# Patient Record
Sex: MALE | Race: BLACK OR AFRICAN AMERICAN | NOT HISPANIC OR LATINO | Employment: OTHER | URBAN - METROPOLITAN AREA
[De-identification: names, ages, dates, MRNs, and addresses within clinical notes are randomized per-mention and may not be internally consistent; named-entity substitution may affect disease eponyms.]

---

## 2017-01-06 ENCOUNTER — COMMUNICATION - HEALTHEAST (OUTPATIENT)
Dept: FAMILY MEDICINE | Facility: CLINIC | Age: 52
End: 2017-01-06

## 2017-01-06 DIAGNOSIS — G47.00 INSOMNIA: ICD-10-CM

## 2017-01-09 ENCOUNTER — OFFICE VISIT - HEALTHEAST (OUTPATIENT)
Dept: FAMILY MEDICINE | Facility: CLINIC | Age: 52
End: 2017-01-09

## 2017-01-09 DIAGNOSIS — G82.20 PARAPLEGIA (H): ICD-10-CM

## 2017-01-09 DIAGNOSIS — L89.90 PRESSURE ULCER: ICD-10-CM

## 2017-01-09 DIAGNOSIS — Z00.00 HEALTH CARE MAINTENANCE: ICD-10-CM

## 2017-01-09 DIAGNOSIS — I10 ESSENTIAL HYPERTENSION, BENIGN: ICD-10-CM

## 2017-01-09 DIAGNOSIS — G89.29 OTHER CHRONIC PAIN: ICD-10-CM

## 2017-01-09 DIAGNOSIS — N31.9 NEUROGENIC BLADDER: ICD-10-CM

## 2017-01-15 ENCOUNTER — RECORDS - HEALTHEAST (OUTPATIENT)
Dept: ADMINISTRATIVE | Facility: OTHER | Age: 52
End: 2017-01-15

## 2017-01-24 ENCOUNTER — TRANSFERRED RECORDS (OUTPATIENT)
Dept: HEALTH INFORMATION MANAGEMENT | Facility: CLINIC | Age: 52
End: 2017-01-24

## 2017-01-24 ENCOUNTER — COMMUNICATION - HEALTHEAST (OUTPATIENT)
Dept: FAMILY MEDICINE | Facility: CLINIC | Age: 52
End: 2017-01-24

## 2017-01-31 ENCOUNTER — OFFICE VISIT - HEALTHEAST (OUTPATIENT)
Dept: VASCULAR SURGERY | Facility: CLINIC | Age: 52
End: 2017-01-31

## 2017-01-31 DIAGNOSIS — S71.101A OPEN THIGH WOUND, RIGHT, INITIAL ENCOUNTER: ICD-10-CM

## 2017-02-01 ENCOUNTER — COMMUNICATION - HEALTHEAST (OUTPATIENT)
Dept: FAMILY MEDICINE | Facility: CLINIC | Age: 52
End: 2017-02-01

## 2017-02-01 DIAGNOSIS — G89.29 OTHER CHRONIC PAIN: ICD-10-CM

## 2017-02-02 ENCOUNTER — COMMUNICATION - HEALTHEAST (OUTPATIENT)
Dept: FAMILY MEDICINE | Facility: CLINIC | Age: 52
End: 2017-02-02

## 2017-02-15 ENCOUNTER — COMMUNICATION - HEALTHEAST (OUTPATIENT)
Dept: FAMILY MEDICINE | Facility: CLINIC | Age: 52
End: 2017-02-15

## 2017-02-15 DIAGNOSIS — G47.00 INSOMNIA: ICD-10-CM

## 2017-02-19 ENCOUNTER — COMMUNICATION - HEALTHEAST (OUTPATIENT)
Dept: FAMILY MEDICINE | Facility: CLINIC | Age: 52
End: 2017-02-19

## 2017-02-19 DIAGNOSIS — G82.20 PARAPLEGIA (H): ICD-10-CM

## 2017-03-06 ENCOUNTER — OFFICE VISIT - HEALTHEAST (OUTPATIENT)
Dept: FAMILY MEDICINE | Facility: CLINIC | Age: 52
End: 2017-03-06

## 2017-03-06 DIAGNOSIS — G82.20 PARAPLEGIA (H): ICD-10-CM

## 2017-03-06 DIAGNOSIS — I10 ESSENTIAL HYPERTENSION, BENIGN: ICD-10-CM

## 2017-03-06 DIAGNOSIS — Z89.511 HX OF BKA, RIGHT (H): ICD-10-CM

## 2017-03-06 DIAGNOSIS — L97.119: ICD-10-CM

## 2017-03-06 DIAGNOSIS — D64.9 ANEMIA: ICD-10-CM

## 2017-03-06 DIAGNOSIS — Z00.00 ROUTINE GENERAL MEDICAL EXAMINATION AT A HEALTH CARE FACILITY: ICD-10-CM

## 2017-03-06 DIAGNOSIS — G89.29 OTHER CHRONIC PAIN: ICD-10-CM

## 2017-03-06 DIAGNOSIS — N31.9 NEUROGENIC BLADDER: ICD-10-CM

## 2017-03-06 LAB
CHOLEST SERPL-MCNC: 103 MG/DL
FASTING STATUS PATIENT QL REPORTED: YES
HDLC SERPL-MCNC: 43 MG/DL
LDLC SERPL CALC-MCNC: 50 MG/DL
PSA SERPL-MCNC: 1 NG/ML (ref 0–3.5)
TRIGL SERPL-MCNC: 51 MG/DL

## 2017-03-10 ENCOUNTER — COMMUNICATION - HEALTHEAST (OUTPATIENT)
Dept: FAMILY MEDICINE | Facility: CLINIC | Age: 52
End: 2017-03-10

## 2017-03-10 DIAGNOSIS — N31.9 NEUROGENIC BLADDER: ICD-10-CM

## 2017-03-13 ENCOUNTER — COMMUNICATION - HEALTHEAST (OUTPATIENT)
Dept: FAMILY MEDICINE | Facility: CLINIC | Age: 52
End: 2017-03-13

## 2017-03-16 ENCOUNTER — RECORDS - HEALTHEAST (OUTPATIENT)
Dept: ADMINISTRATIVE | Facility: OTHER | Age: 52
End: 2017-03-16

## 2017-03-21 ENCOUNTER — COMMUNICATION - HEALTHEAST (OUTPATIENT)
Dept: TELEHEALTH | Facility: CLINIC | Age: 52
End: 2017-03-21

## 2017-03-21 ENCOUNTER — OFFICE VISIT - HEALTHEAST (OUTPATIENT)
Dept: VASCULAR SURGERY | Facility: CLINIC | Age: 52
End: 2017-03-21

## 2017-03-21 DIAGNOSIS — S71.101A OPEN THIGH WOUND, RIGHT, INITIAL ENCOUNTER: ICD-10-CM

## 2017-03-26 ENCOUNTER — COMMUNICATION - HEALTHEAST (OUTPATIENT)
Dept: FAMILY MEDICINE | Facility: CLINIC | Age: 52
End: 2017-03-26

## 2017-03-26 DIAGNOSIS — G82.20 PARAPLEGIA (H): ICD-10-CM

## 2017-03-28 ENCOUNTER — COMMUNICATION - HEALTHEAST (OUTPATIENT)
Dept: FAMILY MEDICINE | Facility: CLINIC | Age: 52
End: 2017-03-28

## 2017-03-28 DIAGNOSIS — G89.29 OTHER CHRONIC PAIN: ICD-10-CM

## 2017-04-25 ENCOUNTER — COMMUNICATION - HEALTHEAST (OUTPATIENT)
Dept: FAMILY MEDICINE | Facility: CLINIC | Age: 52
End: 2017-04-25

## 2017-04-25 DIAGNOSIS — G82.20 PARAPLEGIA (H): ICD-10-CM

## 2017-05-01 ENCOUNTER — OFFICE VISIT - HEALTHEAST (OUTPATIENT)
Dept: FAMILY MEDICINE | Facility: CLINIC | Age: 52
End: 2017-05-01

## 2017-05-01 DIAGNOSIS — G89.29 OTHER CHRONIC PAIN: ICD-10-CM

## 2017-05-01 DIAGNOSIS — G82.20 PARAPLEGIA (H): ICD-10-CM

## 2017-05-01 DIAGNOSIS — Z89.511 HX OF BKA, RIGHT (H): ICD-10-CM

## 2017-05-01 DIAGNOSIS — D64.9 ANEMIA: ICD-10-CM

## 2017-05-25 ENCOUNTER — RECORDS - HEALTHEAST (OUTPATIENT)
Dept: GENERAL RADIOLOGY | Facility: CLINIC | Age: 52
End: 2017-05-25

## 2017-05-25 ENCOUNTER — OFFICE VISIT - HEALTHEAST (OUTPATIENT)
Dept: FAMILY MEDICINE | Facility: CLINIC | Age: 52
End: 2017-05-25

## 2017-05-25 DIAGNOSIS — G89.29 OTHER CHRONIC PAIN: ICD-10-CM

## 2017-05-25 DIAGNOSIS — G82.20 PARAPLEGIA (H): ICD-10-CM

## 2017-05-25 DIAGNOSIS — M25.511 SHOULDER PAIN, RIGHT: ICD-10-CM

## 2017-05-25 DIAGNOSIS — G47.00 INSOMNIA: ICD-10-CM

## 2017-05-25 DIAGNOSIS — M25.511 PAIN IN RIGHT SHOULDER: ICD-10-CM

## 2017-06-12 ENCOUNTER — COMMUNICATION - HEALTHEAST (OUTPATIENT)
Dept: FAMILY MEDICINE | Facility: CLINIC | Age: 52
End: 2017-06-12

## 2017-06-12 DIAGNOSIS — G82.20 PARAPLEGIA (H): ICD-10-CM

## 2017-06-22 ENCOUNTER — RECORDS - HEALTHEAST (OUTPATIENT)
Dept: ADMINISTRATIVE | Facility: OTHER | Age: 52
End: 2017-06-22

## 2017-06-26 ENCOUNTER — COMMUNICATION - HEALTHEAST (OUTPATIENT)
Dept: FAMILY MEDICINE | Facility: CLINIC | Age: 52
End: 2017-06-26

## 2017-06-26 DIAGNOSIS — G47.00 INSOMNIA: ICD-10-CM

## 2017-06-28 ENCOUNTER — TRANSFERRED RECORDS (OUTPATIENT)
Dept: HEALTH INFORMATION MANAGEMENT | Facility: CLINIC | Age: 52
End: 2017-06-28

## 2017-06-29 ENCOUNTER — OFFICE VISIT - HEALTHEAST (OUTPATIENT)
Dept: FAMILY MEDICINE | Facility: CLINIC | Age: 52
End: 2017-06-29

## 2017-06-29 DIAGNOSIS — G89.29 OTHER CHRONIC PAIN: ICD-10-CM

## 2017-06-29 DIAGNOSIS — M25.511 SHOULDER PAIN, RIGHT: ICD-10-CM

## 2017-07-11 ENCOUNTER — COMMUNICATION - HEALTHEAST (OUTPATIENT)
Dept: FAMILY MEDICINE | Facility: CLINIC | Age: 52
End: 2017-07-11

## 2017-07-11 DIAGNOSIS — G82.20 PARAPLEGIA (H): ICD-10-CM

## 2017-07-13 ENCOUNTER — RECORDS - HEALTHEAST (OUTPATIENT)
Dept: ADMINISTRATIVE | Facility: OTHER | Age: 52
End: 2017-07-13

## 2017-07-14 ENCOUNTER — COMMUNICATION - HEALTHEAST (OUTPATIENT)
Dept: FAMILY MEDICINE | Facility: CLINIC | Age: 52
End: 2017-07-14

## 2017-07-14 ENCOUNTER — AMBULATORY - HEALTHEAST (OUTPATIENT)
Dept: LAB | Facility: CLINIC | Age: 52
End: 2017-07-14

## 2017-07-14 DIAGNOSIS — G89.29 OTHER CHRONIC PAIN: ICD-10-CM

## 2017-07-20 ENCOUNTER — OFFICE VISIT - HEALTHEAST (OUTPATIENT)
Dept: FAMILY MEDICINE | Facility: CLINIC | Age: 52
End: 2017-07-20

## 2017-07-20 DIAGNOSIS — G82.20 PARAPLEGIA (H): ICD-10-CM

## 2017-07-20 DIAGNOSIS — L97.119: ICD-10-CM

## 2017-07-20 DIAGNOSIS — G89.29 OTHER CHRONIC PAIN: ICD-10-CM

## 2017-07-20 DIAGNOSIS — I10 ESSENTIAL HYPERTENSION, BENIGN: ICD-10-CM

## 2017-07-26 ENCOUNTER — COMMUNICATION - HEALTHEAST (OUTPATIENT)
Dept: FAMILY MEDICINE | Facility: CLINIC | Age: 52
End: 2017-07-26

## 2017-07-26 ENCOUNTER — TRANSFERRED RECORDS (OUTPATIENT)
Dept: HEALTH INFORMATION MANAGEMENT | Facility: CLINIC | Age: 52
End: 2017-07-26

## 2017-07-26 ENCOUNTER — OFFICE VISIT - HEALTHEAST (OUTPATIENT)
Dept: FAMILY MEDICINE | Facility: CLINIC | Age: 52
End: 2017-07-26

## 2017-07-26 DIAGNOSIS — G82.20 PARAPLEGIA (H): ICD-10-CM

## 2017-07-26 DIAGNOSIS — L97.119: ICD-10-CM

## 2017-07-26 DIAGNOSIS — F14.90 COCAINE USE: ICD-10-CM

## 2017-07-26 DIAGNOSIS — G89.29 OTHER CHRONIC PAIN: ICD-10-CM

## 2017-07-26 DIAGNOSIS — R25.2 SPASTICITY: ICD-10-CM

## 2017-08-07 ENCOUNTER — COMMUNICATION - HEALTHEAST (OUTPATIENT)
Dept: FAMILY MEDICINE | Facility: CLINIC | Age: 52
End: 2017-08-07

## 2017-08-15 ENCOUNTER — COMMUNICATION - HEALTHEAST (OUTPATIENT)
Dept: FAMILY MEDICINE | Facility: CLINIC | Age: 52
End: 2017-08-15

## 2017-08-16 ENCOUNTER — COMMUNICATION - HEALTHEAST (OUTPATIENT)
Dept: FAMILY MEDICINE | Facility: CLINIC | Age: 52
End: 2017-08-16

## 2017-08-16 DIAGNOSIS — G89.29 OTHER CHRONIC PAIN: ICD-10-CM

## 2017-08-30 ENCOUNTER — COMMUNICATION - HEALTHEAST (OUTPATIENT)
Dept: FAMILY MEDICINE | Facility: CLINIC | Age: 52
End: 2017-08-30

## 2017-08-30 DIAGNOSIS — N31.9 NEUROGENIC BLADDER: ICD-10-CM

## 2017-09-12 ENCOUNTER — COMMUNICATION - HEALTHEAST (OUTPATIENT)
Dept: FAMILY MEDICINE | Facility: CLINIC | Age: 52
End: 2017-09-12

## 2017-09-12 DIAGNOSIS — G89.29 OTHER CHRONIC PAIN: ICD-10-CM

## 2017-09-26 ENCOUNTER — OFFICE VISIT - HEALTHEAST (OUTPATIENT)
Dept: VASCULAR SURGERY | Facility: CLINIC | Age: 52
End: 2017-09-26

## 2017-09-26 DIAGNOSIS — S71.101A OPEN THIGH WOUND, RIGHT, INITIAL ENCOUNTER: ICD-10-CM

## 2017-09-27 ENCOUNTER — COMMUNICATION - HEALTHEAST (OUTPATIENT)
Dept: FAMILY MEDICINE | Facility: CLINIC | Age: 52
End: 2017-09-27

## 2017-10-02 ENCOUNTER — COMMUNICATION - HEALTHEAST (OUTPATIENT)
Dept: FAMILY MEDICINE | Facility: CLINIC | Age: 52
End: 2017-10-02

## 2017-10-02 DIAGNOSIS — N39.0 UTI (URINARY TRACT INFECTION): ICD-10-CM

## 2017-10-05 ENCOUNTER — COMMUNICATION - HEALTHEAST (OUTPATIENT)
Dept: PALLIATIVE MEDICINE | Facility: OTHER | Age: 52
End: 2017-10-05

## 2017-10-09 ENCOUNTER — COMMUNICATION - HEALTHEAST (OUTPATIENT)
Dept: FAMILY MEDICINE | Facility: CLINIC | Age: 52
End: 2017-10-09

## 2017-10-09 DIAGNOSIS — G89.29 OTHER CHRONIC PAIN: ICD-10-CM

## 2017-10-18 ENCOUNTER — OFFICE VISIT - HEALTHEAST (OUTPATIENT)
Dept: FAMILY MEDICINE | Facility: CLINIC | Age: 52
End: 2017-10-18

## 2017-10-18 DIAGNOSIS — G82.20 PARAPLEGIA (H): ICD-10-CM

## 2017-10-18 DIAGNOSIS — Z89.511 HX OF BKA, RIGHT (H): ICD-10-CM

## 2017-10-18 DIAGNOSIS — L02.92 BOIL: ICD-10-CM

## 2017-10-18 DIAGNOSIS — G89.29 OTHER CHRONIC PAIN: ICD-10-CM

## 2017-10-18 DIAGNOSIS — L97.119: ICD-10-CM

## 2017-10-18 DIAGNOSIS — N62 GYNECOMASTIA: ICD-10-CM

## 2017-10-24 ENCOUNTER — PRE VISIT (OUTPATIENT)
Dept: ORTHOPEDICS | Facility: CLINIC | Age: 52
End: 2017-10-24

## 2017-10-30 ENCOUNTER — COMMUNICATION - HEALTHEAST (OUTPATIENT)
Dept: FAMILY MEDICINE | Facility: CLINIC | Age: 52
End: 2017-10-30

## 2017-10-30 DIAGNOSIS — I10 HTN (HYPERTENSION): ICD-10-CM

## 2017-10-30 DIAGNOSIS — G47.00 INSOMNIA: ICD-10-CM

## 2017-10-31 ENCOUNTER — OFFICE VISIT (OUTPATIENT)
Dept: ORTHOPEDICS | Facility: CLINIC | Age: 52
End: 2017-10-31

## 2017-10-31 ENCOUNTER — RECORDS - HEALTHEAST (OUTPATIENT)
Dept: ADMINISTRATIVE | Facility: OTHER | Age: 52
End: 2017-10-31

## 2017-10-31 VITALS
DIASTOLIC BLOOD PRESSURE: 74 MMHG | SYSTOLIC BLOOD PRESSURE: 111 MMHG | TEMPERATURE: 98 F | RESPIRATION RATE: 16 BRPM | OXYGEN SATURATION: 100 % | HEART RATE: 78 BPM

## 2017-10-31 DIAGNOSIS — T14.8XXA OPEN WOUND: Primary | ICD-10-CM

## 2017-10-31 RX ORDER — OXYCODONE HYDROCHLORIDE 5 MG/1
5 TABLET ORAL 3 TIMES DAILY
COMMUNITY
Start: 2017-10-18 | End: 2019-04-02

## 2017-10-31 RX ORDER — BACLOFEN 20 MG/1
20 TABLET ORAL 4 TIMES DAILY
Status: ON HOLD | COMMUNITY
Start: 2017-07-26 | End: 2019-05-28

## 2017-10-31 RX ORDER — METOPROLOL SUCCINATE 50 MG/1
50 TABLET, EXTENDED RELEASE ORAL DAILY
Status: ON HOLD | COMMUNITY
Start: 2016-11-25 | End: 2019-05-28

## 2017-10-31 RX ORDER — TOLTERODINE 4 MG/1
8 CAPSULE, EXTENDED RELEASE ORAL DAILY
Status: ON HOLD | COMMUNITY
Start: 2017-09-01 | End: 2019-05-28

## 2017-10-31 RX ORDER — FERROUS SULFATE 325(65) MG
325 TABLET ORAL DAILY
COMMUNITY
Start: 2017-03-10 | End: 2018-03-10

## 2017-10-31 RX ORDER — ZOLPIDEM TARTRATE 10 MG/1
10 TABLET ORAL AT BEDTIME
COMMUNITY
Start: 2017-06-26 | End: 2019-04-02

## 2017-10-31 ASSESSMENT — ENCOUNTER SYMPTOMS
NECK PAIN: 1
MUSCLE WEAKNESS: 1
ARTHRALGIAS: 1
SKIN CHANGES: 0
BACK PAIN: 1
NAIL CHANGES: 0
POOR WOUND HEALING: 1
MYALGIAS: 1

## 2017-10-31 NOTE — NURSING NOTE
Reason For Visit:   Chief Complaint   Patient presents with     Consult     Right thigh ulcer       ?  No  Occupation: Customer Service  Currently working? Yes.  Work status?  On medical leave.  Date of injury: N/A  Type of injury: Ulcer.  Date of surgery: 1995  Type of surgery: Right leg amputation  Smoker: No      Pain Assessment  Patient Currently in Pain: No

## 2017-10-31 NOTE — PROGRESS NOTES
Dear Dr. Petit,    Thank you for asking me to see Mr. Crump for evaluation of a chronic right posterior thigh and buttock secondary to complications of paraplegia. As you know [facility] team has spent significant time and energy working with Mr. Crump to manage his wound. His primary problem at this point appears to be the need to rest the wound on his chair when sitting as well as the significant edema and swelling that has resulted in a very large below-knee amputation stump. He expresses difficulty in his mobility because of the size and weight of the right lower extremity.    Details of our assessment and plan her attention Dr. Rizzo's note. I saw Mr. Crump with Dr. Rizzo today. In summary I believe we will be able to provide him with surgical management of his problems. This would likely require a modified hip disarticulation and significant involvement with our plastic surgery team. I recommended and we will schedule an appointment with Dr. Felicia Blackmon. We will also obtain a CT scan to help predict the orientation of the osteotomy that will be required to remove remove his femoral acetabular interface.    He is asked that if surgery is performed to be performed early in 2018. I will therefore see him back after consultation with Dr. Blackmon and review of his CT scan to discuss the risk and potential benefits of proceeding with what will be a significant surgery.    I hope this is helpful please contact me with any questions are team at [facility] or appreciate being involved in his care.     Sincerely,
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
## 2017-10-31 NOTE — MR AVS SNAPSHOT
After Visit Summary   10/31/2017    Josiah Crump    MRN: [id number]           Patient Information     Date Of Birth          1965        Visit Information        Provider Department      10/31/2017 3:00 PM Mayur Murphy MD [facility]        Today's Diagnoses     Open wound    -  1       Follow-ups after your visit        Who to contact     Please call your clinic at [phone] to:    Ask questions about your health    Make or cancel appointments    Discuss your medicines    Learn about your test results    Speak to your doctor   If you have compliments or concerns about an experience at your clinic, or if you wish to file a complaint, please contact [facility] Patient Relations at [phone] or email us at [email or web address]         Additional Information About Your Visit        MyChart Information     [employer] is an electronic gateway that provides easy, online access to your medical records. With [employer] you can request a clinic appointment, read your test results, renew a prescription or communicate with your care team.     To sign up for [employer] visit the website at [email or web address]   You will be asked to enter the access code listed below, as well as some personal information. Please follow the directions to create your username and password.     Your access code is: [email or web address]  Expires: 2018  6:30 AM     Your access code will  in 90 days. If you need help or a new code, please contact your [facility] or call [phone] for assistance.        Care EveryWhere ID     This is your Care EveryWhere ID. This could be used by other organizations to access your [facility] medical records  [id number]        Your Vitals Were     Pulse Temperature Respirations Pulse Oximetry          78 98  F (36.7  C) (Oral) 16 100%         Blood Pressure from Last 3 Encounters:   10/31/17  111/74    Weight from Last 3 Encounters:   No data found for Wt               Primary Care Provider Office Phone # Fax #    Marvel Petit [phone]       [address] [address]        Equal Access to Services     JOSTIN PEREA : [place] darden germaino Soomaali, waaxda luqadaha, qaybta kaalmada adekodakyada, adelfo aaliyahin hayaagrant kimblekodak ronquillo darrell sales. So [facility] [phone].    ATENCIÓN: Si habla español, tiene a aldridge disposición servicios gratuitos de asistencia lingüística. Llame al [phone].    We comply with applicable federal civil rights laws and [place] laws. We do not discriminate on the basis of race, color, national origin, age, disability, sex, sexual orientation, or gender identity.            Thank you!     Thank you for choosing [facility]  for your care. Our goal is always to provide you with excellent care. Hearing back from our patients is one way we can continue to improve our services. Please take a few minutes to complete the written survey that you may receive in the mail after your visit with us. Thank you!             Your Updated Medication List - Protect others around you: Learn how to safely use, store and throw away your medicines at www.disposemymeds.org.          This list is accurate as of: 10/31/17  6:53 PM.  Always use your most recent med list.                   Brand Name Dispense Instructions for use Diagnosis    baclofen 20 MG tablet    LIORESAL     Take 20 mg by mouth 4 times daily        ferrous sulfate 325 (65 FE) MG tablet    IRON     Take 325 mg by mouth daily        metoprolol 50 MG 24 hr tablet    TOPROL-XL     Take 50 mg by mouth daily        * OXYCONTIN PO      Take 60 mg by mouth 3 times daily        * oxyCODONE 5 MG IR tablet    ROXICODONE     Take 5 mg by mouth 3 times daily        tolterodine 4 MG 24 hr capsule    DETROL LA     Take 4 mg by mouth daily        zolpidem 10 MG tablet    AMBIEN     Take 10 mg by  mouth At Bedtime        * Notice:  This list has 2 medication(s) that are the same as other medications prescribed for you. Read the directions carefully, and ask your doctor or other care provider to review them with you.

## 2017-11-02 ENCOUNTER — OFFICE VISIT - HEALTHEAST (OUTPATIENT)
Dept: FAMILY MEDICINE | Facility: CLINIC | Age: 52
End: 2017-11-02

## 2017-11-02 DIAGNOSIS — N62 GYNECOMASTIA: ICD-10-CM

## 2017-11-02 DIAGNOSIS — L89.90 PRESSURE ULCER: ICD-10-CM

## 2017-11-02 DIAGNOSIS — Z89.511 HX OF BKA, RIGHT (H): ICD-10-CM

## 2017-11-02 DIAGNOSIS — G89.29 OTHER CHRONIC PAIN: ICD-10-CM

## 2017-11-07 ENCOUNTER — TELEPHONE (OUTPATIENT)
Dept: ORTHOPEDICS | Facility: CLINIC | Age: 52
End: 2017-11-07

## 2017-11-07 NOTE — TELEPHONE ENCOUNTER
RN called and spoke with Mr. Crump.  He is scheduled at [facility] with Dr. Norman Mora. An associate of Dr. Coates.  On 11-15-17 at 1000.  We will try and arrange for CT pelvis to be done same day prior to this visit.  Pt is in agreement with this plan.

## 2017-11-14 ENCOUNTER — COMMUNICATION - HEALTHEAST (OUTPATIENT)
Dept: FAMILY MEDICINE | Facility: CLINIC | Age: 52
End: 2017-11-14

## 2017-11-14 DIAGNOSIS — G89.29 OTHER CHRONIC PAIN: ICD-10-CM

## 2017-11-15 ENCOUNTER — RECORDS - HEALTHEAST (OUTPATIENT)
Dept: ADMINISTRATIVE | Facility: OTHER | Age: 52
End: 2017-11-15

## 2017-11-15 ENCOUNTER — HOSPITAL ENCOUNTER (OUTPATIENT)
Dept: WOUND CARE | Facility: CLINIC | Age: 52
Discharge: HOME | End: 2017-11-15
Attending: SURGERY | Admitting: SURGERY
Payer: MEDICARE

## 2017-11-15 ENCOUNTER — TELEPHONE (OUTPATIENT)
Dept: WOUND CARE | Facility: CLINIC | Age: 52
End: 2017-11-15

## 2017-11-15 VITALS — DIASTOLIC BLOOD PRESSURE: 63 MMHG | TEMPERATURE: 98.6 F | HEART RATE: 84 BPM | SYSTOLIC BLOOD PRESSURE: 117 MMHG

## 2017-11-15 LAB
ALBUMIN SERPL-MCNC: 2.3 G/DL (ref 3.4–5)
CRP SERPL-MCNC: 96.2 MG/L (ref 0–8)
ERYTHROCYTE [SEDIMENTATION RATE] IN BLOOD BY WESTERGREN METHOD: 91 MM/H (ref 0–20)
PREALB SERPL IA-MCNC: 12 MG/DL (ref 15–45)

## 2017-11-15 PROCEDURE — 85652 RBC SED RATE AUTOMATED: CPT | Performed by: SURGERY

## 2017-11-15 PROCEDURE — 84134 ASSAY OF PREALBUMIN: CPT | Performed by: SURGERY

## 2017-11-15 PROCEDURE — 99203 OFFICE O/P NEW LOW 30 MIN: CPT | Performed by: SURGERY

## 2017-11-15 PROCEDURE — 86140 C-REACTIVE PROTEIN: CPT | Performed by: SURGERY

## 2017-11-15 PROCEDURE — 99214 OFFICE O/P EST MOD 30 MIN: CPT | Mod: 25

## 2017-11-15 PROCEDURE — 97602 WOUND(S) CARE NON-SELECTIVE: CPT

## 2017-11-15 PROCEDURE — 82040 ASSAY OF SERUM ALBUMIN: CPT | Performed by: SURGERY

## 2017-11-15 NOTE — TELEPHONE ENCOUNTER
Called pt to let him know that Dr. Romero recommends 100 gms of protein daily to promote wound healing according to today's prealbumin and albumin levels. Pt education completed regarding high protein foods in addition to drinking two Ensure supplements daily.
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
## 2017-11-16 ENCOUNTER — OFFICE VISIT - HEALTHEAST (OUTPATIENT)
Dept: FAMILY MEDICINE | Facility: CLINIC | Age: 52
End: 2017-11-16

## 2017-11-16 DIAGNOSIS — E88.09 HYPOALBUMINEMIA: ICD-10-CM

## 2017-11-16 DIAGNOSIS — Z89.511 HX OF BKA, RIGHT (H): ICD-10-CM

## 2017-11-16 DIAGNOSIS — D64.9 ANEMIA: ICD-10-CM

## 2017-11-16 DIAGNOSIS — N62 GYNECOMASTIA: ICD-10-CM

## 2017-11-16 DIAGNOSIS — L89.90 PRESSURE ULCER: ICD-10-CM

## 2017-11-27 ENCOUNTER — AMBULATORY - HEALTHEAST (OUTPATIENT)
Dept: FAMILY MEDICINE | Facility: CLINIC | Age: 52
End: 2017-11-27

## 2017-11-27 ENCOUNTER — COMMUNICATION - HEALTHEAST (OUTPATIENT)
Dept: FAMILY MEDICINE | Facility: CLINIC | Age: 52
End: 2017-11-27

## 2017-11-27 DIAGNOSIS — N31.9 NEUROGENIC BLADDER: ICD-10-CM

## 2017-11-27 DIAGNOSIS — G89.29 OTHER CHRONIC PAIN: ICD-10-CM

## 2017-11-27 DIAGNOSIS — G47.00 INSOMNIA: ICD-10-CM

## 2017-12-04 ENCOUNTER — COMMUNICATION - HEALTHEAST (OUTPATIENT)
Dept: FAMILY MEDICINE | Facility: CLINIC | Age: 52
End: 2017-12-04

## 2017-12-04 DIAGNOSIS — G89.29 OTHER CHRONIC PAIN: ICD-10-CM

## 2017-12-05 ENCOUNTER — COMMUNICATION - HEALTHEAST (OUTPATIENT)
Dept: FAMILY MEDICINE | Facility: CLINIC | Age: 52
End: 2017-12-05

## 2017-12-05 ENCOUNTER — RECORDS - HEALTHEAST (OUTPATIENT)
Dept: ADMINISTRATIVE | Facility: OTHER | Age: 52
End: 2017-12-05

## 2017-12-11 ENCOUNTER — HOSPITAL ENCOUNTER (OUTPATIENT)
Dept: MAMMOGRAPHY | Facility: CLINIC | Age: 52
Discharge: HOME | End: 2017-12-11
Attending: FAMILY MEDICINE

## 2017-12-11 ENCOUNTER — HOSPITAL ENCOUNTER (OUTPATIENT)
Dept: ULTRASOUND IMAGING | Facility: CLINIC | Age: 52
Discharge: HOME | End: 2017-12-11
Attending: FAMILY MEDICINE

## 2017-12-11 ENCOUNTER — COMMUNICATION - HEALTHEAST (OUTPATIENT)
Dept: FAMILY MEDICINE | Facility: CLINIC | Age: 52
End: 2017-12-11

## 2017-12-11 DIAGNOSIS — N62 GYNECOMASTIA: ICD-10-CM

## 2018-01-03 ENCOUNTER — COMMUNICATION - HEALTHEAST (OUTPATIENT)
Dept: FAMILY MEDICINE | Facility: CLINIC | Age: 53
End: 2018-01-03

## 2018-01-03 ENCOUNTER — OFFICE VISIT - HEALTHEAST (OUTPATIENT)
Dept: FAMILY MEDICINE | Facility: CLINIC | Age: 53
End: 2018-01-03

## 2018-01-03 DIAGNOSIS — N62 GYNECOMASTIA: ICD-10-CM

## 2018-01-03 DIAGNOSIS — R77.9 ELEVATED BLOOD PROTEIN: ICD-10-CM

## 2018-01-03 DIAGNOSIS — G89.29 OTHER CHRONIC PAIN: ICD-10-CM

## 2018-01-03 DIAGNOSIS — R79.89 ELEVATED PROLACTIN LEVEL: ICD-10-CM

## 2018-01-03 DIAGNOSIS — R61 NIGHT SWEATS: ICD-10-CM

## 2018-01-03 DIAGNOSIS — R97.8 OTHER ABNORMAL TUMOR MARKERS: ICD-10-CM

## 2018-01-03 DIAGNOSIS — L89.90 PRESSURE ULCER: ICD-10-CM

## 2018-01-03 LAB
ALBUMIN SERPL-MCNC: 2.6 G/DL (ref 3.5–5)
ALP SERPL-CCNC: 84 U/L (ref 45–120)
ALT SERPL W P-5'-P-CCNC: <6 U/L (ref 0–45)
ANION GAP SERPL CALCULATED.3IONS-SCNC: 12 MMOL/L (ref 5–18)
AST SERPL W P-5'-P-CCNC: 9 U/L (ref 0–40)
BILIRUB SERPL-MCNC: 0.3 MG/DL (ref 0–1)
BUN SERPL-MCNC: 10 MG/DL (ref 8–22)
CALCIUM SERPL-MCNC: 9.3 MG/DL (ref 8.5–10.5)
CHLORIDE BLD-SCNC: 102 MMOL/L (ref 98–107)
CO2 SERPL-SCNC: 24 MMOL/L (ref 22–31)
CREAT SERPL-MCNC: 0.75 MG/DL (ref 0.7–1.3)
GFR SERPL CREATININE-BSD FRML MDRD: >60 ML/MIN/1.73M2
GLUCOSE BLD-MCNC: 94 MG/DL (ref 70–125)
POTASSIUM BLD-SCNC: 4.3 MMOL/L (ref 3.5–5)
PROLACTIN SERPL-MCNC: 20.5 NG/ML (ref 0–15)
PROT SERPL-MCNC: 9.3 G/DL (ref 6–8)
SODIUM SERPL-SCNC: 138 MMOL/L (ref 136–145)
T4 FREE SERPL-MCNC: 1.1 NG/DL (ref 0.7–1.8)
TSH SERPL DL<=0.005 MIU/L-ACNC: 2.19 UIU/ML (ref 0.3–5)

## 2018-01-06 LAB
TESTOST SERPL-MCNC: 325 NG/DL (ref 240–950)
TESTOSTERONE, FREE, S - HISTORICAL: 8.13 NG/DL (ref 4.06–15.6)

## 2018-01-12 ENCOUNTER — AMBULATORY - HEALTHEAST (OUTPATIENT)
Dept: LAB | Facility: CLINIC | Age: 53
End: 2018-01-12

## 2018-01-12 DIAGNOSIS — R97.8 OTHER ABNORMAL TUMOR MARKERS: ICD-10-CM

## 2018-01-12 DIAGNOSIS — N62 GYNECOMASTIA: ICD-10-CM

## 2018-01-12 DIAGNOSIS — R77.9 ELEVATED BLOOD PROTEIN: ICD-10-CM

## 2018-01-12 DIAGNOSIS — R79.89 ELEVATED PROLACTIN LEVEL: ICD-10-CM

## 2018-01-12 LAB
ESTRADIOL SERPL-MCNC: 64 PG/ML
LH SERPL-ACNC: 4.8 MIU/ML

## 2018-01-15 LAB — HCG TUMOR MARKER - HISTORICAL: <0.6 IU/L

## 2018-01-19 LAB
ALBUMIN PERCENT: 33.9 % (ref 51–67)
ALBUMIN SERPL ELPH-MCNC: 2.9 G/DL (ref 3.2–4.7)
ALPHA 1 PERCENT: 3.6 % (ref 2–4)
ALPHA 2 PERCENT: 11.1 % (ref 5–13)
ALPHA1 GLOB SERPL ELPH-MCNC: 0.3 G/DL (ref 0.1–0.3)
ALPHA2 GLOB SERPL ELPH-MCNC: 1 G/DL (ref 0.4–0.9)
B-GLOBULIN SERPL ELPH-MCNC: 1.3 G/DL (ref 0.7–1.2)
BETA PERCENT: 15 % (ref 10–17)
GAMMA GLOB SERPL ELPH-MCNC: 3.2 G/DL (ref 0.6–1.4)
GAMMA GLOBULIN PERCENT: 36.4 % (ref 9–20)
PATH ICD:: ABNORMAL
PROT PATTERN SERPL ELPH-IMP: ABNORMAL
PROT SERPL-MCNC: 8.7 G/DL (ref 6–8)
REVIEWING PATHOLOGIST: ABNORMAL

## 2018-01-30 ENCOUNTER — COMMUNICATION - HEALTHEAST (OUTPATIENT)
Dept: FAMILY MEDICINE | Facility: CLINIC | Age: 53
End: 2018-01-30

## 2018-01-31 ENCOUNTER — OFFICE VISIT - HEALTHEAST (OUTPATIENT)
Dept: FAMILY MEDICINE | Facility: CLINIC | Age: 53
End: 2018-01-31

## 2018-01-31 DIAGNOSIS — R77.9 ELEVATED BLOOD PROTEIN: ICD-10-CM

## 2018-01-31 DIAGNOSIS — J10.1 INFLUENZA A: ICD-10-CM

## 2018-01-31 DIAGNOSIS — J40 BRONCHITIS: ICD-10-CM

## 2018-01-31 DIAGNOSIS — N62 GYNECOMASTIA: ICD-10-CM

## 2018-01-31 DIAGNOSIS — G89.29 OTHER CHRONIC PAIN: ICD-10-CM

## 2018-01-31 LAB
FLUAV AG SPEC QL IA: ABNORMAL
FLUBV AG SPEC QL IA: ABNORMAL

## 2018-02-02 LAB
PATH ICD:: NORMAL
PROT PATTERN SERPL IFE-IMP: NORMAL
REVIEWING PATHOLOGIST: NORMAL

## 2018-02-19 ENCOUNTER — OFFICE VISIT - HEALTHEAST (OUTPATIENT)
Dept: FAMILY MEDICINE | Facility: CLINIC | Age: 53
End: 2018-02-19

## 2018-02-19 DIAGNOSIS — N62 GYNECOMASTIA: ICD-10-CM

## 2018-02-19 DIAGNOSIS — L97.119: ICD-10-CM

## 2018-02-19 DIAGNOSIS — G89.29 OTHER CHRONIC PAIN: ICD-10-CM

## 2018-02-19 DIAGNOSIS — G82.20 PARAPLEGIA (H): ICD-10-CM

## 2018-02-20 ENCOUNTER — COMMUNICATION - HEALTHEAST (OUTPATIENT)
Dept: FAMILY MEDICINE | Facility: CLINIC | Age: 53
End: 2018-02-20

## 2018-02-20 DIAGNOSIS — G47.00 INSOMNIA: ICD-10-CM

## 2018-02-22 ENCOUNTER — HOSPITAL ENCOUNTER (OUTPATIENT)
Dept: WOUND CARE | Facility: CLINIC | Age: 53
Discharge: HOME | End: 2018-02-22
Attending: SURGERY | Admitting: SURGERY
Payer: MEDICARE

## 2018-02-22 VITALS — SYSTOLIC BLOOD PRESSURE: 113 MMHG | DIASTOLIC BLOOD PRESSURE: 94 MMHG | TEMPERATURE: 97.4 F | HEART RATE: 94 BPM

## 2018-02-22 DIAGNOSIS — L89.213 PRESSURE ULCER OF RIGHT HIP, STAGE 3 (H): Primary | ICD-10-CM

## 2018-02-22 PROCEDURE — 97602 WOUND(S) CARE NON-SELECTIVE: CPT

## 2018-02-22 NOTE — MR AVS SNAPSHOT
"                  MRN:[id number]                      After Visit Summary   2/22/2018    Josiah Crump    MRN: [id number]           Visit Information        Provider Department      2/22/2018  2:30 PM Charlotte Blackmon MD [facility]          Further instructions from your care team             [facility] [place]  [address] [facility] [address], [place] [address]  Appointment Phone [phone] Nurse Advisors [phone]    Josiah Crump      1965    Wound Dressing Change to right hip and right ischial tuberosity  Cleanse wound with Dial soap and water  Cover wound with kerlix  Cover wound with ABD's and tape, secure with ace wrap   Change dressing daily to twice a day if draining through     Elzbieta Blackmon M.D.. February 22, 2018    Call the [facility] at [phone] to schedule both your CT scan of your pelvis and the CT Angiogram of your right thigh     Call us at [phone] if you have any questions about your wounds, have redness or swelling around your wound, have a fever of 101 or greater or if you have any other problems or concerns. We answer the phone Monday through Friday 8 am to 4 pm, please leave a message as we check the voicemail frequently throughout the day.     Follow up with Dr. Charlotte Blackmon on Thursday March 29th at 2:30pm              [employer] Information     [employer] lets you send messages to your doctor, view your test results, renew your prescriptions, schedule appointments and more. To sign up, go to [email or web address] . Click on \"Log in\" on the left side of the screen, which will take you to the Welcome page. Then click on \"Sign up Now\" on the right side of the page.     You will be asked to enter the access code listed below, as well as some personal information. Please follow the directions to create your username and password.     Your access code is: " [email or web address]  Expires: 2018  3:26 PM     Your access code will  in 90 days. If you need help or a new code, please call your [place] clinic or [phone].        Care EveryWhere ID     This is your Care EveryWhere ID. This could be used by other organizations to access your [place] medical records  [id number]        Equal Access to Services     [facility] Center[facility] : Hadii rajesh Carter, waaxda luninaadaha, qaybta kaalmada adesabas, adelfo ramírez . So [facility] [phone].    ATENCIÓN: Si habla español, tiene a aldridge disposición servicios gratuitos de asistencia lingüística. Llame al [phone].    We comply with applicable federal civil rights laws and [place] laws. We do not discriminate on the basis of race, color, national origin, age, disability, sex, sexual orientation, or gender identity.

## 2018-02-22 NOTE — DISCHARGE INSTRUCTIONS
[facility]  [address] [facility] [address], [place] [address]  Appointment Phone [phone] Nurse Advisors [phone]    Josiah Crump      1965    Wound Dressing Change to right hip and right ischial tuberosity  Cleanse wound with Dial soap and water  Cover wound with kerlix  Cover wound with ABD's and tape, secure with ace wrap   Change dressing daily to twice a day if draining through     Elzbieta Blackmon M.D.. February 22, 2018    Call the [facility] at [phone] to schedule both your CT scan of your pelvis and the CT Angiogram of your right thigh     Call us at [phone] if you have any questions about your wounds, have redness or swelling around your wound, have a fever of 101 or greater or if you have any other problems or concerns. We answer the phone Monday through Friday 8 am to 4 pm, please leave a message as we check the voicemail frequently throughout the day.     Follow up with Dr. Charlotte Blackmon on Thursday March 29th at 2:30pm
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
## 2018-02-27 ENCOUNTER — COMMUNICATION - HEALTHEAST (OUTPATIENT)
Dept: FAMILY MEDICINE | Facility: CLINIC | Age: 53
End: 2018-02-27

## 2018-02-27 DIAGNOSIS — R25.2 SPASTICITY: ICD-10-CM

## 2018-03-06 ENCOUNTER — RADIANT APPOINTMENT (OUTPATIENT)
Dept: CT IMAGING | Facility: CLINIC | Age: 53
End: 2018-03-06
Attending: SURGERY
Payer: MEDICARE

## 2018-03-06 ENCOUNTER — RADIANT APPOINTMENT (OUTPATIENT)
Dept: CT IMAGING | Facility: CLINIC | Age: 53
End: 2018-03-06
Attending: ORTHOPAEDIC SURGERY
Payer: MEDICARE

## 2018-03-06 DIAGNOSIS — T14.8XXA OPEN WOUND: ICD-10-CM

## 2018-03-06 DIAGNOSIS — L89.213 PRESSURE ULCER OF RIGHT HIP, STAGE 3 (H): ICD-10-CM

## 2018-03-06 RX ORDER — IOPAMIDOL 755 MG/ML
150 INJECTION, SOLUTION INTRAVASCULAR ONCE
Status: COMPLETED | OUTPATIENT
Start: 2018-03-06 | End: 2018-03-06

## 2018-03-06 RX ADMIN — IOPAMIDOL 150 ML: 755 INJECTION, SOLUTION INTRAVASCULAR at 13:20

## 2018-03-06 NOTE — DISCHARGE INSTRUCTIONS

## 2018-03-13 ENCOUNTER — COMMUNICATION - HEALTHEAST (OUTPATIENT)
Dept: FAMILY MEDICINE | Facility: CLINIC | Age: 53
End: 2018-03-13

## 2018-03-13 DIAGNOSIS — G89.29 OTHER CHRONIC PAIN: ICD-10-CM

## 2018-03-29 ENCOUNTER — COMMUNICATION - HEALTHEAST (OUTPATIENT)
Dept: FAMILY MEDICINE | Facility: CLINIC | Age: 53
End: 2018-03-29

## 2018-03-29 ENCOUNTER — HOSPITAL ENCOUNTER (OUTPATIENT)
Dept: WOUND CARE | Facility: CLINIC | Age: 53
Discharge: HOME | End: 2018-03-29
Attending: SURGERY | Admitting: SURGERY
Payer: MEDICARE

## 2018-03-29 VITALS — TEMPERATURE: 97.8 F | SYSTOLIC BLOOD PRESSURE: 128 MMHG | HEART RATE: 75 BPM | DIASTOLIC BLOOD PRESSURE: 67 MMHG

## 2018-03-29 DIAGNOSIS — R25.2 SPASTICITY: ICD-10-CM

## 2018-03-29 PROCEDURE — 97602 WOUND(S) CARE NON-SELECTIVE: CPT

## 2018-03-29 NOTE — MR AVS SNAPSHOT
"                  MRN:[id number]                      After Visit Summary   3/29/2018    Josiah Crump    MRN: [id number]           Visit Information        Provider Department      3/29/2018  2:30 PM Charlotte Blackmon MD [facility] [place] [facility]          Further instructions from your care team             [facility]AL[facility] [place]  [address] [facility] [address], [place] [address]  Appointment Phone [phone] Nurse Advisors [phone]     Josiah Crump      1965     Wound Dressing Change to right hip and right ischial tuberosity  Cleanse wound with Dial soap and water  Cover wound with kerlix  Cover wound with ABD's and tape, secure with neoprene velcro closure device  Change dressing daily to twice a day if draining through     Recommend a two part surgical process: have Dr. Murphy remove your bone then wait to see if your soft tissue survives, then have Dr. Blackmon try to close everything. Will consider this after you get your new chair     Elzbieta Blackmon M.D. March 29, 2018      Call us at [phone] if you have any questions about your wounds, have redness or swelling around your wound, have a fever of 101 or greater or if you have any other problems or concerns. We answer the phone Monday through Friday 8 am to 4 pm, please leave a message as we check the voicemail frequently throughout the day.        [employer] Information     [employer] lets you send messages to your doctor, view your test results, renew your prescriptions, schedule appointments and more. To sign up, go to [email or web address] . Click on \"Log in\" on the left side of the screen, which will take you to the Welcome page. Then click on \"Sign up Now\" on the right side of the page.     You will be asked to enter the access code listed below, as well as some personal information. Please follow the directions to create your username and password.     Your access code is: " [email or web address]  Expires: 2018  4:26 PM     Your access code will  in 90 days. If you need help or a new code, please call your [place] clinic or [phone].        Care EveryWhere ID     This is your Care EveryWhere ID. This could be used by other organizations to access your [facility] medical records  [id number]        Equal Access to Services     [facility] : Hadii rajesh Carter, waaxda luninaadaha, qaybta kaalmada aderoberatda, adelfo ramírez . So [facility] [phone].    ATENCIÓN: Si habla español, tiene a aldridge disposición servicios gratuitos de asistencia lingüística. Llame al [phone].    We comply with applicable federal civil rights laws and [place] laws. We do not discriminate on the basis of race, color, national origin, age, disability, sex, sexual orientation, or gender identity.

## 2018-03-29 NOTE — PROGRESS NOTES
Visit Date:   03/29/2018      [facility]AL[facility]STITUTE VISIT       DATE OF VISIT:  03/29/2018      This is a 52-year-old paraplegic gentleman who is here for followup of his right lower extremity decubitus and hip autofusion from . He was referred by Dr. Murphy who had proposed possible hip disarticulation.  We have been trying to assess him for possible soft tissue coverage.  A CT angio showed that the vessels for the upper thigh were all widely patent.  My greatest concern is not so much that as the fact that he has had multiple surgical incisions and previous procedures that have essentially set up additional scar tissues through the soft tissues, which may limit the possibilities for flap creation and coverage. My thoughts are that perhaps Dr. Murphy should first do the disarticulation and we can assess the tissue perfusion at that time with SPY-PHI followed probably within the week with any attempt at flap coverage.  This would certainly help us to ensure that the soft tissues will be viable for coverage. All of these things, however, should wait until this patient gets a new wheelchair and at least a Roho cushion, if not a custom molded cushion at [employer]. The current seating system that he is using is abysmal.  He bought a stuffed cushion for a kitchen chair from [employer] and he is using that on a sling bottom wheelchair. Unfortunately, [place] is quite backed up and that may mean delaying his surgery by at least a couple months.  I, however, will not proceed with any kind of flap closure until he has a proper seating system in place before we commit to prolonged recovery period and need for optimized offloading postoperatively.  He will talk with his  about possibly getting a Roho.  He will talk to his other providers about trying to expedite his visits with [facility].  I have written an email to Dr. Murphy to let him know about where we are at with possible planning and  "optimization for surgery.  We will contact him when we are ready to schedule. The patient, not surprisingly, is eager to have his surgery yesterday.  He is a very active gentleman with a vibrant family life and has difficulty just even sitting in his \"recliner\" at home or holding himself up due to this very elephantitic, edematous lower leg below the large ulcer of the posterior thigh.  Also the heterotopic ossification of the right hip area is also problematic for him and he is wondering if that will be resolved when he has his hip disarticulation. That is probably a better question for Dr. Murphy.  It has been my experience that not all HO is faithfully removable and this might add to the risk of tissue perfusion as well.  I do not know how that would leave him with regards to function in this area.  For now, we will continue with Kerlix, ABD and Ace wraps.  The patient will let us know when there are any further developments so we can go ahead and schedule.       Today, the wound currently measures 17.8 x 15 x 1 cm with some undermining from 7-8 o'clock position of 1.3 cm.  His vitals were within normal limits today.  We are happy to see him back if there are any other issues, otherwise, will just wait to schedule surgery in a staged fashion.         SUNG NAM MD             D: 2018   T: 2018   MT: CAMELIA      Name:     VALDEMAR FLANNERY   MRN:      [id number]        Account:      [id number]   :      1965           Visit Date:   2018      Document: [id number]    "

## 2018-03-29 NOTE — DISCHARGE INSTRUCTIONS
[facility]  [address] [facility] [address], [place] [address]  Appointment Phone [phone] Nurse Advisors [phone]     Josiah Crump      1965     Wound Dressing Change to right hip and right ischial tuberosity  Cleanse wound with Dial soap and water  Cover wound with kerlix  Cover wound with ABD's and tape, secure with neoprene velcro closure device  Change dressing daily to twice a day if draining through     Recommend a two part surgical process: have Dr. Murphy remove your bone then wait to see if your soft tissue survives, then have Dr. Blackmon try to close everything. Will consider this after you get your new chair     Elzbieta Blackmon M.D. March 29, 2018      Call us at [phone] if you have any questions about your wounds, have redness or swelling around your wound, have a fever of 101 or greater or if you have any other problems or concerns. We answer the phone Monday through Friday 8 am to 4 pm, please leave a message as we check the voicemail frequently throughout the day.

## 2018-04-09 ENCOUNTER — OFFICE VISIT - HEALTHEAST (OUTPATIENT)
Dept: FAMILY MEDICINE | Facility: CLINIC | Age: 53
End: 2018-04-09

## 2018-04-09 DIAGNOSIS — G89.29 OTHER CHRONIC PAIN: ICD-10-CM

## 2018-04-09 DIAGNOSIS — L97.119: ICD-10-CM

## 2018-04-09 DIAGNOSIS — G82.20 PARAPLEGIA (H): ICD-10-CM

## 2018-04-09 DIAGNOSIS — Z89.511 HX OF BKA, RIGHT (H): ICD-10-CM

## 2018-04-13 ENCOUNTER — COMMUNICATION - HEALTHEAST (OUTPATIENT)
Dept: FAMILY MEDICINE | Facility: CLINIC | Age: 53
End: 2018-04-13

## 2018-04-13 DIAGNOSIS — I10 HTN (HYPERTENSION): ICD-10-CM

## 2018-04-25 ENCOUNTER — RECORDS - HEALTHEAST (OUTPATIENT)
Dept: ADMINISTRATIVE | Facility: OTHER | Age: 53
End: 2018-04-25

## 2018-04-28 ENCOUNTER — COMMUNICATION - HEALTHEAST (OUTPATIENT)
Dept: FAMILY MEDICINE | Facility: CLINIC | Age: 53
End: 2018-04-28

## 2018-04-28 DIAGNOSIS — R25.2 SPASTICITY: ICD-10-CM

## 2018-04-30 ENCOUNTER — RECORDS - HEALTHEAST (OUTPATIENT)
Dept: ADMINISTRATIVE | Facility: OTHER | Age: 53
End: 2018-04-30

## 2018-05-06 ENCOUNTER — COMMUNICATION - HEALTHEAST (OUTPATIENT)
Dept: FAMILY MEDICINE | Facility: CLINIC | Age: 53
End: 2018-05-06

## 2018-05-06 DIAGNOSIS — G89.29 OTHER CHRONIC PAIN: ICD-10-CM

## 2018-05-07 ENCOUNTER — COMMUNICATION - HEALTHEAST (OUTPATIENT)
Dept: FAMILY MEDICINE | Facility: CLINIC | Age: 53
End: 2018-05-07

## 2018-05-19 ENCOUNTER — COMMUNICATION - HEALTHEAST (OUTPATIENT)
Dept: FAMILY MEDICINE | Facility: CLINIC | Age: 53
End: 2018-05-19

## 2018-05-19 DIAGNOSIS — G47.00 INSOMNIA: ICD-10-CM

## 2018-05-21 ENCOUNTER — COMMUNICATION - HEALTHEAST (OUTPATIENT)
Dept: FAMILY MEDICINE | Facility: CLINIC | Age: 53
End: 2018-05-21

## 2018-05-21 DIAGNOSIS — G89.29 OTHER CHRONIC PAIN: ICD-10-CM

## 2018-05-31 ENCOUNTER — COMMUNICATION - HEALTHEAST (OUTPATIENT)
Dept: FAMILY MEDICINE | Facility: CLINIC | Age: 53
End: 2018-05-31

## 2018-06-04 ENCOUNTER — COMMUNICATION - HEALTHEAST (OUTPATIENT)
Dept: FAMILY MEDICINE | Facility: CLINIC | Age: 53
End: 2018-06-04

## 2018-06-04 DIAGNOSIS — G89.29 OTHER CHRONIC PAIN: ICD-10-CM

## 2018-06-18 ENCOUNTER — COMMUNICATION - HEALTHEAST (OUTPATIENT)
Dept: FAMILY MEDICINE | Facility: CLINIC | Age: 53
End: 2018-06-18

## 2018-06-18 DIAGNOSIS — G89.29 OTHER CHRONIC PAIN: ICD-10-CM

## 2018-06-21 ENCOUNTER — COMMUNICATION - HEALTHEAST (OUTPATIENT)
Dept: FAMILY MEDICINE | Facility: CLINIC | Age: 53
End: 2018-06-21

## 2018-06-25 ENCOUNTER — COMMUNICATION - HEALTHEAST (OUTPATIENT)
Dept: PALLIATIVE MEDICINE | Facility: OTHER | Age: 53
End: 2018-06-25

## 2018-06-25 DIAGNOSIS — G89.29 OTHER CHRONIC PAIN: ICD-10-CM

## 2018-07-09 ENCOUNTER — RECORDS - HEALTHEAST (OUTPATIENT)
Dept: ADMINISTRATIVE | Facility: OTHER | Age: 53
End: 2018-07-09

## 2018-07-11 ENCOUNTER — OFFICE VISIT - HEALTHEAST (OUTPATIENT)
Dept: FAMILY MEDICINE | Facility: CLINIC | Age: 53
End: 2018-07-11

## 2018-07-11 ENCOUNTER — RECORDS - HEALTHEAST (OUTPATIENT)
Dept: ADMINISTRATIVE | Facility: OTHER | Age: 53
End: 2018-07-11

## 2018-07-11 DIAGNOSIS — G89.29 OTHER CHRONIC PAIN: ICD-10-CM

## 2018-07-11 DIAGNOSIS — I10 ESSENTIAL HYPERTENSION, BENIGN: ICD-10-CM

## 2018-07-11 DIAGNOSIS — D64.9 ANEMIA: ICD-10-CM

## 2018-07-11 DIAGNOSIS — L97.119: ICD-10-CM

## 2018-07-11 DIAGNOSIS — G82.20 PARAPLEGIA (H): ICD-10-CM

## 2018-07-11 DIAGNOSIS — Z12.11 SCREENING FOR COLON CANCER: ICD-10-CM

## 2018-07-11 LAB
ANION GAP SERPL CALCULATED.3IONS-SCNC: 12 MMOL/L (ref 5–18)
BUN SERPL-MCNC: 11 MG/DL (ref 8–22)
CALCIUM SERPL-MCNC: 8.9 MG/DL (ref 8.5–10.5)
CHLORIDE BLD-SCNC: 102 MMOL/L (ref 98–107)
CO2 SERPL-SCNC: 21 MMOL/L (ref 22–31)
CREAT SERPL-MCNC: 0.79 MG/DL (ref 0.7–1.3)
ERYTHROCYTE [DISTWIDTH] IN BLOOD BY AUTOMATED COUNT: 26.9 % (ref 11–14.5)
GFR SERPL CREATININE-BSD FRML MDRD: >60 ML/MIN/1.73M2
GLUCOSE BLD-MCNC: 69 MG/DL (ref 70–125)
HCT VFR BLD AUTO: 35.8 % (ref 40–54)
HGB BLD-MCNC: 9.9 G/DL (ref 14–18)
MCH RBC QN AUTO: 18.7 PG (ref 27–34)
MCHC RBC AUTO-ENTMCNC: 27.7 G/DL (ref 32–36)
MCV RBC AUTO: 68 FL (ref 80–100)
PLATELET # BLD AUTO: 535 THOU/UL (ref 140–440)
PMV BLD AUTO: 10 FL (ref 8.5–12.5)
POTASSIUM BLD-SCNC: 4.2 MMOL/L (ref 3.5–5)
RBC # BLD AUTO: 5.29 MILL/UL (ref 4.4–6.2)
SODIUM SERPL-SCNC: 135 MMOL/L (ref 136–145)
WBC: 14.6 THOU/UL (ref 4–11)

## 2018-07-12 ENCOUNTER — COMMUNICATION - HEALTHEAST (OUTPATIENT)
Dept: FAMILY MEDICINE | Facility: CLINIC | Age: 53
End: 2018-07-12

## 2018-07-17 ENCOUNTER — COMMUNICATION - HEALTHEAST (OUTPATIENT)
Dept: FAMILY MEDICINE | Facility: CLINIC | Age: 53
End: 2018-07-17

## 2018-07-17 DIAGNOSIS — N31.9 NEUROGENIC BLADDER: ICD-10-CM

## 2018-07-22 ENCOUNTER — RECORDS - HEALTHEAST (OUTPATIENT)
Dept: ADMINISTRATIVE | Facility: OTHER | Age: 53
End: 2018-07-22

## 2018-07-30 ENCOUNTER — COMMUNICATION - HEALTHEAST (OUTPATIENT)
Dept: FAMILY MEDICINE | Facility: CLINIC | Age: 53
End: 2018-07-30

## 2018-08-09 ENCOUNTER — OFFICE VISIT - HEALTHEAST (OUTPATIENT)
Dept: FAMILY MEDICINE | Facility: CLINIC | Age: 53
End: 2018-08-09

## 2018-08-09 DIAGNOSIS — Z71.85 IMMUNIZATION COUNSELING: ICD-10-CM

## 2018-08-09 DIAGNOSIS — Z23 NEED FOR TD VACCINE: ICD-10-CM

## 2018-08-09 DIAGNOSIS — G89.29 OTHER CHRONIC PAIN: ICD-10-CM

## 2018-08-09 DIAGNOSIS — D64.9 ANEMIA: ICD-10-CM

## 2018-08-09 DIAGNOSIS — G82.20 PARAPLEGIA (H): ICD-10-CM

## 2018-08-09 LAB
ERYTHROCYTE [DISTWIDTH] IN BLOOD BY AUTOMATED COUNT: 25.2 % (ref 11–14.5)
FERRITIN SERPL-MCNC: 77 NG/ML (ref 27–300)
HCT VFR BLD AUTO: 33.8 % (ref 40–54)
HGB BLD-MCNC: 9 G/DL (ref 14–18)
IRON SERPL-MCNC: 20 UG/DL (ref 42–175)
MCH RBC QN AUTO: 18.1 PG (ref 27–34)
MCHC RBC AUTO-ENTMCNC: 26.6 G/DL (ref 32–36)
MCV RBC AUTO: 68 FL (ref 80–100)
PLATELET # BLD AUTO: 553 THOU/UL (ref 140–440)
PMV BLD AUTO: 10 FL (ref 8.5–12.5)
RBC # BLD AUTO: 4.98 MILL/UL (ref 4.4–6.2)
VIT B12 SERPL-MCNC: 821 PG/ML (ref 213–816)
WBC: 10.1 THOU/UL (ref 4–11)

## 2018-08-13 LAB
HEMOGLOBIN A2 QUANTITATION: 3 % (ref 2.2–3.5)
HEMOGLOBIN ELECTROPHRESIS: NORMAL
HEMOGLOBIN F QUANTITATION: <0.8 % (ref 0–2)
PATH ICD:: NORMAL
REVIEWING PATHOLOGIST: NORMAL

## 2018-08-17 ENCOUNTER — OFFICE VISIT - HEALTHEAST (OUTPATIENT)
Dept: ENDOCRINOLOGY | Facility: CLINIC | Age: 53
End: 2018-08-17

## 2018-08-17 DIAGNOSIS — D49.59 TESTICULAR TUMOR: ICD-10-CM

## 2018-08-17 DIAGNOSIS — E55.9 VITAMIN D DEFICIENCY: ICD-10-CM

## 2018-08-17 DIAGNOSIS — N62 GYNECOMASTIA: ICD-10-CM

## 2018-08-17 LAB
CALCIUM SERPL-MCNC: 9.2 MG/DL (ref 8.5–10.5)
CREAT SERPL-MCNC: 0.87 MG/DL (ref 0.7–1.3)
FSH SERPL-ACNC: 4.7 MIU/ML (ref 0–12)
GFR SERPL CREATININE-BSD FRML MDRD: >60 ML/MIN/1.73M2
LH SERPL-ACNC: 6.2 MIU/ML
POTASSIUM BLD-SCNC: 4.8 MMOL/L (ref 3.5–5)
PROLACTIN SERPL-MCNC: 22 NG/ML (ref 0–15)
PTH-INTACT SERPL-MCNC: 58 PG/ML (ref 10–86)

## 2018-08-20 LAB
25(OH)D3 SERPL-MCNC: 15.6 NG/ML (ref 30–80)
25(OH)D3 SERPL-MCNC: 15.6 NG/ML (ref 30–80)

## 2018-08-23 ENCOUNTER — COMMUNICATION - HEALTHEAST (OUTPATIENT)
Dept: ENDOCRINOLOGY | Facility: CLINIC | Age: 53
End: 2018-08-23

## 2018-08-23 DIAGNOSIS — E56.9 VITAMIN DEFICIENCY: ICD-10-CM

## 2018-09-10 ENCOUNTER — OFFICE VISIT - HEALTHEAST (OUTPATIENT)
Dept: FAMILY MEDICINE | Facility: CLINIC | Age: 53
End: 2018-09-10

## 2018-09-10 ENCOUNTER — COMMUNICATION - HEALTHEAST (OUTPATIENT)
Dept: FAMILY MEDICINE | Facility: CLINIC | Age: 53
End: 2018-09-10

## 2018-09-10 DIAGNOSIS — G82.20 PARAPLEGIA (H): ICD-10-CM

## 2018-09-10 DIAGNOSIS — R33.9 URINE RETENTION: ICD-10-CM

## 2018-09-10 DIAGNOSIS — N31.9 NEUROGENIC BLADDER: ICD-10-CM

## 2018-09-10 DIAGNOSIS — G89.29 OTHER CHRONIC PAIN: ICD-10-CM

## 2018-09-25 ENCOUNTER — COMMUNICATION - HEALTHEAST (OUTPATIENT)
Dept: FAMILY MEDICINE | Facility: CLINIC | Age: 53
End: 2018-09-25

## 2018-09-25 DIAGNOSIS — R25.2 SPASTICITY: ICD-10-CM

## 2018-10-01 ENCOUNTER — COMMUNICATION - HEALTHEAST (OUTPATIENT)
Dept: FAMILY MEDICINE | Facility: CLINIC | Age: 53
End: 2018-10-01

## 2018-10-01 DIAGNOSIS — G47.00 INSOMNIA: ICD-10-CM

## 2018-10-26 ENCOUNTER — TELEPHONE (OUTPATIENT)
Dept: WOUND CARE | Facility: CLINIC | Age: 53
End: 2018-10-26

## 2018-10-26 NOTE — TELEPHONE ENCOUNTER
Patient contacted office would like to know if he can possilbly schedule his surgery for the beginning of the new year. State Dr Blackmon was waiting on him to get his new chair and he has already received it    Yousuf Majano LPN

## 2018-10-26 NOTE — TELEPHONE ENCOUNTER
Spoke with patient informed to followup with Dr Blackmon as he has not been in to see her since March. Scheduled December 6th.    Yousuf Majano LPN

## 2018-10-29 ENCOUNTER — TELEPHONE (OUTPATIENT)
Dept: WOUND CARE | Facility: CLINIC | Age: 53
End: 2018-10-29

## 2018-10-29 NOTE — TELEPHONE ENCOUNTER
"Received call from Radha a Physical Therapist from [place] ([phone]) reporting Betty  (Niraj through [facility])  has received Patients new wheelchair at [employer] office. They intend to keep the chair there until surgery. As he will need custom seating. They can offer him a trial cushion (likely a roho) for the in between time but Patient has history of \"popping\" roho cushions so they want to wait until surgery date is set up before dispensing to Patient. Advised that Patient is scheduled to be seen in Dec. They will discuss surgical dates than and Patient can update them after that.   "

## 2018-11-29 ENCOUNTER — COMMUNICATION - HEALTHEAST (OUTPATIENT)
Dept: FAMILY MEDICINE | Facility: CLINIC | Age: 53
End: 2018-11-29

## 2018-11-29 DIAGNOSIS — G47.00 INSOMNIA: ICD-10-CM

## 2018-12-20 ENCOUNTER — HOSPITAL ENCOUNTER (OUTPATIENT)
Dept: WOUND CARE | Facility: CLINIC | Age: 53
Discharge: HOME | End: 2018-12-20
Attending: SURGERY | Admitting: SURGERY
Payer: MEDICARE

## 2018-12-20 VITALS
TEMPERATURE: 98.3 F | HEART RATE: 74 BPM | SYSTOLIC BLOOD PRESSURE: 118 MMHG | RESPIRATION RATE: 18 BRPM | DIASTOLIC BLOOD PRESSURE: 63 MMHG

## 2018-12-20 DIAGNOSIS — S71.101D OPEN WOUND OF RIGHT HIP AND THIGH WITH COMPLICATION, SUBSEQUENT ENCOUNTER: Primary | ICD-10-CM

## 2018-12-20 DIAGNOSIS — L89.213 PRESSURE INJURY OF TROCHANTERIC REGION OF RIGHT HIP, STAGE 3 (H): ICD-10-CM

## 2018-12-20 DIAGNOSIS — S71.001D OPEN WOUND OF RIGHT HIP AND THIGH WITH COMPLICATION, SUBSEQUENT ENCOUNTER: Primary | ICD-10-CM

## 2018-12-20 PROBLEM — L89.219: Status: ACTIVE | Noted: 2017-10-31

## 2018-12-20 LAB
ALBUMIN SERPL-MCNC: 2.8 G/DL (ref 3.4–5)
CRP SERPL-MCNC: 103 MG/L (ref 0–8)
ERYTHROCYTE [SEDIMENTATION RATE] IN BLOOD BY WESTERGREN METHOD: 102 MM/H (ref 0–20)
PREALB SERPL IA-MCNC: 14 MG/DL (ref 15–45)

## 2018-12-20 PROCEDURE — 97602 WOUND(S) CARE NON-SELECTIVE: CPT

## 2018-12-20 PROCEDURE — 86140 C-REACTIVE PROTEIN: CPT | Performed by: SURGERY

## 2018-12-20 PROCEDURE — 84134 ASSAY OF PREALBUMIN: CPT | Performed by: SURGERY

## 2018-12-20 PROCEDURE — 82040 ASSAY OF SERUM ALBUMIN: CPT | Performed by: SURGERY

## 2018-12-20 PROCEDURE — 85652 RBC SED RATE AUTOMATED: CPT | Performed by: SURGERY

## 2018-12-20 RX ORDER — CEFAZOLIN SODIUM 2 G/50ML
2 SOLUTION INTRAVENOUS
Status: CANCELLED | OUTPATIENT
Start: 2018-12-20

## 2018-12-20 RX ORDER — CEFAZOLIN SODIUM 1 G/50ML
1 INJECTION, SOLUTION INTRAVENOUS SEE ADMIN INSTRUCTIONS
Status: CANCELLED | OUTPATIENT
Start: 2018-12-20

## 2018-12-20 NOTE — DISCHARGE INSTRUCTIONS
[facility]  [address] [facility] [address], [place] [address]  Appointment Phone [phone] Nurse Advisors [phone]     Josiah Crump      1965     Wound Dressing Change to right hip and right ischial tuberosity  Cleanse wound with Dial soap and water  Cover wound with kerlix  Cover wound with ABD's and tape  Change dressing daily to twice a day if draining through      Recommend a two part surgical process: have Dr. Murphy remove your bone then wait to see if your soft tissue survives, then have Dr. Blackmon try to close everything.      Elzbieta Blackmon M.D. December 20, 2018      Call us at [phone] if you have any questions about your wounds, have redness or swelling around your wound, have a fever of 101 or greater or if you have any other problems or concerns. We answer the phone Monday through Friday 8 am to 4 pm, please leave a message as we check the voicemail frequently throughout the day.
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
## 2018-12-31 ENCOUNTER — RECORDS - HEALTHEAST (OUTPATIENT)
Dept: ADMINISTRATIVE | Facility: OTHER | Age: 53
End: 2018-12-31

## 2019-01-18 ENCOUNTER — TELEPHONE (OUTPATIENT)
Dept: SURGERY | Facility: CLINIC | Age: 54
End: 2019-01-18

## 2019-01-18 NOTE — TELEPHONE ENCOUNTER
Left message to schedule procedure with Dr Blackmon    Details left with my direct contact number for scheduling.     Shreya Pool  [facility] Surgical Coordinator  [phone]

## 2019-01-21 ENCOUNTER — HOSPITAL ENCOUNTER (INPATIENT)
Facility: CLINIC | Age: 54
Setting detail: SURGERY ADMIT
End: 2019-01-21
Attending: ORTHOPAEDIC SURGERY | Admitting: ORTHOPAEDIC SURGERY
Payer: MEDICARE

## 2019-01-21 NOTE — TELEPHONE ENCOUNTER
Patient called back, he would like to schedule the surgery soon.  Dr West's  is checking to see what date is possible for this surgery.

## 2019-01-22 ENCOUNTER — TELEPHONE (OUTPATIENT)
Dept: ORTHOPEDICS | Facility: CLINIC | Age: 54
End: 2019-01-22

## 2019-01-22 NOTE — TELEPHONE ENCOUNTER
Attempted to reach out to patient to assist with scheduling a follow up appointment with Dr. Murphy to discuss surgery with Dr. Murphy and Dr. Blackmon. Left detailed message with best call back number for scheduling of [phone]

## 2019-01-23 NOTE — TELEPHONE ENCOUNTER
Attempted to reach out to patient to confirm that 1/29/19 would work for an appointment with Dr. Murphy starting at 5:00 pm. Left best call back number of [phone]

## 2019-01-24 NOTE — TELEPHONE ENCOUNTER
Spoke with patient, advised Dr West would like to see him in clinic. He said he will arrange a medical transport ride for 1/29

## 2019-01-25 ENCOUNTER — DOCUMENTATION ONLY (OUTPATIENT)
Dept: CARE COORDINATION | Facility: CLINIC | Age: 54
End: 2019-01-25

## 2019-01-29 ENCOUNTER — COMMUNICATION - HEALTHEAST (OUTPATIENT)
Dept: FAMILY MEDICINE | Facility: CLINIC | Age: 54
End: 2019-01-29

## 2019-01-29 DIAGNOSIS — G47.00 INSOMNIA: ICD-10-CM

## 2019-02-15 ENCOUNTER — OFFICE VISIT (OUTPATIENT)
Dept: ORTHOPEDICS | Facility: CLINIC | Age: 54
End: 2019-02-15
Payer: MEDICARE

## 2019-02-15 ENCOUNTER — RECORDS - HEALTHEAST (OUTPATIENT)
Dept: ADMINISTRATIVE | Facility: OTHER | Age: 54
End: 2019-02-15

## 2019-02-15 DIAGNOSIS — L97.119: Primary | ICD-10-CM

## 2019-02-15 PROBLEM — G89.29 CHRONIC PAIN: Status: ACTIVE | Noted: 2019-02-15

## 2019-02-15 PROBLEM — N62 GYNECOMASTIA: Status: ACTIVE | Noted: 2018-08-17

## 2019-02-15 PROBLEM — E55.9 VITAMIN D DEFICIENCY: Status: ACTIVE | Noted: 2019-02-15

## 2019-02-15 PROBLEM — M77.10 LATERAL EPICONDYLITIS: Status: ACTIVE | Noted: 2019-02-15

## 2019-02-15 PROBLEM — G47.00 INSOMNIA: Status: ACTIVE | Noted: 2019-02-15

## 2019-02-15 PROBLEM — Z89.511 HX OF BKA, RIGHT (H): Status: ACTIVE | Noted: 2018-04-09

## 2019-02-15 PROBLEM — M65.849 OTHER TENOSYNOVITIS OF HAND AND WRIST: Status: ACTIVE | Noted: 2019-02-15

## 2019-02-15 PROBLEM — I10 BENIGN ESSENTIAL HYPERTENSION: Status: ACTIVE | Noted: 2019-02-15

## 2019-02-15 PROBLEM — M65.839 OTHER TENOSYNOVITIS OF HAND AND WRIST: Status: ACTIVE | Noted: 2019-02-15

## 2019-02-15 PROBLEM — N31.9 NEUROGENIC BLADDER: Status: ACTIVE | Noted: 2019-02-15

## 2019-02-15 NOTE — PROGRESS NOTES
I met with Mr. Crump today for 30 minutes.  I saw him several years ago.  He sought Dr. Blackmon about 2 months ago.  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  Because of the fixed flexion contracture and because he is very active the the above-knee amputation stump is constantly dependent in the posterior aspect of the stump and buttocks are constantly rubbing against the edge of his wheelchair.  He is very clear objectives with any surgical treatment and that is to be able to sit upright without the right lower extremity stump inhibiting his ability to sit forward.  He is also aware of significant wound problems.  He attributes these and I think correctly to the pressure of the posterior thigh on his chair.    Examination reveals a stump which is cool and very edematous from the mid thigh distally on the right.  Otherwise anterior medial aspects of the stump are well perfused.  That is proximal to the mid portion of the stump.  The direct lateral aspect of the stump is cool but of life.  The posterior aspect has a very large but clean wound.  The femoral artery is palpable approximately 3 cm medial to the existing anterior hip incision.  The limb is fixed and functionally fused to the pelvis.  Based on my assessment of the wound the best incision would be the straight anterior wound which is already been performed in the past.  We would then amputate the limb at the mid thigh level and all soft tissues distal this would be surgical waist.  The amputation specimen was then can obtain the entire remaining portions of the femur from the hip joint distally.    CT scan was reviewed.  This shows extensive heterotopic ossification.  There is a large bony block causing a functional arthrodesis of the right hip.  There is significant overgrowth of heterotopic bone anteriorly.  The bony block is adjacent to but free of the femoral  vessels.    Impression: 1.  Mr. Crump understands the magnitude of surgery.  He also understands the clear indication of improving his sitting posture and hopefully ultimately improving the soft tissues of the buttock.  2.  Risk and benefits of were reviewed including the risk of blood loss.  He understands this and would like to proceed.    Plan: 1.  We will schedule: Surgery date with or without Dr. Blackmon pending her preference.  3.  We will need a VAC dressing large wound VAC dressing for the time of surgery.  3.  He will need radiation treatment postoperatively.  4.  Would be potentially plan for prolonged hospitalization.

## 2019-02-15 NOTE — NURSING NOTE
Chief Complaint   Patient presents with     Treatment Plan     Pt. states that he is here today to Discuss moving forward with surgery. He was initially scheduled to have surgery on 03/11/2019, but that procedure has been cancelled. It was for Right Hip Disarticulation/Possible Right Thigh Flap, Possible SPY-PHI, Possible Vac, Possible Delayed Closure.        53 year old  1965        [id number] - [place], [place] - [address] AT [facility] [address]    No Known Allergies    Current Outpatient Medications   Medication     baclofen (LIORESAL) 20 MG tablet     metoprolol (TOPROL-XL) 50 MG 24 hr tablet     oxyCODONE (ROXICODONE) 5 MG IR tablet     OxyCODONE HCl (OXYCONTIN PO)     tolterodine (DETROL LA) 4 MG 24 hr capsule     zolpidem (AMBIEN) 10 MG tablet     No current facility-administered medications for this visit.

## 2019-02-15 NOTE — LETTER
2/15/2019       RE: Josiah Crump  [address]  [place] [address]     Dear Colleague,    Thank you for referring your patient, Josiah Crump, to the [facility]. Please see a copy of my visit note below.    I met with Mr. Crump today for 30 minutes.  I saw him several years ago.  He sought Dr. Blacmkon about 2 months ago.  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  Because of the fixed flexion contracture and because he is very active the the above-knee amputation stump is constantly dependent in the posterior aspect of the stump and buttocks are constantly rubbing against the edge of his wheelchair.  He is very clear objectives with any surgical treatment and that is to be able to sit upright without the right lower extremity stump inhibiting his ability to sit forward.  He is also aware of significant wound problems.  He attributes these and I think correctly to the pressure of the posterior thigh on his chair.    Examination reveals a stump which is cool and very edematous from the mid thigh distally on the right.  Otherwise anterior medial aspects of the stump are well perfused.  That is proximal to the mid portion of the stump.  The direct lateral aspect of the stump is cool but of life.  The posterior aspect has a very large but clean wound.  The femoral artery is palpable approximately 3 cm medial to the existing anterior hip incision.  The limb is fixed and functionally fused to the pelvis.  Based on my assessment of the wound the best incision would be the straight anterior wound which is already been performed in the past.  We would then amputate the limb at the mid thigh level and all soft tissues distal this would be surgical waist.  The amputation specimen was then can obtain the entire remaining portions of the femur from the  hip joint distally.    CT scan was reviewed.  This shows extensive heterotopic ossification.  There is a large bony block causing a functional arthrodesis of the right hip.  There is significant overgrowth of heterotopic bone anteriorly.  The bony block is adjacent to but free of the femoral vessels.    Impression: 1.  Mr. Crump understands the magnitude of surgery.  He also understands the clear indication of improving his sitting posture and hopefully ultimately improving the soft tissues of the buttock.  2.  Risk and benefits of were reviewed including the risk of blood loss.  He understands this and would like to proceed.    Plan: 1.  We will schedule: Surgery date with or without Dr. Blackmon pending her preference.  3.  We will need a VAC dressing large wound VAC dressing for the time of surgery.  3.  He will need radiation treatment postoperatively.  4.  Would be potentially plan for prolonged hospitalization.    Again, thank you for allowing me to participate in the care of your patient.      Sincerely,    Mayur Murphy MD

## 2019-02-21 ENCOUNTER — HOSPITAL ENCOUNTER (INPATIENT)
Facility: CLINIC | Age: 54
Setting detail: SURGERY ADMIT
End: 2019-02-21
Attending: ORTHOPAEDIC SURGERY | Admitting: ORTHOPAEDIC SURGERY
Payer: MEDICARE

## 2019-02-21 ENCOUNTER — TELEPHONE (OUTPATIENT)
Dept: ORTHOPEDICS | Facility: CLINIC | Age: 54
End: 2019-02-21

## 2019-02-21 NOTE — TELEPHONE ENCOUNTER
Attempted to reach out to patient to assist with scheduling surgery with Dr. Murphy. Left detailed message informing patient that first available would be 4/29/19. Also informed patient we need to schedule PAC. Left best call back number of [phone]

## 2019-03-21 ENCOUNTER — OFFICE VISIT - HEALTHEAST (OUTPATIENT)
Dept: FAMILY MEDICINE | Facility: CLINIC | Age: 54
End: 2019-03-21

## 2019-03-21 DIAGNOSIS — G82.20 PARAPLEGIA (H): ICD-10-CM

## 2019-03-21 DIAGNOSIS — N62 GYNECOMASTIA: ICD-10-CM

## 2019-03-21 DIAGNOSIS — R79.89 ELEVATED PROLACTIN LEVEL: ICD-10-CM

## 2019-03-21 DIAGNOSIS — L97.119: ICD-10-CM

## 2019-03-21 LAB — PROLACTIN SERPL-MCNC: 25.5 NG/ML (ref 0–15)

## 2019-03-22 ENCOUNTER — COMMUNICATION - HEALTHEAST (OUTPATIENT)
Dept: FAMILY MEDICINE | Facility: CLINIC | Age: 54
End: 2019-03-22

## 2019-03-22 DIAGNOSIS — N31.9 NEUROGENIC BLADDER: ICD-10-CM

## 2019-03-22 LAB — AFP SERPL-MCNC: <2 UG/ML

## 2019-03-25 LAB — HCG-TM SERPL-ACNC: <3 IU/L

## 2019-03-26 ENCOUNTER — COMMUNICATION - HEALTHEAST (OUTPATIENT)
Dept: ADMINISTRATIVE | Facility: CLINIC | Age: 54
End: 2019-03-26

## 2019-03-27 ENCOUNTER — COMMUNICATION - HEALTHEAST (OUTPATIENT)
Dept: FAMILY MEDICINE | Facility: CLINIC | Age: 54
End: 2019-03-27

## 2019-03-27 DIAGNOSIS — G47.00 INSOMNIA, UNSPECIFIED TYPE: ICD-10-CM

## 2019-03-27 DIAGNOSIS — G47.00 INSOMNIA: ICD-10-CM

## 2019-04-01 ENCOUNTER — COMMUNICATION - HEALTHEAST (OUTPATIENT)
Dept: FAMILY MEDICINE | Facility: CLINIC | Age: 54
End: 2019-04-01

## 2019-04-02 ENCOUNTER — RECORDS - HEALTHEAST (OUTPATIENT)
Dept: ADMINISTRATIVE | Facility: OTHER | Age: 54
End: 2019-04-02

## 2019-04-02 ENCOUNTER — ANESTHESIA EVENT (OUTPATIENT)
Dept: SURGERY | Facility: CLINIC | Age: 54
End: 2019-04-02

## 2019-04-02 ENCOUNTER — OFFICE VISIT (OUTPATIENT)
Dept: SURGERY | Facility: CLINIC | Age: 54
End: 2019-04-02
Payer: MEDICARE

## 2019-04-02 VITALS
BODY MASS INDEX: 26.14 KG/M2 | WEIGHT: 193 LBS | TEMPERATURE: 98.1 F | HEIGHT: 72 IN | DIASTOLIC BLOOD PRESSURE: 73 MMHG | SYSTOLIC BLOOD PRESSURE: 126 MMHG | OXYGEN SATURATION: 97 % | HEART RATE: 93 BPM | RESPIRATION RATE: 18 BRPM

## 2019-04-02 DIAGNOSIS — Z01.818 PREOP EXAMINATION: Primary | ICD-10-CM

## 2019-04-02 DIAGNOSIS — L98.499 CHRONIC SKIN ULCER, UNSPECIFIED ULCER STAGE (H): ICD-10-CM

## 2019-04-02 DIAGNOSIS — Z01.818 PREOP EXAMINATION: ICD-10-CM

## 2019-04-02 LAB
ABO + RH BLD: NORMAL
ABO + RH BLD: NORMAL
ANION GAP SERPL CALCULATED.3IONS-SCNC: 6 MMOL/L (ref 3–14)
BLD GP AB SCN SERPL QL: NORMAL
BLOOD BANK CMNT PATIENT-IMP: NORMAL
BLOOD BANK CMNT PATIENT-IMP: NORMAL
BUN SERPL-MCNC: 13 MG/DL (ref 7–30)
CALCIUM SERPL-MCNC: 9.1 MG/DL (ref 8.5–10.1)
CHLORIDE SERPL-SCNC: 103 MMOL/L (ref 94–109)
CO2 SERPL-SCNC: 24 MMOL/L (ref 20–32)
CREAT SERPL-MCNC: 0.73 MG/DL (ref 0.66–1.25)
ERYTHROCYTE [DISTWIDTH] IN BLOOD BY AUTOMATED COUNT: 23.6 % (ref 10–15)
GFR SERPL CREATININE-BSD FRML MDRD: >90 ML/MIN/{1.73_M2}
GLUCOSE SERPL-MCNC: 98 MG/DL (ref 70–99)
HCT VFR BLD AUTO: 42.8 % (ref 40–53)
HGB BLD-MCNC: 11.5 G/DL (ref 13.3–17.7)
MCH RBC QN AUTO: 18.3 PG (ref 26.5–33)
MCHC RBC AUTO-ENTMCNC: 26.9 G/DL (ref 31.5–36.5)
MCV RBC AUTO: 68 FL (ref 78–100)
MRSA DNA SPEC QL NAA+PROBE: NEGATIVE
NT-PROBNP SERPL-MCNC: 54 PG/ML (ref 0–125)
PLATELET # BLD AUTO: 356 10E9/L (ref 150–450)
POTASSIUM SERPL-SCNC: 3.8 MMOL/L (ref 3.4–5.3)
RBC # BLD AUTO: 6.27 10E12/L (ref 4.4–5.9)
SODIUM SERPL-SCNC: 133 MMOL/L (ref 133–144)
SPECIMEN EXP DATE BLD: NORMAL
SPECIMEN SOURCE: NORMAL
WBC # BLD AUTO: 8.2 10E9/L (ref 4–11)

## 2019-04-02 PROCEDURE — 87641 MR-STAPH DNA AMP PROBE: CPT | Performed by: NURSE PRACTITIONER

## 2019-04-02 PROCEDURE — 87640 STAPH A DNA AMP PROBE: CPT | Performed by: NURSE PRACTITIONER

## 2019-04-02 RX ORDER — FERROUS SULFATE 325(65) MG
325 TABLET ORAL
Status: ON HOLD | COMMUNITY
End: 2019-05-28

## 2019-04-02 RX ORDER — TRAZODONE HYDROCHLORIDE 50 MG/1
50 TABLET, FILM COATED ORAL AT BEDTIME
Status: ON HOLD | COMMUNITY
End: 2019-05-28

## 2019-04-02 SDOH — HEALTH STABILITY: MENTAL HEALTH: HOW OFTEN DO YOU HAVE A DRINK CONTAINING ALCOHOL?: NEVER

## 2019-04-02 ASSESSMENT — MIFFLIN-ST. JEOR: SCORE: 1763.44

## 2019-04-02 ASSESSMENT — LIFESTYLE VARIABLES: TOBACCO_USE: 0
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
## 2019-04-02 NOTE — RESULT ENCOUNTER NOTE
Labs reviewed.  Hgb improved from previous.  All labs okay for surgery.       Hanane Roberts DNP, RN, ANP-C

## 2019-04-02 NOTE — PROGRESS NOTES
Preoperative Assessment Center medication history for April 2, 2019 is complete.    See Epic admission navigator for prior to admission medications.   Operating room staff will still need to confirm medications and last dose information on day of surgery.     Medication history interview sources:  Patient INterview    Changes made to PTA medication list (reason)  Added: None    Deleted:   -- oxyCODONE - patient reports still being prescribed by Dr. Petit but care everywhere notes document that PCP stopped prescribing in September 2018 and  shows last fill back in 9/2018  -- oxyCONTIN - patient reports still being prescribed by Dr. Petit but care everywhere notes document that PCP stopped prescribing in September 2018 and  shows last fill back in 9/2018    Changed:   -- ambien changed to trazodone per care everywhere notes    Additional medication history information (including reliability of information, actions taken by pharmacist):    -- No recent (within 30 days) course of antibiotics  -- No recent (within 30 days) course of steroids  -- No recent (within 30 days) chronic daily medications stopped   -- Patient declines being on any other prescription or over-the-counter medications    Prior to Admission medications    Medication Sig Last Dose Taking? Auth Provider   baclofen (LIORESAL) 20 MG tablet Take 20 mg by mouth 4 times daily Taking Yes Reported, Patient   metoprolol (TOPROL-XL) 50 MG 24 hr tablet Take 50 mg by mouth daily Taking Yes Reported, Patient   tolterodine (DETROL LA) 4 MG 24 hr capsule Take 8 mg by mouth daily  Taking Yes Reported, Patient   traZODone (DESYREL) 50 MG tablet Take 50 mg by mouth At Bedtime Taking Yes Unknown, Entered By History         Medication history completed by: Von Jaime RPH

## 2019-04-02 NOTE — PATIENT INSTRUCTIONS
Preparing for Your Surgery      Name:  Josiah Crump   MRN:  [id number]   :  1965   Today's Date:  2019     Arriving for surgery:  Surgery date:  2019  Arrival time:  5:30 am   Please come to:     [facility] [address]  [address] [place]  [address]    - parking is available in front of [facility] from 5:15AM to 8:00PM. If you prefer, park your car in the Green Lot.  -Proceed to the 3rd floor, check in at the Adult Surgery Waiting Lounge. [phone]  If an escort is needed stop at the Information Desk in the lobby. Inform the information person that you are here for surgery. An escort to the Adult Surgery Waiting Lounge will be provided.        What can I eat or drink?  -  You may have solid food or milk products until 8 hours prior to your surgery. (19 at 11 pm)  -  You may have water, apple juice or 7up/Sprite until 2 hours prior to your surgery.(until 5:30 am arrival time)    Which medicines can I take?        Stop Aspirin, vitamins and supplements one week prior to surgery.      Hold Ibuprofen for 24 hours and/or Naproxen for 48 hours prior to surgery.     -  Please take these medications the day of surgery:     All usual am prescription medications       How do I prepare myself?  -  Take two showers: one the night before surgery; and one the morning of surgery.         Use Scrubcare or Hibiclens to wash from neck down.  You may use your own shampoo and conditioner. No other hair products.   -  Do NOT use lotion, powder, deodorant, or antiperspirant the day of your surgery.  -  Do NOT wear any makeup, fingernail polish or jewelry.  - Do not bring your own medications to the hospital, except for inhalers and eye drops.  -  Bring your ID and insurance card.    Questions or Concerns:  -If you have questions or concerns regarding the day of surgery, please call   The Pre Admission Nursing office at [phone].       -For  questions after surgery please call your surgeons office.           AFTER YOUR SURGERY  Breathing exercises   Breathing exercises help you recover faster. Take deep breaths and let the air out slowly. This will:     Help you wake up after surgery.    Help prevent complications like pneumonia.  Preventing complications will help you go home sooner.   We may give you a breathing device (incentive spirometer) to encourage you to breathe deeply.   Nausea and vomiting   You may feel sick to your stomach after surgery; if so, let your nurse know.    Pain control:  After surgery, you may have pain. Our goal is to help you manage your pain. Pain medicine will help you feel comfortable enough to do activities that will help you heal.  These activities may include breathing exercises, walking and physical therapy.   To help your health care team treat your pain we will ask: 1) If you have pain  2) where it is located 3) describe your pain in your words  Methods of pain control include medications given by mouth, vein or by nerve block for some surgeries.  We may give you a pain control pump that will:  1) Deliver the medicine through a tube placed in your vein  2) Control the amount of medicine you receive  3) Allow you to push a button to deliver a dose of pain medicine  Sequential Compression Device (SCD) or Pneumo Boots:  You may need to wear SCD S on your legs or feet. These are wraps connected to a machine that pumps in air and releases it. The repeated pumping helps prevent blood clots from forming.

## 2019-04-04 ENCOUNTER — TELEPHONE (OUTPATIENT)
Dept: URGENT CARE | Facility: URGENT CARE | Age: 54
End: 2019-04-04

## 2019-04-04 DIAGNOSIS — J15.211 MSSA (METHICILLIN SUSCEPTIBLE STAPHYLOCOCCUS AUREUS) PNEUMONIA (H): Primary | ICD-10-CM
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
## 2019-04-04 NOTE — TELEPHONE ENCOUNTER
VM left for pt asking for them to call the Pre-op clinic back when available.   Call back number provided.     Note: LPN was instructed by Provider to inform them of the MSSA positive result of their Nasal swab.     The Provider would like to treat the MSSA with Bactroban- Apply small amount (1 g) to both nostrils, 2 times a day, for 5 days.     Pt will also be directed to obtain a Pre-surgical scrub from the pharmacy. Pt will be instructed to wash with scrub once daily for 5 days.     LPN will continue to try to reach pt and update the provider if they are unable to reach them.     Darya Stovall LPN

## 2019-04-05 RX ORDER — MUPIROCIN 20 MG/G
OINTMENT TOPICAL
Qty: 10 G | Refills: 0 | Status: ON HOLD | OUTPATIENT
Start: 2019-04-05 | End: 2019-05-28

## 2019-04-05 NOTE — RESULT ENCOUNTER NOTE
Patient called by PROMISE Orozco with +MSSA results.  Bactroban and presurgical scrub instructions given.      Hanane Roberts DNP, RN, ANP-C

## 2019-04-05 NOTE — TELEPHONE ENCOUNTER
LPN called pt and explained the MSSA positive results and how to use the Bactroban as instructed by the PAC Provider.   Pt was informed of using the surgical scrub as described, as well.     Pt verbalized understanding.     Order placed.     Darya Stovall LPN

## 2019-04-15 ENCOUNTER — TELEPHONE (OUTPATIENT)
Dept: PLASTIC SURGERY | Facility: CLINIC | Age: 54
End: 2019-04-15

## 2019-04-15 NOTE — TELEPHONE ENCOUNTER
Left message regarding surgery with Dr Blackmon/thom, requested a return call to complete surgery scheduling process.    Surgery was scheduled on 5/23 at [facility]    Direct phone number given for patient call back.    Shreya Pool  Surgical Alycia-Op Coordinator  [phone]

## 2019-04-15 NOTE — TELEPHONE ENCOUNTER
Received call from Josiah, I told him of the surgery date and location change and he seemed upset.   He had already arranged for people to come into town to help him, etc.   He did have some questions regarding medication, etc that a nurse should answer.   Please call him to advise him if/when meds need to be changed

## 2019-04-29 ENCOUNTER — COMMUNICATION - HEALTHEAST (OUTPATIENT)
Dept: FAMILY MEDICINE | Facility: CLINIC | Age: 54
End: 2019-04-29

## 2019-04-29 DIAGNOSIS — I10 HTN (HYPERTENSION): ICD-10-CM

## 2019-05-01 ENCOUNTER — RECORDS - HEALTHEAST (OUTPATIENT)
Dept: ADMINISTRATIVE | Facility: OTHER | Age: 54
End: 2019-05-01

## 2019-05-06 ENCOUNTER — RECORDS - HEALTHEAST (OUTPATIENT)
Dept: ADMINISTRATIVE | Facility: OTHER | Age: 54
End: 2019-05-06

## 2019-05-14 ENCOUNTER — TELEPHONE (OUTPATIENT)
Dept: ORTHOPEDICS | Facility: CLINIC | Age: 54
End: 2019-05-14

## 2019-05-14 NOTE — TELEPHONE ENCOUNTER
RN called and left a voice message for Josiah.  Stating that he would need to obtain another H&P, he could call me and I can assist him in scheduling this.  Otherwise, I gave the number to call to schedule.  I also wanted to make sure that he was going to stop ASA and NSAIDS.  I asked that he call me back to confirm that he had received this message.  Also to help make his H&P appt.        Leticia Malik RN [place], [place], RN             Hi,     Tressa, just one more thing.  I tried reaching this jack, but I'm not leaving any messages that they'd have to call long distance to [place] to return to me.     This is patient of Dr. Blackmon's that Dr. Murphy is doing a hip disarticulation for on May 23rd.  His date has been moved around several time to accommodate her schedule.  He had a PAC eval on 4/2/19, which is now outdated.   He could have an update with his PCP if that is more convenient. Could you just check in with him abou that and make sure he's stopped ASA/NAIDS, etc. He is getting post-op radiation for heterotopic bone the day after surgery and already has an appt. With Dr. Mesa on 5/21/19.     I think that's all.   Thanks for all your help.     Leticia

## 2019-05-15 ENCOUNTER — TELEPHONE (OUTPATIENT)
Dept: FAMILY MEDICINE | Facility: CLINIC | Age: 54
End: 2019-05-15

## 2019-05-15 NOTE — TELEPHONE ENCOUNTER
Health Call Center    Phone Message    May a detailed message be left on voicemail: yes    Reason for Call: Medication Question or concern regarding medication   Prescription Clarification  Name of Medication: mupirocin (BACTROBAN) 2 % external ointment  Prescribing Provider: Hanane Roberts NP   Pharmacy: [id number] - [place], [place] - [address] [place] [address] AT [facility] [address]   What on the order needs clarification? Pt wanted a call to clarify how he is supposed to be using this medication, and also wanted to know when he should begin to take this. He has surgery scheduled on 5/23. Please advise.    Action Taken: Message routed to:  [facility] ([facility]): NP

## 2019-05-17 NOTE — TELEPHONE ENCOUNTER
Attempted to call patient, but there was no answer and no voicemail message indicating it was his voicemail.    If he calls back, please instruct him to start the bactroban BID today (as already written in the prescription instructions) by applying a small amount in each nostril with a qtip.    He should also use the presurgical scrub daily x 5 days.      Hanane Roberts DNP, RN, APRN

## 2019-05-20 ENCOUNTER — OFFICE VISIT (OUTPATIENT)
Dept: FAMILY MEDICINE | Facility: CLINIC | Age: 54
End: 2019-05-20
Payer: MEDICARE

## 2019-05-20 ENCOUNTER — RECORDS - HEALTHEAST (OUTPATIENT)
Dept: ADMINISTRATIVE | Facility: OTHER | Age: 54
End: 2019-05-20

## 2019-05-20 VITALS — SYSTOLIC BLOOD PRESSURE: 131 MMHG | OXYGEN SATURATION: 99 % | DIASTOLIC BLOOD PRESSURE: 78 MMHG | HEART RATE: 87 BPM

## 2019-05-20 DIAGNOSIS — Z01.818 PRE-OPERATIVE EXAMINATION: Primary | ICD-10-CM

## 2019-05-20 DIAGNOSIS — Z01.818 PRE-OPERATIVE EXAMINATION: ICD-10-CM

## 2019-05-20 LAB
ERYTHROCYTE [DISTWIDTH] IN BLOOD BY AUTOMATED COUNT: 22.2 % (ref 10–15)
HCT VFR BLD AUTO: 40.8 % (ref 40–53)
HGB BLD-MCNC: 11.4 G/DL (ref 13.3–17.7)
MCH RBC QN AUTO: 19.6 PG (ref 26.5–33)
MCHC RBC AUTO-ENTMCNC: 27.9 G/DL (ref 31.5–36.5)
MCV RBC AUTO: 70 FL (ref 78–100)
PLATELET # BLD AUTO: 327 10E9/L (ref 150–450)
RBC # BLD AUTO: 5.81 10E12/L (ref 4.4–5.9)
WBC # BLD AUTO: 8.8 10E9/L (ref 4–11)

## 2019-05-20 ASSESSMENT — PAIN SCALES - GENERAL: PAINLEVEL: NO PAIN (0)
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
## 2019-05-20 NOTE — NURSING NOTE
53 year old  Chief Complaint   Patient presents with     Pre-Op Exam     Pt is here for a pre op examination.       Blood pressure 131/78, pulse 87, SpO2 99 %. There is no height or weight on file to calculate BMI.  BP completed using cuff size:    Patient Active Problem List   Diagnosis     Ulcer of right thigh, unspecified ulcer stage (H)     Benign essential hypertension     Chronic pain     General symptom     Gynecomastia     Hx of BKA, right (H)     Hydrocele     Infected penile implant (H)     Insomnia     Lateral epicondylitis     Neurogenic bladder     Other tenosynovitis of hand and wrist     Pain in limb     Paraplegia (H)     Right shoulder strain     Unspecified site of spinal cord injury without evidence of spinal bone injury     Vitamin D deficiency       Wt Readings from Last 2 Encounters:   04/02/19 87.5 kg (193 lb)     BP Readings from Last 3 Encounters:   05/20/19 131/78   04/02/19 126/73   12/20/18 118/63       No Known Allergies    Current Outpatient Medications   Medication     baclofen (LIORESAL) 20 MG tablet     ferrous sulfate (FEROSUL) 325 (65 Fe) MG tablet     metoprolol (TOPROL-XL) 50 MG 24 hr tablet     mupirocin (BACTROBAN) 2 % external ointment     tolterodine (DETROL LA) 4 MG 24 hr capsule     traZODone (DESYREL) 50 MG tablet     No current facility-administered medications for this visit.        Social History     Tobacco Use     Smoking status: Never Smoker     Smokeless tobacco: Never Used   Substance Use Topics     Alcohol use: No     Frequency: Never     Drug use: No       Honoring Choices - Health Care Directive Guide offered to patient at time of visit.    Health Maintenance Due   Topic Date Due     LUIS ASSESSMENT  1965     PHQ-9  1965     COLONOSCOPY  12/01/1976     HIV SCREENING  12/01/1981     LIPID  12/01/2001     ZOSTER IMMUNIZATION (1 of 2) 12/01/2016       Immunization History   Administered Date(s) Administered     FLU 6-35 months 09/14/2010     Flu,  Unspecified 10/22/2007, 10/30/2008     Influenza (H1N1) 12/03/2009     Influenza (IIV3) PF 10/23/2003, 11/02/2005, 11/02/2006, 10/22/2007, 10/30/2008, 10/08/2009, 09/29/2011, 09/12/2012, 09/25/2013     Influenza Quad, Recombinant, p-free (RIV4) 09/23/2014     Influenza Vaccine IM 3yrs+ 4 Valent IIV4 09/23/2014     Influenza Vaccine, 3 YRS +, IM (QUADRIVALENT W/PRESERVATIVES) 10/08/2009, 09/14/2010, 09/29/2011, 09/12/2012, 09/25/2013, 11/14/2016, 10/18/2017     Pneumo Conj 13-V (2010&after) 08/09/2018     Pneumococcal 23 valent 08/15/2012     TD (ADULT, 7+) 08/09/2018     TDAP Vaccine (Adacel) 04/19/2007     Td (Adult), Adsorbed 09/02/2001     Tdap (Adult) Unspecified Formulation 04/19/2007       No results found for: PAP      Recent Labs   Lab Test 04/02/19  1407 12/20/18  0900 11/15/17  1025   CR 0.73  --   --    GFRESTIMATED >90  --   --    GFRESTBLACK >90  --   --    ALBUMIN  --  2.8* 2.3*   POTASSIUM 3.8  --   --        No flowsheet data found.    No flowsheet data found.    No flowsheet data found.    No flowsheet data found.      Gianna Mijares, [facility]  May 20, 2019 12:34 PM

## 2019-05-20 NOTE — PATIENT INSTRUCTIONS
Surgery is at [facility]  Please arrive by 5:30AM    [address] [facility] night before and AM of surgery    Swing by lab on your way out.

## 2019-05-20 NOTE — PROGRESS NOTES
Surgeon (please enter first and last name):  Mayur Murphy MD Buckley, Charlotte Duarte MD  Location of Surgery: [facility] OR   Date of Surgery:  5/23/2019  Procedure:  Right Hip Disarticulation, Possible Thigh Flap Possible VAC  History of reaction to anesthesia?  No
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
## 2019-05-21 ENCOUNTER — TELEPHONE (OUTPATIENT)
Dept: ORTHOPEDICS | Facility: CLINIC | Age: 54
End: 2019-05-21

## 2019-05-21 NOTE — TELEPHONE ENCOUNTER
[facility] Call Center    Phone Message    May a detailed message be left on voicemail: yes    Reason for Call: Other: Pt needing call back to find out the time he needs to check in for surgery on 5/23 with Dr. Murphy. Pt needs to set up a ride     Action Taken: Message routed to:  [facility] ([facility] ortho

## 2019-05-22 ENCOUNTER — ANESTHESIA EVENT (OUTPATIENT)
Dept: SURGERY | Facility: CLINIC | Age: 54
DRG: 580 | End: 2019-05-22
Payer: MEDICARE

## 2019-05-22 ASSESSMENT — LIFESTYLE VARIABLES: TOBACCO_USE: 0

## 2019-05-22 NOTE — TELEPHONE ENCOUNTER
Attempted to return phone call to inform patient that he will be getting a call today to go over arrival time. Left detailed message with best call back number of [phone]
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
## 2019-05-23 ENCOUNTER — HOSPITAL ENCOUNTER (INPATIENT)
Facility: CLINIC | Age: 54
LOS: 5 days | Discharge: NURSING, REHAB OR LONG-TERM CARE | DRG: 580 | End: 2019-05-28
Attending: ORTHOPAEDIC SURGERY | Admitting: ORTHOPAEDIC SURGERY
Payer: MEDICARE

## 2019-05-23 ENCOUNTER — APPOINTMENT (OUTPATIENT)
Dept: GENERAL RADIOLOGY | Facility: CLINIC | Age: 54
DRG: 580 | End: 2019-05-23
Attending: ORTHOPAEDIC SURGERY
Payer: MEDICARE

## 2019-05-23 ENCOUNTER — ANESTHESIA (OUTPATIENT)
Dept: SURGERY | Facility: CLINIC | Age: 54
DRG: 580 | End: 2019-05-23
Payer: MEDICARE

## 2019-05-23 DIAGNOSIS — Z98.890 STATUS POST HIP SURGERY: Primary | ICD-10-CM

## 2019-05-23 LAB
ANGLE RATE OF CLOT GROWTH: 76.7 DEG (ref 59–74)
ANGLE RATE OF CLOT GROWTH: 77.3 DEG (ref 59–74)
APTT PPP: 31 SEC (ref 22–37)
BASE DEFICIT BLDA-SCNC: 2.4 MMOL/L
BASE DEFICIT BLDA-SCNC: 2.4 MMOL/L
BASE DEFICIT BLDA-SCNC: 2.5 MMOL/L
BASE DEFICIT BLDA-SCNC: 2.8 MMOL/L
BASE DEFICIT BLDA-SCNC: 2.9 MMOL/L
BLD PROD TYP BPU: NORMAL
BLD PROD TYP BPU: NORMAL
BLD UNIT ID BPU: 0
BLD UNIT ID BPU: 0
BLOOD PRODUCT CODE: NORMAL
BLOOD PRODUCT CODE: NORMAL
BPU ID: NORMAL
BPU ID: NORMAL
CA-I BLD-MCNC: 4.6 MG/DL (ref 4.4–5.2)
CA-I BLD-MCNC: 4.7 MG/DL (ref 4.4–5.2)
CA-I BLD-MCNC: 4.8 MG/DL (ref 4.4–5.2)
CA-I BLD-MCNC: 4.8 MG/DL (ref 4.4–5.2)
CA-I BLD-MCNC: 5.1 MG/DL (ref 4.4–5.2)
CI HYPERCOAGULATION INDEX: 4.3 RATIO (ref 0–3)
CI HYPERCOAGULATION INDEX: 4.4 RATIO (ref 0–3)
CLOT LYSIS 30M P MA LENFR BLD TEG: 0 % (ref 0–8)
CLOT LYSIS 30M P MA LENFR BLD TEG: 1 % (ref 0–8)
CLOT STRENGTH BLD TEG: 20.9 KD/SC (ref 5.3–13.2)
CLOT STRENGTH BLD TEG: 22.9 KD/SC (ref 5.3–13.2)
CREAT SERPL-MCNC: 0.75 MG/DL (ref 0.66–1.25)
FIBRINOGEN PPP-MCNC: 392 MG/DL (ref 200–420)
GFR SERPL CREATININE-BSD FRML MDRD: >90 ML/MIN/{1.73_M2}
GLUCOSE BLD-MCNC: 111 MG/DL (ref 70–99)
GLUCOSE BLD-MCNC: 128 MG/DL (ref 70–99)
GLUCOSE BLD-MCNC: 130 MG/DL (ref 70–99)
GLUCOSE BLD-MCNC: 134 MG/DL (ref 70–99)
GLUCOSE BLD-MCNC: 145 MG/DL (ref 70–99)
GLUCOSE BLDC GLUCOMTR-MCNC: 108 MG/DL (ref 70–99)
HCO3 BLD-SCNC: 22 MMOL/L (ref 21–28)
HGB BLD-MCNC: 10.1 G/DL (ref 13.3–17.7)
HGB BLD-MCNC: 11 G/DL (ref 13.3–17.7)
HGB BLD-MCNC: 8.3 G/DL (ref 13.3–17.7)
HGB BLD-MCNC: 8.4 G/DL (ref 13.3–17.7)
HGB BLD-MCNC: 9.1 G/DL (ref 13.3–17.7)
HGB BLD-MCNC: 9.6 G/DL (ref 13.3–17.7)
INR PPP: 1.27 (ref 0.86–1.14)
K TIME TO SPEC CLOT STRENGTH: 0.9 MIN (ref 1–3)
K TIME TO SPEC CLOT STRENGTH: 0.9 MIN (ref 1–3)
LACTATE BLD-SCNC: 1.2 MMOL/L (ref 0.7–2)
LY60 LYSIS AT 60 MINUTES: 1.4 % (ref 0–15)
LY60 LYSIS AT 60 MINUTES: 3 % (ref 0–15)
MA MAXIMUM CLOT STRENGTH: 80.7 MM (ref 55–74)
MA MAXIMUM CLOT STRENGTH: 82.1 MM (ref 55–74)
O2/TOTAL GAS SETTING VFR VENT: 43 %
O2/TOTAL GAS SETTING VFR VENT: 44 %
O2/TOTAL GAS SETTING VFR VENT: 45 %
O2/TOTAL GAS SETTING VFR VENT: 45 %
O2/TOTAL GAS SETTING VFR VENT: ABNORMAL %
PCO2 BLD: 35 MM HG (ref 35–45)
PCO2 BLD: 36 MM HG (ref 35–45)
PCO2 BLD: 37 MM HG (ref 35–45)
PCO2 BLD: 37 MM HG (ref 35–45)
PCO2 BLD: 38 MM HG (ref 35–45)
PH BLD: 7.38 PH (ref 7.35–7.45)
PH BLD: 7.38 PH (ref 7.35–7.45)
PH BLD: 7.39 PH (ref 7.35–7.45)
PH BLD: 7.4 PH (ref 7.35–7.45)
PH BLD: 7.41 PH (ref 7.35–7.45)
PLATELET # BLD AUTO: 356 10E9/L (ref 150–450)
PO2 BLD: 113 MM HG (ref 80–105)
PO2 BLD: 126 MM HG (ref 80–105)
PO2 BLD: 149 MM HG (ref 80–105)
PO2 BLD: 179 MM HG (ref 80–105)
PO2 BLD: 196 MM HG (ref 80–105)
POTASSIUM BLD-SCNC: 3.8 MMOL/L (ref 3.4–5.3)
POTASSIUM BLD-SCNC: 3.9 MMOL/L (ref 3.4–5.3)
POTASSIUM BLD-SCNC: 4.1 MMOL/L (ref 3.4–5.3)
POTASSIUM BLD-SCNC: 4.3 MMOL/L (ref 3.4–5.3)
POTASSIUM BLD-SCNC: 4.3 MMOL/L (ref 3.4–5.3)
POTASSIUM SERPL-SCNC: 4.2 MMOL/L (ref 3.4–5.3)
R TIME UNTIL CLOT FORMS: 5 MIN (ref 4–9)
R TIME UNTIL CLOT FORMS: 5.2 MIN (ref 4–9)
SODIUM BLD-SCNC: 139 MMOL/L (ref 133–144)
SODIUM BLD-SCNC: 140 MMOL/L (ref 133–144)
TRANSFUSION STATUS PATIENT QL: NORMAL

## 2019-05-23 PROCEDURE — 25800030 ZZH RX IP 258 OP 636: Performed by: ANESTHESIOLOGY

## 2019-05-23 PROCEDURE — 00000146 ZZHCL STATISTIC GLUCOSE BY METER IP

## 2019-05-23 PROCEDURE — 82803 BLOOD GASES ANY COMBINATION: CPT | Performed by: ANESTHESIOLOGY

## 2019-05-23 PROCEDURE — 25000565 ZZH ISOFLURANE, EA 15 MIN: Performed by: ORTHOPAEDIC SURGERY

## 2019-05-23 PROCEDURE — 37000009 ZZH ANESTHESIA TECHNICAL FEE, EACH ADDTL 15 MIN: Performed by: ORTHOPAEDIC SURGERY

## 2019-05-23 PROCEDURE — 82947 ASSAY GLUCOSE BLOOD QUANT: CPT | Performed by: ANESTHESIOLOGY

## 2019-05-23 PROCEDURE — 25000128 H RX IP 250 OP 636: Performed by: NURSE ANESTHETIST, CERTIFIED REGISTERED

## 2019-05-23 PROCEDURE — 36415 COLL VENOUS BLD VENIPUNCTURE: CPT | Performed by: ANESTHESIOLOGY

## 2019-05-23 PROCEDURE — 25000128 H RX IP 250 OP 636: Performed by: STUDENT IN AN ORGANIZED HEALTH CARE EDUCATION/TRAINING PROGRAM

## 2019-05-23 PROCEDURE — 88311 DECALCIFY TISSUE: CPT | Performed by: ORTHOPAEDIC SURGERY

## 2019-05-23 PROCEDURE — 25000128 H RX IP 250 OP 636: Performed by: ANESTHESIOLOGY

## 2019-05-23 PROCEDURE — 25000125 ZZHC RX 250: Performed by: ANESTHESIOLOGY

## 2019-05-23 PROCEDURE — 25800030 ZZH RX IP 258 OP 636: Performed by: STUDENT IN AN ORGANIZED HEALTH CARE EDUCATION/TRAINING PROGRAM

## 2019-05-23 PROCEDURE — 84132 ASSAY OF SERUM POTASSIUM: CPT | Performed by: ANESTHESIOLOGY

## 2019-05-23 PROCEDURE — 86850 RBC ANTIBODY SCREEN: CPT | Performed by: ANESTHESIOLOGY

## 2019-05-23 PROCEDURE — 93010 ELECTROCARDIOGRAM REPORT: CPT | Performed by: INTERNAL MEDICINE

## 2019-05-23 PROCEDURE — 88311 DECALCIFY TISSUE: CPT | Mod: 26 | Performed by: ORTHOPAEDIC SURGERY

## 2019-05-23 PROCEDURE — 40000170 ZZH STATISTIC PRE-PROCEDURE ASSESSMENT II: Performed by: ORTHOPAEDIC SURGERY

## 2019-05-23 PROCEDURE — 40000275 ZZH STATISTIC RCP TIME EA 10 MIN

## 2019-05-23 PROCEDURE — 85610 PROTHROMBIN TIME: CPT | Performed by: ORTHOPAEDIC SURGERY

## 2019-05-23 PROCEDURE — 0Y6C0Z1 DETACHMENT AT RIGHT UPPER LEG, HIGH, OPEN APPROACH: ICD-10-PCS | Performed by: ORTHOPAEDIC SURGERY

## 2019-05-23 PROCEDURE — 71000015 ZZH RECOVERY PHASE 1 LEVEL 2 EA ADDTL HR: Performed by: ORTHOPAEDIC SURGERY

## 2019-05-23 PROCEDURE — 82330 ASSAY OF CALCIUM: CPT | Performed by: ANESTHESIOLOGY

## 2019-05-23 PROCEDURE — 40000065 ZZH STATISTIC EKG NON-CHARGEABLE

## 2019-05-23 PROCEDURE — 25000132 ZZH RX MED GY IP 250 OP 250 PS 637: Performed by: STUDENT IN AN ORGANIZED HEALTH CARE EDUCATION/TRAINING PROGRAM

## 2019-05-23 PROCEDURE — 37000008 ZZH ANESTHESIA TECHNICAL FEE, 1ST 30 MIN: Performed by: ORTHOPAEDIC SURGERY

## 2019-05-23 PROCEDURE — 86923 COMPATIBILITY TEST ELECTRIC: CPT | Performed by: ANESTHESIOLOGY

## 2019-05-23 PROCEDURE — 85018 HEMOGLOBIN: CPT | Performed by: ANESTHESIOLOGY

## 2019-05-23 PROCEDURE — 40000277 XR SURGERY CARM FLUORO LESS THAN 5 MIN W STILLS: Mod: TC

## 2019-05-23 PROCEDURE — 84295 ASSAY OF SERUM SODIUM: CPT | Performed by: ANESTHESIOLOGY

## 2019-05-23 PROCEDURE — 36000072 ZZH SURGERY LEVEL 5 W FLUORO 1ST 30 MIN - UMMC: Performed by: ORTHOPAEDIC SURGERY

## 2019-05-23 PROCEDURE — 88307 TISSUE EXAM BY PATHOLOGIST: CPT | Performed by: ORTHOPAEDIC SURGERY

## 2019-05-23 PROCEDURE — 85384 FIBRINOGEN ACTIVITY: CPT | Performed by: ORTHOPAEDIC SURGERY

## 2019-05-23 PROCEDURE — 25000125 ZZHC RX 250: Performed by: NURSE ANESTHETIST, CERTIFIED REGISTERED

## 2019-05-23 PROCEDURE — 36000070 ZZH SURGERY LEVEL 5 EA 15 ADDTL MIN - UMMC: Performed by: ORTHOPAEDIC SURGERY

## 2019-05-23 PROCEDURE — P9041 ALBUMIN (HUMAN),5%, 50ML: HCPCS | Performed by: ANESTHESIOLOGY

## 2019-05-23 PROCEDURE — 85049 AUTOMATED PLATELET COUNT: CPT | Performed by: ORTHOPAEDIC SURGERY

## 2019-05-23 PROCEDURE — P9016 RBC LEUKOCYTES REDUCED: HCPCS | Performed by: ANESTHESIOLOGY

## 2019-05-23 PROCEDURE — 27210794 ZZH OR GENERAL SUPPLY STERILE: Performed by: ORTHOPAEDIC SURGERY

## 2019-05-23 PROCEDURE — 12000001 ZZH R&B MED SURG/OB UMMC

## 2019-05-23 PROCEDURE — 82565 ASSAY OF CREATININE: CPT | Performed by: ANESTHESIOLOGY

## 2019-05-23 PROCEDURE — 83605 ASSAY OF LACTIC ACID: CPT | Performed by: ANESTHESIOLOGY

## 2019-05-23 PROCEDURE — 85396 CLOTTING ASSAY WHOLE BLOOD: CPT | Performed by: ORTHOPAEDIC SURGERY

## 2019-05-23 PROCEDURE — 0KXR0ZZ TRANSFER LEFT UPPER LEG MUSCLE, OPEN APPROACH: ICD-10-PCS | Performed by: SURGERY

## 2019-05-23 PROCEDURE — 88307 TISSUE EXAM BY PATHOLOGIST: CPT | Mod: 26 | Performed by: ORTHOPAEDIC SURGERY

## 2019-05-23 PROCEDURE — A9270 NON-COVERED ITEM OR SERVICE: HCPCS | Performed by: STUDENT IN AN ORGANIZED HEALTH CARE EDUCATION/TRAINING PROGRAM

## 2019-05-23 PROCEDURE — 40000014 ZZH STATISTIC ARTERIAL MONITORING DAILY

## 2019-05-23 PROCEDURE — 85730 THROMBOPLASTIN TIME PARTIAL: CPT | Performed by: ORTHOPAEDIC SURGERY

## 2019-05-23 PROCEDURE — 86901 BLOOD TYPING SEROLOGIC RH(D): CPT | Performed by: ANESTHESIOLOGY

## 2019-05-23 PROCEDURE — 71000014 ZZH RECOVERY PHASE 1 LEVEL 2 FIRST HR: Performed by: ORTHOPAEDIC SURGERY

## 2019-05-23 PROCEDURE — 25800030 ZZH RX IP 258 OP 636: Performed by: NURSE ANESTHETIST, CERTIFIED REGISTERED

## 2019-05-23 PROCEDURE — 4A1GXSH MONITORING OF SKIN AND BREAST VASCULAR PERFUSION USING INDOCYANINE GREEN DYE, EXTERNAL APPROACH: ICD-10-PCS | Performed by: SURGERY

## 2019-05-23 PROCEDURE — 86900 BLOOD TYPING SEROLOGIC ABO: CPT | Performed by: ANESTHESIOLOGY

## 2019-05-23 PROCEDURE — 25000128 H RX IP 250 OP 636: Performed by: ORTHOPAEDIC SURGERY

## 2019-05-23 RX ORDER — FENTANYL CITRATE 50 UG/ML
25-50 INJECTION, SOLUTION INTRAMUSCULAR; INTRAVENOUS
Status: DISCONTINUED | OUTPATIENT
Start: 2019-05-23 | End: 2019-05-23 | Stop reason: HOSPADM

## 2019-05-23 RX ORDER — FENTANYL CITRATE 50 UG/ML
INJECTION, SOLUTION INTRAMUSCULAR; INTRAVENOUS PRN
Status: DISCONTINUED | OUTPATIENT
Start: 2019-05-23 | End: 2019-05-23

## 2019-05-23 RX ORDER — FERROUS SULFATE 325(65) MG
325 TABLET ORAL
Status: DISCONTINUED | OUTPATIENT
Start: 2019-05-24 | End: 2019-05-28 | Stop reason: HOSPADM

## 2019-05-23 RX ORDER — CALCIUM CARBONATE 500 MG/1
500 TABLET, CHEWABLE ORAL DAILY PRN
Status: DISCONTINUED | OUTPATIENT
Start: 2019-05-23 | End: 2019-05-28 | Stop reason: HOSPADM

## 2019-05-23 RX ORDER — AMOXICILLIN 250 MG
1 CAPSULE ORAL 2 TIMES DAILY
Status: DISCONTINUED | OUTPATIENT
Start: 2019-05-23 | End: 2019-05-28 | Stop reason: HOSPADM

## 2019-05-23 RX ORDER — DEXMEDETOMIDINE HYDROCHLORIDE 4 UG/ML
0.2-1.2 INJECTION, SOLUTION INTRAVENOUS CONTINUOUS
Status: DISCONTINUED | OUTPATIENT
Start: 2019-05-23 | End: 2019-05-23 | Stop reason: HOSPADM

## 2019-05-23 RX ORDER — ONDANSETRON 2 MG/ML
4 INJECTION INTRAMUSCULAR; INTRAVENOUS EVERY 6 HOURS PRN
Status: DISCONTINUED | OUTPATIENT
Start: 2019-05-23 | End: 2019-05-28 | Stop reason: HOSPADM

## 2019-05-23 RX ORDER — NALOXONE HYDROCHLORIDE 0.4 MG/ML
.1-.4 INJECTION, SOLUTION INTRAMUSCULAR; INTRAVENOUS; SUBCUTANEOUS
Status: ACTIVE | OUTPATIENT
Start: 2019-05-23 | End: 2019-05-24

## 2019-05-23 RX ORDER — HYDROXYZINE HYDROCHLORIDE 25 MG/1
50 TABLET, FILM COATED ORAL EVERY 6 HOURS PRN
Status: DISCONTINUED | OUTPATIENT
Start: 2019-05-23 | End: 2019-05-28 | Stop reason: HOSPADM

## 2019-05-23 RX ORDER — INDOCYANINE GREEN AND WATER 25 MG
KIT INJECTION PRN
Status: DISCONTINUED | OUTPATIENT
Start: 2019-05-23 | End: 2019-05-23

## 2019-05-23 RX ORDER — ONDANSETRON 2 MG/ML
4 INJECTION INTRAMUSCULAR; INTRAVENOUS EVERY 30 MIN PRN
Status: DISCONTINUED | OUTPATIENT
Start: 2019-05-23 | End: 2019-05-23 | Stop reason: HOSPADM

## 2019-05-23 RX ORDER — PROPOFOL 10 MG/ML
INJECTION, EMULSION INTRAVENOUS PRN
Status: DISCONTINUED | OUTPATIENT
Start: 2019-05-23 | End: 2019-05-23

## 2019-05-23 RX ORDER — SODIUM CHLORIDE, SODIUM LACTATE, POTASSIUM CHLORIDE, CALCIUM CHLORIDE 600; 310; 30; 20 MG/100ML; MG/100ML; MG/100ML; MG/100ML
INJECTION, SOLUTION INTRAVENOUS CONTINUOUS
Status: DISCONTINUED | OUTPATIENT
Start: 2019-05-23 | End: 2019-05-25

## 2019-05-23 RX ORDER — TOLTERODINE 4 MG/1
8 CAPSULE, EXTENDED RELEASE ORAL DAILY
Status: DISCONTINUED | OUTPATIENT
Start: 2019-05-24 | End: 2019-05-28 | Stop reason: HOSPADM

## 2019-05-23 RX ORDER — ONDANSETRON 4 MG/1
4 TABLET, ORALLY DISINTEGRATING ORAL EVERY 30 MIN PRN
Status: DISCONTINUED | OUTPATIENT
Start: 2019-05-23 | End: 2019-05-23 | Stop reason: HOSPADM

## 2019-05-23 RX ORDER — ACETAMINOPHEN 325 MG/1
650 TABLET ORAL EVERY 4 HOURS PRN
Status: DISCONTINUED | OUTPATIENT
Start: 2019-05-26 | End: 2019-05-28 | Stop reason: HOSPADM

## 2019-05-23 RX ORDER — HYDROMORPHONE HYDROCHLORIDE 1 MG/ML
.3-.5 INJECTION, SOLUTION INTRAMUSCULAR; INTRAVENOUS; SUBCUTANEOUS
Status: DISCONTINUED | OUTPATIENT
Start: 2019-05-23 | End: 2019-05-28

## 2019-05-23 RX ORDER — SODIUM CHLORIDE, SODIUM LACTATE, POTASSIUM CHLORIDE, CALCIUM CHLORIDE 600; 310; 30; 20 MG/100ML; MG/100ML; MG/100ML; MG/100ML
INJECTION, SOLUTION INTRAVENOUS CONTINUOUS
Status: DISCONTINUED | OUTPATIENT
Start: 2019-05-23 | End: 2019-05-23 | Stop reason: HOSPADM

## 2019-05-23 RX ORDER — HYDROMORPHONE HYDROCHLORIDE 1 MG/ML
.3-.5 INJECTION, SOLUTION INTRAMUSCULAR; INTRAVENOUS; SUBCUTANEOUS EVERY 5 MIN PRN
Status: DISCONTINUED | OUTPATIENT
Start: 2019-05-23 | End: 2019-05-23 | Stop reason: HOSPADM

## 2019-05-23 RX ORDER — CEFAZOLIN SODIUM 1 G/3ML
1 INJECTION, POWDER, FOR SOLUTION INTRAMUSCULAR; INTRAVENOUS SEE ADMIN INSTRUCTIONS
Status: DISCONTINUED | OUTPATIENT
Start: 2019-05-23 | End: 2019-05-23 | Stop reason: HOSPADM

## 2019-05-23 RX ORDER — LIDOCAINE HYDROCHLORIDE 20 MG/ML
INJECTION, SOLUTION INFILTRATION; PERINEURAL PRN
Status: DISCONTINUED | OUTPATIENT
Start: 2019-05-23 | End: 2019-05-23

## 2019-05-23 RX ORDER — ONDANSETRON 2 MG/ML
INJECTION INTRAMUSCULAR; INTRAVENOUS PRN
Status: DISCONTINUED | OUTPATIENT
Start: 2019-05-23 | End: 2019-05-23

## 2019-05-23 RX ORDER — TRAZODONE HYDROCHLORIDE 50 MG/1
50 TABLET, FILM COATED ORAL AT BEDTIME
Status: DISCONTINUED | OUTPATIENT
Start: 2019-05-23 | End: 2019-05-25

## 2019-05-23 RX ORDER — CEFAZOLIN SODIUM 2 G/100ML
2 INJECTION, SOLUTION INTRAVENOUS
Status: COMPLETED | OUTPATIENT
Start: 2019-05-23 | End: 2019-05-23

## 2019-05-23 RX ORDER — PROCHLORPERAZINE MALEATE 5 MG
10 TABLET ORAL EVERY 6 HOURS PRN
Status: DISCONTINUED | OUTPATIENT
Start: 2019-05-23 | End: 2019-05-28 | Stop reason: HOSPADM

## 2019-05-23 RX ORDER — CALCIUM CHLORIDE 100 MG/ML
INJECTION INTRAVENOUS; INTRAVENTRICULAR PRN
Status: DISCONTINUED | OUTPATIENT
Start: 2019-05-23 | End: 2019-05-23

## 2019-05-23 RX ORDER — LIDOCAINE 40 MG/G
CREAM TOPICAL
Status: DISCONTINUED | OUTPATIENT
Start: 2019-05-23 | End: 2019-05-23 | Stop reason: HOSPADM

## 2019-05-23 RX ORDER — METOPROLOL SUCCINATE 50 MG/1
50 TABLET, EXTENDED RELEASE ORAL DAILY
Status: DISCONTINUED | OUTPATIENT
Start: 2019-05-24 | End: 2019-05-28 | Stop reason: HOSPADM

## 2019-05-23 RX ORDER — ACETAMINOPHEN 325 MG/1
975 TABLET ORAL EVERY 8 HOURS
Status: DISPENSED | OUTPATIENT
Start: 2019-05-23 | End: 2019-05-26

## 2019-05-23 RX ORDER — AMOXICILLIN 250 MG
2 CAPSULE ORAL 2 TIMES DAILY
Status: DISCONTINUED | OUTPATIENT
Start: 2019-05-23 | End: 2019-05-28 | Stop reason: HOSPADM

## 2019-05-23 RX ORDER — LANOLIN ALCOHOL/MO/W.PET/CERES
3 CREAM (GRAM) TOPICAL
Status: DISCONTINUED | OUTPATIENT
Start: 2019-05-23 | End: 2019-05-24

## 2019-05-23 RX ORDER — NALOXONE HYDROCHLORIDE 0.4 MG/ML
.1-.4 INJECTION, SOLUTION INTRAMUSCULAR; INTRAVENOUS; SUBCUTANEOUS
Status: DISCONTINUED | OUTPATIENT
Start: 2019-05-23 | End: 2019-05-28 | Stop reason: HOSPADM

## 2019-05-23 RX ORDER — KETOROLAC TROMETHAMINE 15 MG/ML
15 INJECTION, SOLUTION INTRAMUSCULAR; INTRAVENOUS EVERY 6 HOURS
Status: COMPLETED | OUTPATIENT
Start: 2019-05-24 | End: 2019-05-25

## 2019-05-23 RX ORDER — SODIUM CHLORIDE, SODIUM LACTATE, POTASSIUM CHLORIDE, CALCIUM CHLORIDE 600; 310; 30; 20 MG/100ML; MG/100ML; MG/100ML; MG/100ML
INJECTION, SOLUTION INTRAVENOUS CONTINUOUS PRN
Status: DISCONTINUED | OUTPATIENT
Start: 2019-05-23 | End: 2019-05-23

## 2019-05-23 RX ORDER — BACLOFEN 20 MG/1
20 TABLET ORAL 4 TIMES DAILY
Status: DISCONTINUED | OUTPATIENT
Start: 2019-05-23 | End: 2019-05-28 | Stop reason: HOSPADM

## 2019-05-23 RX ORDER — LIDOCAINE 40 MG/G
CREAM TOPICAL
Status: DISCONTINUED | OUTPATIENT
Start: 2019-05-23 | End: 2019-05-28 | Stop reason: HOSPADM

## 2019-05-23 RX ORDER — ALBUMIN, HUMAN INJ 5% 5 %
SOLUTION INTRAVENOUS CONTINUOUS PRN
Status: DISCONTINUED | OUTPATIENT
Start: 2019-05-23 | End: 2019-05-23

## 2019-05-23 RX ORDER — ONDANSETRON 4 MG/1
4 TABLET, ORALLY DISINTEGRATING ORAL EVERY 6 HOURS PRN
Status: DISCONTINUED | OUTPATIENT
Start: 2019-05-23 | End: 2019-05-28 | Stop reason: HOSPADM

## 2019-05-23 RX ORDER — CEFAZOLIN SODIUM 2 G/100ML
2 INJECTION, SOLUTION INTRAVENOUS EVERY 8 HOURS
Status: COMPLETED | OUTPATIENT
Start: 2019-05-23 | End: 2019-05-24

## 2019-05-23 RX ORDER — LABETALOL HYDROCHLORIDE 5 MG/ML
10 INJECTION, SOLUTION INTRAVENOUS
Status: DISCONTINUED | OUTPATIENT
Start: 2019-05-23 | End: 2019-05-23 | Stop reason: HOSPADM

## 2019-05-23 RX ORDER — HYDROMORPHONE HYDROCHLORIDE 1 MG/ML
.3-.5 INJECTION, SOLUTION INTRAMUSCULAR; INTRAVENOUS; SUBCUTANEOUS
Status: DISCONTINUED | OUTPATIENT
Start: 2019-05-23 | End: 2019-05-23

## 2019-05-23 RX ORDER — OXYCODONE HYDROCHLORIDE 5 MG/1
5-10 TABLET ORAL
Status: DISCONTINUED | OUTPATIENT
Start: 2019-05-23 | End: 2019-05-28 | Stop reason: HOSPADM

## 2019-05-23 RX ADMIN — MIDAZOLAM 2 MG: 1 INJECTION INTRAMUSCULAR; INTRAVENOUS at 08:38

## 2019-05-23 RX ADMIN — CEFAZOLIN 1 G: 1 INJECTION, POWDER, FOR SOLUTION INTRAMUSCULAR; INTRAVENOUS at 13:15

## 2019-05-23 RX ADMIN — PHENYLEPHRINE HYDROCHLORIDE 100 MCG: 10 INJECTION INTRAVENOUS at 10:42

## 2019-05-23 RX ADMIN — PHENYLEPHRINE HYDROCHLORIDE 200 MCG: 10 INJECTION INTRAVENOUS at 12:57

## 2019-05-23 RX ADMIN — PROPOFOL 50 MG: 10 INJECTION, EMULSION INTRAVENOUS at 08:56

## 2019-05-23 RX ADMIN — CEFAZOLIN 1 G: 1 INJECTION, POWDER, FOR SOLUTION INTRAMUSCULAR; INTRAVENOUS at 11:14

## 2019-05-23 RX ADMIN — PHENYLEPHRINE HYDROCHLORIDE 100 MCG: 10 INJECTION INTRAVENOUS at 12:38

## 2019-05-23 RX ADMIN — PHENYLEPHRINE HYDROCHLORIDE 200 MCG: 10 INJECTION INTRAVENOUS at 12:43

## 2019-05-23 RX ADMIN — SODIUM CHLORIDE, POTASSIUM CHLORIDE, SODIUM LACTATE AND CALCIUM CHLORIDE: 600; 310; 30; 20 INJECTION, SOLUTION INTRAVENOUS at 08:38

## 2019-05-23 RX ADMIN — BACLOFEN 20 MG: 20 TABLET ORAL at 21:52

## 2019-05-23 RX ADMIN — PHENYLEPHRINE HYDROCHLORIDE 100 MCG: 10 INJECTION INTRAVENOUS at 12:14

## 2019-05-23 RX ADMIN — PHENYLEPHRINE HYDROCHLORIDE 100 MCG: 10 INJECTION INTRAVENOUS at 14:40

## 2019-05-23 RX ADMIN — PHENYLEPHRINE HYDROCHLORIDE 100 MCG: 10 INJECTION INTRAVENOUS at 10:15

## 2019-05-23 RX ADMIN — CALCIUM CHLORIDE 200 MG: 100 INJECTION, SOLUTION INTRAVENOUS at 13:04

## 2019-05-23 RX ADMIN — ROCURONIUM BROMIDE 20 MG: 10 INJECTION INTRAVENOUS at 12:59

## 2019-05-23 RX ADMIN — CALCIUM CHLORIDE 300 MG: 100 INJECTION, SOLUTION INTRAVENOUS at 12:57

## 2019-05-23 RX ADMIN — SUGAMMADEX 200 MG: 100 INJECTION, SOLUTION INTRAVENOUS at 16:46

## 2019-05-23 RX ADMIN — ROCURONIUM BROMIDE 30 MG: 10 INJECTION INTRAVENOUS at 14:21

## 2019-05-23 RX ADMIN — INDOCYANINE GREEN 1.5 MG: KIT INTRAVENOUS at 14:00

## 2019-05-23 RX ADMIN — FENTANYL CITRATE 50 MCG: 50 INJECTION, SOLUTION INTRAMUSCULAR; INTRAVENOUS at 16:59

## 2019-05-23 RX ADMIN — PHENYLEPHRINE HYDROCHLORIDE 100 MCG: 10 INJECTION INTRAVENOUS at 12:54

## 2019-05-23 RX ADMIN — PHENYLEPHRINE HYDROCHLORIDE 100 MCG: 10 INJECTION INTRAVENOUS at 12:30

## 2019-05-23 RX ADMIN — AMINOCAPROIC ACID 1.25 G/HR: 250 INJECTION, SOLUTION INTRAVENOUS at 10:28

## 2019-05-23 RX ADMIN — PHENYLEPHRINE HYDROCHLORIDE 100 MCG: 10 INJECTION INTRAVENOUS at 15:35

## 2019-05-23 RX ADMIN — CEFAZOLIN SODIUM 2 G: 2 INJECTION, SOLUTION INTRAVENOUS at 22:31

## 2019-05-23 RX ADMIN — PHENYLEPHRINE HYDROCHLORIDE 100 MCG: 10 INJECTION INTRAVENOUS at 09:05

## 2019-05-23 RX ADMIN — SENNOSIDES AND DOCUSATE SODIUM 2 TABLET: 8.6; 5 TABLET ORAL at 22:22

## 2019-05-23 RX ADMIN — ALBUMIN HUMAN: 0.05 INJECTION, SOLUTION INTRAVENOUS at 13:04

## 2019-05-23 RX ADMIN — OXYCODONE HYDROCHLORIDE 10 MG: 5 TABLET ORAL at 21:52

## 2019-05-23 RX ADMIN — Medication 20 MG: at 09:15

## 2019-05-23 RX ADMIN — PHENYLEPHRINE HYDROCHLORIDE 100 MCG: 10 INJECTION INTRAVENOUS at 09:40

## 2019-05-23 RX ADMIN — PHENYLEPHRINE HYDROCHLORIDE 0.5 MCG/KG/MIN: 10 INJECTION INTRAVENOUS at 12:51

## 2019-05-23 RX ADMIN — PHENYLEPHRINE HYDROCHLORIDE 50 MCG: 10 INJECTION INTRAVENOUS at 16:40

## 2019-05-23 RX ADMIN — PHENYLEPHRINE HYDROCHLORIDE 100 MCG: 10 INJECTION INTRAVENOUS at 09:19

## 2019-05-23 RX ADMIN — TRAZODONE HYDROCHLORIDE 50 MG: 50 TABLET ORAL at 22:30

## 2019-05-23 RX ADMIN — ROCURONIUM BROMIDE 50 MG: 10 INJECTION INTRAVENOUS at 10:21

## 2019-05-23 RX ADMIN — CEFAZOLIN 1 G: 1 INJECTION, POWDER, FOR SOLUTION INTRAMUSCULAR; INTRAVENOUS at 15:11

## 2019-05-23 RX ADMIN — PHENYLEPHRINE HYDROCHLORIDE 200 MCG: 10 INJECTION INTRAVENOUS at 12:49

## 2019-05-23 RX ADMIN — ROCURONIUM BROMIDE 100 MG: 10 INJECTION INTRAVENOUS at 08:50

## 2019-05-23 RX ADMIN — PHENYLEPHRINE HYDROCHLORIDE 100 MCG: 10 INJECTION INTRAVENOUS at 12:02

## 2019-05-23 RX ADMIN — ROCURONIUM BROMIDE 50 MG: 10 INJECTION INTRAVENOUS at 12:00

## 2019-05-23 RX ADMIN — FENTANYL CITRATE 100 MCG: 50 INJECTION, SOLUTION INTRAMUSCULAR; INTRAVENOUS at 08:47

## 2019-05-23 RX ADMIN — ACETAMINOPHEN 975 MG: 325 TABLET, FILM COATED ORAL at 20:32

## 2019-05-23 RX ADMIN — AMINOCAPROIC ACID 7.5 G: 250 INJECTION, SOLUTION INTRAVENOUS at 09:20

## 2019-05-23 RX ADMIN — PROPOFOL 150 MG: 10 INJECTION, EMULSION INTRAVENOUS at 08:50

## 2019-05-23 RX ADMIN — Medication 0.5 MG: at 20:31

## 2019-05-23 RX ADMIN — PHENYLEPHRINE HYDROCHLORIDE 100 MCG: 10 INJECTION INTRAVENOUS at 10:08

## 2019-05-23 RX ADMIN — ALBUMIN HUMAN: 0.05 INJECTION, SOLUTION INTRAVENOUS at 12:49

## 2019-05-23 RX ADMIN — FENTANYL CITRATE 100 MCG: 50 INJECTION, SOLUTION INTRAMUSCULAR; INTRAVENOUS at 09:35

## 2019-05-23 RX ADMIN — ONDANSETRON 4 MG: 2 INJECTION INTRAMUSCULAR; INTRAVENOUS at 16:34

## 2019-05-23 RX ADMIN — PHENYLEPHRINE HYDROCHLORIDE 50 MCG: 10 INJECTION INTRAVENOUS at 16:39

## 2019-05-23 RX ADMIN — SODIUM CHLORIDE, POTASSIUM CHLORIDE, SODIUM LACTATE AND CALCIUM CHLORIDE: 600; 310; 30; 20 INJECTION, SOLUTION INTRAVENOUS at 12:43

## 2019-05-23 RX ADMIN — SODIUM CHLORIDE, POTASSIUM CHLORIDE, SODIUM LACTATE AND CALCIUM CHLORIDE: 600; 310; 30; 20 INJECTION, SOLUTION INTRAVENOUS at 19:03

## 2019-05-23 RX ADMIN — CALCIUM CHLORIDE 250 MG: 100 INJECTION, SOLUTION INTRAVENOUS at 13:09

## 2019-05-23 RX ADMIN — PHENYLEPHRINE HYDROCHLORIDE 100 MCG: 10 INJECTION INTRAVENOUS at 16:22

## 2019-05-23 RX ADMIN — PHENYLEPHRINE HYDROCHLORIDE 100 MCG: 10 INJECTION INTRAVENOUS at 08:51

## 2019-05-23 RX ADMIN — PHENYLEPHRINE HYDROCHLORIDE 100 MCG: 10 INJECTION INTRAVENOUS at 11:07

## 2019-05-23 RX ADMIN — PHENYLEPHRINE HYDROCHLORIDE 100 MCG: 10 INJECTION INTRAVENOUS at 13:09

## 2019-05-23 RX ADMIN — PHENYLEPHRINE HYDROCHLORIDE 100 MCG: 10 INJECTION INTRAVENOUS at 10:58

## 2019-05-23 RX ADMIN — DEXMEDETOMIDINE 0.5 MCG/KG/HR: 100 INJECTION, SOLUTION, CONCENTRATE INTRAVENOUS at 10:02

## 2019-05-23 RX ADMIN — CEFAZOLIN SODIUM 2 G: 2 INJECTION, SOLUTION INTRAVENOUS at 09:15

## 2019-05-23 RX ADMIN — LIDOCAINE HYDROCHLORIDE 100 MG: 20 INJECTION, SOLUTION INFILTRATION; PERINEURAL at 08:47

## 2019-05-23 RX ADMIN — KETAMINE HYDROCHLORIDE 10 MG/HR: 100 INJECTION, SOLUTION, CONCENTRATE INTRAMUSCULAR; INTRAVENOUS at 09:19

## 2019-05-23 ASSESSMENT — MIFFLIN-ST. JEOR: SCORE: 1822.41

## 2019-05-23 NOTE — BRIEF OP NOTE
[facility], [place]    Brief Operative Note    Pre-operative diagnosis: Open Wound And Flexion Contracture  Post-operative diagnosis * No post-op diagnosis entered *  Procedure: Procedure(s):  Right Hip Disarticulation  Possible Thigh Flap Possible VAC  Surgeon: Surgeon(s) and Role:  Panel 1:     * Mayur Murphy MD - Primary     * Jacinto Mejia MD - Resident - Assisting  Panel 2:     * Charlotte Blackmon MD - Primary  Anesthesia: General   Estimated blood loss: >1500 mL   Drains:    Mendez-Domínguez    Specimens:   ID Type Source Tests Collected by Time Destination   A : RIght Upper Leg Tissue Leg, Right SURGICAL PATHOLOGY EXAM Mayur Murphy MD 5/23/2019  1:01 PM      Findings:   see op note.  Complications: None.      Plan  Activity: HOB no greater than 30 degrees, Ok to roll on left but not right side   Antibiotics: ancef x 24 hr   Consults: hospitalist   Diet: ADAT  DVT prophylaxis: NONE   Wound Care: monitor drain output, incisional wound vac   Drain: PARESH in place   Pain management: oral with iv for breakthrough  Dispo: to floor vs. ICU   Follow up: 2 weeks with Dr. Murphy's team     Jacinto Mejia MD   Orthopaedic Surgery Resident, PGY-4      For any questions regarding this patient please page the ortho resident on call tonight

## 2019-05-23 NOTE — ANESTHESIA PROCEDURE NOTES
Arterial Line Procedure Note  Staff:     Anesthesiologist:  Nita Ceron MD    Resident/CRNA:  Osmani Grace MD    Arterial line performed by resident/CRNA in presence of a teaching physician    Location: In OR After Induction  Procedure Start/Stop Times:     patient identified, IV checked, site marked, risks and benefits discussed, informed consent, monitors and equipment checked, pre-op evaluation and at physician/surgeon's request      Correct Patient: Yes      Correct Position: Yes      Correct Site: Yes      Correct Procedure: Yes      Correct Laterality:  Yes    Site Marked:  N/A  Line Placement:     Procedure:  Arterial Line    Insertion Site:  Radial    Insertion laterality:  Left    Skin Prep: Chloraprep      Patient Prep: patient draped, mask, sterile gloves and hat      Local skin infiltration:  None    Ultrasound Guided?: No      Catheter size:  20 gauge, 12 cm    Cath secured with: suture      Dressing:  Tegaderm    Complications:  None obvious    Arterial waveform: Yes      IBP within 10% of NIBP: Yes

## 2019-05-23 NOTE — ANESTHESIA CARE TRANSFER NOTE
Patient: Josiah Crump    Procedure(s):  Right Hip Disarticulation with Spy  Right Quadracep Thigh Flap With Wound VAC Placement    Diagnosis: Open Wound And Flexion Contracture  Diagnosis Additional Information: No value filed.    Anesthesia Type:   No value filed.     Note:  Airway :Face Mask  Patient transferred to:PACU  Comments: On 4l facemask. Exchanging well. VSS. Report given to RN. Handoff Report: Identifed the Patient, Identified the Reponsible Provider, Reviewed the pertinent medical history, Discussed the surgical course, Reviewed Intra-OP anesthesia mangement and issues during anesthesia, Set expectations for post-procedure period and Allowed opportunity for questions and acknowledgement of understanding      Vitals: (Last set prior to Anesthesia Care Transfer)    CRNA VITALS  5/23/2019 1626 - 5/23/2019 1708      5/23/2019             NIBP:  118/83    Pulse:  90    NIBP Mean:  90    Ht Rate:  90    SpO2:  100 %    Resp Rate (observed):  17    EKG:  Sinus rhythm                Electronically Signed By: RUSLAN Schmid CRNA  May 23, 2019  5:08 PM

## 2019-05-23 NOTE — OP NOTE
Preop diagnosis: 1. 30 cm right posterior thigh decubitus.  2.  Massive heterotopic ossification right hip    Postop diagnosis: The same    Procedures performed: 1.  Excision of massive heterotopic ossification right hip, 15 x 10 x 10 cm.  2.  Right hip disarticulation through arthrodesed hip.  3.  Harvesting of right anterior thigh flap based on the femoral artery.    Surgeons: Justin Murphy, Pam SABA, Jacinto Root and Felicia Blackmon.      Ms. Sanchez's assistance was required throughout the first three quarters of the case.  She assisted with exposure hemostasis and intraoperative surgical decision-making.      Estimated blood loss,.  1.5 L    Patient was interviewed in the preoperative area with his son present risk and benefits have been reviewed previously the consent was signed the surgical site was marked with my initials and line of intended incision.  Preoperative brief had been performed.  Patient was taken the operating room received a general anesthetic in the lateral position the right buttock hip and lower extremity were prepped and draped sterilely.  Surgical timeout was performed.    In the lateral position a incision was made extending along the medial and lateral aspects of the posterior wound.  The incisions were then connected with an incision over the anterior portion of the right thigh.  In combination these allowed the formation of a thigh flap.  Dissection was taken down across the anterior thigh to develop a myocutaneous anterior flap based on the femoral artery.  The femoral artery was ligated with the proximal third of the thigh and the flap was developed along the medial anterior and lateral borders of the thigh.  While developing the flap massive area of heterotopic bone was exposed.  This was excised with removal of approximately 15 x 10 x 10 cm.  We then passed a blunt instrument around the posterior aspect of the arm femur.  Combination of osteotomes in summary used to cut  through the proximal femur at the level of the acetabulum.  Hemostasis was obtained.  There was significant blood loss during the osteotomy and exposure.  The remaining bony surface of the wound was then inspected and modified to accommodate the soft tissue flap which was the result of the preservation of the anterior thigh.  The wound was irrigated.    Dr. Blackmon then became the lead surgeon and I left the operating wound. Her procedure involved optimization of the wound and wound management as well as placement of VAC dressing.  She will dictate that portion of the procedure.    Postoperative plan.  1.  Patient will receive radiation treatment tomorrow.  2.  He will be on the orthopedic service with medical hospitalist consultation.

## 2019-05-23 NOTE — PROGRESS NOTES
SPIRITUAL HEALTH SERVICES  [facility] ([place] 3C   PRE-SURGERY VISIT    Pt's nurse asked me to see pt. Had pre-surgery visit with pt. And his son. Provided spiritual support, prayer. Introduced them to Spiritual Health Services.    Chu Gonzalez M.Div.     Pager [phone]

## 2019-05-23 NOTE — OR NURSING
Dr. Diehl at bedside. SBP 70's - 80's. No IV access at this time. New orders are received for ABG's and he will place new IV at this time.

## 2019-05-23 NOTE — LETTER
Transition Communication Hand-off for Care Transitions to Next Level of Care Provider    Name: Josiah Crump  : 1965  MRN #: [id number]  Primary Care Provider: GABBY TAVAERZ     Primary Clinic: [address] [address]     Reason for Hospitalization:  Open Wound And Flexion Contracture  Status post hip surgery  Admit Date/Time: 2019  5:10 AM  Discharge Date: 19  Payor Source: Payor: MEDICARE / Plan: MEDICARE / Product Type: Medicare /     Readmission Assessment Measure (PENNY) Risk Score/category: Low         Reason for Communication Hand-off Referral: Other D/c to TCU    Discharge Plan:  Patient Name:  Josiah Crump     Anticipated Discharge Date:  19    Discharge Disposition:   TCU:   TCU   [address] [place] [address] [address] [place], [place]  [phone]     Concern for non-adherence with plan of care:   Y/N Unknown  Discharge Needs Assessment:  Needs      Most Recent Value   Equipment Currently Used at Home  grab bar, toilet, grab bar, tub/shower, wheelchair, manual, tub bench          Already enrolled in Tele-monitoring program and name of program:  Unknown  Follow-up specialty is recommended: Yes    Follow-up plan:    Future Appointments   Date Time Provider Department Center   2019  7:00 PM Kylah Brandon OT [facility]       Any outstanding tests or procedures:        Referrals     Future Labs/Procedures    Occupational Therapy Adult Consult     Comments:    Evaluate and treat as clinically indicated.    Physical Therapy Adult Consult     Comments:    Evaluate and treat as clinically indicated.            STEVEN Kessler, LICSW  7B   [phone] (pager) [phone]  2019

## 2019-05-23 NOTE — BRIEF OP NOTE
[facility] Brief Operative Note    Pre-operative diagnosis: Open Wound And Flexion Contracture   Post-operative diagnosis Chronic posterior thigh ulcer right leg, distal elephantiasis, stage 4 R IT decubitus.  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    Procedure: Procedure(s):  Right Hip Disarticulation with Spy  Right Quadracep Thigh Flap With Wound VAC Placement   Surgeon(s): Surgeon(s) and Role:  Panel 1:     * Mayur Murphy MD - Primary     * Jacinto Mejia MD - Resident - Assisting  Panel 2:     * Charlotte Blackmon MD - Primary   Estimated blood loss: 1500 mL    Specimens: ID Type Source Tests Collected by Time Destination   A : RIght Upper Leg Tissue Leg, Right SURGICAL PATHOLOGY EXAM Mayur Murphy MD 5/23/2019  1:01 PM       Findings: Hip disartic with removal of fused H.O.  Quad fillet flap OK on SPY.  Inset and incisional wound VAC.  dopplerable pulses marked with silk sutures.  Off pressors by end of case.  PRBC x 1 unit, 3 liters crystalloid, 5% albumin.

## 2019-05-24 ENCOUNTER — OFFICE VISIT (OUTPATIENT)
Dept: RADIATION ONCOLOGY | Facility: CLINIC | Age: 54
End: 2019-05-24
Attending: RADIOLOGY
Payer: MEDICARE

## 2019-05-24 ENCOUNTER — RECORDS - HEALTHEAST (OUTPATIENT)
Dept: ADMINISTRATIVE | Facility: OTHER | Age: 54
End: 2019-05-24

## 2019-05-24 ENCOUNTER — COMMUNICATION - HEALTHEAST (OUTPATIENT)
Dept: FAMILY MEDICINE | Facility: CLINIC | Age: 54
End: 2019-05-24

## 2019-05-24 DIAGNOSIS — R25.2 SPASTICITY: ICD-10-CM

## 2019-05-24 DIAGNOSIS — N31.9 NEUROGENIC BLADDER: ICD-10-CM

## 2019-05-24 DIAGNOSIS — M89.8X9 HETEROTOPIC OSSIFICATION OF BONE: ICD-10-CM

## 2019-05-24 DIAGNOSIS — G47.00 INSOMNIA, UNSPECIFIED TYPE: ICD-10-CM

## 2019-05-24 DIAGNOSIS — L97.119: Primary | ICD-10-CM

## 2019-05-24 DIAGNOSIS — M89.8X9 HETEROTOPIC OSSIFICATION OF BONE: Primary | ICD-10-CM

## 2019-05-24 DIAGNOSIS — D64.9 ANEMIA: ICD-10-CM

## 2019-05-24 LAB
ANION GAP SERPL CALCULATED.3IONS-SCNC: 2 MMOL/L (ref 3–14)
BLD PROD TYP BPU: NORMAL
BLD UNIT ID BPU: 0
BLOOD PRODUCT CODE: NORMAL
BPU ID: NORMAL
BUN SERPL-MCNC: 12 MG/DL (ref 7–30)
CALCIUM SERPL-MCNC: 7.8 MG/DL (ref 8.5–10.1)
CHLORIDE SERPL-SCNC: 111 MMOL/L (ref 94–109)
CO2 SERPL-SCNC: 28 MMOL/L (ref 20–32)
CREAT SERPL-MCNC: 0.87 MG/DL (ref 0.66–1.25)
FERRITIN SERPL-MCNC: 27 NG/ML (ref 26–388)
GFR SERPL CREATININE-BSD FRML MDRD: >90 ML/MIN/{1.73_M2}
GLUCOSE SERPL-MCNC: 104 MG/DL (ref 70–99)
HGB BLD-MCNC: 6.6 G/DL (ref 13.3–17.7)
HGB BLD-MCNC: 7.2 G/DL (ref 13.3–17.7)
INTERPRETATION ECG - MUSE: NORMAL
IRON SATN MFR SERPL: 9 % (ref 15–46)
IRON SERPL-MCNC: 12 UG/DL (ref 35–180)
POTASSIUM SERPL-SCNC: 3.8 MMOL/L (ref 3.4–5.3)
SODIUM SERPL-SCNC: 140 MMOL/L (ref 133–144)
TIBC SERPL-MCNC: 143 UG/DL (ref 240–430)
TRANSFUSION STATUS PATIENT QL: NORMAL
TRANSFUSION STATUS PATIENT QL: NORMAL

## 2019-05-24 PROCEDURE — 85018 HEMOGLOBIN: CPT | Performed by: ORTHOPAEDIC SURGERY

## 2019-05-24 PROCEDURE — 25000128 H RX IP 250 OP 636: Performed by: STUDENT IN AN ORGANIZED HEALTH CARE EDUCATION/TRAINING PROGRAM

## 2019-05-24 PROCEDURE — 77412 RADIATION TX DELIVERY LVL 3: CPT | Performed by: RADIOLOGY

## 2019-05-24 PROCEDURE — 25000132 ZZH RX MED GY IP 250 OP 250 PS 637: Performed by: STUDENT IN AN ORGANIZED HEALTH CARE EDUCATION/TRAINING PROGRAM

## 2019-05-24 PROCEDURE — 12000001 ZZH R&B MED SURG/OB UMMC

## 2019-05-24 PROCEDURE — 85018 HEMOGLOBIN: CPT | Performed by: STUDENT IN AN ORGANIZED HEALTH CARE EDUCATION/TRAINING PROGRAM

## 2019-05-24 PROCEDURE — 25000132 ZZH RX MED GY IP 250 OP 250 PS 637: Performed by: PEDIATRICS

## 2019-05-24 PROCEDURE — 77334 RADIATION TREATMENT AID(S): CPT | Performed by: RADIOLOGY

## 2019-05-24 PROCEDURE — 80048 BASIC METABOLIC PNL TOTAL CA: CPT | Performed by: STUDENT IN AN ORGANIZED HEALTH CARE EDUCATION/TRAINING PROGRAM

## 2019-05-24 PROCEDURE — 77417 THER RADIOLOGY PORT IMAGE(S): CPT | Performed by: RADIOLOGY

## 2019-05-24 PROCEDURE — A9270 NON-COVERED ITEM OR SERVICE: HCPCS | Performed by: PEDIATRICS

## 2019-05-24 PROCEDURE — 77336 RADIATION PHYSICS CONSULT: CPT | Performed by: RADIOLOGY

## 2019-05-24 PROCEDURE — 77290 THER RAD SIMULAJ FIELD CPLX: CPT | Performed by: RADIOLOGY

## 2019-05-24 PROCEDURE — A9270 NON-COVERED ITEM OR SERVICE: HCPCS | Performed by: STUDENT IN AN ORGANIZED HEALTH CARE EDUCATION/TRAINING PROGRAM

## 2019-05-24 PROCEDURE — 36415 COLL VENOUS BLD VENIPUNCTURE: CPT | Performed by: ORTHOPAEDIC SURGERY

## 2019-05-24 PROCEDURE — 99222 1ST HOSP IP/OBS MODERATE 55: CPT | Performed by: PEDIATRICS

## 2019-05-24 PROCEDURE — P9016 RBC LEUKOCYTES REDUCED: HCPCS | Performed by: ANESTHESIOLOGY

## 2019-05-24 PROCEDURE — 25800030 ZZH RX IP 258 OP 636: Performed by: STUDENT IN AN ORGANIZED HEALTH CARE EDUCATION/TRAINING PROGRAM

## 2019-05-24 PROCEDURE — 83540 ASSAY OF IRON: CPT | Performed by: PEDIATRICS

## 2019-05-24 PROCEDURE — 36415 COLL VENOUS BLD VENIPUNCTURE: CPT | Performed by: STUDENT IN AN ORGANIZED HEALTH CARE EDUCATION/TRAINING PROGRAM

## 2019-05-24 PROCEDURE — 82728 ASSAY OF FERRITIN: CPT | Performed by: PEDIATRICS

## 2019-05-24 PROCEDURE — 77307 TELETHX ISODOSE PLAN CPLX: CPT | Performed by: RADIOLOGY

## 2019-05-24 PROCEDURE — 83550 IRON BINDING TEST: CPT | Performed by: PEDIATRICS

## 2019-05-24 RX ORDER — OXYCODONE HYDROCHLORIDE 5 MG/1
5-10 TABLET ORAL EVERY 4 HOURS PRN
Qty: 40 TABLET | Refills: 0 | Status: ON HOLD | OUTPATIENT
Start: 2019-05-24 | End: 2019-07-19

## 2019-05-24 RX ORDER — LIDOCAINE 4 G/G
1 PATCH TOPICAL
Status: DISCONTINUED | OUTPATIENT
Start: 2019-05-24 | End: 2019-05-28 | Stop reason: HOSPADM

## 2019-05-24 RX ORDER — LANOLIN ALCOHOL/MO/W.PET/CERES
6 CREAM (GRAM) TOPICAL AT BEDTIME
Status: DISCONTINUED | OUTPATIENT
Start: 2019-05-24 | End: 2019-05-28 | Stop reason: HOSPADM

## 2019-05-24 RX ADMIN — ACETAMINOPHEN 975 MG: 325 TABLET, FILM COATED ORAL at 20:08

## 2019-05-24 RX ADMIN — OXYCODONE HYDROCHLORIDE 10 MG: 5 TABLET ORAL at 03:05

## 2019-05-24 RX ADMIN — MELATONIN TAB 3 MG 6 MG: 3 TAB at 22:58

## 2019-05-24 RX ADMIN — BACLOFEN 20 MG: 20 TABLET ORAL at 20:08

## 2019-05-24 RX ADMIN — KETOROLAC TROMETHAMINE 15 MG: 15 INJECTION, SOLUTION INTRAMUSCULAR; INTRAVENOUS at 00:02

## 2019-05-24 RX ADMIN — CEFAZOLIN SODIUM 2 G: 2 INJECTION, SOLUTION INTRAVENOUS at 07:02

## 2019-05-24 RX ADMIN — TRAZODONE HYDROCHLORIDE 50 MG: 50 TABLET ORAL at 22:58

## 2019-05-24 RX ADMIN — ACETAMINOPHEN 975 MG: 325 TABLET, FILM COATED ORAL at 13:05

## 2019-05-24 RX ADMIN — OXYCODONE HYDROCHLORIDE 10 MG: 5 TABLET ORAL at 15:47

## 2019-05-24 RX ADMIN — OXYCODONE HYDROCHLORIDE 10 MG: 5 TABLET ORAL at 05:59

## 2019-05-24 RX ADMIN — SODIUM CHLORIDE, POTASSIUM CHLORIDE, SODIUM LACTATE AND CALCIUM CHLORIDE: 600; 310; 30; 20 INJECTION, SOLUTION INTRAVENOUS at 06:17

## 2019-05-24 RX ADMIN — OXYCODONE HYDROCHLORIDE 10 MG: 5 TABLET ORAL at 09:37

## 2019-05-24 RX ADMIN — MELATONIN TAB 3 MG 3 MG: 3 TAB at 00:02

## 2019-05-24 RX ADMIN — TOLTERODINE 8 MG: 4 CAPSULE, EXTENDED RELEASE ORAL at 07:42

## 2019-05-24 RX ADMIN — HYDROMORPHONE HYDROCHLORIDE 0.5 MG: 1 INJECTION, SOLUTION INTRAMUSCULAR; INTRAVENOUS; SUBCUTANEOUS at 00:02

## 2019-05-24 RX ADMIN — KETOROLAC TROMETHAMINE 15 MG: 15 INJECTION, SOLUTION INTRAMUSCULAR; INTRAVENOUS at 05:59

## 2019-05-24 RX ADMIN — KETOROLAC TROMETHAMINE 15 MG: 15 INJECTION, SOLUTION INTRAMUSCULAR; INTRAVENOUS at 18:40

## 2019-05-24 RX ADMIN — HYDROXYZINE HYDROCHLORIDE 50 MG: 25 TABLET ORAL at 03:05

## 2019-05-24 RX ADMIN — SENNOSIDES AND DOCUSATE SODIUM 1 TABLET: 8.6; 5 TABLET ORAL at 20:08

## 2019-05-24 RX ADMIN — SODIUM CHLORIDE 1000 ML: 9 INJECTION, SOLUTION INTRAVENOUS at 04:10

## 2019-05-24 RX ADMIN — BACLOFEN 20 MG: 20 TABLET ORAL at 07:41

## 2019-05-24 RX ADMIN — OXYCODONE HYDROCHLORIDE 10 MG: 5 TABLET ORAL at 20:08

## 2019-05-24 RX ADMIN — SENNOSIDES AND DOCUSATE SODIUM 2 TABLET: 8.6; 5 TABLET ORAL at 07:43

## 2019-05-24 RX ADMIN — BACLOFEN 20 MG: 20 TABLET ORAL at 15:47

## 2019-05-24 RX ADMIN — LIDOCAINE 1 PATCH: 560 PATCH PERCUTANEOUS; TOPICAL; TRANSDERMAL at 09:37

## 2019-05-24 RX ADMIN — METOPROLOL SUCCINATE 50 MG: 50 TABLET, EXTENDED RELEASE ORAL at 07:47

## 2019-05-24 RX ADMIN — HYDROXYZINE HYDROCHLORIDE 50 MG: 25 TABLET ORAL at 22:58

## 2019-05-24 RX ADMIN — KETOROLAC TROMETHAMINE 15 MG: 15 INJECTION, SOLUTION INTRAMUSCULAR; INTRAVENOUS at 13:05

## 2019-05-24 RX ADMIN — FERROUS SULFATE TAB 325 MG (65 MG ELEMENTAL FE) 325 MG: 325 (65 FE) TAB at 07:41

## 2019-05-24 RX ADMIN — ACETAMINOPHEN 975 MG: 325 TABLET, FILM COATED ORAL at 04:10

## 2019-05-24 RX ADMIN — BACLOFEN 20 MG: 20 TABLET ORAL at 13:05

## 2019-05-24 RX ADMIN — HYDROMORPHONE HYDROCHLORIDE 0.5 MG: 1 INJECTION, SOLUTION INTRAMUSCULAR; INTRAVENOUS; SUBCUTANEOUS at 13:06

## 2019-05-24 ASSESSMENT — ACTIVITIES OF DAILY LIVING (ADL)
ADLS_ACUITY_SCORE: 27
ADLS_ACUITY_SCORE: 27
ADLS_ACUITY_SCORE: 25
ADLS_ACUITY_SCORE: 27
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
## 2019-05-24 NOTE — ANESTHESIA POSTPROCEDURE EVALUATION
Anesthesia POST Procedure Evaluation    Patient: Josiah Crump   MRN:     [id number] Gender:   male   Age:    53 year old :      1965        Preoperative Diagnosis: Open Wound And Flexion Contracture   Procedure(s):  Right Hip Disarticulation with Spy  Right Quadracep Thigh Flap With Wound VAC Placement   Postop Comments: No value filed.       Anesthesia Type:  General  No value filed.    Reportable Event: NO     PAIN: Uncomplicated   Sign Out status: Comfortable, Well controlled pain     PONV: No PONV   Sign Out status:  No Nausea or Vomiting     Neuro/Psych: Uneventful perioperative course   Sign Out Status: Preoperative baseline; Age appropriate mentation     Airway/Resp.: Uneventful perioperative course   Sign Out Status: Non labored breathing, age appropriate RR; Resp. Status within EXPECTED Parameters     CV: Uneventful perioperative course   Sign Out status: Appropriate BP and perfusion indices; Appropriate HR/Rhythm     Disposition:   Sign Out in:  PACU  Disposition:  Floor  Recovery Course: Uneventful  Follow-Up: Not required           Last Anesthesia Record Vitals:  CRNA VITALS  2019 1626 - 2019 1726      2019             NIBP:  118/83    Pulse:  90    NIBP Mean:  90    Ht Rate:  90    SpO2:  100 %    Resp Rate (observed):  17    EKG:  Sinus rhythm          Last PACU Vitals:  Vitals Value Taken Time   /73 2019  7:40 PM   Temp 36.6  C (97.8  F) 2019  6:45 PM   Pulse 96 2019  7:40 PM   Resp 16 2019  7:00 PM   SpO2 90 % 2019  7:39 PM   Temp src     NIBP 118/83 2019  5:00 PM   Pulse 90 2019  5:00 PM   SpO2 100 % 2019  5:00 PM   Resp     Temp     Ht Rate 90 2019  5:00 PM   Temp 2     Vitals shown include unvalidated device data.      Electronically Signed By: Bill Diehl MD, May 23, 2019, 8:01 PM

## 2019-05-24 NOTE — PROGRESS NOTES
"Social Work: Assessment with Discharge Plan    Patient Name:  Josiah Crump  :  1965  Age:  53 year old  MRN:  [id number]  Risk/Complexity Score:     Completed assessment with:  Patient    Presenting Information   Reason for Referral:  Discharge plan  Date of Intake:  May 24, 2019  Referral Source:  Physician  Decision Maker:  Patient  Alternate Decision Maker:  Patient would want his brother making decisions for him  Health Care Directive:  Declined completing  Living Situation:  Apartment  Previous Functional Status:  Patient has a PCA for 6 hours every day. They help with ADLs/IADLs. Patient shared he was recently reassessed for PCA and feels his hours will increase. Patient also was assessed for a CADI waiver. Patient hopes that with the CADI waiver he will be able to get a power wheelchair  Patient and family understanding of hospitalization:  Appropriate  Cultural/Language/Spiritual Considerations:  Patient is Christian and would like a visit from the   Adjustment to Illness:  Patient feels he has been doing \"alright\" and has been dealing with his leg for the past 18 years. Patient knew he needed to come in for the surgery and he is already feeling better    Physical Health  Reason for Admission:    1. Status post hip surgery      Services Needed/Recommended:  ARU    Mental Health/Chemical Dependency  Diagnosis:  None indicated  Support/Services in Place:  None  Services Needed/Recommended:  None indicated at this time    Support System  Significant relationship at present time:  None  Family of origin is available for support: Patient gets support from his brother and mother but they lives in [place]. Patient has 2 sons and a daughter. Patient also has 4 grandchildren, 2 of which live in [place]  Other support available:  Patient gets support from his PCA  Gaps in support system:  None  Patient is caregiver to:  None     Provider Information   Primary Care Physician:  Marvel Petit   " [phone]   Clinic:  [facility] [address] [facility] [address]      :  Patient will have a CM when he is set up with a CADI waiver    Financial   Income Source:  SSDI  Financial Concerns:  None indicated  Insurance:    Payor/Plan Subscriber Name Rel Member # Group #   MEDICARE - MEDICARE VALDEMAR FLANNERY*  [id number]       ATTN CLAIMS, [address]   MEDICAID [place] - [place]* VALDEMAR FLANNERY*  [id number]       [address] [address], [address]       Discharge Plan   Patient and family discharge goal:  ARU  Provided education on discharge plan:  YES  Patient agreeable to discharge plan:  YES  A list of Medicare Certified Facilities was provided to the patient and/or family to encourage patient choice. Patient's choices for facility are:      1. [facility]  2. [facility] is also following    Addendum: After further discussion with patient, he would like [facility] to be his number 1 choice. He does not want  to make any other referrals at this time.    Will NH provide Skilled rehabilitation or complex medical:  YES  General information regarding anticipated insurance coverage and possible out of pocket cost was discussed. Patient and patient's family are aware patient may incur the cost of transportation to the facility, pending insurance payment: YES  Barriers to discharge:  Medical stability    Discharge Recommendations   Anticipated Disposition:  TBD  Transportation Needs:  TBD  Name of Transportation Company and Phone:  TBD    Additional comments   Patient is a 53-year-old male who was admitted to [facility] on 5/23/19 for right hip disarticulation, removal of heterotopic ossification with VAC placement. Patient had an EBL of ~1500mL, receiving 1 unit of PRBCs intraoperatively and has had a postoperative acute anemia with hgb from 11 to 7. SW was asked to see patient as he will most likely need an ARU stay upon discharge. Patient was pleasant and engaged while meeting  with SW. Patient would prefer to go to [facility] for ARU but if he declined then his 2nd choice would be [facility]. Unknown at the time of assessment that [facility] is also following [facility]. SW will need to discuss with patient if he is also okay with this facility. Patient has 6 hours of PCA a day and was recently assessed for a CADI waiver. Patient reports good support from his PCA, his mother, and his brother. Patient was unsure if he will need continuing radiation treatments after today. SW to check in with tx team. Patient is expected to discharge within 3-4 days.     STEVEN Thomas, LGSW  7A   Ph: [phone]  Pager: [phone]

## 2019-05-24 NOTE — PROGRESS NOTES
WEEKLY MANAGEMENT NOTE  Radiation Oncology          Patient Name: Josiah Crump  MRN: [id number]     Right Pelvis  700 cGy / 700 cGy   1/1      DAILY DOSE:     700 cGy       DISEASE UNDER TREATMENT: Right hip hetero-ossification    The patient underwent a simulation today followed by planning and delivery of single treatment of 700 cGy to the right pelvis.    Patient previously had radiotherapy to the same region at [facility] in 9/2005.  I discussed with the patient various aspects of radiotherapy including potential early and late toxicities prior to simulation.    Patient tolerated the treatment without complications.  It is unlikely that the patient can receive additional radiotherapy in the future for heterotopic ossification involving the same region.      IMPRESSION: The patient is tolerating the treatment.  The patient set up, dose, and portal imagings were reviewed.    PLAN: Complete radiotherapy with PRN follow up    PAIN MANAGEMENT PLAN: IP management    JANIA Mesa M.D.  Department of Radiation Oncology  [facility]

## 2019-05-24 NOTE — PROGRESS NOTES
Simulation Note    Date: May 24, 2019    Patient: Josiah Crump    Diagnosis:    Ulcer of right thigh, unspecified ulcer stage (H)  Heterotopic ossification of bone    Type of Simulation:  Adjuvant     Patient Position: Supine    Patient Immobilization: Other:  None    Simulation Aid(s): None    Image Acquisition: Conventional CT simulation without 4D    Total Dose Planned: 700 cGy      Dose/fraction:700 cGy    Energy of machine:  18 MV           Type of Radiotherapy Technique: Oblique pair with customized blocking      Continuing Physcis/Dosimetry  and Dose calculations are ordered.  Simple simulations will be done prior to new start and changes in fields.  Weekly on treat visit.      JANIA Mesa M.D.  Department of Radiation Oncology  [facility]

## 2019-05-24 NOTE — PLAN OF CARE
"Blood pressure 103/61, pulse 97, temperature 99  F (37.2  C), temperature source Oral, resp. rate 14, height 1.93 m (6' 4\"), weight 87.1 kg (192 lb), SpO2 96 %.   Patient with complaints of right hip pain and rt shoulder pain.  Given oxycodone x2, dilaudid x1, toradol x2 and atarax with some relief.  Hot pack to rt shoulder.  Wound vac to right hip disarticulation site-intact, not pulling any drainage.  Omi with with 130cc bloody drainage.  Patient with soft BP and  low urine output.  MD notified, 1liter bolus given.  BP unchanged, pulse decreased (109-97), 100cc urine response (urine cloudy).  Patient on pulsate mattress, turned slightly to lft only.  HOB should be max at 30 degrees, rolling only to lft side or supine.  Lungs clear, sats high 90's on 2 liters.   Alert and oriented x4.  Plastics here and found pulses on rt hip wound over silk stitches.  Continue with POC.  "

## 2019-05-24 NOTE — LETTER
5/24/2019       RE: Josiah Crump  [address]  [address]  [place] [address]     Dear Colleague,    Thank you for referring your patient, Josiah Crump, to the RADIATION ONCOLOGY CLINIC. Please see a copy of my visit note below.    WEEKLY MANAGEMENT NOTE  Radiation Oncology          Patient Name: Josiah Crump  MRN: [id number]     Right Pelvis  700 cGy / 700 cGy   1/1      DAILY DOSE:     700 cGy       DISEASE UNDER TREATMENT: Right hip hetero-ossification    The patient underwent a simulation today followed by planning and delivery of single treatment of 700 cGy to the right pelvis.    Patient previously had radiotherapy to the same region at [facility] in 9/2005.  I discussed with the patient various aspects of radiotherapy including potential early and late toxicities prior to simulation.    Patient tolerated the treatment without complications.  It is unlikely that the patient can receive additional radiotherapy in the future for heterotopic ossification involving the same region.      IMPRESSION: The patient is tolerating the treatment.  The patient set up, dose, and portal imagings were reviewed.    PLAN: Complete radiotherapy with PRN follow up    PAIN MANAGEMENT PLAN: IP teresa Mesa M.D.  Department of Radiation Oncology  [facility]

## 2019-05-24 NOTE — PLAN OF CARE
Vitals:    05/24/19 0306 05/24/19 0554 05/24/19 0753 05/24/19 1207   BP: 105/70 103/61 127/61 133/65   BP Location: Right arm Left arm  Left arm   Pulse: 109 97 102 100   Resp: 15 14 16 16   Temp:  99  F (37.2  C) 98.6  F (37  C) 99.1  F (37.3  C)   TempSrc:  Oral  Oral   SpO2:  96% 99% 99%   Weight:       Height:        T- max 99.1 tachycardic BP stable, sating 99% on room air, complain of right hip and right shoulder pain, Oxy, Toradol, dilaudid and tylenol with mani relief,  Right hip wound vac and J.P intact, left groin Mepilex intact,  muhammad with adequate out put, tolerating intake.  Pt went down for radiation this AM and at 1400,   Continue with plan of care.

## 2019-05-24 NOTE — PROGRESS NOTES
"Doing well this am, some pain control issues overnight, most pain in right shoulder, likely from positioning    /61 (BP Location: Left arm)   Pulse 97   Temp 99  F (37.2  C) (Oral)   Resp 14   Ht 1.93 m (6' 4\")   Wt 87.1 kg (192 lb)   SpO2 96%   BMI 23.37 kg/m    NAD, AAOx3  RRR  NLB room air  Incisional vac in place, good seal, no leak, no drainage, PARESH right LE serosanguinous; dopplerable signals slightly away from the two silk sutures on posterior aspect of fillet flap    PARESH 130 // 140 serosanguinous  Hg post op 8.3 (pre op 10); am hg pending    I/O last 3 completed shifts:  In: 6016.67 [P.O.:1170; I.V.:3046.67; IV Piggyback:1000]  Out: 3555 [Urine:1785; Drains:270; Blood:1500]      A/P: 54 yo underwent 5/23 Clohisy/Blackmon hip disartic with removal of fused H. O., quad fillet flap (ok on SPY), incisional vac  --No need for doppler checks  --Continue incisional vac  --F/u am Hg  --Strip/record drain output qshift  --Pain control  --Regular diet  --Appreciate cares per primary ortho team    Patient seen and discussed with Dr. Neymar Sam PGY-4  Plastic Surgery  Please page on call  "
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
## 2019-05-24 NOTE — PROGRESS NOTES
"Orthopaedic Surgery Progress Note   May 24, 2019    Subjective: No acute events overnight. Pain well controlled. Feeling ready for breakfast this morning.    Objective: /61 (BP Location: Left arm)   Pulse 97   Temp 99  F (37.2  C) (Oral)   Resp 14   Ht 1.93 m (6' 4\")   Wt 87.1 kg (192 lb)   SpO2 96%   BMI 23.37 kg/m      Drain: PARESH    General: NAD, alert and oriented, cooperative with exam.   Cardio: RRR, extremities wwp.   Respiratory: Non-labored breathing on room air.  MSK: Focused examination of RLE with incisional vac over stump. No erythema. PARESH drain with serosanguinous output (140 ml, 130cc last two shifts)    Labs:   Hemoglobin   Date Value Ref Range Status   05/24/2019 7.2 (L) 13.3 - 17.7 g/dL Final   05/23/2019 8.3 (L) 13.3 - 17.7 g/dL Final     Imaging: none needed    Assessment and Plan: Josiah Crump is a 53 year old male with complex past medical history regarding right lower extremity who underwent right hip disarticulation (Jeffrey) and right quadracep thigh flap (Dr. Blackmon) on 5/23/19. Intraoperative blood loss was estimated to be 1500 mL but patient has been stable since surgery    Ortho Primary  Scheduled for radiation therapy today due to history of HO  Activity: HOB no greater than 30 degrees, ok to roll on left but no on right side  Pain management: Transition from IV to PO as tolerated. Lidocaine patch for right should pain   Antibiotics: Ancef x 24 hours.  Diet: Begin with clear fluids and progress diet as tolerated.   DVT prophylaxis: no chemical ppx indicated, and mechanical on contralateral side while in the hospital  Imaging: none needed.  Labs: Hgb trend POD#1, #2  Bracing/Splinting: none  Dressings: incisional vac to be managed by plastics  Drains: Document output per shift, managed by plastics  Physical Therapy/Occupational Therapy: Eval and treat.  Cultures: none   Consults: Plastics, medicine, rad onc  Follow-up: Clinic with Dr. Murphy in 2 weeks  Disposition: " Pending progress with therapies, pain control on orals, and medical stability, anticipate discharge to home on POD #3-4.    Ary Atkinson MD  Orthopaedic Surgery Resident, PGY-4  Pager: [phone]

## 2019-05-25 LAB
ABO + RH BLD: NORMAL
ABO + RH BLD: NORMAL
BLD GP AB SCN SERPL QL: NORMAL
BLD PROD TYP BPU: NORMAL
BLD UNIT ID BPU: 0
BLD UNIT ID BPU: 0
BLOOD BANK CMNT PATIENT-IMP: NORMAL
BLOOD PRODUCT CODE: NORMAL
BLOOD PRODUCT CODE: NORMAL
BPU ID: NORMAL
BPU ID: NORMAL
HGB BLD-MCNC: 6.9 G/DL (ref 13.3–17.7)
NUM BPU REQUESTED: 4
SPECIMEN EXP DATE BLD: NORMAL
TRANSFUSION STATUS PATIENT QL: NORMAL

## 2019-05-25 PROCEDURE — 25800030 ZZH RX IP 258 OP 636: Performed by: STUDENT IN AN ORGANIZED HEALTH CARE EDUCATION/TRAINING PROGRAM

## 2019-05-25 PROCEDURE — 25000132 ZZH RX MED GY IP 250 OP 250 PS 637: Performed by: STUDENT IN AN ORGANIZED HEALTH CARE EDUCATION/TRAINING PROGRAM

## 2019-05-25 PROCEDURE — 36415 COLL VENOUS BLD VENIPUNCTURE: CPT | Performed by: ORTHOPAEDIC SURGERY

## 2019-05-25 PROCEDURE — A9270 NON-COVERED ITEM OR SERVICE: HCPCS | Performed by: PEDIATRICS

## 2019-05-25 PROCEDURE — P9016 RBC LEUKOCYTES REDUCED: HCPCS | Performed by: ANESTHESIOLOGY

## 2019-05-25 PROCEDURE — 99233 SBSQ HOSP IP/OBS HIGH 50: CPT | Performed by: PEDIATRICS

## 2019-05-25 PROCEDURE — 25000132 ZZH RX MED GY IP 250 OP 250 PS 637: Performed by: PEDIATRICS

## 2019-05-25 PROCEDURE — 85018 HEMOGLOBIN: CPT | Performed by: ORTHOPAEDIC SURGERY

## 2019-05-25 PROCEDURE — A9270 NON-COVERED ITEM OR SERVICE: HCPCS | Performed by: STUDENT IN AN ORGANIZED HEALTH CARE EDUCATION/TRAINING PROGRAM

## 2019-05-25 PROCEDURE — 25000128 H RX IP 250 OP 636: Performed by: STUDENT IN AN ORGANIZED HEALTH CARE EDUCATION/TRAINING PROGRAM

## 2019-05-25 PROCEDURE — 12000001 ZZH R&B MED SURG/OB UMMC

## 2019-05-25 RX ORDER — GABAPENTIN 300 MG/1
300 CAPSULE ORAL 3 TIMES DAILY
Status: DISCONTINUED | OUTPATIENT
Start: 2019-05-25 | End: 2019-05-28 | Stop reason: HOSPADM

## 2019-05-25 RX ORDER — TRAZODONE HYDROCHLORIDE 100 MG/1
100 TABLET ORAL AT BEDTIME
Status: DISCONTINUED | OUTPATIENT
Start: 2019-05-25 | End: 2019-05-28 | Stop reason: HOSPADM

## 2019-05-25 RX ADMIN — OXYCODONE HYDROCHLORIDE 10 MG: 5 TABLET ORAL at 17:07

## 2019-05-25 RX ADMIN — BACLOFEN 20 MG: 20 TABLET ORAL at 16:55

## 2019-05-25 RX ADMIN — MELATONIN TAB 3 MG 6 MG: 3 TAB at 22:32

## 2019-05-25 RX ADMIN — BACLOFEN 20 MG: 20 TABLET ORAL at 07:40

## 2019-05-25 RX ADMIN — TRAZODONE HYDROCHLORIDE 100 MG: 100 TABLET ORAL at 22:32

## 2019-05-25 RX ADMIN — OXYCODONE HYDROCHLORIDE 10 MG: 5 TABLET ORAL at 11:46

## 2019-05-25 RX ADMIN — KETOROLAC TROMETHAMINE 15 MG: 15 INJECTION, SOLUTION INTRAMUSCULAR; INTRAVENOUS at 00:33

## 2019-05-25 RX ADMIN — ACETAMINOPHEN 975 MG: 325 TABLET, FILM COATED ORAL at 11:30

## 2019-05-25 RX ADMIN — METOPROLOL SUCCINATE 50 MG: 50 TABLET, EXTENDED RELEASE ORAL at 07:47

## 2019-05-25 RX ADMIN — FERROUS SULFATE TAB 325 MG (65 MG ELEMENTAL FE) 325 MG: 325 (65 FE) TAB at 07:41

## 2019-05-25 RX ADMIN — ACETAMINOPHEN 975 MG: 325 TABLET, FILM COATED ORAL at 20:00

## 2019-05-25 RX ADMIN — LIDOCAINE 1 PATCH: 560 PATCH PERCUTANEOUS; TOPICAL; TRANSDERMAL at 07:39

## 2019-05-25 RX ADMIN — HYDROMORPHONE HYDROCHLORIDE 0.5 MG: 1 INJECTION, SOLUTION INTRAMUSCULAR; INTRAVENOUS; SUBCUTANEOUS at 01:05

## 2019-05-25 RX ADMIN — HYDROMORPHONE HYDROCHLORIDE 0.5 MG: 1 INJECTION, SOLUTION INTRAMUSCULAR; INTRAVENOUS; SUBCUTANEOUS at 06:08

## 2019-05-25 RX ADMIN — ACETAMINOPHEN 975 MG: 325 TABLET, FILM COATED ORAL at 04:46

## 2019-05-25 RX ADMIN — GABAPENTIN 300 MG: 300 CAPSULE ORAL at 20:01

## 2019-05-25 RX ADMIN — GABAPENTIN 300 MG: 300 CAPSULE ORAL at 14:17

## 2019-05-25 RX ADMIN — SENNOSIDES AND DOCUSATE SODIUM 2 TABLET: 8.6; 5 TABLET ORAL at 07:41

## 2019-05-25 RX ADMIN — TOLTERODINE 8 MG: 4 CAPSULE, EXTENDED RELEASE ORAL at 07:40

## 2019-05-25 RX ADMIN — SODIUM CHLORIDE, POTASSIUM CHLORIDE, SODIUM LACTATE AND CALCIUM CHLORIDE: 600; 310; 30; 20 INJECTION, SOLUTION INTRAVENOUS at 00:33

## 2019-05-25 RX ADMIN — BACLOFEN 20 MG: 20 TABLET ORAL at 11:31

## 2019-05-25 RX ADMIN — SENNOSIDES AND DOCUSATE SODIUM 1 TABLET: 8.6; 5 TABLET ORAL at 20:01

## 2019-05-25 RX ADMIN — BACLOFEN 20 MG: 20 TABLET ORAL at 20:01

## 2019-05-25 RX ADMIN — OXYCODONE HYDROCHLORIDE 10 MG: 5 TABLET ORAL at 04:46

## 2019-05-25 ASSESSMENT — ACTIVITIES OF DAILY LIVING (ADL)
ADLS_ACUITY_SCORE: 24
ADLS_ACUITY_SCORE: 27
ADLS_ACUITY_SCORE: 24

## 2019-05-25 NOTE — PLAN OF CARE
Vitals:    05/25/19 1003 05/25/19 1200 05/25/19 1215 05/25/19 1300   BP: 106/56 129/76 128/73 121/72   Pulse: 89 90 94 92   Resp: 18 18 18    Temp: 98  F (36.7  C) 97.5  F (36.4  C) 97.6  F (36.4  C)    TempSrc:       SpO2: 97% 99% 99% 99%   Weight:       Height:       Afebrile vital stable, sating 99% on room air, belly soft, passing gas,   Lungs clear, good urine out put from muhammad, pt stated that he wants to keep the muhammad, OK to keep muhammad per Dr Murphy, tolerating intake, Hgb 6.9, 2 unit RBC infused, Oxy tylenol and dilaudid for pain, left groin site primapore.   Ritgh hip with incisional vac over stump. J.P Tegaderm intact, no erythema.  Gabapentin for tingling to bilateral upper extremities fingers,  IV SL continue with plan of care.
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
## 2019-05-25 NOTE — PROGRESS NOTES
"Plastic Surgery Progress Note    No complaints this am, pain control has improved. In good spirits.     /60   Pulse 94   Temp 98.6  F (37  C)   Resp 18   Ht 1.93 m (6' 4\")   Wt 87.1 kg (192 lb)   SpO2 94%   BMI 23.37 kg/m    NAD, AAOx3  RRR  NLB room air  Incisional vac in place, good seal, no leak, no drainage, PARESH right LE serosanguinous. Flap is well perfused and appears viable.     PARESH 410 // 45 serosanguinous  Hg post op 6.9 from 9 post-op    I/O last 3 completed shifts:  In: 2961.67 [P.O.:900; I.V.:1696.67]  Out: 2925 [Urine:2600; Drains:325]      A/P: 52 yo underwent 5/23 Clohisy/Blackmon hip disartic with removal of fused H. O., quad fillet flap (ok on SPY), incisional vac    --No need for doppler checks  --Continue incisional vac, will take down likely mid-week next week  --Will defer to Orthopedic for management of Hgb drift, likely from intra-op blood loss. Ok for transfusion from our stand-point  --HOB no greater than 30 degrees. Reposition Q2H.   --Strip/record drain output qshift  --Pain control  --Regular diet  --Appreciate cares per primary ortho team      Leatha Pacheco MD  "

## 2019-05-25 NOTE — PROGRESS NOTES
"Orthopaedic Surgery Progress Note   May 25, 2019    Subjective: No acute events overnight. 1 unit PRBC yesterday for hgb 6.6  Objective: /56   Pulse 89   Temp 98  F (36.7  C)   Resp 18   Ht 1.93 m (6' 4\")   Wt 87.1 kg (192 lb)   SpO2 97%   BMI 23.37 kg/m      Drain: PARESH with 140, 140, 45 ml for last three shifts  Incisional vac with no output    General: NAD, alert and oriented, cooperative with exam.   Cardio: RRR, extremities wwp.   Respiratory: Non-labored breathing on room air.  MSK: Focused examination of RLE with incisional vac over stump. No erythema.    Labs:   Hemoglobin   Date Value Ref Range Status   05/25/2019 6.9 (LL) 13.3 - 17.7 g/dL Final     Comment:     This result has been called to KELLY SALAZAR RN 7B by Anay Beaver on 05 25 2019   at 0731, and has been read back.      05/24/2019 6.6 (LL) 13.3 - 17.7 g/dL Final     Comment:     This result has been called to NUBIA ESPINOZA RN 7B by Kamron Singh   on 05 24 2019 at 1932, and has been read back.        Imaging: none needed    Assessment and Plan: Josiah Crump is a 53 year old male with complex past medical history regarding right lower extremity who underwent right hip disarticulation (Clohisy) and right quadracep thigh flap (Dr. Blackmon) on 5/23/19. Intraoperative blood loss was estimated to be 1500 mL and he has gotten two units back due to persistent hgb <7    Ortho Primary  Radiation therapy completed POD#1  Activity: HOB no greater than 30 degrees, ok to roll on left but no on right side  Pain management: Transition from IV to PO as tolerated. Lidocaine patch for right should pain   Antibiotics: Ancef x 24 hours.  Diet: Begin with clear fluids and progress diet as tolerated.   DVT prophylaxis: no chemical ppx indicated, and mechanical on contralateral side while in the hospital  Imaging: none needed.  Labs: Hgb trend POD#1, #2, goal Hgb >7, medicine team ordered 2 units this morning, ortho will plan to recheck hgb in the " morning but would not check this evening unless symptomatic  Bracing/Splinting: none  Dressings: incisional vac to be managed by plastics  Drains: Document output per shift, managed by plastics  Appreciate medicine team placing order to discontinue muhammad today  Physical Therapy/Occupational Therapy: Eval and treat.  Cultures: none   Consults: Plastics, medicine, rad onc  Follow-up: Clinic with Dr. Murphy in 2 weeks  Disposition: Pending progress with therapies, pain control on orals, and medical stability, anticipate discharge to home on POD #3-4. Has bed secured at  per patient. Also per patient, plastics wants to keep incisional vac on until Wednesda; unclear if he needs to remain in the hospital for this so we will clarify with plastics team regarding discharge plan.    No further surgery planned during this admission    Ary Atkinson MD  Orthopaedic Surgery Resident, PGY-4  Pager: [phone]    Patient seen and examined today. I spoke with Dr Atkinson. I agree with her assessment and plan. Slow bleeding continues . Agree with intent to transfuse 2 units. Will remain hospital until Wednesday per plastics recommendation.

## 2019-05-25 NOTE — PLAN OF CARE
Vital signs:  Temp: 98.6  F (37  C) Temp src: Oral BP: 118/60 Heart Rate: 82 Resp: 18 SpO2: 94 % O2 Device: None (Room air)     Neuro: A&Ox4.   Cardiac: WNL, denies chest pain.   Respiratory: LS clear, denies SOB.   Pain: Pt requested prn IV dilaudid x2 & prn oxycodone x1 with adequate relief. Scheduled toradol & tylenol given per order.   Diet: Tolerating regular diet without nausea or emesis.   GI/: Shah with adequate UOP. +BS, +flatus, no BM.   Incision/Drain: R hip wound vac intact, 0 output. R PARESH with small serosang drainage. Mepilex intact on coccyx.   Labs: Hgb 6.6, 1 unit of RBC given at HS. Hgb recheck in am per MD.   LDA: R PIV infusing 125 ml/hr.   New changes this shift: None, no acute changes overnight.   Plan: Continue POC.

## 2019-05-26 ENCOUNTER — APPOINTMENT (OUTPATIENT)
Dept: PHYSICAL THERAPY | Facility: CLINIC | Age: 54
DRG: 580 | End: 2019-05-26
Attending: ORTHOPAEDIC SURGERY
Payer: MEDICARE

## 2019-05-26 LAB — HGB BLD-MCNC: 9.1 G/DL (ref 13.3–17.7)

## 2019-05-26 PROCEDURE — 25000128 H RX IP 250 OP 636: Performed by: STUDENT IN AN ORGANIZED HEALTH CARE EDUCATION/TRAINING PROGRAM

## 2019-05-26 PROCEDURE — 25000132 ZZH RX MED GY IP 250 OP 250 PS 637: Performed by: STUDENT IN AN ORGANIZED HEALTH CARE EDUCATION/TRAINING PROGRAM

## 2019-05-26 PROCEDURE — A9270 NON-COVERED ITEM OR SERVICE: HCPCS | Performed by: PEDIATRICS

## 2019-05-26 PROCEDURE — 85018 HEMOGLOBIN: CPT | Performed by: STUDENT IN AN ORGANIZED HEALTH CARE EDUCATION/TRAINING PROGRAM

## 2019-05-26 PROCEDURE — 99233 SBSQ HOSP IP/OBS HIGH 50: CPT | Performed by: PEDIATRICS

## 2019-05-26 PROCEDURE — 25000132 ZZH RX MED GY IP 250 OP 250 PS 637: Performed by: PEDIATRICS

## 2019-05-26 PROCEDURE — A9270 NON-COVERED ITEM OR SERVICE: HCPCS | Performed by: STUDENT IN AN ORGANIZED HEALTH CARE EDUCATION/TRAINING PROGRAM

## 2019-05-26 PROCEDURE — 97162 PT EVAL MOD COMPLEX 30 MIN: CPT | Mod: GP

## 2019-05-26 PROCEDURE — 40000556 ZZH STATISTIC PERIPHERAL IV START W US GUIDANCE

## 2019-05-26 PROCEDURE — 12000001 ZZH R&B MED SURG/OB UMMC

## 2019-05-26 PROCEDURE — 36415 COLL VENOUS BLD VENIPUNCTURE: CPT | Performed by: STUDENT IN AN ORGANIZED HEALTH CARE EDUCATION/TRAINING PROGRAM

## 2019-05-26 PROCEDURE — 97110 THERAPEUTIC EXERCISES: CPT | Mod: GP

## 2019-05-26 PROCEDURE — 97530 THERAPEUTIC ACTIVITIES: CPT | Mod: GP

## 2019-05-26 RX ORDER — POLYETHYLENE GLYCOL 3350 17 G/17G
17 POWDER, FOR SOLUTION ORAL 2 TIMES DAILY PRN
Status: DISCONTINUED | OUTPATIENT
Start: 2019-05-26 | End: 2019-05-28 | Stop reason: HOSPADM

## 2019-05-26 RX ADMIN — MELATONIN TAB 3 MG 6 MG: 3 TAB at 22:18

## 2019-05-26 RX ADMIN — METOPROLOL SUCCINATE 50 MG: 50 TABLET, EXTENDED RELEASE ORAL at 07:52

## 2019-05-26 RX ADMIN — GABAPENTIN 300 MG: 300 CAPSULE ORAL at 14:49

## 2019-05-26 RX ADMIN — HYDROXYZINE HYDROCHLORIDE 50 MG: 25 TABLET ORAL at 22:23

## 2019-05-26 RX ADMIN — OXYCODONE HYDROCHLORIDE 10 MG: 5 TABLET ORAL at 20:14

## 2019-05-26 RX ADMIN — TRAZODONE HYDROCHLORIDE 100 MG: 100 TABLET ORAL at 22:18

## 2019-05-26 RX ADMIN — ACETAMINOPHEN 975 MG: 325 TABLET, FILM COATED ORAL at 12:21

## 2019-05-26 RX ADMIN — BACLOFEN 20 MG: 20 TABLET ORAL at 12:21

## 2019-05-26 RX ADMIN — BACLOFEN 20 MG: 20 TABLET ORAL at 07:52

## 2019-05-26 RX ADMIN — OXYCODONE HYDROCHLORIDE 10 MG: 5 TABLET ORAL at 17:21

## 2019-05-26 RX ADMIN — BACLOFEN 20 MG: 20 TABLET ORAL at 17:11

## 2019-05-26 RX ADMIN — GABAPENTIN 300 MG: 300 CAPSULE ORAL at 07:51

## 2019-05-26 RX ADMIN — OXYCODONE HYDROCHLORIDE 10 MG: 5 TABLET ORAL at 10:05

## 2019-05-26 RX ADMIN — FERROUS SULFATE TAB 325 MG (65 MG ELEMENTAL FE) 325 MG: 325 (65 FE) TAB at 07:52

## 2019-05-26 RX ADMIN — SENNOSIDES AND DOCUSATE SODIUM 2 TABLET: 8.6; 5 TABLET ORAL at 20:14

## 2019-05-26 RX ADMIN — BACLOFEN 20 MG: 20 TABLET ORAL at 20:14

## 2019-05-26 RX ADMIN — HYDROMORPHONE HYDROCHLORIDE 0.5 MG: 1 INJECTION, SOLUTION INTRAMUSCULAR; INTRAVENOUS; SUBCUTANEOUS at 00:00

## 2019-05-26 RX ADMIN — HYDROMORPHONE HYDROCHLORIDE 0.5 MG: 1 INJECTION, SOLUTION INTRAMUSCULAR; INTRAVENOUS; SUBCUTANEOUS at 12:29

## 2019-05-26 RX ADMIN — GABAPENTIN 300 MG: 300 CAPSULE ORAL at 20:14

## 2019-05-26 RX ADMIN — LIDOCAINE 1 PATCH: 560 PATCH PERCUTANEOUS; TOPICAL; TRANSDERMAL at 07:52

## 2019-05-26 RX ADMIN — SENNOSIDES AND DOCUSATE SODIUM 2 TABLET: 8.6; 5 TABLET ORAL at 07:52

## 2019-05-26 RX ADMIN — TOLTERODINE 8 MG: 4 CAPSULE, EXTENDED RELEASE ORAL at 07:51

## 2019-05-26 ASSESSMENT — ACTIVITIES OF DAILY LIVING (ADL)
ADLS_ACUITY_SCORE: 24
ADLS_ACUITY_SCORE: 24
ADLS_ACUITY_SCORE: 23
ADLS_ACUITY_SCORE: 24
ADLS_ACUITY_SCORE: 23
ADLS_ACUITY_SCORE: 24

## 2019-05-26 NOTE — PLAN OF CARE
Discharge Planner PT   Patient plan for discharge: Rehab  Current status: Pt sat in bed HOB elevated to 60 deg ~3 minutes. Roll to left side, transfer to 45 deg left lateral lean, avoiding pressure on R surgical site. Pt tolerated position for ~10 minutes. No increase in pain or discomfort. Issues blue theraband and instructed in exercises for shoulder girdle strengthening.   Barriers to return to prior living situation: Medical status  Recommendations for discharge: ARU  Rationale for recommendations: Pt has high level of pre surgical mobility, can tolerate 3 hrs therapy       Entered by: Marvel Plata 05/26/2019 3:37 PM       Avoid any lift use 2/2 wound breakdown sling will cause.

## 2019-05-26 NOTE — PLAN OF CARE
"/63 (BP Location: Left arm)   Pulse 93   Temp 99.3  F (37.4  C) (Oral)   Resp 16   Ht 1.93 m (6' 4\")   Wt 87.1 kg (192 lb)   SpO2 99%   BMI 23.37 kg/m      Patient afebrile, VSS. Patients pain controlled with oxy x1. Patients muhammad with large urine output. Patient eva with 90cc output. Patient did bowel regeiment of digital stimulation with no results. Patient passing gas. Patient appears to rest between cares. Cont with POC  "

## 2019-05-26 NOTE — PLAN OF CARE
OT: Hold, per discussion with interdisciplinary team, PT will initiate with Pt with bed mobility and transfers within post surgical precautions.  Pt with likely OT needs and will hold therapy until 5/28

## 2019-05-26 NOTE — PROGRESS NOTES
"Orthopaedic Surgery Progress Note   May 26, 2019    Subjective: No acute events overnight. Pain adequately controlled. Has not noticed much change from gabapentin. Shah out and voiding well. Has not had bowel movement    Objective: /69 (BP Location: Right arm)   Pulse 86   Temp 99.8  F (37.7  C) (Oral)   Resp 16   Ht 1.93 m (6' 4\")   Wt 87.1 kg (192 lb)   SpO2 98%   BMI 23.37 kg/m      Drain: PARESH with 140, 30, 110 ml for last three shifts  Incisional vac with no output    General: NAD, alert and oriented, cooperative with exam.   Cardio: RRR, extremities wwp.   Respiratory: Non-labored breathing on room air.  MSK: Focused examination of RLE with incisional vac over stump. No erythema.    Labs:   Hemoglobin   Date Value Ref Range Status   05/26/2019 9.1 (L) 13.3 - 17.7 g/dL Final   05/25/2019 6.9 (LL) 13.3 - 17.7 g/dL Final     Comment:     This result has been called to KELLY SALAZAR RN 7B by Anay Beaver on 05 25 2019   at 0731, and has been read back.        Imaging: none needed    Assessment and Plan: Josiah Crump is a 53 year old male with complex past medical history regarding right lower extremity who underwent right hip disarticulation (Clohisy) and right quadracep thigh flap (Dr. Blackmon) on 5/23/19. Intraoperative blood loss was estimated to be 1500 mL and he has gotten two units back due to persistent hgb <7    Ortho Primary  Radiation therapy completed POD#1  Activity: HOB no greater than 30 degrees, ok to roll on left but no on right side. Lift to transfer to chair. NO slideboard.  Pain management: Transition from IV to PO as tolerated. Lidocaine patch for right should pain   Antibiotics: Ancef x 24 hours.  Diet: Begin with clear fluids and progress diet as tolerated.   DVT prophylaxis: no chemical ppx indicated, and mechanical on contralateral side while in the hospital  Imaging: none needed.  Labs: repeat Hgb tomorrow. If stable, no further rechecks needed unless " symptomatic  Bracing/Splinting: none  Dressings: incisional vac to be managed by plastics  Drains: Document output per shift, managed by plastics  Appreciate medicine team placing order to discontinue muhammad today  Physical Therapy/Occupational Therapy: Eval and treat.  Cultures: none   Consults: Plastics, medicine, rad onc  Follow-up: Clinic with Dr. Murphy in 2 weeks  Disposition: Pending progress with therapies, pain control on orals, and medical stability, anticipate discharge to [facility]. Plastics does not need him in-house for incisional vac or drain management- they can do that when he is in TCU    No further surgery planned during this admission    Ary Atkinson MD  Orthopaedic Surgery Resident, PGY-4  Pager: [phone]

## 2019-05-26 NOTE — PLAN OF CARE
Vital signs:  Temp: 99.3  F (37.4  C) Temp src: Oral BP: 118/63 Pulse: 93 Resp: 16 SpO2: 99 % O2 Device: None (Room air)     Neuro: A&Ox4.   Cardiac: HR 93, denies chest pain.   Respiratory: LS clear, denies SOB.   Pain: Pt requested IV dilaudid prn x1 at HS with relief.   Diet: Tolerating diet, no nausea.   GI/: Shah with adequate UOP. +BS, +flatus, no BM.   Incision/Drain: Wound vac intact on R hip, 0 output. R PARESH with small serosang drainage.   R PARESH with small serosang drainage. Mepilex intact on coccyx.   Labs: Hgb draw in am.   LDA: New PIV placed, saline locked.   New changes this shift: None, acute changes overnight.   Plan: Continue POC.

## 2019-05-26 NOTE — PLAN OF CARE
Vitals:    05/25/19 1518 05/25/19 2213 05/26/19 0854 05/26/19 1200   BP: 111/58 118/63 127/69 120/70   BP Location: Left arm Left arm Right arm Right arm   Pulse: 92 93 86 83   Resp: 16 16 16 18   Temp: 97.9  F (36.6  C) 99.3  F (37.4  C) 99.8  F (37.7  C) 98.6  F (37  C)   TempSrc: Oral Oral Oral    SpO2: 96% 99% 98% 97%   Weight:       Height:        Afebrile vital stable sating 97% on room air, lungs clear, non labor breathing,   Right side wound vac intact, left groin and hip wound primapore changed,   Pt cleaned up bed changed, Oxy and dilaudid for pain with relief, tolerating intake,  Good urine out put from muhammad, plan for PT and OT,  left foot middle toe scabbed,  Elevated on pillow. Bowl regimen provided, continue with plan of care.
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
## 2019-05-27 LAB
ERYTHROCYTE [DISTWIDTH] IN BLOOD BY AUTOMATED COUNT: 22.9 % (ref 10–15)
HCT VFR BLD AUTO: 32.5 % (ref 40–53)
HGB BLD-MCNC: 9.2 G/DL (ref 13.3–17.7)
MCH RBC QN AUTO: 22.2 PG (ref 26.5–33)
MCHC RBC AUTO-ENTMCNC: 28.3 G/DL (ref 31.5–36.5)
MCV RBC AUTO: 79 FL (ref 78–100)
PLATELET # BLD AUTO: 237 10E9/L (ref 150–450)
RBC # BLD AUTO: 4.14 10E12/L (ref 4.4–5.9)
WBC # BLD AUTO: 10.3 10E9/L (ref 4–11)

## 2019-05-27 PROCEDURE — 85027 COMPLETE CBC AUTOMATED: CPT | Performed by: STUDENT IN AN ORGANIZED HEALTH CARE EDUCATION/TRAINING PROGRAM

## 2019-05-27 PROCEDURE — 25000132 ZZH RX MED GY IP 250 OP 250 PS 637: Performed by: PEDIATRICS

## 2019-05-27 PROCEDURE — 25000132 ZZH RX MED GY IP 250 OP 250 PS 637: Performed by: STUDENT IN AN ORGANIZED HEALTH CARE EDUCATION/TRAINING PROGRAM

## 2019-05-27 PROCEDURE — 12000001 ZZH R&B MED SURG/OB UMMC

## 2019-05-27 PROCEDURE — 99232 SBSQ HOSP IP/OBS MODERATE 35: CPT | Performed by: PEDIATRICS

## 2019-05-27 PROCEDURE — 36415 COLL VENOUS BLD VENIPUNCTURE: CPT | Performed by: STUDENT IN AN ORGANIZED HEALTH CARE EDUCATION/TRAINING PROGRAM

## 2019-05-27 PROCEDURE — A9270 NON-COVERED ITEM OR SERVICE: HCPCS | Performed by: STUDENT IN AN ORGANIZED HEALTH CARE EDUCATION/TRAINING PROGRAM

## 2019-05-27 PROCEDURE — A9270 NON-COVERED ITEM OR SERVICE: HCPCS | Performed by: PEDIATRICS

## 2019-05-27 RX ORDER — DIAPER,BRIEF,INFANT-TODD,DISP
EACH MISCELLANEOUS 4 TIMES DAILY PRN
Status: DISCONTINUED | OUTPATIENT
Start: 2019-05-27 | End: 2019-05-28 | Stop reason: HOSPADM

## 2019-05-27 RX ADMIN — SENNOSIDES AND DOCUSATE SODIUM 2 TABLET: 8.6; 5 TABLET ORAL at 20:33

## 2019-05-27 RX ADMIN — OXYCODONE HYDROCHLORIDE 10 MG: 5 TABLET ORAL at 14:31

## 2019-05-27 RX ADMIN — TRAZODONE HYDROCHLORIDE 100 MG: 100 TABLET ORAL at 21:35

## 2019-05-27 RX ADMIN — LIDOCAINE 1 PATCH: 560 PATCH PERCUTANEOUS; TOPICAL; TRANSDERMAL at 07:21

## 2019-05-27 RX ADMIN — METOPROLOL SUCCINATE 50 MG: 50 TABLET, EXTENDED RELEASE ORAL at 07:21

## 2019-05-27 RX ADMIN — OXYCODONE HYDROCHLORIDE 10 MG: 5 TABLET ORAL at 10:58

## 2019-05-27 RX ADMIN — BACLOFEN 20 MG: 20 TABLET ORAL at 16:48

## 2019-05-27 RX ADMIN — BACLOFEN 20 MG: 20 TABLET ORAL at 07:21

## 2019-05-27 RX ADMIN — GABAPENTIN 300 MG: 300 CAPSULE ORAL at 14:32

## 2019-05-27 RX ADMIN — GABAPENTIN 300 MG: 300 CAPSULE ORAL at 20:33

## 2019-05-27 RX ADMIN — BACLOFEN 20 MG: 20 TABLET ORAL at 13:03

## 2019-05-27 RX ADMIN — GABAPENTIN 300 MG: 300 CAPSULE ORAL at 07:21

## 2019-05-27 RX ADMIN — TOLTERODINE 8 MG: 4 CAPSULE, EXTENDED RELEASE ORAL at 07:21

## 2019-05-27 RX ADMIN — OXYCODONE HYDROCHLORIDE 10 MG: 5 TABLET ORAL at 00:01

## 2019-05-27 RX ADMIN — FERROUS SULFATE TAB 325 MG (65 MG ELEMENTAL FE) 325 MG: 325 (65 FE) TAB at 07:21

## 2019-05-27 RX ADMIN — OXYCODONE HYDROCHLORIDE 10 MG: 5 TABLET ORAL at 20:40

## 2019-05-27 RX ADMIN — SENNOSIDES AND DOCUSATE SODIUM 2 TABLET: 8.6; 5 TABLET ORAL at 07:21

## 2019-05-27 RX ADMIN — OXYCODONE HYDROCHLORIDE 10 MG: 5 TABLET ORAL at 04:58

## 2019-05-27 RX ADMIN — MELATONIN TAB 3 MG 6 MG: 3 TAB at 21:35

## 2019-05-27 RX ADMIN — BACLOFEN 20 MG: 20 TABLET ORAL at 20:33

## 2019-05-27 ASSESSMENT — ACTIVITIES OF DAILY LIVING (ADL)
ADLS_ACUITY_SCORE: 23

## 2019-05-27 NOTE — PLAN OF CARE
Patient chart reviewed. Patient clinically stable and medically appropriate for discharge to ARU when bed becomes available. We will plan to take wound VAC down on 5/29 if patient remains in house. Should a bed become available tomorrow and the patient is discharging please contact the Plastic Surgery team and we will remove the wound VAC prior to discharge. Following the VAC removal the patient will have daily dressing changes with a non-adherent dressing over incisions, covered with ABD.     Continue with current activity restrictions. These will be in place for at least 2 weeks.    Follow-up with Dr. Blackmon in two weeks.    Call with questions, concerns, or change in clinical exam.    Leatha Pacheco MD

## 2019-05-27 NOTE — PLAN OF CARE
Vitals:    05/27/19 0642 05/27/19 0748 05/27/19 1522 05/27/19 1700   BP: 107/62 108/68 124/72    BP Location: Right arm Right arm Right arm    Pulse:       Resp: 14 16 16    Temp: 98.1  F (36.7  C) 98.9  F (37.2  C) 99.9  F (37.7  C) 98.9  F (37.2  C)   TempSrc: Oral Oral Oral    SpO2: 97% 96% 98%    Weight:       Height:        Afebrile vital stable, sating 98% on room air, non labor breathing, +BS passing gas,   Had multiple BM, muhammad discontinued, pt self cath twice with adequate out put,  Complain of right shoulder pain, Oxycodone and lidocaine patch provide,  Right hip wound vac air leak detected, patched  sealed, wound ostomy consult placed, tolerating intake. Pt clinically stable, possible discharge to acute rehab unit tomorrow per MD,  continue with plan of care.
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
## 2019-05-27 NOTE — PLAN OF CARE
right hip disarticulation, removal of heterotopic ossification with wound VAC placement. A&Ox4, neuro intact. Vs stable on room air. C/o low back pain, Oxy 10 mg x 1 with effective result. Wound vac for flap healing intact and no output. PARESH 60 ml output. Shah in place with enough output and patent. Denies SOB or chest pain, deep breathing and coughing encouraged. Bilateral lung sound clear. Hgb 9.1 per yesterday morning. Will continue the current care plan.

## 2019-05-27 NOTE — PROGRESS NOTES
Social Work Services Progress Note    Hospital Day: 5  Date of Initial Social Work Evaluation:  5/24/19  Collaborated with:  Ortho Residdent    Data: Pt admitted to [facility] on 5/23/19    Intervention:  WESTON paged by Ortho resident explaining that pt is medically ready for ARU and was inquiring about updates on placement. SW explained due to the holidays not many facilities have staff in admissions. SW will attempt to contact facilities to get update.    Assessment:  Pt medically ready for discharge to ARU.    Plan:    Anticipated Disposition:  Facility:  [facility]    Barriers to d/c plan:  Bed availability, acceptance    Follow Up:  SW will continue to follow

## 2019-05-27 NOTE — PROGRESS NOTES
"Orthopaedic Surgery Progress Note   May 27, 2019    Subjective: No acute events overnight. Pain adequately controlled. Wants muhammad out today to resume home regimen of straight cath. Incisional vac has been alarming for leak intermittently; nursing will reinforce.     Objective: /68 (BP Location: Right arm)   Pulse 93   Temp 98.9  F (37.2  C) (Oral)   Resp 16   Ht 1.93 m (6' 4\")   Wt 87.1 kg (192 lb)   SpO2 96%   BMI 23.37 kg/m      Drain: PARESH with 100, 60, 40 ml for last three shifts  Incisional vac with no output    General: NAD, alert and oriented, cooperative with exam.   Cardio: RRR, extremities wwp.   Respiratory: Non-labored breathing on room air.  MSK: Focused examination of RLE with incisional vac over stump. No erythema.    Labs:   Hemoglobin   Date Value Ref Range Status   05/27/2019 9.2 (L) 13.3 - 17.7 g/dL Final   05/26/2019 9.1 (L) 13.3 - 17.7 g/dL Final     Imaging: none needed    Assessment and Plan: Josiah Crump is a 53 year old male with complex past medical history regarding right lower extremity who underwent right hip disarticulation (Clohisy) and right quadracep thigh flap (Dr. Blackmon) on 5/23/19. Intraoperative blood loss was estimated to be 1500 mL and he has gotten two units back due to persistent hgb <7    Ortho Primary  Radiation therapy completed POD#1  Activity: HOB no greater than 30 degrees, ok to roll on left but no on right side. Lift to transfer to chair. NO slideboard.  Pain management: Transition from IV to PO as tolerated. Lidocaine patch for right should pain   Antibiotics: Ancef x 24 hours.  Diet: Begin with clear fluids and progress diet as tolerated.   DVT prophylaxis: no chemical ppx indicated, and mechanical on contralateral side while in the hospital  Imaging: none needed.  Labs:no labs needed at this time  Bracing/Splinting: none  Dressings: incisional vac to be managed by plastics  Drains: Document output per shift, managed by plastics  Discontinue " jb today  Physical Therapy/Occupational Therapy: Eval and treat.  Cultures: none   Consults: Plastics, medicine, rad onc, social work.  Follow-up: Clinic with Dr. Murphy in 2 weeks  Disposition: Pending progress with therapies, pain control on orals, and medical stability, anticipate discharge to [facility]. Plastics does not need him in-house for incisional vac or drain management- they can do that when he is in [facility]. Medically ready for discharge tomorrow if placement finalized.    No further surgery planned during this admission    Ary tAkinson MD  Orthopaedic Surgery Resident, PGY-4  Pager: [phone]

## 2019-05-28 ENCOUNTER — APPOINTMENT (OUTPATIENT)
Dept: PHYSICAL THERAPY | Facility: CLINIC | Age: 54
DRG: 580 | End: 2019-05-28
Attending: ORTHOPAEDIC SURGERY
Payer: MEDICARE

## 2019-05-28 ENCOUNTER — HOSPITAL ENCOUNTER (INPATIENT)
Facility: SKILLED NURSING FACILITY | Age: 54
LOS: 52 days | Discharge: HOME HEALTH | DRG: 560 | End: 2019-07-19
Attending: INTERNAL MEDICINE | Admitting: INTERNAL MEDICINE
Payer: MEDICARE

## 2019-05-28 VITALS
WEIGHT: 192 LBS | DIASTOLIC BLOOD PRESSURE: 58 MMHG | BODY MASS INDEX: 23.38 KG/M2 | HEART RATE: 101 BPM | HEIGHT: 76 IN | RESPIRATION RATE: 16 BRPM | OXYGEN SATURATION: 94 % | SYSTOLIC BLOOD PRESSURE: 105 MMHG | TEMPERATURE: 99.7 F

## 2019-05-28 DIAGNOSIS — I10 BENIGN ESSENTIAL HYPERTENSION: ICD-10-CM

## 2019-05-28 DIAGNOSIS — M77.10 LATERAL EPICONDYLITIS, UNSPECIFIED LATERALITY: ICD-10-CM

## 2019-05-28 DIAGNOSIS — Z98.890 STATUS POST HIP SURGERY: Primary | ICD-10-CM

## 2019-05-28 DIAGNOSIS — R14.0 ABDOMINAL BLOATING WITH CRAMPS: ICD-10-CM

## 2019-05-28 DIAGNOSIS — M79.609 PAIN IN EXTREMITY, UNSPECIFIED EXTREMITY: ICD-10-CM

## 2019-05-28 DIAGNOSIS — R10.13 DYSPEPSIA: ICD-10-CM

## 2019-05-28 DIAGNOSIS — R10.9 ABDOMINAL BLOATING WITH CRAMPS: ICD-10-CM

## 2019-05-28 DIAGNOSIS — G82.20 PARAPLEGIA (H): ICD-10-CM

## 2019-05-28 DIAGNOSIS — D50.9 IRON DEFICIENCY ANEMIA, UNSPECIFIED IRON DEFICIENCY ANEMIA TYPE: ICD-10-CM

## 2019-05-28 DIAGNOSIS — S46.911D STRAIN OF RIGHT SHOULDER, SUBSEQUENT ENCOUNTER: ICD-10-CM

## 2019-05-28 DIAGNOSIS — G47.00 INSOMNIA, UNSPECIFIED TYPE: ICD-10-CM

## 2019-05-28 PROCEDURE — 25000132 ZZH RX MED GY IP 250 OP 250 PS 637: Performed by: INTERNAL MEDICINE

## 2019-05-28 PROCEDURE — A9270 NON-COVERED ITEM OR SERVICE: HCPCS | Performed by: STUDENT IN AN ORGANIZED HEALTH CARE EDUCATION/TRAINING PROGRAM

## 2019-05-28 PROCEDURE — 12000022 ZZH R&B SNF

## 2019-05-28 PROCEDURE — 25000128 H RX IP 250 OP 636: Performed by: STUDENT IN AN ORGANIZED HEALTH CARE EDUCATION/TRAINING PROGRAM

## 2019-05-28 PROCEDURE — 25000132 ZZH RX MED GY IP 250 OP 250 PS 637: Performed by: STUDENT IN AN ORGANIZED HEALTH CARE EDUCATION/TRAINING PROGRAM

## 2019-05-28 PROCEDURE — 97530 THERAPEUTIC ACTIVITIES: CPT | Mod: GP

## 2019-05-28 PROCEDURE — 40000901 ZZH STATISTIC WOC PT EDUCATION, 0-15 MIN

## 2019-05-28 PROCEDURE — L3908 WHO COCK-UP NONMOLDE PRE OTS: HCPCS

## 2019-05-28 PROCEDURE — A9270 NON-COVERED ITEM OR SERVICE: HCPCS | Performed by: INTERNAL MEDICINE

## 2019-05-28 RX ORDER — HYDROXYZINE HYDROCHLORIDE 50 MG/1
50 TABLET, FILM COATED ORAL EVERY 6 HOURS PRN
Status: ON HOLD | DISCHARGE
Start: 2019-05-28 | End: 2019-07-19

## 2019-05-28 RX ORDER — LIDOCAINE 4 G/G
1 PATCH TOPICAL EVERY 24 HOURS
Status: ON HOLD | DISCHARGE
Start: 2019-05-28 | End: 2019-07-19

## 2019-05-28 RX ORDER — HYDROXYZINE HYDROCHLORIDE 25 MG/1
50 TABLET, FILM COATED ORAL EVERY 6 HOURS PRN
Status: DISCONTINUED | OUTPATIENT
Start: 2019-05-28 | End: 2019-06-26

## 2019-05-28 RX ORDER — TOLTERODINE 4 MG/1
8 CAPSULE, EXTENDED RELEASE ORAL DAILY
Status: ON HOLD | DISCHARGE
Start: 2019-05-28 | End: 2019-07-19

## 2019-05-28 RX ORDER — METOPROLOL SUCCINATE 25 MG/1
50 TABLET, EXTENDED RELEASE ORAL DAILY
Status: DISCONTINUED | OUTPATIENT
Start: 2019-05-29 | End: 2019-07-19 | Stop reason: HOSPADM

## 2019-05-28 RX ORDER — ACETAMINOPHEN 325 MG/1
650 TABLET ORAL EVERY 4 HOURS PRN
Status: ON HOLD | DISCHARGE
Start: 2019-05-28 | End: 2019-07-19

## 2019-05-28 RX ORDER — BACLOFEN 20 MG/1
20 TABLET ORAL 4 TIMES DAILY
Status: ON HOLD | DISCHARGE
Start: 2019-05-28 | End: 2019-07-19

## 2019-05-28 RX ORDER — GABAPENTIN 300 MG/1
300 CAPSULE ORAL 3 TIMES DAILY
Status: DISCONTINUED | OUTPATIENT
Start: 2019-05-28 | End: 2019-05-30

## 2019-05-28 RX ORDER — ACETAMINOPHEN 325 MG/1
650 TABLET ORAL EVERY 4 HOURS PRN
Status: DISCONTINUED | OUTPATIENT
Start: 2019-05-28 | End: 2019-07-19 | Stop reason: HOSPADM

## 2019-05-28 RX ORDER — FERROUS SULFATE 325(65) MG
325 TABLET ORAL
Status: ON HOLD | DISCHARGE
Start: 2019-05-28 | End: 2019-07-19

## 2019-05-28 RX ORDER — TRAZODONE HYDROCHLORIDE 50 MG/1
50 TABLET, FILM COATED ORAL ONCE
Status: COMPLETED | OUTPATIENT
Start: 2019-05-29 | End: 2019-05-29

## 2019-05-28 RX ORDER — CALCIUM CARBONATE 500 MG/1
1000 TABLET, CHEWABLE ORAL 4 TIMES DAILY PRN
Status: DISCONTINUED | OUTPATIENT
Start: 2019-05-28 | End: 2019-07-19 | Stop reason: HOSPADM

## 2019-05-28 RX ORDER — NALOXONE HYDROCHLORIDE 0.4 MG/ML
.1-.4 INJECTION, SOLUTION INTRAMUSCULAR; INTRAVENOUS; SUBCUTANEOUS
Status: DISCONTINUED | OUTPATIENT
Start: 2019-05-28 | End: 2019-07-19 | Stop reason: HOSPADM

## 2019-05-28 RX ORDER — LANOLIN ALCOHOL/MO/W.PET/CERES
6 CREAM (GRAM) TOPICAL AT BEDTIME
Status: ON HOLD | DISCHARGE
Start: 2019-05-28 | End: 2019-07-19

## 2019-05-28 RX ORDER — BACLOFEN 10 MG/1
20 TABLET ORAL 4 TIMES DAILY
Status: DISCONTINUED | OUTPATIENT
Start: 2019-05-28 | End: 2019-07-15

## 2019-05-28 RX ORDER — TOLTERODINE 4 MG/1
8 CAPSULE, EXTENDED RELEASE ORAL DAILY
Status: DISCONTINUED | OUTPATIENT
Start: 2019-05-29 | End: 2019-07-19 | Stop reason: HOSPADM

## 2019-05-28 RX ORDER — GABAPENTIN 300 MG/1
300 CAPSULE ORAL 3 TIMES DAILY
Status: ON HOLD | DISCHARGE
Start: 2019-05-28 | End: 2019-07-19

## 2019-05-28 RX ORDER — TRAZODONE HYDROCHLORIDE 50 MG/1
50 TABLET, FILM COATED ORAL AT BEDTIME
Status: ON HOLD | DISCHARGE
Start: 2019-05-28 | End: 2019-07-19

## 2019-05-28 RX ORDER — POLYETHYLENE GLYCOL 3350 17 G/17G
17 POWDER, FOR SOLUTION ORAL 2 TIMES DAILY PRN
Status: DISCONTINUED | OUTPATIENT
Start: 2019-05-28 | End: 2019-07-19 | Stop reason: HOSPADM

## 2019-05-28 RX ORDER — FERROUS SULFATE 325(65) MG
325 TABLET ORAL
Status: DISCONTINUED | OUTPATIENT
Start: 2019-05-29 | End: 2019-07-19 | Stop reason: HOSPADM

## 2019-05-28 RX ORDER — AMOXICILLIN 250 MG
2 CAPSULE ORAL 2 TIMES DAILY
Status: ON HOLD | DISCHARGE
Start: 2019-05-28 | End: 2019-07-19

## 2019-05-28 RX ORDER — METOPROLOL SUCCINATE 50 MG/1
50 TABLET, EXTENDED RELEASE ORAL DAILY
Status: ON HOLD | DISCHARGE
Start: 2019-05-28 | End: 2019-07-19

## 2019-05-28 RX ORDER — POLYETHYLENE GLYCOL 3350 17 G/17G
17 POWDER, FOR SOLUTION ORAL 2 TIMES DAILY PRN
Status: ON HOLD | DISCHARGE
Start: 2019-05-28 | End: 2019-07-19

## 2019-05-28 RX ORDER — OXYCODONE HYDROCHLORIDE 5 MG/1
5-10 TABLET ORAL EVERY 4 HOURS PRN
Status: DISCONTINUED | OUTPATIENT
Start: 2019-05-28 | End: 2019-05-30

## 2019-05-28 RX ORDER — AMOXICILLIN 250 MG
2 CAPSULE ORAL 2 TIMES DAILY
Status: DISCONTINUED | OUTPATIENT
Start: 2019-05-28 | End: 2019-05-29

## 2019-05-28 RX ORDER — TRAZODONE HYDROCHLORIDE 50 MG/1
50 TABLET, FILM COATED ORAL AT BEDTIME
Status: DISCONTINUED | OUTPATIENT
Start: 2019-05-28 | End: 2019-05-30

## 2019-05-28 RX ORDER — LIDOCAINE 4 G/G
1 PATCH TOPICAL EVERY 24 HOURS
Status: DISCONTINUED | OUTPATIENT
Start: 2019-05-29 | End: 2019-06-16

## 2019-05-28 RX ORDER — LANOLIN ALCOHOL/MO/W.PET/CERES
6 CREAM (GRAM) TOPICAL AT BEDTIME
Status: DISCONTINUED | OUTPATIENT
Start: 2019-05-28 | End: 2019-07-19 | Stop reason: HOSPADM

## 2019-05-28 RX ADMIN — OXYCODONE HYDROCHLORIDE 10 MG: 5 TABLET ORAL at 00:27

## 2019-05-28 RX ADMIN — OXYCODONE HYDROCHLORIDE 10 MG: 5 TABLET ORAL at 23:42

## 2019-05-28 RX ADMIN — GABAPENTIN 300 MG: 300 CAPSULE ORAL at 13:14

## 2019-05-28 RX ADMIN — OXYCODONE HYDROCHLORIDE 10 MG: 5 TABLET ORAL at 09:52

## 2019-05-28 RX ADMIN — OXYCODONE HYDROCHLORIDE 10 MG: 5 TABLET ORAL at 13:13

## 2019-05-28 RX ADMIN — HYDROXYZINE HYDROCHLORIDE 50 MG: 25 TABLET ORAL at 21:26

## 2019-05-28 RX ADMIN — TOLTERODINE 8 MG: 4 CAPSULE, EXTENDED RELEASE ORAL at 08:08

## 2019-05-28 RX ADMIN — GABAPENTIN 300 MG: 300 CAPSULE ORAL at 08:08

## 2019-05-28 RX ADMIN — OXYCODONE HYDROCHLORIDE 10 MG: 5 TABLET ORAL at 05:59

## 2019-05-28 RX ADMIN — BACLOFEN 20 MG: 10 TABLET ORAL at 21:26

## 2019-05-28 RX ADMIN — BACLOFEN 20 MG: 20 TABLET ORAL at 08:08

## 2019-05-28 RX ADMIN — ACETAMINOPHEN 650 MG: 325 TABLET, FILM COATED ORAL at 21:26

## 2019-05-28 RX ADMIN — OXYCODONE HYDROCHLORIDE 10 MG: 5 TABLET ORAL at 19:37

## 2019-05-28 RX ADMIN — FERROUS SULFATE TAB 325 MG (65 MG ELEMENTAL FE) 325 MG: 325 (65 FE) TAB at 08:08

## 2019-05-28 RX ADMIN — TRAZODONE HYDROCHLORIDE 50 MG: 50 TABLET ORAL at 21:27

## 2019-05-28 RX ADMIN — LIDOCAINE 1 PATCH: 560 PATCH PERCUTANEOUS; TOPICAL; TRANSDERMAL at 08:08

## 2019-05-28 RX ADMIN — METOPROLOL SUCCINATE 50 MG: 50 TABLET, EXTENDED RELEASE ORAL at 08:08

## 2019-05-28 RX ADMIN — OXYCODONE HYDROCHLORIDE 10 MG: 5 TABLET ORAL at 16:09

## 2019-05-28 RX ADMIN — MELATONIN TAB 3 MG 6 MG: 3 TAB at 21:26

## 2019-05-28 RX ADMIN — GABAPENTIN 300 MG: 300 CAPSULE ORAL at 19:37

## 2019-05-28 RX ADMIN — BACLOFEN 20 MG: 20 TABLET ORAL at 12:06

## 2019-05-28 RX ADMIN — BACLOFEN 20 MG: 20 TABLET ORAL at 16:09

## 2019-05-28 RX ADMIN — HYDROMORPHONE HYDROCHLORIDE 0.5 MG: 1 INJECTION, SOLUTION INTRAMUSCULAR; INTRAVENOUS; SUBCUTANEOUS at 08:02

## 2019-05-28 ASSESSMENT — ACTIVITIES OF DAILY LIVING (ADL)
ADLS_ACUITY_SCORE: 23

## 2019-05-28 ASSESSMENT — MIFFLIN-ST. JEOR: SCORE: 1761.62

## 2019-05-28 NOTE — PLAN OF CARE
Vital signs:  Temp: 98.9  F (37.2  C) Temp src: Oral BP: 114/67 Heart Rate: 98 Resp: 16 SpO2: 97 % O2 Device: None (Room air)     Neuro: A&Ox4.   Cardiac: HR 98, denies chest pain.   Respiratory: LS clear, denies SOB.   Pain: Pt c/o R hip pain, back, R shoulder discomfort. Pain well managed with current regimen & prn oxycodone.   Diet: Tolerating regular diet, no nausea.    GI/: Self caths with adequate UOP. +BS, +flatus, +BM.   Incision/Drain: R hip wound vac dressing reinforced d/t air leak. R PARESH with small serosang drainage.  LDA: L PIV SL.   New changes this shift: None, no acute changes overnight.   Plan: plan for possible discharge to ARU today.

## 2019-05-28 NOTE — PROGRESS NOTES
"Vac leaking overnight, patient comfortable    /63 (BP Location: Right arm)   Pulse 101   Temp 98.1  F (36.7  C) (Oral)   Resp 16   Ht 1.93 m (6' 4\")   Wt 87.1 kg (192 lb)   SpO2 94%   BMI 23.37 kg/m    NAD  RRR  NLB room air  RLE surgical site with incisional vac taken down, incisions CDI, small superficial blister lateral aspect, deep layer closed but skin open on 5 cm aspect of anterior standing cone (dog ear)--this was intentionally left open to reduce tension  PARESH serosanguinous    A/P: 52 yo underwent 5/23 Clohisy/Blackmon hip disartic with removal of fused H. O., quad fillet flap (ok on SPY), incisional vac, now s/p vac take down  --No need for doppler checks  --Strip and record PARESH drain output qshift--patient will discharge with drain  --Adaptic strip to open skin at anterior dog ear/standing cone, adaptic else where to collect drainage  --Appreciate cares per primary, patient may discharge to TCU once bed available, plastics discharge orders place (wound care, drain)    Seen and discussed with Dr. Neymar Sam PGY-4  Plastic Surgery  Please page on call  "

## 2019-05-28 NOTE — PROGRESS NOTES
"05/28/19, 12:15PM, [facility], [place] [address] [facility] 7228/7228-01, male, Height: 6' 4\", Weight: 192 lbs 0 oz, Ordered by: Dr. Yayo Elizabeth MD, Dx: B/L Carpal tunnel, MRN #: [id number].    S:   Patient was seen today at 7228/7228-01 present for the evaluation/fitting/delivery of an OTS [employer] bilateral large suede lacing wrist (Lot #: [id number]; Part #: [id number] - LT- L; 434B - RT - L) on the bilateral wrists. Patient appears pleasant.   Health History & Progression: Patient currently experiences b/l finger numbness 1-4 and was written a script to wear b/l splints while sleeping. .  Patient's history of utilizing a wrist cockup splint: no history of utilizing orthoses for ailment being addressed today.  Patient would like to utilize wrist hand orthosis to achieve the following functional goals: Maintain a functional wrist position near neutral  Patient is currently limited by: Weakness in affected side wrist    O:   Sensory: Patient vocalized sensation as Diminished on right and left upper limb: b/l 1-4 finger numbness.  Appearance of limb & vasculature: WNL  Patient has full ROM of b/l elbows.    A:     Patient can benefit from the OTS cock-up wrist splint as it features an easy wraparound design with perforated neoprene construction. Enclosure of the brace is done with a single strap closure that is suitable for a variety of wrist ailments.  Upon fitting the brace the patient vocalized satisfaction with the fit and feel of the orthosis. The circumferences of the large brace presented satisfactory upon donning the brace.   Patient signed the delivery ticket and was given the [facility] receipt information sheet.     Goal:   A wrist hand orthosis is functionally necessary to provide support and immobilization for injuries to the wrist including, but not limited to: carpal tunnel syndrome and general support and positioning of the affected wrist.    P:  Patient was " given the verbal and written instructions on how to don/doff/care for the brace. Patient will utilize the orthosis for the duration of rehabilitation/PT in the hospital and at home activities upon discharge for the duration of treatment of patient ailment or until use of orthosis is no longer necessary. Will follow-up PRN.    Electronically signed by STEVEN Joseph, MSPO, Board Eligible Prosthetist.

## 2019-05-28 NOTE — PROGRESS NOTES
Received consult for wound vac management. Chart reviewed and noted plastic team to manage the vac. Plan to remove the vac today. Paged out to TIFF PEDERSON will complete the consult.

## 2019-05-28 NOTE — PROGRESS NOTES
Social Work Services Discharge Note      Patient Name:  Josiah Crump     Anticipated Discharge Date:  5/28/19    Discharge Disposition:   TCU:   TCU   [address] 4th floor  [place], [place]  [phone]    Following MD:  To be determined     Pre-Admission Screening (PAS) online form has been completed.  The Level of Care (LOC) is:  Determined  Confirmation Code is:  [id number]  Patient/caregiver informed of referral to [facility] for Pre-Admission Screening for skilled nursing facility (SNF) placement and to expect a phone call post discharge from SNF.     Additional Services/Equipment Arranged:  Stretcher transport (due to restrictions and pt being unable to sit upright) arranged via [facility] ([phone]) for today at 5:45pm.      Patient / Family response to discharge plan:  Pt is agreeable and declined to have SW update anyone and reported he has updated his PCA/step daughter     Persons notified of above discharge plan:  Pt, bedside nurse, charge nurse, NST, receiving facility, medical teams    Staff Discharge Instructions:  Please fax discharge orders and signed hard scripts for any controlled substances.  Please print a packet and send with patient.     CTS Handoff completed:  YES    Medicare Notice of Rights provided to the patient/family:  YES    STEVEN Kessler, Dorothea Dix Psychiatric CenterSW     [phone] (pager) [phone]  5/28/2019
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
## 2019-05-28 NOTE — PLAN OF CARE
[id number]  VSS. A&Ox4. HOB less than 30 degrees. On pulsate mattress, turning between L side and back. R leg amputated up to hip. R hip flap incision CDI covered with ABD. Wound vac removed this morning by provider. PARESH with serosanguinous output. Pt having incontinence- condom cath placed. Hip and shoulder pain. Oxycodone 10mg q3 hours PRN. Lidocaine patch to R shoulder. Tolerating reg diet. Pt going to [facility]. Transport set up for 5:45pm.

## 2019-05-28 NOTE — PLAN OF CARE
OT 7B: Cancel and HOLD.  Acknowledge orders were placed for OT eval and treat.  Upon chart review and discussion with PT, The patients current activity restrictions limit his skilled therapy needs at this time.  Pt upper body function is intact allowing pt to complete basic self cares while supine without assist.  Activity orders limit pt time in sitting, no slideboards or shearing force to R hip/flap making lift transfer contraindicated.  Anticipate ~2 weeks with this level of restricted activity.  PT will follow to advance bed mobility, work toward transfers as appropriate and engage pt in upper body strengthening exercise to allow pt to maintain independence for mobility during time of prolonged inactivity. Will monitor pt status and initiate OT services once appropriate.  Anticipate pt will initially be most appropriate to discharge to TCU with possibility of transitioning to ARU once activity is advanced.

## 2019-05-28 NOTE — PROGRESS NOTES
"Orthopaedic Surgery Progress Note     Subjective: Wound vac alarming all night. Continues to have R shoulder pain. Working with therapy. Pain per baseline (takes oxycodone at baseline).     Objective: /67 (BP Location: Right arm)   Pulse 93   Temp 98.9  F (37.2  C) (Oral)   Resp 16   Ht 1.93 m (6' 4\")   Wt 87.1 kg (192 lb)   SpO2 97%   BMI 23.37 kg/m      Drain: PARESH with 270 mL yesterday, 160mL    General: NAD, alert and oriented, cooperative with exam.   Cardio: RRR, extremities wwp.   Respiratory: Non-labored breathing on room air.  MSK: Focused examination of RLE with incisional vac over stump, suction questionable over alteral aspect of wound. No erythema.    Labs:   Hemoglobin   Date Value Ref Range Status   05/27/2019 9.2 (L) 13.3 - 17.7 g/dL Final   05/26/2019 9.1 (L) 13.3 - 17.7 g/dL Final     Imaging: none needed    Assessment and Plan: Josiah Crump is a 53 year old male with complex past medical history regarding right lower extremity who underwent right hip disarticulation (Clojany) and right quadracep thigh flap (Dr. Blackmon) on 5/23/19. Intraoperative blood loss was estimated to be 1500 mL, after 2 U pRBC Hb has been stable.     Ortho Primary  Radiation therapy completed POD#1  Activity: HOB no greater than 30 degrees, ok to roll on left but no on right side. Lift to transfer to chair. NO slideboard.  Pain management: Transition from IV to PO as tolerated. Lidocaine patch for right should pain   Antibiotics: Ancef x 24 hours.  Diet: Begin with clear fluids and progress diet as tolerated.   DVT prophylaxis: no chemical ppx indicated, and mechanical on contralateral side while in the hospital  Imaging: none needed.  Labs:no labs needed at this time  Bracing/Splinting: none  Dressings: incisional vac to be managed by plastics  Drains: Document output per shift, managed by plastics  Physical Therapy/Occupational Therapy: Eval and treat.  Cultures: none   Consults: Plastics, medicine, rad " onc, social work.  Follow-up: Clinic with Dr. Murphy in 2 weeks  Disposition: Pending progress with therapies, pain control on orals, and medical stability, anticipate discharge to [facility]..  Medically ready for discharge today if placement finalized.    No further surgery planned during this admission    Jacinto Mejia MD   PGY-4 Orthopaedic Surgery   [phone]

## 2019-05-28 NOTE — PROGRESS NOTES
"Social Work Services Progress Note    Hospital Day: 6  Date of Initial Social Work Evaluation:  5/24/19- please see for details  Collaborated with:  Chart review, team rounds, pt, [facility] admissions (*[id number]), Ortho team, Plastic Surgery team    Data:  Patient is a 53-year-old male who was admitted to [facility] on 5/23/19 for right hip disarticulation, removal of heterotopic ossification with VAC placement.    ARU had been recommended for pt but upon further discussion with OT staff pt does not have any OT needs currently, which means he does not meet criteria for ARU (as ARU requires that pt have need for two therapy disciplines).     Per chart review pt is medically ready for discharge. Both the Plastic Surgery and Ortho team confirmed pt's readiness for discharge.     Intervention:  SW met with pt at the bedside to introduce self and role, and discuss discharge planning. Pt reported he is doing ok today, other than having some pain. Pt discussed being thankful that he has his surgery and noted he was supposed to have the surgery some time ago. Pt discussed how, with his frozen hip, his leg was really swollen and heavy and he was not able to sit upright. Pt is hopeful that after the surgery he will be able to move better and get back to playing w/c basketball and tennis like he was doing before.   Pt discussed how his wife passed away a year ago and noted he is seeing grief counselors with [facility] and attending support groups.     SW explained how ARU had been being pursued but after pt saw PT and after discussion with OT staff it was determined that pt doesn't currently have OT needs, which pt agrees with. SW explained criteria for an ARU stay and pt got really frustrated when finding out he doesn't qualify and said he needs rehab but can't get rehab. Pt discussed feeling it is a \"money thing\" and he is limited because he doesn't have means, better insurance, leading to more options. WESTON explained to pt that it " is not a money thing and that an ARU would not accept pt if he doesn't meet criteria for admission. SW explained alternate option of TCU and pt did not like that this is often in a nursing home setting. Pt asked about going directly home from the hospital and discussed how his step daughter is paid to provide 6 hours a day of PCA care but can stay longer if needed, is able to perform wound care, and pt noted his home is all handicap accessible. Pt noted that his step daughter/PCA is his only local family/support. Pt also discussed how he will be getting opened to a CADI waiver and has been keeping the county updated and understands that it can take some time to get things opened up through CADI. SW reminded pt that these things often don't happen until the person is back in the community. Pt acknowledges that he has not attempted a transfer yet and would like more physical rehab than he would be able to get if he were to go directly home. Pt also asked if he can stay in the hospital to get more rehab then go home instead of to TCU. SW explained that pt is medically ready for discharge and pt discussed feeling like he is being pushed out and doesn't have options.   Pt asked if he can still go to a rehab connected with the hospital and SW explained [facility] option and also provided pt with a Medicare list of TCU options and explained general insurance coverage for TCU. Pt noted that he has a spend down for MA but did not note the amount. Pt discussed being very motivated to get home as soon as possible and wants to be at a TCU for as little amount of time as possible. Pt requested a referral to  TCU.     Pt noted wanting a roho cushion for his w/c and noted his current cushion is really hard. Pt also noted having a seating appointment for his chair at [facility] on 7/2/19.    WESTON made referral to  TCU via phone call to [facility]. WESTON later spoke with Natasha with admissions whom requested transport be arranged,  questioned the plan for pt's wound vac, and asked that IV dilaudid be discontinued.     WESTON paged Ortho team inquiring if pt is ready for discharge and asking that IV dilaudid be stopped, waiting to hear back.     WESTON paged Plastic Surgery team inquiring about plan for wound vac and was told this was removed today and pt will not discharge with a wound vac.  TCU admissions is aware of this.     Tentative stretcher transport arranged via [facility] ([phone]) for today 5:45pm.     PAS completed in anticipation of discharge- confirmation code: [id number].    Assessment:  See bedside RN, PT/OT, medical team notes    Plan:    Anticipated Disposition:  Facility:   TCU    Barriers to d/c plan:  Awaiting official acceptance at  TCU and update from Ortho team    Follow Up:  WESTON MOCTEZUMA will continue to follow    STEVEN Kessler, [facility] HospitalSW     [phone] (pager) [phone]  5/28/2019

## 2019-05-28 NOTE — PLAN OF CARE
Discharge Planner PT   Patient plan for discharge: rehab  Current status: Log roll IND to EOB and sits x~10 min at 45 deg lean L in order to off-weight R side.  Able to sit in neutral 3x 10 sec IND with no pain.  During bouts of neutral sitting, pt able to perform sitting push up.  2/2 compliance of bed, pt not able to lift pelvis off entirely from bed but does decrease WB on pelvis: 3x4 reps.    Barriers to return to prior living situation: mobility limited by WB restrictions on R pelvis,   Recommendations for discharge: TCU   Rationale for recommendations: Pt is very motivated and has high PLOF but does not demonstrate need for OT at this time.  Would benefit from intensive PT once restrictions to surgical site are lifted.        Entered by: Glen Portillo 05/28/2019 4:52 PM     Lift use not advised 2/2 potential for shearing forces on surgical site causing skin breakdown.  Pt IND able to off load and roll onto L side to prevent skin breakdown.

## 2019-05-29 ENCOUNTER — RECORDS - HEALTHEAST (OUTPATIENT)
Dept: ADMINISTRATIVE | Facility: OTHER | Age: 54
End: 2019-05-29

## 2019-05-29 PROCEDURE — 12000022 ZZH R&B SNF

## 2019-05-29 PROCEDURE — 25000125 ZZHC RX 250: Performed by: INTERNAL MEDICINE

## 2019-05-29 PROCEDURE — 97162 PT EVAL MOD COMPLEX 30 MIN: CPT | Mod: GP | Performed by: PHYSICAL THERAPIST

## 2019-05-29 PROCEDURE — A9270 NON-COVERED ITEM OR SERVICE: HCPCS | Performed by: INTERNAL MEDICINE

## 2019-05-29 PROCEDURE — 25000132 ZZH RX MED GY IP 250 OP 250 PS 637: Performed by: INTERNAL MEDICINE

## 2019-05-29 PROCEDURE — 99305 1ST NF CARE MODERATE MDM 35: CPT | Performed by: INTERNAL MEDICINE

## 2019-05-29 PROCEDURE — 97530 THERAPEUTIC ACTIVITIES: CPT | Mod: GP | Performed by: PHYSICAL THERAPIST

## 2019-05-29 RX ORDER — AMOXICILLIN 250 MG
2 CAPSULE ORAL 2 TIMES DAILY
Status: DISCONTINUED | OUTPATIENT
Start: 2019-05-29 | End: 2019-07-19 | Stop reason: HOSPADM

## 2019-05-29 RX ADMIN — LIDOCAINE 1 PATCH: 560 PATCH PERCUTANEOUS; TOPICAL; TRANSDERMAL at 08:30

## 2019-05-29 RX ADMIN — MELATONIN TAB 3 MG 6 MG: 3 TAB at 21:35

## 2019-05-29 RX ADMIN — GABAPENTIN 300 MG: 300 CAPSULE ORAL at 08:29

## 2019-05-29 RX ADMIN — METOPROLOL SUCCINATE 50 MG: 25 TABLET, EXTENDED RELEASE ORAL at 08:29

## 2019-05-29 RX ADMIN — SENNOSIDES AND DOCUSATE SODIUM 2 TABLET: 8.6; 5 TABLET ORAL at 08:29

## 2019-05-29 RX ADMIN — OXYCODONE HYDROCHLORIDE 10 MG: 5 TABLET ORAL at 20:43

## 2019-05-29 RX ADMIN — BACLOFEN 20 MG: 10 TABLET ORAL at 16:42

## 2019-05-29 RX ADMIN — GABAPENTIN 300 MG: 300 CAPSULE ORAL at 13:10

## 2019-05-29 RX ADMIN — BACLOFEN 20 MG: 10 TABLET ORAL at 08:29

## 2019-05-29 RX ADMIN — FERROUS SULFATE TAB 325 MG (65 MG ELEMENTAL FE) 325 MG: 325 (65 FE) TAB at 08:29

## 2019-05-29 RX ADMIN — BACLOFEN 20 MG: 10 TABLET ORAL at 13:10

## 2019-05-29 RX ADMIN — ACETAMINOPHEN 650 MG: 325 TABLET, FILM COATED ORAL at 16:42

## 2019-05-29 RX ADMIN — TOLTERODINE 8 MG: 4 CAPSULE, EXTENDED RELEASE ORAL at 08:29

## 2019-05-29 RX ADMIN — ACETAMINOPHEN 650 MG: 325 TABLET, FILM COATED ORAL at 20:43

## 2019-05-29 RX ADMIN — OXYCODONE HYDROCHLORIDE 10 MG: 5 TABLET ORAL at 16:42

## 2019-05-29 RX ADMIN — Medication 5 UNITS: at 09:51

## 2019-05-29 RX ADMIN — OXYCODONE HYDROCHLORIDE 10 MG: 5 TABLET ORAL at 03:46

## 2019-05-29 RX ADMIN — ACETAMINOPHEN 650 MG: 325 TABLET, FILM COATED ORAL at 23:49

## 2019-05-29 RX ADMIN — BACLOFEN 20 MG: 10 TABLET ORAL at 20:43

## 2019-05-29 RX ADMIN — OXYCODONE HYDROCHLORIDE 10 MG: 5 TABLET ORAL at 08:29

## 2019-05-29 RX ADMIN — TRAZODONE HYDROCHLORIDE 50 MG: 50 TABLET ORAL at 00:10

## 2019-05-29 RX ADMIN — TRAZODONE HYDROCHLORIDE 50 MG: 50 TABLET ORAL at 21:35

## 2019-05-29 RX ADMIN — HYDROXYZINE HYDROCHLORIDE 50 MG: 25 TABLET ORAL at 23:49

## 2019-05-29 RX ADMIN — OXYCODONE HYDROCHLORIDE 10 MG: 5 TABLET ORAL at 12:41

## 2019-05-29 RX ADMIN — GABAPENTIN 300 MG: 300 CAPSULE ORAL at 20:43

## 2019-05-29 NOTE — PROGRESS NOTES
Social Work: Initial Assessment with Discharge Plan    Patient Name: Josiah Crump  : 1965  Age: 53 year old  MRN: [id number]  Completed assessment with: Pt  Admitted to TCU: 2019    Presenting Information   Date of SW assessment: May 29, 2019  Health Care Directive: Provided education  Primary Health Care Agent: Self  Secondary Health Care Agent: Pt's brother would be NOK per policy   Living Situation: Pt lives alone in a handicap accessible apartment in [place]   Previous Functional Status: Pt was wheelchair based, and now has the help of his PCA   DME available: Wheelchair   Patient and family understanding of hospitalization: Pt stated that he is here for his amputation/wound care and bed rest per Dr. Blackmon   Cultural/Language/Spiritual Considerations: English speaking.   Abuse concerns: No concerns presented   BIMS: Pt scored 15 on BIMS indicating cognitively intact.   PHQ-9: Pt scored 00 on PHQ-9 indicating no depressive symptoms.   PAS: confirmation number- [id number]  Has there been a level II screen?  No  Were there any recommendations in the screen? N/A  If yes, will the recommendations we incorporated into the Plan of Care?  N/A  Physical Health  Reason for admission: Status post hip surgery, Heterotopic ossification of bone, Paraplegia (H)    Provider Information   Primary Care Physician:Marvel Petit   : Pt will have  when he is approved for a CADI Waiver. The pt does have a financial worker and pt is up to date on his MA renewals.     Mental Health:   Diagnosis: No concerns presented   Current Support/Services: NA  Previous Services: NA  Services Needed/Recommended: Pt is currently going to grief counseling due to the passing of his wife Krystyna in 2018. Pt finds it helpful but knows the journey of grief has no time line. Pt also aware that Health Psychology is available if he needs that support.     Substance Use:  Diagnosis: No concerns   Current  Support/Services: NA  Previous Services: NA  Services Needed/Recommended: NA    Support System  Marital Status:    Family support: Pt has the support of his son's and brother.   Other support available: PCA provides 6 hours/day 7 days a week. Looking at potential re-assessment for PCA hours since the pt has had a change in condition.   Gaps in support system: No apparent gaps.     Community Resources  Current in home services: PCA 6 hours/day   Previous services: NA    Financial/Employment/Education  Employment Status: Not currently working- Pt states that he has work waiting for him, but he knows that this healing process can't be rushed. The pt's goal is to return to work as able.   Income Source: [employer]  Education: College   Financial Concerns:  No concerns, pt has financial worker through [facility].   Insurance: Medicare/ Medicaid.       Discharge Plan   Patient and family discharge goal: Pt's goal is to return to his apartment   Provided Education on discharge plan: YES  Patient agreeable to discharge plan:  YES  A list of Medicare Certified Facilities was provided to the patient and/or family to encourage patient choice. Based on location and rating, patient would like referrals made to: [facility]  General information regarding anticipated insurance coverage and possible out of pocket cost was discussed. Patient and patient's family are aware patient may incur the cost of transportation to the facility, pending insurance payment: YES  Barriers to discharge: Medical clearance, safe discharge plan, wound care/bed rest restrictions.     Discharge Recommendations   Disposition: Home with continued PCA support.   Transportation Needs: Family to provide.   Name of Transportation Company and Phone: [facility]    Additional comments   Writer introduced self and role of SW. Pt was pleasant and open to SW services while here on TCU. Pt very thankful that he is here to recover and has a good outlook on life and his care plan.  Pt did give the pt his care plan goals and orders and there were no questions at this time. SW will continue to follow and assist as needed.    AIDA Epstein  [facility]   P: [phone]  Pgr: [phone]           05/30/19 1100   Living Arrangements   Lives With alone   Living Arrangements apartment   Primary Caregiver(s) Other(s)  (PCA)   Able to Return to Prior Arrangements yes   Home Safety   Patient Feels Safe Living in Home? yes   Discharge Planning   Expected Discharge Date   (TBD pt states that he is currently on bed rest )   Patient/Family Anticipates Transition to home with help/services   Discharge Needs Assessment   Transportation Anticipated family or friend will provide

## 2019-05-29 NOTE — PLAN OF CARE
RN: New admit yesterday. Pt is alert and oriented. C/o inability to sleep. Received Trazodone 50 mg tab at 2100. Was receiving 100 mg tab prior to transfer here. paged and ordered another 50 mg tab of Trazodone- given. R hip and back pain comfortably managed with Oxycodone 10 mg tab x 2 this shift. R hip incision with sutures in place. PARESH bulb x 1 with serosanguinous drainage- stripped x 1. Incontinent of urine. Incontinence care done and Gauze on R hip changed. Pt supposed to be straight cathing independently but has been incontinent. Has supplies at bedside. Although not his regular supplies, pt claimed they're ok. When asked how often he cath at night but pt questioned why his indwelling muhammad was removed early.  Concern on the swelling part on his R hip. It is soft to touch. Will leave a note for the provider. Independent with bed mobility. Aware to turn only between L and back. Continue with plan of care.    0725: Pt called asking for some wash cloth and new gown. Resident reported waking up incontinent then he cath himself with 200 ml in the urinal. Gauze dressing off and wet. Reiterated his desire to have the indwelling muhammad back d/t still deconditioned and drinks a lot of water.Currently still being cleaned up by NA. Will inform AM nurse.
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
## 2019-05-29 NOTE — PLAN OF CARE
PT: Pt seen today for PT eval.  Pt with precautions limiting his ability to sit and transfer.  A recliner chair and /or wheelchair was not available at this time. Pt would benefit from a pressure relief wheelchair cushion if getting up to recliner, and pt cannot be seated greater than 60 degrees at hip if in  recliner, and is supposed to be 30 degrees or lower in bed. Pt is aware of his precautions and is cautious, with goal of healing so he can get back to previous activities like working, recreational activities. Pt was able to sit EOB leaning onto L side , L forearm to keep precautions, and denied dizziness, vitals stable.   PT will follow to ensure safe transfers and seating within precautions and restrictions.  Estimated 3-5 days and then hand off to nursing, as pt will need to be here for longer time period for wound healing.    Pt cannot use his wheelchair, is not a recliner.

## 2019-05-29 NOTE — PLAN OF CARE
Pt is alert and oriented, able to make needs known. Pt was incontinent of bladder x 2 per report from the outgoing RN. When asked about the incontinence episodes, pt expressed concerns about getting his incision wet with urine. Indicated the desire to have an indwelling catheter inserted until he is strong enough to self cath per home routine. Dr Gilmore became aware of pt's intent. New 16 Fr indwelling catheter was inserted at 1100 per new order. Shah is currently patent with yellow urine. Pt also mentioned that he performs digital stimulation every other day at home and would like to continue with the same routine. Pt reported that he takes oral medications for his bowel program. Had a bowel movement on 5/28/19 per digital stimulation. Next digital stimulation is due on 5/30/19 in the late afternoon per pt's request. PARESH bulb is intact and patent with sanguinous drainage, tubing was stripped. No leak noted from the insertion site.   The top of the right hip incision is dehisced, Adaptic applied to the site and ABD dressing to the rest of the incision.
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
## 2019-05-29 NOTE — PLAN OF CARE
Patient is a 53 year old male  admitted to room 405 via [facility] transport  Patient is alert and oriented X 3. See Epic for VS and assessment.  Patient is able to transfer A2 using likolift. Patient was settled into their room, shown call light, tv, mealtimes etc. Oriented to unit. Will continue monitoring pain level and VS. Notifying MD with any concerns.  Follow MD orders for cares and medications.    Level of Schooling:college  Ethnicity: -American  Marital Status:  Dentures: No  Hearing Aid: No  Smoker:  No  Glasses: No  Occupation: none  Falls 0-1 mo: 0 2-6 mo: 0  Stairs prior function: Not applicable  Prior device use: Manual Wheelchair   Advanced Care Directive Referral to Social Work?No
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
## 2019-05-29 NOTE — PROGRESS NOTES
Patient had Wrist splints at bedside upon arrival. Patient did not take delivery on the wrist splints from the orthotics department.  STEVEN Diaz.

## 2019-05-30 ENCOUNTER — COMMUNICATION - HEALTHEAST (OUTPATIENT)
Dept: FAMILY MEDICINE | Facility: CLINIC | Age: 54
End: 2019-05-30

## 2019-05-30 ENCOUNTER — APPOINTMENT (OUTPATIENT)
Dept: GENERAL RADIOLOGY | Facility: CLINIC | Age: 54
End: 2019-05-30
Attending: NURSE PRACTITIONER
Payer: MEDICARE

## 2019-05-30 ENCOUNTER — APPOINTMENT (OUTPATIENT)
Dept: LAB | Facility: CLINIC | Age: 54
End: 2019-05-30
Attending: INTERNAL MEDICINE
Payer: MEDICARE

## 2019-05-30 PROCEDURE — 25000132 ZZH RX MED GY IP 250 OP 250 PS 637: Performed by: INTERNAL MEDICINE

## 2019-05-30 PROCEDURE — G0463 HOSPITAL OUTPT CLINIC VISIT: HCPCS

## 2019-05-30 PROCEDURE — A9270 NON-COVERED ITEM OR SERVICE: HCPCS | Performed by: INTERNAL MEDICINE

## 2019-05-30 PROCEDURE — 12000022 ZZH R&B SNF

## 2019-05-30 PROCEDURE — 73030 X-RAY EXAM OF SHOULDER: CPT | Mod: 50

## 2019-05-30 PROCEDURE — 97110 THERAPEUTIC EXERCISES: CPT | Mod: GP | Performed by: PHYSICAL THERAPIST

## 2019-05-30 RX ORDER — OXYCODONE HYDROCHLORIDE 5 MG/1
5-10 TABLET ORAL
Status: DISCONTINUED | OUTPATIENT
Start: 2019-05-30 | End: 2019-06-02

## 2019-05-30 RX ORDER — TRAZODONE HYDROCHLORIDE 50 MG/1
50 TABLET, FILM COATED ORAL ONCE
Status: COMPLETED | OUTPATIENT
Start: 2019-05-30 | End: 2019-05-30

## 2019-05-30 RX ORDER — TRAZODONE HYDROCHLORIDE 100 MG/1
100 TABLET ORAL AT BEDTIME
Status: DISCONTINUED | OUTPATIENT
Start: 2019-05-31 | End: 2019-07-19 | Stop reason: HOSPADM

## 2019-05-30 RX ORDER — GABAPENTIN 300 MG/1
600 CAPSULE ORAL 3 TIMES DAILY
Status: DISCONTINUED | OUTPATIENT
Start: 2019-05-30 | End: 2019-06-19

## 2019-05-30 RX ORDER — TRAZODONE HYDROCHLORIDE 50 MG/1
100 TABLET, FILM COATED ORAL AT BEDTIME
Status: DISCONTINUED | OUTPATIENT
Start: 2019-05-30 | End: 2019-05-30

## 2019-05-30 RX ADMIN — OXYCODONE HYDROCHLORIDE 10 MG: 5 TABLET ORAL at 19:34

## 2019-05-30 RX ADMIN — BACLOFEN 20 MG: 10 TABLET ORAL at 13:47

## 2019-05-30 RX ADMIN — CALCIUM CARBONATE (ANTACID) CHEW TAB 500 MG 1000 MG: 500 CHEW TAB at 22:38

## 2019-05-30 RX ADMIN — MELATONIN TAB 3 MG 6 MG: 3 TAB at 21:14

## 2019-05-30 RX ADMIN — ACETAMINOPHEN 650 MG: 325 TABLET, FILM COATED ORAL at 22:34

## 2019-05-30 RX ADMIN — ACETAMINOPHEN 650 MG: 325 TABLET, FILM COATED ORAL at 08:34

## 2019-05-30 RX ADMIN — BACLOFEN 20 MG: 10 TABLET ORAL at 08:28

## 2019-05-30 RX ADMIN — LIDOCAINE 1 PATCH: 560 PATCH PERCUTANEOUS; TOPICAL; TRANSDERMAL at 08:29

## 2019-05-30 RX ADMIN — GABAPENTIN 300 MG: 300 CAPSULE ORAL at 08:28

## 2019-05-30 RX ADMIN — OXYCODONE HYDROCHLORIDE 10 MG: 5 TABLET ORAL at 00:38

## 2019-05-30 RX ADMIN — OXYCODONE HYDROCHLORIDE 10 MG: 5 TABLET ORAL at 22:34

## 2019-05-30 RX ADMIN — ACETAMINOPHEN 650 MG: 325 TABLET, FILM COATED ORAL at 04:38

## 2019-05-30 RX ADMIN — OXYCODONE HYDROCHLORIDE 10 MG: 5 TABLET ORAL at 08:34

## 2019-05-30 RX ADMIN — FERROUS SULFATE TAB 325 MG (65 MG ELEMENTAL FE) 325 MG: 325 (65 FE) TAB at 08:28

## 2019-05-30 RX ADMIN — GABAPENTIN 600 MG: 300 CAPSULE ORAL at 13:48

## 2019-05-30 RX ADMIN — GABAPENTIN 600 MG: 300 CAPSULE ORAL at 19:34

## 2019-05-30 RX ADMIN — BACLOFEN 20 MG: 10 TABLET ORAL at 21:14

## 2019-05-30 RX ADMIN — OXYCODONE HYDROCHLORIDE 10 MG: 5 TABLET ORAL at 12:26

## 2019-05-30 RX ADMIN — OXYCODONE HYDROCHLORIDE 10 MG: 5 TABLET ORAL at 16:02

## 2019-05-30 RX ADMIN — TRAZODONE HYDROCHLORIDE 50 MG: 50 TABLET ORAL at 00:38

## 2019-05-30 RX ADMIN — SENNOSIDES AND DOCUSATE SODIUM 2 TABLET: 8.6; 5 TABLET ORAL at 08:28

## 2019-05-30 RX ADMIN — METOPROLOL SUCCINATE 50 MG: 25 TABLET, EXTENDED RELEASE ORAL at 08:28

## 2019-05-30 RX ADMIN — OXYCODONE HYDROCHLORIDE 10 MG: 5 TABLET ORAL at 04:38

## 2019-05-30 RX ADMIN — BACLOFEN 20 MG: 10 TABLET ORAL at 16:02

## 2019-05-30 RX ADMIN — TOLTERODINE 8 MG: 4 CAPSULE, EXTENDED RELEASE ORAL at 08:28

## 2019-05-30 NOTE — PLAN OF CARE
New order in place to keep pt on strict bedrest, no transfers allowed at this time. Pt complained that his pain medication regimen is not effective. Pt expressed that he was taking 60 mg of Oxycodone three times daily at home. Dr Gilmore increased the frequency of Oxycodone 5-10 mg to 3 hours from 4 hours.  Also, Gabapentin was increased from 300 mg to 600 mg tid. Pt verbalized contentment with the changes. Shah is patent with yellow urine output, good fluid intake noted. PARESH site is intact, no leak noted from the insertions site. Total output of 75 ml was collected this shift. Dressing was changed to the right hip incision, scant amount of drainage noted. Had a bowel movement this afternoon. Pleasant and cooperative with cares.

## 2019-05-30 NOTE — PLAN OF CARE
Pt is alert and oriented x4. Denies SOB and chest pain during cares. Strict bedrest. Temp of 100.8 and 100.3 this shift; encouraged fluids and informed Charge; moonlighter paged. Per magilighter, administer PRN Tylenol and inform care team; added on sticky. Temp: 100.3  F (37.9  C) Temp src: Oral BP: 103/64 Pulse: 112   Resp: 18 SpO2: 95 % O2 Device: None (Room air)    Administered PRN oxycodone 10 mg x3 q 3hrs with last administration at 2234. Administered PRN TUMs for heartburn, per pt request. Shah cares completed; patent with yellow colored urine, 1200 mL output. Cleanse PARESH site, intact with 50 mL output. Pt performed digital stimulation independently with small BM. Dressing to right hip incision was soiled; changed with scant amount of drainage noted. PT had X-Ray completed to bilateral shoulder, awaiting results. Continue w/ POC.

## 2019-05-30 NOTE — PLAN OF CARE
RN: Pt was c/o unrelieved shoulder pain at the start of the shift. He claimed that he had try warm and cold packs but not helping. Was taking Oxycodone 60 mg/day at home per pt. Informed the pt that its pretty much the same amount he is taking here 10 mg tab q 4H = 60 mg/24H  but pt insisted that 10 mg tab won't do nothing. Declined offer to call the house officer regarding pain. Was getting Tylenol every 4H to aid in Oxycodone effects so possibly masking the actual temperature. Note in place by PM RN re: Pt's unrelieved pain.Got another another dose of Trazodone 50 mg tab for sleep. Dr. Marin increased the order now to 100 mg tab. Shah intact. R hip stump incision with abd - WNL. PARESH drain with serosanguinous drainage- stripped X 1.  Appear to be sleeping/resting. Continue with plan of care.

## 2019-05-30 NOTE — PLAN OF CARE
OT:  OT therapist introduction to patient and OT interview with patient regarding his current medical restriction and current involvement with physical therapy. OT discussion with physical  therapy earlier this am concluded 1 service is adequate to follow patient for HEP WC status for mapping and mobilization when appropriate. OT discussed with patient when his healing process supports ADL retraining OT will assess and TX as needed. Patient receptive and in agreement with OT discussion and plan and is aware OT will CX eval and tx orders  due to patient current restriction level and his involvement with physical therapy at this time.

## 2019-05-30 NOTE — PLAN OF CARE
Physical Therapy Discharge Summary    Reason for therapy discharge:    Discharged to transitional care facility.    Progress towards therapy goal(s). See goals on Care Plan in Psychiatric electronic health record for goal details.  Goals partially met.  Barriers to achieving goals:   discharge from facility.    Therapy recommendation(s):    Continued therapy is recommended.  Rationale/Recommendations:  Pt is very motivated and has high PLOF but does not demonstrate need for OT at this time.  Would benefit from intensive PT once restrictions to surgical site are lifted. .

## 2019-05-30 NOTE — PROGRESS NOTES
Discussed with Dr. Blackmon on 05/30/2019  Patient should be on bed rest for 3 weeks from surgery (05/23/2019)  Order placed.       Harrison Gilmore  Internal Medicine  Hospitalist  Pager no: [phone]

## 2019-05-30 NOTE — PLAN OF CARE
Pt is requesting for pharmacological pain management to be re-assessed/increased. Pt indicated that oxycodone 10 mg q 4 hrs is not relieving pain as desired. Added to sticky for Provider to assess.

## 2019-05-30 NOTE — PLAN OF CARE
PT: Pt with new orders for strict bedrest x 3 weeks after surgery (5/23/19) per DR. Blackmon. Pt with B shoulder pain and cannot tolerate shoulder exercises due to pain.  Pt had been given a blue tband for shoulder exercises in inpatient but he cannot tolerate those exercises now.  HEP modified to elbow strengthening and scap retraction, AAROM  within painfree range.  Pt with likely supraspinatus involvement B, and had been going to OP PT in the recent past.  Pt will try to get his orthopedic notes from [facility].  Pt will benefit from PT 2-3 x/week , 30 min to work on shoulder rehab while on bedrest. Pt will benefit form ice/heat modalities to help with pain management, inflammation.

## 2019-05-30 NOTE — PROGRESS NOTES
WOC Consult    S: WOC Consult to evaluate and treat right hip wound.    B: Per Dr Reagan Sam with Plastic Surgery on 5/28/19: 54 yo underwent 5/23 Clohisy/Blackmon hip disartic with removal of fused H. O., quad fillet flap (ok on SPY), incisional vac, now s/p vac take down  --No need for doppler checks  --Strip and record PARESH drain output qshift--patient will discharge with drain  --Adaptic strip to open skin at anterior dog ear/standing cone, adaptic else where to collect drainage  --Appreciate cares per primary, patient may discharge to TCU once bed available, plastics discharge orders place (wound care, drain)    A: Spoke with patient this morning. Dressing in place per Plastic Surgery orders. No drainage noted on dressing. Wound last assessed by Plastics on 5/28/19.    P: Continue with plan of care per Plastic Surgery. WOC will continue to follow patient weekly for wound checks, however, Plastic Surgery should be called with acute concerns or for any changes in the plan of care.    Anastasia Cruz RN, CWOCN

## 2019-05-30 NOTE — PLAN OF CARE
Patient is alert and oriented x4 and able to make needs known. No complaints of chest pain, nausea, or SOB. Pt had indwelling urinary catheter placed during am shift due to incontinent episodes. 750 mL catheter output noted for evening shift. 60 mL PARESH drain output noted for evening shift. Pt received PRN oxycodone and acetaminophen @ 1642 and 2043 due to pain and fever. Continued to monitor fever throughout shift with fluctuating results. Continue to monitor on overnight shift.   Pt performed digital stimulation independently this evening and had a moderately sized BM.   New dressing was placed on backside, CDI. Hygiene care completed to PARESH drain and catheter.   Pt resting in bed throughout shift, will continue to monitor.   Pt requesting pharmacological pain management to be increased tomorrow 5/30.

## 2019-05-31 LAB
ALBUMIN UR-MCNC: NEGATIVE MG/DL
ANION GAP SERPL CALCULATED.3IONS-SCNC: 6 MMOL/L (ref 3–14)
APPEARANCE UR: CLEAR
BACTERIA #/AREA URNS HPF: ABNORMAL /HPF
BASOPHILS # BLD AUTO: 0 10E9/L (ref 0–0.2)
BASOPHILS NFR BLD AUTO: 0.2 %
BILIRUB UR QL STRIP: NEGATIVE
BUN SERPL-MCNC: 22 MG/DL (ref 7–30)
CALCIUM SERPL-MCNC: 8.8 MG/DL (ref 8.5–10.1)
CHLORIDE SERPL-SCNC: 101 MMOL/L (ref 94–109)
CO2 SERPL-SCNC: 28 MMOL/L (ref 20–32)
COLOR UR AUTO: ABNORMAL
COPATH REPORT: NORMAL
CREAT SERPL-MCNC: 0.7 MG/DL (ref 0.66–1.25)
DIFFERENTIAL METHOD BLD: ABNORMAL
EOSINOPHIL # BLD AUTO: 0.4 10E9/L (ref 0–0.7)
EOSINOPHIL NFR BLD AUTO: 3 %
ERYTHROCYTE [DISTWIDTH] IN BLOOD BY AUTOMATED COUNT: 23.5 % (ref 10–15)
GFR SERPL CREATININE-BSD FRML MDRD: >90 ML/MIN/{1.73_M2}
GLUCOSE SERPL-MCNC: 125 MG/DL (ref 70–99)
GLUCOSE UR STRIP-MCNC: NEGATIVE MG/DL
HCT VFR BLD AUTO: 31.1 % (ref 40–53)
HGB BLD-MCNC: 9.4 G/DL (ref 13.3–17.7)
HGB UR QL STRIP: ABNORMAL
IMM GRANULOCYTES # BLD: 0.1 10E9/L (ref 0–0.4)
IMM GRANULOCYTES NFR BLD: 0.5 %
KETONES UR STRIP-MCNC: NEGATIVE MG/DL
LACTATE BLD-SCNC: 0.7 MMOL/L (ref 0.7–2)
LEUKOCYTE ESTERASE UR QL STRIP: ABNORMAL
LYMPHOCYTES # BLD AUTO: 1.4 10E9/L (ref 0.8–5.3)
LYMPHOCYTES NFR BLD AUTO: 10.3 %
MCH RBC QN AUTO: 22.3 PG (ref 26.5–33)
MCHC RBC AUTO-ENTMCNC: 30.2 G/DL (ref 31.5–36.5)
MCV RBC AUTO: 74 FL (ref 78–100)
MONOCYTES # BLD AUTO: 1.7 10E9/L (ref 0–1.3)
MONOCYTES NFR BLD AUTO: 13 %
MUCOUS THREADS #/AREA URNS LPF: PRESENT /LPF
NEUTROPHILS # BLD AUTO: 9.6 10E9/L (ref 1.6–8.3)
NEUTROPHILS NFR BLD AUTO: 73 %
NITRATE UR QL: NEGATIVE
NRBC # BLD AUTO: 0.1 10*3/UL
NRBC BLD AUTO-RTO: 0 /100
PH UR STRIP: 6.5 PH (ref 5–7)
PLATELET # BLD AUTO: 309 10E9/L (ref 150–450)
POTASSIUM SERPL-SCNC: 4 MMOL/L (ref 3.4–5.3)
RBC # BLD AUTO: 4.21 10E12/L (ref 4.4–5.9)
RBC #/AREA URNS AUTO: 4 /HPF (ref 0–2)
SODIUM SERPL-SCNC: 135 MMOL/L (ref 133–144)
SOURCE: ABNORMAL
SP GR UR STRIP: 1.01 (ref 1–1.03)
UROBILINOGEN UR STRIP-MCNC: NORMAL MG/DL (ref 0–2)
WBC # BLD AUTO: 13.2 10E9/L (ref 4–11)
WBC #/AREA URNS AUTO: 18 /HPF (ref 0–5)

## 2019-05-31 PROCEDURE — 81001 URINALYSIS AUTO W/SCOPE: CPT | Performed by: INTERNAL MEDICINE

## 2019-05-31 PROCEDURE — 87186 SC STD MICRODIL/AGAR DIL: CPT | Performed by: INTERNAL MEDICINE

## 2019-05-31 PROCEDURE — A9270 NON-COVERED ITEM OR SERVICE: HCPCS | Performed by: INTERNAL MEDICINE

## 2019-05-31 PROCEDURE — 87040 BLOOD CULTURE FOR BACTERIA: CPT | Performed by: INTERNAL MEDICINE

## 2019-05-31 PROCEDURE — 25000132 ZZH RX MED GY IP 250 OP 250 PS 637: Performed by: INTERNAL MEDICINE

## 2019-05-31 PROCEDURE — 87088 URINE BACTERIA CULTURE: CPT | Performed by: INTERNAL MEDICINE

## 2019-05-31 PROCEDURE — 12000022 ZZH R&B SNF

## 2019-05-31 PROCEDURE — 85025 COMPLETE CBC W/AUTO DIFF WBC: CPT | Performed by: INTERNAL MEDICINE

## 2019-05-31 PROCEDURE — 99207 ZZC CDG-MDM COMPONENT: MEETS MODERATE - UP CODED: CPT | Performed by: INTERNAL MEDICINE

## 2019-05-31 PROCEDURE — 80048 BASIC METABOLIC PNL TOTAL CA: CPT | Performed by: INTERNAL MEDICINE

## 2019-05-31 PROCEDURE — 83605 ASSAY OF LACTIC ACID: CPT | Performed by: INTERNAL MEDICINE

## 2019-05-31 PROCEDURE — 36415 COLL VENOUS BLD VENIPUNCTURE: CPT | Performed by: INTERNAL MEDICINE

## 2019-05-31 PROCEDURE — 25000128 H RX IP 250 OP 636: Performed by: INTERNAL MEDICINE

## 2019-05-31 PROCEDURE — 87086 URINE CULTURE/COLONY COUNT: CPT | Performed by: INTERNAL MEDICINE

## 2019-05-31 PROCEDURE — 99309 SBSQ NF CARE MODERATE MDM 30: CPT | Performed by: INTERNAL MEDICINE

## 2019-05-31 RX ORDER — LEVOFLOXACIN 250 MG/1
500 TABLET, FILM COATED ORAL DAILY
Status: DISCONTINUED | OUTPATIENT
Start: 2019-05-31 | End: 2019-06-03

## 2019-05-31 RX ADMIN — BACLOFEN 20 MG: 10 TABLET ORAL at 08:53

## 2019-05-31 RX ADMIN — OXYCODONE HYDROCHLORIDE 10 MG: 5 TABLET ORAL at 16:07

## 2019-05-31 RX ADMIN — FERROUS SULFATE TAB 325 MG (65 MG ELEMENTAL FE) 325 MG: 325 (65 FE) TAB at 08:53

## 2019-05-31 RX ADMIN — BACLOFEN 20 MG: 10 TABLET ORAL at 16:08

## 2019-05-31 RX ADMIN — CALCIUM CARBONATE (ANTACID) CHEW TAB 500 MG 1000 MG: 500 CHEW TAB at 04:37

## 2019-05-31 RX ADMIN — CALCIUM CARBONATE (ANTACID) CHEW TAB 500 MG 1000 MG: 500 CHEW TAB at 23:32

## 2019-05-31 RX ADMIN — OXYCODONE HYDROCHLORIDE 10 MG: 5 TABLET ORAL at 08:51

## 2019-05-31 RX ADMIN — OXYCODONE HYDROCHLORIDE 10 MG: 5 TABLET ORAL at 01:29

## 2019-05-31 RX ADMIN — TRAZODONE HYDROCHLORIDE 100 MG: 100 TABLET ORAL at 21:18

## 2019-05-31 RX ADMIN — GABAPENTIN 600 MG: 300 CAPSULE ORAL at 08:53

## 2019-05-31 RX ADMIN — GABAPENTIN 600 MG: 300 CAPSULE ORAL at 13:49

## 2019-05-31 RX ADMIN — ACETAMINOPHEN 650 MG: 325 TABLET, FILM COATED ORAL at 16:07

## 2019-05-31 RX ADMIN — ENOXAPARIN SODIUM 40 MG: 40 INJECTION SUBCUTANEOUS at 11:21

## 2019-05-31 RX ADMIN — SENNOSIDES AND DOCUSATE SODIUM 2 TABLET: 8.6; 5 TABLET ORAL at 08:53

## 2019-05-31 RX ADMIN — BACLOFEN 20 MG: 10 TABLET ORAL at 12:52

## 2019-05-31 RX ADMIN — MELATONIN TAB 3 MG 6 MG: 3 TAB at 21:18

## 2019-05-31 RX ADMIN — OXYCODONE HYDROCHLORIDE 10 MG: 5 TABLET ORAL at 23:30

## 2019-05-31 RX ADMIN — HYDROXYZINE HYDROCHLORIDE 50 MG: 25 TABLET ORAL at 01:29

## 2019-05-31 RX ADMIN — METOPROLOL SUCCINATE 50 MG: 25 TABLET, EXTENDED RELEASE ORAL at 08:53

## 2019-05-31 RX ADMIN — GABAPENTIN 600 MG: 300 CAPSULE ORAL at 21:17

## 2019-05-31 RX ADMIN — OXYCODONE HYDROCHLORIDE 10 MG: 5 TABLET ORAL at 04:35

## 2019-05-31 RX ADMIN — TOLTERODINE 8 MG: 4 CAPSULE, EXTENDED RELEASE ORAL at 08:52

## 2019-05-31 RX ADMIN — OXYCODONE HYDROCHLORIDE 10 MG: 5 TABLET ORAL at 12:52

## 2019-05-31 RX ADMIN — SENNOSIDES AND DOCUSATE SODIUM 2 TABLET: 8.6; 5 TABLET ORAL at 21:17

## 2019-05-31 RX ADMIN — OXYCODONE HYDROCHLORIDE 10 MG: 5 TABLET ORAL at 19:50

## 2019-05-31 RX ADMIN — ACETAMINOPHEN 650 MG: 325 TABLET, FILM COATED ORAL at 04:35

## 2019-05-31 RX ADMIN — LEVOFLOXACIN 500 MG: 250 TABLET, FILM COATED ORAL at 16:07

## 2019-05-31 RX ADMIN — BACLOFEN 20 MG: 10 TABLET ORAL at 21:17

## 2019-05-31 NOTE — PROGRESS NOTES
Per conversation with pt,  sent an e-mail to Nannette VIRGEN liaison with [facility] to seek an assessment for the pt' change in condition for PCA hours. Writer did alert the pt that it may or may not take place here or it may have to be completed once he returns home. Pt is agreeable and understanding. SW will continue to follow and assist as needed.    AIDA Epstein  [facility]   P: [phone]  Pgr: [phone]
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
## 2019-05-31 NOTE — PLAN OF CARE
Pt.A&Ox4. Strict bedrest. Requests PRN pain meds when due - last medicated with Tyl.650mg po and Oxycodone 10mg po @ 0435 for c/o ariana.shoulder pain. Had XR done yesterday ariana.shoulders. Elevated temps previous shift - checked x2 tonight = 98.2 and 98.5 (o). Pablo and PARESH intact and patent with lrg.outputs. Pt.incont.lrg.loose>soft stool. Dressings changed d/t being soiled.

## 2019-05-31 NOTE — PLAN OF CARE
Patient is alert and oriented x 4,able to verbalize needs,complains of  right shoulder pain.Lidoderm patch applied on shoulder,PRN oxycodone every 3 hours with relief.PARESH right groin/ hip draining okay,sutures intact & dressing changed.Shah intact,cares done.No stool incontinence this shift.Patient eating and drinking okay.Pleasant,continue with POC.

## 2019-05-31 NOTE — PLAN OF CARE
RN: Pt has low grade fever again this evening, fluid encouraged. ML informed, the order is to give Tylenol as needed tonight and ask care team to F/U tomorrow.

## 2019-06-01 LAB
ANION GAP SERPL CALCULATED.3IONS-SCNC: 7 MMOL/L (ref 3–14)
BASOPHILS # BLD AUTO: 0 10E9/L (ref 0–0.2)
BASOPHILS NFR BLD AUTO: 0.2 %
BUN SERPL-MCNC: 22 MG/DL (ref 7–30)
CALCIUM SERPL-MCNC: 8.7 MG/DL (ref 8.5–10.1)
CHLORIDE SERPL-SCNC: 101 MMOL/L (ref 94–109)
CO2 SERPL-SCNC: 27 MMOL/L (ref 20–32)
CREAT SERPL-MCNC: 0.75 MG/DL (ref 0.66–1.25)
DIFFERENTIAL METHOD BLD: ABNORMAL
EOSINOPHIL # BLD AUTO: 0.3 10E9/L (ref 0–0.7)
EOSINOPHIL NFR BLD AUTO: 2 %
ERYTHROCYTE [DISTWIDTH] IN BLOOD BY AUTOMATED COUNT: 23.5 % (ref 10–15)
GFR SERPL CREATININE-BSD FRML MDRD: >90 ML/MIN/{1.73_M2}
GLUCOSE SERPL-MCNC: 112 MG/DL (ref 70–99)
HCT VFR BLD AUTO: 30.3 % (ref 40–53)
HGB BLD-MCNC: 9.3 G/DL (ref 13.3–17.7)
IMM GRANULOCYTES # BLD: 0.1 10E9/L (ref 0–0.4)
IMM GRANULOCYTES NFR BLD: 0.4 %
LACTATE BLD-SCNC: 0.8 MMOL/L (ref 0.7–2)
LYMPHOCYTES # BLD AUTO: 2 10E9/L (ref 0.8–5.3)
LYMPHOCYTES NFR BLD AUTO: 14.8 %
MCH RBC QN AUTO: 22.5 PG (ref 26.5–33)
MCHC RBC AUTO-ENTMCNC: 30.7 G/DL (ref 31.5–36.5)
MCV RBC AUTO: 73 FL (ref 78–100)
MONOCYTES # BLD AUTO: 1.5 10E9/L (ref 0–1.3)
MONOCYTES NFR BLD AUTO: 10.8 %
NEUTROPHILS # BLD AUTO: 9.8 10E9/L (ref 1.6–8.3)
NEUTROPHILS NFR BLD AUTO: 71.8 %
NRBC # BLD AUTO: 0.1 10*3/UL
NRBC BLD AUTO-RTO: 0 /100
PLATELET # BLD AUTO: 321 10E9/L (ref 150–450)
POTASSIUM SERPL-SCNC: 4.2 MMOL/L (ref 3.4–5.3)
RBC # BLD AUTO: 4.14 10E12/L (ref 4.4–5.9)
SODIUM SERPL-SCNC: 135 MMOL/L (ref 133–144)
WBC # BLD AUTO: 13.7 10E9/L (ref 4–11)

## 2019-06-01 PROCEDURE — 25000132 ZZH RX MED GY IP 250 OP 250 PS 637: Performed by: INTERNAL MEDICINE

## 2019-06-01 PROCEDURE — 25000128 H RX IP 250 OP 636: Performed by: INTERNAL MEDICINE

## 2019-06-01 PROCEDURE — 83605 ASSAY OF LACTIC ACID: CPT | Performed by: INTERNAL MEDICINE

## 2019-06-01 PROCEDURE — 99309 SBSQ NF CARE MODERATE MDM 30: CPT | Performed by: INTERNAL MEDICINE

## 2019-06-01 PROCEDURE — A9270 NON-COVERED ITEM OR SERVICE: HCPCS | Performed by: INTERNAL MEDICINE

## 2019-06-01 PROCEDURE — 12000022 ZZH R&B SNF

## 2019-06-01 PROCEDURE — 85025 COMPLETE CBC W/AUTO DIFF WBC: CPT | Performed by: INTERNAL MEDICINE

## 2019-06-01 PROCEDURE — 99207 ZZC CDG-MDM COMPONENT: MEETS MODERATE - UP CODED: CPT | Performed by: INTERNAL MEDICINE

## 2019-06-01 PROCEDURE — 80048 BASIC METABOLIC PNL TOTAL CA: CPT | Performed by: INTERNAL MEDICINE

## 2019-06-01 PROCEDURE — 36415 COLL VENOUS BLD VENIPUNCTURE: CPT | Performed by: INTERNAL MEDICINE

## 2019-06-01 RX ADMIN — SENNOSIDES AND DOCUSATE SODIUM 2 TABLET: 8.6; 5 TABLET ORAL at 20:40

## 2019-06-01 RX ADMIN — OXYCODONE HYDROCHLORIDE 10 MG: 5 TABLET ORAL at 02:29

## 2019-06-01 RX ADMIN — ACETAMINOPHEN 650 MG: 325 TABLET, FILM COATED ORAL at 06:06

## 2019-06-01 RX ADMIN — BACLOFEN 20 MG: 10 TABLET ORAL at 20:40

## 2019-06-01 RX ADMIN — ENOXAPARIN SODIUM 40 MG: 40 INJECTION SUBCUTANEOUS at 08:02

## 2019-06-01 RX ADMIN — LEVOFLOXACIN 500 MG: 250 TABLET, FILM COATED ORAL at 15:15

## 2019-06-01 RX ADMIN — GABAPENTIN 600 MG: 300 CAPSULE ORAL at 13:03

## 2019-06-01 RX ADMIN — OXYCODONE HYDROCHLORIDE 10 MG: 5 TABLET ORAL at 06:06

## 2019-06-01 RX ADMIN — LIDOCAINE 1 PATCH: 560 PATCH PERCUTANEOUS; TOPICAL; TRANSDERMAL at 07:53

## 2019-06-01 RX ADMIN — BACLOFEN 20 MG: 10 TABLET ORAL at 08:00

## 2019-06-01 RX ADMIN — BACLOFEN 20 MG: 10 TABLET ORAL at 16:43

## 2019-06-01 RX ADMIN — BACLOFEN 20 MG: 10 TABLET ORAL at 13:03

## 2019-06-01 RX ADMIN — OXYCODONE HYDROCHLORIDE 10 MG: 5 TABLET ORAL at 09:51

## 2019-06-01 RX ADMIN — METOPROLOL SUCCINATE 50 MG: 25 TABLET, EXTENDED RELEASE ORAL at 07:53

## 2019-06-01 RX ADMIN — OXYCODONE HYDROCHLORIDE 10 MG: 5 TABLET ORAL at 21:37

## 2019-06-01 RX ADMIN — TOLTERODINE 8 MG: 4 CAPSULE, EXTENDED RELEASE ORAL at 07:53

## 2019-06-01 RX ADMIN — OXYCODONE HYDROCHLORIDE 10 MG: 5 TABLET ORAL at 18:28

## 2019-06-01 RX ADMIN — FERROUS SULFATE TAB 325 MG (65 MG ELEMENTAL FE) 325 MG: 325 (65 FE) TAB at 07:54

## 2019-06-01 RX ADMIN — TRAZODONE HYDROCHLORIDE 100 MG: 100 TABLET ORAL at 21:38

## 2019-06-01 RX ADMIN — GABAPENTIN 600 MG: 300 CAPSULE ORAL at 20:40

## 2019-06-01 RX ADMIN — OXYCODONE HYDROCHLORIDE 10 MG: 5 TABLET ORAL at 15:14

## 2019-06-01 RX ADMIN — ACETAMINOPHEN 650 MG: 325 TABLET, FILM COATED ORAL at 18:28

## 2019-06-01 RX ADMIN — MELATONIN TAB 3 MG 6 MG: 3 TAB at 21:38

## 2019-06-01 RX ADMIN — GABAPENTIN 600 MG: 300 CAPSULE ORAL at 07:54

## 2019-06-01 NOTE — PLAN OF CARE
Patient alert and oriented x4, able to make needs known. Pt received PRN oxycodone 10 mg at 0951 due to complaints of pain. Pt had has no further complaints of pain, shortness of breath or nausea throughout shift. Pt reported self-removal of catheter this am at 0600. Physician is aware of catheter removal. Pt continues with self cath and digital stimulation.   Pt self cathed this afternoon @ 1215 with 150 output.   Wound care complete due to soiled dressings, and PARESH drain cares complete.   Pt pleasant and calm. Will continue with plan of care.

## 2019-06-01 NOTE — PLAN OF CARE
Alert and oriented x 4. C/o pain gave oxycodone x 2. Gave bed bath and changed surgical incision dressing no drainage noted. The incision not intact around the hip area. Muhammad intacta and positional. Pt temp was 101.6 gave tylenol and pain medications and it went down to 99.6. Pt triggered sepsis and lactate level was 0.7. Pt is taking Levaquin for UTI. Pt wanted me to remove muhammad but there is no order.
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
## 2019-06-01 NOTE — PLAN OF CARE
No acute issues or concerns overnight. Strict bedrest. Indep.performed dig.stim.after set-up. Cleansed incision and reapplied dsg. Pablo and PARESH intact/patent. Requested and rec'd Oxycodone 10mg po @ 0230 for c/o shoulder pain. Temp.@ 0030 = 100.1(o). Blood cultures, UA/UC completed yesterday.
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
## 2019-06-01 NOTE — PROGRESS NOTES
RN: MD aware of labs, elevated WBC, continue plan of care, monitor, recheck labs on Monday, monitor vital signs.

## 2019-06-02 LAB
ANION GAP SERPL CALCULATED.3IONS-SCNC: 7 MMOL/L (ref 3–14)
BACTERIA SPEC CULT: ABNORMAL
BASOPHILS # BLD AUTO: 0 10E9/L (ref 0–0.2)
BASOPHILS NFR BLD AUTO: 0.2 %
BUN SERPL-MCNC: 23 MG/DL (ref 7–30)
CALCIUM SERPL-MCNC: 8.4 MG/DL (ref 8.5–10.1)
CHLORIDE SERPL-SCNC: 105 MMOL/L (ref 94–109)
CO2 SERPL-SCNC: 25 MMOL/L (ref 20–32)
CREAT SERPL-MCNC: 0.63 MG/DL (ref 0.66–1.25)
DIFFERENTIAL METHOD BLD: ABNORMAL
EOSINOPHIL # BLD AUTO: 0.3 10E9/L (ref 0–0.7)
EOSINOPHIL NFR BLD AUTO: 2.6 %
ERYTHROCYTE [DISTWIDTH] IN BLOOD BY AUTOMATED COUNT: 23.8 % (ref 10–15)
GFR SERPL CREATININE-BSD FRML MDRD: >90 ML/MIN/{1.73_M2}
GLUCOSE SERPL-MCNC: 116 MG/DL (ref 70–99)
HCT VFR BLD AUTO: 29.1 % (ref 40–53)
HGB BLD-MCNC: 8.7 G/DL (ref 13.3–17.7)
IMM GRANULOCYTES # BLD: 0 10E9/L (ref 0–0.4)
IMM GRANULOCYTES NFR BLD: 0.2 %
LYMPHOCYTES # BLD AUTO: 1.5 10E9/L (ref 0.8–5.3)
LYMPHOCYTES NFR BLD AUTO: 11.1 %
Lab: ABNORMAL
MCH RBC QN AUTO: 22.3 PG (ref 26.5–33)
MCHC RBC AUTO-ENTMCNC: 29.9 G/DL (ref 31.5–36.5)
MCV RBC AUTO: 74 FL (ref 78–100)
MONOCYTES # BLD AUTO: 1.4 10E9/L (ref 0–1.3)
MONOCYTES NFR BLD AUTO: 10.4 %
NEUTROPHILS # BLD AUTO: 9.8 10E9/L (ref 1.6–8.3)
NEUTROPHILS NFR BLD AUTO: 75.5 %
NRBC # BLD AUTO: 0 10*3/UL
NRBC BLD AUTO-RTO: 0 /100
PLATELET # BLD AUTO: 342 10E9/L (ref 150–450)
POTASSIUM SERPL-SCNC: 4.2 MMOL/L (ref 3.4–5.3)
RBC # BLD AUTO: 3.91 10E12/L (ref 4.4–5.9)
SODIUM SERPL-SCNC: 137 MMOL/L (ref 133–144)
SPECIMEN SOURCE: ABNORMAL
WBC # BLD AUTO: 13 10E9/L (ref 4–11)

## 2019-06-02 PROCEDURE — 12000022 ZZH R&B SNF

## 2019-06-02 PROCEDURE — 80048 BASIC METABOLIC PNL TOTAL CA: CPT | Performed by: INTERNAL MEDICINE

## 2019-06-02 PROCEDURE — 25000132 ZZH RX MED GY IP 250 OP 250 PS 637: Performed by: INTERNAL MEDICINE

## 2019-06-02 PROCEDURE — 25000128 H RX IP 250 OP 636: Performed by: INTERNAL MEDICINE

## 2019-06-02 PROCEDURE — 85025 COMPLETE CBC W/AUTO DIFF WBC: CPT | Performed by: INTERNAL MEDICINE

## 2019-06-02 PROCEDURE — A9270 NON-COVERED ITEM OR SERVICE: HCPCS | Performed by: INTERNAL MEDICINE

## 2019-06-02 PROCEDURE — 36415 COLL VENOUS BLD VENIPUNCTURE: CPT | Performed by: INTERNAL MEDICINE

## 2019-06-02 RX ORDER — OXYCODONE HYDROCHLORIDE 5 MG/1
10-15 TABLET ORAL EVERY 4 HOURS PRN
Status: DISCONTINUED | OUTPATIENT
Start: 2019-06-02 | End: 2019-07-19 | Stop reason: HOSPADM

## 2019-06-02 RX ADMIN — GABAPENTIN 600 MG: 300 CAPSULE ORAL at 15:20

## 2019-06-02 RX ADMIN — HYDROXYZINE HYDROCHLORIDE 50 MG: 25 TABLET ORAL at 11:52

## 2019-06-02 RX ADMIN — BACLOFEN 20 MG: 10 TABLET ORAL at 08:01

## 2019-06-02 RX ADMIN — TOLTERODINE 8 MG: 4 CAPSULE, EXTENDED RELEASE ORAL at 07:57

## 2019-06-02 RX ADMIN — BACLOFEN 20 MG: 10 TABLET ORAL at 17:15

## 2019-06-02 RX ADMIN — SENNOSIDES AND DOCUSATE SODIUM 2 TABLET: 8.6; 5 TABLET ORAL at 08:04

## 2019-06-02 RX ADMIN — BACLOFEN 20 MG: 10 TABLET ORAL at 21:12

## 2019-06-02 RX ADMIN — LEVOFLOXACIN 500 MG: 250 TABLET, FILM COATED ORAL at 15:20

## 2019-06-02 RX ADMIN — OXYCODONE HYDROCHLORIDE 15 MG: 5 TABLET ORAL at 21:12

## 2019-06-02 RX ADMIN — GABAPENTIN 600 MG: 300 CAPSULE ORAL at 20:28

## 2019-06-02 RX ADMIN — FERROUS SULFATE TAB 325 MG (65 MG ELEMENTAL FE) 325 MG: 325 (65 FE) TAB at 07:59

## 2019-06-02 RX ADMIN — OXYCODONE HYDROCHLORIDE 15 MG: 5 TABLET ORAL at 17:15

## 2019-06-02 RX ADMIN — TRAZODONE HYDROCHLORIDE 100 MG: 100 TABLET ORAL at 22:56

## 2019-06-02 RX ADMIN — MELATONIN TAB 3 MG 6 MG: 3 TAB at 22:55

## 2019-06-02 RX ADMIN — METOPROLOL SUCCINATE 50 MG: 25 TABLET, EXTENDED RELEASE ORAL at 07:59

## 2019-06-02 RX ADMIN — SENNOSIDES AND DOCUSATE SODIUM 2 TABLET: 8.6; 5 TABLET ORAL at 21:12

## 2019-06-02 RX ADMIN — CALCIUM CARBONATE (ANTACID) CHEW TAB 500 MG 1000 MG: 500 CHEW TAB at 05:56

## 2019-06-02 RX ADMIN — OXYCODONE HYDROCHLORIDE 10 MG: 5 TABLET ORAL at 09:09

## 2019-06-02 RX ADMIN — GABAPENTIN 600 MG: 300 CAPSULE ORAL at 07:57

## 2019-06-02 RX ADMIN — OXYCODONE HYDROCHLORIDE 15 MG: 5 TABLET ORAL at 12:39

## 2019-06-02 RX ADMIN — BACLOFEN 20 MG: 10 TABLET ORAL at 12:39

## 2019-06-02 RX ADMIN — OXYCODONE HYDROCHLORIDE 10 MG: 5 TABLET ORAL at 02:51

## 2019-06-02 RX ADMIN — ACETAMINOPHEN 650 MG: 325 TABLET, FILM COATED ORAL at 06:55

## 2019-06-02 RX ADMIN — ENOXAPARIN SODIUM 40 MG: 40 INJECTION SUBCUTANEOUS at 08:05

## 2019-06-02 RX ADMIN — LIDOCAINE 1 PATCH: 560 PATCH PERCUTANEOUS; TOPICAL; TRANSDERMAL at 07:58

## 2019-06-02 RX ADMIN — ACETAMINOPHEN 650 MG: 325 TABLET, FILM COATED ORAL at 02:51

## 2019-06-02 RX ADMIN — OXYCODONE HYDROCHLORIDE 10 MG: 5 TABLET ORAL at 05:56

## 2019-06-02 NOTE — PLAN OF CARE
Pt triggered sepsis around 1600 and lactate level was 0.8. Temp was 100.3 gave tylenol 650mg, encouraged fluids and at bedtime it was 99.6 F. Incontinent of urine and he did straight cath x 2 800cc out put no BM. Dressing on R hip changed by the am nurse CDI. Pt was c/o pain and gave oxycodone q3hrs.

## 2019-06-02 NOTE — PROGRESS NOTES
Briefly met with patient  Reports doing well  Still feels shoulder pain is not controlled.   Urine culture grew E coli. Will continue Levofloxacin  Vital signs stable  Will go up on the dose of Oxycodone to 10-15 mg every 4 hours PRN from 5-10 mg every 3 hours PRN    Harrison Gilmore  Internal Medicine  Hospitalist  Pager no: [phone]

## 2019-06-02 NOTE — PLAN OF CARE
Right hip drain output 40 ml,site RITIKA.Pain medication every 4 hours per patient request,last given @ 12.45 pm.Self cath per patient.Eating and drinking okay.Denies SOB.Independent  with repositioning.Pleasant.

## 2019-06-02 NOTE — PLAN OF CARE
No acute issues or concerns overnight. Self-caths and indep.performed dig.stim tonight. PRN Oxycodone 10mg po x2 for shoulder pain.

## 2019-06-03 ENCOUNTER — RECORDS - HEALTHEAST (OUTPATIENT)
Dept: ADMINISTRATIVE | Facility: OTHER | Age: 54
End: 2019-06-03

## 2019-06-03 LAB
ANION GAP SERPL CALCULATED.3IONS-SCNC: 6 MMOL/L (ref 3–14)
BASOPHILS # BLD AUTO: 0 10E9/L (ref 0–0.2)
BASOPHILS NFR BLD AUTO: 0.2 %
BUN SERPL-MCNC: 19 MG/DL (ref 7–30)
CALCIUM SERPL-MCNC: 8.3 MG/DL (ref 8.5–10.1)
CHLORIDE SERPL-SCNC: 106 MMOL/L (ref 94–109)
CO2 SERPL-SCNC: 26 MMOL/L (ref 20–32)
CREAT SERPL-MCNC: 0.67 MG/DL (ref 0.66–1.25)
DIFFERENTIAL METHOD BLD: ABNORMAL
EOSINOPHIL # BLD AUTO: 0.3 10E9/L (ref 0–0.7)
EOSINOPHIL NFR BLD AUTO: 2.9 %
ERYTHROCYTE [DISTWIDTH] IN BLOOD BY AUTOMATED COUNT: 23.7 % (ref 10–15)
GFR SERPL CREATININE-BSD FRML MDRD: >90 ML/MIN/{1.73_M2}
GLUCOSE SERPL-MCNC: 113 MG/DL (ref 70–99)
HCT VFR BLD AUTO: 27.3 % (ref 40–53)
HGB BLD-MCNC: 8.2 G/DL (ref 13.3–17.7)
IMM GRANULOCYTES # BLD: 0 10E9/L (ref 0–0.4)
IMM GRANULOCYTES NFR BLD: 0.4 %
LYMPHOCYTES # BLD AUTO: 1.5 10E9/L (ref 0.8–5.3)
LYMPHOCYTES NFR BLD AUTO: 13.2 %
MCH RBC QN AUTO: 22.2 PG (ref 26.5–33)
MCHC RBC AUTO-ENTMCNC: 30 G/DL (ref 31.5–36.5)
MCV RBC AUTO: 74 FL (ref 78–100)
MONOCYTES # BLD AUTO: 0.9 10E9/L (ref 0–1.3)
MONOCYTES NFR BLD AUTO: 8.4 %
NEUTROPHILS # BLD AUTO: 8.4 10E9/L (ref 1.6–8.3)
NEUTROPHILS NFR BLD AUTO: 74.9 %
NRBC # BLD AUTO: 0 10*3/UL
NRBC BLD AUTO-RTO: 0 /100
PLATELET # BLD AUTO: 373 10E9/L (ref 150–450)
POTASSIUM SERPL-SCNC: 4.4 MMOL/L (ref 3.4–5.3)
RBC # BLD AUTO: 3.7 10E12/L (ref 4.4–5.9)
SODIUM SERPL-SCNC: 138 MMOL/L (ref 133–144)
WBC # BLD AUTO: 11.2 10E9/L (ref 4–11)

## 2019-06-03 PROCEDURE — 85025 COMPLETE CBC W/AUTO DIFF WBC: CPT | Performed by: INTERNAL MEDICINE

## 2019-06-03 PROCEDURE — 25000132 ZZH RX MED GY IP 250 OP 250 PS 637: Performed by: INTERNAL MEDICINE

## 2019-06-03 PROCEDURE — A9270 NON-COVERED ITEM OR SERVICE: HCPCS | Performed by: INTERNAL MEDICINE

## 2019-06-03 PROCEDURE — 25000128 H RX IP 250 OP 636: Performed by: INTERNAL MEDICINE

## 2019-06-03 PROCEDURE — 36415 COLL VENOUS BLD VENIPUNCTURE: CPT | Performed by: INTERNAL MEDICINE

## 2019-06-03 PROCEDURE — 12000022 ZZH R&B SNF

## 2019-06-03 PROCEDURE — 80048 BASIC METABOLIC PNL TOTAL CA: CPT | Performed by: INTERNAL MEDICINE

## 2019-06-03 PROCEDURE — 97110 THERAPEUTIC EXERCISES: CPT | Mod: GP

## 2019-06-03 RX ORDER — SULFAMETHOXAZOLE/TRIMETHOPRIM 800-160 MG
1 TABLET ORAL 2 TIMES DAILY
Status: COMPLETED | OUTPATIENT
Start: 2019-06-03 | End: 2019-06-09

## 2019-06-03 RX ADMIN — GABAPENTIN 600 MG: 300 CAPSULE ORAL at 20:12

## 2019-06-03 RX ADMIN — GABAPENTIN 600 MG: 300 CAPSULE ORAL at 08:43

## 2019-06-03 RX ADMIN — GABAPENTIN 600 MG: 300 CAPSULE ORAL at 13:02

## 2019-06-03 RX ADMIN — MELATONIN TAB 3 MG 6 MG: 3 TAB at 21:24

## 2019-06-03 RX ADMIN — FERROUS SULFATE TAB 325 MG (65 MG ELEMENTAL FE) 325 MG: 325 (65 FE) TAB at 08:47

## 2019-06-03 RX ADMIN — TOLTERODINE 8 MG: 4 CAPSULE, EXTENDED RELEASE ORAL at 08:43

## 2019-06-03 RX ADMIN — BACLOFEN 20 MG: 10 TABLET ORAL at 16:38

## 2019-06-03 RX ADMIN — OXYCODONE HYDROCHLORIDE 15 MG: 5 TABLET ORAL at 04:00

## 2019-06-03 RX ADMIN — BACLOFEN 20 MG: 10 TABLET ORAL at 13:02

## 2019-06-03 RX ADMIN — SULFAMETHOXAZOLE AND TRIMETHOPRIM 1 TABLET: 800; 160 TABLET ORAL at 11:48

## 2019-06-03 RX ADMIN — OXYCODONE HYDROCHLORIDE 15 MG: 5 TABLET ORAL at 17:20

## 2019-06-03 RX ADMIN — OXYCODONE HYDROCHLORIDE 15 MG: 5 TABLET ORAL at 08:42

## 2019-06-03 RX ADMIN — ENOXAPARIN SODIUM 40 MG: 40 INJECTION SUBCUTANEOUS at 08:43

## 2019-06-03 RX ADMIN — BACLOFEN 20 MG: 10 TABLET ORAL at 20:12

## 2019-06-03 RX ADMIN — BACLOFEN 20 MG: 10 TABLET ORAL at 08:43

## 2019-06-03 RX ADMIN — OXYCODONE HYDROCHLORIDE 15 MG: 5 TABLET ORAL at 21:24

## 2019-06-03 RX ADMIN — METOPROLOL SUCCINATE 50 MG: 25 TABLET, EXTENDED RELEASE ORAL at 08:43

## 2019-06-03 RX ADMIN — OXYCODONE HYDROCHLORIDE 15 MG: 5 TABLET ORAL at 13:02

## 2019-06-03 RX ADMIN — TRAZODONE HYDROCHLORIDE 100 MG: 100 TABLET ORAL at 21:24

## 2019-06-03 RX ADMIN — LIDOCAINE 1 PATCH: 560 PATCH PERCUTANEOUS; TOPICAL; TRANSDERMAL at 08:50

## 2019-06-03 RX ADMIN — SULFAMETHOXAZOLE AND TRIMETHOPRIM 1 TABLET: 800; 160 TABLET ORAL at 20:12

## 2019-06-03 NOTE — PROGRESS NOTES
RN paged me about PARESH drain falling out.   Per chart review. Drain was managed by Plastics.   RN to try to page plastic.   Keep on pressure dressing (per RN output was 40 ml last 8 hrs.    Page prn.     Moonlighter.

## 2019-06-03 NOTE — PROGRESS NOTES
Chart reviewed  Briefly met with patient  PARESH drain fell out on 06/02 night.   Case was discussed with Plastic surgery overnight. Advise to dress site with ABD  Patient is Afebrile, feels fine. Pain is controlled  White count is improving  Page Plastic surgery. Awaiting to hear back  Urine culture reviewed. E coli sensitive to Bactrim and resistant to Flouroquinolones  - Start Bactrim for 7 days      Addendum: 1:15 PM. Discussed with plastic surgery. Continue dressing as before.  Notify plastic surgery if there is significant swelling. Patient informed.     Harrison [facility]  Internal Medicine  Hospitalist  Pager no: [phone]

## 2019-06-03 NOTE — PLAN OF CARE
Patient alert and oriented x4, and able to make needs known. Pt uses call light appropriately. Pt's PARESH drain completely dislodged last evening 6/2.Report given to MD and plastics and told to cover with abdominal dressing and monitor for swelling and drainage. Wound care complete to pt's bottom this afternoon including PARESH site. Small amount of drainage noted. Pt received PRN 15 mg of oxycodone twice this shift @ 0840 & 1305. Bactrim added to pt's medication with first dose given today @1305. Levaquin discontinued.   Pt self cathed once this am, and did not perform digital stimulation.   Weight needed on pt tomorrow 6/4.   Will continue POC.

## 2019-06-03 NOTE — PLAN OF CARE
Pt.sleeping well until ~ 0400 / called to request pain meds. Medicated with Oxycodone 15mg po @ 0400 for c/o shoulder pain. Pt.self-cathed @ that time but also urinary incontinence noted. Wounds cleansed and new dsgs applied d/t urine on old dsgs. (R) hip PARESH completely dislodged yesterday - changed dsg as well - moderate amt.purulent, yellow drainage with scant blood on old dsg - site without swelling/edema.

## 2019-06-03 NOTE — PROGRESS NOTES
06/03/19 1500   General Information   Patient Profile Review See Profile for full history and prior level of function   Daily Contact with Relatives or Friends Phone call;Visit   Observations Pt on bedrest.   Community Involvement   Community Involvement Currently employed;Disabled;Other (comments)  ([place])   Drives Yes   Spiritual Practice Other (comments)  (Zacarias)   Sees [facility] ? Yes   Outings Dinner;Movies   Hobbies/Interests   Word Puzzles Word Search   Music   Listens to Recorded Music Yes   Brought Own Equipment Yes   Media   Newspapers Daily   Computer Will use tablet/phone   TV / Movies TV;Movie list   Sports / Physical Activities   Outdoor Activities Hunting;Fishing   Sports/Exercise Baseball;Basketball;Health club   Sports Fan Football   Impression   Open to Socializing with Others Independent   Barriers to Leisure Bedrest   Patient, family and / or staff in agreement with Plan of Care Yes   Treatment Plan   Interested in Unit [place]? No   Type of Intervention 1:1 intervention   Equipment and Supplies While on Unit Newspaper;Other (comments)  (word search)   Assessment Assessment completed. Pt limited to leisure d/t bedrest and precautions. Pt previously very active and involved in w/c sports and leisure activities. Provided pt with word searches and daily newspaper. Pt interested in hand massage.
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
## 2019-06-03 NOTE — PLAN OF CARE
PT: Provided pt with yellow theraband and exercise hand out for shoulder exercises this date. Pt performed without concern, HOB remained at 30 degrees.

## 2019-06-03 NOTE — PLAN OF CARE
"RN: Pt AA&O x3, able to make needs known and uses call light appropriately. Pt reported PARESH drain and tube falling out around 1930. Abd pad and tape applied to area and will monitor output. Pt averaging about 40 mL q 8 hr. Writer paged hospitalist (#[phone]) and spoke to dr on call; dr requested paging plastics (* * *   [phone] Leatha Pacheco). Writer spoke to Tara at 2130 and ok to keep abd dsg on and monitor output and change dsg prn - also monitor for swelling if fluid is not being reabsorbed. Writer will leave Sticky Note for dr to follow up Monday. Pt's appetite good. Incontinent of bladder x2; pt reports self cathing every 4 hours and he \"sometimes leaks.\" Dsgs changed per orders. Pt reports having a penile implant and that is why a Shah cath does not work. Pain meds requested when due. Con't POC.   "

## 2019-06-04 ENCOUNTER — RECORDS - HEALTHEAST (OUTPATIENT)
Dept: ADMINISTRATIVE | Facility: OTHER | Age: 54
End: 2019-06-04

## 2019-06-04 PROCEDURE — 99207 ZZC CDG-MDM COMPONENT: MEETS MODERATE - UP CODED: CPT | Performed by: INTERNAL MEDICINE

## 2019-06-04 PROCEDURE — 25000128 H RX IP 250 OP 636: Performed by: INTERNAL MEDICINE

## 2019-06-04 PROCEDURE — A9270 NON-COVERED ITEM OR SERVICE: HCPCS | Performed by: INTERNAL MEDICINE

## 2019-06-04 PROCEDURE — 12000022 ZZH R&B SNF

## 2019-06-04 PROCEDURE — 00220000 ZZH SNF RUG CODE OPNP

## 2019-06-04 PROCEDURE — 99308 SBSQ NF CARE LOW MDM 20: CPT | Performed by: INTERNAL MEDICINE

## 2019-06-04 PROCEDURE — 25000132 ZZH RX MED GY IP 250 OP 250 PS 637: Performed by: INTERNAL MEDICINE

## 2019-06-04 RX ADMIN — FERROUS SULFATE TAB 325 MG (65 MG ELEMENTAL FE) 325 MG: 325 (65 FE) TAB at 08:36

## 2019-06-04 RX ADMIN — OXYCODONE HYDROCHLORIDE 15 MG: 5 TABLET ORAL at 01:33

## 2019-06-04 RX ADMIN — SULFAMETHOXAZOLE AND TRIMETHOPRIM 1 TABLET: 800; 160 TABLET ORAL at 20:10

## 2019-06-04 RX ADMIN — OXYCODONE HYDROCHLORIDE 15 MG: 5 TABLET ORAL at 21:27

## 2019-06-04 RX ADMIN — GABAPENTIN 600 MG: 300 CAPSULE ORAL at 20:10

## 2019-06-04 RX ADMIN — BACLOFEN 20 MG: 10 TABLET ORAL at 17:03

## 2019-06-04 RX ADMIN — OXYCODONE HYDROCHLORIDE 15 MG: 5 TABLET ORAL at 13:04

## 2019-06-04 RX ADMIN — BACLOFEN 20 MG: 10 TABLET ORAL at 20:10

## 2019-06-04 RX ADMIN — BACLOFEN 20 MG: 10 TABLET ORAL at 13:04

## 2019-06-04 RX ADMIN — ENOXAPARIN SODIUM 40 MG: 40 INJECTION SUBCUTANEOUS at 08:35

## 2019-06-04 RX ADMIN — BACLOFEN 20 MG: 10 TABLET ORAL at 08:35

## 2019-06-04 RX ADMIN — TOLTERODINE 8 MG: 4 CAPSULE, EXTENDED RELEASE ORAL at 08:38

## 2019-06-04 RX ADMIN — MELATONIN TAB 3 MG 6 MG: 3 TAB at 21:27

## 2019-06-04 RX ADMIN — HYDROXYZINE HYDROCHLORIDE 50 MG: 25 TABLET ORAL at 00:36

## 2019-06-04 RX ADMIN — OXYCODONE HYDROCHLORIDE 15 MG: 5 TABLET ORAL at 17:03

## 2019-06-04 RX ADMIN — OXYCODONE HYDROCHLORIDE 15 MG: 5 TABLET ORAL at 08:43

## 2019-06-04 RX ADMIN — TRAZODONE HYDROCHLORIDE 100 MG: 100 TABLET ORAL at 21:27

## 2019-06-04 RX ADMIN — METOPROLOL SUCCINATE 50 MG: 25 TABLET, EXTENDED RELEASE ORAL at 08:37

## 2019-06-04 RX ADMIN — SULFAMETHOXAZOLE AND TRIMETHOPRIM 1 TABLET: 800; 160 TABLET ORAL at 08:38

## 2019-06-04 NOTE — PLAN OF CARE
A&O x4, able to communicate needs. Denies SOB or chest pain. Good appetite, denies nausea. Complained of pain to incision site and requested 15mg Oxycodone Q4H with some relief. Lidocaine patch negative. LBM 6/3/19 - small BM after digital stimulation per patient, self-caths twice this shift. UTV under R hip, buttocks, or abdominal dressings - changed this Am. Continued on strict bedrest, A2 w/ lift for transfer, HOB <30. Will continue plan of care.
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
## 2019-06-04 NOTE — PLAN OF CARE
Alert and oriented x4. Medicated with prn Oxycodone 15 mg per request for right hip pain. Atarax also administered x1 for adjuvant pain. Patient sleeping well in between encounters. Incontinent of bladder x1, patient also self catheterized. Old PARESH site dressing changed as the old one become soiled from the incontinence. Calls appropriately for assistance. Continue POC.

## 2019-06-04 NOTE — PLAN OF CARE
"Alert and correctly oriented. Pleasant. Has refused gabapentin today, stating it makes him too sleepy. Taking oxycodone for pain with relief. See emar. Dressing changes done. Moderate amount of serosanguinous drainage from old PARESH site- area cleansed and dressing applied. Suture still in place-PARESH came out previously-will re-asses old PARESH site tomorrow and remove sutures if indicated. Incision on right buttock and \"hip\" area have one area each of dehiscence. Areas cleansed and dry dressings applied. Able to turn self from side to side in bed independently. Self caths. Had one urine incontinency episode this morning, otherwise has been self cathing.   "

## 2019-06-05 ENCOUNTER — TELEPHONE (OUTPATIENT)
Dept: REHABILITATION | Facility: SKILLED NURSING FACILITY | Age: 54
End: 2019-06-05

## 2019-06-05 PROCEDURE — 12000022 ZZH R&B SNF

## 2019-06-05 PROCEDURE — A9270 NON-COVERED ITEM OR SERVICE: HCPCS | Performed by: INTERNAL MEDICINE

## 2019-06-05 PROCEDURE — A9270 NON-COVERED ITEM OR SERVICE: HCPCS | Mod: GY | Performed by: INTERNAL MEDICINE

## 2019-06-05 PROCEDURE — 25000132 ZZH RX MED GY IP 250 OP 250 PS 637: Mod: GY | Performed by: INTERNAL MEDICINE

## 2019-06-05 PROCEDURE — 25000128 H RX IP 250 OP 636: Performed by: INTERNAL MEDICINE

## 2019-06-05 PROCEDURE — 25000132 ZZH RX MED GY IP 250 OP 250 PS 637: Performed by: INTERNAL MEDICINE

## 2019-06-05 RX ADMIN — GABAPENTIN 600 MG: 300 CAPSULE ORAL at 13:56

## 2019-06-05 RX ADMIN — BACLOFEN 20 MG: 10 TABLET ORAL at 13:56

## 2019-06-05 RX ADMIN — BACLOFEN 20 MG: 10 TABLET ORAL at 16:40

## 2019-06-05 RX ADMIN — SULFAMETHOXAZOLE AND TRIMETHOPRIM 1 TABLET: 800; 160 TABLET ORAL at 08:13

## 2019-06-05 RX ADMIN — SULFAMETHOXAZOLE AND TRIMETHOPRIM 1 TABLET: 800; 160 TABLET ORAL at 22:00

## 2019-06-05 RX ADMIN — OXYCODONE HYDROCHLORIDE 15 MG: 5 TABLET ORAL at 13:56

## 2019-06-05 RX ADMIN — MELATONIN TAB 3 MG 6 MG: 3 TAB at 22:00

## 2019-06-05 RX ADMIN — BACLOFEN 20 MG: 10 TABLET ORAL at 08:13

## 2019-06-05 RX ADMIN — ENOXAPARIN SODIUM 40 MG: 40 INJECTION SUBCUTANEOUS at 08:15

## 2019-06-05 RX ADMIN — METOPROLOL SUCCINATE 50 MG: 25 TABLET, EXTENDED RELEASE ORAL at 08:13

## 2019-06-05 RX ADMIN — OXYCODONE HYDROCHLORIDE 15 MG: 5 TABLET ORAL at 05:29

## 2019-06-05 RX ADMIN — TOLTERODINE 8 MG: 4 CAPSULE, EXTENDED RELEASE ORAL at 08:12

## 2019-06-05 RX ADMIN — GABAPENTIN 600 MG: 300 CAPSULE ORAL at 08:12

## 2019-06-05 RX ADMIN — BACLOFEN 20 MG: 10 TABLET ORAL at 22:00

## 2019-06-05 RX ADMIN — FERROUS SULFATE TAB 325 MG (65 MG ELEMENTAL FE) 325 MG: 325 (65 FE) TAB at 08:14

## 2019-06-05 RX ADMIN — OXYCODONE HYDROCHLORIDE 15 MG: 5 TABLET ORAL at 19:45

## 2019-06-05 RX ADMIN — OXYCODONE HYDROCHLORIDE 15 MG: 5 TABLET ORAL at 01:22

## 2019-06-05 RX ADMIN — OXYCODONE HYDROCHLORIDE 15 MG: 5 TABLET ORAL at 09:54

## 2019-06-05 RX ADMIN — LIDOCAINE 1 PATCH: 560 PATCH PERCUTANEOUS; TOPICAL; TRANSDERMAL at 08:11

## 2019-06-05 RX ADMIN — TRAZODONE HYDROCHLORIDE 100 MG: 100 TABLET ORAL at 22:00

## 2019-06-05 NOTE — PLAN OF CARE
Alert and oriented x4. Reported no new complaints overnight. Medicated with prn Oxycodone x1 per request for right hip pain. Slept well in between cares. Self caths, had adequate output. Independent with repositioning in bed. Calls appropriately for assistance. Continue POC.

## 2019-06-05 NOTE — PLAN OF CARE
Patient is bedrest,pain medication every 4 hours,last given @ 2 pm.Right hip dressing CDI.Pt self cath himself with no issues.Eating and drinking,appetite excellent.Pleasant.

## 2019-06-05 NOTE — PLAN OF CARE
VSS. Alert and orientedx4. Able to make needs known and used call light appropriately. On strict bed rest. All dressings to wound CDI. Had BM medium soft this shift after pts self digital stimulation. Self cath 2x. Verbalized pain to wound, oxycodone 15mg given 2x this shift with some relief. A-2 full lift for transfer. Pt declined Senna. Appetite good.   Will continue plan of care.  Vital signs:  Temp: 98.8  F (37.1  C) Temp src: Oral BP: 111/65 Pulse: 88 Heart Rate: 95 Resp: 18 SpO2: 96 % O2 Device: None (Room air)

## 2019-06-06 ENCOUNTER — APPOINTMENT (OUTPATIENT)
Dept: LAB | Facility: CLINIC | Age: 54
End: 2019-06-06
Attending: INTERNAL MEDICINE
Payer: MEDICARE

## 2019-06-06 LAB
ANION GAP SERPL CALCULATED.3IONS-SCNC: 6 MMOL/L (ref 3–14)
BACTERIA SPEC CULT: NO GROWTH
BACTERIA SPEC CULT: NO GROWTH
BASOPHILS # BLD AUTO: 0 10E9/L (ref 0–0.2)
BASOPHILS NFR BLD AUTO: 0.3 %
BUN SERPL-MCNC: 26 MG/DL (ref 7–30)
CALCIUM SERPL-MCNC: 8.6 MG/DL (ref 8.5–10.1)
CHLORIDE SERPL-SCNC: 104 MMOL/L (ref 94–109)
CO2 SERPL-SCNC: 26 MMOL/L (ref 20–32)
CREAT SERPL-MCNC: 0.74 MG/DL (ref 0.66–1.25)
DIFFERENTIAL METHOD BLD: ABNORMAL
EOSINOPHIL # BLD AUTO: 0.4 10E9/L (ref 0–0.7)
EOSINOPHIL NFR BLD AUTO: 4 %
ERYTHROCYTE [DISTWIDTH] IN BLOOD BY AUTOMATED COUNT: 24 % (ref 10–15)
GFR SERPL CREATININE-BSD FRML MDRD: >90 ML/MIN/{1.73_M2}
GLUCOSE SERPL-MCNC: 104 MG/DL (ref 70–99)
HCT VFR BLD AUTO: 29.1 % (ref 40–53)
HGB BLD-MCNC: 8.6 G/DL (ref 13.3–17.7)
IMM GRANULOCYTES # BLD: 0 10E9/L (ref 0–0.4)
IMM GRANULOCYTES NFR BLD: 0.3 %
LYMPHOCYTES # BLD AUTO: 1.4 10E9/L (ref 0.8–5.3)
LYMPHOCYTES NFR BLD AUTO: 15 %
Lab: NORMAL
Lab: NORMAL
MCH RBC QN AUTO: 21.7 PG (ref 26.5–33)
MCHC RBC AUTO-ENTMCNC: 29.6 G/DL (ref 31.5–36.5)
MCV RBC AUTO: 74 FL (ref 78–100)
MONOCYTES # BLD AUTO: 0.9 10E9/L (ref 0–1.3)
MONOCYTES NFR BLD AUTO: 9.5 %
NEUTROPHILS # BLD AUTO: 6.7 10E9/L (ref 1.6–8.3)
NEUTROPHILS NFR BLD AUTO: 70.9 %
NRBC # BLD AUTO: 0 10*3/UL
NRBC BLD AUTO-RTO: 0 /100
PLATELET # BLD AUTO: 487 10E9/L (ref 150–450)
POTASSIUM SERPL-SCNC: 4.6 MMOL/L (ref 3.4–5.3)
RBC # BLD AUTO: 3.96 10E12/L (ref 4.4–5.9)
SODIUM SERPL-SCNC: 136 MMOL/L (ref 133–144)
SPECIMEN SOURCE: NORMAL
SPECIMEN SOURCE: NORMAL
WBC # BLD AUTO: 9.5 10E9/L (ref 4–11)

## 2019-06-06 PROCEDURE — 25000128 H RX IP 250 OP 636: Performed by: INTERNAL MEDICINE

## 2019-06-06 PROCEDURE — 12000022 ZZH R&B SNF

## 2019-06-06 PROCEDURE — 85025 COMPLETE CBC W/AUTO DIFF WBC: CPT | Performed by: INTERNAL MEDICINE

## 2019-06-06 PROCEDURE — G0463 HOSPITAL OUTPT CLINIC VISIT: HCPCS

## 2019-06-06 PROCEDURE — 25000132 ZZH RX MED GY IP 250 OP 250 PS 637: Mod: GY | Performed by: INTERNAL MEDICINE

## 2019-06-06 PROCEDURE — 97110 THERAPEUTIC EXERCISES: CPT | Mod: GP | Performed by: PHYSICAL THERAPIST

## 2019-06-06 PROCEDURE — 80048 BASIC METABOLIC PNL TOTAL CA: CPT | Performed by: INTERNAL MEDICINE

## 2019-06-06 PROCEDURE — 36415 COLL VENOUS BLD VENIPUNCTURE: CPT | Performed by: INTERNAL MEDICINE

## 2019-06-06 PROCEDURE — A9270 NON-COVERED ITEM OR SERVICE: HCPCS | Mod: GY | Performed by: INTERNAL MEDICINE

## 2019-06-06 RX ADMIN — OXYCODONE HYDROCHLORIDE 15 MG: 5 TABLET ORAL at 07:32

## 2019-06-06 RX ADMIN — METOPROLOL SUCCINATE 50 MG: 25 TABLET, EXTENDED RELEASE ORAL at 09:04

## 2019-06-06 RX ADMIN — GABAPENTIN 600 MG: 300 CAPSULE ORAL at 20:06

## 2019-06-06 RX ADMIN — GABAPENTIN 600 MG: 300 CAPSULE ORAL at 13:18

## 2019-06-06 RX ADMIN — OXYCODONE HYDROCHLORIDE 15 MG: 5 TABLET ORAL at 20:07

## 2019-06-06 RX ADMIN — MELATONIN TAB 3 MG 6 MG: 3 TAB at 21:08

## 2019-06-06 RX ADMIN — SULFAMETHOXAZOLE AND TRIMETHOPRIM 1 TABLET: 800; 160 TABLET ORAL at 20:07

## 2019-06-06 RX ADMIN — ENOXAPARIN SODIUM 40 MG: 40 INJECTION SUBCUTANEOUS at 09:02

## 2019-06-06 RX ADMIN — OXYCODONE HYDROCHLORIDE 15 MG: 5 TABLET ORAL at 11:34

## 2019-06-06 RX ADMIN — OXYCODONE HYDROCHLORIDE 15 MG: 5 TABLET ORAL at 02:45

## 2019-06-06 RX ADMIN — OXYCODONE HYDROCHLORIDE 15 MG: 5 TABLET ORAL at 16:02

## 2019-06-06 RX ADMIN — LIDOCAINE 1 PATCH: 560 PATCH PERCUTANEOUS; TOPICAL; TRANSDERMAL at 09:02

## 2019-06-06 RX ADMIN — BACLOFEN 20 MG: 10 TABLET ORAL at 09:03

## 2019-06-06 RX ADMIN — FERROUS SULFATE TAB 325 MG (65 MG ELEMENTAL FE) 325 MG: 325 (65 FE) TAB at 09:04

## 2019-06-06 RX ADMIN — SULFAMETHOXAZOLE AND TRIMETHOPRIM 1 TABLET: 800; 160 TABLET ORAL at 09:04

## 2019-06-06 RX ADMIN — GABAPENTIN 600 MG: 300 CAPSULE ORAL at 09:03

## 2019-06-06 RX ADMIN — TRAZODONE HYDROCHLORIDE 100 MG: 100 TABLET ORAL at 21:08

## 2019-06-06 RX ADMIN — TOLTERODINE 8 MG: 4 CAPSULE, EXTENDED RELEASE ORAL at 09:03

## 2019-06-06 NOTE — PLAN OF CARE
Pain medication when due per patient preference,he states he is always in pain,right shoulder,right hip and back.Right hip dressing CDI,old drain site suture intact,dressing changed last night. Self cath and he dig himself early morning.Eats and drinks well.Pt is on bedrest,PT evaluation done today.Pleasant.

## 2019-06-06 NOTE — PLAN OF CARE
Physical Therapy Discharge Summary    Reason for therapy discharge:    All goals and outcomes met, no further needs identified. Discharge PT 6/6    Progress towards therapy goal(s). See goals on Care Plan in [facility] electronic health record for goal details.  Goals met    Therapy recommendation(s):    No further PT while here. Pt has ex program for shoulders from here. Pt's OP ortho PT needed his signed release before they could fax his OP program here--[facility] obtained form and pt's signature, faxed it to [facility]. Pt will cont with his ex program on own while here on bedrest until ~6/13. Plan pressure mapping at [place] in July.

## 2019-06-06 NOTE — PROGRESS NOTES
D/w Plastic resident on call.   Rec: at least 3 weeks bed rest from day of surgery. 05.23.19. Unless told otherwise by Dr Blackmon.   Dr Blackmon to see pt in TCU in next 1-2 days.     No other concern reported.     Mitchell Marin MD   Hospitalist (Internal Medicine)  Pager: [phone].

## 2019-06-06 NOTE — PROGRESS NOTES
WOC Consult    S: WOC Consult to evaluate and treat right hip wound.    B: Per Dr Reagan Sam with Plastic Surgery on 5/28/19: 54 yo underwent 5/23 Clohisy/Blackmon hip disartic with removal of fused H. O., quad fillet flap (ok on SPY), incisional vac, now s/p vac take down  --No need for doppler checks  --Strip and record PARESH drain output qshift--patient will discharge with drain  --Adaptic strip to open skin at anterior dog ear/standing cone, adaptic else where to collect drainage  --Appreciate cares per primary, patient may discharge to TCU once bed available, plastics discharge orders place (wound care, drain)    A: Spoke with patient this morning. Dressing in place per Plastic Surgery orders. No drainage noted on dressing. Wound last assessed by Plastics on 5/28/19.          Wound Location:  Right stump incision  Date of last photo 6/6/19 (lateral portion only, lost connection for posterior portion)  Wound History: as above  Measurements (length x width x depth, in cm) Distal and proximal portions dehiscence, not measured  Wound Base: clean yellow and red base.    Palpation of the wound bed: normal   Periwound skin: intact  Color: normal and consistent with surrounding tissue  Temperature: normal   Drainage:, small  Description of drainage: serosanguinous  Odor: none  Pain: absent    P: Continue with plan of care per Plastic Surgery. WOC will continue to follow patient weekly for wound checks, however, Plastic Surgery should be called with acute concerns or for any changes in the plan of care.    Ele Mccarthy BSN RN CWOCN

## 2019-06-06 NOTE — PLAN OF CARE
Alert and oriented x 4, able to make needs known. Remains on bedrest. Wound care/dressing completed by AM RN CDI. Had small BM this shift. Continues to self cath when needed. Medicated x  for incisional pian. Call in reach.

## 2019-06-06 NOTE — PLAN OF CARE
Pt.sleeping until ~ 0245 - requested and rec'd Oxycodone 15mg po for c/o shoulder pain. Self-cathed @ that time. Old PARESH site dsg soiled with lrg.amount milky yellow drng./scant blood - site cleansed and new dsg applied. Denies CP/SOB.

## 2019-06-07 PROCEDURE — A9270 NON-COVERED ITEM OR SERVICE: HCPCS | Mod: GY | Performed by: INTERNAL MEDICINE

## 2019-06-07 PROCEDURE — 12000022 ZZH R&B SNF

## 2019-06-07 PROCEDURE — 25000128 H RX IP 250 OP 636: Performed by: INTERNAL MEDICINE

## 2019-06-07 PROCEDURE — 25000132 ZZH RX MED GY IP 250 OP 250 PS 637: Mod: GY | Performed by: INTERNAL MEDICINE

## 2019-06-07 RX ADMIN — ENOXAPARIN SODIUM 40 MG: 40 INJECTION SUBCUTANEOUS at 08:58

## 2019-06-07 RX ADMIN — OXYCODONE HYDROCHLORIDE 15 MG: 5 TABLET ORAL at 17:27

## 2019-06-07 RX ADMIN — MELATONIN TAB 3 MG 6 MG: 3 TAB at 21:52

## 2019-06-07 RX ADMIN — TRAZODONE HYDROCHLORIDE 100 MG: 100 TABLET ORAL at 21:52

## 2019-06-07 RX ADMIN — OXYCODONE HYDROCHLORIDE 15 MG: 5 TABLET ORAL at 21:53

## 2019-06-07 RX ADMIN — LIDOCAINE 1 PATCH: 560 PATCH PERCUTANEOUS; TOPICAL; TRANSDERMAL at 08:58

## 2019-06-07 RX ADMIN — BACLOFEN 20 MG: 10 TABLET ORAL at 08:57

## 2019-06-07 RX ADMIN — METOPROLOL SUCCINATE 50 MG: 25 TABLET, EXTENDED RELEASE ORAL at 08:55

## 2019-06-07 RX ADMIN — OXYCODONE HYDROCHLORIDE 15 MG: 5 TABLET ORAL at 03:58

## 2019-06-07 RX ADMIN — FERROUS SULFATE TAB 325 MG (65 MG ELEMENTAL FE) 325 MG: 325 (65 FE) TAB at 08:56

## 2019-06-07 RX ADMIN — OXYCODONE HYDROCHLORIDE 15 MG: 5 TABLET ORAL at 00:01

## 2019-06-07 RX ADMIN — OXYCODONE HYDROCHLORIDE 15 MG: 5 TABLET ORAL at 13:21

## 2019-06-07 RX ADMIN — SULFAMETHOXAZOLE AND TRIMETHOPRIM 1 TABLET: 800; 160 TABLET ORAL at 21:52

## 2019-06-07 RX ADMIN — OXYCODONE HYDROCHLORIDE 15 MG: 5 TABLET ORAL at 08:57

## 2019-06-07 RX ADMIN — TOLTERODINE 8 MG: 4 CAPSULE, EXTENDED RELEASE ORAL at 08:54

## 2019-06-07 RX ADMIN — SENNOSIDES AND DOCUSATE SODIUM 2 TABLET: 8.6; 5 TABLET ORAL at 21:51

## 2019-06-07 RX ADMIN — SULFAMETHOXAZOLE AND TRIMETHOPRIM 1 TABLET: 800; 160 TABLET ORAL at 08:57

## 2019-06-07 NOTE — OP NOTE
Procedure Date: 05/23/2019      SURGEON:  Charlotte Blackmon MD.      COSURGEON:   Mayur Murphy MD.       PREOPERATIVE DIAGNOSIS:  Multiple decubitus ulcers with right hip fusion.      ANESTHESIA:  GET.      ESTIMATED BLOOD LOSS:  1500 mL total.      COUNTS:  Correct.      COMPLICATIONS:  None.      DRAINS:  PAREHS x 1.      INDICATIONS:  This is a 53-year-old  gentleman who is a paraplegic.  He was seen in our [facility] for possible surgical options for a complex combination of decubitus ulcers.  This includes the right ischial area and a very large posterior thigh wound at the right leg with distal severe lymphedema.  This patient has had numerous other procedures and flaps resulting in a complex anatomy and multiple scar lines.  His case is complicated by essentially a fused right hip following Girdlestone procedure and mass of heterotopic ossification resulting in bony fusion.  The patient was referred by Dr. Murphy for possible combined surgery during which Dr. Murphy would perform a completion hip disarticulation and we would use the remaining quadriceps thigh fillet flap to cover his complex wounds.  The patient had preoperative imaging studies including vascular studies which showed patent blood supply.      DESCRIPTION OF PROCEDURE:  The patient was seen in the preoperative waiting area.  The operative site was marked.  The patient was also seen with Dr. Murphy and consent forms were signed and witnessed after discussing our proposed procedures with the patient.  I also reviewed the possible risks and complications including, but not limited to the following:  Infection, bleeding, hematoma, seroma formation, poor healing, possible dehiscence, possible tissue loss, possible failed flap, possible residual deformities and need for further surgery, possible anesthetic risks such as DVT, PE and cardiopulmonary arrest.        The patient was then brought to the operating room and  placed on the OR table.  Please see Dr. Murphy's notes for details regarding positioning.  I was asked to join the procedure with Dr. Murphy to facilitate protection of the anterior thigh fillet flap while he completed the osteotomy of the autofusion.  Once the lower leg was detached and sent to pathology, some additional shaving of the remaining exposed bone near the acetabulum and ischial ramus was additionally shaved off by Dr. Murphy.  Please see his notes for further details.  At this point, the flap had a viable vascular pedicle.  We did, however, use the SPY to assess all areas of the flap.  Approximately 3 mL of indocyanine green were reconstituted and injected intravenously by the Anesthesia team.  The near infrared laser system was used and showed adequate perfusion of the entire flap despite having a fairly large scar  the lateral portion from the anterior portion.  This was somewhat more difficult given the patient had -American skin.  The underside of the flap was much easier to visualize and assess.  The flap was able to fold posteriorly in such a way that it nicely covered over the exposed bone.  The more lateral thicker portion of the flap  by the scar was somewhat extra, but not able to completely remove at the level of the scar.  Since we only had 1 shot at using SPY, we felt we should fold the flap over as is and come back another day to revise things if there were problems with healing.  Since we had good flow and we felt that the wound was very clean after irrigating with multiple liters of triple antibiotic solution, we decided to attempt primary closure.  After ensuring that adequate hemostasis had been achieved, we started to inset the flap with a deep level of the muscle being anchored essentially to itself as it was folded over.  0 Vicryls were used in a figure-of-eight fashion to really anchor and line up the flap for a multilayer closure.  Once we had inset  the flap along the deepest level from the point of flap fold along the scrotal base along the medial aspect of the perineum, we then inserted a #19 channel drain and brought it out through a separate stab wound incision laterally over the lateral hip.  This was secured with 3-0 nylon suture.  Additional layers of closure were then achieved with 2-0 Vicryl deep dermal buried sutures and 2-0 Prolene vertical mattress sutures.  The remaining dog ear, if you will, along the lateral aspect of the flap was reapproximated as much as possible, but due to the thickness of the flap, could not be completely closed at a superficial level at the very distal tip.  Therefore, silver VAC sponge was used essentially to dress the open area of incision, the dog ear and to line the incision of the rest of the flap to help with drainage, immobilization, and edema postoperatively.  A piece of Adaptic was laid along the incision closure and then a strip or silver VAC sponge was held in place with the VAC adhesive drape.  A tincture of benzoin was also used to help achieve the seal.  Additional reinforcement was used around the scrotal base and anywhere else the seal appeared tenuous.  Track pad was used and attached laterally to avoid any difficulties with the suction tubing.  The VAC was then set to 125 mmHg continuous therapy.  Once we were happy with our closure, the patient was returned to a supine position on the OR table, extubated and transferred to a specialty wave mattress.  He was then taken to the recovery room in satisfactory condition having tolerated the procedure without difficulty or complication.  The patient did receive a transfusion for 1500 mL blood loss during the orthopedic portion of the procedure and was quickly weaned off of pressors by the end of the case.         SUNG NAM MD             D: 06/06/2019   T: 06/06/2019   MT:       Name:     VALDEMAR FLANNERY   MRN:      [id number]        Account:         [id number]   :      1965           Procedure Date: 2019      Document: [id number]

## 2019-06-07 NOTE — PLAN OF CARE
Patient slept well between cares. He called for assistance as needed and communicated his needs. C/O pain right hip,medicated with Oxycodone twice, with relief, last time was 0400. He was incontinent of urine but also  performed intermittent bladder catheterization twice independently. He is on bedrest.

## 2019-06-07 NOTE — PLAN OF CARE
Pt is alert and oriented x4. Denies SOB and chest pain during cares. Incisions to right buttock and hip area continue to each have one area of dehiscence. Cleansed and applied dressings, per orders. Old PARESH site cleansed and applied dressings per order, noted moderate amount of serosanguinous drainage. Left groin pressure ulcer looks opened. Informed Charge who had Dr. Marin assess. Indicated that this is a keloid. Cleansed area and applied Mepilex for protection. Pt independently repositions himself on the bed with reminders. Pt independently self cathed x3 with x2 incontinent episode. Digital stimulation was completed by pt this am with small amt of small BM. Held baclofen x1 and gabapentin x2, per pt request as he believes this is the cause of the incontinence episode. Continue w/ POC.     Temp: 97.7  F (36.5  C) Temp src: Oral BP: 101/61 Pulse: 86 Heart Rate: 83 Resp: 18 SpO2: 94 % O2 Device: None (Room air)

## 2019-06-07 NOTE — TELEPHONE ENCOUNTER
PA Initiation    Medication: Oxycodone 10mg tablets - Quantity Limits  Insurance Company: [employer] Part D - Phone [phone] Fax [phone]  Pharmacy Filling the Rx: n/a - Patient has not yet been discharged, and there are no outpatient orders for this medication yet   Start Date: 6/5/2019    Ashley Zendejas  [place] Discharge Pharmacy Liaison     [facility]  [address] [facility]  [address] [place] [address] [address], [place] [address]   [email or web address]  [email or web address]   Phone: [phone]  Pager: [phone]  Fax: [phone]

## 2019-06-07 NOTE — TELEPHONE ENCOUNTER
Prior Authorization Approval    Authorization Effective Date: 3/9/2019  Authorization Expiration Date: 6/6/2020  Medication: Oxycodone 10mg tablets - Quantity Limits  Approved Dose/Quantity: 9 tablets daily  Reference #: CMM Key: [id number] - PA Case ID: [id number]   Insurance Company: [employer] Part D - Phone [phone] Fax [phone]  Expected CoPay: $3.40     CoPay Card Available: No    Foundation Assistance Needed: n/a  Which Pharmacy is filling the prescription (Not needed for infusion/clinic administered): n/a - Patient has not yet been discharged, and there are no outpatient orders for this medication yet    Ashley Zendejas  [place] Discharge Pharmacy Liaison     [facility]  [address] [place] [address]  [address] [place] [address] [address], [place] [address]   [email or web address]  [email or web address]   Phone: [phone]  Pager: [phone]  Fax: [phone]

## 2019-06-07 NOTE — PLAN OF CARE
RN: Pt alert and oriented, VSS. Pt remains on bed rest. Appetite good. Wound dressing changed to right hip at ordered. Pt dose self st.cath without issues, urine is yellow/clear. Pt refused to take scheduled Baclofen, State: This med makes me incontinent of urine. Pt takes PRN med for pain every 4hrs with good effects. Pt has no complaints. Continue with POC.

## 2019-06-08 PROCEDURE — 12000022 ZZH R&B SNF

## 2019-06-08 PROCEDURE — A9270 NON-COVERED ITEM OR SERVICE: HCPCS | Mod: GY | Performed by: INTERNAL MEDICINE

## 2019-06-08 PROCEDURE — 25000132 ZZH RX MED GY IP 250 OP 250 PS 637: Mod: GY | Performed by: INTERNAL MEDICINE

## 2019-06-08 PROCEDURE — 25000128 H RX IP 250 OP 636: Performed by: INTERNAL MEDICINE

## 2019-06-08 RX ADMIN — OXYCODONE HYDROCHLORIDE 15 MG: 5 TABLET ORAL at 18:21

## 2019-06-08 RX ADMIN — OXYCODONE HYDROCHLORIDE 15 MG: 5 TABLET ORAL at 01:53

## 2019-06-08 RX ADMIN — SULFAMETHOXAZOLE AND TRIMETHOPRIM 1 TABLET: 800; 160 TABLET ORAL at 21:28

## 2019-06-08 RX ADMIN — OXYCODONE HYDROCHLORIDE 15 MG: 5 TABLET ORAL at 06:11

## 2019-06-08 RX ADMIN — SENNOSIDES AND DOCUSATE SODIUM 2 TABLET: 8.6; 5 TABLET ORAL at 08:33

## 2019-06-08 RX ADMIN — OXYCODONE HYDROCHLORIDE 15 MG: 5 TABLET ORAL at 10:20

## 2019-06-08 RX ADMIN — MELATONIN TAB 3 MG 6 MG: 3 TAB at 21:28

## 2019-06-08 RX ADMIN — SENNOSIDES AND DOCUSATE SODIUM 2 TABLET: 8.6; 5 TABLET ORAL at 21:28

## 2019-06-08 RX ADMIN — BACLOFEN 20 MG: 10 TABLET ORAL at 08:33

## 2019-06-08 RX ADMIN — LIDOCAINE 1 PATCH: 560 PATCH PERCUTANEOUS; TOPICAL; TRANSDERMAL at 08:28

## 2019-06-08 RX ADMIN — TOLTERODINE 8 MG: 4 CAPSULE, EXTENDED RELEASE ORAL at 08:33

## 2019-06-08 RX ADMIN — BACLOFEN 20 MG: 10 TABLET ORAL at 17:15

## 2019-06-08 RX ADMIN — OXYCODONE HYDROCHLORIDE 15 MG: 5 TABLET ORAL at 22:26

## 2019-06-08 RX ADMIN — OXYCODONE HYDROCHLORIDE 15 MG: 5 TABLET ORAL at 14:21

## 2019-06-08 RX ADMIN — ENOXAPARIN SODIUM 40 MG: 40 INJECTION SUBCUTANEOUS at 08:29

## 2019-06-08 RX ADMIN — TRAZODONE HYDROCHLORIDE 100 MG: 100 TABLET ORAL at 21:28

## 2019-06-08 RX ADMIN — SULFAMETHOXAZOLE AND TRIMETHOPRIM 1 TABLET: 800; 160 TABLET ORAL at 08:34

## 2019-06-08 RX ADMIN — FERROUS SULFATE TAB 325 MG (65 MG ELEMENTAL FE) 325 MG: 325 (65 FE) TAB at 08:34

## 2019-06-08 RX ADMIN — ACETAMINOPHEN 650 MG: 325 TABLET, FILM COATED ORAL at 00:34

## 2019-06-08 NOTE — PLAN OF CARE
A &O x4, able to communicate needs. Continued on strict bedrest. Requested PRN oxycodone 15mg Q4H for pain to shoulders and incision with some relief. Declined baclofen and gabapentin 2x. No complaints of SOB or chest pain. Dressing changes done to R hip and L groin. Continues to have serous drainage to old PARESH drain site. Self cath 2x this shift. Will continue with plan of care.

## 2019-06-08 NOTE — PLAN OF CARE
Pt is alert and oriented. Denies SOB and chest pain. Strict bedrest. Administered PRN oxycodone 15 mg x2 for right hip and left shoulder pain. Declined gabapentin x2 and baclofen x1. Self cath x3, clear annia colored urine, see flow sheet for output. Urine incontinence x1. Digital stimulation by pt x1. Completed dressing to right hip, pressure injury and PARESH site. Incision open area noted on sacral area; informed Charge. Continue w/ POC.     Temp: 96.9  F (36.1  C) Temp src: Oral BP: 98/61 Pulse: 86 Heart Rate: 88 Resp: 18 SpO2: 98 % O2 Device: None (Room air)

## 2019-06-08 NOTE — PLAN OF CARE
Patient is alert and oriented x 4. Medicated with oxycodone and tylenol for back pain and R hip pain q 4 hours. Patient straight cath self x 3. Had a small formed BM this shift. Patient stated he didn't sleep last night. No respiratory distress noted. Will continue with current plan of care.

## 2019-06-09 PROCEDURE — A9270 NON-COVERED ITEM OR SERVICE: HCPCS | Mod: GY | Performed by: INTERNAL MEDICINE

## 2019-06-09 PROCEDURE — 25000128 H RX IP 250 OP 636: Performed by: INTERNAL MEDICINE

## 2019-06-09 PROCEDURE — 12000022 ZZH R&B SNF

## 2019-06-09 PROCEDURE — 25000132 ZZH RX MED GY IP 250 OP 250 PS 637: Mod: GY | Performed by: INTERNAL MEDICINE

## 2019-06-09 RX ADMIN — SULFAMETHOXAZOLE AND TRIMETHOPRIM 1 TABLET: 800; 160 TABLET ORAL at 08:36

## 2019-06-09 RX ADMIN — METOPROLOL SUCCINATE 50 MG: 25 TABLET, EXTENDED RELEASE ORAL at 08:36

## 2019-06-09 RX ADMIN — ENOXAPARIN SODIUM 40 MG: 40 INJECTION SUBCUTANEOUS at 08:39

## 2019-06-09 RX ADMIN — OXYCODONE HYDROCHLORIDE 15 MG: 5 TABLET ORAL at 13:00

## 2019-06-09 RX ADMIN — BACLOFEN 20 MG: 10 TABLET ORAL at 08:39

## 2019-06-09 RX ADMIN — BACLOFEN 20 MG: 10 TABLET ORAL at 13:00

## 2019-06-09 RX ADMIN — OXYCODONE HYDROCHLORIDE 15 MG: 5 TABLET ORAL at 21:09

## 2019-06-09 RX ADMIN — OXYCODONE HYDROCHLORIDE 15 MG: 5 TABLET ORAL at 16:56

## 2019-06-09 RX ADMIN — MELATONIN TAB 3 MG 6 MG: 3 TAB at 21:11

## 2019-06-09 RX ADMIN — FERROUS SULFATE TAB 325 MG (65 MG ELEMENTAL FE) 325 MG: 325 (65 FE) TAB at 08:36

## 2019-06-09 RX ADMIN — BACLOFEN 20 MG: 10 TABLET ORAL at 16:56

## 2019-06-09 RX ADMIN — LIDOCAINE 1 PATCH: 560 PATCH PERCUTANEOUS; TOPICAL; TRANSDERMAL at 08:36

## 2019-06-09 RX ADMIN — GABAPENTIN 600 MG: 300 CAPSULE ORAL at 21:11

## 2019-06-09 RX ADMIN — TRAZODONE HYDROCHLORIDE 100 MG: 100 TABLET ORAL at 21:11

## 2019-06-09 RX ADMIN — SULFAMETHOXAZOLE AND TRIMETHOPRIM 1 TABLET: 800; 160 TABLET ORAL at 21:10

## 2019-06-09 RX ADMIN — OXYCODONE HYDROCHLORIDE 15 MG: 5 TABLET ORAL at 08:39

## 2019-06-09 RX ADMIN — OXYCODONE HYDROCHLORIDE 15 MG: 5 TABLET ORAL at 02:32

## 2019-06-09 RX ADMIN — TOLTERODINE 8 MG: 4 CAPSULE, EXTENDED RELEASE ORAL at 08:36

## 2019-06-09 NOTE — PLAN OF CARE
Pt is alert and oriented x4. Denies SOB and chest pain. Continuous on strict bedrest. Applied scheduled lidocaine on left upper shoulder. Administered PRN oxycodone 15 mg x2 for shoulder and hip pain. Pt independently self cath x2; incontinent x1. No BM this shift. Dressings were completed by Provider this AM; currently CDI. Pt verbalized throat discomfort/irritation and requested for lozenge; paged provider. No temp, no throat redness/thrush noted. Pt continuous to refuse gabapentin x2. Continue w/ POC.     Temp: 97  F (36.1  C) Temp src: Oral BP: 107/66 Pulse: 73 Heart Rate: 86 Resp: 18 SpO2: 99 % O2 Device: None (Room air)

## 2019-06-09 NOTE — PLAN OF CARE
Patient alert and oriented x 4. Complained of R hip, back and shoulder pain-oxycodone given. Dressing to R buttock came off and R hip old PARESH site was saturated- both dressing were changed. Patient able to communicate needs. No respiratory distress noted. Will continue with current plan of care.

## 2019-06-09 NOTE — PLAN OF CARE
A &O x 4, able to communicate needs. Denies SOB or chest pain. Pain in shoulders, back, R hip, requested PRN oxycodone 15mg x2, lidocaine patches off. Self cath 3x this shift, supplies at bedside. Dressing changes done, serous drainage from old PARESH site. Strict bedrest. Regular diet. Refused gabapentin x2 and baclofen x1. Decided to take baclofen at 1715 due to discomfort from muscle spasms in LLE, was effective. Will continue with plan of care.

## 2019-06-09 NOTE — PROGRESS NOTES
"PLASTICS ATTENDING    Will be POD#21 this coming Friday.   Unfortunately, PARESH drain fell out several days ago and the drain site is still draining a fair amount.   The patient is doing very well from a psychological perspective and is committed to his healing.    PE:  Dressings saturated with serous drainage from PARESH site.  ~6 cm area along most distal edge of flap starting to dehisce superficially, but deep closure still holding. Looks a bit sloughy.  The area of the lateral dogear is softening and the open crevice is getting smaller.   The area of flap \"turnover\" near the scrotal base feels more indurated, but the incision is intact.    A/P: continue strict bedrest.  Will have Janell come evaluate for possible GlideWear bed pad.   Will have CWOCN eval and start collagen and VAC to dehisced areas.  Even though he will hit 3 weeks postop on Friday, we will need to get a CT to evaluate for any underlying fluid collections since PARESH fell out prematurely. If this is OK, then we will remove sutures as prudent and consider starting a very conservative sitting protocol.  "

## 2019-06-10 ENCOUNTER — APPOINTMENT (OUTPATIENT)
Dept: LAB | Facility: CLINIC | Age: 54
End: 2019-06-10
Attending: INTERNAL MEDICINE
Payer: MEDICARE

## 2019-06-10 LAB
ANION GAP SERPL CALCULATED.3IONS-SCNC: 9 MMOL/L (ref 3–14)
BASOPHILS # BLD AUTO: 0.1 10E9/L (ref 0–0.2)
BASOPHILS NFR BLD AUTO: 0.6 %
BUN SERPL-MCNC: 31 MG/DL (ref 7–30)
CALCIUM SERPL-MCNC: 9.1 MG/DL (ref 8.5–10.1)
CHLORIDE SERPL-SCNC: 103 MMOL/L (ref 94–109)
CO2 SERPL-SCNC: 24 MMOL/L (ref 20–32)
CREAT SERPL-MCNC: 0.78 MG/DL (ref 0.66–1.25)
DIFFERENTIAL METHOD BLD: ABNORMAL
EOSINOPHIL # BLD AUTO: 0.4 10E9/L (ref 0–0.7)
EOSINOPHIL NFR BLD AUTO: 3.3 %
ERYTHROCYTE [DISTWIDTH] IN BLOOD BY AUTOMATED COUNT: 23.6 % (ref 10–15)
GFR SERPL CREATININE-BSD FRML MDRD: >90 ML/MIN/{1.73_M2}
GLUCOSE SERPL-MCNC: 129 MG/DL (ref 70–99)
HCT VFR BLD AUTO: 31.6 % (ref 40–53)
HGB BLD-MCNC: 9.1 G/DL (ref 13.3–17.7)
IMM GRANULOCYTES # BLD: 0.1 10E9/L (ref 0–0.4)
IMM GRANULOCYTES NFR BLD: 0.6 %
LYMPHOCYTES # BLD AUTO: 1.6 10E9/L (ref 0.8–5.3)
LYMPHOCYTES NFR BLD AUTO: 15 %
MCH RBC QN AUTO: 22.1 PG (ref 26.5–33)
MCHC RBC AUTO-ENTMCNC: 28.8 G/DL (ref 31.5–36.5)
MCV RBC AUTO: 77 FL (ref 78–100)
MONOCYTES # BLD AUTO: 0.9 10E9/L (ref 0–1.3)
MONOCYTES NFR BLD AUTO: 8.3 %
NEUTROPHILS # BLD AUTO: 7.8 10E9/L (ref 1.6–8.3)
NEUTROPHILS NFR BLD AUTO: 72.2 %
NRBC # BLD AUTO: 0 10*3/UL
NRBC BLD AUTO-RTO: 0 /100
PLATELET # BLD AUTO: 706 10E9/L (ref 150–450)
POTASSIUM SERPL-SCNC: 4.2 MMOL/L (ref 3.4–5.3)
RBC # BLD AUTO: 4.12 10E12/L (ref 4.4–5.9)
SODIUM SERPL-SCNC: 136 MMOL/L (ref 133–144)
WBC # BLD AUTO: 10.7 10E9/L (ref 4–11)

## 2019-06-10 PROCEDURE — 97605 NEG PRS WND THER DME<=50SQCM: CPT

## 2019-06-10 PROCEDURE — 25000128 H RX IP 250 OP 636: Performed by: INTERNAL MEDICINE

## 2019-06-10 PROCEDURE — 36415 COLL VENOUS BLD VENIPUNCTURE: CPT | Performed by: INTERNAL MEDICINE

## 2019-06-10 PROCEDURE — 25000132 ZZH RX MED GY IP 250 OP 250 PS 637: Mod: GY | Performed by: INTERNAL MEDICINE

## 2019-06-10 PROCEDURE — 99308 SBSQ NF CARE LOW MDM 20: CPT | Performed by: INTERNAL MEDICINE

## 2019-06-10 PROCEDURE — A9270 NON-COVERED ITEM OR SERVICE: HCPCS | Mod: GY | Performed by: INTERNAL MEDICINE

## 2019-06-10 PROCEDURE — 80048 BASIC METABOLIC PNL TOTAL CA: CPT | Performed by: INTERNAL MEDICINE

## 2019-06-10 PROCEDURE — G0463 HOSPITAL OUTPT CLINIC VISIT: HCPCS

## 2019-06-10 PROCEDURE — 85025 COMPLETE CBC W/AUTO DIFF WBC: CPT | Performed by: INTERNAL MEDICINE

## 2019-06-10 PROCEDURE — 12000022 ZZH R&B SNF

## 2019-06-10 RX ADMIN — OXYCODONE HYDROCHLORIDE 15 MG: 5 TABLET ORAL at 13:33

## 2019-06-10 RX ADMIN — TRAZODONE HYDROCHLORIDE 100 MG: 100 TABLET ORAL at 21:29

## 2019-06-10 RX ADMIN — BACLOFEN 20 MG: 10 TABLET ORAL at 20:32

## 2019-06-10 RX ADMIN — BACLOFEN 20 MG: 10 TABLET ORAL at 08:41

## 2019-06-10 RX ADMIN — GABAPENTIN 600 MG: 300 CAPSULE ORAL at 13:33

## 2019-06-10 RX ADMIN — BENZOCAINE, MENTHOL 1 LOZENGE: 15; 3.6 LOZENGE ORAL at 13:34

## 2019-06-10 RX ADMIN — FERROUS SULFATE TAB 325 MG (65 MG ELEMENTAL FE) 325 MG: 325 (65 FE) TAB at 08:39

## 2019-06-10 RX ADMIN — BACLOFEN 20 MG: 10 TABLET ORAL at 13:33

## 2019-06-10 RX ADMIN — MELATONIN TAB 3 MG 6 MG: 3 TAB at 21:29

## 2019-06-10 RX ADMIN — OXYCODONE HYDROCHLORIDE 15 MG: 5 TABLET ORAL at 18:30

## 2019-06-10 RX ADMIN — OXYCODONE HYDROCHLORIDE 15 MG: 5 TABLET ORAL at 22:33

## 2019-06-10 RX ADMIN — BENZOCAINE, MENTHOL 1 LOZENGE: 15; 3.6 LOZENGE ORAL at 22:39

## 2019-06-10 RX ADMIN — ENOXAPARIN SODIUM 40 MG: 40 INJECTION SUBCUTANEOUS at 08:40

## 2019-06-10 RX ADMIN — LIDOCAINE 1 PATCH: 560 PATCH PERCUTANEOUS; TOPICAL; TRANSDERMAL at 08:40

## 2019-06-10 RX ADMIN — OXYCODONE HYDROCHLORIDE 15 MG: 5 TABLET ORAL at 08:39

## 2019-06-10 RX ADMIN — METOPROLOL SUCCINATE 50 MG: 25 TABLET, EXTENDED RELEASE ORAL at 08:39

## 2019-06-10 RX ADMIN — BENZOCAINE, MENTHOL 1 LOZENGE: 15; 3.6 LOZENGE ORAL at 11:31

## 2019-06-10 RX ADMIN — TOLTERODINE 8 MG: 4 CAPSULE, EXTENDED RELEASE ORAL at 08:39

## 2019-06-10 NOTE — PLAN OF CARE
A &O x4, able to communicate needs. Denies SOB or chest pain. Denied sore throat, did not request lozenge. Pain in shoulders, back, and hips Oxycodone 15mg given 2x. Self cath 2x, declined senna due to loose BM early this am per pt. Declined baclofen this evening. Strict bedrest. All dressings changed, please see Dr. Blackmon's note and plan. Will continue with plan of care.
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
## 2019-06-10 NOTE — PLAN OF CARE
Patient alert and oriented x 4,able to complete his dig stimulation and self cath independently.PRN pain medication x 2,complains of right hip and shoulder pain.Throat irritation controlled with Cepacol Lozenge.  Declined Gabapentin in am but took it this afternoon.Right hip old drain site wound vac will be placed this afternoon by WOC nurse.
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
## 2019-06-10 NOTE — PLAN OF CARE
Patient alert and oriented x 4. Patient no complaints of pain and discomfort. Patient appears sleeping during rounds. Uses call light approprietly. No respiratory distress noted. Will continue with current plan of care.

## 2019-06-10 NOTE — PROGRESS NOTES
Writer heard from Nannette with HC and that the pt has already been assessed prior to having surgery. Writer went to talk to the pt about this, and pt was addiment that they need to re-do it. Writer was able to get the assessors name and number (Darnell [phone]) and talked to him about the assessment. Darnell indicated that the pt was assessed based on him having the upcoming surgery. Darnell stated that he was approved for PCA and the CADI waiver. The waiver is good for 90 days, so we will have to update Darnell if the pt is getting close to that 60 day cap. Darnell then can call the pt to update his assessment. Pt will have full services when he returns home.     Writer will update the pt on this information . SW will continue to follow and assist as needed.    AIDA Epstein  [facility]   P: [phone]  Pgr: [phone]

## 2019-06-11 PROCEDURE — A9270 NON-COVERED ITEM OR SERVICE: HCPCS | Mod: GY | Performed by: INTERNAL MEDICINE

## 2019-06-11 PROCEDURE — 25000132 ZZH RX MED GY IP 250 OP 250 PS 637: Mod: GY | Performed by: INTERNAL MEDICINE

## 2019-06-11 PROCEDURE — 00220000 ZZH SNF RUG CODE OPNP

## 2019-06-11 PROCEDURE — 25000128 H RX IP 250 OP 636: Performed by: INTERNAL MEDICINE

## 2019-06-11 PROCEDURE — 12000022 ZZH R&B SNF

## 2019-06-11 RX ADMIN — GABAPENTIN 600 MG: 300 CAPSULE ORAL at 13:59

## 2019-06-11 RX ADMIN — METOPROLOL SUCCINATE 50 MG: 25 TABLET, EXTENDED RELEASE ORAL at 09:11

## 2019-06-11 RX ADMIN — TOLTERODINE 8 MG: 4 CAPSULE, EXTENDED RELEASE ORAL at 09:11

## 2019-06-11 RX ADMIN — BACLOFEN 20 MG: 10 TABLET ORAL at 16:56

## 2019-06-11 RX ADMIN — BENZOCAINE, MENTHOL 1 LOZENGE: 15; 3.6 LOZENGE ORAL at 13:59

## 2019-06-11 RX ADMIN — GABAPENTIN 600 MG: 300 CAPSULE ORAL at 20:05

## 2019-06-11 RX ADMIN — LIDOCAINE 1 PATCH: 560 PATCH PERCUTANEOUS; TOPICAL; TRANSDERMAL at 09:10

## 2019-06-11 RX ADMIN — BACLOFEN 20 MG: 10 TABLET ORAL at 09:11

## 2019-06-11 RX ADMIN — OXYCODONE HYDROCHLORIDE 15 MG: 5 TABLET ORAL at 10:38

## 2019-06-11 RX ADMIN — GABAPENTIN 600 MG: 300 CAPSULE ORAL at 09:11

## 2019-06-11 RX ADMIN — ENOXAPARIN SODIUM 40 MG: 40 INJECTION SUBCUTANEOUS at 09:10

## 2019-06-11 RX ADMIN — OXYCODONE HYDROCHLORIDE 15 MG: 5 TABLET ORAL at 02:43

## 2019-06-11 RX ADMIN — BACLOFEN 20 MG: 10 TABLET ORAL at 13:59

## 2019-06-11 RX ADMIN — OXYCODONE HYDROCHLORIDE 15 MG: 5 TABLET ORAL at 06:24

## 2019-06-11 RX ADMIN — FERROUS SULFATE TAB 325 MG (65 MG ELEMENTAL FE) 325 MG: 325 (65 FE) TAB at 09:13

## 2019-06-11 RX ADMIN — BENZOCAINE, MENTHOL 1 LOZENGE: 15; 3.6 LOZENGE ORAL at 09:34

## 2019-06-11 RX ADMIN — BACLOFEN 20 MG: 10 TABLET ORAL at 21:16

## 2019-06-11 RX ADMIN — TRAZODONE HYDROCHLORIDE 100 MG: 100 TABLET ORAL at 21:16

## 2019-06-11 RX ADMIN — OXYCODONE HYDROCHLORIDE 15 MG: 5 TABLET ORAL at 15:20

## 2019-06-11 RX ADMIN — MELATONIN TAB 3 MG 6 MG: 3 TAB at 21:16

## 2019-06-11 RX ADMIN — BENZOCAINE, MENTHOL 1 LOZENGE: 15; 3.6 LOZENGE ORAL at 06:26

## 2019-06-11 RX ADMIN — OXYCODONE HYDROCHLORIDE 15 MG: 5 TABLET ORAL at 20:05

## 2019-06-11 NOTE — PLAN OF CARE
Wound vac intact,no alarms.PRN pain medication when due.Condom catheter draining okay.Eating and drinking well.Pleasant.Continue w/ plan of care.

## 2019-06-11 NOTE — PLAN OF CARE
Pt is alert and oriented x4, able to make needs known. Denies chest pain and SOB. Pt complains of bilateral shoulder pain, given PRN oxycodone x2 this shift. WOC nurse placed wound VAC today to old drain site on R. hip, running at 125. Pt requested condom cath overnight, placed at 2030, patent and draining. Mepliex to L. groin changed this shift. Strict bedrest. Call light within reach, will continue to monitor.

## 2019-06-11 NOTE — PLAN OF CARE
Pt.sleeping off and on. Continues strict bedrest. Requested and rec'd Oxycodone 15mg po @ 0240 for c/o ariana.shoulder/back pain. Wearing condom catheter overnight. Stated will be doing dig.stim ~ 0600. Wound vac dsg reinforced d/t leak - noted dressing started to peel off and away from skin near the trac pad. Changed dsg to old PARESH site - moderate amt.creamy yellow-green drainage with scant blood.

## 2019-06-12 LAB
ANION GAP SERPL CALCULATED.3IONS-SCNC: 4 MMOL/L (ref 3–14)
BUN SERPL-MCNC: 37 MG/DL (ref 7–30)
CALCIUM SERPL-MCNC: 8.9 MG/DL (ref 8.5–10.1)
CHLORIDE SERPL-SCNC: 103 MMOL/L (ref 94–109)
CO2 SERPL-SCNC: 28 MMOL/L (ref 20–32)
CREAT SERPL-MCNC: 0.69 MG/DL (ref 0.66–1.25)
ERYTHROCYTE [DISTWIDTH] IN BLOOD BY AUTOMATED COUNT: 23.3 % (ref 10–15)
GFR SERPL CREATININE-BSD FRML MDRD: >90 ML/MIN/{1.73_M2}
GLUCOSE SERPL-MCNC: 98 MG/DL (ref 70–99)
HCT VFR BLD AUTO: 29.9 % (ref 40–53)
HGB BLD-MCNC: 9 G/DL (ref 13.3–17.7)
MCH RBC QN AUTO: 21.7 PG (ref 26.5–33)
MCHC RBC AUTO-ENTMCNC: 30.1 G/DL (ref 31.5–36.5)
MCV RBC AUTO: 72 FL (ref 78–100)
PLATELET # BLD AUTO: 656 10E9/L (ref 150–450)
POTASSIUM SERPL-SCNC: 4.5 MMOL/L (ref 3.4–5.3)
PROCALCITONIN SERPL-MCNC: 0.09 NG/ML
RBC # BLD AUTO: 4.14 10E12/L (ref 4.4–5.9)
SODIUM SERPL-SCNC: 135 MMOL/L (ref 133–144)
WBC # BLD AUTO: 12.4 10E9/L (ref 4–11)

## 2019-06-12 PROCEDURE — 85027 COMPLETE CBC AUTOMATED: CPT | Performed by: INTERNAL MEDICINE

## 2019-06-12 PROCEDURE — 36415 COLL VENOUS BLD VENIPUNCTURE: CPT | Performed by: INTERNAL MEDICINE

## 2019-06-12 PROCEDURE — 25000132 ZZH RX MED GY IP 250 OP 250 PS 637: Mod: GY | Performed by: INTERNAL MEDICINE

## 2019-06-12 PROCEDURE — 25000128 H RX IP 250 OP 636: Performed by: INTERNAL MEDICINE

## 2019-06-12 PROCEDURE — A9270 NON-COVERED ITEM OR SERVICE: HCPCS | Mod: GY | Performed by: INTERNAL MEDICINE

## 2019-06-12 PROCEDURE — 84145 PROCALCITONIN (PCT): CPT | Performed by: INTERNAL MEDICINE

## 2019-06-12 PROCEDURE — 80048 BASIC METABOLIC PNL TOTAL CA: CPT | Performed by: INTERNAL MEDICINE

## 2019-06-12 PROCEDURE — 12000022 ZZH R&B SNF

## 2019-06-12 PROCEDURE — 99309 SBSQ NF CARE MODERATE MDM 30: CPT | Performed by: INTERNAL MEDICINE

## 2019-06-12 PROCEDURE — 99207 ZZC CDG-MDM COMPONENT: MEETS MODERATE - UP CODED: CPT | Performed by: INTERNAL MEDICINE

## 2019-06-12 RX ORDER — GUAIFENESIN/DEXTROMETHORPHAN 100-10MG/5
5 SYRUP ORAL EVERY 4 HOURS PRN
Status: DISCONTINUED | OUTPATIENT
Start: 2019-06-12 | End: 2019-07-19 | Stop reason: HOSPADM

## 2019-06-12 RX ORDER — GUAIFENESIN AND DEXTROMETHORPHAN HYDROBROMIDE 600; 30 MG/1; MG/1
1 TABLET, EXTENDED RELEASE ORAL 2 TIMES DAILY PRN
Status: DISCONTINUED | OUTPATIENT
Start: 2019-06-12 | End: 2019-06-12

## 2019-06-12 RX ADMIN — ENOXAPARIN SODIUM 40 MG: 40 INJECTION SUBCUTANEOUS at 08:58

## 2019-06-12 RX ADMIN — FERROUS SULFATE TAB 325 MG (65 MG ELEMENTAL FE) 325 MG: 325 (65 FE) TAB at 08:58

## 2019-06-12 RX ADMIN — OXYCODONE HYDROCHLORIDE 15 MG: 5 TABLET ORAL at 10:06

## 2019-06-12 RX ADMIN — GABAPENTIN 600 MG: 300 CAPSULE ORAL at 21:52

## 2019-06-12 RX ADMIN — BENZOCAINE, MENTHOL 1 LOZENGE: 15; 3.6 LOZENGE ORAL at 05:49

## 2019-06-12 RX ADMIN — OXYCODONE HYDROCHLORIDE 15 MG: 5 TABLET ORAL at 01:07

## 2019-06-12 RX ADMIN — OXYCODONE HYDROCHLORIDE 15 MG: 5 TABLET ORAL at 14:08

## 2019-06-12 RX ADMIN — OXYCODONE HYDROCHLORIDE 15 MG: 5 TABLET ORAL at 21:52

## 2019-06-12 RX ADMIN — BENZOCAINE, MENTHOL 1 LOZENGE: 15; 3.6 LOZENGE ORAL at 01:11

## 2019-06-12 RX ADMIN — TRAZODONE HYDROCHLORIDE 100 MG: 100 TABLET ORAL at 21:52

## 2019-06-12 RX ADMIN — OXYCODONE HYDROCHLORIDE 15 MG: 5 TABLET ORAL at 05:48

## 2019-06-12 RX ADMIN — BACLOFEN 20 MG: 10 TABLET ORAL at 08:56

## 2019-06-12 RX ADMIN — METOPROLOL SUCCINATE 50 MG: 25 TABLET, EXTENDED RELEASE ORAL at 08:57

## 2019-06-12 RX ADMIN — GUAIFENESIN AND DEXTROMETHORPHAN 5 ML: 100; 10 SYRUP ORAL at 22:18

## 2019-06-12 RX ADMIN — TOLTERODINE 8 MG: 4 CAPSULE, EXTENDED RELEASE ORAL at 08:57

## 2019-06-12 RX ADMIN — GUAIFENESIN AND DEXTROMETHORPHAN HYDROBROMIDE 1 TABLET: 600; 30 TABLET, EXTENDED RELEASE ORAL at 10:06

## 2019-06-12 RX ADMIN — BACLOFEN 20 MG: 10 TABLET ORAL at 21:52

## 2019-06-12 RX ADMIN — GUAIFENESIN AND DEXTROMETHORPHAN 5 ML: 100; 10 SYRUP ORAL at 17:19

## 2019-06-12 RX ADMIN — GABAPENTIN 600 MG: 300 CAPSULE ORAL at 08:56

## 2019-06-12 RX ADMIN — BENZOCAINE, MENTHOL 1 LOZENGE: 15; 3.6 LOZENGE ORAL at 12:23

## 2019-06-12 RX ADMIN — MELATONIN TAB 3 MG 6 MG: 3 TAB at 21:52

## 2019-06-12 RX ADMIN — BACLOFEN 20 MG: 10 TABLET ORAL at 14:03

## 2019-06-12 RX ADMIN — LIDOCAINE 1 PATCH: 560 PATCH PERCUTANEOUS; TOPICAL; TRANSDERMAL at 08:58

## 2019-06-12 RX ADMIN — OXYCODONE HYDROCHLORIDE 15 MG: 5 TABLET ORAL at 18:08

## 2019-06-12 NOTE — PLAN OF CARE
Patient alert and oriented x 4. Complained of R hip, and lower back pain, medicated with oxycodone and effective. Wound vac intact and working well, no alarm noted. Patien thas condom catheter @ night and per patient he didn't straight cath this shift. No respiratory distress noted. Will continue with current plan of care.
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
## 2019-06-12 NOTE — PLAN OF CARE
Pt VSS. Afebrile. Complaining of new cough today, nonproductive. RN ordered IS for pt to use. MD notified, who ordered mucinex. CBC and BMP also drawn, WBC 12.4. RN administered mucinex, pt reports not effective. Cepacol given, RN notified MD who saw pt and ordered portable CXR this afternoon, and cough syrup. Pt c/o pain in BL shoulders, R hip and back. PRN oxycodone 15 mg given x2, last at 1405. Reduces pain for pt. Wound VAC patent, to be changed by WOCN tomorrow. R hip PARESH site dressing changed, large amount of drainage on dressing. Condom catheter patent, otherwise self straight caths. Pt did bowel regimen this AM. Pt shifts weight in bed frequently. Pt alert and oriented, pleasant and conversant. Continue to monitor pt status and keep updated.

## 2019-06-12 NOTE — UTILIZATION REVIEW
Pain Interview    1. Have you had pain or hurting at any time in the last 5 days?  Yes  2. How much of the time have you experienced pain or hurting over the last 5 days? Almost Constantly  3. Over the past 5 days, has pain made it hard for you to sleep at night?  No  4. Over the past 5 days, have you limited your day-to-day activities because of pain?Yes  5. Rate pain intensity: Numeric 8

## 2019-06-12 NOTE — PLAN OF CARE
"Patient A&Ox4. VSS. Remains on strict bedrest. Straight-cathed himself in the evening with 1650mL clear, yellow urine output and applied condom catheter at [facility]. Incontinent of urine x1; linen changed. Good appetite; finished 100% of supper. PRN oxycodone given x1 for bilateral shoulder and hip pain with moderate relief. Patient performed digital stim on himself after supper and had a large BM. Wound vac dressing to R stump came loose during pericares and was successfully reinforced. NPWT currently set at -125mmHg with no alarms reported. Dressing to R hip changed; moderate amount of serous drainage noted on old dressing. New Mepilex applied to L groin wound. Patient tolerated well. No PRN lozenge requested this shift. Pleasant and cooperative with cares. Able to make needs known. Continue with POC.    /68 (BP Location: Right arm)   Pulse 80   Temp 97.9  F (36.6  C) (Oral)   Resp 18   Ht 1.93 m (6' 4\")   Wt 81 kg (178 lb 9.6 oz)   SpO2 96%   BMI 21.74 kg/m      "

## 2019-06-13 ENCOUNTER — APPOINTMENT (OUTPATIENT)
Dept: LAB | Facility: CLINIC | Age: 54
End: 2019-06-13
Attending: INTERNAL MEDICINE
Payer: MEDICARE

## 2019-06-13 ENCOUNTER — ANESTHESIA (OUTPATIENT)
Dept: REHABILITATION | Facility: SKILLED NURSING FACILITY | Age: 54
End: 2019-06-13

## 2019-06-13 ENCOUNTER — ANESTHESIA EVENT (OUTPATIENT)
Dept: REHABILITATION | Facility: SKILLED NURSING FACILITY | Age: 54
End: 2019-06-13

## 2019-06-13 LAB
ANION GAP SERPL CALCULATED.3IONS-SCNC: 5 MMOL/L (ref 3–14)
BASOPHILS # BLD AUTO: 0 10E9/L (ref 0–0.2)
BASOPHILS NFR BLD AUTO: 0.3 %
BUN SERPL-MCNC: 36 MG/DL (ref 7–30)
CALCIUM SERPL-MCNC: 9 MG/DL (ref 8.5–10.1)
CHLORIDE SERPL-SCNC: 104 MMOL/L (ref 94–109)
CO2 SERPL-SCNC: 26 MMOL/L (ref 20–32)
CREAT SERPL-MCNC: 0.63 MG/DL (ref 0.66–1.25)
DIFFERENTIAL METHOD BLD: ABNORMAL
EOSINOPHIL # BLD AUTO: 0.4 10E9/L (ref 0–0.7)
EOSINOPHIL NFR BLD AUTO: 3.6 %
ERYTHROCYTE [DISTWIDTH] IN BLOOD BY AUTOMATED COUNT: 23.3 % (ref 10–15)
GFR SERPL CREATININE-BSD FRML MDRD: >90 ML/MIN/{1.73_M2}
GLUCOSE SERPL-MCNC: 124 MG/DL (ref 70–99)
HCT VFR BLD AUTO: 29.4 % (ref 40–53)
HGB BLD-MCNC: 8.8 G/DL (ref 13.3–17.7)
IMM GRANULOCYTES # BLD: 0.1 10E9/L (ref 0–0.4)
IMM GRANULOCYTES NFR BLD: 0.5 %
LYMPHOCYTES # BLD AUTO: 1.6 10E9/L (ref 0.8–5.3)
LYMPHOCYTES NFR BLD AUTO: 13.4 %
MCH RBC QN AUTO: 21.9 PG (ref 26.5–33)
MCHC RBC AUTO-ENTMCNC: 29.9 G/DL (ref 31.5–36.5)
MCV RBC AUTO: 73 FL (ref 78–100)
MONOCYTES # BLD AUTO: 1.3 10E9/L (ref 0–1.3)
MONOCYTES NFR BLD AUTO: 10.8 %
NEUTROPHILS # BLD AUTO: 8.3 10E9/L (ref 1.6–8.3)
NEUTROPHILS NFR BLD AUTO: 71.4 %
NRBC # BLD AUTO: 0.1 10*3/UL
NRBC BLD AUTO-RTO: 1 /100
PLATELET # BLD AUTO: 570 10E9/L (ref 150–450)
POTASSIUM SERPL-SCNC: 3.9 MMOL/L (ref 3.4–5.3)
RBC # BLD AUTO: 4.01 10E12/L (ref 4.4–5.9)
SODIUM SERPL-SCNC: 135 MMOL/L (ref 133–144)
WBC # BLD AUTO: 11.6 10E9/L (ref 4–11)

## 2019-06-13 PROCEDURE — G0463 HOSPITAL OUTPT CLINIC VISIT: HCPCS

## 2019-06-13 PROCEDURE — 80048 BASIC METABOLIC PNL TOTAL CA: CPT | Performed by: INTERNAL MEDICINE

## 2019-06-13 PROCEDURE — A9270 NON-COVERED ITEM OR SERVICE: HCPCS | Mod: GY | Performed by: INTERNAL MEDICINE

## 2019-06-13 PROCEDURE — 25000132 ZZH RX MED GY IP 250 OP 250 PS 637: Mod: GY | Performed by: INTERNAL MEDICINE

## 2019-06-13 PROCEDURE — 12000022 ZZH R&B SNF

## 2019-06-13 PROCEDURE — 40000671 ZZH STATISTIC ANESTHESIA CASE

## 2019-06-13 PROCEDURE — 25000128 H RX IP 250 OP 636: Performed by: INTERNAL MEDICINE

## 2019-06-13 PROCEDURE — 36415 COLL VENOUS BLD VENIPUNCTURE: CPT | Performed by: INTERNAL MEDICINE

## 2019-06-13 PROCEDURE — 85025 COMPLETE CBC W/AUTO DIFF WBC: CPT | Performed by: INTERNAL MEDICINE

## 2019-06-13 RX ORDER — IOPAMIDOL 755 MG/ML
100 INJECTION, SOLUTION INTRAVASCULAR ONCE
Status: COMPLETED | OUTPATIENT
Start: 2019-06-13 | End: 2019-06-13

## 2019-06-13 RX ORDER — LIDOCAINE HYDROCHLORIDE 10 MG/ML
INJECTION, SOLUTION EPIDURAL; INFILTRATION; INTRACAUDAL; PERINEURAL
Status: DISCONTINUED
Start: 2019-06-13 | End: 2019-06-14 | Stop reason: HOSPADM

## 2019-06-13 RX ADMIN — GUAIFENESIN AND DEXTROMETHORPHAN 5 ML: 100; 10 SYRUP ORAL at 20:32

## 2019-06-13 RX ADMIN — BACLOFEN 20 MG: 10 TABLET ORAL at 08:29

## 2019-06-13 RX ADMIN — BACLOFEN 20 MG: 10 TABLET ORAL at 14:37

## 2019-06-13 RX ADMIN — OXYCODONE HYDROCHLORIDE 15 MG: 5 TABLET ORAL at 20:32

## 2019-06-13 RX ADMIN — ENOXAPARIN SODIUM 40 MG: 40 INJECTION SUBCUTANEOUS at 08:31

## 2019-06-13 RX ADMIN — SODIUM CHLORIDE 62 ML: 9 INJECTION, SOLUTION INTRAVENOUS at 13:50

## 2019-06-13 RX ADMIN — GUAIFENESIN AND DEXTROMETHORPHAN 5 ML: 100; 10 SYRUP ORAL at 16:33

## 2019-06-13 RX ADMIN — OXYCODONE HYDROCHLORIDE 15 MG: 5 TABLET ORAL at 16:33

## 2019-06-13 RX ADMIN — TRAZODONE HYDROCHLORIDE 100 MG: 100 TABLET ORAL at 21:09

## 2019-06-13 RX ADMIN — GUAIFENESIN AND DEXTROMETHORPHAN 5 ML: 100; 10 SYRUP ORAL at 08:31

## 2019-06-13 RX ADMIN — GUAIFENESIN AND DEXTROMETHORPHAN 5 ML: 100; 10 SYRUP ORAL at 12:33

## 2019-06-13 RX ADMIN — OXYCODONE HYDROCHLORIDE 15 MG: 5 TABLET ORAL at 08:30

## 2019-06-13 RX ADMIN — BACLOFEN 20 MG: 10 TABLET ORAL at 20:32

## 2019-06-13 RX ADMIN — GABAPENTIN 600 MG: 300 CAPSULE ORAL at 20:32

## 2019-06-13 RX ADMIN — MELATONIN TAB 3 MG 6 MG: 3 TAB at 21:09

## 2019-06-13 RX ADMIN — OXYCODONE HYDROCHLORIDE 15 MG: 5 TABLET ORAL at 12:33

## 2019-06-13 RX ADMIN — GUAIFENESIN AND DEXTROMETHORPHAN 5 ML: 100; 10 SYRUP ORAL at 03:39

## 2019-06-13 RX ADMIN — TOLTERODINE 8 MG: 4 CAPSULE, EXTENDED RELEASE ORAL at 08:28

## 2019-06-13 RX ADMIN — OXYCODONE HYDROCHLORIDE 15 MG: 5 TABLET ORAL at 03:36

## 2019-06-13 RX ADMIN — FERROUS SULFATE TAB 325 MG (65 MG ELEMENTAL FE) 325 MG: 325 (65 FE) TAB at 08:31

## 2019-06-13 RX ADMIN — METOPROLOL SUCCINATE 50 MG: 25 TABLET, EXTENDED RELEASE ORAL at 08:30

## 2019-06-13 RX ADMIN — GABAPENTIN 600 MG: 300 CAPSULE ORAL at 14:37

## 2019-06-13 RX ADMIN — IOPAMIDOL 88 ML: 755 INJECTION, SOLUTION INTRAVENOUS at 13:49

## 2019-06-13 ASSESSMENT — MIFFLIN-ST. JEOR: SCORE: 1877.75

## 2019-06-13 NOTE — PROGRESS NOTES
CLINICAL NUTRITION SERVICES - REASSESSMENT NOTE     Nutrition Prescription    RECOMMENDATIONS FOR MDs/PROVIDERS TO ORDER:  None today    Malnutrition Status:    Patient does not meet two of the criteria necessary for diagnosing malnutrition    Recommendations already ordered by Registered Dietitian (RD):  Reduce frequency of Boost Plus to TID with meals (chocolate only)    Future/Additional Recommendations:  Continue to encourage adequate protein intake; if continues to be excessive, continue to recommend reducing frequency of supplements.     EVALUATION OF THE PROGRESS TOWARD GOALS   Diet: Regular, Boost Plus 5x/d, Gelatein PRN    Intake: 100% of meals per flowsheet. On average, pt is ordering 5475 kcal and 273 g protein daily (including supplements) per [employer]. He is drinking 5 Boost Plus and typically 4 Gelatein daily which contributes ~2000 kcal and 135 g protein in supplements alone. This is likely meeting >100% minimum energy and protein needs.       NEW FINDINGS   - R posterior hip wound due to Surgical Wound dehiscence. Status: evolving  - BUN is trending up slightly (36 on 6/13); question if d/t excessive protein intakes.   - Pt has gained 25 lbs over the last 2 weeks since admit. Question accuracy given bed wt vs true wt gain with excessive caloric intakes. Pt reports his UBW prior to the amputation was ~220 lbs and 204 lbs is likely more accurate given s/p amputation and some muscle loss with bedrest.   06/13/19  92.6 kg (204 lb 3.2 oz)   05/28/19  81 kg (178 lb 9.6 oz)     MALNUTRITION  % Intake: No decreased intake noted  % Weight Loss: None noted  Subcutaneous Fat Loss: None observed  Muscle Loss: None observed  Fluid Accumulation/Edema: None noted  Malnutrition Diagnosis: Patient does not meet two of the above criteria necessary for diagnosing malnutrition    Previous Goals   Patient to consume % of nutritionally adequate meal trays TID, or the equivalent with  supplements/snacks.  Evaluation: Exceeded     Previous Nutrition Diagnosis  Predicted inadequate protein-energy intake related to variable appetite and increased needs as evidenced by pt reliant on PO intakes to meet 100% of nutritional needs with potential for variation     Evaluation: No change    CURRENT NUTRITION DIAGNOSIS  Predicted inadequate protein-energy intake related to variable appetite and increased needs as evidenced by pt reliant on PO intakes to meet 100% of nutritional needs with potential for variation       INTERVENTIONS  Implementation  Discussed excessive calorie/protein intakes and pt has been trying very hard to ensure adequate nutrition as he wants his wounds to heal as fast as possible so he can go home. Expressed he is growing somewhat tired of the menu options but is still ordering large meals and eating 100%. Encouraged pt to discontinue supplements given PO intakes, however he would like to continue. Is agreeable to reducing Boost Plus frequency and continue Gelatein PRN.     Goals  Patient to consume % of nutritionally adequate meal trays TID, or the equivalent with supplements/snacks.    Monitoring/Evaluation  Progress toward goals will be monitored and evaluated per protocol.      Vanessa Chahal RD, LD  Unit Pager: [phone]

## 2019-06-13 NOTE — PLAN OF CARE
Pt very pleasant. Declined his am senna, gabapentin and lidocaine patch. Requested cough medicine twice for slightly productive cough. Requested pain medication twice this shift. Anesthesia started 20 gauge IV in R wrist for CT with contrast to evaluate for fluid collection as PARESH was accidentally dislodged on 6/2/19. Pt has been straight cath'ing himself today, condom cath not applied. Wound vac present and working, no alarms or seal leaks. Pt continues on bedrest until cleared by plastics.

## 2019-06-13 NOTE — PLAN OF CARE
Patient requested and received prn Oxycodone x1 for right hip and back pain-effective. Medicated with prn Robitussin for cough/congestion, moderate relief per patient statement. Wound-vac to right hip wound is intact, no alarms-continuous suction at 125 mmHg. Self caths, 550 out. No bm overnight. Continue to monitor.

## 2019-06-13 NOTE — PLAN OF CARE
Alert and oriented, VSS. Appetite good. PRN Oxycodone given x 2 for pain. Lidocaine patch was on mid-shift, but writer unable to find it for removal. PRN Robitussin given x 2 for cough. Mobile CXR done this shift. Wound vac to R hip intact and running; no alarms noted. Old PARESH drain site on R hip was redressed for the 2nd time today due to excessive drainage. Condom cath in place. It was on throughout shift, though pt took it off at one point to strait cath; bedding was changed afterwards due to leakage during removal. Pt on bedrest, independently positioning from back to L side.
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
## 2019-06-14 ENCOUNTER — RECORDS - HEALTHEAST (OUTPATIENT)
Dept: ADMINISTRATIVE | Facility: OTHER | Age: 54
End: 2019-06-14

## 2019-06-14 ENCOUNTER — ONCOLOGY VISIT (OUTPATIENT)
Dept: RADIATION ONCOLOGY | Facility: CLINIC | Age: 54
End: 2019-06-14

## 2019-06-14 PROCEDURE — A9270 NON-COVERED ITEM OR SERVICE: HCPCS | Mod: GY | Performed by: INTERNAL MEDICINE

## 2019-06-14 PROCEDURE — 12000022 ZZH R&B SNF

## 2019-06-14 PROCEDURE — 25000128 H RX IP 250 OP 636: Performed by: INTERNAL MEDICINE

## 2019-06-14 PROCEDURE — 25000132 ZZH RX MED GY IP 250 OP 250 PS 637: Mod: GY | Performed by: INTERNAL MEDICINE

## 2019-06-14 RX ADMIN — GUAIFENESIN AND DEXTROMETHORPHAN 5 ML: 100; 10 SYRUP ORAL at 21:22

## 2019-06-14 RX ADMIN — FERROUS SULFATE TAB 325 MG (65 MG ELEMENTAL FE) 325 MG: 325 (65 FE) TAB at 08:50

## 2019-06-14 RX ADMIN — OXYCODONE HYDROCHLORIDE 15 MG: 5 TABLET ORAL at 17:18

## 2019-06-14 RX ADMIN — BACLOFEN 20 MG: 10 TABLET ORAL at 08:50

## 2019-06-14 RX ADMIN — TOLTERODINE 8 MG: 4 CAPSULE, EXTENDED RELEASE ORAL at 08:50

## 2019-06-14 RX ADMIN — GUAIFENESIN AND DEXTROMETHORPHAN 5 ML: 100; 10 SYRUP ORAL at 08:59

## 2019-06-14 RX ADMIN — METOPROLOL SUCCINATE 50 MG: 25 TABLET, EXTENDED RELEASE ORAL at 08:49

## 2019-06-14 RX ADMIN — OXYCODONE HYDROCHLORIDE 15 MG: 5 TABLET ORAL at 13:11

## 2019-06-14 RX ADMIN — GUAIFENESIN AND DEXTROMETHORPHAN 5 ML: 100; 10 SYRUP ORAL at 17:18

## 2019-06-14 RX ADMIN — BACLOFEN 20 MG: 10 TABLET ORAL at 13:11

## 2019-06-14 RX ADMIN — GABAPENTIN 600 MG: 300 CAPSULE ORAL at 08:48

## 2019-06-14 RX ADMIN — GUAIFENESIN AND DEXTROMETHORPHAN 5 ML: 100; 10 SYRUP ORAL at 13:12

## 2019-06-14 RX ADMIN — TRAZODONE HYDROCHLORIDE 100 MG: 100 TABLET ORAL at 21:22

## 2019-06-14 RX ADMIN — MELATONIN TAB 3 MG 6 MG: 3 TAB at 21:21

## 2019-06-14 RX ADMIN — BACLOFEN 20 MG: 10 TABLET ORAL at 17:18

## 2019-06-14 RX ADMIN — OXYCODONE HYDROCHLORIDE 15 MG: 5 TABLET ORAL at 03:27

## 2019-06-14 RX ADMIN — OXYCODONE HYDROCHLORIDE 15 MG: 5 TABLET ORAL at 21:21

## 2019-06-14 RX ADMIN — ENOXAPARIN SODIUM 40 MG: 40 INJECTION SUBCUTANEOUS at 08:46

## 2019-06-14 RX ADMIN — OXYCODONE HYDROCHLORIDE 15 MG: 5 TABLET ORAL at 08:59

## 2019-06-14 RX ADMIN — GUAIFENESIN AND DEXTROMETHORPHAN 5 ML: 100; 10 SYRUP ORAL at 03:30

## 2019-06-14 NOTE — PLAN OF CARE
Patient alert and oriented x4, able to make needs known. Pt requested and received oxycodone and guaifenesin twice this shift, (0900 & 1315) due to pain, and cough. Wound care complete to PARESH site due to moderate amount of yellow/tan leakage through dressing. Wound vac in place, dressing CDI at 125. Pt self cathed once this shift with 275 output recorded. No BM reported this shift. No reports of nausea, chest pain, or SOB. Vital signs stable. Will continue to monitor.

## 2019-06-14 NOTE — PLAN OF CARE
"Patient A&Ox4. VSS. Good appetite. PRN cough syrup and oxycodone given x2 for nonproductive cough and R hip pain with moderate relief. Patient completed bed bath this evening with set-up assistance. He also straight-cath himself during shift and put on condom catheter at HS. LBM today. Dressing over old PARESH site was 80% saturated with yellow drainage and replaced at 2000. Wound vac to R hip patent; running at -125mmHg with no alarms noted. Patient pleasant and cooperative with cares. Able to make needs known. Continue with POC.    /67 (BP Location: Right arm)   Pulse 74   Temp 96.7  F (35.9  C) (Oral)   Resp 16   Ht 1.93 m (6' 4\")   Wt 92.6 kg (204 lb 3.2 oz)   SpO2 98%   BMI 24.86 kg/m      "

## 2019-06-14 NOTE — PLAN OF CARE
Alert and oriented x4. Patient requested and received prn Oxycodone x1 per request for right hip/back pain. Appears to be sleeping in between cares. Wound-vac is intact, no alarms. Condom catheter in place and with adequate output. Independently positioning in bed. Calls appropriately for assistance. Continue POC.

## 2019-06-15 PROCEDURE — 25000128 H RX IP 250 OP 636: Performed by: INTERNAL MEDICINE

## 2019-06-15 PROCEDURE — 25000132 ZZH RX MED GY IP 250 OP 250 PS 637: Mod: GY | Performed by: INTERNAL MEDICINE

## 2019-06-15 PROCEDURE — A9270 NON-COVERED ITEM OR SERVICE: HCPCS | Mod: GY | Performed by: INTERNAL MEDICINE

## 2019-06-15 PROCEDURE — 12000022 ZZH R&B SNF

## 2019-06-15 RX ADMIN — OXYCODONE HYDROCHLORIDE 15 MG: 5 TABLET ORAL at 13:32

## 2019-06-15 RX ADMIN — GABAPENTIN 600 MG: 300 CAPSULE ORAL at 21:49

## 2019-06-15 RX ADMIN — TRAZODONE HYDROCHLORIDE 100 MG: 100 TABLET ORAL at 21:50

## 2019-06-15 RX ADMIN — OXYCODONE HYDROCHLORIDE 15 MG: 5 TABLET ORAL at 17:29

## 2019-06-15 RX ADMIN — GABAPENTIN 600 MG: 300 CAPSULE ORAL at 08:53

## 2019-06-15 RX ADMIN — TOLTERODINE 8 MG: 4 CAPSULE, EXTENDED RELEASE ORAL at 08:53

## 2019-06-15 RX ADMIN — BACLOFEN 20 MG: 10 TABLET ORAL at 21:49

## 2019-06-15 RX ADMIN — OXYCODONE HYDROCHLORIDE 15 MG: 5 TABLET ORAL at 04:00

## 2019-06-15 RX ADMIN — GABAPENTIN 600 MG: 300 CAPSULE ORAL at 13:32

## 2019-06-15 RX ADMIN — ENOXAPARIN SODIUM 40 MG: 40 INJECTION SUBCUTANEOUS at 08:53

## 2019-06-15 RX ADMIN — GUAIFENESIN AND DEXTROMETHORPHAN 5 ML: 100; 10 SYRUP ORAL at 04:00

## 2019-06-15 RX ADMIN — BACLOFEN 20 MG: 10 TABLET ORAL at 13:32

## 2019-06-15 RX ADMIN — BACLOFEN 20 MG: 10 TABLET ORAL at 08:53

## 2019-06-15 RX ADMIN — MELATONIN TAB 3 MG 6 MG: 3 TAB at 21:49

## 2019-06-15 RX ADMIN — METOPROLOL SUCCINATE 50 MG: 25 TABLET, EXTENDED RELEASE ORAL at 08:53

## 2019-06-15 RX ADMIN — GUAIFENESIN AND DEXTROMETHORPHAN 5 ML: 100; 10 SYRUP ORAL at 17:32

## 2019-06-15 RX ADMIN — FERROUS SULFATE TAB 325 MG (65 MG ELEMENTAL FE) 325 MG: 325 (65 FE) TAB at 08:53

## 2019-06-15 RX ADMIN — OXYCODONE HYDROCHLORIDE 15 MG: 5 TABLET ORAL at 21:50

## 2019-06-15 RX ADMIN — GUAIFENESIN AND DEXTROMETHORPHAN 5 ML: 100; 10 SYRUP ORAL at 08:52

## 2019-06-15 RX ADMIN — OXYCODONE HYDROCHLORIDE 15 MG: 5 TABLET ORAL at 08:53

## 2019-06-15 RX ADMIN — SENNOSIDES AND DOCUSATE SODIUM 2 TABLET: 8.6; 5 TABLET ORAL at 21:50

## 2019-06-15 RX ADMIN — BACLOFEN 20 MG: 10 TABLET ORAL at 17:28

## 2019-06-15 NOTE — PROGRESS NOTES
CT scan reviewed with Plastic surgery fellow  (who reviewed with Dr. Blackmon)- Imp: The collection lkely a sequelae of Sx , no new intervention required. Ct to monitor

## 2019-06-15 NOTE — PLAN OF CARE
Pt is alert and oriented. On strict bedrest with HOB <30 degrees. Wound vac intact to right hip. Dressing changed to old PARESH drain site at start of shift, ABD pad with large yellow drainage. ABD needed to be changed again at HS. Sticky note left for MD. Pt able to reposition himself in bed independently. Requested PRN oxycodone for back and right hip pain q 4 hours. Also requested PRN robitussin for cough. Self caths independently, uses condom cath at night. Continue POC.

## 2019-06-15 NOTE — PLAN OF CARE
Patient alert and able to make needs known, slept most of this shift, awake at 0400 and requested for PRN pain medication for right hip and lower back pain, prn oxycodone 15 mg given with relief. Condom cath to manage incontinent, right  Upper hip dressing above wound vac area remains intact, wound Vac on cont suction pressure ar 125 mm hg, no alarm noted through this shift. Continue plan of care

## 2019-06-15 NOTE — PLAN OF CARE
Patient alert and oriented x4, able to make needs known. Uses call light appropriately. Dressing changed to R PARESH site once this shift due to moderate amount of yellow/pink tinged drainage soaking through dressing, brief, and pad. Reinforced with ABD, and tape. Pt requested PRN Oxycodone twice this shift (0900 & 1330) and guaifenesin once (0900) for pain and congestion in chest. Pt educated that guaifenesin may suppress cough and make it harder to expel secretions. Pt declined PRN dose at 1330. Pt had no further complaints of SOB, chest pain, nausea. Pt bedfast, uses condom catheter, and digital stimulation. Pt declined morning dose of senna, and lidocaine patch. Pt reports last BM was 6/13. Will continue to monitor per POC.

## 2019-06-16 PROCEDURE — 25000132 ZZH RX MED GY IP 250 OP 250 PS 637: Mod: GY | Performed by: INTERNAL MEDICINE

## 2019-06-16 PROCEDURE — 99309 SBSQ NF CARE MODERATE MDM 30: CPT | Performed by: INTERNAL MEDICINE

## 2019-06-16 PROCEDURE — 12000022 ZZH R&B SNF

## 2019-06-16 PROCEDURE — A9270 NON-COVERED ITEM OR SERVICE: HCPCS | Mod: GY | Performed by: INTERNAL MEDICINE

## 2019-06-16 PROCEDURE — 99207 ZZC CDG-CUT & PASTE-POTENTIAL IMPACT ON LEVEL: CPT | Performed by: INTERNAL MEDICINE

## 2019-06-16 PROCEDURE — 25000128 H RX IP 250 OP 636: Performed by: INTERNAL MEDICINE

## 2019-06-16 RX ADMIN — OXYCODONE HYDROCHLORIDE 15 MG: 5 TABLET ORAL at 08:53

## 2019-06-16 RX ADMIN — ENOXAPARIN SODIUM 40 MG: 40 INJECTION SUBCUTANEOUS at 08:54

## 2019-06-16 RX ADMIN — METOPROLOL SUCCINATE 50 MG: 25 TABLET, EXTENDED RELEASE ORAL at 08:49

## 2019-06-16 RX ADMIN — BACLOFEN 20 MG: 10 TABLET ORAL at 17:41

## 2019-06-16 RX ADMIN — BENZOCAINE, MENTHOL 1 LOZENGE: 15; 3.6 LOZENGE ORAL at 04:52

## 2019-06-16 RX ADMIN — OXYCODONE HYDROCHLORIDE 15 MG: 5 TABLET ORAL at 02:20

## 2019-06-16 RX ADMIN — OXYCODONE HYDROCHLORIDE 15 MG: 5 TABLET ORAL at 21:36

## 2019-06-16 RX ADMIN — OXYCODONE HYDROCHLORIDE 15 MG: 5 TABLET ORAL at 17:41

## 2019-06-16 RX ADMIN — SENNOSIDES AND DOCUSATE SODIUM 2 TABLET: 8.6; 5 TABLET ORAL at 21:36

## 2019-06-16 RX ADMIN — TRAZODONE HYDROCHLORIDE 100 MG: 100 TABLET ORAL at 21:36

## 2019-06-16 RX ADMIN — TOLTERODINE 8 MG: 4 CAPSULE, EXTENDED RELEASE ORAL at 08:48

## 2019-06-16 RX ADMIN — GABAPENTIN 600 MG: 300 CAPSULE ORAL at 08:51

## 2019-06-16 RX ADMIN — BENZOCAINE, MENTHOL 1 LOZENGE: 15; 3.6 LOZENGE ORAL at 02:20

## 2019-06-16 RX ADMIN — OXYCODONE HYDROCHLORIDE 15 MG: 5 TABLET ORAL at 13:54

## 2019-06-16 RX ADMIN — FERROUS SULFATE TAB 325 MG (65 MG ELEMENTAL FE) 325 MG: 325 (65 FE) TAB at 08:51

## 2019-06-16 RX ADMIN — BACLOFEN 20 MG: 10 TABLET ORAL at 13:49

## 2019-06-16 RX ADMIN — BACLOFEN 20 MG: 10 TABLET ORAL at 08:50

## 2019-06-16 RX ADMIN — GUAIFENESIN AND DEXTROMETHORPHAN 5 ML: 100; 10 SYRUP ORAL at 02:20

## 2019-06-16 RX ADMIN — GABAPENTIN 600 MG: 300 CAPSULE ORAL at 21:35

## 2019-06-16 RX ADMIN — GUAIFENESIN AND DEXTROMETHORPHAN 5 ML: 100; 10 SYRUP ORAL at 21:36

## 2019-06-16 RX ADMIN — GUAIFENESIN AND DEXTROMETHORPHAN 5 ML: 100; 10 SYRUP ORAL at 08:55

## 2019-06-16 RX ADMIN — MELATONIN TAB 3 MG 6 MG: 3 TAB at 21:36

## 2019-06-16 RX ADMIN — BACLOFEN 20 MG: 10 TABLET ORAL at 21:35

## 2019-06-16 NOTE — PLAN OF CARE
Patient alert and oriented x4, able to make needs known. Pt bedfast with wound vac to right lower hip incision set at 125. Dressing to old PARESH drainage site changed this am with large/moderate amount of tan/pink drainage. Reinforced with 2x2,4x4, and ABD dressing. Pt requested and received oxycodone twice this shift @ 0900 and 1300 due to pain. Pt requested guaifenesin once this shift @ 0900 due to chest congestion. Educated pt that guaifenesin may suppress cough and make it harder to cough up secretions. No complaints of chest pain, SOB, or nausea. Will continue to monitor per POC.

## 2019-06-16 NOTE — PLAN OF CARE
FOCUS/GOAL  Bowel management, Bladder management, Pain management and Medical management    ASSESSMENT, INTERVENTIONS AND CONTINUING PLAN FOR GOAL:    Patient is A&Ox4, continent of bowel. Patient had a BM and 900 ml urine from condom catheter. C/O right hip pain, PRN Oxy (15 mg) given which provided some relief. Patient requested PRN Robitussin 1x and Cepacol lozenges 2x for a cough and secretions. Continue POC.

## 2019-06-16 NOTE — PLAN OF CARE
RN: No new concern or issues, continue to have large amount fro old PARESH site, dressng changed. Wound VAC dressing CDI. Pain is well controlled with PRN medication. Continue with POC.
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
## 2019-06-17 ENCOUNTER — APPOINTMENT (OUTPATIENT)
Dept: LAB | Facility: CLINIC | Age: 54
End: 2019-06-17
Attending: INTERNAL MEDICINE
Payer: MEDICARE

## 2019-06-17 LAB
ANION GAP SERPL CALCULATED.3IONS-SCNC: 6 MMOL/L (ref 3–14)
BASOPHILS # BLD AUTO: 0 10E9/L (ref 0–0.2)
BASOPHILS NFR BLD AUTO: 0.3 %
BUN SERPL-MCNC: 34 MG/DL (ref 7–30)
CALCIUM SERPL-MCNC: 8.9 MG/DL (ref 8.5–10.1)
CHLORIDE SERPL-SCNC: 103 MMOL/L (ref 94–109)
CO2 SERPL-SCNC: 28 MMOL/L (ref 20–32)
CREAT SERPL-MCNC: 0.59 MG/DL (ref 0.66–1.25)
DIFFERENTIAL METHOD BLD: ABNORMAL
EOSINOPHIL # BLD AUTO: 0.3 10E9/L (ref 0–0.7)
EOSINOPHIL NFR BLD AUTO: 2.8 %
ERYTHROCYTE [DISTWIDTH] IN BLOOD BY AUTOMATED COUNT: 22.7 % (ref 10–15)
GFR SERPL CREATININE-BSD FRML MDRD: >90 ML/MIN/{1.73_M2}
GLUCOSE SERPL-MCNC: 103 MG/DL (ref 70–99)
HCT VFR BLD AUTO: 29.8 % (ref 40–53)
HGB BLD-MCNC: 8.7 G/DL (ref 13.3–17.7)
IMM GRANULOCYTES # BLD: 0 10E9/L (ref 0–0.4)
IMM GRANULOCYTES NFR BLD: 0.3 %
LYMPHOCYTES # BLD AUTO: 2.1 10E9/L (ref 0.8–5.3)
LYMPHOCYTES NFR BLD AUTO: 18.1 %
MCH RBC QN AUTO: 21.1 PG (ref 26.5–33)
MCHC RBC AUTO-ENTMCNC: 29.2 G/DL (ref 31.5–36.5)
MCV RBC AUTO: 72 FL (ref 78–100)
MONOCYTES # BLD AUTO: 0.9 10E9/L (ref 0–1.3)
MONOCYTES NFR BLD AUTO: 7.8 %
NEUTROPHILS # BLD AUTO: 8 10E9/L (ref 1.6–8.3)
NEUTROPHILS NFR BLD AUTO: 70.7 %
NRBC # BLD AUTO: 0 10*3/UL
NRBC BLD AUTO-RTO: 0 /100
PLATELET # BLD AUTO: 476 10E9/L (ref 150–450)
POTASSIUM SERPL-SCNC: 4 MMOL/L (ref 3.4–5.3)
RBC # BLD AUTO: 4.12 10E12/L (ref 4.4–5.9)
SODIUM SERPL-SCNC: 137 MMOL/L (ref 133–144)
WBC # BLD AUTO: 11.4 10E9/L (ref 4–11)

## 2019-06-17 PROCEDURE — 97605 NEG PRS WND THER DME<=50SQCM: CPT

## 2019-06-17 PROCEDURE — 12000022 ZZH R&B SNF

## 2019-06-17 PROCEDURE — 25000132 ZZH RX MED GY IP 250 OP 250 PS 637: Mod: GY | Performed by: INTERNAL MEDICINE

## 2019-06-17 PROCEDURE — A9270 NON-COVERED ITEM OR SERVICE: HCPCS | Mod: GY | Performed by: INTERNAL MEDICINE

## 2019-06-17 PROCEDURE — 85025 COMPLETE CBC W/AUTO DIFF WBC: CPT | Performed by: INTERNAL MEDICINE

## 2019-06-17 PROCEDURE — 25000128 H RX IP 250 OP 636: Performed by: INTERNAL MEDICINE

## 2019-06-17 PROCEDURE — 36415 COLL VENOUS BLD VENIPUNCTURE: CPT | Performed by: INTERNAL MEDICINE

## 2019-06-17 PROCEDURE — G0463 HOSPITAL OUTPT CLINIC VISIT: HCPCS

## 2019-06-17 PROCEDURE — 80048 BASIC METABOLIC PNL TOTAL CA: CPT | Performed by: INTERNAL MEDICINE

## 2019-06-17 RX ADMIN — MELATONIN TAB 3 MG 6 MG: 3 TAB at 22:12

## 2019-06-17 RX ADMIN — BACLOFEN 20 MG: 10 TABLET ORAL at 18:15

## 2019-06-17 RX ADMIN — OXYCODONE HYDROCHLORIDE 15 MG: 5 TABLET ORAL at 05:39

## 2019-06-17 RX ADMIN — SENNOSIDES AND DOCUSATE SODIUM 2 TABLET: 8.6; 5 TABLET ORAL at 09:45

## 2019-06-17 RX ADMIN — GABAPENTIN 600 MG: 300 CAPSULE ORAL at 21:15

## 2019-06-17 RX ADMIN — BACLOFEN 20 MG: 10 TABLET ORAL at 09:42

## 2019-06-17 RX ADMIN — BACLOFEN 20 MG: 10 TABLET ORAL at 13:44

## 2019-06-17 RX ADMIN — OXYCODONE HYDROCHLORIDE 15 MG: 5 TABLET ORAL at 18:14

## 2019-06-17 RX ADMIN — ENOXAPARIN SODIUM 40 MG: 40 INJECTION SUBCUTANEOUS at 09:44

## 2019-06-17 RX ADMIN — GABAPENTIN 600 MG: 300 CAPSULE ORAL at 09:41

## 2019-06-17 RX ADMIN — OXYCODONE HYDROCHLORIDE 15 MG: 5 TABLET ORAL at 01:49

## 2019-06-17 RX ADMIN — TOLTERODINE 8 MG: 4 CAPSULE, EXTENDED RELEASE ORAL at 09:42

## 2019-06-17 RX ADMIN — FERROUS SULFATE TAB 325 MG (65 MG ELEMENTAL FE) 325 MG: 325 (65 FE) TAB at 09:43

## 2019-06-17 RX ADMIN — OXYCODONE HYDROCHLORIDE 15 MG: 5 TABLET ORAL at 09:43

## 2019-06-17 RX ADMIN — OXYCODONE HYDROCHLORIDE 15 MG: 5 TABLET ORAL at 22:13

## 2019-06-17 RX ADMIN — TRAZODONE HYDROCHLORIDE 100 MG: 100 TABLET ORAL at 22:12

## 2019-06-17 RX ADMIN — OXYCODONE HYDROCHLORIDE 15 MG: 5 TABLET ORAL at 13:44

## 2019-06-17 RX ADMIN — GABAPENTIN 600 MG: 300 CAPSULE ORAL at 13:44

## 2019-06-17 RX ADMIN — SENNOSIDES AND DOCUSATE SODIUM 2 TABLET: 8.6; 5 TABLET ORAL at 21:15

## 2019-06-17 RX ADMIN — METOPROLOL SUCCINATE 50 MG: 25 TABLET, EXTENDED RELEASE ORAL at 09:41

## 2019-06-17 NOTE — PLAN OF CARE
RN: Pt alert and oriented, VSS. Pt remains on bed rest. Appetite good. Continue to have large amount of drainage from old PARESH site, cleansed site, dressing changed. Wound VAC dressing reinforced, VAC functioning well. Pt did self st.cath twice this shift, urine is clear/yellow. Pain is well controlled with PRN medication. Pt has no complaints, continue with POC.

## 2019-06-17 NOTE — PLAN OF CARE
Alert and oriented X 4. Able to make needs known. Call light in reach. Right hip pain managed with Oxycodone 15 mg PO Q 4 hours. Dressings CDI. Able to reposition self in bed. Wearing a condom catheter at night, draining adequately. Last BM on 6/16. Appears to be sleeping during rounds. Continue with plan of care.

## 2019-06-17 NOTE — PLAN OF CARE
Pt in good mood and has a good appetite. Took pain meds twice this shift with decrease in pain. No cough noted, pt did not request cough syrup today. Wound vac in place. Old PARESH site dressing changed with moderate amount of serosanguinous drainage.
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
## 2019-06-18 PROCEDURE — A9270 NON-COVERED ITEM OR SERVICE: HCPCS | Mod: GY | Performed by: INTERNAL MEDICINE

## 2019-06-18 PROCEDURE — 25000132 ZZH RX MED GY IP 250 OP 250 PS 637: Mod: GY | Performed by: INTERNAL MEDICINE

## 2019-06-18 PROCEDURE — 25000128 H RX IP 250 OP 636: Performed by: INTERNAL MEDICINE

## 2019-06-18 PROCEDURE — 12000022 ZZH R&B SNF

## 2019-06-18 RX ADMIN — ACETAMINOPHEN 650 MG: 325 TABLET, FILM COATED ORAL at 08:35

## 2019-06-18 RX ADMIN — OXYCODONE HYDROCHLORIDE 15 MG: 5 TABLET ORAL at 08:35

## 2019-06-18 RX ADMIN — OXYCODONE HYDROCHLORIDE 15 MG: 5 TABLET ORAL at 20:57

## 2019-06-18 RX ADMIN — ENOXAPARIN SODIUM 40 MG: 40 INJECTION SUBCUTANEOUS at 08:35

## 2019-06-18 RX ADMIN — GABAPENTIN 600 MG: 300 CAPSULE ORAL at 20:58

## 2019-06-18 RX ADMIN — GUAIFENESIN AND DEXTROMETHORPHAN 5 ML: 100; 10 SYRUP ORAL at 22:08

## 2019-06-18 RX ADMIN — OXYCODONE HYDROCHLORIDE 15 MG: 5 TABLET ORAL at 16:38

## 2019-06-18 RX ADMIN — OXYCODONE HYDROCHLORIDE 15 MG: 5 TABLET ORAL at 04:36

## 2019-06-18 RX ADMIN — OXYCODONE HYDROCHLORIDE 15 MG: 5 TABLET ORAL at 12:29

## 2019-06-18 RX ADMIN — MELATONIN TAB 3 MG 6 MG: 3 TAB at 20:58

## 2019-06-18 RX ADMIN — SENNOSIDES AND DOCUSATE SODIUM 2 TABLET: 8.6; 5 TABLET ORAL at 20:57

## 2019-06-18 RX ADMIN — GABAPENTIN 600 MG: 300 CAPSULE ORAL at 13:45

## 2019-06-18 RX ADMIN — BACLOFEN 20 MG: 10 TABLET ORAL at 12:29

## 2019-06-18 RX ADMIN — GUAIFENESIN AND DEXTROMETHORPHAN 5 ML: 100; 10 SYRUP ORAL at 13:48

## 2019-06-18 RX ADMIN — TOLTERODINE 8 MG: 4 CAPSULE, EXTENDED RELEASE ORAL at 08:33

## 2019-06-18 RX ADMIN — ACETAMINOPHEN 650 MG: 325 TABLET, FILM COATED ORAL at 12:29

## 2019-06-18 RX ADMIN — METOPROLOL SUCCINATE 50 MG: 25 TABLET, EXTENDED RELEASE ORAL at 08:34

## 2019-06-18 RX ADMIN — FERROUS SULFATE TAB 325 MG (65 MG ELEMENTAL FE) 325 MG: 325 (65 FE) TAB at 08:34

## 2019-06-18 RX ADMIN — BACLOFEN 20 MG: 10 TABLET ORAL at 16:38

## 2019-06-18 RX ADMIN — TRAZODONE HYDROCHLORIDE 100 MG: 100 TABLET ORAL at 20:58

## 2019-06-18 RX ADMIN — BACLOFEN 20 MG: 10 TABLET ORAL at 08:33

## 2019-06-18 NOTE — PROVIDER NOTIFICATION
Focus: Phlegm  D: Pt is feeling like he is having phlegm that he cannot cough up. Appears in no acute distress. Would however, like to talk with a provider. I: Dr. Us notified. P: ROBITUSSIN DM given.

## 2019-06-18 NOTE — PLAN OF CARE
VSS. Alert amd orientedx4.Continued on strict bedrest. Able to used call light appropriately. Appetite good. Wound vac dressing CDI, no unusual alarms. Dressing to old eva site changed, continues to have serous drainage to site, no s/x of infection to site. Condom cath applied by pt at  Night. Verbalized pain to to wound and back, oxycodone given every 4 hrs with some relief.

## 2019-06-18 NOTE — PLAN OF CARE
Focus: Physio  D: Doing well this morning. Directing his own care. Right hip dressing changed. Saturated with yellow drainage. Wound vac patent. Oxycodone for pain along with tylenol. Straight caths self independently during the day. P: Continue current plan of care.

## 2019-06-18 NOTE — PLAN OF CARE
Patient sleeping most of this shift, condom cath in place, requested for and received prn Oxycodone x 1 with adequate pain control. Wound Vac patent at 125 mm hg cont suction pressure, no alarm noted, continue plan of care

## 2019-06-19 ENCOUNTER — APPOINTMENT (OUTPATIENT)
Dept: LAB | Facility: CLINIC | Age: 54
End: 2019-06-19
Attending: INTERNAL MEDICINE
Payer: MEDICARE

## 2019-06-19 PROCEDURE — 25000132 ZZH RX MED GY IP 250 OP 250 PS 637: Mod: GY | Performed by: INTERNAL MEDICINE

## 2019-06-19 PROCEDURE — A9270 NON-COVERED ITEM OR SERVICE: HCPCS | Mod: GY | Performed by: INTERNAL MEDICINE

## 2019-06-19 PROCEDURE — 25000128 H RX IP 250 OP 636: Performed by: INTERNAL MEDICINE

## 2019-06-19 PROCEDURE — G0463 HOSPITAL OUTPT CLINIC VISIT: HCPCS

## 2019-06-19 PROCEDURE — 12000022 ZZH R&B SNF

## 2019-06-19 PROCEDURE — 25000125 ZZHC RX 250: Performed by: INTERNAL MEDICINE

## 2019-06-19 RX ADMIN — TRAZODONE HYDROCHLORIDE 100 MG: 100 TABLET ORAL at 21:00

## 2019-06-19 RX ADMIN — SENNOSIDES AND DOCUSATE SODIUM 2 TABLET: 8.6; 5 TABLET ORAL at 08:07

## 2019-06-19 RX ADMIN — SENNOSIDES AND DOCUSATE SODIUM 2 TABLET: 8.6; 5 TABLET ORAL at 21:00

## 2019-06-19 RX ADMIN — OXYCODONE HYDROCHLORIDE 15 MG: 5 TABLET ORAL at 13:05

## 2019-06-19 RX ADMIN — BACLOFEN 20 MG: 10 TABLET ORAL at 08:08

## 2019-06-19 RX ADMIN — FERROUS SULFATE TAB 325 MG (65 MG ELEMENTAL FE) 325 MG: 325 (65 FE) TAB at 08:08

## 2019-06-19 RX ADMIN — METOPROLOL SUCCINATE 50 MG: 25 TABLET, EXTENDED RELEASE ORAL at 08:08

## 2019-06-19 RX ADMIN — TOLTERODINE 8 MG: 4 CAPSULE, EXTENDED RELEASE ORAL at 08:07

## 2019-06-19 RX ADMIN — BACLOFEN 20 MG: 10 TABLET ORAL at 17:04

## 2019-06-19 RX ADMIN — MELATONIN TAB 3 MG 6 MG: 3 TAB at 21:00

## 2019-06-19 RX ADMIN — GUAIFENESIN AND DEXTROMETHORPHAN 5 ML: 100; 10 SYRUP ORAL at 19:24

## 2019-06-19 RX ADMIN — Medication 5 UNITS: at 13:06

## 2019-06-19 RX ADMIN — OXYCODONE HYDROCHLORIDE 15 MG: 5 TABLET ORAL at 21:02

## 2019-06-19 RX ADMIN — OXYCODONE HYDROCHLORIDE 15 MG: 5 TABLET ORAL at 08:08

## 2019-06-19 RX ADMIN — ENOXAPARIN SODIUM 40 MG: 40 INJECTION SUBCUTANEOUS at 08:07

## 2019-06-19 RX ADMIN — OXYCODONE HYDROCHLORIDE 15 MG: 5 TABLET ORAL at 17:04

## 2019-06-19 RX ADMIN — BACLOFEN 20 MG: 10 TABLET ORAL at 13:05

## 2019-06-19 NOTE — PROGRESS NOTES
Focus:  R posterior hip wound management  D:  Per nurses , VAC dislodged yesterday.per  pateint this occurred during bowel program.  Per our last assessment, the wound with vac in place was not making any progress  I:  TC to Dr. Blackmon to notify of problems keeping VAC on and lack of progress in healing since VAC  Started on this wound . She then asked for orders to be started with Mesalt on this wound.  Upon exam of posterior incision  lines it was also noted that the lower aspect of perineal region was draining purulent drainage from a pinpont and slightly denuded area that also felt boggy, there is a definite odor in the room that is also noted. ( Per nurses notes the old eva site to anterior hip is however draining less)Message sent to DR. Blackmon to notify of this finding.  Wound to posterior R hip dressed with Mesalt/foam dressing.  P:  Follow up as needed

## 2019-06-19 NOTE — PLAN OF CARE
Patient slept throughout this shift, condom cath in place, patient wound vac dressing came out on PM shift yesterday and nurse did wet to dry dressing, patient repositioning from back to left side this shift. Call light is within reach and he is able to make needs known, continue plan of care
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
## 2019-06-19 NOTE — PLAN OF CARE
Pt requested pain meds twice this shift. Wound vac is off, abd pads applied to R hip incision in am. Dressing changed again at 1430 with moderate drainage from incision. Abd pad placed over previous PARESH site, drainage from that site has slowed dramatically. Pt reported blood from anus during self-bowel program, said his stool was hard. RN encouraged pt to take senna for stool softening. Pt frustrated with lack of progress - wants to get off bedrest and start therapy but understands the wound is not healed. Pt put condom cath on at 1445 so he can see what is wound drainage vs. what might be urine from self cath. Mantoux given - pt educated that it needs to be repeated [id number].

## 2019-06-19 NOTE — PLAN OF CARE
"Patient awake, alert and able to make needs known, vitals /73 (BP Location: Right arm)   Pulse 88   Temp 97.6  F (36.4  C) (Oral)   Resp 18   Ht 1.93 m (6' 4\")   Wt 92.6 kg (204 lb 3.2 oz)   SpO2 100%   BMI 24.86 kg/m , all morning medications given except Neurontin patient refused, PRN oxycodone 15 mg given for pain, perineal wound cares completed, moderate amounts of serous drainage noted, wet to dry dressing done> WCON to replace wound vac dressing. All linen change this morning, continue plan of care.    "

## 2019-06-19 NOTE — PLAN OF CARE
VSS. Denies any chest pain, SOB, dizziness, nausea. Verbalized pain to back and wound area, Oxycodone given 15mg every 4 hrs prn with some relief. With occasional coughing, Robitussin given. Wound vac dressing not intact, and its coming off, dressing removed and changed to wet to dry dressing with gauze per protocol. R hip dressing from the old PARESH site changed. St cath once this shift by pt himself. Pt used Condom cath at night.  Will continue plan of care.  Vital signs:  Temp: 97.5  F (36.4  C) Temp src: Oral BP: 108/63 Pulse: 82 Heart Rate: 80 Resp: 18 SpO2: 99 % O2 Device: None (Room air)

## 2019-06-20 LAB
ANION GAP SERPL CALCULATED.3IONS-SCNC: 5 MMOL/L (ref 3–14)
BASOPHILS # BLD AUTO: 0 10E9/L (ref 0–0.2)
BASOPHILS NFR BLD AUTO: 0.3 %
BUN SERPL-MCNC: 28 MG/DL (ref 7–30)
CALCIUM SERPL-MCNC: 8.9 MG/DL (ref 8.5–10.1)
CHLORIDE SERPL-SCNC: 102 MMOL/L (ref 94–109)
CO2 SERPL-SCNC: 29 MMOL/L (ref 20–32)
CREAT SERPL-MCNC: 0.64 MG/DL (ref 0.66–1.25)
DIFFERENTIAL METHOD BLD: ABNORMAL
EOSINOPHIL # BLD AUTO: 0.4 10E9/L (ref 0–0.7)
EOSINOPHIL NFR BLD AUTO: 4.2 %
ERYTHROCYTE [DISTWIDTH] IN BLOOD BY AUTOMATED COUNT: 22.5 % (ref 10–15)
GFR SERPL CREATININE-BSD FRML MDRD: >90 ML/MIN/{1.73_M2}
GLUCOSE SERPL-MCNC: 101 MG/DL (ref 70–99)
HCT VFR BLD AUTO: 30 % (ref 40–53)
HGB BLD-MCNC: 8.8 G/DL (ref 13.3–17.7)
IMM GRANULOCYTES # BLD: 0 10E9/L (ref 0–0.4)
IMM GRANULOCYTES NFR BLD: 0.2 %
LYMPHOCYTES # BLD AUTO: 1.9 10E9/L (ref 0.8–5.3)
LYMPHOCYTES NFR BLD AUTO: 19.4 %
MCH RBC QN AUTO: 21 PG (ref 26.5–33)
MCHC RBC AUTO-ENTMCNC: 29.3 G/DL (ref 31.5–36.5)
MCV RBC AUTO: 71 FL (ref 78–100)
MONOCYTES # BLD AUTO: 1.1 10E9/L (ref 0–1.3)
MONOCYTES NFR BLD AUTO: 11.6 %
NEUTROPHILS # BLD AUTO: 6.2 10E9/L (ref 1.6–8.3)
NEUTROPHILS NFR BLD AUTO: 64.3 %
NRBC # BLD AUTO: 0 10*3/UL
NRBC BLD AUTO-RTO: 0 /100
PLATELET # BLD AUTO: 406 10E9/L (ref 150–450)
POTASSIUM SERPL-SCNC: 4 MMOL/L (ref 3.4–5.3)
RBC # BLD AUTO: 4.2 10E12/L (ref 4.4–5.9)
SODIUM SERPL-SCNC: 136 MMOL/L (ref 133–144)
WBC # BLD AUTO: 9.6 10E9/L (ref 4–11)

## 2019-06-20 PROCEDURE — 12000022 ZZH R&B SNF

## 2019-06-20 PROCEDURE — 25000132 ZZH RX MED GY IP 250 OP 250 PS 637: Mod: GY | Performed by: INTERNAL MEDICINE

## 2019-06-20 PROCEDURE — 80048 BASIC METABOLIC PNL TOTAL CA: CPT | Performed by: INTERNAL MEDICINE

## 2019-06-20 PROCEDURE — 25000128 H RX IP 250 OP 636: Performed by: INTERNAL MEDICINE

## 2019-06-20 PROCEDURE — 36415 COLL VENOUS BLD VENIPUNCTURE: CPT | Performed by: INTERNAL MEDICINE

## 2019-06-20 PROCEDURE — 85025 COMPLETE CBC W/AUTO DIFF WBC: CPT | Performed by: INTERNAL MEDICINE

## 2019-06-20 PROCEDURE — A9270 NON-COVERED ITEM OR SERVICE: HCPCS | Mod: GY | Performed by: INTERNAL MEDICINE

## 2019-06-20 RX ADMIN — BACLOFEN 20 MG: 10 TABLET ORAL at 13:00

## 2019-06-20 RX ADMIN — OXYCODONE HYDROCHLORIDE 15 MG: 5 TABLET ORAL at 05:05

## 2019-06-20 RX ADMIN — SENNOSIDES AND DOCUSATE SODIUM 2 TABLET: 8.6; 5 TABLET ORAL at 09:05

## 2019-06-20 RX ADMIN — ENOXAPARIN SODIUM 40 MG: 40 INJECTION SUBCUTANEOUS at 09:04

## 2019-06-20 RX ADMIN — OXYCODONE HYDROCHLORIDE 15 MG: 5 TABLET ORAL at 17:04

## 2019-06-20 RX ADMIN — METOPROLOL SUCCINATE 50 MG: 25 TABLET, EXTENDED RELEASE ORAL at 09:07

## 2019-06-20 RX ADMIN — MELATONIN TAB 3 MG 6 MG: 3 TAB at 21:00

## 2019-06-20 RX ADMIN — BACLOFEN 20 MG: 10 TABLET ORAL at 17:04

## 2019-06-20 RX ADMIN — FERROUS SULFATE TAB 325 MG (65 MG ELEMENTAL FE) 325 MG: 325 (65 FE) TAB at 09:07

## 2019-06-20 RX ADMIN — OXYCODONE HYDROCHLORIDE 15 MG: 5 TABLET ORAL at 01:13

## 2019-06-20 RX ADMIN — BACLOFEN 20 MG: 10 TABLET ORAL at 09:04

## 2019-06-20 RX ADMIN — OXYCODONE HYDROCHLORIDE 15 MG: 5 TABLET ORAL at 20:58

## 2019-06-20 RX ADMIN — BACLOFEN 20 MG: 10 TABLET ORAL at 20:58

## 2019-06-20 RX ADMIN — TOLTERODINE 8 MG: 4 CAPSULE, EXTENDED RELEASE ORAL at 09:06

## 2019-06-20 RX ADMIN — OXYCODONE HYDROCHLORIDE 15 MG: 5 TABLET ORAL at 13:00

## 2019-06-20 RX ADMIN — TRAZODONE HYDROCHLORIDE 100 MG: 100 TABLET ORAL at 21:00

## 2019-06-20 RX ADMIN — OXYCODONE HYDROCHLORIDE 15 MG: 5 TABLET ORAL at 09:06

## 2019-06-20 NOTE — PLAN OF CARE
Patient alert and oriented x4, able to make needs known. Dressing to scrotal area changed twice due to moderate amount of serosanguinous drainage. Area boggy, pink/red, and moist to the touch. PARESH site draining small amount of drainage, reinforced with ABD. Flap incision dressings CDI when checked at 0800 & 1300. PRN 15 mg oxycodone given for R hip pain @ 0900 & 1300.   Condom catheter removed at 1400 with 220 output. Will continue to monitor per POC.

## 2019-06-20 NOTE — PLAN OF CARE
VSS. Alert and orientedx4. Cooperative but frustrated at first. Noted to have a new open area very tiny (pinpoint) to distal bulgy part just beside incision. Upon reassessment again at 2000 wound increase its size, area cleanse with microklenz and covered with mepilex. Dr. Blackmon came around 2100 and address the issue. Please see new order for wound care. Pain managed well with oxycodone 15mg every 4 hrs prn.  Will cont plan of care.  Vital signs:  Temp: 98.7  F (37.1  C) Temp src: Oral BP: 120/76 Pulse: 71   Resp: 18 SpO2: 100 % O2 Device: None (Room air)

## 2019-06-20 NOTE — PLAN OF CARE
RN: R hip pain comfortably managed with Oxycodone 15 mg tab q 4H per pt request. Flap incisions dressings CDI when checked at 0100. At 0500, dressings rechecked and noted the mepilex dressing on the chasidy-scrotal area was heavily soaked with serosanguinous> sanguinous drainage. The the amount of drainage could be contributing to its redness. Area assessed: boggy with ulcer-like spot at the center.No active drainage noted. Seen by HERMILO and Neymar Lopes yesterday--See their notes. Note left for the provider to update them on there amount and color of the drainage as this wasn't clearly recorded yesterday. Continue with plan of care.

## 2019-06-20 NOTE — PROGRESS NOTES
"PLASTICS ATTENDING    CWOCN called to report that VAC has not had much effect on area of incisional dehiscence. Also noted new area of \"boggy\" tissue near scrotal base along flap incision.  VAC d/c'd and Mesalt placed.  Nursing placed Mepilex border on squishy area when noted to have enlarging ulceration.   Patient had noted increased drainage from that area.    On exam, the incisions are intact except for the previous areas of superficial dehiscence. The tissues are clean and the deeper closure seems to be intact.   The PARESH drain site is no longer putting out much drainage, but there is a hypertrophic granuloma present.  The region of flap turnover feels less indurated.  The area of new \"bogginess\" appears to be edematous scrotal skin. There is an ulceration approximately 1 cm in diameter. I am wondering if this will be an area of spontaneous drainage of the fluid collection under the flap that was visualized on CT scan.    We will continue with bedrest, HOB no greater than 30 degrees. NO SITTING.  We will continue to manage open areas with conservative wound care and observe tissue response.  Use Mesalt for dehiscence and Mepilex for periscrotal area.  I will reassess sometime this weekend.  "

## 2019-06-21 PROCEDURE — 25000132 ZZH RX MED GY IP 250 OP 250 PS 637: Mod: GY | Performed by: INTERNAL MEDICINE

## 2019-06-21 PROCEDURE — 99309 SBSQ NF CARE MODERATE MDM 30: CPT | Performed by: INTERNAL MEDICINE

## 2019-06-21 PROCEDURE — 12000022 ZZH R&B SNF

## 2019-06-21 PROCEDURE — 99207 ZZC CDG-CUT & PASTE-POTENTIAL IMPACT ON LEVEL: CPT | Performed by: INTERNAL MEDICINE

## 2019-06-21 PROCEDURE — 25000128 H RX IP 250 OP 636: Performed by: INTERNAL MEDICINE

## 2019-06-21 RX ADMIN — OXYCODONE HYDROCHLORIDE 15 MG: 5 TABLET ORAL at 00:58

## 2019-06-21 RX ADMIN — MELATONIN TAB 3 MG 6 MG: 3 TAB at 21:14

## 2019-06-21 RX ADMIN — FERROUS SULFATE TAB 325 MG (65 MG ELEMENTAL FE) 325 MG: 325 (65 FE) TAB at 09:27

## 2019-06-21 RX ADMIN — BACLOFEN 20 MG: 10 TABLET ORAL at 09:26

## 2019-06-21 RX ADMIN — ENOXAPARIN SODIUM 40 MG: 40 INJECTION SUBCUTANEOUS at 09:27

## 2019-06-21 RX ADMIN — OXYCODONE HYDROCHLORIDE 15 MG: 5 TABLET ORAL at 13:18

## 2019-06-21 RX ADMIN — SENNOSIDES AND DOCUSATE SODIUM 2 TABLET: 8.6; 5 TABLET ORAL at 09:26

## 2019-06-21 RX ADMIN — TRAZODONE HYDROCHLORIDE 100 MG: 100 TABLET ORAL at 21:14

## 2019-06-21 RX ADMIN — BACLOFEN 20 MG: 10 TABLET ORAL at 17:19

## 2019-06-21 RX ADMIN — OXYCODONE HYDROCHLORIDE 15 MG: 5 TABLET ORAL at 05:00

## 2019-06-21 RX ADMIN — BACLOFEN 20 MG: 10 TABLET ORAL at 21:14

## 2019-06-21 RX ADMIN — OXYCODONE HYDROCHLORIDE 15 MG: 5 TABLET ORAL at 21:14

## 2019-06-21 RX ADMIN — TOLTERODINE 8 MG: 4 CAPSULE, EXTENDED RELEASE ORAL at 09:27

## 2019-06-21 RX ADMIN — METOPROLOL SUCCINATE 50 MG: 25 TABLET, EXTENDED RELEASE ORAL at 09:26

## 2019-06-21 RX ADMIN — OXYCODONE HYDROCHLORIDE 15 MG: 5 TABLET ORAL at 17:19

## 2019-06-21 RX ADMIN — BACLOFEN 20 MG: 10 TABLET ORAL at 13:15

## 2019-06-21 RX ADMIN — OXYCODONE HYDROCHLORIDE 15 MG: 5 TABLET ORAL at 09:30

## 2019-06-21 NOTE — PLAN OF CARE
RN: Pt requested to receive Oxycodone 15 mg tab every 4H to comfortably managed R hip pain. Incisions with dressing CDI. Alycia-scrotal mepilex dressing soaked with serosanguinous drainage and part of the brief at 0100. Dressing changed done and will recheck again. Pt quite anxious about continued drainage. Answered questions within scope of practice and will direct other inquiries to providers. Appear to be sleeping/resting between meds and cares. Continue with plan of care.    0600: Alycia-scrotal mepilex is soaked again with lighter serosanguinous < serous drainage. Did not went through to pt's brief. Changed. The rest of the dressings remain CDI.  Continue to monitor.

## 2019-06-21 NOTE — PLAN OF CARE
Pt alert and oriented x 4. Able to communicate needs. Denies SOB.  Pain under control with oxycodone almost every four hours.  Remains on bedrest. Able to roll on left, but no on right side per plastic.   Appetite good.  Continues with st.cath/condom catha and self dig stimulation/removing stool.  Wound care on right hip, open area on scrotum and old eva site completed around 1300 due to drainage. See flow sheet for details.   Continues to monitor.

## 2019-06-21 NOTE — PLAN OF CARE
"Patient A&Ox4. VSS. Good appetite. Right hip pain moderately relieved with PRN oxycodone x2. Dressing to scrotal area was CDI at 1600. Dressing was noted to be saturated with large amount of serosanguineous drainage at 1930 and changed x1. New dressings applied to flap incision and old PARESH site. Patient straight-cath independently throughout shift and applied condom catheter at HS. Patient pleasant and cooperative with cares. Able to make needs known. Continue with POC.       /69 (BP Location: Right arm)   Pulse 84   Temp 98.3  F (36.8  C) (Oral)   Resp 18   Ht 1.93 m (6' 4\")   Wt 92.6 kg (204 lb 3.2 oz)   SpO2 98%   BMI 24.86 kg/m      "
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
## 2019-06-22 PROCEDURE — 25000132 ZZH RX MED GY IP 250 OP 250 PS 637: Mod: GY | Performed by: INTERNAL MEDICINE

## 2019-06-22 PROCEDURE — 12000022 ZZH R&B SNF

## 2019-06-22 PROCEDURE — 25000128 H RX IP 250 OP 636: Performed by: INTERNAL MEDICINE

## 2019-06-22 RX ADMIN — FERROUS SULFATE TAB 325 MG (65 MG ELEMENTAL FE) 325 MG: 325 (65 FE) TAB at 09:18

## 2019-06-22 RX ADMIN — BACLOFEN 20 MG: 10 TABLET ORAL at 09:18

## 2019-06-22 RX ADMIN — BACLOFEN 20 MG: 10 TABLET ORAL at 17:14

## 2019-06-22 RX ADMIN — ACETAMINOPHEN 650 MG: 325 TABLET, FILM COATED ORAL at 17:26

## 2019-06-22 RX ADMIN — OXYCODONE HYDROCHLORIDE 15 MG: 5 TABLET ORAL at 11:46

## 2019-06-22 RX ADMIN — METOPROLOL SUCCINATE 50 MG: 25 TABLET, EXTENDED RELEASE ORAL at 09:17

## 2019-06-22 RX ADMIN — OXYCODONE HYDROCHLORIDE 15 MG: 5 TABLET ORAL at 02:04

## 2019-06-22 RX ADMIN — BACLOFEN 20 MG: 10 TABLET ORAL at 21:08

## 2019-06-22 RX ADMIN — OXYCODONE HYDROCHLORIDE 15 MG: 5 TABLET ORAL at 19:45

## 2019-06-22 RX ADMIN — TRAZODONE HYDROCHLORIDE 100 MG: 100 TABLET ORAL at 21:08

## 2019-06-22 RX ADMIN — MELATONIN TAB 3 MG 6 MG: 3 TAB at 21:08

## 2019-06-22 RX ADMIN — BACLOFEN 20 MG: 10 TABLET ORAL at 13:18

## 2019-06-22 RX ADMIN — TOLTERODINE 8 MG: 4 CAPSULE, EXTENDED RELEASE ORAL at 09:17

## 2019-06-22 RX ADMIN — ACETAMINOPHEN 650 MG: 325 TABLET, FILM COATED ORAL at 22:32

## 2019-06-22 RX ADMIN — OXYCODONE HYDROCHLORIDE 15 MG: 5 TABLET ORAL at 15:49

## 2019-06-22 RX ADMIN — SENNOSIDES AND DOCUSATE SODIUM 2 TABLET: 8.6; 5 TABLET ORAL at 09:17

## 2019-06-22 RX ADMIN — ENOXAPARIN SODIUM 40 MG: 40 INJECTION SUBCUTANEOUS at 09:17

## 2019-06-22 RX ADMIN — OXYCODONE HYDROCHLORIDE 15 MG: 5 TABLET ORAL at 06:42

## 2019-06-22 NOTE — PLAN OF CARE
No new complaint. Dressing changed around 1130 due to leaking. See flow sheet for details.  Pain under control with oxycodone.  Appetite good.  Bladder and bowel managed by pt.   Still bed rest on pulsate mattress.   Continue with plan of care.

## 2019-06-22 NOTE — PLAN OF CARE
Appeared to be sleeping/resting during rounds. Able to make needs known. Requested Oxycodone for pain x2. Specifically mentioned having joint pain in bilat. shoulders and elbows. Removed condom cath and performed bowel digital stimulation at 0545. All dressings in place and intact. Replaced soaked scrotal mepilex and old PARESH site dressings. Continue with plan of care.

## 2019-06-22 NOTE — PLAN OF CARE
"Patient A&Ox4. VSS. Remained on bedrest throughout shift. Good appetite. Pain comfortably managed with PRN oxycodone x2. Around 1715 patient took a bed bath. Dressings to R hip and scrotum were replaced with no noticeable amount of drainage noted. Patient applied condom catheter after bed bath. Dressings were checked/ changed again at 2115. Scrotal wound noted to have small amount of serosanguineous drainage. No drainage observed over R hip incision. Small amount of yellow drainage seen over old PARESH drain site. Patient pleasant and cooperative with cares. Able to make needs known. Continue with POC.    /66 (BP Location: Right arm)   Pulse 89   Temp 97.7  F (36.5  C) (Oral)   Resp 18   Ht 1.93 m (6' 4\")   Wt 92.6 kg (204 lb 3.2 oz)   SpO2 99%   BMI 24.86 kg/m      "

## 2019-06-23 PROCEDURE — 12000022 ZZH R&B SNF

## 2019-06-23 PROCEDURE — 25000128 H RX IP 250 OP 636: Performed by: INTERNAL MEDICINE

## 2019-06-23 PROCEDURE — 25000132 ZZH RX MED GY IP 250 OP 250 PS 637: Mod: GY | Performed by: INTERNAL MEDICINE

## 2019-06-23 RX ORDER — SODIUM PHOSPHATE,MONO-DIBASIC 19G-7G/118
1 ENEMA (ML) RECTAL DAILY
Status: DISCONTINUED | OUTPATIENT
Start: 2019-06-23 | End: 2019-07-19 | Stop reason: HOSPADM

## 2019-06-23 RX ADMIN — Medication 1 CAPSULE: at 11:35

## 2019-06-23 RX ADMIN — ACETAMINOPHEN 650 MG: 325 TABLET, FILM COATED ORAL at 11:35

## 2019-06-23 RX ADMIN — BACLOFEN 20 MG: 10 TABLET ORAL at 16:35

## 2019-06-23 RX ADMIN — OXYCODONE HYDROCHLORIDE 15 MG: 5 TABLET ORAL at 06:52

## 2019-06-23 RX ADMIN — ACETAMINOPHEN 650 MG: 325 TABLET, FILM COATED ORAL at 19:44

## 2019-06-23 RX ADMIN — METOPROLOL SUCCINATE 50 MG: 25 TABLET, EXTENDED RELEASE ORAL at 08:48

## 2019-06-23 RX ADMIN — OXYCODONE HYDROCHLORIDE 15 MG: 5 TABLET ORAL at 15:35

## 2019-06-23 RX ADMIN — OXYCODONE HYDROCHLORIDE 15 MG: 5 TABLET ORAL at 19:44

## 2019-06-23 RX ADMIN — BACLOFEN 20 MG: 10 TABLET ORAL at 21:04

## 2019-06-23 RX ADMIN — ENOXAPARIN SODIUM 40 MG: 40 INJECTION SUBCUTANEOUS at 08:48

## 2019-06-23 RX ADMIN — OXYCODONE HYDROCHLORIDE 15 MG: 5 TABLET ORAL at 11:35

## 2019-06-23 RX ADMIN — BACLOFEN 20 MG: 10 TABLET ORAL at 12:23

## 2019-06-23 RX ADMIN — ACETAMINOPHEN 650 MG: 325 TABLET, FILM COATED ORAL at 06:52

## 2019-06-23 RX ADMIN — OXYCODONE HYDROCHLORIDE 15 MG: 5 TABLET ORAL at 01:48

## 2019-06-23 RX ADMIN — MELATONIN TAB 3 MG 6 MG: 3 TAB at 21:04

## 2019-06-23 RX ADMIN — BACLOFEN 20 MG: 10 TABLET ORAL at 08:48

## 2019-06-23 RX ADMIN — ACETAMINOPHEN 650 MG: 325 TABLET, FILM COATED ORAL at 15:35

## 2019-06-23 RX ADMIN — OXYCODONE HYDROCHLORIDE 15 MG: 5 TABLET ORAL at 23:55

## 2019-06-23 RX ADMIN — FERROUS SULFATE TAB 325 MG (65 MG ELEMENTAL FE) 325 MG: 325 (65 FE) TAB at 08:48

## 2019-06-23 RX ADMIN — TOLTERODINE 8 MG: 4 CAPSULE, EXTENDED RELEASE ORAL at 08:48

## 2019-06-23 RX ADMIN — SENNOSIDES AND DOCUSATE SODIUM 2 TABLET: 8.6; 5 TABLET ORAL at 08:48

## 2019-06-23 RX ADMIN — TRAZODONE HYDROCHLORIDE 100 MG: 100 TABLET ORAL at 21:04

## 2019-06-23 NOTE — PLAN OF CARE
"Patient A&Ox4. VSS. Remained on bedrest. C/o increased pain to bilateral shoulders/ elbows, aggravated by movement and felt like \"arthritic pain\". Patient received PRN oxycodone and Tylenol x2 this shift with some relief. Sticky note left for provider for further assessment. Dressings on R hip/ scrotum changed at 2000. Scrotal wound continues to drain moderate amount of serosanguineous drainage on gauze. No drainage observed from R hip incision. Old PARESH site had small amount of serous drainage noted. Patient self-cath during the day and applied condom catheter at [facility]. Pleasant and cooperative with cares. Able to make needs known. Continue with POC.    /61 (BP Location: Right arm)   Pulse 73   Temp 97.5  F (36.4  C) (Oral)   Resp 18   Ht 1.93 m (6' 4\")   Wt 92.6 kg (204 lb 3.2 oz)   SpO2 97%   BMI 24.86 kg/m      "

## 2019-06-23 NOTE — PLAN OF CARE
Comment: [id number] A, well differentiated SCC No new concern.  See new order for glucosamine for arthritis shoulder pain.   Oxycodone and tylenol given per request.   Denies SOB.   Appetite good.  B/B managed by pt ok per home routine.  Unable to walk. On bed rest and able to reposition in bed.  Dressings done around 1100 due to leaking. See flow sheet for details.  Continue to monitor.    Detail Level: Detailed

## 2019-06-23 NOTE — PLAN OF CARE
A & O. Denies SOB. Pt reports L hip pain and arthritic pain to bilateral shoulders. Prn oxycodone given. R hip and scrotom dressings CDI. Old PARESH site dressing moderately saturated, and dressing changed. Strict bedrest. Pt independent in repositioning self in bed. Condom cath on overnight w/ adequate amounts of urine. LBM 6/22/19. Regular diet. Call light within reach, able to make needs known.

## 2019-06-24 LAB
ANION GAP SERPL CALCULATED.3IONS-SCNC: 6 MMOL/L (ref 3–14)
ANISOCYTOSIS BLD QL SMEAR: SLIGHT
BASOPHILS # BLD AUTO: 0 10E9/L (ref 0–0.2)
BASOPHILS NFR BLD AUTO: 0.4 %
BUN SERPL-MCNC: 35 MG/DL (ref 7–30)
BURR CELLS BLD QL SMEAR: SLIGHT
CALCIUM SERPL-MCNC: 9.2 MG/DL (ref 8.5–10.1)
CHLORIDE SERPL-SCNC: 103 MMOL/L (ref 94–109)
CO2 SERPL-SCNC: 27 MMOL/L (ref 20–32)
CREAT SERPL-MCNC: 0.6 MG/DL (ref 0.66–1.25)
DACRYOCYTES BLD QL SMEAR: ABNORMAL
DIFFERENTIAL METHOD BLD: ABNORMAL
EOSINOPHIL # BLD AUTO: 0.4 10E9/L (ref 0–0.7)
EOSINOPHIL NFR BLD AUTO: 3.5 %
ERYTHROCYTE [DISTWIDTH] IN BLOOD BY AUTOMATED COUNT: 22.2 % (ref 10–15)
GFR SERPL CREATININE-BSD FRML MDRD: >90 ML/MIN/{1.73_M2}
GLUCOSE SERPL-MCNC: 108 MG/DL (ref 70–99)
HCT VFR BLD AUTO: 31.2 % (ref 40–53)
HGB BLD-MCNC: 9.1 G/DL (ref 13.3–17.7)
IMM GRANULOCYTES # BLD: 0 10E9/L (ref 0–0.4)
IMM GRANULOCYTES NFR BLD: 0.2 %
LYMPHOCYTES # BLD AUTO: 1.5 10E9/L (ref 0.8–5.3)
LYMPHOCYTES NFR BLD AUTO: 14.4 %
MACROCYTES BLD QL SMEAR: PRESENT
MCH RBC QN AUTO: 20.6 PG (ref 26.5–33)
MCHC RBC AUTO-ENTMCNC: 29.2 G/DL (ref 31.5–36.5)
MCV RBC AUTO: 71 FL (ref 78–100)
MICROCYTES BLD QL SMEAR: PRESENT
MONOCYTES # BLD AUTO: 1.2 10E9/L (ref 0–1.3)
MONOCYTES NFR BLD AUTO: 11.4 %
NEUTROPHILS # BLD AUTO: 7.4 10E9/L (ref 1.6–8.3)
NEUTROPHILS NFR BLD AUTO: 70.1 %
NRBC # BLD AUTO: 0 10*3/UL
NRBC BLD AUTO-RTO: 0 /100
PLATELET # BLD AUTO: 375 10E9/L (ref 150–450)
PLATELET # BLD EST: ABNORMAL 10*3/UL
POIKILOCYTOSIS BLD QL SMEAR: ABNORMAL
POTASSIUM SERPL-SCNC: 4 MMOL/L (ref 3.4–5.3)
RBC # BLD AUTO: 4.41 10E12/L (ref 4.4–5.9)
RBC INCLUSIONS BLD: ABNORMAL
SODIUM SERPL-SCNC: 136 MMOL/L (ref 133–144)
WBC # BLD AUTO: 10.6 10E9/L (ref 4–11)

## 2019-06-24 PROCEDURE — 25000132 ZZH RX MED GY IP 250 OP 250 PS 637: Mod: GY | Performed by: INTERNAL MEDICINE

## 2019-06-24 PROCEDURE — 25000128 H RX IP 250 OP 636: Performed by: INTERNAL MEDICINE

## 2019-06-24 PROCEDURE — 36415 COLL VENOUS BLD VENIPUNCTURE: CPT | Performed by: INTERNAL MEDICINE

## 2019-06-24 PROCEDURE — 80048 BASIC METABOLIC PNL TOTAL CA: CPT | Performed by: INTERNAL MEDICINE

## 2019-06-24 PROCEDURE — 12000022 ZZH R&B SNF

## 2019-06-24 PROCEDURE — 85025 COMPLETE CBC W/AUTO DIFF WBC: CPT | Performed by: INTERNAL MEDICINE

## 2019-06-24 RX ADMIN — BACLOFEN 20 MG: 10 TABLET ORAL at 17:20

## 2019-06-24 RX ADMIN — BACLOFEN 20 MG: 10 TABLET ORAL at 08:41

## 2019-06-24 RX ADMIN — SENNOSIDES AND DOCUSATE SODIUM 2 TABLET: 8.6; 5 TABLET ORAL at 22:09

## 2019-06-24 RX ADMIN — ACETAMINOPHEN 650 MG: 325 TABLET, FILM COATED ORAL at 08:47

## 2019-06-24 RX ADMIN — BACLOFEN 20 MG: 10 TABLET ORAL at 13:09

## 2019-06-24 RX ADMIN — METOPROLOL SUCCINATE 50 MG: 25 TABLET, EXTENDED RELEASE ORAL at 08:41

## 2019-06-24 RX ADMIN — Medication 1 CAPSULE: at 08:41

## 2019-06-24 RX ADMIN — SENNOSIDES AND DOCUSATE SODIUM 2 TABLET: 8.6; 5 TABLET ORAL at 08:41

## 2019-06-24 RX ADMIN — TRAZODONE HYDROCHLORIDE 100 MG: 100 TABLET ORAL at 22:10

## 2019-06-24 RX ADMIN — ACETAMINOPHEN 650 MG: 325 TABLET, FILM COATED ORAL at 17:19

## 2019-06-24 RX ADMIN — ENOXAPARIN SODIUM 40 MG: 40 INJECTION SUBCUTANEOUS at 08:41

## 2019-06-24 RX ADMIN — OXYCODONE HYDROCHLORIDE 15 MG: 5 TABLET ORAL at 08:47

## 2019-06-24 RX ADMIN — OXYCODONE HYDROCHLORIDE 15 MG: 5 TABLET ORAL at 17:27

## 2019-06-24 RX ADMIN — MELATONIN TAB 3 MG 6 MG: 3 TAB at 22:10

## 2019-06-24 RX ADMIN — OXYCODONE HYDROCHLORIDE 15 MG: 5 TABLET ORAL at 22:09

## 2019-06-24 RX ADMIN — FERROUS SULFATE TAB 325 MG (65 MG ELEMENTAL FE) 325 MG: 325 (65 FE) TAB at 08:41

## 2019-06-24 RX ADMIN — ACETAMINOPHEN 650 MG: 325 TABLET, FILM COATED ORAL at 22:09

## 2019-06-24 RX ADMIN — TOLTERODINE 8 MG: 4 CAPSULE, EXTENDED RELEASE ORAL at 08:41

## 2019-06-24 RX ADMIN — OXYCODONE HYDROCHLORIDE 15 MG: 5 TABLET ORAL at 13:09

## 2019-06-24 NOTE — PROGRESS NOTES
CLINICAL NUTRITION SERVICES - REASSESSMENT NOTE     Nutrition Prescription    RECOMMENDATIONS FOR MDs/PROVIDERS TO ORDER:  None today    Malnutrition Status:    Patient does not meet two of the criteria necessary for diagnosing malnutrition    Recommendations already ordered by Registered Dietitian (RD):  Continue supplements per pt request  Weight check    Future/Additional Recommendations:  Continue to encourage adequate protein intakes. If continues to be excessive, continue to recommend reducing frequency of supplements.     EVALUATION OF THE PROGRESS TOWARD GOALS   Diet: Regular, Boost Plus TID, Gelatein PRN    Intake: % of meals per flowsheet. On average, pt is ordering 4250 kcal and 200 g protein daily per [employer]. This is likely meeting 100% energy and protein needs.       NEW FINDINGS   - No updated wts despite standing weekly order. Weight from 2 weeks ago was ~15 lbs less than his UBW of ~220 lbs, consistent with amputation and some muscle loss with strict bedrest.     MALNUTRITION  % Intake: No decreased intake noted  % Weight Loss: Weight loss does not meet criteria  Subcutaneous Fat Loss: None observed  Muscle Loss: None observed  Fluid Accumulation/Edema: None noted  Malnutrition Diagnosis: Patient does not meet two of the above criteria necessary for diagnosing malnutrition    Previous Goals   Patient to consume % of nutritionally adequate meal trays TID, or the equivalent with supplements/snacks.  Evaluation: Met    Previous Nutrition Diagnosis  Predicted inadequate protein-energy intake related to variable appetite and increased needs as evidenced by pt reliant on PO intakes to meet 100% of nutritional needs with potential for variation     Evaluation: Resolved    CURRENT NUTRITION DIAGNOSIS  No nutrition diagnosis at this time d/t consistently meeting estimated nutritional needs     INTERVENTIONS  Implementation  Discussed pts POC during interdisciplinary morning  rounds    Monitoring/Evaluation  Progress toward goals will be monitored and evaluated per protocol q 14 days.      Vanessa Chahal RD, LD  Unit Pager: [phone]

## 2019-06-24 NOTE — PLAN OF CARE
"Patient A&Ox4. VSS. Remained on bedrest. PRN oxycodone and Tylenol x2 given for c/o shoulder and hip pain with some relief. Dressings on R hip/ scrotum changed at 2100. Scrotal wound appears to be more reddened and increase in size; continues to drain moderate amount of serosanguineous drainage on gauze. No drainage observed from R hip incision. Old PARESH site had small amount of serosanguineous drainage noted. Patient self-cath during the day and applied condom catheter at [facility]. Pleasant and cooperative with cares. Able to make needs known. Continue with POC.    /63 (BP Location: Right arm)   Pulse 68   Temp 97.1  F (36.2  C) (Oral)   Resp 18   Ht 1.93 m (6' 4\")   Wt 92.6 kg (204 lb 3.2 oz)   SpO2 97%   BMI 24.86 kg/m      "

## 2019-06-24 NOTE — PLAN OF CARE
Alert and oriented.  Pain under control with current pain medication.  Denies SOB.   Appetite excellent.  Remains bed rest, on pulsate mattress.  Dressing done at 1130 due to drainage. However for past few days, less drainage noted. See flow sheet for details.  Pt continues to manage B/B with no difficulties.  Continue to monitor.

## 2019-06-24 NOTE — PROGRESS NOTES
A&O x 4. Slept well. Complained of pain which was tolerated with prn medications. Will continue to monitor.

## 2019-06-25 PROCEDURE — 25000132 ZZH RX MED GY IP 250 OP 250 PS 637: Mod: GY | Performed by: INTERNAL MEDICINE

## 2019-06-25 PROCEDURE — 12000022 ZZH R&B SNF

## 2019-06-25 PROCEDURE — 25000128 H RX IP 250 OP 636: Performed by: INTERNAL MEDICINE

## 2019-06-25 RX ADMIN — FERROUS SULFATE TAB 325 MG (65 MG ELEMENTAL FE) 325 MG: 325 (65 FE) TAB at 09:11

## 2019-06-25 RX ADMIN — BACLOFEN 20 MG: 10 TABLET ORAL at 20:34

## 2019-06-25 RX ADMIN — ACETAMINOPHEN 650 MG: 325 TABLET, FILM COATED ORAL at 12:26

## 2019-06-25 RX ADMIN — ACETAMINOPHEN 650 MG: 325 TABLET, FILM COATED ORAL at 16:27

## 2019-06-25 RX ADMIN — ENOXAPARIN SODIUM 40 MG: 40 INJECTION SUBCUTANEOUS at 09:11

## 2019-06-25 RX ADMIN — OXYCODONE HYDROCHLORIDE 15 MG: 5 TABLET ORAL at 02:02

## 2019-06-25 RX ADMIN — Medication 1 CAPSULE: at 09:11

## 2019-06-25 RX ADMIN — TOLTERODINE 8 MG: 4 CAPSULE, EXTENDED RELEASE ORAL at 09:11

## 2019-06-25 RX ADMIN — MELATONIN TAB 3 MG 6 MG: 3 TAB at 21:56

## 2019-06-25 RX ADMIN — OXYCODONE HYDROCHLORIDE 15 MG: 5 TABLET ORAL at 20:34

## 2019-06-25 RX ADMIN — OXYCODONE HYDROCHLORIDE 15 MG: 5 TABLET ORAL at 16:27

## 2019-06-25 RX ADMIN — ACETAMINOPHEN 650 MG: 325 TABLET, FILM COATED ORAL at 20:34

## 2019-06-25 RX ADMIN — BACLOFEN 20 MG: 10 TABLET ORAL at 12:27

## 2019-06-25 RX ADMIN — SENNOSIDES AND DOCUSATE SODIUM 2 TABLET: 8.6; 5 TABLET ORAL at 09:11

## 2019-06-25 RX ADMIN — OXYCODONE HYDROCHLORIDE 15 MG: 5 TABLET ORAL at 06:05

## 2019-06-25 RX ADMIN — BACLOFEN 20 MG: 10 TABLET ORAL at 09:11

## 2019-06-25 RX ADMIN — BACLOFEN 20 MG: 10 TABLET ORAL at 16:27

## 2019-06-25 RX ADMIN — TRAZODONE HYDROCHLORIDE 100 MG: 100 TABLET ORAL at 21:56

## 2019-06-25 RX ADMIN — METOPROLOL SUCCINATE 50 MG: 25 TABLET, EXTENDED RELEASE ORAL at 09:14

## 2019-06-25 RX ADMIN — OXYCODONE HYDROCHLORIDE 15 MG: 5 TABLET ORAL at 12:27

## 2019-06-25 ASSESSMENT — MIFFLIN-ST. JEOR: SCORE: 1754.82

## 2019-06-25 NOTE — PLAN OF CARE
Wound care done per order. Less drainage noted, but scrotum wound seems bigger.   Medicated with pain medication per request.   Had BM, after dig stimulation. Condom cath on.   Continue to monitor.

## 2019-06-25 NOTE — PLAN OF CARE
"Pt expressed the desire to get OOB, participate in therapy session and discharge home. Stated \"I don't wonna be here no more, I wonna do therapy and get out of here\". Per Dr Blackmon's note on 6/19/19, pt is expected to remain until she sees him on the weekend. Dr Blackmon was paged, no response at this time. All wound care were completed by the outgoing RN this morning but the dressing to the right lateral hip was soaked with drainage, new dressing was applied after cleansing. Posterior dressing is intact at this time. Medicated with Oxycodone and Tylenol x 1. Weight was completed via the bed scale with 1 pillow in place. Self cath during the day without difficulty. Had a BM this morning.   "

## 2019-06-25 NOTE — PLAN OF CARE
RN: R hip pain comfortably managed with Oxycodone 15 mg q 4H per pt request. Chasidy-scrotal dressing remained dry until this am. R hip dressing with moderate amount of serosanguinous drainage noted on old dressing while the post hip has small drainage. New dressing applied on R lat. Hip, R post. Hip and chasidy- scrotal wound. Pt noted to be non-compliant with his strict bed rest order as he is seen to have his HOB at 45 degrees and pulling himself up in bed. Pt reminded about his order and claimed that he is just tired being on bedrest and would like to talk to provider about advancing his activity. Appear to be sleeping/restind between meds/cares, Continue with plan of care.

## 2019-06-25 NOTE — PROVIDER NOTIFICATION
Patient is wondering when he can start therapy and sitting program as he is anxious to go home. Dr. Neymar merritt.

## 2019-06-26 ENCOUNTER — APPOINTMENT (OUTPATIENT)
Dept: LAB | Facility: CLINIC | Age: 54
End: 2019-06-26
Attending: INTERNAL MEDICINE
Payer: MEDICARE

## 2019-06-26 PROCEDURE — 99232 SBSQ HOSP IP/OBS MODERATE 35: CPT | Performed by: HOSPITALIST

## 2019-06-26 PROCEDURE — 25000128 H RX IP 250 OP 636: Performed by: INTERNAL MEDICINE

## 2019-06-26 PROCEDURE — 25000132 ZZH RX MED GY IP 250 OP 250 PS 637: Mod: GY | Performed by: INTERNAL MEDICINE

## 2019-06-26 PROCEDURE — 12000022 ZZH R&B SNF

## 2019-06-26 PROCEDURE — G0463 HOSPITAL OUTPT CLINIC VISIT: HCPCS

## 2019-06-26 RX ORDER — TROLAMINE SALICYLATE 10 G/100G
CREAM TOPICAL 4 TIMES DAILY PRN
Status: DISCONTINUED | OUTPATIENT
Start: 2019-06-26 | End: 2019-07-19 | Stop reason: HOSPADM

## 2019-06-26 RX ADMIN — MELATONIN TAB 3 MG 6 MG: 3 TAB at 22:07

## 2019-06-26 RX ADMIN — TOLTERODINE 8 MG: 4 CAPSULE, EXTENDED RELEASE ORAL at 08:41

## 2019-06-26 RX ADMIN — TRAZODONE HYDROCHLORIDE 100 MG: 100 TABLET ORAL at 22:07

## 2019-06-26 RX ADMIN — FERROUS SULFATE TAB 325 MG (65 MG ELEMENTAL FE) 325 MG: 325 (65 FE) TAB at 08:41

## 2019-06-26 RX ADMIN — BACLOFEN 20 MG: 10 TABLET ORAL at 16:08

## 2019-06-26 RX ADMIN — Medication 1 CAPSULE: at 08:41

## 2019-06-26 RX ADMIN — ACETAMINOPHEN 650 MG: 325 TABLET, FILM COATED ORAL at 12:04

## 2019-06-26 RX ADMIN — OXYCODONE HYDROCHLORIDE 15 MG: 5 TABLET ORAL at 12:04

## 2019-06-26 RX ADMIN — METOPROLOL SUCCINATE 50 MG: 25 TABLET, EXTENDED RELEASE ORAL at 08:44

## 2019-06-26 RX ADMIN — ACETAMINOPHEN 650 MG: 325 TABLET, FILM COATED ORAL at 16:08

## 2019-06-26 RX ADMIN — BACLOFEN 20 MG: 10 TABLET ORAL at 22:07

## 2019-06-26 RX ADMIN — ACETAMINOPHEN 650 MG: 325 TABLET, FILM COATED ORAL at 20:08

## 2019-06-26 RX ADMIN — OXYCODONE HYDROCHLORIDE 15 MG: 5 TABLET ORAL at 00:49

## 2019-06-26 RX ADMIN — OXYCODONE HYDROCHLORIDE 15 MG: 5 TABLET ORAL at 16:08

## 2019-06-26 RX ADMIN — ENOXAPARIN SODIUM 40 MG: 40 INJECTION SUBCUTANEOUS at 08:41

## 2019-06-26 RX ADMIN — SENNOSIDES AND DOCUSATE SODIUM 2 TABLET: 8.6; 5 TABLET ORAL at 08:40

## 2019-06-26 RX ADMIN — BACLOFEN 20 MG: 10 TABLET ORAL at 08:41

## 2019-06-26 RX ADMIN — OXYCODONE HYDROCHLORIDE 15 MG: 5 TABLET ORAL at 20:08

## 2019-06-26 RX ADMIN — BACLOFEN 20 MG: 10 TABLET ORAL at 12:04

## 2019-06-26 RX ADMIN — OXYCODONE HYDROCHLORIDE 15 MG: 5 TABLET ORAL at 05:21
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
## 2019-06-26 NOTE — PLAN OF CARE
"Pt remains on bed rest but still awaiting the Plastic surgeon to initiate sitting program and therapy in order to discharge home. Pt appears calm but disinterested. Appetite is poor, consumed 50% of breakfast, stated \"Everything is the same, I've tried it all\". Wound care to buttocks and posterior left hip were completed by the ostomy nurse. Dressing is currently dry and intact. Medicated with Oxycodone and Tylenol x 1. Atarax was discontinued. Self cath without difficulty. No bowel movement this morning.  "

## 2019-06-26 NOTE — PLAN OF CARE
"At start of shift, patient verbalized feelings of frustration and poor motivation regarding his progress, stating he was already on bedrest status for 33 days and would like to be OOB. Appeared noncompliant with keeping HOB <30 degrees. Education provided with little success. Mood appeared to be improved with active listening/ distraction/ 1:1. Good appetite. PRN oxycodone/ Tylenol given x2 for R hip and shoulder pain with some relief. Patient seft-cath during shift, condom catheter applied at HS. LBM today. Dressings on R hip/ scrotum changed at 2000. Scrotal wound observed to be reddened/ moist and had small amount of serosanguineous drainage on foam. Moderate amount of serosanguineous drainage noted from old PAERSH site and R hip incision. Patient pleasant and able to make needs known. Continue with POC.    /65 (BP Location: Right arm)   Pulse 69   Temp 96.6  F (35.9  C) (Oral)   Resp 16   Ht 1.93 m (6' 4\")   Wt 80.3 kg (177 lb 1.6 oz)   SpO2 99%   BMI 21.56 kg/m      "
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
## 2019-06-26 NOTE — PLAN OF CARE
RN: R hip pain comfortably managed with Oxycodone 15 mg tab x 2 this shift. Only had to change dressing on R lateral hip d/t some shadow of drainage on the dressing, the rest of the dressings CDI. Condom cath intact overnight. Had a good sleep in between meds per pt. Continue with  plan of care.

## 2019-06-26 NOTE — PHARMACY
Prescriber Notification Note    The pharmacist has communicated with this patient's provider regarding a concern or therapy recommendation.    Notified Person: Dr. Almanza  Date/Time of Notification: 6/26/19 @ 1200  Interaction: Face to face  Concern/Recommendation:  Hydroxyzine 50 mg q6h prn pain is due for reassessment, patient has not needed it since 6/4/19, discontinue?      Comments/Additional Details:  Discontinue Hydroxyzine order.     Bijan Truong, PharmD, BCPS

## 2019-06-27 ENCOUNTER — APPOINTMENT (OUTPATIENT)
Dept: LAB | Facility: CLINIC | Age: 54
End: 2019-06-27
Attending: INTERNAL MEDICINE
Payer: MEDICARE

## 2019-06-27 LAB
ANION GAP SERPL CALCULATED.3IONS-SCNC: 4 MMOL/L (ref 3–14)
BASOPHILS # BLD AUTO: 0 10E9/L (ref 0–0.2)
BASOPHILS NFR BLD AUTO: 0.3 %
BUN SERPL-MCNC: 36 MG/DL (ref 7–30)
CALCIUM SERPL-MCNC: 9.1 MG/DL (ref 8.5–10.1)
CHLORIDE SERPL-SCNC: 104 MMOL/L (ref 94–109)
CO2 SERPL-SCNC: 27 MMOL/L (ref 20–32)
CREAT SERPL-MCNC: 0.59 MG/DL (ref 0.66–1.25)
DIFFERENTIAL METHOD BLD: ABNORMAL
EOSINOPHIL # BLD AUTO: 0.4 10E9/L (ref 0–0.7)
EOSINOPHIL NFR BLD AUTO: 4 %
ERYTHROCYTE [DISTWIDTH] IN BLOOD BY AUTOMATED COUNT: 22 % (ref 10–15)
GFR SERPL CREATININE-BSD FRML MDRD: >90 ML/MIN/{1.73_M2}
GLUCOSE SERPL-MCNC: 100 MG/DL (ref 70–99)
HCT VFR BLD AUTO: 31 % (ref 40–53)
HGB BLD-MCNC: 9.1 G/DL (ref 13.3–17.7)
IMM GRANULOCYTES # BLD: 0 10E9/L (ref 0–0.4)
IMM GRANULOCYTES NFR BLD: 0.1 %
LYMPHOCYTES # BLD AUTO: 1.6 10E9/L (ref 0.8–5.3)
LYMPHOCYTES NFR BLD AUTO: 16.6 %
MCH RBC QN AUTO: 20.9 PG (ref 26.5–33)
MCHC RBC AUTO-ENTMCNC: 29.4 G/DL (ref 31.5–36.5)
MCV RBC AUTO: 71 FL (ref 78–100)
MONOCYTES # BLD AUTO: 1.2 10E9/L (ref 0–1.3)
MONOCYTES NFR BLD AUTO: 12.2 %
NEUTROPHILS # BLD AUTO: 6.3 10E9/L (ref 1.6–8.3)
NEUTROPHILS NFR BLD AUTO: 66.8 %
NRBC # BLD AUTO: 0 10*3/UL
NRBC BLD AUTO-RTO: 0 /100
PLATELET # BLD AUTO: 331 10E9/L (ref 150–450)
POTASSIUM SERPL-SCNC: 4.1 MMOL/L (ref 3.4–5.3)
RBC # BLD AUTO: 4.36 10E12/L (ref 4.4–5.9)
SODIUM SERPL-SCNC: 136 MMOL/L (ref 133–144)
WBC # BLD AUTO: 9.5 10E9/L (ref 4–11)

## 2019-06-27 PROCEDURE — 25000132 ZZH RX MED GY IP 250 OP 250 PS 637: Mod: GY | Performed by: INTERNAL MEDICINE

## 2019-06-27 PROCEDURE — 85025 COMPLETE CBC W/AUTO DIFF WBC: CPT | Performed by: INTERNAL MEDICINE

## 2019-06-27 PROCEDURE — 80048 BASIC METABOLIC PNL TOTAL CA: CPT | Performed by: INTERNAL MEDICINE

## 2019-06-27 PROCEDURE — G0463 HOSPITAL OUTPT CLINIC VISIT: HCPCS | Mod: 25

## 2019-06-27 PROCEDURE — 36415 COLL VENOUS BLD VENIPUNCTURE: CPT | Performed by: INTERNAL MEDICINE

## 2019-06-27 PROCEDURE — 25000128 H RX IP 250 OP 636: Performed by: INTERNAL MEDICINE

## 2019-06-27 PROCEDURE — 97602 WOUND(S) CARE NON-SELECTIVE: CPT

## 2019-06-27 PROCEDURE — 12000022 ZZH R&B SNF

## 2019-06-27 RX ADMIN — BACLOFEN 20 MG: 10 TABLET ORAL at 14:00

## 2019-06-27 RX ADMIN — OXYCODONE HYDROCHLORIDE 15 MG: 5 TABLET ORAL at 22:00

## 2019-06-27 RX ADMIN — MELATONIN TAB 3 MG 6 MG: 3 TAB at 22:01

## 2019-06-27 RX ADMIN — TRAZODONE HYDROCHLORIDE 100 MG: 100 TABLET ORAL at 22:01

## 2019-06-27 RX ADMIN — SENNOSIDES AND DOCUSATE SODIUM 2 TABLET: 8.6; 5 TABLET ORAL at 08:08

## 2019-06-27 RX ADMIN — ENOXAPARIN SODIUM 40 MG: 40 INJECTION SUBCUTANEOUS at 08:15

## 2019-06-27 RX ADMIN — ACETAMINOPHEN 650 MG: 325 TABLET, FILM COATED ORAL at 00:20

## 2019-06-27 RX ADMIN — BACLOFEN 20 MG: 10 TABLET ORAL at 16:20

## 2019-06-27 RX ADMIN — ACETAMINOPHEN 650 MG: 325 TABLET, FILM COATED ORAL at 22:01

## 2019-06-27 RX ADMIN — FERROUS SULFATE TAB 325 MG (65 MG ELEMENTAL FE) 325 MG: 325 (65 FE) TAB at 08:08

## 2019-06-27 RX ADMIN — ACETAMINOPHEN 650 MG: 325 TABLET, FILM COATED ORAL at 04:50

## 2019-06-27 RX ADMIN — METOPROLOL SUCCINATE 50 MG: 25 TABLET, EXTENDED RELEASE ORAL at 08:09

## 2019-06-27 RX ADMIN — Medication 1 CAPSULE: at 08:07

## 2019-06-27 RX ADMIN — ACETAMINOPHEN 650 MG: 325 TABLET, FILM COATED ORAL at 18:00

## 2019-06-27 RX ADMIN — OXYCODONE HYDROCHLORIDE 15 MG: 5 TABLET ORAL at 00:20

## 2019-06-27 RX ADMIN — OXYCODONE HYDROCHLORIDE 15 MG: 5 TABLET ORAL at 04:50

## 2019-06-27 RX ADMIN — ACETAMINOPHEN 650 MG: 325 TABLET, FILM COATED ORAL at 09:22

## 2019-06-27 RX ADMIN — OXYCODONE HYDROCHLORIDE 15 MG: 5 TABLET ORAL at 09:22

## 2019-06-27 RX ADMIN — BACLOFEN 20 MG: 10 TABLET ORAL at 08:09

## 2019-06-27 RX ADMIN — OXYCODONE HYDROCHLORIDE 15 MG: 5 TABLET ORAL at 18:00

## 2019-06-27 RX ADMIN — TOLTERODINE 8 MG: 4 CAPSULE, EXTENDED RELEASE ORAL at 08:08

## 2019-06-27 RX ADMIN — OXYCODONE HYDROCHLORIDE 15 MG: 5 TABLET ORAL at 14:00

## 2019-06-27 RX ADMIN — ACETAMINOPHEN 650 MG: 325 TABLET, FILM COATED ORAL at 14:00

## 2019-06-27 RX ADMIN — BACLOFEN 20 MG: 10 TABLET ORAL at 22:01

## 2019-06-27 NOTE — PLAN OF CARE
Appears to be resting/sleeping. Medicated x 2 for pain management. No acute issues or distress noted. Seen by plastic surgery, Dr. Blackmon,  last Evening, see note for new orders; pt to be started on conservative sitting program, a 15 min session. Able to make needs known. Call in reach.

## 2019-06-27 NOTE — PROGRESS NOTES
Patient A&O x4, denied CP, lightheadedness, dizziness, numbness, tingling and SOB. Voiding without difficulties, condom cath at night, LBM today per pt report. Pain tolerable and taking PRN oxycodone and tylenol, refused to use ice pack, dressing changed by [facility] nurse. Fifteen  minute session of sitting patient at 90 degree completed with out complaining. Self repositioned and turned in bed, shoulder x-ray completed in the pt room, able to use call light appropriately and make needs known, will continue to monitor patient as POC.
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
## 2019-06-27 NOTE — PROGRESS NOTES
PLASTICS ATTENDING    Patient anxious to start PT/OT, but doesn't want any setbacks with wound either. Sitting up in bed at about 45 degrees.  The superficial dehiscence area is a bit smaller but still sloughy so we will continue the Mesalt.  The periscrotal superficial ulceration is still boggy but does not track at present.  The old PARESH site has started draining again and there is hypertrophic granulation tissue at the opening.  This tunnels at least the full length of a Q-tip (~15 cms) and is most likely colonized along the old PARESH tract.    Please have the CWOCNs pack the tunnel with a thin strip of Hydrofera Blue to see if we can get this to fill in.  The external granuloma can be treated with silver nitrate PRN bleeding.    While he is about 1 month post-flap, these various sites of poor healing need to be protected and closely observed. I will leave the sutures in for now.  We will allow the patient to try 15 minute sitting sessions IN BED (up to 90 degrees) to test the flap and condition it to the forces of sitting. He will need to allow for at least 1 hour between sittings, AND nursing staff MUST check open areas to make sure there is not further damage before he can sit again.  Unfortunately, he will probably miss his seating appointment at [place] due to these setbacks, but should reschedule for next month when appointment time is available.  He continues to be in good spirits and is trying to find ways to occupy himself while he is healing.  I will be out of town for the next 2 weeks and will follow up upon my return.  Any significant changes can be reported to the plastic surgery resident on call.
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
## 2019-06-27 NOTE — PLAN OF CARE
Patient alert and oriented x4, able to make needs known. PRN oxycodone and tylenol requested Q4 for pain. Seen by Dr. Blackmon this pm, wound care completed. Sitting program able to be initiated. See note. Dressings CDI. No other complaints of pain, SOB, chest pain, or nausea. Condom cath in place. Will continue to monitor per POC.

## 2019-06-28 PROCEDURE — 25000132 ZZH RX MED GY IP 250 OP 250 PS 637: Mod: GY | Performed by: INTERNAL MEDICINE

## 2019-06-28 PROCEDURE — 25000128 H RX IP 250 OP 636: Performed by: INTERNAL MEDICINE

## 2019-06-28 PROCEDURE — 12000022 ZZH R&B SNF

## 2019-06-28 PROCEDURE — 25000132 ZZH RX MED GY IP 250 OP 250 PS 637: Mod: GY | Performed by: HOSPITALIST

## 2019-06-28 PROCEDURE — 00220000 ZZH SNF RUG CODE OPNP

## 2019-06-28 RX ADMIN — BACLOFEN 20 MG: 10 TABLET ORAL at 17:00

## 2019-06-28 RX ADMIN — OXYCODONE HYDROCHLORIDE 15 MG: 5 TABLET ORAL at 20:37

## 2019-06-28 RX ADMIN — ACETAMINOPHEN 650 MG: 325 TABLET, FILM COATED ORAL at 02:10

## 2019-06-28 RX ADMIN — ENOXAPARIN SODIUM 40 MG: 40 INJECTION SUBCUTANEOUS at 10:40

## 2019-06-28 RX ADMIN — BACLOFEN 20 MG: 10 TABLET ORAL at 10:41

## 2019-06-28 RX ADMIN — SENNOSIDES AND DOCUSATE SODIUM 2 TABLET: 8.6; 5 TABLET ORAL at 20:37

## 2019-06-28 RX ADMIN — Medication 1 CAPSULE: at 10:42

## 2019-06-28 RX ADMIN — BACLOFEN 20 MG: 10 TABLET ORAL at 14:19

## 2019-06-28 RX ADMIN — ACETAMINOPHEN 650 MG: 325 TABLET, FILM COATED ORAL at 15:54

## 2019-06-28 RX ADMIN — BACLOFEN 20 MG: 10 TABLET ORAL at 20:37

## 2019-06-28 RX ADMIN — MELATONIN TAB 3 MG 6 MG: 3 TAB at 21:59

## 2019-06-28 RX ADMIN — TROLAMINE SALICYLATE: 10 CREAM TOPICAL at 14:46

## 2019-06-28 RX ADMIN — FERROUS SULFATE TAB 325 MG (65 MG ELEMENTAL FE) 325 MG: 325 (65 FE) TAB at 10:42

## 2019-06-28 RX ADMIN — OXYCODONE HYDROCHLORIDE 15 MG: 5 TABLET ORAL at 02:11

## 2019-06-28 RX ADMIN — OXYCODONE HYDROCHLORIDE 15 MG: 5 TABLET ORAL at 15:54

## 2019-06-28 RX ADMIN — METOPROLOL SUCCINATE 50 MG: 25 TABLET, EXTENDED RELEASE ORAL at 10:41

## 2019-06-28 RX ADMIN — SENNOSIDES AND DOCUSATE SODIUM 2 TABLET: 8.6; 5 TABLET ORAL at 10:42

## 2019-06-28 RX ADMIN — TRAZODONE HYDROCHLORIDE 100 MG: 100 TABLET ORAL at 21:59

## 2019-06-28 RX ADMIN — ACETAMINOPHEN 650 MG: 325 TABLET, FILM COATED ORAL at 06:38

## 2019-06-28 RX ADMIN — ACETAMINOPHEN 650 MG: 325 TABLET, FILM COATED ORAL at 20:37

## 2019-06-28 RX ADMIN — OXYCODONE HYDROCHLORIDE 15 MG: 5 TABLET ORAL at 11:15

## 2019-06-28 RX ADMIN — ACETAMINOPHEN 650 MG: 325 TABLET, FILM COATED ORAL at 11:15

## 2019-06-28 RX ADMIN — OXYCODONE HYDROCHLORIDE 15 MG: 5 TABLET ORAL at 06:38

## 2019-06-28 RX ADMIN — TOLTERODINE 8 MG: 4 CAPSULE, EXTENDED RELEASE ORAL at 10:43

## 2019-06-28 ASSESSMENT — PAIN DESCRIPTION - DESCRIPTORS: DESCRIPTORS: CONSTANT;ACHING;SORE;DISCOMFORT

## 2019-06-28 NOTE — PLAN OF CARE
Slept well most of night. Pt A&O x4. VSS. Reports pain in right hip and arm, managed with PRN oxy and tylenol. Condom cath on overnight. Able to make needs known. Continue with POC.
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
## 2019-06-28 NOTE — PLAN OF CARE
"Patient A&Ox4. VSS. Good appetite. Pain comfortably managed with PRN oxycodone and Tylenol x2. Patient exercised conservative sitting at 1700 and 2200. Tolerated well with no signs of worsening dehiscence/ ulceration noted. Reported LBM today. Self-cath during the day, condom catheter on at NOC. Dressings to R hip and scrotum replaced at 2000 with no significant drainage noted. Patient pleasant and cooperative with cares. Able to make needs known. Continue with POC.    /55 (BP Location: Right arm)   Pulse 68   Temp 96.3  F (35.7  C) (Oral)   Resp 18   Ht 1.93 m (6' 4\")   Wt 80.3 kg (177 lb 1.6 oz)   SpO2 98%   BMI 21.56 kg/m      "

## 2019-06-28 NOTE — PLAN OF CARE
Pt VSS. Continues to have BL shoulder pain and R hip pain, treated with PRN tylenol and oxycodone x1 this shift, and aspercreme to shoulders. Pt sat up twice this shift per order of 15 mins at 90 degrees in bed, RN assessed afterwards with no new or worsened skin conditions noted. Wound care per orders to scrotal and R buttock wound, new gauze/ABD to R hip wound-packing not changed. Pt independently straight cathing. RN paged ortho MD that saw pt yesterday regarding pt question for plan for steroid injection, no response. Continue to monitor.

## 2019-06-29 PROCEDURE — 25000132 ZZH RX MED GY IP 250 OP 250 PS 637: Mod: GY | Performed by: INTERNAL MEDICINE

## 2019-06-29 PROCEDURE — 12000022 ZZH R&B SNF

## 2019-06-29 PROCEDURE — 25000128 H RX IP 250 OP 636: Performed by: INTERNAL MEDICINE

## 2019-06-29 RX ADMIN — TRAZODONE HYDROCHLORIDE 100 MG: 100 TABLET ORAL at 22:17

## 2019-06-29 RX ADMIN — SENNOSIDES AND DOCUSATE SODIUM 2 TABLET: 8.6; 5 TABLET ORAL at 07:57

## 2019-06-29 RX ADMIN — ACETAMINOPHEN 650 MG: 325 TABLET, FILM COATED ORAL at 11:16

## 2019-06-29 RX ADMIN — TOLTERODINE 8 MG: 4 CAPSULE, EXTENDED RELEASE ORAL at 07:57

## 2019-06-29 RX ADMIN — METOPROLOL SUCCINATE 50 MG: 25 TABLET, EXTENDED RELEASE ORAL at 07:57

## 2019-06-29 RX ADMIN — ENOXAPARIN SODIUM 40 MG: 40 INJECTION SUBCUTANEOUS at 11:16

## 2019-06-29 RX ADMIN — ACETAMINOPHEN 650 MG: 325 TABLET, FILM COATED ORAL at 16:21

## 2019-06-29 RX ADMIN — MELATONIN TAB 3 MG 6 MG: 3 TAB at 22:17

## 2019-06-29 RX ADMIN — OXYCODONE HYDROCHLORIDE 15 MG: 5 TABLET ORAL at 11:16

## 2019-06-29 RX ADMIN — ACETAMINOPHEN 650 MG: 325 TABLET, FILM COATED ORAL at 04:42

## 2019-06-29 RX ADMIN — Medication 1 CAPSULE: at 07:58

## 2019-06-29 RX ADMIN — ACETAMINOPHEN 650 MG: 325 TABLET, FILM COATED ORAL at 00:32

## 2019-06-29 RX ADMIN — FERROUS SULFATE TAB 325 MG (65 MG ELEMENTAL FE) 325 MG: 325 (65 FE) TAB at 07:58

## 2019-06-29 RX ADMIN — OXYCODONE HYDROCHLORIDE 15 MG: 5 TABLET ORAL at 16:20

## 2019-06-29 RX ADMIN — OXYCODONE HYDROCHLORIDE 15 MG: 5 TABLET ORAL at 20:18

## 2019-06-29 RX ADMIN — BACLOFEN 20 MG: 10 TABLET ORAL at 08:00

## 2019-06-29 RX ADMIN — OXYCODONE HYDROCHLORIDE 15 MG: 5 TABLET ORAL at 00:32

## 2019-06-29 RX ADMIN — OXYCODONE HYDROCHLORIDE 15 MG: 5 TABLET ORAL at 04:42

## 2019-06-29 RX ADMIN — SENNOSIDES AND DOCUSATE SODIUM 2 TABLET: 8.6; 5 TABLET ORAL at 20:19

## 2019-06-29 NOTE — PLAN OF CARE
Pt slept off and on during the night. A&O x4. Strict bedrest, independently positioning. Reports pain in shoulders, managed with PRN tylenol and oxy. Condom catheter on overnight, self cathing during day. Continue with POC.

## 2019-06-29 NOTE — PLAN OF CARE
"RN: Pt AA&O x3, able to make needs known. Pt on bedrest, w/ 15 min \"sitting protocol\" in place. Pt sat for 15 mins, no noted dehiscence. Dsgs changed per orders. Pt incontinent of urine and dribbling even after straight cath; refused afternoon baclofen. Pain meds given when requested. Con't POC.   "

## 2019-06-29 NOTE — PLAN OF CARE
VSS; alert and oriented x4. Strict bedrest; repositioning to L side independently q2h. HOB raised to 90 degrees x2; sutures intact on R hip site. R hip tunneled wound packed with hydrofera per order; mepilex in place on scrotal wound. Pt self cathing adequate amounts; condom cath applied by pt @ HOS. LBM 6/28. Moderate shoulder pain controlled with tylenol and oxy q4h. Tolerating regular diet well; able to make needs known. Cooperative with nursing cares.

## 2019-06-30 PROCEDURE — 25000132 ZZH RX MED GY IP 250 OP 250 PS 637: Mod: GY | Performed by: INTERNAL MEDICINE

## 2019-06-30 PROCEDURE — 25000128 H RX IP 250 OP 636: Performed by: INTERNAL MEDICINE

## 2019-06-30 PROCEDURE — 12000022 ZZH R&B SNF

## 2019-06-30 RX ADMIN — MELATONIN TAB 3 MG 6 MG: 3 TAB at 21:59

## 2019-06-30 RX ADMIN — ENOXAPARIN SODIUM 40 MG: 40 INJECTION SUBCUTANEOUS at 10:19

## 2019-06-30 RX ADMIN — METOPROLOL SUCCINATE 50 MG: 25 TABLET, EXTENDED RELEASE ORAL at 10:21

## 2019-06-30 RX ADMIN — ACETAMINOPHEN 650 MG: 325 TABLET, FILM COATED ORAL at 20:16

## 2019-06-30 RX ADMIN — BACLOFEN 20 MG: 10 TABLET ORAL at 16:09

## 2019-06-30 RX ADMIN — OXYCODONE HYDROCHLORIDE 15 MG: 5 TABLET ORAL at 10:15

## 2019-06-30 RX ADMIN — OXYCODONE HYDROCHLORIDE 15 MG: 5 TABLET ORAL at 16:09

## 2019-06-30 RX ADMIN — SENNOSIDES AND DOCUSATE SODIUM 2 TABLET: 8.6; 5 TABLET ORAL at 10:17

## 2019-06-30 RX ADMIN — TOLTERODINE 8 MG: 4 CAPSULE, EXTENDED RELEASE ORAL at 10:16

## 2019-06-30 RX ADMIN — OXYCODONE HYDROCHLORIDE 15 MG: 5 TABLET ORAL at 06:09

## 2019-06-30 RX ADMIN — ACETAMINOPHEN 650 MG: 325 TABLET, FILM COATED ORAL at 06:09

## 2019-06-30 RX ADMIN — FERROUS SULFATE TAB 325 MG (65 MG ELEMENTAL FE) 325 MG: 325 (65 FE) TAB at 10:16

## 2019-06-30 RX ADMIN — SENNOSIDES AND DOCUSATE SODIUM 2 TABLET: 8.6; 5 TABLET ORAL at 21:59

## 2019-06-30 RX ADMIN — Medication 1 CAPSULE: at 10:17

## 2019-06-30 RX ADMIN — BACLOFEN 20 MG: 10 TABLET ORAL at 10:17

## 2019-06-30 RX ADMIN — ACETAMINOPHEN 650 MG: 325 TABLET, FILM COATED ORAL at 10:15

## 2019-06-30 RX ADMIN — OXYCODONE HYDROCHLORIDE 15 MG: 5 TABLET ORAL at 20:14

## 2019-06-30 RX ADMIN — ACETAMINOPHEN 650 MG: 325 TABLET, FILM COATED ORAL at 16:09

## 2019-06-30 RX ADMIN — ACETAMINOPHEN 650 MG: 325 TABLET, FILM COATED ORAL at 00:22

## 2019-06-30 RX ADMIN — TRAZODONE HYDROCHLORIDE 100 MG: 100 TABLET ORAL at 21:59

## 2019-06-30 RX ADMIN — OXYCODONE HYDROCHLORIDE 15 MG: 5 TABLET ORAL at 00:23

## 2019-06-30 NOTE — PLAN OF CARE
Pt is alert and oriented. On bedrest with sitting protocol. Sat up for 15 minutes this shift, incision area inspected after, intact. Denies shortness of breath/chest pain. Self caths during the day, uses condom cath at night. Reports having bowel movement earlier today. Dressings to right hip and right incision CDI. Needed to change foam dressing to scrotum x2 d/t increased drainage. Requested PRN oxycodone x2 d/t pain to shoulders, hip, and back. Independent with bed mobility. Continue POC.

## 2019-06-30 NOTE — PLAN OF CARE
RN: Pt AA&O x3, able to make needs known. Dsgs changed by another RN per orders DT large incontinence of bladder; pt continues dribbling even after straight cath; refused afternoon baclofen. Straight caths ordered from [facility] and in pt room. Pain meds given when requested. Con't POC.

## 2019-06-30 NOTE — PLAN OF CARE
Appears to be sleeping well. A&O x4. On strict bedrest, independently positioning. Reports pain in shoulders and R hip, managed with PRN tylenol and oxy. Condom cath on overnight, self cath during day. Continue with POC.

## 2019-07-01 LAB
ALBUMIN UR-MCNC: NEGATIVE MG/DL
APPEARANCE UR: ABNORMAL
BACTERIA #/AREA URNS HPF: ABNORMAL /HPF
BILIRUB UR QL STRIP: NEGATIVE
COLOR UR AUTO: ABNORMAL
GLUCOSE UR STRIP-MCNC: NEGATIVE MG/DL
HGB UR QL STRIP: NEGATIVE
KETONES UR STRIP-MCNC: NEGATIVE MG/DL
LEUKOCYTE ESTERASE UR QL STRIP: ABNORMAL
MUCOUS THREADS #/AREA URNS LPF: PRESENT /LPF
NITRATE UR QL: POSITIVE
PH UR STRIP: 6 PH (ref 5–7)
RBC #/AREA URNS AUTO: 4 /HPF (ref 0–2)
SOURCE: ABNORMAL
SP GR UR STRIP: 1.01 (ref 1–1.03)
SQUAMOUS #/AREA URNS AUTO: 1 /HPF (ref 0–1)
UROBILINOGEN UR STRIP-MCNC: NORMAL MG/DL (ref 0–2)
WBC #/AREA URNS AUTO: 47 /HPF (ref 0–5)
WBC CLUMPS #/AREA URNS HPF: PRESENT /HPF

## 2019-07-01 PROCEDURE — 25000132 ZZH RX MED GY IP 250 OP 250 PS 637: Mod: GY | Performed by: INTERNAL MEDICINE

## 2019-07-01 PROCEDURE — 87186 SC STD MICRODIL/AGAR DIL: CPT | Performed by: INTERNAL MEDICINE

## 2019-07-01 PROCEDURE — 87086 URINE CULTURE/COLONY COUNT: CPT | Performed by: INTERNAL MEDICINE

## 2019-07-01 PROCEDURE — 99207 ZZC CDG-HISTORY COMP: MEETS EXP. PROBLEM FOCUSED - DOWN CODED LACK OF HPI: CPT | Performed by: HOSPITALIST

## 2019-07-01 PROCEDURE — 25000128 H RX IP 250 OP 636: Performed by: INTERNAL MEDICINE

## 2019-07-01 PROCEDURE — 12000022 ZZH R&B SNF

## 2019-07-01 PROCEDURE — 87088 URINE BACTERIA CULTURE: CPT | Performed by: INTERNAL MEDICINE

## 2019-07-01 PROCEDURE — 81001 URINALYSIS AUTO W/SCOPE: CPT | Performed by: HOSPITALIST

## 2019-07-01 PROCEDURE — 25000125 ZZHC RX 250: Performed by: INTERNAL MEDICINE

## 2019-07-01 PROCEDURE — 99308 SBSQ NF CARE LOW MDM 20: CPT | Performed by: HOSPITALIST

## 2019-07-01 RX ORDER — CEFTRIAXONE 1 G/1
1 INJECTION, POWDER, FOR SOLUTION INTRAMUSCULAR; INTRAVENOUS EVERY 24 HOURS
Status: DISCONTINUED | OUTPATIENT
Start: 2019-07-01 | End: 2019-07-08

## 2019-07-01 RX ORDER — LIDOCAINE 40 MG/G
CREAM TOPICAL
Status: DISCONTINUED | OUTPATIENT
Start: 2019-07-01 | End: 2019-07-19 | Stop reason: HOSPADM

## 2019-07-01 RX ORDER — LIDOCAINE 40 MG/G
CREAM TOPICAL
Status: DISCONTINUED | OUTPATIENT
Start: 2019-07-01 | End: 2019-07-02

## 2019-07-01 RX ADMIN — LIDOCAINE: 40 CREAM TOPICAL at 21:56

## 2019-07-01 RX ADMIN — BACLOFEN 20 MG: 10 TABLET ORAL at 13:41

## 2019-07-01 RX ADMIN — FERROUS SULFATE TAB 325 MG (65 MG ELEMENTAL FE) 325 MG: 325 (65 FE) TAB at 09:00

## 2019-07-01 RX ADMIN — OXYCODONE HYDROCHLORIDE 15 MG: 5 TABLET ORAL at 04:29

## 2019-07-01 RX ADMIN — METOPROLOL SUCCINATE 50 MG: 25 TABLET, EXTENDED RELEASE ORAL at 09:00

## 2019-07-01 RX ADMIN — OXYCODONE HYDROCHLORIDE 15 MG: 5 TABLET ORAL at 18:01

## 2019-07-01 RX ADMIN — OXYCODONE HYDROCHLORIDE 15 MG: 5 TABLET ORAL at 22:00

## 2019-07-01 RX ADMIN — ACETAMINOPHEN 650 MG: 325 TABLET, FILM COATED ORAL at 18:01

## 2019-07-01 RX ADMIN — BACLOFEN 20 MG: 10 TABLET ORAL at 18:01

## 2019-07-01 RX ADMIN — OXYCODONE HYDROCHLORIDE 15 MG: 5 TABLET ORAL at 13:41

## 2019-07-01 RX ADMIN — OXYCODONE HYDROCHLORIDE 15 MG: 5 TABLET ORAL at 09:00

## 2019-07-01 RX ADMIN — OXYCODONE HYDROCHLORIDE 15 MG: 5 TABLET ORAL at 00:27

## 2019-07-01 RX ADMIN — SENNOSIDES AND DOCUSATE SODIUM 2 TABLET: 8.6; 5 TABLET ORAL at 08:59

## 2019-07-01 RX ADMIN — ACETAMINOPHEN 650 MG: 325 TABLET, FILM COATED ORAL at 22:00

## 2019-07-01 RX ADMIN — Medication 1 CAPSULE: at 08:59

## 2019-07-01 RX ADMIN — ENOXAPARIN SODIUM 40 MG: 40 INJECTION SUBCUTANEOUS at 08:59

## 2019-07-01 RX ADMIN — BACLOFEN 20 MG: 10 TABLET ORAL at 22:00

## 2019-07-01 RX ADMIN — MELATONIN TAB 3 MG 6 MG: 3 TAB at 22:00

## 2019-07-01 RX ADMIN — TOLTERODINE 8 MG: 4 CAPSULE, EXTENDED RELEASE ORAL at 08:59

## 2019-07-01 RX ADMIN — TRAZODONE HYDROCHLORIDE 100 MG: 100 TABLET ORAL at 22:00

## 2019-07-01 RX ADMIN — BACLOFEN 20 MG: 10 TABLET ORAL at 08:59

## 2019-07-01 NOTE — PLAN OF CARE
RN: L shoulder and R hip pain comfortably managed with Oxycodone 15 mg tab every 4H per pt request. Dressings x 3 checked x 2. ALL remain CDI except the mepilex on chasidy-scrotal wound- it was soaked with sero-sanguinous drainage. Ulceration is no longer bulging. The wound bed/base is now more visible-  is moist and red. Kelly applied per order. Condom cath in place and planning to continue to use it x 2 days to avoid any urinary leakage that flows to his scrotum. Appear to be sleeping/resting between cares/meds. Continue with plan of care.

## 2019-07-01 NOTE — PLAN OF CARE
Pt is alert and oriented. On bedrest with sitting protocol. Sat up for 15 minutes this shift, incision area inspected after, intact. Denies shortness of breath/chest pain. Self caths during the day, uses condom cath at night. Reports having bowel movement earlier today. Dressings to right hip and right incision CDI. Needed to change foam dressing to scrotum x12 d/t increased drainage. Scrotal wound is raised/bulging.  Requested PRN oxycodone x2 d/t pain to shoulders, hip, and back. Independent with bed mobility. Continue POC.
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
## 2019-07-01 NOTE — PLAN OF CARE
No new concern.  Sat up x 2 per order. Skin and incision check done and no new problem noted.  All three dressing changed. See flow sheet for details.   Denies SOB.   Oxycodone provided per request.   Dr Almanza updated per pt's request regarding urine leaking between st cath. Pt currently on 8 mg of Cadet LA.   Continue to monitor.

## 2019-07-02 ENCOUNTER — ANESTHESIA EVENT (OUTPATIENT)
Dept: REHABILITATION | Facility: SKILLED NURSING FACILITY | Age: 54
End: 2019-07-02

## 2019-07-02 ENCOUNTER — ANESTHESIA (OUTPATIENT)
Dept: REHABILITATION | Facility: SKILLED NURSING FACILITY | Age: 54
End: 2019-07-02

## 2019-07-02 LAB
BACTERIA SPEC CULT: ABNORMAL
Lab: ABNORMAL
SPECIMEN SOURCE: ABNORMAL

## 2019-07-02 PROCEDURE — 25000132 ZZH RX MED GY IP 250 OP 250 PS 637: Mod: GY | Performed by: INTERNAL MEDICINE

## 2019-07-02 PROCEDURE — 40000671 ZZH STATISTIC ANESTHESIA CASE

## 2019-07-02 PROCEDURE — 25000128 H RX IP 250 OP 636: Performed by: HOSPITALIST

## 2019-07-02 PROCEDURE — 12000022 ZZH R&B SNF

## 2019-07-02 PROCEDURE — 25000128 H RX IP 250 OP 636: Performed by: INTERNAL MEDICINE

## 2019-07-02 RX ADMIN — OXYCODONE HYDROCHLORIDE 15 MG: 5 TABLET ORAL at 13:30

## 2019-07-02 RX ADMIN — OXYCODONE HYDROCHLORIDE 15 MG: 5 TABLET ORAL at 09:42

## 2019-07-02 RX ADMIN — BACLOFEN 20 MG: 10 TABLET ORAL at 09:42

## 2019-07-02 RX ADMIN — TRAZODONE HYDROCHLORIDE 100 MG: 100 TABLET ORAL at 21:59

## 2019-07-02 RX ADMIN — METOPROLOL SUCCINATE 50 MG: 25 TABLET, EXTENDED RELEASE ORAL at 09:42

## 2019-07-02 RX ADMIN — BACLOFEN 20 MG: 10 TABLET ORAL at 21:59

## 2019-07-02 RX ADMIN — OXYCODONE HYDROCHLORIDE 15 MG: 5 TABLET ORAL at 21:58

## 2019-07-02 RX ADMIN — CEFTRIAXONE SODIUM 1 G: 1 INJECTION, POWDER, FOR SOLUTION INTRAMUSCULAR; INTRAVENOUS at 00:48

## 2019-07-02 RX ADMIN — BACLOFEN 20 MG: 10 TABLET ORAL at 13:29

## 2019-07-02 RX ADMIN — OXYCODONE HYDROCHLORIDE 15 MG: 5 TABLET ORAL at 17:30

## 2019-07-02 RX ADMIN — FERROUS SULFATE TAB 325 MG (65 MG ELEMENTAL FE) 325 MG: 325 (65 FE) TAB at 09:43

## 2019-07-02 RX ADMIN — CEFTRIAXONE SODIUM 1 G: 1 INJECTION, POWDER, FOR SOLUTION INTRAMUSCULAR; INTRAVENOUS at 23:37

## 2019-07-02 RX ADMIN — ENOXAPARIN SODIUM 40 MG: 40 INJECTION SUBCUTANEOUS at 09:42

## 2019-07-02 RX ADMIN — MELATONIN TAB 3 MG 6 MG: 3 TAB at 21:59

## 2019-07-02 RX ADMIN — TOLTERODINE 8 MG: 4 CAPSULE, EXTENDED RELEASE ORAL at 09:43

## 2019-07-02 RX ADMIN — BACLOFEN 20 MG: 10 TABLET ORAL at 17:30

## 2019-07-02 RX ADMIN — Medication 1 CAPSULE: at 09:42

## 2019-07-02 NOTE — PLAN OF CARE
"Patient A&Ox4. Seen by Dr. Almanza this afternoon. Patient exercised conservative sitting x1 at 1800. Good appetite. PRN oxycodone and Tylenol given x2 for shoulder/ hip pain with moderate relief. Dressings to R hip incision, scrotum, and old PARESH drain replaced at 1530 and remained CDI. Patient performed rectal stimulation and had a BM after supper. Patient refused PVR checks. UA sample obtained via straight-cath at 1800 and sent to lab. Received order for IV rocephin 1g q24 hrs for UTI at 2045. Flyer/ ED RN attempted to start IV line with lidocaine premedication per patient's request; unable to successfully establish IV access. Anesthesia paged via ; awaiting return call. Patient appeared frustrated along the process d/t finding out about UA result and not wanting to be \"any sicker than this\". Active listening provided with some success. Report passed on to incoming shift for monitoring.    /68 (BP Location: Right arm)   Pulse 71   Temp 96.8  F (36  C) (Oral)   Resp 18   Ht 1.93 m (6' 4\")   Wt 80.3 kg (177 lb 1.6 oz)   SpO2 98%   BMI 21.56 kg/m      "

## 2019-07-02 NOTE — PLAN OF CARE
RN: CRNA came and able to start PIV on pt's L hand at 0045. Ordered Rocephin IV administered for UTI without problem. UC still pending. Pt more accepting. Dressings checked and were all CDI except the  old PARESH site on R hip. Moderate amount of serosanguinous drainage noted on dressing. Has Hydrofera blue in place. Only ABD pad covering the site was changed. Denies need for any pain med or be woken up when due. Claimed he would call as needed. Condom cath in place. Appear to be sleeping/resting without problem. Continue with plan of care.

## 2019-07-02 NOTE — PLAN OF CARE
Alert and oriented x 4.  Pain under control with oxycodone.  Denies SOB.  Continues with self cath and dig stim.  Appetite good.   On bed rest and sitting protocol. No skin/incision concern noted after sitting.  All dressing changed. See flow sheet for details.  Continue to monitor.

## 2019-07-03 ENCOUNTER — APPOINTMENT (OUTPATIENT)
Dept: LAB | Facility: CLINIC | Age: 54
End: 2019-07-03
Attending: INTERNAL MEDICINE
Payer: MEDICARE

## 2019-07-03 PROCEDURE — 97602 WOUND(S) CARE NON-SELECTIVE: CPT

## 2019-07-03 PROCEDURE — 25000128 H RX IP 250 OP 636: Performed by: HOSPITALIST

## 2019-07-03 PROCEDURE — 25000132 ZZH RX MED GY IP 250 OP 250 PS 637: Mod: GY | Performed by: INTERNAL MEDICINE

## 2019-07-03 PROCEDURE — 12000022 ZZH R&B SNF

## 2019-07-03 PROCEDURE — G0463 HOSPITAL OUTPT CLINIC VISIT: HCPCS

## 2019-07-03 RX ADMIN — METOPROLOL SUCCINATE 50 MG: 25 TABLET, EXTENDED RELEASE ORAL at 07:52

## 2019-07-03 RX ADMIN — TRAZODONE HYDROCHLORIDE 100 MG: 100 TABLET ORAL at 21:56

## 2019-07-03 RX ADMIN — FERROUS SULFATE TAB 325 MG (65 MG ELEMENTAL FE) 325 MG: 325 (65 FE) TAB at 07:52

## 2019-07-03 RX ADMIN — SENNOSIDES AND DOCUSATE SODIUM 2 TABLET: 8.6; 5 TABLET ORAL at 08:03

## 2019-07-03 RX ADMIN — OXYCODONE HYDROCHLORIDE 15 MG: 5 TABLET ORAL at 18:51

## 2019-07-03 RX ADMIN — OXYCODONE HYDROCHLORIDE 15 MG: 5 TABLET ORAL at 06:02

## 2019-07-03 RX ADMIN — BACLOFEN 20 MG: 10 TABLET ORAL at 08:03

## 2019-07-03 RX ADMIN — BACLOFEN 20 MG: 10 TABLET ORAL at 13:10

## 2019-07-03 RX ADMIN — BACLOFEN 20 MG: 10 TABLET ORAL at 21:55

## 2019-07-03 RX ADMIN — CEFTRIAXONE SODIUM 1 G: 1 INJECTION, POWDER, FOR SOLUTION INTRAMUSCULAR; INTRAVENOUS at 22:08

## 2019-07-03 RX ADMIN — TOLTERODINE 8 MG: 4 CAPSULE, EXTENDED RELEASE ORAL at 07:52

## 2019-07-03 RX ADMIN — OXYCODONE HYDROCHLORIDE 15 MG: 5 TABLET ORAL at 22:49

## 2019-07-03 RX ADMIN — Medication 1 CAPSULE: at 07:52

## 2019-07-03 RX ADMIN — OXYCODONE HYDROCHLORIDE 15 MG: 5 TABLET ORAL at 14:36

## 2019-07-03 RX ADMIN — MELATONIN TAB 3 MG 6 MG: 3 TAB at 21:56

## 2019-07-03 RX ADMIN — BACLOFEN 20 MG: 10 TABLET ORAL at 17:17

## 2019-07-03 RX ADMIN — OXYCODONE HYDROCHLORIDE 15 MG: 5 TABLET ORAL at 10:31

## 2019-07-03 RX ADMIN — OXYCODONE HYDROCHLORIDE 15 MG: 5 TABLET ORAL at 01:50
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
## 2019-07-03 NOTE — CONSULTS
Responding to IR consult for left shoulder steroid injection.  Consult request notes referring his orthopedics, Dr. Yany Sloan.  I spoke with referring who requests radiology perform left shoulder injection as the patient has had them previously and has benefited.      Will add-on for LEFT shoulder steroid injection on Friday 7/5/2019 with IR.

## 2019-07-03 NOTE — PLAN OF CARE
Pt alert and orientated. Bedrest. Condom cath at night. Does digital simulation on self.  Foam on buttocks area and sutures.  L PIV SL.  Having incisional hip pain and left shoulder pain. Taking oxycodone 15mg every 4 hours. Denies nausea and SOB.  Able to make needs known.  Continue plan of care.

## 2019-07-03 NOTE — PLAN OF CARE
Alert and oriented x 4.   Medicated with oxycodone per request.   Pt will get cortisone injection per ortho order today.  All dressing changed by [facility] nurse. See her note for details.   Appetite good.  Continuous on bed rest with sitting program. No new concern noted after sitting.   No problem with B/B. Continues self cath and dig Stim per home routine.   Continue to monitor.

## 2019-07-03 NOTE — PLAN OF CARE
Patient alert and oriented x4, able to make needs known. Dressing changed once to scrotal wound due to drainage. PRN oxycodone 15mg given twice this shift @ 1730 and 2130 due to pain. No further complaints of pain, nausea, or SOB. Digital stimulation performed this evening. Condom catheter on.   PIV line for IV Rocephin.   No other concerns.

## 2019-07-03 NOTE — PROVIDER NOTIFICATION
Patient is wondering if he will receive a cortisone injection in his shoulder that was recommended by Ortho. I paged Shaylee Montes De Oca, ortho CNP, and she will check into this and call me back.

## 2019-07-04 LAB
ANION GAP SERPL CALCULATED.3IONS-SCNC: 8 MMOL/L (ref 3–14)
BUN SERPL-MCNC: 26 MG/DL (ref 7–30)
CALCIUM SERPL-MCNC: 9.4 MG/DL (ref 8.5–10.1)
CHLORIDE SERPL-SCNC: 102 MMOL/L (ref 94–109)
CO2 SERPL-SCNC: 26 MMOL/L (ref 20–32)
CREAT SERPL-MCNC: 0.59 MG/DL (ref 0.66–1.25)
CREAT SERPL-MCNC: 0.59 MG/DL (ref 0.66–1.25)
ERYTHROCYTE [DISTWIDTH] IN BLOOD BY AUTOMATED COUNT: 20.8 % (ref 10–15)
GFR ESTIMATE EXT - HISTORICAL: >90 ML/MIN/1.73M2
GFR ESTIMATE, IF BLACK EXT - HISTORICAL: >90 ML/MIN/1.73M2
GFR SERPL CREATININE-BSD FRML MDRD: >90 ML/MIN/{1.73_M2}
GLUCOSE SERPL-MCNC: 131 MG/DL (ref 70–99)
HCT VFR BLD AUTO: 30.3 % (ref 40–53)
HGB BLD-MCNC: 8.9 G/DL (ref 13.3–17.7)
MCH RBC QN AUTO: 20.1 PG (ref 26.5–33)
MCHC RBC AUTO-ENTMCNC: 29.4 G/DL (ref 31.5–36.5)
MCV RBC AUTO: 68 FL (ref 78–100)
PLATELET # BLD AUTO: 282 10E9/L (ref 150–450)
POTASSIUM SERPL-SCNC: 3.7 MMOL/L (ref 3.4–5.3)
RBC # BLD AUTO: 4.43 10E12/L (ref 4.4–5.9)
SODIUM SERPL-SCNC: 136 MMOL/L (ref 133–144)
WBC # BLD AUTO: 8.2 10E9/L (ref 4–11)

## 2019-07-04 PROCEDURE — 25000132 ZZH RX MED GY IP 250 OP 250 PS 637: Mod: GY | Performed by: INTERNAL MEDICINE

## 2019-07-04 PROCEDURE — 85027 COMPLETE CBC AUTOMATED: CPT | Performed by: HOSPITALIST

## 2019-07-04 PROCEDURE — 25800030 ZZH RX IP 258 OP 636

## 2019-07-04 PROCEDURE — 25000128 H RX IP 250 OP 636: Performed by: INTERNAL MEDICINE

## 2019-07-04 PROCEDURE — 80048 BASIC METABOLIC PNL TOTAL CA: CPT | Performed by: HOSPITALIST

## 2019-07-04 PROCEDURE — 36415 COLL VENOUS BLD VENIPUNCTURE: CPT | Performed by: HOSPITALIST

## 2019-07-04 PROCEDURE — 12000022 ZZH R&B SNF

## 2019-07-04 RX ORDER — SODIUM CHLORIDE 9 MG/ML
INJECTION, SOLUTION INTRAVENOUS
Status: COMPLETED
Start: 2019-07-04 | End: 2019-07-04

## 2019-07-04 RX ADMIN — SENNOSIDES AND DOCUSATE SODIUM 2 TABLET: 8.6; 5 TABLET ORAL at 07:42

## 2019-07-04 RX ADMIN — BACLOFEN 20 MG: 10 TABLET ORAL at 07:41

## 2019-07-04 RX ADMIN — BACLOFEN 20 MG: 10 TABLET ORAL at 16:56

## 2019-07-04 RX ADMIN — Medication 1 CAPSULE: at 07:41

## 2019-07-04 RX ADMIN — BACLOFEN 20 MG: 10 TABLET ORAL at 21:10

## 2019-07-04 RX ADMIN — SODIUM CHLORIDE 500 ML: 9 INJECTION, SOLUTION INTRAVENOUS at 21:23

## 2019-07-04 RX ADMIN — ENOXAPARIN SODIUM 40 MG: 40 INJECTION SUBCUTANEOUS at 09:31

## 2019-07-04 RX ADMIN — OXYCODONE HYDROCHLORIDE 15 MG: 5 TABLET ORAL at 16:56

## 2019-07-04 RX ADMIN — BACLOFEN 20 MG: 10 TABLET ORAL at 13:10

## 2019-07-04 RX ADMIN — ENOXAPARIN SODIUM 40 MG: 40 INJECTION SUBCUTANEOUS at 07:43

## 2019-07-04 RX ADMIN — METOPROLOL SUCCINATE 50 MG: 25 TABLET, EXTENDED RELEASE ORAL at 07:41

## 2019-07-04 RX ADMIN — OXYCODONE HYDROCHLORIDE 15 MG: 5 TABLET ORAL at 02:54

## 2019-07-04 RX ADMIN — TOLTERODINE 8 MG: 4 CAPSULE, EXTENDED RELEASE ORAL at 07:42

## 2019-07-04 RX ADMIN — BACLOFEN 20 MG: 10 TABLET ORAL at 09:31

## 2019-07-04 RX ADMIN — FERROUS SULFATE TAB 325 MG (65 MG ELEMENTAL FE) 325 MG: 325 (65 FE) TAB at 07:40

## 2019-07-04 RX ADMIN — TRAZODONE HYDROCHLORIDE 100 MG: 100 TABLET ORAL at 21:10

## 2019-07-04 RX ADMIN — MELATONIN TAB 3 MG 6 MG: 3 TAB at 21:10

## 2019-07-04 RX ADMIN — OXYCODONE HYDROCHLORIDE 15 MG: 5 TABLET ORAL at 21:11

## 2019-07-04 RX ADMIN — OXYCODONE HYDROCHLORIDE 15 MG: 5 TABLET ORAL at 07:28

## 2019-07-04 RX ADMIN — CEFTRIAXONE SODIUM 1 G: 1 INJECTION, POWDER, FOR SOLUTION INTRAMUSCULAR; INTRAVENOUS at 21:23

## 2019-07-04 RX ADMIN — SENNOSIDES AND DOCUSATE SODIUM 2 TABLET: 8.6; 5 TABLET ORAL at 09:32

## 2019-07-04 NOTE — PLAN OF CARE
RN: No new concerns or issues, VSS. IV abx infused via PIV without issues. Wound dressing CDI. PT requested PRN med for pain every 4hrs with good effects. Continue with POC.

## 2019-07-04 NOTE — PLAN OF CARE
"  VS: /62 (BP Location: Right arm)   Pulse 70   Temp 97.7  F (36.5  C) (Oral)   Resp 16   Ht 1.93 m (6' 4\")   Wt 80.3 kg (177 lb 1.6 oz)   SpO2 97%   BMI 21.56 kg/m     O2: Pt on room air. Denied shortness of breath    Output: Pt straight caths self in room x2. Output of 280 & 210.    Last BM: LBM: 7/2/19.    Activity: Bedrest. Pt has started sitting protocol. Sits up in chair for 15min x4 a day.   Positions independently in bed    Transfers using mechanical lift   Skin: Skin is mainly intact. Sutures on buttocks through chasidy area. Incision on buttocks has tiny open area. Dressing changed per protocol. Wound/ulcer on scrotum. Dressing changed per protocol.    Pain: Pt denied pain   CMS: Alert and oriented. Helpful and cooperative with care   Dressing: Dressings changed today. Due to be changed 7/5/19. CDI   Diet: Regular   LDA: N/a   Equipment: Call light and phone within reach.    Plan: Continue with POC   Additional Info: Paraplegic. Going to get steroid injection tomorrow 7/5/19      "

## 2019-07-04 NOTE — PLAN OF CARE
RN: B shoulder and R hip pain comfortably managed with Oxycodone 15 mg tab x 1 this shift. Steroid injection on shoulder scheduled 7/5 at IR.  R hip with the old PARESH site has moderate amount of serounguinous drainage on dressing. Scrotal wound has no dressing in place and noted small drainage on pt's brief. R posterior hip incision with Mepilex intact. Wound care and dressing change done  on all 3 areas at 0315 per order. Independent with bed mobility. Condom cath intact. Up early to wash up. Inquiring if haircut service available in the unit. Will check with [facility] this am. Appear to be sleeping/resting. Continue with plan of care.

## 2019-07-05 ENCOUNTER — APPOINTMENT (OUTPATIENT)
Dept: LAB | Facility: CLINIC | Age: 54
End: 2019-07-05
Attending: INTERNAL MEDICINE
Payer: MEDICARE

## 2019-07-05 ENCOUNTER — APPOINTMENT (OUTPATIENT)
Dept: INTERVENTIONAL RADIOLOGY/VASCULAR | Facility: CLINIC | Age: 54
End: 2019-07-05
Attending: PHYSICIAN ASSISTANT
Payer: MEDICARE

## 2019-07-05 PROCEDURE — 27210904 ZZH KIT CR6

## 2019-07-05 PROCEDURE — 25000128 H RX IP 250 OP 636: Performed by: RADIOLOGY

## 2019-07-05 PROCEDURE — 77002 NEEDLE LOCALIZATION BY XRAY: CPT

## 2019-07-05 PROCEDURE — 25000132 ZZH RX MED GY IP 250 OP 250 PS 637: Mod: GY | Performed by: INTERNAL MEDICINE

## 2019-07-05 PROCEDURE — 3E0U3BZ INTRODUCTION OF ANESTHETIC AGENT INTO JOINTS, PERCUTANEOUS APPROACH: ICD-10-PCS | Performed by: RADIOLOGY

## 2019-07-05 PROCEDURE — 25000128 H RX IP 250 OP 636: Performed by: INTERNAL MEDICINE

## 2019-07-05 PROCEDURE — 12000022 ZZH R&B SNF

## 2019-07-05 PROCEDURE — 27211039 ZZH NEEDLE CR2

## 2019-07-05 PROCEDURE — 25000125 ZZHC RX 250: Performed by: RADIOLOGY

## 2019-07-05 PROCEDURE — 20610 DRAIN/INJ JOINT/BURSA W/O US: CPT

## 2019-07-05 PROCEDURE — 3E0U33Z INTRODUCTION OF ANTI-INFLAMMATORY INTO JOINTS, PERCUTANEOUS APPROACH: ICD-10-PCS | Performed by: RADIOLOGY

## 2019-07-05 RX ORDER — TRIAMCINOLONE ACETONIDE 40 MG/ML
40 INJECTION, SUSPENSION INTRA-ARTICULAR; INTRAMUSCULAR ONCE
Status: COMPLETED | OUTPATIENT
Start: 2019-07-05 | End: 2019-07-05

## 2019-07-05 RX ORDER — BUPIVACAINE HYDROCHLORIDE 2.5 MG/ML
0-10 INJECTION, SOLUTION INFILTRATION; PERINEURAL ONCE
Status: COMPLETED | OUTPATIENT
Start: 2019-07-05 | End: 2019-07-05

## 2019-07-05 RX ADMIN — MELATONIN TAB 3 MG 6 MG: 3 TAB at 21:34

## 2019-07-05 RX ADMIN — TRAZODONE HYDROCHLORIDE 100 MG: 100 TABLET ORAL at 21:35

## 2019-07-05 RX ADMIN — ENOXAPARIN SODIUM 40 MG: 40 INJECTION SUBCUTANEOUS at 08:46

## 2019-07-05 RX ADMIN — BACLOFEN 20 MG: 10 TABLET ORAL at 13:56

## 2019-07-05 RX ADMIN — OXYCODONE HYDROCHLORIDE 15 MG: 5 TABLET ORAL at 08:42

## 2019-07-05 RX ADMIN — METOPROLOL SUCCINATE 50 MG: 25 TABLET, EXTENDED RELEASE ORAL at 08:43

## 2019-07-05 RX ADMIN — Medication 1 CAPSULE: at 08:44

## 2019-07-05 RX ADMIN — BUPIVACAINE HYDROCHLORIDE 4 ML: 2.5 INJECTION, SOLUTION EPIDURAL; INFILTRATION; INTRACAUDAL; PERINEURAL at 13:23

## 2019-07-05 RX ADMIN — CEFTRIAXONE SODIUM 1 G: 1 INJECTION, POWDER, FOR SOLUTION INTRAMUSCULAR; INTRAVENOUS at 21:34

## 2019-07-05 RX ADMIN — LIDOCAINE HYDROCHLORIDE 2 ML: 10 INJECTION, SOLUTION EPIDURAL; INFILTRATION; INTRACAUDAL; PERINEURAL at 13:22

## 2019-07-05 RX ADMIN — BACLOFEN 20 MG: 10 TABLET ORAL at 08:44

## 2019-07-05 RX ADMIN — OXYCODONE HYDROCHLORIDE 15 MG: 5 TABLET ORAL at 13:56

## 2019-07-05 RX ADMIN — OXYCODONE HYDROCHLORIDE 15 MG: 5 TABLET ORAL at 22:22

## 2019-07-05 RX ADMIN — TRIAMCINOLONE ACETONIDE 40 MG: 40 INJECTION, SUSPENSION INTRA-ARTICULAR; INTRAMUSCULAR at 13:22

## 2019-07-05 RX ADMIN — OXYCODONE HYDROCHLORIDE 15 MG: 5 TABLET ORAL at 18:26

## 2019-07-05 RX ADMIN — TOLTERODINE 8 MG: 4 CAPSULE, EXTENDED RELEASE ORAL at 08:42

## 2019-07-05 RX ADMIN — FERROUS SULFATE TAB 325 MG (65 MG ELEMENTAL FE) 325 MG: 325 (65 FE) TAB at 08:44

## 2019-07-05 RX ADMIN — OXYCODONE HYDROCHLORIDE 15 MG: 5 TABLET ORAL at 04:16

## 2019-07-05 NOTE — PLAN OF CARE
Pt is alert and oriented x4. Mechanical lift. Continues on bed rest. Denies SOB and chest pain. Administered PRN oxycodone 15 mg x2 for bilateral back, hip and shoulder pain with some relief voiced from pt. Dressing completed, per orders. Blister noted on incision/suture area between right posterior hip (old PARESH site) and right posterior dehisced surgical incision; informed Charge and added on sticky. Pt left for IR around 1230 and returned at 1345. Continue w/ POC.     Temp: 97  F (36.1  C) Temp src: Oral BP: 118/66 Pulse: 80 Heart Rate: 68 Resp: 16 SpO2: 98 % O2 Device: None (Room air)

## 2019-07-05 NOTE — PLAN OF CARE
RN: Pt alert and oriented, VSS. Pt remains on bed rest, turned/repositioned with staff assistance. Appetite good. IV abx infused via PIV without issues. Wound dressing changed as ordered. Pt does self st.cath twice this shift, urine color is clear/yellow. Pt continue having pain in left shoulder / lower back and right hip, pt requested PRN med every 4 hours with good effects. Pt has no complaints and very pleasant. Continue with POC.

## 2019-07-05 NOTE — BRIEF OP NOTE
Left shoulder arthrogram for corticosteroid and local anesthetic instillation, 7/5/2019.    HISTORY:    Left shoulder pain.    Interventional radiologist: LIZZY Bear M.D.    I, JESSE BEAR MD, attest that I was present in the procedure room for the entire procedure.    Fluoro time: 0.3 minutes.    PROCEDURE:  After verbal and written consent was obtained from the patient , the patient was placed supine on the fluoroscopy table. The left shoulder was prepped and draped in the normal sterile fashion. 1% lidocaine was used to anesthetize the skin and subcutaneous tissues. Utilizing fluoroscopic guidance, a 3-1/2 inch, 22-gauge spinal needle was advanced onto the femoral neck. 2 to 3 mL of Isovue-M 200 contrast was injected to assure intra articular placement. A mixture of Kenalog 40 mg/1 ml with 4 ml 0.25% bupivacaine  was then injected into the hip joint. The needle was removed with stylet in place. There were no immediate complications. After being observed in the fluoroscopy room for 15-20 minutes, the patient was sent back to the floor in good condition.      IMPRESSION:   Successful left shoulder arthrogram for corticosteroid and local anesthetic instillation.

## 2019-07-05 NOTE — PLAN OF CARE
VS: VSS   O2: O2 stable on room air.   Output: Pt uses straight cath. Used once this shift.   Last BM: 7/3/19.    Activity: Bedrest. Repositions independently while in bed. Assist of 2 with lyko lift when transferring. Pt slept through most of shift.    Up for meals? Yes.   Skin: Skin is mainly intact. Sutures on right posterior incision. Incision on buttocks has tiny open area. Wound/ulcer on scrotum. Open to air. Scattered bruising. Skin dry and warm.   Pain: Complained of left shoulder and right hip pain. Pt given 15 mg of oxycodone with some relief.    CMS: Intact. Alert and oriented. No numbness or tingling.   Dressing: UTV   Diet: Regular diet   LDA: PIV Left forearm saline locked.    Equipment: IV pole, personal belongings.   Plan: Continue with plan of care.    Additional Info: Steroid injection 7/5. NPO after 10 Am.

## 2019-07-06 PROCEDURE — 25000132 ZZH RX MED GY IP 250 OP 250 PS 637: Mod: GY | Performed by: INTERNAL MEDICINE

## 2019-07-06 PROCEDURE — 25000128 H RX IP 250 OP 636: Performed by: INTERNAL MEDICINE

## 2019-07-06 PROCEDURE — 12000022 ZZH R&B SNF

## 2019-07-06 PROCEDURE — 99308 SBSQ NF CARE LOW MDM 20: CPT | Performed by: INTERNAL MEDICINE

## 2019-07-06 RX ADMIN — Medication 1 CAPSULE: at 08:36

## 2019-07-06 RX ADMIN — OXYCODONE HYDROCHLORIDE 15 MG: 5 TABLET ORAL at 21:30

## 2019-07-06 RX ADMIN — OXYCODONE HYDROCHLORIDE 15 MG: 5 TABLET ORAL at 07:32

## 2019-07-06 RX ADMIN — BACLOFEN 20 MG: 10 TABLET ORAL at 17:30

## 2019-07-06 RX ADMIN — TOLTERODINE 8 MG: 4 CAPSULE, EXTENDED RELEASE ORAL at 08:36

## 2019-07-06 RX ADMIN — BACLOFEN 20 MG: 10 TABLET ORAL at 13:13

## 2019-07-06 RX ADMIN — OXYCODONE HYDROCHLORIDE 15 MG: 5 TABLET ORAL at 13:13

## 2019-07-06 RX ADMIN — OXYCODONE HYDROCHLORIDE 15 MG: 5 TABLET ORAL at 03:15

## 2019-07-06 RX ADMIN — TRAZODONE HYDROCHLORIDE 100 MG: 100 TABLET ORAL at 21:25

## 2019-07-06 RX ADMIN — OXYCODONE HYDROCHLORIDE 15 MG: 5 TABLET ORAL at 17:30

## 2019-07-06 RX ADMIN — SENNOSIDES AND DOCUSATE SODIUM 2 TABLET: 8.6; 5 TABLET ORAL at 21:24

## 2019-07-06 RX ADMIN — ENOXAPARIN SODIUM 40 MG: 40 INJECTION SUBCUTANEOUS at 08:35

## 2019-07-06 RX ADMIN — SENNOSIDES AND DOCUSATE SODIUM 2 TABLET: 8.6; 5 TABLET ORAL at 08:46

## 2019-07-06 RX ADMIN — MELATONIN TAB 3 MG 6 MG: 3 TAB at 21:25

## 2019-07-06 RX ADMIN — BACLOFEN 20 MG: 10 TABLET ORAL at 08:36

## 2019-07-06 RX ADMIN — CEFTRIAXONE SODIUM 1 G: 1 INJECTION, POWDER, FOR SOLUTION INTRAMUSCULAR; INTRAVENOUS at 21:25

## 2019-07-06 RX ADMIN — METOPROLOL SUCCINATE 50 MG: 25 TABLET, EXTENDED RELEASE ORAL at 08:38

## 2019-07-06 RX ADMIN — FERROUS SULFATE TAB 325 MG (65 MG ELEMENTAL FE) 325 MG: 325 (65 FE) TAB at 08:36

## 2019-07-06 NOTE — PLAN OF CARE
Pt is alert and oriented. Mechanical lift and continues on bed rest. Administered PRN oxycodone 15 mg x1 for bilateral shoulder pain. Dressing completed, per orders.  mL. Pt verbalized frustration due to length of stay and health situation; provided therapeutic listening and allowed pt to verbalize feelings. Pt will be changing to room 405 due room size and change of scenery. Continue w/ POC.     Temp: 97  F (36.1  C) Temp src: Oral BP: 111/64 Pulse: 56 Heart Rate: 65 Resp: 16 SpO2: 99 % O2 Device: None (Room air)

## 2019-07-06 NOTE — PLAN OF CARE
VSS. Alert and orientedx4. Denies chest [ain, SOB, diziiness and numbness. Pt declined baclofen 2x. Verbalized generalized pain, Oxycodone given 2x this shift with relief. Dressing changed to chasidy scrotal wound and R post hip d/t drainage. Pt declined bladder scan. PIV to L hand patent, continued on IV Rocephin.  Will continue plan of care.  Vital signs:  Temp: 96.3  F (35.7  C) Temp src: Oral BP: 122/68 Pulse: 75 Heart Rate: 68 Resp: 20 SpO2: 98 % O2 Device: None (Room air)
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
## 2019-07-06 NOTE — PLAN OF CARE
Alert and oriented x4. No acute concerns overnight. Requested and received prn Oxycodone x1 for  shoulder and right hip pain, asleep during follow-up rounding. Condom catheter in place. PIV to left forearm is intact. Calls appropriately for assistance. Continue POC.

## 2019-07-06 NOTE — PLAN OF CARE
VSS. Alert and orientedx4. Denies SOB, chest pain, or any untoward s/sx. Likes to talk. Happy with his new room. Verbalized pain to shoulder and back, Oxycodone given with relief. Declined PVR this shift. Wound care done to periscrotal area and old PARESH sites d/t drainage. PIV to  hand for IV Rocephin.  Will continue plan of care.  Vital signs:  Temp: 96.8  F (36  C) Temp src: Oral BP: 122/58 Pulse: 56 Heart Rate: 78 Resp: 16 SpO2: 97 % O2 Device: None (Room air)

## 2019-07-07 PROCEDURE — 25000132 ZZH RX MED GY IP 250 OP 250 PS 637: Mod: GY | Performed by: INTERNAL MEDICINE

## 2019-07-07 PROCEDURE — 25000128 H RX IP 250 OP 636: Performed by: INTERNAL MEDICINE

## 2019-07-07 PROCEDURE — 12000022 ZZH R&B SNF

## 2019-07-07 RX ADMIN — TRAZODONE HYDROCHLORIDE 100 MG: 100 TABLET ORAL at 21:10

## 2019-07-07 RX ADMIN — OXYCODONE HYDROCHLORIDE 15 MG: 5 TABLET ORAL at 19:12

## 2019-07-07 RX ADMIN — OXYCODONE HYDROCHLORIDE 15 MG: 5 TABLET ORAL at 23:59

## 2019-07-07 RX ADMIN — Medication 1 CAPSULE: at 08:22

## 2019-07-07 RX ADMIN — ENOXAPARIN SODIUM 40 MG: 40 INJECTION SUBCUTANEOUS at 08:22

## 2019-07-07 RX ADMIN — SENNOSIDES AND DOCUSATE SODIUM 2 TABLET: 8.6; 5 TABLET ORAL at 08:22

## 2019-07-07 RX ADMIN — BACLOFEN 20 MG: 10 TABLET ORAL at 13:42

## 2019-07-07 RX ADMIN — SENNOSIDES AND DOCUSATE SODIUM 2 TABLET: 8.6; 5 TABLET ORAL at 21:10

## 2019-07-07 RX ADMIN — OXYCODONE HYDROCHLORIDE 15 MG: 5 TABLET ORAL at 07:01

## 2019-07-07 RX ADMIN — FERROUS SULFATE TAB 325 MG (65 MG ELEMENTAL FE) 325 MG: 325 (65 FE) TAB at 08:22

## 2019-07-07 RX ADMIN — METOPROLOL SUCCINATE 50 MG: 25 TABLET, EXTENDED RELEASE ORAL at 08:22

## 2019-07-07 RX ADMIN — CEFTRIAXONE SODIUM 1 G: 1 INJECTION, POWDER, FOR SOLUTION INTRAMUSCULAR; INTRAVENOUS at 21:11

## 2019-07-07 RX ADMIN — OXYCODONE HYDROCHLORIDE 15 MG: 5 TABLET ORAL at 11:10

## 2019-07-07 RX ADMIN — BACLOFEN 20 MG: 10 TABLET ORAL at 21:10

## 2019-07-07 RX ADMIN — TOLTERODINE 8 MG: 4 CAPSULE, EXTENDED RELEASE ORAL at 08:22

## 2019-07-07 RX ADMIN — MELATONIN TAB 3 MG 6 MG: 3 TAB at 21:10

## 2019-07-07 RX ADMIN — BACLOFEN 20 MG: 10 TABLET ORAL at 08:22

## 2019-07-07 RX ADMIN — BACLOFEN 20 MG: 10 TABLET ORAL at 16:58

## 2019-07-07 RX ADMIN — OXYCODONE HYDROCHLORIDE 15 MG: 5 TABLET ORAL at 15:17

## 2019-07-07 RX ADMIN — OXYCODONE HYDROCHLORIDE 15 MG: 5 TABLET ORAL at 01:36

## 2019-07-07 NOTE — PLAN OF CARE
No new concerns from the patient. Requested and received prn Oxycodone x1. Sleeping well in between cares. Independently repositioning. Condom catheter in place and draining well. No BM tonight. Calls for assistance. Continue with plan of care.

## 2019-07-07 NOTE — PLAN OF CARE
Pt is alert and oriented x4. Mechanical lift and continues on bed rest. Denies SOB and chest pain. Pt is happy with room change. Administered PRN Oxycodone for bilateral shoulder pain. Wound cares completed to scrotal wound, right posterior hip and posterior right buttock/dehisced surgical incision this shift, per orders. Pt independently performed digital stimulation and self cath. Reinforced dressing to left PIV. Pleasant and able to make needs known. Continue w/ POC.     Temp: 97.6  F (36.4  C) Temp src: Oral BP: 123/73   Heart Rate: 71 Resp: 16 SpO2: 96 % O2 Device: None (Room air)

## 2019-07-08 PROCEDURE — 25000132 ZZH RX MED GY IP 250 OP 250 PS 637: Mod: GY | Performed by: INTERNAL MEDICINE

## 2019-07-08 PROCEDURE — 12000022 ZZH R&B SNF

## 2019-07-08 PROCEDURE — 25000128 H RX IP 250 OP 636: Performed by: INTERNAL MEDICINE

## 2019-07-08 RX ORDER — CIPROFLOXACIN 250 MG/1
250 TABLET, FILM COATED ORAL EVERY 12 HOURS SCHEDULED
Status: COMPLETED | OUTPATIENT
Start: 2019-07-08 | End: 2019-07-15

## 2019-07-08 RX ADMIN — SENNOSIDES AND DOCUSATE SODIUM 2 TABLET: 8.6; 5 TABLET ORAL at 08:36

## 2019-07-08 RX ADMIN — ENOXAPARIN SODIUM 40 MG: 40 INJECTION SUBCUTANEOUS at 08:40

## 2019-07-08 RX ADMIN — BACLOFEN 20 MG: 10 TABLET ORAL at 13:09

## 2019-07-08 RX ADMIN — BACLOFEN 20 MG: 10 TABLET ORAL at 16:11

## 2019-07-08 RX ADMIN — MELATONIN TAB 3 MG 6 MG: 3 TAB at 22:34

## 2019-07-08 RX ADMIN — OXYCODONE HYDROCHLORIDE 15 MG: 5 TABLET ORAL at 11:01

## 2019-07-08 RX ADMIN — BACLOFEN 20 MG: 10 TABLET ORAL at 20:22

## 2019-07-08 RX ADMIN — OXYCODONE HYDROCHLORIDE 15 MG: 5 TABLET ORAL at 06:12

## 2019-07-08 RX ADMIN — TRAZODONE HYDROCHLORIDE 100 MG: 100 TABLET ORAL at 22:34

## 2019-07-08 RX ADMIN — BACLOFEN 20 MG: 10 TABLET ORAL at 08:36

## 2019-07-08 RX ADMIN — OXYCODONE HYDROCHLORIDE 15 MG: 5 TABLET ORAL at 16:11

## 2019-07-08 RX ADMIN — SENNOSIDES AND DOCUSATE SODIUM 2 TABLET: 8.6; 5 TABLET ORAL at 20:22

## 2019-07-08 RX ADMIN — FERROUS SULFATE TAB 325 MG (65 MG ELEMENTAL FE) 325 MG: 325 (65 FE) TAB at 08:36

## 2019-07-08 RX ADMIN — OXYCODONE HYDROCHLORIDE 15 MG: 5 TABLET ORAL at 20:22

## 2019-07-08 RX ADMIN — CIPROFLOXACIN HYDROCHLORIDE 250 MG: 250 TABLET, FILM COATED ORAL at 19:37

## 2019-07-08 RX ADMIN — Medication 1 CAPSULE: at 08:36

## 2019-07-08 RX ADMIN — TOLTERODINE 8 MG: 4 CAPSULE, EXTENDED RELEASE ORAL at 08:36

## 2019-07-08 RX ADMIN — METOPROLOL SUCCINATE 50 MG: 25 TABLET, EXTENDED RELEASE ORAL at 08:36

## 2019-07-08 NOTE — PROVIDER NOTIFICATION
Pt on IV Rocephin q24 hrs, refusing to have PIV put in/placed in. Dr Alfaro paged.    1700: Order noted for Cipro 250 mg PO q 12 hrs, IV Rocephin DC'd.

## 2019-07-08 NOTE — PLAN OF CARE
Patient alert and able to make needs known, repositioning self in bed independently. Patient asking for him to get eval from therapist so he can start working towards his discharge, wound to perineal areas has Mepilex no drainage noted and dressings intact. PRN Oxycodone 15 mg given x 2 for shoulder and back pain with adequate relief, continue plan of care

## 2019-07-08 NOTE — PROGRESS NOTES
CLINICAL NUTRITION SERVICES - REASSESSMENT NOTE     Nutrition Prescription    RECOMMENDATIONS FOR MDs/PROVIDERS TO ORDER:  None today    Malnutrition Status:    Patient does not meet two of the criteria necessary for diagnosing malnutrition    Recommendations already ordered by Registered Dietitian (RD):  Jean to order from peds menu    Future/Additional Recommendations:  None today     EVALUATION OF THE PROGRESS TOWARD GOALS   Diet: Regular (double portions PRN), Boost Plus TID + PRN, Gelatein PRN    Intake: 100% of meals per flowsheet. On average, pt is ordering 5130 kcal and 220 g protein daily per [employer]. This is likely meeting >100% minimum energy and protein needs. Pt reports he is growing tired of the food after being admitted for >1 month, however reports this is not affecting his intakes. Continues to enjoy supplements. Question actual intakes vs estimated needs given wt remains stable.     NEW FINDINGS   - Wts continue to be variable, possibly d/t bed wts with air mattress resulting in inaccurate wts. Overall, wt same from 1 month ago.   - Dr Blackmon managing wounds. Jean'ed pt for 90 degrees in bed only.     MALNUTRITION  % Intake: No decreased intake noted  % Weight Loss: None noted  Subcutaneous Fat Loss: None observed  Muscle Loss: None observed  Fluid Accumulation/Edema: None noted  Malnutrition Diagnosis: Patient does not meet two of the above criteria necessary for diagnosing malnutrition    Previous Nutrition Diagnosis  No nutrition diagnosis at this time d/t consistently meeting estimated nutritional needs   Evaluation: No change    CURRENT NUTRITION DIAGNOSIS  No nutrition diagnosis at this time d/t consistently meeting estimated nutritional needs with wt maintenance     INTERVENTIONS  Implementation  Discussed pts POC during interdisciplinary morning rounds    Monitoring/Evaluation  Progress toward goals will be monitored and evaluated per protocol q 14 days.       Vanessa Chahal RD,  LD  Unit Pager: [phone]

## 2019-07-08 NOTE — PLAN OF CARE
PT: Met with pt at bedside to discuss PT/OT initiation of services. Pt is eager to begin therapies once bedrest orders are lifted. Until this time, pt is not interested in initiating formal therapy d/t being IND with bed exercises.

## 2019-07-08 NOTE — PLAN OF CARE
No new concern.  Denies SOB.  Medicated per request.   Suture and incision ok after sitting.  All dressings changed per order. See flow sheet for details.  Appetite good.   No concern with B/B.   Continue to monitor.

## 2019-07-09 PROCEDURE — 25000132 ZZH RX MED GY IP 250 OP 250 PS 637: Mod: GY | Performed by: INTERNAL MEDICINE

## 2019-07-09 PROCEDURE — 25000128 H RX IP 250 OP 636: Performed by: INTERNAL MEDICINE

## 2019-07-09 PROCEDURE — 12000022 ZZH R&B SNF

## 2019-07-09 RX ADMIN — MELATONIN TAB 3 MG 6 MG: 3 TAB at 22:06

## 2019-07-09 RX ADMIN — SENNOSIDES AND DOCUSATE SODIUM 2 TABLET: 8.6; 5 TABLET ORAL at 09:23

## 2019-07-09 RX ADMIN — BACLOFEN 20 MG: 10 TABLET ORAL at 16:47

## 2019-07-09 RX ADMIN — OXYCODONE HYDROCHLORIDE 15 MG: 5 TABLET ORAL at 11:30

## 2019-07-09 RX ADMIN — TRAZODONE HYDROCHLORIDE 100 MG: 100 TABLET ORAL at 22:06

## 2019-07-09 RX ADMIN — OXYCODONE HYDROCHLORIDE 15 MG: 5 TABLET ORAL at 23:28

## 2019-07-09 RX ADMIN — BACLOFEN 20 MG: 10 TABLET ORAL at 09:22

## 2019-07-09 RX ADMIN — ENOXAPARIN SODIUM 40 MG: 40 INJECTION SUBCUTANEOUS at 09:27

## 2019-07-09 RX ADMIN — BACLOFEN 20 MG: 10 TABLET ORAL at 22:03

## 2019-07-09 RX ADMIN — TOLTERODINE 8 MG: 4 CAPSULE, EXTENDED RELEASE ORAL at 09:22

## 2019-07-09 RX ADMIN — BACLOFEN 20 MG: 10 TABLET ORAL at 13:33

## 2019-07-09 RX ADMIN — FERROUS SULFATE TAB 325 MG (65 MG ELEMENTAL FE) 325 MG: 325 (65 FE) TAB at 09:24

## 2019-07-09 RX ADMIN — OXYCODONE HYDROCHLORIDE 15 MG: 5 TABLET ORAL at 15:36

## 2019-07-09 RX ADMIN — Medication 1 CAPSULE: at 09:25

## 2019-07-09 RX ADMIN — OXYCODONE HYDROCHLORIDE 15 MG: 5 TABLET ORAL at 19:29

## 2019-07-09 RX ADMIN — CIPROFLOXACIN HYDROCHLORIDE 250 MG: 250 TABLET, FILM COATED ORAL at 19:29

## 2019-07-09 RX ADMIN — METOPROLOL SUCCINATE 50 MG: 25 TABLET, EXTENDED RELEASE ORAL at 09:25

## 2019-07-09 RX ADMIN — CIPROFLOXACIN HYDROCHLORIDE 250 MG: 250 TABLET, FILM COATED ORAL at 09:23

## 2019-07-09 RX ADMIN — OXYCODONE HYDROCHLORIDE 15 MG: 5 TABLET ORAL at 06:58

## 2019-07-09 NOTE — PLAN OF CARE
Alert and oriented.  Bedrest. Self straight caths, and condom cath at night.  Performs digital stimulation on self.  R shoulder and incision pain managed with oxycodone 15mg every 4 hours.  No IV access, pt refused new IV placement.  IV antibiotics were switched to PO.  All dressing CDI.  Denies SOB or nausea.  Pt able to make needs known.  Continue plan of care.

## 2019-07-09 NOTE — PLAN OF CARE
No new concerns overnight. No complaints of pain. Patient slept throughout the night. Condom catheter is intact, large urine output for the shift. No bowel movement. Call light in reach. Continue POC.

## 2019-07-09 NOTE — PLAN OF CARE
Pt VSS. Alert and oriented. Pain in R shoulder/hip/back treated with 15 mg PRN oxycodone x1 this shift. Pt frustrated regarding length of stay and feeling that he is not getting his questions answered regarding plan and what is upcoming. Charge RN notified, who contacted MD Blackmon, awaiting response. RN also talked with MD Alfaro regarding pt request for cortisone shot for shoulder. Dressings to scrotum, R hip and R buttock changed per orders. Pt sitting up much of the day, RN assessed skin and no new/worsened skin conditions noted. Continue to monitor pt status and update him with plans as they arise.

## 2019-07-10 ENCOUNTER — APPOINTMENT (OUTPATIENT)
Dept: LAB | Facility: CLINIC | Age: 54
End: 2019-07-10
Attending: INTERNAL MEDICINE
Payer: MEDICARE

## 2019-07-10 ENCOUNTER — APPOINTMENT (OUTPATIENT)
Dept: INTERVENTIONAL RADIOLOGY/VASCULAR | Facility: CLINIC | Age: 54
End: 2019-07-10
Attending: PHYSICIAN ASSISTANT
Payer: MEDICARE

## 2019-07-10 PROCEDURE — 25000128 H RX IP 250 OP 636: Performed by: INTERNAL MEDICINE

## 2019-07-10 PROCEDURE — 27210904 ZZH KIT CR6

## 2019-07-10 PROCEDURE — 77002 NEEDLE LOCALIZATION BY XRAY: CPT

## 2019-07-10 PROCEDURE — 25000125 ZZHC RX 250: Performed by: RADIOLOGY

## 2019-07-10 PROCEDURE — 25000132 ZZH RX MED GY IP 250 OP 250 PS 637: Mod: GY | Performed by: INTERNAL MEDICINE

## 2019-07-10 PROCEDURE — G0463 HOSPITAL OUTPT CLINIC VISIT: HCPCS

## 2019-07-10 PROCEDURE — 3E0U33Z INTRODUCTION OF ANTI-INFLAMMATORY INTO JOINTS, PERCUTANEOUS APPROACH: ICD-10-PCS | Performed by: RADIOLOGY

## 2019-07-10 PROCEDURE — 12000022 ZZH R&B SNF

## 2019-07-10 PROCEDURE — 3E0U3BZ INTRODUCTION OF ANESTHETIC AGENT INTO JOINTS, PERCUTANEOUS APPROACH: ICD-10-PCS | Performed by: RADIOLOGY

## 2019-07-10 PROCEDURE — 20610 DRAIN/INJ JOINT/BURSA W/O US: CPT

## 2019-07-10 PROCEDURE — 25500064 ZZH RX 255 OP 636: Performed by: RADIOLOGY

## 2019-07-10 PROCEDURE — 25000128 H RX IP 250 OP 636: Performed by: RADIOLOGY

## 2019-07-10 RX ORDER — IOPAMIDOL 612 MG/ML
0-15 INJECTION, SOLUTION INTRATHECAL ONCE
Status: DISCONTINUED | OUTPATIENT
Start: 2019-07-10 | End: 2019-07-10 | Stop reason: CLARIF

## 2019-07-10 RX ORDER — IOPAMIDOL 408 MG/ML
0-10 INJECTION, SOLUTION INTRATHECAL ONCE
Status: COMPLETED | OUTPATIENT
Start: 2019-07-10 | End: 2019-07-10

## 2019-07-10 RX ORDER — BUPIVACAINE HYDROCHLORIDE 2.5 MG/ML
0-20 INJECTION, SOLUTION INFILTRATION; PERINEURAL ONCE
Status: COMPLETED | OUTPATIENT
Start: 2019-07-10 | End: 2019-07-10

## 2019-07-10 RX ORDER — TRIAMCINOLONE ACETONIDE 40 MG/ML
40 INJECTION, SUSPENSION INTRA-ARTICULAR; INTRAMUSCULAR ONCE
Status: COMPLETED | OUTPATIENT
Start: 2019-07-10 | End: 2019-07-10

## 2019-07-10 RX ADMIN — CIPROFLOXACIN HYDROCHLORIDE 250 MG: 250 TABLET, FILM COATED ORAL at 08:43

## 2019-07-10 RX ADMIN — OXYCODONE HYDROCHLORIDE 15 MG: 5 TABLET ORAL at 12:10

## 2019-07-10 RX ADMIN — CIPROFLOXACIN HYDROCHLORIDE 250 MG: 250 TABLET, FILM COATED ORAL at 20:46

## 2019-07-10 RX ADMIN — Medication 1 CAPSULE: at 08:42

## 2019-07-10 RX ADMIN — TOLTERODINE 8 MG: 4 CAPSULE, EXTENDED RELEASE ORAL at 08:42

## 2019-07-10 RX ADMIN — BACLOFEN 20 MG: 10 TABLET ORAL at 20:46

## 2019-07-10 RX ADMIN — OXYCODONE HYDROCHLORIDE 15 MG: 5 TABLET ORAL at 03:33

## 2019-07-10 RX ADMIN — SENNOSIDES AND DOCUSATE SODIUM 2 TABLET: 8.6; 5 TABLET ORAL at 20:46

## 2019-07-10 RX ADMIN — TRAZODONE HYDROCHLORIDE 100 MG: 100 TABLET ORAL at 21:43

## 2019-07-10 RX ADMIN — ACETAMINOPHEN 650 MG: 325 TABLET, FILM COATED ORAL at 07:24

## 2019-07-10 RX ADMIN — OXYCODONE HYDROCHLORIDE 15 MG: 5 TABLET ORAL at 07:24

## 2019-07-10 RX ADMIN — BACLOFEN 20 MG: 10 TABLET ORAL at 16:35

## 2019-07-10 RX ADMIN — LIDOCAINE HYDROCHLORIDE 3 ML: 10 INJECTION, SOLUTION EPIDURAL; INFILTRATION; INTRACAUDAL; PERINEURAL at 13:22

## 2019-07-10 RX ADMIN — FERROUS SULFATE TAB 325 MG (65 MG ELEMENTAL FE) 325 MG: 325 (65 FE) TAB at 08:43

## 2019-07-10 RX ADMIN — BACLOFEN 20 MG: 10 TABLET ORAL at 12:10

## 2019-07-10 RX ADMIN — TRIAMCINOLONE ACETONIDE 40 MG: 40 INJECTION, SUSPENSION INTRA-ARTICULAR; INTRAMUSCULAR at 13:22

## 2019-07-10 RX ADMIN — METOPROLOL SUCCINATE 50 MG: 25 TABLET, EXTENDED RELEASE ORAL at 08:41

## 2019-07-10 RX ADMIN — OXYCODONE HYDROCHLORIDE 15 MG: 5 TABLET ORAL at 20:46

## 2019-07-10 RX ADMIN — BACLOFEN 20 MG: 10 TABLET ORAL at 08:44

## 2019-07-10 RX ADMIN — MELATONIN TAB 3 MG 6 MG: 3 TAB at 21:43

## 2019-07-10 RX ADMIN — IOPAMIDOL 1 ML: 408 INJECTION, SOLUTION INTRATHECAL at 13:24

## 2019-07-10 RX ADMIN — ENOXAPARIN SODIUM 40 MG: 40 INJECTION SUBCUTANEOUS at 08:47

## 2019-07-10 RX ADMIN — OXYCODONE HYDROCHLORIDE 15 MG: 5 TABLET ORAL at 16:35

## 2019-07-10 RX ADMIN — BUPIVACAINE HYDROCHLORIDE 10 ML: 2.5 INJECTION, SOLUTION EPIDURAL; INFILTRATION; INTRACAUDAL; PERINEURAL at 13:22

## 2019-07-10 ASSESSMENT — MIFFLIN-ST. JEOR: SCORE: 1801.99

## 2019-07-10 NOTE — CONSULTS
INTERVENTIONAL RADIOLOGY CONSULT NOTE    Patient is a 53 year old male with history of T8 spinal cord injury, paraplegia with chronic bilateral shoulder pain with any movement. He's had left steroid/anesthetic injections in the past, last 7/5/19 with relief. He had a right shoulder injection 8 months ago at [facility] with relief.    Patient is on IR schedule Wednesday 7/10/19 for a right shoulder steroid injection.   Labs WNL for procedure.    No NPO required.  Medications to be held include: None  Consent will be done prior to procedure.     Platelet Count   Date Value Ref Range Status   07/04/2019 282 150 - 450 10e9/L Final     INR   Date Value Ref Range Status   05/23/2019 1.27 (H) 0.86 - 1.14 Final        Please contact the IR [place] RN at [phone] for estimated time of procedure.     Case discussed with Dr. Gaston and Dr. Alfaro.    Solange Geronimo PA-C  Interventional Radiology

## 2019-07-10 NOTE — PLAN OF CARE
"Patient A&Ox4. VSS. PRN oxycodone given q4 hrs for shoulder, back, and hip pain with moderate relief. Good appetite. Patient performed digital stimulation after supper and had a medium BM. Self-cath during the day, condom catheter on at NOC. Currently on oral abx for UTI. Dressings to R hip incision/ old PARESH site/ scrotum checked and changed at 1930 with small amount of serosanguineous drainage observed. Patient appeared somewhat frustrated with bedrest status; active listening utilized and was somewhat effective. Patient pleasant and cooperative with cares. Able to make needs known. Continue with POC.    /73 (BP Location: Right arm)   Pulse 73   Temp 96.3  F (35.7  C) (Oral)   Resp 18   Ht 1.93 m (6' 4\")   Wt 80.3 kg (177 lb 1.6 oz)   SpO2 98%   BMI 21.56 kg/m      "
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
## 2019-07-10 NOTE — PROCEDURES
Interventional Radiology Brief Post Procedure Note    Procedure: IR JOINT INJECTION MAJOR RIGHT    Proceduralist: Clovis Gaston MD    Assistant: None    Time Out: Prior to the start of the procedure and with procedural staff participation, I verbally confirmed the patient s identity using two indicators, relevant allergies, that the procedure was appropriate and matched the consent or emergent situation, and that the correct equipment/implants were available. Immediately prior to starting the procedure I conducted the Time Out with the procedural staff and re-confirmed the patient s name, procedure, and site/side. (The Joint Commission universal protocol was followed.)  Yes    Medications   Medication Event Details Admin User Admin Time       Sedation: None. Local Anesthestic used    Findings: Successful right shoulder joint injection.     Estimated Blood Loss: Minimal    Fluoroscopy Time:  minute(s)    SPECIMENS: None    Complications: 1. None     Condition: Stable    Plan: Patient to return as needed.     Comments: See dictated procedure note for full details.    Clovis Gaston MD

## 2019-07-10 NOTE — PLAN OF CARE
Appears to be resting/sleeping comfortably. Medicated x 1 for pain management. Self cathed x 1 Dressings CDI. Call in reach.

## 2019-07-10 NOTE — PROGRESS NOTES
RN: Dr Blackmon paged to get update/order as to when pt may be off of bedrest and into wheel chair to participate in therapies, awaiting response.

## 2019-07-10 NOTE — PROGRESS NOTES
PLASTICS ATTENDING    S/p right hip disartic and fillet flap 5/23/19 with Dr Murphy.   Has had some wound healing issues along and adjacent to the suture line, but finally showing significant healing.  PARESH tract is much smaller and less hypertrophic.  Unfortunately, there is a new area of hypertrophic tissue near the base of the dogear and the chasidy-scrotal wound continues to feel boggy.  The area of incisional dehiscence has basically closed now.  He is struggling with his lengthy hospitalization but is trying to maintain his positive outlook, eager to move forward and resume his busy life.    Currently he has been sitting upright in bed most of the day without signs of worsening of his wounds. The flap seems to be holding up so far.  We will go ahead and order a seating eval and pressure mapping but he will need a demo Roho since his cushion is not acceptable. His chair isn't really acceptable either, given the sling bottom and back, but he doesn't have another appointment at [place] until 8/2.    As for the hypertrophic granulation tissues, I will cauterize with silver nitrate on Friday or this weekend, along with removal of sutures. I would also like to start using Hydrofera Blue foam for the other 2 open wound sites.

## 2019-07-11 LAB
ANION GAP SERPL CALCULATED.3IONS-SCNC: 8 MMOL/L (ref 3–14)
BUN SERPL-MCNC: 30 MG/DL (ref 7–30)
CALCIUM SERPL-MCNC: 8.8 MG/DL (ref 8.5–10.1)
CHLORIDE SERPL-SCNC: 108 MMOL/L (ref 94–109)
CO2 SERPL-SCNC: 21 MMOL/L (ref 20–32)
CREAT SERPL-MCNC: 0.55 MG/DL (ref 0.66–1.25)
ERYTHROCYTE [DISTWIDTH] IN BLOOD BY AUTOMATED COUNT: 22.2 % (ref 10–15)
GFR SERPL CREATININE-BSD FRML MDRD: >90 ML/MIN/{1.73_M2}
GLUCOSE SERPL-MCNC: 120 MG/DL (ref 70–99)
HCT VFR BLD AUTO: 34 % (ref 40–53)
HGB BLD-MCNC: 10.1 G/DL (ref 13.3–17.7)
MCH RBC QN AUTO: 20.2 PG (ref 26.5–33)
MCHC RBC AUTO-ENTMCNC: 29.7 G/DL (ref 31.5–36.5)
MCV RBC AUTO: 68 FL (ref 78–100)
PLATELET # BLD AUTO: 352 10E9/L (ref 150–450)
POTASSIUM SERPL-SCNC: 4.6 MMOL/L (ref 3.4–5.3)
RBC # BLD AUTO: 5.01 10E12/L (ref 4.4–5.9)
SODIUM SERPL-SCNC: 137 MMOL/L (ref 133–144)
WBC # BLD AUTO: 10.5 10E9/L (ref 4–11)

## 2019-07-11 PROCEDURE — 25000128 H RX IP 250 OP 636: Performed by: INTERNAL MEDICINE

## 2019-07-11 PROCEDURE — 80048 BASIC METABOLIC PNL TOTAL CA: CPT | Performed by: HOSPITALIST

## 2019-07-11 PROCEDURE — 25000132 ZZH RX MED GY IP 250 OP 250 PS 637: Mod: GY | Performed by: INTERNAL MEDICINE

## 2019-07-11 PROCEDURE — 99308 SBSQ NF CARE LOW MDM 20: CPT | Performed by: INTERNAL MEDICINE

## 2019-07-11 PROCEDURE — 36416 COLLJ CAPILLARY BLOOD SPEC: CPT | Performed by: HOSPITALIST

## 2019-07-11 PROCEDURE — 12000022 ZZH R&B SNF

## 2019-07-11 PROCEDURE — 85027 COMPLETE CBC AUTOMATED: CPT | Performed by: HOSPITALIST

## 2019-07-11 RX ADMIN — BACLOFEN 20 MG: 10 TABLET ORAL at 10:00

## 2019-07-11 RX ADMIN — OXYCODONE HYDROCHLORIDE 15 MG: 5 TABLET ORAL at 18:49

## 2019-07-11 RX ADMIN — BACLOFEN 20 MG: 10 TABLET ORAL at 14:17

## 2019-07-11 RX ADMIN — METOPROLOL SUCCINATE 50 MG: 25 TABLET, EXTENDED RELEASE ORAL at 09:58

## 2019-07-11 RX ADMIN — OXYCODONE HYDROCHLORIDE 15 MG: 5 TABLET ORAL at 22:29

## 2019-07-11 RX ADMIN — TRAZODONE HYDROCHLORIDE 100 MG: 100 TABLET ORAL at 22:29

## 2019-07-11 RX ADMIN — OXYCODONE HYDROCHLORIDE 15 MG: 5 TABLET ORAL at 01:07

## 2019-07-11 RX ADMIN — OXYCODONE HYDROCHLORIDE 15 MG: 5 TABLET ORAL at 14:17

## 2019-07-11 RX ADMIN — CIPROFLOXACIN HYDROCHLORIDE 250 MG: 250 TABLET, FILM COATED ORAL at 20:20

## 2019-07-11 RX ADMIN — CIPROFLOXACIN HYDROCHLORIDE 250 MG: 250 TABLET, FILM COATED ORAL at 10:00

## 2019-07-11 RX ADMIN — BACLOFEN 20 MG: 10 TABLET ORAL at 20:20

## 2019-07-11 RX ADMIN — MELATONIN TAB 3 MG 6 MG: 3 TAB at 22:29

## 2019-07-11 RX ADMIN — OXYCODONE HYDROCHLORIDE 15 MG: 5 TABLET ORAL at 09:57

## 2019-07-11 RX ADMIN — OXYCODONE HYDROCHLORIDE 15 MG: 5 TABLET ORAL at 05:47

## 2019-07-11 RX ADMIN — SENNOSIDES AND DOCUSATE SODIUM 2 TABLET: 8.6; 5 TABLET ORAL at 20:20

## 2019-07-11 RX ADMIN — FERROUS SULFATE TAB 325 MG (65 MG ELEMENTAL FE) 325 MG: 325 (65 FE) TAB at 10:00

## 2019-07-11 RX ADMIN — TOLTERODINE 8 MG: 4 CAPSULE, EXTENDED RELEASE ORAL at 09:59

## 2019-07-11 RX ADMIN — SENNOSIDES AND DOCUSATE SODIUM 2 TABLET: 8.6; 5 TABLET ORAL at 09:59

## 2019-07-11 RX ADMIN — BACLOFEN 20 MG: 10 TABLET ORAL at 17:53

## 2019-07-11 RX ADMIN — ENOXAPARIN SODIUM 40 MG: 40 INJECTION SUBCUTANEOUS at 10:01

## 2019-07-11 RX ADMIN — Medication 1 CAPSULE: at 10:01

## 2019-07-11 ASSESSMENT — PAIN DESCRIPTION - DESCRIPTORS: DESCRIPTORS: ACHING;SORE
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
## 2019-07-11 NOTE — PLAN OF CARE
Pt VSS. Pain in back/hip/shoulders controlled with PRN oxycodone 15 mg x2 this shift. Drsgs intact, changed at 0630 this AM. Pt incontinent and self cathing. Reports + BM this AM. MD Blackmon to see pt tomorrow/weekend per her note. Awaiting cushion mapping. Continue with POC for pt.

## 2019-07-11 NOTE — PLAN OF CARE
Pt seen by plastics, Dr Blackmon, see note for details. Orders noted for wheelchair seating/position IP consult, & pressure mapping. New order for scrotal wound care. Medicated x 2 for pain management. Self caths independently. Dressing CDI. Condom cath on at HS. Call in reach.

## 2019-07-11 NOTE — PLAN OF CARE
Pt is alert and oriented. Denies SOB and chest pain. Administered PRN oxycodone 15 mg x2 for bilateral shoulder pain. Pt slept at around 0200. Appeared to be sleeping during rounds. Wound care completed to periscrotal area and ol PARESH site due to drainage and incontinence. Continue w/ POC.

## 2019-07-12 PROCEDURE — 25000128 H RX IP 250 OP 636: Performed by: INTERNAL MEDICINE

## 2019-07-12 PROCEDURE — 12000022 ZZH R&B SNF

## 2019-07-12 PROCEDURE — 25000132 ZZH RX MED GY IP 250 OP 250 PS 637: Mod: GY | Performed by: INTERNAL MEDICINE

## 2019-07-12 RX ADMIN — SENNOSIDES AND DOCUSATE SODIUM 2 TABLET: 8.6; 5 TABLET ORAL at 10:27

## 2019-07-12 RX ADMIN — MELATONIN TAB 3 MG 6 MG: 3 TAB at 22:19

## 2019-07-12 RX ADMIN — SENNOSIDES AND DOCUSATE SODIUM 2 TABLET: 8.6; 5 TABLET ORAL at 22:19

## 2019-07-12 RX ADMIN — ENOXAPARIN SODIUM 40 MG: 40 INJECTION SUBCUTANEOUS at 10:27

## 2019-07-12 RX ADMIN — BACLOFEN 20 MG: 10 TABLET ORAL at 10:28

## 2019-07-12 RX ADMIN — OXYCODONE HYDROCHLORIDE 15 MG: 5 TABLET ORAL at 18:32

## 2019-07-12 RX ADMIN — OXYCODONE HYDROCHLORIDE 15 MG: 5 TABLET ORAL at 14:38

## 2019-07-12 RX ADMIN — CIPROFLOXACIN HYDROCHLORIDE 250 MG: 250 TABLET, FILM COATED ORAL at 10:27

## 2019-07-12 RX ADMIN — BACLOFEN 20 MG: 10 TABLET ORAL at 14:38

## 2019-07-12 RX ADMIN — Medication 1 CAPSULE: at 10:27

## 2019-07-12 RX ADMIN — CIPROFLOXACIN HYDROCHLORIDE 250 MG: 250 TABLET, FILM COATED ORAL at 20:49

## 2019-07-12 RX ADMIN — ACETAMINOPHEN 650 MG: 325 TABLET, FILM COATED ORAL at 14:38

## 2019-07-12 RX ADMIN — OXYCODONE HYDROCHLORIDE 15 MG: 5 TABLET ORAL at 06:42

## 2019-07-12 RX ADMIN — OXYCODONE HYDROCHLORIDE 15 MG: 5 TABLET ORAL at 02:26

## 2019-07-12 RX ADMIN — TOLTERODINE 8 MG: 4 CAPSULE, EXTENDED RELEASE ORAL at 10:27

## 2019-07-12 RX ADMIN — METOPROLOL SUCCINATE 50 MG: 25 TABLET, EXTENDED RELEASE ORAL at 10:27

## 2019-07-12 RX ADMIN — ACETAMINOPHEN 650 MG: 325 TABLET, FILM COATED ORAL at 22:19

## 2019-07-12 RX ADMIN — ACETAMINOPHEN 650 MG: 325 TABLET, FILM COATED ORAL at 18:32

## 2019-07-12 RX ADMIN — OXYCODONE HYDROCHLORIDE 15 MG: 5 TABLET ORAL at 11:02

## 2019-07-12 RX ADMIN — TRAZODONE HYDROCHLORIDE 100 MG: 100 TABLET ORAL at 22:19

## 2019-07-12 RX ADMIN — FERROUS SULFATE TAB 325 MG (65 MG ELEMENTAL FE) 325 MG: 325 (65 FE) TAB at 10:28

## 2019-07-12 RX ADMIN — BACLOFEN 20 MG: 10 TABLET ORAL at 16:53

## 2019-07-12 RX ADMIN — BACLOFEN 20 MG: 10 TABLET ORAL at 22:19

## 2019-07-12 RX ADMIN — OXYCODONE HYDROCHLORIDE 15 MG: 5 TABLET ORAL at 22:19

## 2019-07-12 NOTE — PLAN OF CARE
RN: Pt AA&O x3, able to make needs known and uses call light appropriately. Wound dsgs changed per orders; skin is improving. Pt anxiously awaiting Dr Blackmon to remove sutures and begin mapping w/c. Oxy given per pt request. Pt self cath w/ no s/s of complications; more supplies ordered and hand  placed at bedside. Con't POC.

## 2019-07-12 NOTE — PROGRESS NOTES
PLASTICS ATTENDING    Sutures removed today. Hypertrophic granulation tissue areas cauterized with silver nitrate.  All open areas getting smaller, even with sitting up in bed for long periods of time.  Reminded to weight shift often.   Awaiting pressure mapping of wheelchair with loaner cushion.  Continue current cares.  If maps well, can continue to increase sitting time in wheelchair and progress with PT.  Will need to keep appointment at [place] for wheelchair assessment in early August.

## 2019-07-12 NOTE — PLAN OF CARE
Patient sleeping between cares, requested for and received prn Oxy 15 mg x 2 for shoulder aches with good relieved. Patient independent with bed mobility, incision to butt looks good, no drainage noted, dressing to scrotum intact, patient using condom cath through the night . He continue to verbalized feeling hopeful for discharge, will like to go home to enjoy some part of the summer, has appointment pending for wheelchair mapping, continue plan of care.

## 2019-07-12 NOTE — PLAN OF CARE
Patient is alert and oriented  x 4. Able to make needs known. Pain comfortably manageable with oxycodone 15 mg x 2.  LBM today. Self caths independently, uses condom cath over NOC. Dressing changed to R posterior hip &  Posterior scrotal wound. Denies any cough, chest pain, SOB, lightheadedness & dizzines. Calls appropriately for help. Call within reach.

## 2019-07-13 PROCEDURE — 12000022 ZZH R&B SNF

## 2019-07-13 PROCEDURE — 25000132 ZZH RX MED GY IP 250 OP 250 PS 637: Mod: GY | Performed by: INTERNAL MEDICINE

## 2019-07-13 PROCEDURE — 25000128 H RX IP 250 OP 636: Performed by: INTERNAL MEDICINE

## 2019-07-13 RX ADMIN — MELATONIN TAB 3 MG 6 MG: 3 TAB at 21:24

## 2019-07-13 RX ADMIN — METOPROLOL SUCCINATE 50 MG: 25 TABLET, EXTENDED RELEASE ORAL at 08:43

## 2019-07-13 RX ADMIN — OXYCODONE HYDROCHLORIDE 15 MG: 5 TABLET ORAL at 15:51

## 2019-07-13 RX ADMIN — ENOXAPARIN SODIUM 40 MG: 40 INJECTION SUBCUTANEOUS at 08:42

## 2019-07-13 RX ADMIN — CIPROFLOXACIN HYDROCHLORIDE 250 MG: 250 TABLET, FILM COATED ORAL at 08:42

## 2019-07-13 RX ADMIN — OXYCODONE HYDROCHLORIDE 15 MG: 5 TABLET ORAL at 19:30

## 2019-07-13 RX ADMIN — OXYCODONE HYDROCHLORIDE 15 MG: 5 TABLET ORAL at 11:27

## 2019-07-13 RX ADMIN — OXYCODONE HYDROCHLORIDE 15 MG: 5 TABLET ORAL at 03:32

## 2019-07-13 RX ADMIN — TOLTERODINE 8 MG: 4 CAPSULE, EXTENDED RELEASE ORAL at 08:43

## 2019-07-13 RX ADMIN — OXYCODONE HYDROCHLORIDE 15 MG: 5 TABLET ORAL at 07:36

## 2019-07-13 RX ADMIN — BACLOFEN 20 MG: 10 TABLET ORAL at 08:44

## 2019-07-13 RX ADMIN — ACETAMINOPHEN 650 MG: 325 TABLET, FILM COATED ORAL at 11:28

## 2019-07-13 RX ADMIN — SENNOSIDES AND DOCUSATE SODIUM 2 TABLET: 8.6; 5 TABLET ORAL at 08:43

## 2019-07-13 RX ADMIN — SENNOSIDES AND DOCUSATE SODIUM 2 TABLET: 8.6; 5 TABLET ORAL at 21:24

## 2019-07-13 RX ADMIN — TRAZODONE HYDROCHLORIDE 100 MG: 100 TABLET ORAL at 21:24

## 2019-07-13 RX ADMIN — ACETAMINOPHEN 650 MG: 325 TABLET, FILM COATED ORAL at 15:52

## 2019-07-13 RX ADMIN — Medication 1 CAPSULE: at 08:43

## 2019-07-13 RX ADMIN — CIPROFLOXACIN HYDROCHLORIDE 250 MG: 250 TABLET, FILM COATED ORAL at 19:30

## 2019-07-13 RX ADMIN — ACETAMINOPHEN 650 MG: 325 TABLET, FILM COATED ORAL at 19:30

## 2019-07-13 RX ADMIN — FERROUS SULFATE TAB 325 MG (65 MG ELEMENTAL FE) 325 MG: 325 (65 FE) TAB at 08:43

## 2019-07-13 NOTE — PLAN OF CARE
RN: Pt AA&O x3, able to make needs known. Sutures removed 7/12/19, skin looks approximated and wounds are healing; dsgs changed at 1130. Pt eager to begin wheelchair mapping and go home. No new concerns, appetite good. Pt refused afternoon Baclofen stating it makes him go frequently; pt would like to talk to dr about other options. Con't POC.

## 2019-07-13 NOTE — PLAN OF CARE
Patient alert and oriented x4, able to make needs known. PRN oxycodone and tylenol given @ 1830 and 2230 for pain to bottom and shoulder. Dr. Blackmon here this am to remove suture and assess wounds, see note. Condom catheter in place. No BM this shift. No complaints of chest pain, nausea, or SOB. No other changes or concerns.

## 2019-07-13 NOTE — PLAN OF CARE
Alert and oriented x4. Reported no new issues overnight. Medicated with prn Oxycodone x1 per request. Asleep off and on in between cares. Independently repositioning in bed. Condom catheter in place and with adequate output. Calls for assistance. Continue POC.

## 2019-07-14 PROCEDURE — 12000022 ZZH R&B SNF

## 2019-07-14 PROCEDURE — 25000132 ZZH RX MED GY IP 250 OP 250 PS 637: Mod: GY | Performed by: INTERNAL MEDICINE

## 2019-07-14 PROCEDURE — 25000128 H RX IP 250 OP 636: Performed by: INTERNAL MEDICINE

## 2019-07-14 RX ADMIN — ACETAMINOPHEN 650 MG: 325 TABLET, FILM COATED ORAL at 22:27

## 2019-07-14 RX ADMIN — TRAZODONE HYDROCHLORIDE 100 MG: 100 TABLET ORAL at 21:33

## 2019-07-14 RX ADMIN — BACLOFEN 20 MG: 10 TABLET ORAL at 18:30

## 2019-07-14 RX ADMIN — ACETAMINOPHEN 650 MG: 325 TABLET, FILM COATED ORAL at 18:30

## 2019-07-14 RX ADMIN — FERROUS SULFATE TAB 325 MG (65 MG ELEMENTAL FE) 325 MG: 325 (65 FE) TAB at 08:22

## 2019-07-14 RX ADMIN — OXYCODONE HYDROCHLORIDE 15 MG: 5 TABLET ORAL at 22:27

## 2019-07-14 RX ADMIN — BACLOFEN 20 MG: 10 TABLET ORAL at 14:27

## 2019-07-14 RX ADMIN — SENNOSIDES AND DOCUSATE SODIUM 2 TABLET: 8.6; 5 TABLET ORAL at 08:22

## 2019-07-14 RX ADMIN — MELATONIN TAB 3 MG 6 MG: 3 TAB at 21:33

## 2019-07-14 RX ADMIN — ACETAMINOPHEN 650 MG: 325 TABLET, FILM COATED ORAL at 14:26

## 2019-07-14 RX ADMIN — SENNOSIDES AND DOCUSATE SODIUM 2 TABLET: 8.6; 5 TABLET ORAL at 21:33

## 2019-07-14 RX ADMIN — CIPROFLOXACIN HYDROCHLORIDE 250 MG: 250 TABLET, FILM COATED ORAL at 21:33

## 2019-07-14 RX ADMIN — TOLTERODINE 8 MG: 4 CAPSULE, EXTENDED RELEASE ORAL at 08:22

## 2019-07-14 RX ADMIN — OXYCODONE HYDROCHLORIDE 15 MG: 5 TABLET ORAL at 10:36

## 2019-07-14 RX ADMIN — CIPROFLOXACIN HYDROCHLORIDE 250 MG: 250 TABLET, FILM COATED ORAL at 08:22

## 2019-07-14 RX ADMIN — BACLOFEN 20 MG: 10 TABLET ORAL at 08:22

## 2019-07-14 RX ADMIN — OXYCODONE HYDROCHLORIDE 15 MG: 5 TABLET ORAL at 14:26

## 2019-07-14 RX ADMIN — ACETAMINOPHEN 650 MG: 325 TABLET, FILM COATED ORAL at 10:36

## 2019-07-14 RX ADMIN — OXYCODONE HYDROCHLORIDE 15 MG: 5 TABLET ORAL at 18:30

## 2019-07-14 RX ADMIN — METOPROLOL SUCCINATE 50 MG: 25 TABLET, EXTENDED RELEASE ORAL at 08:21

## 2019-07-14 RX ADMIN — Medication 1 CAPSULE: at 08:22

## 2019-07-14 RX ADMIN — ENOXAPARIN SODIUM 40 MG: 40 INJECTION SUBCUTANEOUS at 08:22

## 2019-07-14 RX ADMIN — OXYCODONE HYDROCHLORIDE 15 MG: 5 TABLET ORAL at 06:42

## 2019-07-14 NOTE — PLAN OF CARE
Patient alert and oriented x4, able to make needs known. Wound dressings CDI, changed this am. PRN oxycodone and tylenol given @ 1530 and 1930. Refused both doses of Baclofen, pt stating it is causing frequent urinary leakage. Continuing to work on sitting up in bed. No complaints of chest pain or SOB. No other concerns.

## 2019-07-14 NOTE — PLAN OF CARE
RN: Pt  sleeping well the whole night and upon waking up this am, verified that he did have a good night. All dressings CDI. Incisions WNL. Condom cath in use during the night then off during the day. New name band given. Oxycodone 15 mg tab this am for R hip pain . Continue with plan of care.

## 2019-07-14 NOTE — PLAN OF CARE
"Rn: Pt declining some baclofen doses over past 2 days, see MAR, sticky note left for provider to update.  Pt wishes for medication review for other option for bladder leakage control, says \"since I started Detrol LA, I have had more leakage in combination with baclofen.  Continue to monitor, await MD input.   "

## 2019-07-15 PROCEDURE — 25000132 ZZH RX MED GY IP 250 OP 250 PS 637: Mod: GY | Performed by: INTERNAL MEDICINE

## 2019-07-15 PROCEDURE — 25000128 H RX IP 250 OP 636: Performed by: INTERNAL MEDICINE

## 2019-07-15 PROCEDURE — 12000022 ZZH R&B SNF

## 2019-07-15 RX ORDER — BACLOFEN 10 MG/1
20 TABLET ORAL 4 TIMES DAILY PRN
Status: DISCONTINUED | OUTPATIENT
Start: 2019-07-15 | End: 2019-07-19 | Stop reason: HOSPADM

## 2019-07-15 RX ADMIN — FERROUS SULFATE TAB 325 MG (65 MG ELEMENTAL FE) 325 MG: 325 (65 FE) TAB at 08:26

## 2019-07-15 RX ADMIN — ENOXAPARIN SODIUM 40 MG: 40 INJECTION SUBCUTANEOUS at 08:25

## 2019-07-15 RX ADMIN — CIPROFLOXACIN HYDROCHLORIDE 250 MG: 250 TABLET, FILM COATED ORAL at 08:26

## 2019-07-15 RX ADMIN — BACLOFEN 20 MG: 10 TABLET ORAL at 15:54

## 2019-07-15 RX ADMIN — OXYCODONE HYDROCHLORIDE 15 MG: 5 TABLET ORAL at 15:54

## 2019-07-15 RX ADMIN — Medication 1 CAPSULE: at 08:26

## 2019-07-15 RX ADMIN — MELATONIN TAB 3 MG 6 MG: 3 TAB at 21:12

## 2019-07-15 RX ADMIN — OXYCODONE HYDROCHLORIDE 15 MG: 5 TABLET ORAL at 08:31

## 2019-07-15 RX ADMIN — TOLTERODINE 8 MG: 4 CAPSULE, EXTENDED RELEASE ORAL at 08:25

## 2019-07-15 RX ADMIN — TRAZODONE HYDROCHLORIDE 100 MG: 100 TABLET ORAL at 21:12

## 2019-07-15 RX ADMIN — METOPROLOL SUCCINATE 50 MG: 25 TABLET, EXTENDED RELEASE ORAL at 08:25

## 2019-07-15 RX ADMIN — SENNOSIDES AND DOCUSATE SODIUM 2 TABLET: 8.6; 5 TABLET ORAL at 21:11

## 2019-07-15 RX ADMIN — SENNOSIDES AND DOCUSATE SODIUM 2 TABLET: 8.6; 5 TABLET ORAL at 08:25

## 2019-07-15 RX ADMIN — OXYCODONE HYDROCHLORIDE 15 MG: 5 TABLET ORAL at 19:59

## 2019-07-15 RX ADMIN — ACETAMINOPHEN 650 MG: 325 TABLET, FILM COATED ORAL at 04:08

## 2019-07-15 RX ADMIN — OXYCODONE HYDROCHLORIDE 15 MG: 5 TABLET ORAL at 04:08

## 2019-07-15 NOTE — PLAN OF CARE
A &O x4, able to communicate needs. Denies SOB or chest pain. Complained of R shoulder and hip pain. Requested oxycodone 15mg, w/ relief. Incontinent this am, dressing changes done. Self cath, no other incontinence issues this shift. Regular diet. Pt frustrated w/ lack of progress w/ pressure mapping of wheelchair. Spoke with pt about plan to start on 8/2 and he became upset. See Dr Alfaro's note from 7/11. Will continue with plan of care.

## 2019-07-15 NOTE — PLAN OF CARE
Patient declines to take scheduled baclofen   Switched it to prn per patient request   Vitas and labs reviewed   No concerns noted by nursing

## 2019-07-15 NOTE — PLAN OF CARE
RN: Pt's R hip and shoulder pain comfortably managed with Oxycodone 15 mg tab and Tylenol 650 mg tab. Condom cath in place at night. Dressings CDI. Appear to be sleeping/resting between cares.Using romulo light appropriately. Continue with plan of care.

## 2019-07-15 NOTE — PLAN OF CARE
Patient alert and oriented x4, able to make needs known. Wound dressings CDI, completed by am shift. PRN oxycodone and tylenol requested Q4 hours for pain. 1600 dose of baclofen refused by pt, later requested at 1830, given. Rescheduled 2100 dose to be given at 0030 tonight - pt states he plans on refusing this administration. Good appetite. No complaints of chest pain, SOB, or nausea. Will continue to monitor.

## 2019-07-16 PROCEDURE — 25000128 H RX IP 250 OP 636: Performed by: INTERNAL MEDICINE

## 2019-07-16 PROCEDURE — 25000132 ZZH RX MED GY IP 250 OP 250 PS 637: Mod: GY | Performed by: INTERNAL MEDICINE

## 2019-07-16 PROCEDURE — 12000022 ZZH R&B SNF

## 2019-07-16 RX ORDER — SIMETHICONE 80 MG
80 TABLET,CHEWABLE ORAL EVERY 6 HOURS PRN
Status: DISCONTINUED | OUTPATIENT
Start: 2019-07-16 | End: 2019-07-19 | Stop reason: HOSPADM

## 2019-07-16 RX ADMIN — OXYCODONE HYDROCHLORIDE 10 MG: 5 TABLET ORAL at 10:22

## 2019-07-16 RX ADMIN — OXYCODONE HYDROCHLORIDE 15 MG: 5 TABLET ORAL at 18:32

## 2019-07-16 RX ADMIN — TOLTERODINE 8 MG: 4 CAPSULE, EXTENDED RELEASE ORAL at 08:11

## 2019-07-16 RX ADMIN — ENOXAPARIN SODIUM 40 MG: 40 INJECTION SUBCUTANEOUS at 08:12

## 2019-07-16 RX ADMIN — OXYCODONE HYDROCHLORIDE 15 MG: 5 TABLET ORAL at 02:08

## 2019-07-16 RX ADMIN — SENNOSIDES AND DOCUSATE SODIUM 2 TABLET: 8.6; 5 TABLET ORAL at 08:12

## 2019-07-16 RX ADMIN — SENNOSIDES AND DOCUSATE SODIUM 2 TABLET: 8.6; 5 TABLET ORAL at 22:27

## 2019-07-16 RX ADMIN — OXYCODONE HYDROCHLORIDE 15 MG: 5 TABLET ORAL at 06:08

## 2019-07-16 RX ADMIN — METOPROLOL SUCCINATE 50 MG: 25 TABLET, EXTENDED RELEASE ORAL at 08:11

## 2019-07-16 RX ADMIN — ACETAMINOPHEN 650 MG: 325 TABLET, FILM COATED ORAL at 06:08

## 2019-07-16 RX ADMIN — ACETAMINOPHEN 650 MG: 325 TABLET, FILM COATED ORAL at 02:08

## 2019-07-16 RX ADMIN — CALCIUM CARBONATE (ANTACID) CHEW TAB 500 MG 1000 MG: 500 CHEW TAB at 11:53

## 2019-07-16 RX ADMIN — MELATONIN TAB 3 MG 6 MG: 3 TAB at 22:27

## 2019-07-16 RX ADMIN — Medication 1 CAPSULE: at 08:10

## 2019-07-16 RX ADMIN — SIMETHICONE CHEW TAB 80 MG 80 MG: 80 TABLET ORAL at 14:25

## 2019-07-16 RX ADMIN — OXYCODONE HYDROCHLORIDE 15 MG: 5 TABLET ORAL at 14:27

## 2019-07-16 RX ADMIN — TRAZODONE HYDROCHLORIDE 100 MG: 100 TABLET ORAL at 22:27

## 2019-07-16 RX ADMIN — FERROUS SULFATE TAB 325 MG (65 MG ELEMENTAL FE) 325 MG: 325 (65 FE) TAB at 08:11

## 2019-07-16 RX ADMIN — OXYCODONE HYDROCHLORIDE 15 MG: 5 TABLET ORAL at 22:26

## 2019-07-16 NOTE — PLAN OF CARE
OT Began process of setting up pressure mapping for patient in wheelchair through [facility] [employer]iraj Spence  Voicemail left on his v.mail requesting him as patient reporting he was his vendor prior with setting up of his new wheelchair. Patient reports wheelchair cushion came with wheelchair. Awaiting call back from Niraj Herman for set up of mapping scheduled time - did request if he is unable to complete ASAP, that someone else from his office could see.  Will notify patient when set up and scheduled.  OT- This writer has not heard back from vendor at end of day - did attempt to call again.  Left notes and spoke to staff in therapy gym regarding situation - they are aware, and can relay message or set up when call is returned.

## 2019-07-16 NOTE — PROGRESS NOTES
Patient had abdominal cramps this afternoon  Moderately distended abdomen  Simethicone ordered    Harrison Gilmore  Internal Medicine  Hospitalist  Pager no: [phone]

## 2019-07-16 NOTE — PLAN OF CARE
Alert and oriented x4. Patient requested and received prn Oxycodone together with Tylenol for pain-effective.  Appears to be sleeping in between cares.

## 2019-07-16 NOTE — PLAN OF CARE
Pt frustrated and grumpy today. Concerned about the pressure mapping. Requested to speak with . RN reported request to DON who said she will go see pt. Also wants someone to address his chronic issue of bladder leakage. C/o stomach cramping repeatedly, said this was a new pain for him. Abdomen distended. Had BM yesterday and today, stated he was having some flatus. RN requested simethicone from provider. Provider saw pt, palpated abdomen and ordered simethicone.  Pt aware he can have simethicone q6hrs. Didn't eat lunch as his stomach was upset. Pain meds given twice this shift.

## 2019-07-16 NOTE — PLAN OF CARE
Pt is alert and oriented x4. Denies SOB and chest pain. Verbalized bilateral shoulder and hip pain; administered PRN oxycodone 15 mg x2, per pt request. Digital stimulation x1 done independently by pt and self cath x2 this shift. Pt appears to be agitated and withdrawn due to current situation with pressure mapping of wheelchair. Changed dressing to old PARESH site due drainage; other dressings were CDI and changed during AM shift. Continue w/ POC.     Temp: 96.8  F (36  C) Temp src: Oral BP: 134/78 Pulse: 64 Heart Rate: 63 Resp: 16 SpO2: 95 % O2 Device: None (Room air)

## 2019-07-17 ENCOUNTER — APPOINTMENT (OUTPATIENT)
Dept: LAB | Facility: CLINIC | Age: 54
End: 2019-07-17
Attending: INTERNAL MEDICINE
Payer: MEDICARE

## 2019-07-17 PROCEDURE — 12000022 ZZH R&B SNF

## 2019-07-17 PROCEDURE — G0463 HOSPITAL OUTPT CLINIC VISIT: HCPCS

## 2019-07-17 PROCEDURE — 25000132 ZZH RX MED GY IP 250 OP 250 PS 637: Mod: GY | Performed by: INTERNAL MEDICINE

## 2019-07-17 PROCEDURE — 25000128 H RX IP 250 OP 636: Performed by: INTERNAL MEDICINE

## 2019-07-17 RX ADMIN — OXYCODONE HYDROCHLORIDE 15 MG: 5 TABLET ORAL at 17:41

## 2019-07-17 RX ADMIN — OXYCODONE HYDROCHLORIDE 15 MG: 5 TABLET ORAL at 08:05

## 2019-07-17 RX ADMIN — BACLOFEN 20 MG: 10 TABLET ORAL at 13:58

## 2019-07-17 RX ADMIN — MELATONIN TAB 3 MG 6 MG: 3 TAB at 21:35

## 2019-07-17 RX ADMIN — SIMETHICONE CHEW TAB 80 MG 80 MG: 80 TABLET ORAL at 08:05

## 2019-07-17 RX ADMIN — SENNOSIDES AND DOCUSATE SODIUM 2 TABLET: 8.6; 5 TABLET ORAL at 08:05

## 2019-07-17 RX ADMIN — BACLOFEN 20 MG: 10 TABLET ORAL at 08:05

## 2019-07-17 RX ADMIN — TRAZODONE HYDROCHLORIDE 100 MG: 100 TABLET ORAL at 21:34

## 2019-07-17 RX ADMIN — SENNOSIDES AND DOCUSATE SODIUM 2 TABLET: 8.6; 5 TABLET ORAL at 21:35

## 2019-07-17 RX ADMIN — BACLOFEN 20 MG: 10 TABLET ORAL at 21:34

## 2019-07-17 RX ADMIN — OXYCODONE HYDROCHLORIDE 15 MG: 5 TABLET ORAL at 03:02

## 2019-07-17 RX ADMIN — SIMETHICONE CHEW TAB 80 MG 80 MG: 80 TABLET ORAL at 14:05

## 2019-07-17 RX ADMIN — Medication 1 CAPSULE: at 08:05

## 2019-07-17 RX ADMIN — OXYCODONE HYDROCHLORIDE 15 MG: 5 TABLET ORAL at 21:35

## 2019-07-17 RX ADMIN — ACETAMINOPHEN 650 MG: 325 TABLET, FILM COATED ORAL at 03:02

## 2019-07-17 RX ADMIN — OXYCODONE HYDROCHLORIDE 15 MG: 5 TABLET ORAL at 12:56

## 2019-07-17 RX ADMIN — ENOXAPARIN SODIUM 40 MG: 40 INJECTION SUBCUTANEOUS at 08:11

## 2019-07-17 RX ADMIN — SIMETHICONE CHEW TAB 80 MG 80 MG: 80 TABLET ORAL at 01:26

## 2019-07-17 RX ADMIN — FERROUS SULFATE TAB 325 MG (65 MG ELEMENTAL FE) 325 MG: 325 (65 FE) TAB at 08:05

## 2019-07-17 RX ADMIN — TOLTERODINE 8 MG: 4 CAPSULE, EXTENDED RELEASE ORAL at 08:05

## 2019-07-17 RX ADMIN — METOPROLOL SUCCINATE 50 MG: 25 TABLET, EXTENDED RELEASE ORAL at 08:09

## 2019-07-17 RX ADMIN — CALCIUM CARBONATE (ANTACID) CHEW TAB 500 MG 1000 MG: 500 CHEW TAB at 01:03

## 2019-07-17 NOTE — PLAN OF CARE
Writer left message with Ghanshyam Spence 10:30 AM. Recalled now and told he is out of the office today out in field doing mappings so was forwarded to intake/ for getting pt scheduled for pressure mapping ASAP. Voice message left. Will await word tomorrow.

## 2019-07-17 NOTE — PLAN OF CARE
Pt is alert and oriented x4. Denies SOB and chest pain. Administered PRN baclofen and oxycodone 15 mg x2 for bilateral shoulder pain. Administered simethicone x2 for gas; all quadrants audible and active, rounded. Pt independently performed dig stim x1 and self cath x3. All dressing completed this shift, per orders. Pt continuous to be frustrated with lack of progress re pressure mapping. Therapeutic listening provided. DON spoke with pt and will continue to monitor. Continue w/ POC.     Temp: 97  F (36.1  C) Temp src: Oral BP: 119/73 Pulse: 70 Heart Rate: 72 Resp: 16 SpO2: 94 % O2 Device: None (Room air)

## 2019-07-17 NOTE — PLAN OF CARE
Pt alert and oriented x 4. Able to make needs known. Medicated x 2 for bilateral shoulders pain. Self caths independently. Uses condom cath at HS. BM x 1 this shift. Dressings changed per plan of care. Call in reach.
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
## 2019-07-17 NOTE — PLAN OF CARE
Alert and oriented x4. Patient requested and received prn Oxycodone together with Tylenol for pain-effective.  Appears to be sleeping in between cares. Condom catheter in place overnight, had adequate urine output. Patient also self caths. No bm overnight. Calls for assistance. Continue POC.

## 2019-07-18 LAB
ANION GAP SERPL CALCULATED.3IONS-SCNC: 6 MMOL/L (ref 3–14)
BUN SERPL-MCNC: 30 MG/DL (ref 7–30)
CALCIUM SERPL-MCNC: 8.7 MG/DL (ref 8.5–10.1)
CHLORIDE SERPL-SCNC: 105 MMOL/L (ref 94–109)
CO2 SERPL-SCNC: 26 MMOL/L (ref 20–32)
CREAT SERPL-MCNC: 0.61 MG/DL (ref 0.66–1.25)
CREAT SERPL-MCNC: 0.61 MG/DL (ref 0.66–1.25)
ERYTHROCYTE [DISTWIDTH] IN BLOOD BY AUTOMATED COUNT: 23.6 % (ref 10–15)
GFR ESTIMATE EXT - HISTORICAL: >90 ML/MIN/1.73M2
GFR ESTIMATE, IF BLACK EXT - HISTORICAL: >90 ML/MIN/1.73M2
GFR SERPL CREATININE-BSD FRML MDRD: >90 ML/MIN/{1.73_M2}
GLUCOSE SERPL-MCNC: 103 MG/DL (ref 70–99)
HCT VFR BLD AUTO: 37.6 % (ref 40–53)
HGB BLD-MCNC: 10.9 G/DL (ref 13.3–17.7)
MCH RBC QN AUTO: 20.6 PG (ref 26.5–33)
MCHC RBC AUTO-ENTMCNC: 29 G/DL (ref 31.5–36.5)
MCV RBC AUTO: 71 FL (ref 78–100)
PLATELET # BLD AUTO: 287 10E9/L (ref 150–450)
POTASSIUM SERPL-SCNC: 4.1 MMOL/L (ref 3.4–5.3)
RBC # BLD AUTO: 5.29 10E12/L (ref 4.4–5.9)
SODIUM SERPL-SCNC: 137 MMOL/L (ref 133–144)
WBC # BLD AUTO: 8.2 10E9/L (ref 4–11)

## 2019-07-18 PROCEDURE — 25000128 H RX IP 250 OP 636: Performed by: INTERNAL MEDICINE

## 2019-07-18 PROCEDURE — 80048 BASIC METABOLIC PNL TOTAL CA: CPT | Performed by: HOSPITALIST

## 2019-07-18 PROCEDURE — 36415 COLL VENOUS BLD VENIPUNCTURE: CPT | Performed by: HOSPITALIST

## 2019-07-18 PROCEDURE — 85027 COMPLETE CBC AUTOMATED: CPT | Performed by: HOSPITALIST

## 2019-07-18 PROCEDURE — 12000022 ZZH R&B SNF

## 2019-07-18 PROCEDURE — 25000132 ZZH RX MED GY IP 250 OP 250 PS 637: Mod: GY | Performed by: INTERNAL MEDICINE

## 2019-07-18 PROCEDURE — 99308 SBSQ NF CARE LOW MDM 20: CPT | Performed by: INTERNAL MEDICINE

## 2019-07-18 PROCEDURE — 99207 ZZC CDG-CODE CATEGORY CHANGED: CPT | Performed by: INTERNAL MEDICINE

## 2019-07-18 RX ADMIN — Medication 1 CAPSULE: at 09:22

## 2019-07-18 RX ADMIN — MELATONIN TAB 3 MG 6 MG: 3 TAB at 21:23

## 2019-07-18 RX ADMIN — ACETAMINOPHEN 650 MG: 325 TABLET, FILM COATED ORAL at 21:23

## 2019-07-18 RX ADMIN — TRAZODONE HYDROCHLORIDE 100 MG: 100 TABLET ORAL at 21:23

## 2019-07-18 RX ADMIN — OXYCODONE HYDROCHLORIDE 15 MG: 5 TABLET ORAL at 17:28

## 2019-07-18 RX ADMIN — BACLOFEN 20 MG: 10 TABLET ORAL at 17:28

## 2019-07-18 RX ADMIN — TOLTERODINE 8 MG: 4 CAPSULE, EXTENDED RELEASE ORAL at 09:22

## 2019-07-18 RX ADMIN — OXYCODONE HYDROCHLORIDE 15 MG: 5 TABLET ORAL at 13:24

## 2019-07-18 RX ADMIN — BACLOFEN 20 MG: 10 TABLET ORAL at 13:24

## 2019-07-18 RX ADMIN — ENOXAPARIN SODIUM 40 MG: 40 INJECTION SUBCUTANEOUS at 09:23

## 2019-07-18 RX ADMIN — OXYCODONE HYDROCHLORIDE 15 MG: 5 TABLET ORAL at 21:23

## 2019-07-18 RX ADMIN — SENNOSIDES AND DOCUSATE SODIUM 2 TABLET: 8.6; 5 TABLET ORAL at 09:22

## 2019-07-18 RX ADMIN — METOPROLOL SUCCINATE 50 MG: 25 TABLET, EXTENDED RELEASE ORAL at 09:22

## 2019-07-18 RX ADMIN — FERROUS SULFATE TAB 325 MG (65 MG ELEMENTAL FE) 325 MG: 325 (65 FE) TAB at 09:22

## 2019-07-18 RX ADMIN — OXYCODONE HYDROCHLORIDE 15 MG: 5 TABLET ORAL at 06:32

## 2019-07-18 RX ADMIN — ACETAMINOPHEN 650 MG: 325 TABLET, FILM COATED ORAL at 17:28

## 2019-07-18 RX ADMIN — BACLOFEN 20 MG: 10 TABLET ORAL at 21:24

## 2019-07-18 NOTE — PLAN OF CARE
RN: Sleeping/resting well the whole night. Oxycodone 15 mg tab x 1. All dressings CDI/ Crabby to RNB and aide initially then,loosen up. Claimed that he will  be distrdiscxharging  whenever he feels\s like it.

## 2019-07-18 NOTE — DISCHARGE INSTRUCTIONS
SEATING EVAL AT [facility] ([place]) [facility] Thursday AUGUST 8TH AT 8 AM. [address] IN [facility]. IT IS ON THE FIRST FLOOR TO THE RIGHT.[phone].      Right posterior hip, Scrotal wound and Post right buttock/dehisced incision: Per Dr Blackmon's note on 7/12/19: Daily: Cleanse with wound cleanser. Paint periwound with cavilon no sting.  Pack gently with a strip of Hydrofera Blue (item # [id number]) moistened with saline and squeezed dry, into the wound. Cover with ABD.    Scrotal wound: Daily: Cleanse wound on base of scrotum with wound cleanser and gauze. Paint periwound skin with Cavalon No Sting and allow to dry. Cover with Mepilex 4x4.  Please start using HYDROFERA BLUE FOAM as the contact layer of dressing under Mepilex. Also apply to new bleb near base of dogear deformity. Thanks!

## 2019-07-18 NOTE — PLAN OF CARE
Pt is alert and oriented. Bed rest, waiting for wheelchair mapping so pt can start sitting up in chair. Pt voiced frustration with waiting for mapping. Denies shortness of breath/chest pain. Self caths independently, condom cath on at night. BM today. Dressings to right hip CDI, changed scrotal wound dressing as it was falling off. Requested PRN oxycodone x2. Continue POC.

## 2019-07-18 NOTE — PROGRESS NOTES
Chart reviewed  Hg 10.9 gm/dl   Creatinine 0.61 on 07/18  Platelet 287    Harrison Gilmore  Internal Medicine  Hospitalist  Pager no: [phone]
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
## 2019-07-18 NOTE — PLAN OF CARE
Patient alert and oriented x4, able to make needs known. Pt very frustrated today, wanting to be discharged but still waiting for wheelchair mapping appt. PRN oxycodone and baclofen given @1330. Wound care complete to all wounds on bottom and periscrotal area - small amount of drainage noted from old PARESH site. No complaints of chest pain, SOB, or nausea. Fair appetite, pt getting tired of menu choices. No new concerns. Will continue to monitor.

## 2019-07-19 ENCOUNTER — RECORDS - HEALTHEAST (OUTPATIENT)
Dept: ADMINISTRATIVE | Facility: OTHER | Age: 54
End: 2019-07-19

## 2019-07-19 VITALS
DIASTOLIC BLOOD PRESSURE: 69 MMHG | RESPIRATION RATE: 16 BRPM | BODY MASS INDEX: 22.83 KG/M2 | SYSTOLIC BLOOD PRESSURE: 124 MMHG | WEIGHT: 187.5 LBS | HEIGHT: 76 IN | TEMPERATURE: 96.2 F | OXYGEN SATURATION: 95 % | HEART RATE: 77 BPM

## 2019-07-19 PROCEDURE — 25000132 ZZH RX MED GY IP 250 OP 250 PS 637: Mod: GY | Performed by: INTERNAL MEDICINE

## 2019-07-19 PROCEDURE — 25000128 H RX IP 250 OP 636: Performed by: INTERNAL MEDICINE

## 2019-07-19 PROCEDURE — 97162 PT EVAL MOD COMPLEX 30 MIN: CPT | Mod: GP

## 2019-07-19 PROCEDURE — 99316 NF DSCHRG MGMT 30 MIN+: CPT | Performed by: INTERNAL MEDICINE

## 2019-07-19 RX ORDER — OXYCODONE HYDROCHLORIDE 10 MG/1
10 TABLET ORAL EVERY 6 HOURS PRN
Qty: 30 TABLET | Refills: 0 | Status: SHIPPED | OUTPATIENT
Start: 2019-07-19 | End: 2020-02-25

## 2019-07-19 RX ORDER — ACETAMINOPHEN 325 MG/1
650 TABLET ORAL EVERY 4 HOURS PRN
Qty: 100 TABLET | Refills: 0 | Status: SHIPPED | OUTPATIENT
Start: 2019-07-19 | End: 2020-03-25

## 2019-07-19 RX ORDER — FERROUS SULFATE 325(65) MG
325 TABLET ORAL
Qty: 30 TABLET | Refills: 0 | Status: SHIPPED | OUTPATIENT
Start: 2019-07-20 | End: 2021-08-18

## 2019-07-19 RX ORDER — METOPROLOL SUCCINATE 50 MG/1
50 TABLET, EXTENDED RELEASE ORAL DAILY
Qty: 30 TABLET | Refills: 0 | Status: SHIPPED | OUTPATIENT
Start: 2019-07-20 | End: 2021-08-18

## 2019-07-19 RX ORDER — TOLTERODINE 4 MG/1
8 CAPSULE, EXTENDED RELEASE ORAL DAILY
Qty: 60 CAPSULE | Refills: 0 | Status: SHIPPED | OUTPATIENT
Start: 2019-07-20 | End: 2020-02-25

## 2019-07-19 RX ORDER — BACLOFEN 20 MG/1
20 TABLET ORAL 4 TIMES DAILY PRN
Qty: 90 TABLET | Refills: 0 | Status: SHIPPED | OUTPATIENT
Start: 2019-07-19 | End: 2021-09-16

## 2019-07-19 RX ORDER — TRAZODONE HYDROCHLORIDE 100 MG/1
100 TABLET ORAL AT BEDTIME
Qty: 30 TABLET | Refills: 0 | Status: SHIPPED | OUTPATIENT
Start: 2019-07-19 | End: 2020-03-25

## 2019-07-19 RX ORDER — CALCIUM CARBONATE 500 MG/1
1 TABLET, CHEWABLE ORAL 4 TIMES DAILY PRN
Qty: 50 TABLET | Refills: 0 | Status: SHIPPED | OUTPATIENT
Start: 2019-07-19 | End: 2020-03-25

## 2019-07-19 RX ORDER — SIMETHICONE 80 MG
80 TABLET,CHEWABLE ORAL EVERY 6 HOURS PRN
Qty: 30 TABLET | Refills: 0 | Status: SHIPPED | OUTPATIENT
Start: 2019-07-19 | End: 2020-03-25

## 2019-07-19 RX ORDER — AMOXICILLIN 250 MG
2 CAPSULE ORAL 2 TIMES DAILY
Qty: 120 TABLET | Refills: 0 | Status: SHIPPED | OUTPATIENT
Start: 2019-07-19 | End: 2020-03-25

## 2019-07-19 RX ORDER — POLYETHYLENE GLYCOL 3350 17 G/17G
17 POWDER, FOR SOLUTION ORAL 2 TIMES DAILY PRN
Qty: 60 PACKET | Refills: 0 | Status: SHIPPED | OUTPATIENT
Start: 2019-07-19 | End: 2020-03-25

## 2019-07-19 RX ORDER — SODIUM PHOSPHATE,MONO-DIBASIC 19G-7G/118
1 ENEMA (ML) RECTAL DAILY
Qty: 30 CAPSULE | Refills: 0 | Status: SHIPPED | OUTPATIENT
Start: 2019-07-20 | End: 2020-03-25

## 2019-07-19 RX ADMIN — FERROUS SULFATE TAB 325 MG (65 MG ELEMENTAL FE) 325 MG: 325 (65 FE) TAB at 08:23

## 2019-07-19 RX ADMIN — ENOXAPARIN SODIUM 40 MG: 40 INJECTION SUBCUTANEOUS at 08:22

## 2019-07-19 RX ADMIN — SENNOSIDES AND DOCUSATE SODIUM 2 TABLET: 8.6; 5 TABLET ORAL at 08:22

## 2019-07-19 RX ADMIN — BACLOFEN 20 MG: 10 TABLET ORAL at 10:48

## 2019-07-19 RX ADMIN — Medication 1 CAPSULE: at 08:22

## 2019-07-19 RX ADMIN — OXYCODONE HYDROCHLORIDE 15 MG: 5 TABLET ORAL at 02:34

## 2019-07-19 RX ADMIN — METOPROLOL SUCCINATE 50 MG: 25 TABLET, EXTENDED RELEASE ORAL at 08:23

## 2019-07-19 RX ADMIN — OXYCODONE HYDROCHLORIDE 15 MG: 5 TABLET ORAL at 06:31

## 2019-07-19 RX ADMIN — OXYCODONE HYDROCHLORIDE 15 MG: 5 TABLET ORAL at 10:48

## 2019-07-19 RX ADMIN — TOLTERODINE 8 MG: 4 CAPSULE, EXTENDED RELEASE ORAL at 08:23
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
## 2019-07-19 NOTE — DISCHARGE SUMMARY
Pt discharged on 7/19 @1400. All paperwork given to pt, with medicine education. Proper education was given, pt had no questions or concerns.

## 2019-07-19 NOTE — PLAN OF CARE
"Patient A&Ox4. VSS. Good appetite. PRN oxycodone, Tylenol, and baclofen given x2 per request for hip/ shoulder pain with moderate relief. Self-caths during the day, condom catheter on at NOC. LBM this AM. Dressings to R hip incision and scrotum replaced d/t partially coming off; no drainage observed. Dressing to old PARESH site had scant amount of serosanguineous drainage and was also changed. Patient hopeful about PT evaluation tomorrow and discharge planning. Pleasant and cooperative with cares. Able to make needs known. Continue with POC.     /75 (BP Location: Right arm)   Pulse 70   Temp 97.6  F (36.4  C) (Oral)   Resp 18   Ht 1.93 m (6' 4\")   Wt 85 kg (187 lb 8 oz)   SpO2 97%   BMI 22.82 kg/m      "
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
## 2019-07-19 NOTE — PLAN OF CARE
RN: Pt in a better mood during encounters. Oxycodone 15 mg tab gicen x Condom cath overnight then off this am. No comments re: long hospitalization. Report received that pt will have his wheelchair mapping done today. Continue with plan of care.

## 2019-07-19 NOTE — PROGRESS NOTES
Case discussed with Dr. Blackmon. Discussed the pressure mapping findings.   Also discussed discharge plan and follow up   Dr. Blackmon recommends, that he should be sitting in chair for up to 2 hours three times daily. He wounds needs to be assessed in 1-2 days. If he is doing well, then he can be discharge.     Harrison Gilmore  Internal Medicine  Hospitalist  Pager no: [phone]

## 2019-07-25 NOTE — TELEPHONE ENCOUNTER
Pharmacy Filling the Rx: [facility] [place], [place] - [address]  Filling Pharmacy Phone: [phone]  Filling Pharmacy Fax: [phone]    [facility]  [address]  [address], [place] [address]   [email or web address]  [email or web address]   Phone: [phone]  Pager: [phone]  Fax: [phone]

## 2019-07-29 ENCOUNTER — OFFICE VISIT - HEALTHEAST (OUTPATIENT)
Dept: FAMILY MEDICINE | Facility: CLINIC | Age: 54
End: 2019-07-29

## 2019-07-29 DIAGNOSIS — Z89.621 HISTORY OF DISARTICULATION OF RIGHT HIP: ICD-10-CM

## 2019-07-29 DIAGNOSIS — G47.00 INSOMNIA, UNSPECIFIED TYPE: ICD-10-CM

## 2019-07-29 DIAGNOSIS — N31.9 NEUROGENIC BLADDER: ICD-10-CM

## 2019-07-29 DIAGNOSIS — G82.20 PARAPLEGIA (H): ICD-10-CM

## 2019-07-29 DIAGNOSIS — I10 ESSENTIAL HYPERTENSION, BENIGN: ICD-10-CM

## 2019-07-31 ENCOUNTER — RECORDS - HEALTHEAST (OUTPATIENT)
Dept: ADMINISTRATIVE | Facility: OTHER | Age: 54
End: 2019-07-31

## 2019-08-15 ENCOUNTER — HOSPITAL ENCOUNTER (OUTPATIENT)
Dept: WOUND CARE | Facility: CLINIC | Age: 54
Discharge: HOME | End: 2019-08-15
Attending: SURGERY | Admitting: SURGERY
Payer: MEDICARE

## 2019-08-15 VITALS
DIASTOLIC BLOOD PRESSURE: 76 MMHG | RESPIRATION RATE: 16 BRPM | TEMPERATURE: 97.3 F | HEART RATE: 70 BPM | SYSTOLIC BLOOD PRESSURE: 128 MMHG

## 2019-08-15 DIAGNOSIS — L97.119: ICD-10-CM

## 2019-08-15 PROCEDURE — G0463 HOSPITAL OUTPT CLINIC VISIT: HCPCS

## 2019-08-15 PROCEDURE — 97602 WOUND(S) CARE NON-SELECTIVE: CPT

## 2019-08-15 NOTE — DISCHARGE INSTRUCTIONS
[facility]  [address] [facility] [place] [address]  Appointment Phone [phone] Nurse Advisors [phone]    Josiah Crump      1965  Wound Dressing Change:Right Alycia-Scrotal  Cleanse wound with soap and water  Cover wound with calmoseptine cream   Apply Morning and Night with cleansing cares.     Elzbieta Blackmon M.D.. August 15, 2019  Follow up with Provider - 1 Month      Reducing a Patient s Risk for Pressure Injuries  There is no single preventive for pressure injuries. Give priority to pressure relief. Reduce other risks to help maintain a healthy flow of nutrients to the patient s skin.  Relieve pressure  Relieving pressure is the single most important factor in preventing and treating pressure injuries.  You can relieve pressure and restore the skin s blood supply by repositioning the patient and using special devices. Post a schedule to remind you to reposition the patient -- from side to back or from stomach to side. Make minor position changes even more frequently. Specially designed pillows, beds, or mattresses can also help reduce pressure.  In a bed    Use pillows under calves to elevate the legs from above the knees to the ankles.    Alter the angles of arms and legs.  In a wheelchair    Be sure the wheelchair is the proper size for the patient to give optimal support. For example, if the seat it too narrow, it will put extra pressure on the hips.    Cushion the back and buttocks with pillows or wheelchair cushions, and pad the footrest.    Use a special wheelchair cushion that is designed to distribute weight evenly and relieve pressure points that is evaluated yearly.    Have special cushions tested and adjusted at the proper times as recommended by the . This will allow the pressure relief to remain effective.   Manage moisture  Keep skin clean and lubricated, but free of excess moisture. Put the patient on a  regular toilet schedule. Use incontinent devices, if appropriate. Consult with a doctor about using diarrhea medicines:    Use talc-free powders or barrier creams.    Pat skin dry after bathing.    Lubricate skin with lotion.  Reduce shear and friction  Prevent skin breakdown by reducing friction and shear. Patients are less likely to slide down in bed if they re supported by pillows and the head of the bed isn t raised too high. During bed or wheelchair transfers, lift--don t drag--the patient. If you can t do this alone, get help. And be sure to use assistive devices, whenever possible.  In a bed    Use draw-sheets or transfer boards to move patients.    Clean and smooth the bed surface.    Lift the head of the bed no more than 30 .    Raise the foot of the bed slightly.  In a wheelchair    Support the patient s back with a pillow.    Use a foot extension.

## 2019-08-15 NOTE — PROGRESS NOTES
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  He also had a very large mid thigh ulceration and distal severe lymphedema of the BKA stump.  This was a number of months ago and postoperatively he spent at least another month or two in our TCU healing his postsurgical wounds.  He ultimately went home AMA after mapping well, but has been doing well at home.  He has a custom back to his current wheelchair now and is using his Roho cushion, but has a custom cushion pending.  He is not spending a lot of time in his wheelchair at home and actually has a new recliner.  We have encouraged him to use his cushion when he is in his recliner.  He has not had that mapped.  As far as his overall health is concerned, he still is feeling pain along his incisions.  He is seeing his long time PCP, Dr. Marvel Petit, at the [facility], and they are trying to wean him off of his narcotics.  He was on 15 mg of oxy for his dosages in the hospital and now they are trying to wean him down to 5 mg t.i.d.  He is a little concerned that 6 weeks for a wean might be a little fast, but I encouraged him to try and see how it goes and then they can also adjust his dosages or do a slower wean if necessary.  On a good note, the patient has a job interview at [employer], which is one of his dream jobs, for a part-time position next week.  Hopefully, we will be able to use his vast experience with sports and outdoors to have a meaningful employment there.      As far as his personal life is concerned, he is still having a lot of dreams with his dead wife.  They are not disturbing dreams and he really feels that he does not sleep well if she is missing from them, but he is wondering if that is a normal thing.  I have assured him that this is nothing to worry about, but he is going to  follow up with the spousal grief counselor again at [facility].  I also mentioned that he could look into lucid dreaming.  As far as his surgical site is considered, everything has healed except for a small spot by the periscrotum on the right side.  Today it measures 2.2 x 1.7 x 0.1 cm.  He was unaware that this was there anymore and was a little disturbed with himself for not knowing.  I have encouraged him to just look in the mirror and just put some Calmoseptine there on a daily basis at least or twice daily as needed to see if we can get that to reepithelialize.  Otherwise, everything else has healed beautifully and he seems to be doing quite well with mobility and his chair.  At this point, we will see him back in a month just to make sure everything is good.  His vitals today were within normal limits.   Labs:   Recent Labs   Lab Test 07/18/19  0522  05/23/19  1334  12/20/18  0900   ALBUMIN  --   --   --   --  2.8*   HGB 10.9*   < >  --    < >  --    INR  --   --  1.27*  --   --    WBC 8.2   < >  --    < >  --    CRP  --   --   --   --  103.0*    < > = values in this interval not displayed.     Nutrition requirements were discussed with patient today.  Vitals:  /76 (BP Location: Right arm)   Pulse 70   Temp 97.3  F (36.3  C) (Temporal)   Resp 16   Wound:   Wound (used by OP [facility] only) 08/15/19 1029 Right perineum pressure injury (Active)   Length (cm) 2.2 8/15/2019 10:00 AM   Width (cm) 1.7 8/15/2019 10:00 AM   Depth (cm) 0.1 8/15/2019 10:00 AM   Wound (cm^2) 3.74 cm^2 8/15/2019 10:00 AM   Wound Volume (cm^3) 0.37 cm^3 8/15/2019 10:00 AM   Dressing Appearance open to air 8/15/2019 10:00 AM   Thickness/Stage Stage 3 8/15/2019 10:00 AM   Base red/granulating 8/15/2019 10:00 AM   Red (%), Wound Tissue Color 100 8/15/2019 10:00 AM   Periwound intact 8/15/2019 10:00 AM   Periwound Temperature warm 8/15/2019 10:00 AM   Periwound Skin Turgor soft 8/15/2019 10:00 AM   Edges open 8/15/2019 10:00 AM   Care,  Wound non-select wound debridement performed 8/15/2019 10:00 AM      Photo:           SUNG NAM MD             D: 08/15/2019   T: 08/15/2019   MT: ROCIO      Name:     VALDEMAR FLANNERY   MRN:      [id number]        Account:      [id number]   :      1965           Visit Date:   08/15/2019      Document: [id number]

## 2019-08-15 NOTE — PROGRESS NOTES
Patient arrived for wound care visit. Certified Wound Care Nurse time spent evaluating patient record, completed a full evaluation and documented healed SURGICAL WOUND AND NEW PERISCROTAL wound(s) & chasidy-wound skin; provided recommendation based on treatment plan. Applied dressing, reviewed discharge instructions, patient education, and discussed plan of care with appropriate medical team staff members and patient and/or family members.

## 2019-08-19 ENCOUNTER — COMMUNICATION - HEALTHEAST (OUTPATIENT)
Dept: FAMILY MEDICINE | Facility: CLINIC | Age: 54
End: 2019-08-19

## 2019-08-19 ENCOUNTER — RECORDS - HEALTHEAST (OUTPATIENT)
Dept: ADMINISTRATIVE | Facility: OTHER | Age: 54
End: 2019-08-19

## 2019-08-19 ENCOUNTER — OFFICE VISIT - HEALTHEAST (OUTPATIENT)
Dept: FAMILY MEDICINE | Facility: CLINIC | Age: 54
End: 2019-08-19

## 2019-08-19 DIAGNOSIS — N31.9 NEUROGENIC BLADDER: ICD-10-CM

## 2019-08-19 DIAGNOSIS — D64.9 ANEMIA, UNSPECIFIED TYPE: ICD-10-CM

## 2019-08-19 DIAGNOSIS — G82.20 PARAPLEGIA (H): ICD-10-CM

## 2019-08-19 DIAGNOSIS — Z89.621 HISTORY OF DISARTICULATION OF RIGHT HIP: ICD-10-CM

## 2019-08-19 DIAGNOSIS — I10 ESSENTIAL HYPERTENSION, BENIGN: ICD-10-CM

## 2019-08-19 LAB
ERYTHROCYTE [DISTWIDTH] IN BLOOD BY AUTOMATED COUNT: 23.9 % (ref 11–14.5)
HCT VFR BLD AUTO: 45.6 % (ref 40–54)
HGB BLD-MCNC: 12.7 G/DL (ref 14–18)
MCH RBC QN AUTO: 19.4 PG (ref 27–34)
MCHC RBC AUTO-ENTMCNC: 27.9 G/DL (ref 32–36)
MCV RBC AUTO: 70 FL (ref 80–100)
PLATELET # BLD AUTO: 410 THOU/UL (ref 140–440)
RBC # BLD AUTO: 6.55 MILL/UL (ref 4.4–6.2)
WBC: 14.2 THOU/UL (ref 4–11)

## 2019-08-20 ENCOUNTER — COMMUNICATION - HEALTHEAST (OUTPATIENT)
Dept: FAMILY MEDICINE | Facility: CLINIC | Age: 54
End: 2019-08-20

## 2019-08-26 ENCOUNTER — RECORDS - HEALTHEAST (OUTPATIENT)
Dept: ADMINISTRATIVE | Facility: OTHER | Age: 54
End: 2019-08-26

## 2019-09-18 ENCOUNTER — OFFICE VISIT - HEALTHEAST (OUTPATIENT)
Dept: FAMILY MEDICINE | Facility: CLINIC | Age: 54
End: 2019-09-18

## 2019-09-18 DIAGNOSIS — N31.9 NEUROGENIC BLADDER: ICD-10-CM

## 2019-09-18 DIAGNOSIS — I10 ESSENTIAL HYPERTENSION, BENIGN: ICD-10-CM

## 2019-09-18 DIAGNOSIS — R25.2 SPASTICITY: ICD-10-CM

## 2019-09-18 DIAGNOSIS — G89.29 OTHER CHRONIC PAIN: ICD-10-CM

## 2019-09-18 DIAGNOSIS — Z89.621 HISTORY OF DISARTICULATION OF RIGHT HIP: ICD-10-CM

## 2019-09-18 DIAGNOSIS — G82.20 PARAPLEGIA (H): ICD-10-CM

## 2019-09-21 ENCOUNTER — COMMUNICATION - HEALTHEAST (OUTPATIENT)
Dept: FAMILY MEDICINE | Facility: CLINIC | Age: 54
End: 2019-09-21

## 2019-09-21 DIAGNOSIS — R25.2 SPASTICITY: ICD-10-CM

## 2019-11-21 ENCOUNTER — HOSPITAL ENCOUNTER (OUTPATIENT)
Dept: WOUND CARE | Facility: CLINIC | Age: 54
Discharge: HOME | End: 2019-11-21
Attending: SURGERY | Admitting: SURGERY
Payer: MEDICARE

## 2019-11-21 ENCOUNTER — COMMUNICATION - HEALTHEAST (OUTPATIENT)
Dept: FAMILY MEDICINE | Facility: CLINIC | Age: 54
End: 2019-11-21

## 2019-11-21 VITALS
HEART RATE: 77 BPM | RESPIRATION RATE: 18 BRPM | TEMPERATURE: 97.3 F | SYSTOLIC BLOOD PRESSURE: 131 MMHG | DIASTOLIC BLOOD PRESSURE: 79 MMHG

## 2019-11-21 DIAGNOSIS — L97.119: Primary | ICD-10-CM

## 2019-11-21 PROCEDURE — A6210 FOAM DRG >16<=48 SQ IN W/O B: HCPCS

## 2019-11-21 PROCEDURE — A6021 COLLAGEN DRESSING <=16 SQ IN: HCPCS

## 2019-11-21 PROCEDURE — 11042 DBRDMT SUBQ TIS 1ST 20SQCM/<: CPT

## 2019-11-21 RX ORDER — OXYCODONE HYDROCHLORIDE 10 MG/1
10 TABLET ORAL EVERY 4 HOURS PRN
Qty: 20 TABLET | Refills: 0 | Status: SHIPPED | OUTPATIENT
Start: 2019-11-21 | End: 2020-02-25

## 2019-11-21 NOTE — DISCHARGE INSTRUCTIONS
[facility]  [address], [place] [address]  Appointment Phone [phone]     Josiah Crump      1965    Wound Dressing Change:right trochanter, right old PARESH drain site  Cleanse wound and surrounding skin with: soap and water  Insert Hydrofera Blue Classic into wound  Cover wound with ABD and tape  Change dressing daily    Wound Dressing Change:periscrotal wound  Cleanse wound with soap and water  Cover wound with Endoform cut to size slightly larger than wound, moisten with saline  (this is supposed to dissolve into wound, if it doesn't remove and replace)  Cover wound with gauze, tape  Change dressing daily     Elzbieta Blackmon M.D.. November 21, 2019    Call us at [phone] if you have any questions about your wounds, have redness or swelling around your wound, have a fever of 101 or greater or if you have any other problems or concerns. We answer the phone Monday through Friday 8 am to 4 pm, please leave a message as we check the voicemail frequently throughout the day.    Follow up with Provider - 6-8 weeks

## 2019-11-22 NOTE — PROGRESS NOTES
Visit Date:   11/21/2019      [facility] VISIT NOTE       This is a 53-year-old paraplegic gentleman who is here for further followup after his rather lengthy TCU stay for right leg/hip disarticulation and thigh fillet flap.  He has noted that there is still a persistent draining hole in the site of his previous PARESH drain on the right hip.  He has just been using Band-Aids to cover the opening.  There is also a persistent wound at the base of the right scrotum that he had prior to discharge home.  He has missed a couple of visits with us due to transportation issues, but made it in today.  He has been feeling well with no signs or symptoms of systemic infection.  The drainage that he describes it usually clear with no odor.  He has been quite active, having just recently spent time with his brother at the gym doing workouts, etc.  He is waiting to start a job at [employer] and is looking forward to an upcoming trip to visit family, including his aging mother in [place].      On exam today, there are 2 very small skin defects along the right hip.  The superior one is the site of the previous PARESH and that, when probed with a Q-tip, actually goes almost 14 cm.  Interestingly, there is another opening below that along the suture line before the flap inset inset that actually connects to the superior tunnel and actually feels like a space between tissue planes.  Since these are most assuredly colonized with biofilm, we need to start doing some topical therapies.  He does not have home nursing care services, so he will need to learn to do this himself.  I think strips of non-moistened Hydrofera Blue will probably be the best option here.  The patient does have a mirror that he uses to check on his wounds at home, so he may be able to use this to facilitate the wound packings.        While 4% topical lidocaine was applied to the scrotal base wound per protocol by our nursing staff, the patient does have some  sensation along the upper portion of his flap and required the injection of 10 mL of 1% lidocaine with epinephrine as a field block prior to debriding these 2 holes.  After adequate time was allowed for analgesic and hemostatic effects to take hold, our electrocautery was used to excise skin and subcutaneous tissue from both openings.  This did allow for easier access to the tunnels.  He tolerated things quite well and hemostasis was achieved.  We also used a curet to selectively debride the periscrotal base wound.  There was a bit of film there and we did stirrup some better bleeding afterwards.        At this point, I would like him to learn to flush his connecting tunnel tracking wounds of the right hip either with saline or Vashe.  He was instructed today on how to use a strip of Hydrofera Blue into each of these wounds.  He can certainly continue to do these dressings when he travels, and we can facilitate ordering supplies.  Because he suffers from chronic pain that is not just in the area of his wounds, he spoke of returning to his primary care provider to resume an analgesic prescription.  Since the weekend is upon us and his physician is not in the office on Fridays, I agreed to provide him with #20, 10 mg oxycodone tablets.  We will not be providing ongoing pain meds for this, so anything that he needs after that should be provided by his primary doctor who will continue to follow him over time.  At this point, we will order Hydrofera Blue with cover dressings for the right hip wounds.  We will order Endoform and a cover dressing for the periscrotal wound.  We will also facilitate getting him supplies to flush his wounds.        We will see him back in clinic in a month or two.  I am booking into January, but if he needs to be seen in December prior to his trip, he can see one of my colleagues.  If there are any problems with the dressings themselves, he needs to contact our nursing staff here at the  clinic.        Please see nursing notes for dimensions of the wounds today which were pre-debridement.  His vitals were within normal limits today.   Labs:   Recent Labs   Lab Test 19  0522  19  1334  18  0900   ALBUMIN  --   --   --   --  2.8*   HGB 10.9*   < >  --    < >  --    INR  --   --  1.27*  --   --    WBC 8.2   < >  --    < >  --    CRP  --   --   --   --  103.0*    < > = values in this interval not displayed.     Nutrition requirements were discussed with patient today.  Vitals:  /79   Pulse 77   Temp 97.3  F (36.3  C) (Temporal)   Resp 18   Wound:     Photo:           SUNG NAM MD             D: 2019   T: 2019   MT:       Name:     VALDEMAR FLANNERY   MRN:      [id number]        Account:      [id number]   :      1965           Visit Date:   2019      Document: [id number]

## 2019-12-05 ENCOUNTER — OFFICE VISIT - HEALTHEAST (OUTPATIENT)
Dept: FAMILY MEDICINE | Facility: CLINIC | Age: 54
End: 2019-12-05

## 2019-12-05 DIAGNOSIS — Z89.621 HISTORY OF DISARTICULATION OF RIGHT HIP: ICD-10-CM

## 2019-12-05 DIAGNOSIS — N31.9 NEUROGENIC BLADDER: ICD-10-CM

## 2019-12-05 DIAGNOSIS — D64.9 ANEMIA, UNSPECIFIED TYPE: ICD-10-CM

## 2019-12-05 DIAGNOSIS — G82.20 PARAPLEGIA (H): ICD-10-CM

## 2019-12-05 DIAGNOSIS — G89.29 OTHER CHRONIC PAIN: ICD-10-CM

## 2019-12-05 DIAGNOSIS — R25.2 SPASTICITY: ICD-10-CM

## 2019-12-05 DIAGNOSIS — I10 ESSENTIAL HYPERTENSION, BENIGN: ICD-10-CM

## 2019-12-05 LAB
ERYTHROCYTE [DISTWIDTH] IN BLOOD BY AUTOMATED COUNT: 23.1 % (ref 11–14.5)
FERRITIN SERPL-MCNC: 40 NG/ML (ref 27–300)
HCT VFR BLD AUTO: 43.7 % (ref 40–54)
HGB BLD-MCNC: 12.3 G/DL (ref 14–18)
IRON SERPL-MCNC: 22 UG/DL (ref 42–175)
MCH RBC QN AUTO: 19.2 PG (ref 27–34)
MCHC RBC AUTO-ENTMCNC: 28.1 G/DL (ref 32–36)
MCV RBC AUTO: 68 FL (ref 80–100)
PLATELET # BLD AUTO: 332 THOU/UL (ref 140–440)
RBC # BLD AUTO: 6.42 MILL/UL (ref 4.4–6.2)
WBC: 9 THOU/UL (ref 4–11)

## 2019-12-16 ENCOUNTER — COMMUNICATION - HEALTHEAST (OUTPATIENT)
Dept: FAMILY MEDICINE | Facility: CLINIC | Age: 54
End: 2019-12-16

## 2019-12-16 DIAGNOSIS — I10 HTN (HYPERTENSION): ICD-10-CM

## 2019-12-17 ENCOUNTER — RECORDS - HEALTHEAST (OUTPATIENT)
Dept: ADMINISTRATIVE | Facility: OTHER | Age: 54
End: 2019-12-17

## 2019-12-17 ENCOUNTER — AMBULATORY - HEALTHEAST (OUTPATIENT)
Dept: FAMILY MEDICINE | Facility: CLINIC | Age: 54
End: 2019-12-17

## 2019-12-17 ENCOUNTER — COMMUNICATION - HEALTHEAST (OUTPATIENT)
Dept: FAMILY MEDICINE | Facility: CLINIC | Age: 54
End: 2019-12-17

## 2019-12-17 DIAGNOSIS — D50.9 IRON DEFICIENCY ANEMIA, UNSPECIFIED IRON DEFICIENCY ANEMIA TYPE: ICD-10-CM

## 2019-12-30 ENCOUNTER — RECORDS - HEALTHEAST (OUTPATIENT)
Dept: ADMINISTRATIVE | Facility: OTHER | Age: 54
End: 2019-12-30

## 2019-12-31 ENCOUNTER — COMMUNICATION - HEALTHEAST (OUTPATIENT)
Dept: FAMILY MEDICINE | Facility: CLINIC | Age: 54
End: 2019-12-31

## 2020-01-03 ENCOUNTER — COMMUNICATION - HEALTHEAST (OUTPATIENT)
Dept: FAMILY MEDICINE | Facility: CLINIC | Age: 55
End: 2020-01-03

## 2020-01-09 ENCOUNTER — HOSPITAL ENCOUNTER (OUTPATIENT)
Dept: WOUND CARE | Facility: CLINIC | Age: 55
Discharge: HOME | End: 2020-01-09
Attending: SURGERY | Admitting: SURGERY
Payer: MEDICARE

## 2020-01-09 ENCOUNTER — RECORDS - HEALTHEAST (OUTPATIENT)
Dept: ADMINISTRATIVE | Facility: OTHER | Age: 55
End: 2020-01-09

## 2020-01-09 VITALS
SYSTOLIC BLOOD PRESSURE: 119 MMHG | HEART RATE: 86 BPM | RESPIRATION RATE: 20 BRPM | DIASTOLIC BLOOD PRESSURE: 64 MMHG | TEMPERATURE: 97.8 F

## 2020-01-09 DIAGNOSIS — L97.119: ICD-10-CM

## 2020-01-09 PROCEDURE — G0463 HOSPITAL OUTPT CLINIC VISIT: HCPCS

## 2020-01-09 NOTE — DISCHARGE INSTRUCTIONS
[facility] [place]  [address] [facility] [address] [place] [address]  Appointment Phone [phone]   Josiah DEEP Theron      1965  Your wound is Healed!! Dressings are no longer required.        Elzbieta Blackmon M.D.. January 9, 2020    Reducing a Patient s Risk for Pressure Injuries  There is no single preventive for pressure injuries. Give priority to pressure relief. Reduce other risks to help maintain a healthy flow of nutrients to the patient s skin.  Relieve pressure  Relieving pressure is the single most important factor in preventing and treating pressure injuries.  You can relieve pressure and restore the skin s blood supply by repositioning the patient and using special devices. Post a schedule to remind you to reposition the patient -- from side to back or from stomach to side. Make minor position changes even more frequently. Specially designed pillows, beds, or mattresses can also help reduce pressure.  In a bed    Use pillows under calves to elevate the legs from above the knees to the ankles.    Alter the angles of arms and legs.  In a wheelchair    Be sure the wheelchair is the proper size for the patient to give optimal support. For example, if the seat it too narrow, it will put extra pressure on the hips.    Cushion the back and buttocks with pillows or wheelchair cushions, and pad the footrest.    Use a special wheelchair cushion that is designed to distribute weight evenly and relieve pressure points that is evaluated yearly.    Have special cushions tested and adjusted at the proper times as recommended by the . This will allow the pressure relief to remain effective.   Manage moisture  Keep skin clean and lubricated, but free of excess moisture. Put the patient on a regular toilet schedule. Use incontinent devices, if appropriate. Consult with a doctor about using diarrhea medicines:    Use talc-free powders or barrier creams.    Pat skin dry  after bathing.    Lubricate skin with lotion.  Reduce shear and friction  Prevent skin breakdown by reducing friction and shear. Patients are less likely to slide down in bed if they re supported by pillows and the head of the bed isn t raised too high. During bed or wheelchair transfers, lift--don t drag--the patient. If you can t do this alone, get help. And be sure to use assistive devices, whenever possible.  In a bed    Use draw-sheets or transfer boards to move patients.    Clean and smooth the bed surface.    Lift the head of the bed no more than 30 .    Raise the foot of the bed slightly.  In a wheelchair    Support the patient s back with a pillow.    Use a foot extension.
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
## 2020-01-13 NOTE — PROGRESS NOTES
Patient arrived for wound care visit. Certified Wound Care Nurse time spent evaluating patient record, completed a full evaluation and documented wounds & chasidy-wound skin; provided recommendation based on treatment plan. Applied dressing, reviewed discharge instructions, patient education, and discussed plan of care with appropriate medical team staff members and patient.

## 2020-01-16 ENCOUNTER — COMMUNICATION - HEALTHEAST (OUTPATIENT)
Dept: SCHEDULING | Facility: CLINIC | Age: 55
End: 2020-01-16

## 2020-01-16 DIAGNOSIS — J20.9 ACUTE BRONCHITIS, UNSPECIFIED ORGANISM: ICD-10-CM

## 2020-01-24 ENCOUNTER — COMMUNICATION - HEALTHEAST (OUTPATIENT)
Dept: ENDOCRINOLOGY | Facility: CLINIC | Age: 55
End: 2020-01-24

## 2020-01-28 ENCOUNTER — TRANSFERRED RECORDS (OUTPATIENT)
Dept: HEALTH INFORMATION MANAGEMENT | Facility: CLINIC | Age: 55
End: 2020-01-28

## 2020-01-28 ENCOUNTER — RECORDS - HEALTHEAST (OUTPATIENT)
Dept: ADMINISTRATIVE | Facility: OTHER | Age: 55
End: 2020-01-28

## 2020-02-03 ENCOUNTER — TRANSCRIBE ORDERS (OUTPATIENT)
Dept: OTHER | Age: 55
End: 2020-02-03

## 2020-02-03 DIAGNOSIS — N36.8 URETHRAL EROSION: Primary | ICD-10-CM

## 2020-02-06 ENCOUNTER — COMMUNICATION - HEALTHEAST (OUTPATIENT)
Dept: FAMILY MEDICINE | Facility: CLINIC | Age: 55
End: 2020-02-06

## 2020-02-06 ENCOUNTER — COMMUNICATION - HEALTHEAST (OUTPATIENT)
Dept: ENDOCRINOLOGY | Facility: CLINIC | Age: 55
End: 2020-02-06

## 2020-02-06 DIAGNOSIS — N62 GYNECOMASTIA: ICD-10-CM
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
## 2020-02-06 NOTE — TELEPHONE ENCOUNTER
MEDICAL RECORDS REQUEST   [facility]  Urology Clinic  [address] [place] [address]  PHONE: [phone]  Fax: [phone]        FUTURE VISIT INFORMATION                                                   Josiah Crump, : 1965 scheduled for future visit at [facility]    APPOINTMENT INFORMATION:    Date: 2/10/20 12PM    Provider:  Osmani Goncalves MD    Reason for Visit/Diagnosis: Urethral erosion     REFERRAL INFORMATION:    Referring provider:  Richard Byers    Specialty: MD    Referring providers clinic:  [facility]    Clinic contact number: [phone]    RECORDS REQUESTED FOR VISIT                                                     NOTES  STATUS/DETAILS   OFFICE NOTE from referring provider  in process   OFFICE NOTE from other specialist  in process   DISCHARGE SUMMARY from hospital  in process   DISCHARGE REPORT from the ER  in process   OPERATIVE REPORT  in process   MEDICATION LIST  in process   LABS     URINALYSIS (UA)  in process     PRE-VISIT CHECKLIST      Record collection complete If no, please explain: Fax to [facility] to obtain recs ASAP - 7:16AM    Appointment appropriately scheduled           (right time/right provider) Yes   MyChart activation If no, please explain: In process    Questionnaire complete If no, please explain: In process      Completed by: Audrey Thapa

## 2020-02-07 ENCOUNTER — TRANSCRIBE ORDERS (OUTPATIENT)
Dept: OTHER | Age: 55
End: 2020-02-07

## 2020-02-07 ENCOUNTER — PRE VISIT (OUTPATIENT)
Dept: UROLOGY | Facility: CLINIC | Age: 55
End: 2020-02-07

## 2020-02-07 DIAGNOSIS — E11.9 DIABETES MELLITUS (H): ICD-10-CM

## 2020-02-07 DIAGNOSIS — N62 GYNECOMASTIA: Primary | ICD-10-CM

## 2020-02-07 NOTE — TELEPHONE ENCOUNTER
Reason for visit: urethral erosion      Relevant information: history of paraplegia, ph has a penile implant, pt both uses a muhammad and straight caths     Records/imaging/labs/orders: records available     Pt called: no need for a call    At Rooming: regular

## 2020-02-10 ENCOUNTER — PRE VISIT (OUTPATIENT)
Dept: UROLOGY | Facility: CLINIC | Age: 55
End: 2020-02-10

## 2020-02-10 ENCOUNTER — ALLIED HEALTH/NURSE VISIT (OUTPATIENT)
Dept: UROLOGY | Facility: CLINIC | Age: 55
End: 2020-02-10
Payer: MEDICARE

## 2020-02-10 ENCOUNTER — OFFICE VISIT (OUTPATIENT)
Dept: UROLOGY | Facility: CLINIC | Age: 55
End: 2020-02-10
Attending: UROLOGY
Payer: MEDICARE

## 2020-02-10 ENCOUNTER — HOSPITAL ENCOUNTER (OUTPATIENT)
Facility: AMBULATORY SURGERY CENTER | Age: 55
End: 2020-02-10
Attending: UROLOGY
Payer: MEDICARE

## 2020-02-10 VITALS
HEIGHT: 75 IN | SYSTOLIC BLOOD PRESSURE: 161 MMHG | BODY MASS INDEX: 25.49 KG/M2 | HEART RATE: 72 BPM | DIASTOLIC BLOOD PRESSURE: 99 MMHG | WEIGHT: 205 LBS

## 2020-02-10 DIAGNOSIS — T83.89XA EROSION OF URETHRA DUE TO CATHETERIZATION OF URINARY TRACT, INITIAL ENCOUNTER (H): ICD-10-CM

## 2020-02-10 DIAGNOSIS — T83.89XA EROSION OF URETHRA DUE TO CATHETERIZATION OF URINARY TRACT, INITIAL ENCOUNTER (H): Primary | ICD-10-CM

## 2020-02-10 DIAGNOSIS — N36.8 EROSION OF URETHRA DUE TO CATHETERIZATION OF URINARY TRACT, INITIAL ENCOUNTER (H): ICD-10-CM

## 2020-02-10 DIAGNOSIS — N36.8 EROSION OF URETHRA DUE TO CATHETERIZATION OF URINARY TRACT, INITIAL ENCOUNTER (H): Primary | ICD-10-CM

## 2020-02-10 DIAGNOSIS — Q64.32 CONGENITAL STRICTURE OF URETHRA: Primary | ICD-10-CM

## 2020-02-10 RX ORDER — CEFAZOLIN SODIUM 2 G/50ML
2 SOLUTION INTRAVENOUS
Status: CANCELLED | OUTPATIENT
Start: 2020-02-10

## 2020-02-10 RX ORDER — CEFAZOLIN SODIUM 1 G/50ML
1 INJECTION, SOLUTION INTRAVENOUS SEE ADMIN INSTRUCTIONS
Status: CANCELLED | OUTPATIENT
Start: 2020-02-10

## 2020-02-10 ASSESSMENT — PAIN SCALES - GENERAL: PAINLEVEL: EXTREME PAIN (9)

## 2020-02-10 ASSESSMENT — MIFFLIN-ST. JEOR: SCORE: 1860.5

## 2020-02-10 NOTE — PATIENT INSTRUCTIONS
Schedule Urodynamics.    Schedule surgery.    It was a pleasure meeting with you today.  Thank you for allowing me and my team the privilege of caring for you today.  YOU are the reason we are here, and I truly hope we provided you with the excellent service you deserve.  Please let us know if there is anything else we can do for you so that we can be sure you are leaving completely satisfied with your care experience.

## 2020-02-10 NOTE — PROGRESS NOTES
Pre Op Teaching Flowsheet       Pre and Post op Patient Education  Relevant Diagnosis:  Urethral erosion  Teaching Topic:  Pre and post op teaching for anterior second stage urethroplasty and SP placement  Person Involved in teaching:  Josiah Crump      Motivation Level:  Asks Questions: Yes  Eager to Learn:  Yes  Cooperative: Yes  Receptive (willing/able to accept information):  Yes  Patient demonstrates understanding of the following:  Date and time of surgery:  3/27/20  Location of surgery: [facility]- 5th Floor  History and Physical and any other testing necessary prior to surgery: Yes  Required time line for completion of History and Physical and any pre-op testing: Yes    NPO Guidelines: NPO per Anesthesia Guidelines    Patient demonstrates understanding of the following:  Patient understands the need for a responsible adult to drive them home and someone to stay with them for the first 24 hours post-operatively: YES   Pre-op bowel prep: No, not needed  Pre-op showering/scrub information with Hibiclens Soap: Yes  Medications to take the day of surgery:  Per PCP  Blood thinner medications discussed and when to stop (if applicable):  Yes  Diabetes medication management (if applicable):  N/A  Discussed pain control after surgery: pain scale, pain medications and pain management techniques  Infection Prevention: Patient demonstrates understanding of the following:  Patient instructed on hand hygiene:  Yes  Surgical procedure site care taught: Yes  Signs and symptoms of infection taught:  Yes  Wound care will be taught at the time of discharge.  Central venous catheter care will be taught at the time of discharge (if applicable).    Post-op follow-up:  Discussed how to contact the hospital, nurse, and clinic scheduling staff if necessary.    Instructional materials used/given/mailed:  [employer] Surgery Booklet, post op teaching sheet, Map, Soap, and arrival/location  information.    Surgical instructions given to patient in clinic: Yes.    Instructional Materials given:  Before your surgery packet , Medications to avoid before surgery , Showering or Bathing instructions before surgery  and What to expect after surgery    Post-op appointment/testing scheduled per MD orders: Yes    Total time with patient: 10 minutes    Alberta Durand RN, BSN  Urology Care Coordinator

## 2020-02-10 NOTE — NURSING NOTE
"Chief Complaint   Patient presents with     Consult For     head of penis split x 1 week        Blood pressure (!) 161/99, pulse 72, height 1.905 m (6' 3\"), weight 93 kg (205 lb). Body mass index is 25.62 kg/m .    Patient Active Problem List   Diagnosis     Ulcer of right thigh, unspecified ulcer stage (H)     Benign essential hypertension     Chronic pain     General symptom     Gynecomastia     Hx of BKA, right (H)     Hydrocele     Infected penile implant (H)     Insomnia     Lateral epicondylitis     Neurogenic bladder     Other tenosynovitis of hand and wrist     Pain in limb     Paraplegia (H)     Right shoulder strain     Unspecified site of spinal cord injury without evidence of spinal bone injury     Vitamin D deficiency     Status post hip surgery     Heterotopic ossification of bone     Physical deconditioning       No Known Allergies    Current Outpatient Medications   Medication Sig Dispense Refill     acetaminophen (TYLENOL) 325 MG tablet Take 2 tablets (650 mg) by mouth every 4 hours as needed for other (multimodal surgical pain management along with NSAIDS and opioid medication as indicated based on pain control and physical function.) 100 tablet 0     baclofen (LIORESAL) 20 MG tablet Take 1 tablet (20 mg) by mouth 4 times daily as needed for muscle spasms 90 tablet 0     calcium carbonate (TUMS) 500 MG chewable tablet Take 1 tablet (500 mg) by mouth 4 times daily as needed for heartburn 50 tablet 0     ferrous sulfate (FEROSUL) 325 (65 Fe) MG tablet Take 1 tablet (325 mg) by mouth daily (with breakfast) 30 tablet 0     glucosamine-chondroitin 500-400 MG CAPS per capsule Take 1 capsule by mouth daily 30 capsule 0     metoprolol succinate ER (TOPROL-XL) 50 MG 24 hr tablet Take 1 tablet (50 mg) by mouth daily 30 tablet 0     order for [facility] Order Phone [phone] Fax [phone]    Primary Dressing Hydroferra Blue Classic 6X6  Qty 6  Primary Dressing Endoform 2X2 Qty 10  Secondary " "Dressing Gauze 4X4 Qty 1 loaf  Secondary Dressing ABD Qty 60  Secondary Dressing Medipore tape 2\" Qty 4  Extra Large Gloves Qty  1 box  Wound Cleanser Qty 1 large bottle  Length of Need: 1 month  Frequency of dressing change: daily 30 days 0     polyethylene glycol (MIRALAX/GLYCOLAX) packet Take 17 g by mouth 2 times daily as needed for constipation 60 packet 0     senna-docusate (SENOKOT-S/PERICOLACE) 8.6-50 MG tablet Take 2 tablets by mouth 2 times daily 120 tablet 0     simethicone (MYLICON) 80 MG chewable tablet Take 1 tablet (80 mg) by mouth every 6 hours as needed for cramping 30 tablet 0     tolterodine ER (DETROL LA) 4 MG 24 hr capsule Take 2 capsules (8 mg) by mouth daily 60 capsule 0     traZODone (DESYREL) 100 MG tablet Take 1 tablet (100 mg) by mouth At Bedtime 30 tablet 0     oxyCODONE (ROXICODONE) 10 MG tablet Take 1 tablet (10 mg) by mouth every 6 hours as needed for severe pain (Patient not taking: Reported on 2/10/2020) 30 tablet 0     oxyCODONE IR (ROXICODONE) 10 MG tablet Take 1 tablet (10 mg) by mouth every 4 hours as needed for severe pain (Patient not taking: Reported on 2/10/2020) 20 tablet 0       Social History     Tobacco Use     Smoking status: Never Smoker     Smokeless tobacco: Never Used   Substance Use Topics     Alcohol use: No     Frequency: Never     Drug use: No       Anastasia Serrato LPN  2/10/2020  12:14 PM  "

## 2020-02-19 NOTE — TELEPHONE ENCOUNTER
RECORDS RECEIVED FROM: internal    DATE RECEIVED: 3.30.20   NOTES (FOR ALL VISITS) STATUS DETAILS   OFFICE NOTES from referring provider Care Everywhere 2/7/20 Marvel Petit    OFFICE NOTES from other specialist N/A    ED NOTES N/A    OPERATIVE REPORT  (thyroid, pituitary, adrenal, parathyroid) N/A    MEDICATION LIST Internal    IMAGING      DEXASCAN N/A    MRI (BRAIN) N/A    XR (Chest) Internal 6.12.19,    CT (HEAD/NECK/CHEST/ABDOMEN) Internal 6.13.19,    NUCLEAR  N/A    ULTRASOUND (HEAD/NECK) N/A    LABS     DIABETES: HBGA1C, CREATININE, FASTING LIPIDS, MICROALBUMIN URINE, POTASSIUM, TSH, T4    THYROID: TSH, T4, CBC, THYRODLONULIN, TOTAL T3, FREE T4, CALCITONIN, CEA Internal/CE   CREATININE 5.23.19  POTASSIUM 5.23.29  TSH/T3- 1/3/19

## 2020-02-20 ENCOUNTER — PRE VISIT (OUTPATIENT)
Dept: UROLOGY | Facility: CLINIC | Age: 55
End: 2020-02-20

## 2020-02-25 ENCOUNTER — RECORDS - HEALTHEAST (OUTPATIENT)
Dept: ADMINISTRATIVE | Facility: OTHER | Age: 55
End: 2020-02-25

## 2020-02-25 ENCOUNTER — ANCILLARY PROCEDURE (OUTPATIENT)
Dept: RADIOLOGY | Facility: AMBULATORY SURGERY CENTER | Age: 55
End: 2020-02-25
Attending: NURSE PRACTITIONER
Payer: MEDICARE

## 2020-02-25 ENCOUNTER — OFFICE VISIT (OUTPATIENT)
Dept: UROLOGY | Facility: CLINIC | Age: 55
End: 2020-02-25
Payer: MEDICARE

## 2020-02-25 VITALS
DIASTOLIC BLOOD PRESSURE: 81 MMHG | BODY MASS INDEX: 25.49 KG/M2 | SYSTOLIC BLOOD PRESSURE: 156 MMHG | WEIGHT: 205 LBS | HEIGHT: 75 IN | HEART RATE: 71 BPM

## 2020-02-25 DIAGNOSIS — N31.9 NEUROGENIC BLADDER: ICD-10-CM

## 2020-02-25 DIAGNOSIS — Q64.32 CONGENITAL STRICTURE OF URETHRA: ICD-10-CM

## 2020-02-25 DIAGNOSIS — N36.8 EROSION OF URETHRA DUE TO CATHETERIZATION OF URINARY TRACT, INITIAL ENCOUNTER (H): Primary | ICD-10-CM

## 2020-02-25 DIAGNOSIS — T83.89XA EROSION OF URETHRA DUE TO CATHETERIZATION OF URINARY TRACT, INITIAL ENCOUNTER (H): Primary | ICD-10-CM

## 2020-02-25 DIAGNOSIS — R32 INCONTINENCE: ICD-10-CM

## 2020-02-25 PROBLEM — R25.2 SPASTICITY: Status: ACTIVE | Noted: 2019-09-18

## 2020-02-25 PROBLEM — Z89.621: Status: ACTIVE | Noted: 2019-08-19

## 2020-02-25 LAB
ALBUMIN UR-MCNC: >300 MG/DL
APPEARANCE UR: ABNORMAL
BILIRUB UR QL STRIP: NEGATIVE
COLOR UR AUTO: ABNORMAL
GLUCOSE UR STRIP-MCNC: NEGATIVE MG/DL
HGB UR QL STRIP: ABNORMAL
KETONES UR STRIP-MCNC: NEGATIVE MG/DL
LEUKOCYTE ESTERASE UR QL STRIP: ABNORMAL
NITRATE UR QL: POSITIVE
PH UR STRIP: 6.5 PH (ref 5–7)
SP GR UR STRIP: 1.02 (ref 1–1.03)
UROBILINOGEN UR STRIP-ACNC: 1 EU/DL (ref 0.2–1)

## 2020-02-25 PROCEDURE — 81003 URINALYSIS AUTO W/O SCOPE: CPT

## 2020-02-25 RX ORDER — SULFAMETHOXAZOLE/TRIMETHOPRIM 800-160 MG
1 TABLET ORAL ONCE
Status: COMPLETED | OUTPATIENT
Start: 2020-02-25 | End: 2020-02-25

## 2020-02-25 RX ORDER — HYDROCODONE BITARTRATE AND ACETAMINOPHEN 5; 325 MG/1; MG/1
TABLET ORAL
COMMUNITY
Start: 2020-02-13 | End: 2020-03-25

## 2020-02-25 RX ORDER — AMOXICILLIN 500 MG/1
CAPSULE ORAL
COMMUNITY
Start: 2020-02-13 | End: 2020-02-25

## 2020-02-25 RX ORDER — POLYETHYLENE GLYCOL 3350 17 G/17G
POWDER, FOR SOLUTION ORAL
COMMUNITY
Start: 2019-07-19 | End: 2020-02-25

## 2020-02-25 RX ORDER — FESOTERODINE FUMARATE 8 MG/1
TABLET, FILM COATED, EXTENDED RELEASE ORAL
COMMUNITY
End: 2021-08-18

## 2020-02-25 RX ORDER — CHLORHEXIDINE GLUCONATE ORAL RINSE 1.2 MG/ML
SOLUTION DENTAL
COMMUNITY
Start: 2020-02-13 | End: 2020-03-25

## 2020-02-25 RX ADMIN — SULFAMETHOXAZOLE AND TRIMETHOPRIM 1 TABLET: 800; 160 TABLET ORAL at 15:37

## 2020-02-25 ASSESSMENT — PAIN SCALES - GENERAL: PAINLEVEL: NO PAIN (0)

## 2020-02-25 ASSESSMENT — MIFFLIN-ST. JEOR: SCORE: 1860.5
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
## 2020-02-25 NOTE — NURSING NOTE
The following medication was given:     MEDICATION:  Bactrim  ROUTE: PO  SITE: Orally  DOSE: 800/160mg  LOT #: [id number]  : [employer]  EXPIRATION DATE: 11/2020  NDC#: 3281-9954-93   Was there drug waste? No    Prior to administration, verified patient identity using patient's name and date of birth.  Due to administration, patient instructed to remain in clinic for 15 minutes  afterwards, and to report any adverse reaction to me immediately.    Drug Amount Wasted:  None.  Vial/Syringe: Single dose vial      The following medication was given:     MEDICATION:  Omnipaque (Iohexol Injection) (240mgI/mL)  ROUTE: Provider Administered  SITE: Provider Administered via catheter  DOSE: 200mL  LOT #: [id number]  : [email or web address]  EXPIRATION DATE: 9/6/2022  NDC#: [address]-22   Was there drug waste? No    Drug Amount Wasted:  None.  Vial/Syringe: Single dose vial        Felicita Pak CMA  2/25/2020  2:55 PM

## 2020-02-25 NOTE — LETTER
2/25/2020       RE: Josiah Crump  [address]  [address]  [place] [address]     Dear Colleague,    Thank you for referring your patient, Josiah Crump, to the [facility] AND [facility] PROST[facility] UROLOGIC CANCERS at [facility]. Please see a copy of my visit note below.    PREPROCEDURE DIAGNOSES:    1. Neurogenic bladder secondary to T7 spinal cord injury  2. Urinary incontinence  3. 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 Sterile urethral catheterization for measurement of postvoid residual urine volume.    INDICATIONS FOR PROCEDURE:  Mr. Josiah Crump is a pleasant 54 year old male with neurogenic bladder secondary to T7 spinal cord injury, urinary incontinence, and urethral erosion 2/2 indwelling Shah catheter. Baseline video urodynamic assessment is requested today by Dr. Goncalves to better characterize Mr. Josiah Crump's voiding dysfunction.      VOIDING DIARY:  Not completed.    DESCRIPTION OF PROCEDURE:  Risks, benefits, and alternatives to urodynamics were discussed with the patient and he wished to proceed.  Urodynamics are planned to better assess the primary etiology for Mr. Crump's urologic dysfunction.      PRE-STUDY UROFLOWMETRY:  Postvoid residual by catheter: 5 mL.  Pretest urine dipstick was positive for leukocytes and nitrites. The patient denies any UTI symptoms today; specifically, denies dysuria, frequency, urgency, hematuria, fevers, chills, N/V. In this patient who self-catheterizes multiple times per day, colonization is likely and therefore some degree of pyuria is to be expected. We discussed the risks vs benefits of proceeding with today's study. The patient verbalized understanding and wishes to proceed.       ASSESSMENT/PLAN:  Mr. Josiah Crump is a pleasant 54 year old male with neurogenic bladder secondary to T7 spinal cord injury, urinary incontinence, and urethral erosion 2/2  indwelling Shah catheter who presented today for urodynamics. Unfortunately, despite multiple attempts to empty his bladder via catheterization, we were ultimately unable to achieve any return via urethral catheter. However, when the patient was turned and repositioned on the [employer] chair, he would have spontaneous incontinence of large volumes of urine. Given this, several additional cath attempts were made, but were again unable to drain any urine via the catheter. Therefore, the study was at that point aborted, and will refer Mr. Crump for cystoscopy to better evaluate the patency of his urethra and have him return for UDS once this is complete.     - A single Bactrim DS was provided for UTI prophylaxis.    RUSLAN Hays, CNP  Department of Urology       Again, thank you for allowing me to participate in the care of your patient.      Sincerely,    Kylah Mas, JULIETA

## 2020-02-25 NOTE — NURSING NOTE
Invasive Procedure Safety Checklist:    Procedure: Urodynamics Study    Action: Complete sections and checkboxes as appropriate.    Pre-procedure:  1. Patient ID Verified with 2 identifiers (Sully and  or MRN) : YES    2. Procedure and site verified with patient/designee (when able) : YES    3. Accurate consent documentation in medical record : YES    4. H&P (or appropriate assessment) documented in medical record : NO  H&P must be up to 30 days prior to procedure an updated within 24 hours of                 Procedure as applicable.     5. Relevant diagnostic and radiology test results appropriately labeled and displayed as applicable : NO    6. Blood products, implants, devices, and/or special equipment available for the procedure as applicable : NO    7. Procedure site(s) marked with provider initials [Exclusions: ] : NO    8. Marking not required. Reason : Yes  Procedure does not require site marking    Time Out:     Time-Out performed immediately prior to starting procedure, including verbal and active participation of all team members addressing: YES    1. Correct patient identity.  2. Confirmed that the correct side and site are marked.  3. An accurate procedure to be done.  4. Agreement on the procedure to be done.  5. Correct patient position.  6. Relevant images and results are properly labeled and appropriately displayed.  7. The need to administer antibiotics or fluids for irrigation purposes during the procedure as applicable.  8. Safety precautions based on patient history or medication use.    During Procedure: Verification of correct person, site, and procedure occurs any time the responsibility for care of the patient is transferred to another member of the care team.    Felicita Pak, [facility]

## 2020-02-27 ENCOUNTER — OFFICE VISIT - HEALTHEAST (OUTPATIENT)
Dept: FAMILY MEDICINE | Facility: CLINIC | Age: 55
End: 2020-02-27

## 2020-02-27 DIAGNOSIS — I10 ESSENTIAL HYPERTENSION, BENIGN: ICD-10-CM

## 2020-02-27 DIAGNOSIS — S24.109S INJURY OF THORACIC SPINAL CORD, SEQUELA (H): ICD-10-CM

## 2020-02-27 DIAGNOSIS — Z89.621 HISTORY OF DISARTICULATION OF RIGHT HIP: ICD-10-CM

## 2020-02-27 DIAGNOSIS — T83.89XS EROSION OF URETHRA DUE TO CATHETERIZATION OF URINARY TRACT, SEQUELA: ICD-10-CM

## 2020-02-27 DIAGNOSIS — N36.8 EROSION OF URETHRA DUE TO CATHETERIZATION OF URINARY TRACT, SEQUELA: ICD-10-CM

## 2020-02-27 DIAGNOSIS — Z01.818 PREOP GENERAL PHYSICAL EXAM: ICD-10-CM

## 2020-02-27 DIAGNOSIS — N31.9 NEUROGENIC BLADDER: ICD-10-CM

## 2020-02-27 DIAGNOSIS — D50.9 IRON DEFICIENCY ANEMIA, UNSPECIFIED IRON DEFICIENCY ANEMIA TYPE: ICD-10-CM

## 2020-02-27 LAB
ALBUMIN SERPL-MCNC: 3.3 G/DL (ref 3.5–5)
ALP SERPL-CCNC: 101 U/L (ref 45–120)
ALT SERPL W P-5'-P-CCNC: 14 U/L (ref 0–45)
ANION GAP SERPL CALCULATED.3IONS-SCNC: 10 MMOL/L (ref 5–18)
AST SERPL W P-5'-P-CCNC: 15 U/L (ref 0–40)
BILIRUB SERPL-MCNC: 0.3 MG/DL (ref 0–1)
BUN SERPL-MCNC: 18 MG/DL (ref 8–22)
CALCIUM SERPL-MCNC: 9.4 MG/DL (ref 8.5–10.5)
CHLORIDE BLD-SCNC: 107 MMOL/L (ref 98–107)
CO2 SERPL-SCNC: 22 MMOL/L (ref 22–31)
CREAT SERPL-MCNC: 0.85 MG/DL (ref 0.7–1.3)
ERYTHROCYTE [DISTWIDTH] IN BLOOD BY AUTOMATED COUNT: 21.7 % (ref 11–14.5)
GFR SERPL CREATININE-BSD FRML MDRD: >60 ML/MIN/1.73M2
GLUCOSE BLD-MCNC: 88 MG/DL (ref 70–125)
HCT VFR BLD AUTO: 42.6 % (ref 40–54)
HGB BLD-MCNC: 12 G/DL (ref 14–18)
MCH RBC QN AUTO: 19.8 PG (ref 27–34)
MCHC RBC AUTO-ENTMCNC: 28.2 G/DL (ref 32–36)
MCV RBC AUTO: 70 FL (ref 80–100)
PLATELET # BLD AUTO: 268 THOU/UL (ref 140–440)
POTASSIUM BLD-SCNC: 4.8 MMOL/L (ref 3.5–5)
PROT SERPL-MCNC: 7.8 G/DL (ref 6–8)
RBC # BLD AUTO: 6.06 MILL/UL (ref 4.4–6.2)
SODIUM SERPL-SCNC: 139 MMOL/L (ref 136–145)
WBC: 7.6 THOU/UL (ref 4–11)

## 2020-02-28 ENCOUNTER — COMMUNICATION - HEALTHEAST (OUTPATIENT)
Dept: FAMILY MEDICINE | Facility: CLINIC | Age: 55
End: 2020-02-28

## 2020-02-28 ENCOUNTER — TELEPHONE (OUTPATIENT)
Dept: UROLOGY | Facility: CLINIC | Age: 55
End: 2020-02-28

## 2020-02-28 NOTE — TELEPHONE ENCOUNTER
Called patient very upset regarding his leaking around the catheter   Was told that he would be seen this week for this condition  Non one has called . Gladis Pérez LPN Staff Nurse

## 2020-02-28 NOTE — TELEPHONE ENCOUNTER
Health Call Center    Phone Message    May a detailed message be left on voicemail: yes     Reason for Call: Symptoms or Concerns     If patient has red-flag symptoms, warm transfer to triage line    Current symptom or concern: Since pt's urodynamics appointment on Wednesday the pt has been experiencing more leaking with the muhammad catheter, and has been unable to completely empty his bladder    Symptoms have been present for:  3 day(s)    Has patient previously been seen for this? Yes    By : Dr. Goncalves & Kylah Mas    Date: 02/25 - urodynamics appt    Are there any new or worsening symptoms? Yes: Pt said that the leaking has gotten quite a bit worse, and he is very concerned about it.      Action Taken: Message routed to:  [facility] ([facility] Urology    Travel Screening: Not Applicable

## 2020-02-28 NOTE — TELEPHONE ENCOUNTER
Patient scheduled to see MD on Monday 12:00 with sadi left message on his phone Gladis Pérez LPN Staff Nurse
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
## 2020-03-02 ENCOUNTER — PRE VISIT (OUTPATIENT)
Dept: UROLOGY | Facility: CLINIC | Age: 55
End: 2020-03-02

## 2020-03-02 ENCOUNTER — RECORDS - HEALTHEAST (OUTPATIENT)
Dept: ADMINISTRATIVE | Facility: OTHER | Age: 55
End: 2020-03-02

## 2020-03-02 ENCOUNTER — OFFICE VISIT (OUTPATIENT)
Dept: UROLOGY | Facility: CLINIC | Age: 55
End: 2020-03-02
Payer: MEDICARE

## 2020-03-02 VITALS — BODY MASS INDEX: 25.49 KG/M2 | HEIGHT: 75 IN | WEIGHT: 205 LBS

## 2020-03-02 DIAGNOSIS — N31.9 NEUROGENIC BLADDER: Primary | ICD-10-CM

## 2020-03-02 DIAGNOSIS — N36.5 FALSE PASSAGE OF URETHRA: ICD-10-CM

## 2020-03-02 DIAGNOSIS — N36.8 URETHRAL EROSION: ICD-10-CM

## 2020-03-02 PROBLEM — S24.109S INJURY OF THORACIC SPINAL CORD, SEQUELA (H): Status: ACTIVE | Noted: 2020-02-27

## 2020-03-02 ASSESSMENT — PAIN SCALES - GENERAL: PAINLEVEL: NO PAIN (0)

## 2020-03-02 ASSESSMENT — MIFFLIN-ST. JEOR: SCORE: 1860.5

## 2020-03-02 NOTE — TELEPHONE ENCOUNTER
Reason for Visit: Cystoscopy - follow up to UDS on 2/25/2020    Diagnosis: 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    Orders/Procedures/Records: Records available    Contact Patient: N/A    Rooming Requirements: Cystoscopy      Sandy Little  03/02/20  10:26 AM

## 2020-03-02 NOTE — NURSING NOTE
"Chief Complaint   Patient presents with     Cystoscopy     Erosion of urethra due to catheterization or urinary tract       Height 1.905 m (6' 3\"), weight 93 kg (205 lb). Body mass index is 25.62 kg/m .    Patient Active Problem List   Diagnosis     Ulcer of right thigh, unspecified ulcer stage (H)     Benign essential hypertension     Chronic pain     General symptom     Gynecomastia     Hx of BKA, right (H)     Hydrocele     Infected penile implant (H)     Insomnia     Lateral epicondylitis     Neurogenic bladder     Other tenosynovitis of hand and wrist     Pain in limb     Paraplegia (H)     Right shoulder strain     Unspecified site of spinal cord injury without evidence of spinal bone injury     Vitamin D deficiency     Status post hip surgery     Heterotopic ossification of bone     Physical deconditioning     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, initial encounter (H)     Spasticity     History of disarticulation of right hip       No Known Allergies    Current Outpatient Medications   Medication Sig Dispense Refill     acetaminophen (TYLENOL) 325 MG tablet Take 2 tablets (650 mg) by mouth every 4 hours as needed for other (multimodal surgical pain management along with NSAIDS and opioid medication as indicated based on pain control and physical function.) 100 tablet 0     baclofen (LIORESAL) 20 MG tablet Take 1 tablet (20 mg) by mouth 4 times daily as needed for muscle spasms 90 tablet 0     calcium carbonate (TUMS) 500 MG chewable tablet Take 1 tablet (500 mg) by mouth 4 times daily as needed for heartburn 50 tablet 0     chlorhexidine (PERIDEX) 0.12 % solution SWISH 10ML GENTLY FOR 30 SECONDS AND GENTLY SPIT BID FOR 7 DAYS       ferrous sulfate (FEROSUL) 325 (65 Fe) MG tablet Take 1 tablet (325 mg) by mouth daily (with breakfast) 30 tablet 0     Fesoterodine Fumarate (TOVIAZ) 8 MG TB24        glucosamine-chondroitin 500-400 MG CAPS per capsule Take 1 capsule by mouth daily 30 capsule " "0     HYDROcodone-acetaminophen (NORCO) 5-325 MG tablet        metoprolol succinate ER (TOPROL-XL) 50 MG 24 hr tablet Take 1 tablet (50 mg) by mouth daily 30 tablet 0     order for DME [facility] Phone [phone] Fax [phone]    Primary Dressing Hydroferra Blue Classic 6X6  Qty 6  Primary Dressing Endoform 2X2 Qty 10  Secondary Dressing Gauze 4X4 Qty 1 loaf  Secondary Dressing ABD Qty 60  Secondary Dressing Medipore tape 2\" Qty 4  Extra Large Gloves Qty  1 box  Wound Cleanser Qty 1 large bottle  Length of Need: 1 month  Frequency of dressing change: daily 30 days 0     polyethylene glycol (MIRALAX/GLYCOLAX) packet Take 17 g by mouth 2 times daily as needed for constipation 60 packet 0     senna-docusate (SENOKOT-S/PERICOLACE) 8.6-50 MG tablet Take 2 tablets by mouth 2 times daily 120 tablet 0     simethicone (MYLICON) 80 MG chewable tablet Take 1 tablet (80 mg) by mouth every 6 hours as needed for cramping 30 tablet 0     traZODone (DESYREL) 100 MG tablet Take 1 tablet (100 mg) by mouth At Bedtime 30 tablet 0       Social History     Tobacco Use     Smoking status: Never Smoker     Smokeless tobacco: Never Used   Substance Use Topics     Alcohol use: No     Frequency: Never     Drug use: No       Invasive Procedure Safety Checklist:    Procedure: Cystoscopy    Action: Complete sections and checkboxes as appropriate.    Pre-procedure:  1. Patient ID Verified with 2 identifiers (Sully and  or MRN) : YES    2. Procedure and site verified with patient/designee (when able) : YES    3. Accurate consent documentation in medical record : YES    4. H&P (or appropriate assessment) documented in medical record : N/A  H&P must be up to 30 days prior to procedure an updated within 24 hours of                 Procedure as applicable.     5. Relevant diagnostic and radiology test results appropriately labeled and displayed as applicable : YES    6. Blood products, implants, devices, and/or special equipment " available for the procedure as applicable : YES    7. Procedure site(s) marked with provider initials [Exclusions: none] : NO    8. Marking not required. Reason : Yes  Procedure does not require site marking    Time Out:     Time-Out performed immediately prior to starting procedure, including verbal and active participation of all team members addressing: YES    1. Correct patient identity.  2. Confirmed that the correct side and site are marked.  3. An accurate procedure to be done.  4. Agreement on the procedure to be done.  5. Correct patient position.  6. Relevant images and results are properly labeled and appropriately displayed.  7. The need to administer antibiotics or fluids for irrigation purposes during the procedure as applicable.  8. Safety precautions based on patient history or medication use.    During Procedure: Verification of correct person, site, and procedure occurs any time the responsibility for care of the patient is transferred to another member of the care team.    The following medication was given:     MEDICATION:  Lidocaine without epinephrine 2% jelly  ROUTE: Urethral  SITE: Urethra via the meatus  DOSE: 10 mL  LOT #: [id number]  : [employer]  EXPIRATION DATE:   NDC#: [address]-77   Was there drug waste? No    Prior to med admin, verified patient identity using patient's name and date of birth.  Due to med administration, patient instructed to remain in clinic for 15 minutes  afterwards, and to report any adverse reaction to me immediately.    Drug Amount Wasted:  None.  Vial/Syringe: Syringe      Sandy Little  3/2/2020  12:05 PM

## 2020-03-02 NOTE — PATIENT INSTRUCTIONS
"Please schedule the next available UDS on a Monday when Dr. Goncalves is in clinic.      It was a pleasure meeting with you today.  Thank you for allowing me and my team the privilege of caring for you today.  YOU are the reason we are here, and I truly hope we provided you with the excellent service you deserve.  Please let us know if there is anything else we can do for you so that we can be sure you are leaving completely satisfied with your care experience.          AFTER YOUR CYSTOSCOPY        You have just completed a cystoscopy, or \"cysto\", which allowed your physician to learn more about your bladder (or to remove a stent placed after surgery). We suggest that you continue to avoid caffeine, fruit juice, and alcohol for the next 24 hours, however, you are encouraged to return to your normal activities.         A few things that are considered normal after your cystoscopy:     * Small amount of bleeding (or spotting) that clears within the next 24 hours     * Slight burning sensation with urination     * Sensation to of needing to avoid more frequently     * The feeling of \"air\" in your urine     * Mild discomfort that is relieved with Tylenol        Please contact our office promptly if you:     * Develop a fever above 101 degrees     * Are unable to urinate     * Develop bright red blood that does not stop     * Severe pain or swelling         Please contact our office with any concerns or questions @[email or web address].  "

## 2020-03-02 NOTE — PROGRESS NOTES
[facility]    Once completed please fax to [phone]  E-script to [email or web address]."[email or web address]"    A. Please include patient demographics and chart notes (ex: indefinite retention) with this order     Name: Josiah Crump  : 1965  Phone: [phone]  Address: [facility] [address] [place] [address]    B. Diagnosis    Josiah Crump is a 54 year old male with Neurogenic bladder, erosion of the urethra, and indefinite urinary retention. Josiah presented with false passages in the urethra, per Dr. Fam, which have lead to difficult self-catheterization. Dr. Fam wishes for Josiah to attempt catheterization with coude tip catheters to reach the bladder.     Retention of urine (788.20/R33.9)   Urinary Incontinence (788.30/R30)    Incomplete Bladder Emptying (788.21/R39-14)   Urge Incontinence (788.31/N39.41)    Other Specified Retention of Urine (788.29/R33.8)  x Other: Neurogenic bladder, erosion of urethra, indefinite retention     C. Order Information/Start Date: 20    Number of Refills: 99  Length of Need: Lifetime     Patient may receive up to a 90-day supply at one time; therefore, quantity will be three (3) times amount below.    Type (check one): x Hydrophilic    Silicone    Red Rubber    PVC Clear                                      CHECK PRODUCT Cambodian FREQ OF USE QUANTITY PER STRAIGHT  COUDE    ONE  SIZE PER DAY MONTH TO DISPESE      Intermittent Catheter        x Intermittent Catheter with 16 6 180  x    Insertion Supplies          Condom or External Catheters          ADDITIONAL PRODUCTS/ITEMS ORDERED (check all that apply)     PRODUCT FREQ OF USE QUANTITY PER  _x__ Patient Choice     PER MONTH MONTH TO DISPENSE  ___ Bard   x Tube of Lubricant  Daily 10  ___ Coloplast    Leg Bags    ___ Cure Medical    Night Bags    ___ GentleCath    Penile Clamp (Cunningham)    ___ Hi-Slip    Disinfection/BZK-PDI Wipes    ___ [place]    Shah Insertion Tray    ___Lo  Fric    Vacuum Erection Device    ___Magic3    Shah Catheters:    ___ MTG    Straight    ___ Chaz-Teleflex    Coude    ___ SpediCath    Uzbek size        Balloon size         D. Physician's Information:      Physician's Name: Gayle Fam MD  Phone: [phone]  Date: 1965    [facility]  [address] [place] [address]    Erica Lora Manager  Office: [phone]  Mobile: [phone]  Fax: [phone]  [email or web address] [email or web address]
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
## 2020-03-16 ENCOUNTER — COMMUNICATION - HEALTHEAST (OUTPATIENT)
Dept: FAMILY MEDICINE | Facility: CLINIC | Age: 55
End: 2020-03-16

## 2020-03-17 ENCOUNTER — RECORDS - HEALTHEAST (OUTPATIENT)
Dept: ADMINISTRATIVE | Facility: OTHER | Age: 55
End: 2020-03-17

## 2020-03-18 ENCOUNTER — TELEPHONE (OUTPATIENT)
Dept: UROLOGY | Facility: CLINIC | Age: 55
End: 2020-03-18

## 2020-03-18 NOTE — TELEPHONE ENCOUNTER
Called 03/18/20 10:30 AM and no answer. Generic VM left about rescheduling, per Dr. Fam.    Reschedule to ~June/July.      Memo Roberts, EMT       Gayle Fam MD sent to Memo Roberts EMT               It can be pushed back but we need to know if he is cathing better with the Coude catheter. Can you check on it when you call to push testing back? Karen    Previous Messages     ----- Message -----   From: Memo Roberts EMT   Sent: 3/18/2020   8:54 AM CDT   To: Osmani Goncalves MD, *   Subject: UDS?                                             Does Josiah needs his UDS this coming Monday? We are scrubbing all non-emergent UDS.     Thanks! Memo.

## 2020-03-24 ENCOUNTER — TELEPHONE (OUTPATIENT)
Dept: WOUND CARE | Facility: CLINIC | Age: 55
End: 2020-03-24

## 2020-03-24 DIAGNOSIS — L97.119: ICD-10-CM

## 2020-03-24 NOTE — TELEPHONE ENCOUNTER
"Patient stated  he need 4x4 Gauze, ABD Pads, 2\" Paper Tape. And one large box of gloves ordered..  "

## 2020-03-25 ENCOUNTER — RECORDS - HEALTHEAST (OUTPATIENT)
Dept: ADMINISTRATIVE | Facility: OTHER | Age: 55
End: 2020-03-25

## 2020-03-25 ENCOUNTER — HOSPITAL ENCOUNTER (OUTPATIENT)
Dept: WOUND CARE | Facility: CLINIC | Age: 55
End: 2020-03-25
Attending: SURGERY
Payer: MEDICARE

## 2020-03-25 DIAGNOSIS — L97.112 ULCER OF TROCHANTERIC REGION OF RIGHT HIP WITH FAT LAYER EXPOSED (H): ICD-10-CM

## 2020-03-25 PROBLEM — L97.119: Status: ACTIVE | Noted: 2020-03-25

## 2020-03-25 PROBLEM — R32 URINARY INCONTINENCE: Status: ACTIVE | Noted: 2020-03-25

## 2020-03-25 PROCEDURE — G2012 BRIEF CHECK IN BY MD/QHP: HCPCS | Performed by: SURGERY

## 2020-03-25 NOTE — PROGRESS NOTES
Phone visit completed by myself and the nurse, I discussed with the patient for less than 5 minutes, he states he is fine, simply needs supplies, he does not have an interest in interacting extensively on a phone discussion at this time.  He has personal care attendants that are helping perform his dressing changes.  Given the course of the current pandemic, inability to see the patient in a personal fashion, we are certifying that he requires dressing supplies to keep wounds stabilize and prevent regression which could result in a emergency room, urgent care visit or hospitalization.

## 2020-03-25 NOTE — PROGRESS NOTES
"Josiah Crump is a 54 year old male who is being evaluated via a billable telephone visit.      The patient has been notified of following:     \"This telephone visit will be conducted via a call between you and your physician/provider. We have found that certain health care needs can be provided without the need for a physical exam.  This service lets us provide the care you need with a short phone conversation.  If a prescription is necessary we can send it directly to your pharmacy.  If lab work is needed we can place an order for that and you can then stop by our lab to have the test done at a later time.    If during the course of the call the physician/provider feels a telephone visit is not appropriate, you will not be charged for this service.\"     Josiah Crump complains of    Chief Complaint   Patient presents with     WOUND CARE CLINIC       I have reviewed and updated the patient's Past Medical History, Social History, Family History and Medication List.    ALLERGIES  Patient has no known allergies.    Additional provider notes: Patient reports his wound reopened about a couple days ago.  Wound (used by OP [facility] only) 03/25/20 1207 Right greater trochanter pressure injury (Active)   Length (cm) 2 03/25/20 1200   Width (cm) 0.5 03/25/20 1200   Depth (cm) 2 03/25/20 1200   Wound (cm^2) 1 cm^2 03/25/20 1200   Wound Volume (cm^3) 2 cm^3 03/25/20 1200   Dressing Appearance moist drainage 03/25/20 1200   Drainage Characteristics/Odor serosanguineous 03/25/20 1200   Drainage Amount copious 03/25/20 1200   Thickness/Stage Stage 3 03/25/20 1200   Base red/granulating 03/25/20 1200   Periwound intact 03/25/20 1200   Periwound Temperature warm 03/25/20 1200   Periwound Skin Turgor soft 03/25/20 1200   Edges open 03/25/20 1200           Phone call duration: 10 minutes    Yayo Overton MD    "

## 2020-03-30 ENCOUNTER — VIRTUAL VISIT (OUTPATIENT)
Dept: ENDOCRINOLOGY | Facility: CLINIC | Age: 55
End: 2020-03-30
Attending: FAMILY MEDICINE
Payer: MEDICARE

## 2020-03-30 ENCOUNTER — PRE VISIT (OUTPATIENT)
Dept: ENDOCRINOLOGY | Facility: CLINIC | Age: 55
End: 2020-03-30

## 2020-03-30 DIAGNOSIS — R79.89 ELEVATED PROLACTIN LEVEL: ICD-10-CM

## 2020-03-30 DIAGNOSIS — N62 GYNECOMASTIA: Primary | ICD-10-CM
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
## 2020-04-02 ENCOUNTER — COMMUNICATION - HEALTHEAST (OUTPATIENT)
Dept: FAMILY MEDICINE | Facility: CLINIC | Age: 55
End: 2020-04-02

## 2020-04-02 DIAGNOSIS — I10 HTN (HYPERTENSION): ICD-10-CM

## 2020-04-06 ENCOUNTER — RECORDS - HEALTHEAST (OUTPATIENT)
Dept: ADMINISTRATIVE | Facility: OTHER | Age: 55
End: 2020-04-06

## 2020-04-16 ENCOUNTER — AMBULATORY - HEALTHEAST (OUTPATIENT)
Dept: CARE COORDINATION | Facility: CLINIC | Age: 55
End: 2020-04-16

## 2020-04-16 DIAGNOSIS — I63.9 ACUTE CVA (CEREBROVASCULAR ACCIDENT) (H): ICD-10-CM

## 2020-04-17 ENCOUNTER — COMMUNICATION - HEALTHEAST (OUTPATIENT)
Dept: NURSING | Facility: CLINIC | Age: 55
End: 2020-04-17

## 2020-04-20 ENCOUNTER — COMMUNICATION - HEALTHEAST (OUTPATIENT)
Dept: NURSING | Facility: CLINIC | Age: 55
End: 2020-04-20

## 2020-04-23 ENCOUNTER — COMMUNICATION - HEALTHEAST (OUTPATIENT)
Dept: FAMILY MEDICINE | Facility: CLINIC | Age: 55
End: 2020-04-23

## 2020-04-27 ENCOUNTER — COMMUNICATION - HEALTHEAST (OUTPATIENT)
Dept: CARE COORDINATION | Facility: CLINIC | Age: 55
End: 2020-04-27

## 2020-04-27 ASSESSMENT — ACTIVITIES OF DAILY LIVING (ADL): DEPENDENT_IADLS:: CLEANING;COOKING;LAUNDRY;SHOPPING;MEAL PREPARATION;TRANSPORTATION

## 2020-04-30 ENCOUNTER — COMMUNICATION - HEALTHEAST (OUTPATIENT)
Dept: NURSING | Facility: CLINIC | Age: 55
End: 2020-04-30

## 2020-05-11 ENCOUNTER — COMMUNICATION - HEALTHEAST (OUTPATIENT)
Dept: CARE COORDINATION | Facility: CLINIC | Age: 55
End: 2020-05-11

## 2020-05-11 ASSESSMENT — ACTIVITIES OF DAILY LIVING (ADL): DEPENDENT_IADLS:: CLEANING;COOKING;LAUNDRY;SHOPPING;MEAL PREPARATION;TRANSPORTATION

## 2020-05-20 DIAGNOSIS — N62 GYNECOMASTIA: ICD-10-CM

## 2020-05-20 DIAGNOSIS — R79.89 ELEVATED PROLACTIN LEVEL: ICD-10-CM

## 2020-05-20 LAB
ALBUMIN SERPL-MCNC: 3.4 G/DL (ref 3.4–5)
ALP SERPL-CCNC: 97 U/L (ref 40–150)
ALT SERPL W P-5'-P-CCNC: 29 U/L (ref 0–70)
ANION GAP SERPL CALCULATED.3IONS-SCNC: 5 MMOL/L (ref 3–14)
AST SERPL W P-5'-P-CCNC: 17 U/L (ref 0–45)
BILIRUB SERPL-MCNC: 0.2 MG/DL (ref 0.2–1.3)
BUN SERPL-MCNC: 18 MG/DL (ref 7–30)
CALCIUM SERPL-MCNC: 9.3 MG/DL (ref 8.5–10.1)
CHLORIDE SERPL-SCNC: 108 MMOL/L (ref 94–109)
CO2 SERPL-SCNC: 25 MMOL/L (ref 20–32)
CREAT SERPL-MCNC: 0.75 MG/DL (ref 0.66–1.25)
CREAT SERPL-MCNC: 0.75 MG/DL (ref 0.66–1.25)
ESTRADIOL SERPL-MCNC: 56 PG/ML (ref 6–50)
FSH SERPL-ACNC: 8.2 IU/L (ref 0.7–10.8)
GFR ESTIMATE EXT - HISTORICAL: >90 ML/MIN/1.73M2
GFR ESTIMATE, IF BLACK EXT - HISTORICAL: >90 ML/MIN/1.73M2
GFR SERPL CREATININE-BSD FRML MDRD: >90 ML/MIN/{1.73_M2}
GLUCOSE SERPL-MCNC: 90 MG/DL (ref 70–99)
LH SERPL-ACNC: 9.4 IU/L (ref 1.5–9.3)
POTASSIUM SERPL-SCNC: 4.2 MMOL/L (ref 3.4–5.3)
PROLACTIN SERPL-MCNC: 15 UG/L (ref 2–18)
PROT SERPL-MCNC: 8.6 G/DL (ref 6.8–8.8)
SODIUM SERPL-SCNC: 139 MMOL/L (ref 133–144)
TSH SERPL DL<=0.005 MIU/L-ACNC: 1.07 MU/L (ref 0.4–4)

## 2020-05-20 PROCEDURE — 84403 ASSAY OF TOTAL TESTOSTERONE: CPT | Performed by: INTERNAL MEDICINE

## 2020-05-20 PROCEDURE — 82670 ASSAY OF TOTAL ESTRADIOL: CPT | Performed by: INTERNAL MEDICINE

## 2020-05-20 PROCEDURE — 84146 ASSAY OF PROLACTIN: CPT | Performed by: INTERNAL MEDICINE

## 2020-05-20 PROCEDURE — 83002 ASSAY OF GONADOTROPIN (LH): CPT | Performed by: INTERNAL MEDICINE

## 2020-05-21 ENCOUNTER — DOCUMENTATION ONLY (OUTPATIENT)
Dept: ENDOCRINOLOGY | Facility: CLINIC | Age: 55
End: 2020-05-21

## 2020-05-21 ENCOUNTER — TELEPHONE (OUTPATIENT)
Dept: ENDOCRINOLOGY | Facility: CLINIC | Age: 55
End: 2020-05-21

## 2020-05-21 DIAGNOSIS — Z01.411 ENCOUNTER FOR GYNECOLOGICAL EXAMINATION WITH ABNORMAL FINDING: Primary | ICD-10-CM

## 2020-05-21 LAB — TESTOST SERPL-MCNC: 567 NG/DL (ref 240–950)

## 2020-05-21 RX ORDER — ANASTROZOLE 1 MG/1
1 TABLET ORAL DAILY
Qty: 90 TABLET | Refills: 3 | Status: SHIPPED | OUTPATIENT
Start: 2020-05-21 | End: 2021-04-12

## 2020-05-21 NOTE — TELEPHONE ENCOUNTER
Spoke at length with Pt regarding medication. Confirms understanding of medication order, pharmacy, and upcoming appt 06/03/2020 with Dr Martinez.   Ly Howard, RN on 5/21/2020 at 3:05 PM     RE    anastrozole (ARIMIDEX) 1 MG tablet  90 tablet  3 refill 5/21/2020   --    Sig - Route: Take 1 tablet (1 mg) by mouth daily - Oral    Sent to pharmacy as: Anastrozole 1 MG Oral Tablet (Arimidex)    Class: E-Prescribe    Order: [id number]    E-Prescribing Status: Receipt confirmed by pharmacy (5/21/2020  2:50 PM CDT)    Printout Tracking     External Result Report    Pharmacy     [id number] - [place], [place] - [address] [place] [address]

## 2020-05-21 NOTE — TELEPHONE ENCOUNTER
----- Message from Chandni Rivera sent at 5/21/2020  2:53 PM CDT -----  Regarding: FW: reviewed and Rx    ----- Message -----  From: Haley Martinez MD  Sent: 5/21/2020   2:50 PM CDT  To: [id number]  Subject: reviewed and Rx                                  Could you communicate?    Reviewed. Estradiol elevated. Will try Arimidex 1 mg daily for gynecomastia.     Haley Martinez MD  Staff Physician  Endocrinology and Metabolism  [facility]  License: [id number]  Pager: [phone]

## 2020-05-21 NOTE — PROGRESS NOTES
Reviewed. Estradiol elevated. Will try Arimidex 1 mg daily for gynecomastia.     Haley Martinez MD  Staff Physician  Endocrinology and Metabolism  [facility]  License: [id number]  Pager: [phone]    Results for VALDEMAR FLANNERY (MRN [id number]) as of 5/21/2020 14:48   Ref. Range 5/20/2020 10:21   Sodium Latest Ref Range: 133 - 144 mmol/L 139   Potassium Latest Ref Range: 3.4 - 5.3 mmol/L 4.2   Chloride Latest Ref Range: 94 - 109 mmol/L 108   Carbon Dioxide Latest Ref Range: 20 - 32 mmol/L 25   Urea Nitrogen Latest Ref Range: 7 - 30 mg/dL 18   Creatinine Latest Ref Range: 0.66 - 1.25 mg/dL 0.75   GFR Estimate Latest Ref Range: >60 mL/min/1.73_m2 >90   GFR Estimate If Black Latest Ref Range: >60 mL/min/1.73_m2 >90   Calcium Latest Ref Range: 8.5 - 10.1 mg/dL 9.3   Anion Gap Latest Ref Range: 3 - 14 mmol/L 5   Albumin Latest Ref Range: 3.4 - 5.0 g/dL 3.4   Protein Total Latest Ref Range: 6.8 - 8.8 g/dL 8.6   Bilirubin Total Latest Ref Range: 0.2 - 1.3 mg/dL 0.2   Alkaline Phosphatase Latest Ref Range: 40 - 150 U/L 97   ALT Latest Ref Range: 0 - 70 U/L 29   AST Latest Ref Range: 0 - 45 U/L 17   Estradiol Latest Ref Range: 6 - 50 pg/mL 56 (H)   FSH Latest Ref Range: 0.7 - 10.8 IU/L 8.2   Prolactin Latest Ref Range: 2 - 18 ug/L 15   Testosterone Total Latest Ref Range: 240 - 950 ng/dL 567   TSH Latest Ref Range: 0.40 - 4.00 mU/L 1.07   Glucose Latest Ref Range: 70 - 99 mg/dL 90   Lutropin Latest Ref Range: 1.5 - 9.3 IU/L 9.4 (H)

## 2020-05-27 ENCOUNTER — COMMUNICATION - HEALTHEAST (OUTPATIENT)
Dept: FAMILY MEDICINE | Facility: CLINIC | Age: 55
End: 2020-05-27

## 2020-05-27 ENCOUNTER — COMMUNICATION - HEALTHEAST (OUTPATIENT)
Dept: CARE COORDINATION | Facility: CLINIC | Age: 55
End: 2020-05-27

## 2020-05-27 DIAGNOSIS — R25.2 SPASTICITY: ICD-10-CM

## 2020-06-01 ENCOUNTER — COMMUNICATION - HEALTHEAST (OUTPATIENT)
Dept: NURSING | Facility: CLINIC | Age: 55
End: 2020-06-01
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
## 2020-06-03 ENCOUNTER — VIRTUAL VISIT (OUTPATIENT)
Dept: ENDOCRINOLOGY | Facility: CLINIC | Age: 55
End: 2020-06-03
Payer: MEDICARE

## 2020-06-03 ENCOUNTER — RECORDS - HEALTHEAST (OUTPATIENT)
Dept: ADMINISTRATIVE | Facility: OTHER | Age: 55
End: 2020-06-03

## 2020-06-03 ENCOUNTER — TELEPHONE (OUTPATIENT)
Dept: WOUND CARE | Facility: CLINIC | Age: 55
End: 2020-06-03

## 2020-06-03 ENCOUNTER — COMMUNICATION - HEALTHEAST (OUTPATIENT)
Dept: CARE COORDINATION | Facility: CLINIC | Age: 55
End: 2020-06-03

## 2020-06-03 ENCOUNTER — TELEPHONE (OUTPATIENT)
Dept: UROLOGY | Facility: CLINIC | Age: 55
End: 2020-06-03

## 2020-06-03 DIAGNOSIS — N62 GYNECOMASTIA: Primary | ICD-10-CM

## 2020-06-03 DIAGNOSIS — Z11.59 ENCOUNTER FOR SCREENING FOR OTHER VIRAL DISEASES: Primary | ICD-10-CM

## 2020-06-03 DIAGNOSIS — Z83.3 FAMILY HISTORY OF DIABETES MELLITUS: ICD-10-CM

## 2020-06-03 DIAGNOSIS — E88.819 INSULIN RESISTANCE: ICD-10-CM

## 2020-06-03 ASSESSMENT — ACTIVITIES OF DAILY LIVING (ADL): DEPENDENT_IADLS:: CLEANING;COOKING;LAUNDRY;SHOPPING;MEAL PREPARATION;TRANSPORTATION

## 2020-06-03 NOTE — TELEPHONE ENCOUNTER
Patient is scheduled for surgery with Dr. Goncalves      Spoke or left message with: Josiah    Date of Surgery: 7/3/2020    [facility]    Informed patient they will need an adult  yes    Pre-op with surgeon (if applicable): n/a    H&P: Patient to schedule with PCP at [facility]    Post-op: 7/20/2020 @ 2:30p    Additional imaging/appointments: [facility] scheduled on 7/20/2020 @ 1pm    Surgery packet: To be mailed     Additional comments: n/a
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
## 2020-06-08 ENCOUNTER — RECORDS - HEALTHEAST (OUTPATIENT)
Dept: ADMINISTRATIVE | Facility: OTHER | Age: 55
End: 2020-06-08
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
## 2020-06-11 ENCOUNTER — COMMUNICATION - HEALTHEAST (OUTPATIENT)
Dept: FAMILY MEDICINE | Facility: CLINIC | Age: 55
End: 2020-06-11

## 2020-06-12 ENCOUNTER — TELEPHONE (OUTPATIENT)
Dept: ENDOCRINOLOGY | Facility: CLINIC | Age: 55
End: 2020-06-12

## 2020-06-12 ENCOUNTER — AMBULATORY - HEALTHEAST (OUTPATIENT)
Dept: LAB | Facility: CLINIC | Age: 55
End: 2020-06-12

## 2020-06-12 DIAGNOSIS — G82.20 PARAPLEGIA (H): ICD-10-CM

## 2020-06-12 DIAGNOSIS — E08.21 DIABETES MELLITUS DUE TO UNDERLYING CONDITION WITH DIABETIC NEPHROPATHY (H): ICD-10-CM

## 2020-06-12 LAB
ESTRADIOL SERPL-MCNC: 19 PG/ML
HBA1C MFR BLD: 5.3 %
TESTOST SERPL-MCNC: 1091 NG/DL (ref 221–716)

## 2020-06-12 NOTE — TELEPHONE ENCOUNTER
[facility] Call Center    Phone Message    May a detailed message be left on voicemail: no     Reason for Call: Order(s): Other:   Reason for requested: Julee with [facility] lab called requesting that the lab orders for the Pt from Dr. Martinez be faxed to them. She stated that the Pt is currently at their lab waiting to get them done, and has already been waiting a long time. Clinic priority line wasn t answered, but vocera line was, and writer was asked to send a message because the endo RN was on the phone with someone. Please fax these labs ASAP to Fax: [phone]  Date needed: ASAP, today  Provider name: Michelle      Action Taken: Message routed to:  [facility] angela    Travel Screening: Not Applicable

## 2020-06-15 ENCOUNTER — COMMUNICATION - HEALTHEAST (OUTPATIENT)
Dept: NURSING | Facility: CLINIC | Age: 55
End: 2020-06-15

## 2020-06-16 ENCOUNTER — TRANSFERRED RECORDS (OUTPATIENT)
Dept: HEALTH INFORMATION MANAGEMENT | Facility: CLINIC | Age: 55
End: 2020-06-16

## 2020-06-16 ENCOUNTER — COMMUNICATION - HEALTHEAST (OUTPATIENT)
Dept: FAMILY MEDICINE | Facility: CLINIC | Age: 55
End: 2020-06-16

## 2020-06-16 ENCOUNTER — OFFICE VISIT - HEALTHEAST (OUTPATIENT)
Dept: FAMILY MEDICINE | Facility: CLINIC | Age: 55
End: 2020-06-16

## 2020-06-16 DIAGNOSIS — I63.9 ACUTE CVA (CEREBROVASCULAR ACCIDENT) (H): ICD-10-CM

## 2020-06-16 DIAGNOSIS — T83.89XS EROSION OF URETHRA DUE TO CATHETERIZATION OF URINARY TRACT, SEQUELA: ICD-10-CM

## 2020-06-16 DIAGNOSIS — Z01.818 PREOP GENERAL PHYSICAL EXAM: ICD-10-CM

## 2020-06-16 DIAGNOSIS — I10 ESSENTIAL HYPERTENSION, BENIGN: ICD-10-CM

## 2020-06-16 DIAGNOSIS — N36.8 EROSION OF URETHRA DUE TO CATHETERIZATION OF URINARY TRACT, SEQUELA: ICD-10-CM

## 2020-06-16 DIAGNOSIS — N62 GYNECOMASTIA: ICD-10-CM

## 2020-06-16 DIAGNOSIS — E78.5 DYSLIPIDEMIA: ICD-10-CM

## 2020-06-16 DIAGNOSIS — Z86.73 HISTORY OF CVA (CEREBROVASCULAR ACCIDENT): ICD-10-CM

## 2020-06-16 DIAGNOSIS — N31.9 NEUROGENIC BLADDER: ICD-10-CM

## 2020-06-16 DIAGNOSIS — S24.109S INJURY OF THORACIC SPINAL CORD, SEQUELA (H): ICD-10-CM

## 2020-06-16 ASSESSMENT — PATIENT HEALTH QUESTIONNAIRE - PHQ9: SUM OF ALL RESPONSES TO PHQ QUESTIONS 1-9: 0

## 2020-06-17 ENCOUNTER — COMMUNICATION - HEALTHEAST (OUTPATIENT)
Dept: FAMILY MEDICINE | Facility: CLINIC | Age: 55
End: 2020-06-17

## 2020-06-22 ENCOUNTER — TRANSFERRED RECORDS (OUTPATIENT)
Dept: HEALTH INFORMATION MANAGEMENT | Facility: CLINIC | Age: 55
End: 2020-06-22

## 2020-06-22 ENCOUNTER — AMBULATORY - HEALTHEAST (OUTPATIENT)
Dept: LAB | Facility: CLINIC | Age: 55
End: 2020-06-22

## 2020-06-22 DIAGNOSIS — I10 ESSENTIAL HYPERTENSION, BENIGN: ICD-10-CM

## 2020-06-22 DIAGNOSIS — Z01.818 PREOP GENERAL PHYSICAL EXAM: ICD-10-CM

## 2020-06-22 DIAGNOSIS — E78.5 DYSLIPIDEMIA: ICD-10-CM

## 2020-06-22 DIAGNOSIS — Z86.73 HISTORY OF CVA (CEREBROVASCULAR ACCIDENT): ICD-10-CM

## 2020-06-22 LAB
ALBUMIN SERPL-MCNC: 3.4 G/DL (ref 3.5–5)
ALP SERPL-CCNC: 94 U/L (ref 45–120)
ALT SERPL W P-5'-P-CCNC: 20 U/L (ref 0–45)
ANION GAP SERPL CALCULATED.3IONS-SCNC: 13 MMOL/L (ref 5–18)
AST SERPL W P-5'-P-CCNC: 17 U/L (ref 0–40)
BILIRUB SERPL-MCNC: 0.4 MG/DL (ref 0–1)
BUN SERPL-MCNC: 13 MG/DL (ref 8–22)
CALCIUM SERPL-MCNC: 9.4 MG/DL (ref 8.5–10.5)
CHLORIDE BLD-SCNC: 105 MMOL/L (ref 98–107)
CHOLEST SERPL-MCNC: 114 MG/DL
CO2 SERPL-SCNC: 23 MMOL/L (ref 22–31)
CREAT SERPL-MCNC: 0.81 MG/DL (ref 0.7–1.3)
ERYTHROCYTE [DISTWIDTH] IN BLOOD BY AUTOMATED COUNT: 21.2 % (ref 11–14.5)
FASTING STATUS PATIENT QL REPORTED: YES
GFR SERPL CREATININE-BSD FRML MDRD: >60 ML/MIN/1.73M2
GLUCOSE BLD-MCNC: 84 MG/DL (ref 70–125)
HCT VFR BLD AUTO: 51.7 % (ref 40–54)
HDLC SERPL-MCNC: 33 MG/DL
HGB BLD-MCNC: 14.5 G/DL (ref 14–18)
LDLC SERPL CALC-MCNC: 67 MG/DL
MCH RBC QN AUTO: 20 PG (ref 27–34)
MCHC RBC AUTO-ENTMCNC: 28 G/DL (ref 32–36)
MCV RBC AUTO: 71 FL (ref 80–100)
PLATELET # BLD AUTO: 302 THOU/UL (ref 140–440)
PMV BLD AUTO: 10.3 FL (ref 8.5–12.5)
POTASSIUM BLD-SCNC: 4.5 MMOL/L (ref 3.5–5)
PROT SERPL-MCNC: 8.5 G/DL (ref 6–8)
RBC # BLD AUTO: 7.24 MILL/UL (ref 4.4–6.2)
SODIUM SERPL-SCNC: 141 MMOL/L (ref 136–145)
TRIGL SERPL-MCNC: 71 MG/DL
WBC: 8.9 THOU/UL (ref 4–11)

## 2020-06-24 ENCOUNTER — COMMUNICATION - HEALTHEAST (OUTPATIENT)
Dept: CARE COORDINATION | Facility: CLINIC | Age: 55
End: 2020-06-24

## 2020-06-25 ENCOUNTER — RECORDS - HEALTHEAST (OUTPATIENT)
Dept: GENERAL RADIOLOGY | Facility: CLINIC | Age: 55
End: 2020-06-25

## 2020-06-25 ENCOUNTER — AMBULATORY - HEALTHEAST (OUTPATIENT)
Dept: NURSING | Facility: CLINIC | Age: 55
End: 2020-06-25

## 2020-06-25 DIAGNOSIS — Z01.818 PREOP GENERAL PHYSICAL EXAM: ICD-10-CM

## 2020-06-25 DIAGNOSIS — I10 ESSENTIAL HYPERTENSION, BENIGN: ICD-10-CM

## 2020-06-25 DIAGNOSIS — Z01.818 ENCOUNTER FOR OTHER PREPROCEDURAL EXAMINATION: ICD-10-CM

## 2020-06-25 LAB
ATRIAL RATE - MUSE: 93 BPM
DIASTOLIC BLOOD PRESSURE - MUSE: NORMAL
INTERPRETATION ECG - MUSE: NORMAL
P AXIS - MUSE: 36 DEGREES
PR INTERVAL - MUSE: 148 MS
QRS DURATION - MUSE: 78 MS
QT - MUSE: 352 MS
QTC - MUSE: 437 MS
R AXIS - MUSE: 13 DEGREES
SYSTOLIC BLOOD PRESSURE - MUSE: NORMAL
T AXIS - MUSE: 25 DEGREES
VENTRICULAR RATE- MUSE: 93 BPM

## 2020-06-26 ENCOUNTER — TELEPHONE (OUTPATIENT)
Dept: UROLOGY | Facility: CLINIC | Age: 55
End: 2020-06-26

## 2020-06-26 NOTE — TELEPHONE ENCOUNTER
"[facility] Call Center    Phone Message    May a detailed message be left on voicemail: yes     Reason for Call: Other: pt calling to \"make sure\" that the provider gives pt a botox injection during the surgery on 7/3/20 for his overactive bladder. Thank you.     Action Taken: Message routed to:  [facility]  Urology    Travel Screening: Not Applicable                                                                      "

## 2020-06-30 ENCOUNTER — COMMUNICATION - HEALTHEAST (OUTPATIENT)
Dept: FAMILY MEDICINE | Facility: CLINIC | Age: 55
End: 2020-06-30

## 2020-07-01 DIAGNOSIS — R30.0 DYSURIA: Primary | ICD-10-CM

## 2020-07-01 DIAGNOSIS — Z11.59 ENCOUNTER FOR SCREENING FOR OTHER VIRAL DISEASES: ICD-10-CM

## 2020-07-01 DIAGNOSIS — R30.0 DYSURIA: ICD-10-CM

## 2020-07-01 LAB
ALBUMIN UR-MCNC: 100 MG/DL
APPEARANCE UR: ABNORMAL
BACTERIA #/AREA URNS HPF: ABNORMAL /HPF
BILIRUB UR QL STRIP: NEGATIVE
COLOR UR AUTO: YELLOW
GLUCOSE UR STRIP-MCNC: NEGATIVE MG/DL
HGB UR QL STRIP: ABNORMAL
KETONES UR STRIP-MCNC: NEGATIVE MG/DL
LEUKOCYTE ESTERASE UR QL STRIP: ABNORMAL
MUCOUS THREADS #/AREA URNS LPF: PRESENT /LPF
NITRATE UR QL: POSITIVE
PH UR STRIP: 5 PH (ref 5–7)
RBC #/AREA URNS AUTO: 135 /HPF (ref 0–2)
SOURCE: ABNORMAL
SP GR UR STRIP: 1.01 (ref 1–1.03)
SQUAMOUS #/AREA URNS AUTO: 2 /HPF (ref 0–1)
UROBILINOGEN UR STRIP-MCNC: 0 MG/DL (ref 0–2)
WBC #/AREA URNS AUTO: >182 /HPF (ref 0–5)
WBC CLUMPS #/AREA URNS HPF: PRESENT /HPF

## 2020-07-01 PROCEDURE — U0003 INFECTIOUS AGENT DETECTION BY NUCLEIC ACID (DNA OR RNA); SEVERE ACUTE RESPIRATORY SYNDROME CORONAVIRUS 2 (SARS-COV-2) (CORONAVIRUS DISEASE [COVID-19]), AMPLIFIED PROBE TECHNIQUE, MAKING USE OF HIGH THROUGHPUT TECHNOLOGIES AS DESCRIBED BY CMS-2020-01-R: HCPCS | Performed by: UROLOGY

## 2020-07-01 PROCEDURE — 87186 SC STD MICRODIL/AGAR DIL: CPT | Performed by: UROLOGY

## 2020-07-01 PROCEDURE — 87088 URINE BACTERIA CULTURE: CPT | Performed by: UROLOGY

## 2020-07-01 PROCEDURE — 87086 URINE CULTURE/COLONY COUNT: CPT | Performed by: UROLOGY

## 2020-07-02 DIAGNOSIS — R30.0 DYSURIA: Primary | ICD-10-CM

## 2020-07-02 LAB
SARS-COV-2 RNA SPEC QL NAA+PROBE: NOT DETECTED
SPECIMEN SOURCE: NORMAL

## 2020-07-02 RX ORDER — CIPROFLOXACIN 500 MG/1
500 TABLET, FILM COATED ORAL 2 TIMES DAILY
Qty: 10 TABLET | Refills: 0 | Status: SHIPPED | OUTPATIENT
Start: 2020-07-02 | End: 2020-07-07

## 2020-07-03 ENCOUNTER — ANESTHESIA (OUTPATIENT)
Dept: SURGERY | Facility: AMBULATORY SURGERY CENTER | Age: 55
End: 2020-07-03

## 2020-07-03 ENCOUNTER — ANESTHESIA EVENT (OUTPATIENT)
Dept: SURGERY | Facility: AMBULATORY SURGERY CENTER | Age: 55
End: 2020-07-03

## 2020-07-03 ENCOUNTER — PREP FOR PROCEDURE (OUTPATIENT)
Dept: UROLOGY | Facility: CLINIC | Age: 55
End: 2020-07-03

## 2020-07-03 ENCOUNTER — HOSPITAL ENCOUNTER (OUTPATIENT)
Facility: AMBULATORY SURGERY CENTER | Age: 55
End: 2020-07-03
Attending: UROLOGY
Payer: MEDICARE

## 2020-07-03 VITALS
WEIGHT: 210 LBS | TEMPERATURE: 97.6 F | RESPIRATION RATE: 16 BRPM | SYSTOLIC BLOOD PRESSURE: 100 MMHG | HEART RATE: 87 BPM | HEIGHT: 72 IN | DIASTOLIC BLOOD PRESSURE: 60 MMHG | BODY MASS INDEX: 28.44 KG/M2 | OXYGEN SATURATION: 93 %

## 2020-07-03 DIAGNOSIS — N36.8 EROSION OF URETHRA DUE TO CATHETERIZATION OF URINARY TRACT, INITIAL ENCOUNTER (H): Primary | ICD-10-CM

## 2020-07-03 DIAGNOSIS — N31.9 NEUROGENIC BLADDER: Primary | ICD-10-CM

## 2020-07-03 DIAGNOSIS — T83.89XD EROSION OF URETHRA DUE TO CATHETERIZATION OF URINARY TRACT, SUBSEQUENT ENCOUNTER: ICD-10-CM

## 2020-07-03 DIAGNOSIS — N36.8 EROSION OF URETHRA DUE TO CATHETERIZATION OF URINARY TRACT, SUBSEQUENT ENCOUNTER: ICD-10-CM

## 2020-07-03 DIAGNOSIS — T83.89XA EROSION OF URETHRA DUE TO CATHETERIZATION OF URINARY TRACT, INITIAL ENCOUNTER (H): Primary | ICD-10-CM

## 2020-07-03 LAB
BACTERIA SPEC CULT: ABNORMAL
BACTERIA SPEC CULT: ABNORMAL
Lab: ABNORMAL
SPECIMEN SOURCE: ABNORMAL

## 2020-07-03 RX ORDER — SODIUM CHLORIDE, SODIUM LACTATE, POTASSIUM CHLORIDE, CALCIUM CHLORIDE 600; 310; 30; 20 MG/100ML; MG/100ML; MG/100ML; MG/100ML
INJECTION, SOLUTION INTRAVENOUS CONTINUOUS
Status: DISCONTINUED | OUTPATIENT
Start: 2020-07-03 | End: 2020-07-04 | Stop reason: HOSPADM

## 2020-07-03 RX ORDER — BACITRACIN ZINC 500 [USP'U]/G
OINTMENT TOPICAL 2 TIMES DAILY
Qty: 28 G | Refills: 0 | Status: SHIPPED | OUTPATIENT
Start: 2020-07-03 | End: 2020-07-07

## 2020-07-03 RX ORDER — DEXAMETHASONE SODIUM PHOSPHATE 4 MG/ML
INJECTION, SOLUTION INTRA-ARTICULAR; INTRALESIONAL; INTRAMUSCULAR; INTRAVENOUS; SOFT TISSUE PRN
Status: DISCONTINUED | OUTPATIENT
Start: 2020-07-03 | End: 2020-07-03

## 2020-07-03 RX ORDER — MEPERIDINE HYDROCHLORIDE 25 MG/ML
12.5 INJECTION INTRAMUSCULAR; INTRAVENOUS; SUBCUTANEOUS
Status: DISCONTINUED | OUTPATIENT
Start: 2020-07-03 | End: 2020-07-04 | Stop reason: HOSPADM

## 2020-07-03 RX ORDER — ACETAMINOPHEN 325 MG/1
975 TABLET ORAL ONCE
Status: DISCONTINUED | OUTPATIENT
Start: 2020-07-03 | End: 2020-07-03 | Stop reason: HOSPADM

## 2020-07-03 RX ORDER — CEFAZOLIN SODIUM 1 G/50ML
1 INJECTION, SOLUTION INTRAVENOUS SEE ADMIN INSTRUCTIONS
Status: CANCELLED | OUTPATIENT
Start: 2020-07-03

## 2020-07-03 RX ORDER — OXYCODONE HYDROCHLORIDE 5 MG/1
5 TABLET ORAL EVERY 4 HOURS PRN
Status: DISCONTINUED | OUTPATIENT
Start: 2020-07-03 | End: 2020-07-04 | Stop reason: HOSPADM

## 2020-07-03 RX ORDER — SODIUM CHLORIDE, SODIUM LACTATE, POTASSIUM CHLORIDE, CALCIUM CHLORIDE 600; 310; 30; 20 MG/100ML; MG/100ML; MG/100ML; MG/100ML
INJECTION, SOLUTION INTRAVENOUS CONTINUOUS PRN
Status: DISCONTINUED | OUTPATIENT
Start: 2020-07-03 | End: 2020-07-03

## 2020-07-03 RX ORDER — BACLOFEN 20 MG/1
20 TABLET ORAL ONCE
Status: COMPLETED | OUTPATIENT
Start: 2020-07-03 | End: 2020-07-03

## 2020-07-03 RX ORDER — LIDOCAINE 40 MG/G
CREAM TOPICAL
Status: DISCONTINUED | OUTPATIENT
Start: 2020-07-03 | End: 2020-07-03 | Stop reason: HOSPADM

## 2020-07-03 RX ORDER — FENTANYL CITRATE 50 UG/ML
INJECTION, SOLUTION INTRAMUSCULAR; INTRAVENOUS PRN
Status: DISCONTINUED | OUTPATIENT
Start: 2020-07-03 | End: 2020-07-03

## 2020-07-03 RX ORDER — PROPOFOL 10 MG/ML
INJECTION, EMULSION INTRAVENOUS PRN
Status: DISCONTINUED | OUTPATIENT
Start: 2020-07-03 | End: 2020-07-03

## 2020-07-03 RX ORDER — ONDANSETRON 2 MG/ML
4 INJECTION INTRAMUSCULAR; INTRAVENOUS EVERY 30 MIN PRN
Status: DISCONTINUED | OUTPATIENT
Start: 2020-07-03 | End: 2020-07-04 | Stop reason: HOSPADM

## 2020-07-03 RX ORDER — CEFAZOLIN SODIUM 2 G/50ML
2 SOLUTION INTRAVENOUS
Status: COMPLETED | OUTPATIENT
Start: 2020-07-03 | End: 2020-07-03

## 2020-07-03 RX ORDER — SENNA AND DOCUSATE SODIUM 50; 8.6 MG/1; MG/1
1 TABLET, FILM COATED ORAL 2 TIMES DAILY PRN
Qty: 20 TABLET | Refills: 0 | Status: SHIPPED | OUTPATIENT
Start: 2020-07-03 | End: 2021-02-15

## 2020-07-03 RX ORDER — CEFAZOLIN SODIUM 2 G/50ML
2 SOLUTION INTRAVENOUS
Status: CANCELLED | OUTPATIENT
Start: 2020-07-03

## 2020-07-03 RX ORDER — LIDOCAINE HYDROCHLORIDE 20 MG/ML
INJECTION, SOLUTION INFILTRATION; PERINEURAL PRN
Status: DISCONTINUED | OUTPATIENT
Start: 2020-07-03 | End: 2020-07-03

## 2020-07-03 RX ORDER — ONDANSETRON 4 MG/1
4 TABLET, ORALLY DISINTEGRATING ORAL EVERY 30 MIN PRN
Status: DISCONTINUED | OUTPATIENT
Start: 2020-07-03 | End: 2020-07-04 | Stop reason: HOSPADM

## 2020-07-03 RX ORDER — NALOXONE HYDROCHLORIDE 0.4 MG/ML
.1-.4 INJECTION, SOLUTION INTRAMUSCULAR; INTRAVENOUS; SUBCUTANEOUS
Status: DISCONTINUED | OUTPATIENT
Start: 2020-07-03 | End: 2020-07-04 | Stop reason: HOSPADM

## 2020-07-03 RX ORDER — KETOROLAC TROMETHAMINE 30 MG/ML
30 INJECTION, SOLUTION INTRAMUSCULAR; INTRAVENOUS EVERY 6 HOURS PRN
Status: DISCONTINUED | OUTPATIENT
Start: 2020-07-03 | End: 2020-07-04 | Stop reason: HOSPADM

## 2020-07-03 RX ORDER — PROPOFOL 10 MG/ML
INJECTION, EMULSION INTRAVENOUS CONTINUOUS PRN
Status: DISCONTINUED | OUTPATIENT
Start: 2020-07-03 | End: 2020-07-03

## 2020-07-03 RX ORDER — OXYCODONE HYDROCHLORIDE 5 MG/1
5 TABLET ORAL EVERY 6 HOURS PRN
Qty: 10 TABLET | Refills: 0 | Status: SHIPPED | OUTPATIENT
Start: 2020-07-03 | End: 2021-02-15

## 2020-07-03 RX ORDER — FENTANYL CITRATE 50 UG/ML
25-50 INJECTION, SOLUTION INTRAMUSCULAR; INTRAVENOUS
Status: DISCONTINUED | OUTPATIENT
Start: 2020-07-03 | End: 2020-07-03 | Stop reason: HOSPADM

## 2020-07-03 RX ORDER — CEFAZOLIN SODIUM 1 G/50ML
1 SOLUTION INTRAVENOUS SEE ADMIN INSTRUCTIONS
Status: DISCONTINUED | OUTPATIENT
Start: 2020-07-03 | End: 2020-07-03 | Stop reason: HOSPADM

## 2020-07-03 RX ORDER — GABAPENTIN 300 MG/1
300 CAPSULE ORAL ONCE
Status: DISCONTINUED | OUTPATIENT
Start: 2020-07-03 | End: 2020-07-03 | Stop reason: HOSPADM

## 2020-07-03 RX ORDER — SODIUM CHLORIDE, SODIUM LACTATE, POTASSIUM CHLORIDE, CALCIUM CHLORIDE 600; 310; 30; 20 MG/100ML; MG/100ML; MG/100ML; MG/100ML
INJECTION, SOLUTION INTRAVENOUS CONTINUOUS
Status: DISCONTINUED | OUTPATIENT
Start: 2020-07-03 | End: 2020-07-03 | Stop reason: HOSPADM

## 2020-07-03 RX ADMIN — OXYCODONE HYDROCHLORIDE 5 MG: 5 TABLET ORAL at 13:30

## 2020-07-03 RX ADMIN — PROPOFOL 150 MCG/KG/MIN: 10 INJECTION, EMULSION INTRAVENOUS at 10:46

## 2020-07-03 RX ADMIN — Medication 100 MCG: at 10:59

## 2020-07-03 RX ADMIN — FENTANYL CITRATE 50 MCG: 50 INJECTION, SOLUTION INTRAMUSCULAR; INTRAVENOUS at 11:49

## 2020-07-03 RX ADMIN — PROPOFOL: 10 INJECTION, EMULSION INTRAVENOUS at 11:23

## 2020-07-03 RX ADMIN — SODIUM CHLORIDE, SODIUM LACTATE, POTASSIUM CHLORIDE, CALCIUM CHLORIDE: 600; 310; 30; 20 INJECTION, SOLUTION INTRAVENOUS at 08:43

## 2020-07-03 RX ADMIN — DEXAMETHASONE SODIUM PHOSPHATE 4 MG: 4 INJECTION, SOLUTION INTRA-ARTICULAR; INTRALESIONAL; INTRAMUSCULAR; INTRAVENOUS; SOFT TISSUE at 10:59

## 2020-07-03 RX ADMIN — Medication 100 MCG: at 11:11

## 2020-07-03 RX ADMIN — BACLOFEN 20 MG: 20 TABLET ORAL at 10:00

## 2020-07-03 RX ADMIN — CEFAZOLIN SODIUM 2 G: 2 SOLUTION INTRAVENOUS at 10:46

## 2020-07-03 RX ADMIN — FENTANYL CITRATE 50 MCG: 50 INJECTION, SOLUTION INTRAMUSCULAR; INTRAVENOUS at 10:46

## 2020-07-03 RX ADMIN — SODIUM CHLORIDE, SODIUM LACTATE, POTASSIUM CHLORIDE, CALCIUM CHLORIDE: 600; 310; 30; 20 INJECTION, SOLUTION INTRAVENOUS at 10:38

## 2020-07-03 RX ADMIN — PROPOFOL 160 MG: 10 INJECTION, EMULSION INTRAVENOUS at 10:46

## 2020-07-03 RX ADMIN — LIDOCAINE HYDROCHLORIDE 100 MG: 20 INJECTION, SOLUTION INFILTRATION; PERINEURAL at 10:46

## 2020-07-03 ASSESSMENT — MIFFLIN-ST. JEOR: SCORE: 1835.55
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
## 2020-07-03 NOTE — DISCHARGE INSTRUCTIONS
[facility]  Home Care Following Anesthesia  For 24 hours after surgery:  1. Get plenty of rest.  A responsible adult must stay with you for at least 24 hours after you leave the surgery center.  2. Do not drive or use heavy equipment.  If you have weakness or tingling, don't drive or use heavy equipment until this feeling goes away.   3. Do not drink alcohol.   4. Avoid strenuous or risky activities.  Ask for help when climbing stairs.  5. You may feel lightheaded.  IF so, sit for a few minutes before standing.  Have someone help you get up.   6. If you have nausea (feel sick to your stomach): Drink only clear liquids such as apple juice, ginger ale, broth or 7-Up.  Rest may also help.  Be sure to drink enough fluids.  Move to a regular diet as you feel able.   7. You may have a slight fever.  Call the doctor if your fever is over 100 F (37.7 C) (taken under the tongue) or lasts longer than 24 hours.  8. You may have a dry mouth, a sore throat, muscle aches or trouble sleeping. These should go away after 24 hours.  9. Do not make important or legal decisions.        Tips for taking pain medications  To get the best pain relief possible, remember these points:    Take pain medications as directed, before pain becomes severe.    Pain medication can upset your stomach: taking it with food may help.    Constipation is a common side effect of pain medication. Drink plenty of  fluids.    Eat foods high in fiber. Take a stool softener if recommended by your doctor or pharmacist.    Do not drink alcohol, drive or operate machinery while taking pain medications.    Ask about other ways to control pain, such as with heat, ice or relaxation.    Tylenol/Acetaminophen Consumption  To help encourage the safe use of acetaminophen, the makers of TYLENOL  have lowered the maximum daily dose for single-ingredient Extra Strength TYLENOL  (acetaminophen) products sold in the [place] from 8 pills per  day (4,000 mg) to 6 pills per day (3,000 mg). The dosing interval has also changed from 2 pills every 4-6 hours to 2 pills every 6 hours.    If you feel your pain relief is insufficient, you may take Tylenol/Acetaminophen in addition to your narcotic pain medication.     Be careful not to exceed 3,000 mg of Tylenol/Acetaminophen in a 24 hour period from all sources.    If you are taking extra strength Tylenol/acetaminophen (500 mg), the maximum dose is 6 tablets in 24 hours.    If you are taking regular strength acetaminophen (325 mg), the maximum dose is 9 tablets in 24 hours.    Call a doctor for any of the followin. Signs of infection (fever, growing tenderness at the surgery site, a large amount of drainage or bleeding, severe pain, foul-smelling drainage, redness, swelling).  2. It has been over 8 to 10 hours since surgery and you are still not able to urinate (pass water).  3. Headache for over 24 hours.  4. Signs of Covid-19 infection (temperature over 100 degrees, shortness of breath, cough, loss of taste/smell, generalized body aches, persistent headache, chills, sore throat, nausea/vomiting/diarrhea)  Your doctor is:  Dr. Osmani Savage, Prostate and Urology: [phone]                  Or dial [phone] and ask for the resident on call for:  Prostate Urology  For emergency care, call the:  [facility]:  [phone] (TTY for hearing impaired: [phone])    Emptying and Cleaning Your Urinary Catheter Bag  You have an indwelling urinary catheter. This drains urine from your bladder into a bag. The bag can be one that is used at your bedside. Or it can be a smaller bag that is strapped to your leg. Follow the steps below to empty and clean a urinary bag.       Drain Clean tube Clean catheter   Step 1. Drain the bag    Wash your hands well with soap and water to prevent infecting the urinary catheter and bag.    If the short drainage tube is inserted into a pocket on the bag, take  the drainage tube out of the pocket.    Hold the drainage tube over a toilet or measuring container. Open the valve.    Don t touch the tip of the valve or let it touch the toilet or container.    Wash your hands again.  Step 2. Clean the drainage tube    When the bag is empty, clean the tip of the drainage valve with an alcohol wipe.    Close the valve.    Reinsert the drainage tube into the pocket, if there is one.  ? Don t apply powder to the genital area or to the tubing.  Step 3. Check and clean the catheter tubing    Check the tubing. If there are kinks, cracks, clogs, or you can t see into the tubing, you ll need to change to new tubing as you were shown by your healthcare provider.    If the current tubing can still be used, wash it with soap and water. Always wash the tubing in the direction away from your body. Don't pull on the tubing.    Dry the tubing with a clean washcloth or paper towel.  Step 4. Clean the drainage bag    Have a clean backup bag or other drainage device ready.    Follow these steps:  ? Wash your hands well with soap and water.  ? Disconnect the bag from the catheter tubing. Connect the tubing to the backup bag or drainage device.  ? Drain any remaining urine from the bag you just disconnected. Close the drainage valve.  ? Pour some warm (not hot) soapy water into the bag. Swish the soap around, being sure to get the corners of the bag.  ? Open the drainage valve to drain the soap. Close the valve.:  ? Use a certain solution to clean the bag if your healthcare provider recommends one. Recommended solutions may include:     2 parts vinegar and 3 parts water    1 tablespoon of chlorine bleach mixed with a half cup of water  ? Ask your healthcare provider how often you should clean your bag and what solution you should use to reduce odor and keep your bag free of germs.  ? Shake the solution a bit and allow it to remain in the bag for 30 minutes.  ? Drain the solution and rinse the bag  with cold tap water.  ? Hang the bag to drain and air-dry.  When to call your healthcare provider  Call your healthcare provider right away if you have any of the following:    Little or no urine flowing into the bag    Urine leaking where the catheter enters the body    Pain, burning, or redness of the area where the catheter enters the body    Bloody urine (a trace of blood is normal)    Cloudy or foul-smelling urine, or sand-like grains in your urine    Pain in your lower back or lower abdomen    Your catheter falls out    Fever of 100.4 F (38 C) or higher, or as directed by your healthcare provider    Shaking chills   Date Last Reviewed: 1/1/2017 2000-2018 [employer]. [address] [place] [address], [place] [address]. All rights reserved. This information is not intended as a substitute for professional medical care. Always follow your healthcare professional's instructions.       Discharge Instructions: Caring for Your Leg Bag  You are going home with a urinary catheter and collection device (drainage bag) in place. One type of collection device is called a leg bag. This is a smaller drainage bag that you can wear on your leg to collect urine during the day. The bag can fit under your clothing. You can move around with greater ease when using a leg bag instead of a larger collection bag.  You were shown how to care for your catheter in the hospital. This sheet will help you remember those steps when you are at home.  Home care    Wash your hands thoroughly before and after you care for your catheter or collection device.    Gather your supplies:  ? Alcohol wipes  ? Soap and water  ? Towel and washcloth  ? Leg strap and leg bag    Use soap and water to wash the area where your catheter enters your body. Rinse well.    Secure the bag ca to your leg:  ? Put the leg band high on your thigh with the product label pointing away from your leg.  ? Stretch the leg band in place and fasten.  ? Place the  catheter tubing over the bag and secure it. You may secure it with a Velcro tab or other method, depending on the product you use. Be sure to leave enough loop in the catheter above the leg band so you won't pull on the tube.  ? Every 4 to 6 hours, reposition the band. This will prevent pressure from the elastic on your leg. You can do this by changing the bag to the other leg or by raising or lowering the leg band.  ? Wash the band as often as needed. You can hand wash and dry the leg band.    Place the bag in the bag ca.    Clean the urine bag end of the catheter and your catheter port with an alcohol wipe.    Place a towel under the bag and port to keep urine from dripping onto your leg.    Before connecting the outlet valve at the bottom of the bag to the catheter, make sure that it is firmly closed. Flip the valve upward toward the bag. It needs to snap firmly in place. Be sure not to tug on the tubing. Be gentle.    Attach the urine bag to the end of the catheter. Insert the connector snugly into the catheter port. You can prevent dribbling urine by bending the catheter tubing just below the tip and holding it while you disconnect it from the catheter. Be careful to keep the tip clean while connecting the leg bag tubing to the catheter--this keeps germs from getting into the system.    Drain the bag when it's full. To drain the bag, flip the clamp downward. Direct the flexible outlet tube to control the flow of urine. You don t have to disconnect the leg bag from the catheter to empty it. Raise your leg up to the edge of the toilet to reach the leg bag. Then you can empty the bag directly into the toilet. This way, you won t need to bend over, which may be uncomfortable.    Keep the leg bag clean. Your healthcare provider may recommend that you use a specific solution to clean the bag. Recommended solutions may include:  ? 2 parts vinegar and 3 parts water  ? 1 tablespoon of chlorine bleach mixed with a  half cup of water    Ask your healthcare provider how often you should clean your bag and what solution you should use ?to reduce odor and keep the bag free of germs.    Shake the solution a bit and allow it to remain in the bag for 30 minutes.      Drain the solution and rinse the bag with cold tap water.    Hang the bag to drain and air dry.    Remember to keep the drainage bag below the level of your bladder for proper drainage.  Follow-up  Make a follow-up appointment as directed by your healthcare provider.  When to call your healthcare provider  Call your healthcare provider right away if you have any of the following:    Redness, swelling, or warmth around the catheter entry site    Pus draining from your catheter entry site or into the catheter tubing and bag    Blood, clots, or floating debris in the urine    Nausea and vomiting    Shaking chills    Fever above 100.4 F (38 C), or as directed by your healthcare provider    Pain that is not relieved by medicine     Catheter that falls out or is dislodged   Date Last Reviewed: 1/1/2017 2000-2018 [employer]. [address] [place] [address], [place] [address]. All rights reserved. This information is not intended as a substitute for professional medical care. Always follow your healthcare professional's instructions.

## 2020-07-03 NOTE — ANESTHESIA CARE TRANSFER NOTE
Patient: Josiah Crump    Procedure(s):  Second stage urethroplasty, bladder botox injection, insertion of suprapubic tube, cystoscopy    Diagnosis: 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, initial encounter (H) [T83.89XA, N36.8]  Diagnosis Additional Information: No value filed.    Anesthesia Type:   MAC     Note:  Airway :Face Mask  Patient transferred to:PACU  Handoff Report: Identifed the Patient, Identified the Reponsible Provider, Reviewed the pertinent medical history, Discussed the surgical course, Reviewed Intra-OP anesthesia mangement and issues during anesthesia, Set expectations for post-procedure period and Allowed opportunity for questions and acknowledgement of understanding      Vitals: (Last set prior to Anesthesia Care Transfer)    CRNA VITALS  7/3/2020 1147 - 7/3/2020 1217      7/3/2020             Pulse:  92    SpO2:  96 %    Resp Rate (set):  10                Electronically Signed By: RUSLAN Ochoa CRNA  July 3, 2020  12:17 PM

## 2020-07-03 NOTE — OP NOTE
OPERATIVE NOTE    PREOPERATIVE DIAGNOSIS: Urethral erosion, neurogenic bladder  POSTOPERATIVE DIAGNOSIS: Same    PROCEDURE:   1. second stage urethroplasty  2.  Cystoscopy with Botox injection  3.  Cystoscopy with suprapubic tube placement  4. Abdominal ultrasound and for database purposesinterpretation  SURGEON:  Osmani Goncalves MD     ASSISTANT: Lorri Frank, PGY-7 ; Mo Ricardo, PGY-4    ANESTHESIA: General    INDICATIONS: 54-year-old man with spinal cord injury and right hip disarticulation who had an indwelling urethral Shah catheter and developed ventral urethral erosion.  He desires urethroplasty to reverse the erosion.  He also has neurogenic bladder due to spinal cord injury and does intermittent catheterization.  He has urge incontinence between the catheterizations.  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  Long-term management of the false passages in the intermittent catheterization is currently up in the air but he very much desires a urethroplasty to close the erosion.    PROCEDURE: After informed consent was obtain and pre-operative antibiotics were given the patient was taken to the operating room. After adequate anesthesia he was placed in the lithotomy position with all pressure points padded.    A Moss injection scope was inserted through a well-lubricated urethra and into the bladder.  There were no anterior urethral strictures however there were 2 very large false passages in the posterior wall of his prostatic urethra, one on each side at about the level of the ejaculatory ducts.  The bladder neck was very high and we had to torque the scope towards the ceiling in order to come up into the bladder.  We also tried catheterizing him with a 16 Welsh Tiemann catheter and that would not go into the bladder.    We then injected 200 units of Botox and 10 cc of saline into the wall of the bladder and template fashion.  There  is no bleeding.  The bladder was seen to be mildly trabeculated    That scope was removed and then a flexible cystoscope was inserted and the bladder was filled with saline.  A abdominal ultrasound probe was used to visualize his bladder and confirm that the penile prosthesis reservoir was off to his left side and out of the way of the midline.  There is also no bowel in the way.  A transverse incision was then made with an 11 blade scalpel and the bladder was punctured with a spinal needle under ultrasound and cystoscopic guidance.  The Russ trocar was then used to access the bladder under cystoscopic guidance.  And a 16 Canadian Shah catheter was left.    Using a 15 blade scalpel we made an incision along the mucocutaneous junction where the eroded urethra joined with the distal penile shaft skin.  This incision was carried down to the tunica albuginea on all sides of the urethra to mobilize the skin and dartos muscle off of the corpus spongiosum.  The urethra was rolled together in the midline using 4-0 Vicryl suture and the lumen was at least 20 Canadian in size when we are done.  The skin was then reapproximated to the urethral edges in a transverse fashion such that we had no running midline skin closure suture and thus we had no overlapping suture lines.  No urethral catheter was left at the end of the case    At the conclusion of the procedure he was awakened from anesthesia, transferrred to a gurney and to the PACU in stable condition.    TUBES/DRAINS:    SPECIMENS: None.    COMPLICATIONS:  None.    EBL: 10    As the attending surgeon I, Osmani Goncalves, was present and scrubbed throughout the procedure.    Op time: urethroplasty was 20 min

## 2020-07-03 NOTE — ANESTHESIA POSTPROCEDURE EVALUATION
Anesthesia POST Procedure Evaluation    Patient: Josiah Crump   MRN:     [id number] Gender:   male   Age:    54 year old :      1965        Preoperative Diagnosis: 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, initial encounter (H) [T83.89XA, N36.8]   Procedure(s):  Second stage urethroplasty, bladder botox injection, insertion of suprapubic tube, cystoscopy   Postop Comments: No value filed.     Anesthesia Type: MAC       Disposition: Outpatient   Postop Pain Control: Uneventful            Sign Out: Well controlled pain   PONV: No   Neuro/Psych: Uneventful            Sign Out: Acceptable/Baseline neuro status   Airway/Respiratory: Uneventful            Sign Out: Acceptable/Baseline resp. status   CV/Hemodynamics: Uneventful            Sign Out: Acceptable CV status   Other NRE: NONE   DID A NON-ROUTINE EVENT OCCUR? No         Last Anesthesia Record Vitals:  CRNA VITALS  7/3/2020 1147 - 7/3/2020 1247      7/3/2020             Pulse:  92    SpO2:  96 %    Resp Rate (set):  10          Last PACU Vitals:  Vitals Value Taken Time   /64 7/3/2020  1:30 PM   Temp 36.6  C (97.9  F) 7/3/2020  1:30 PM   Pulse 86 7/3/2020  1:30 PM   Resp 18 7/3/2020  1:33 PM   SpO2 93 % 7/3/2020  1:33 PM   Temp src     NIBP     Pulse     SpO2     Resp     Temp     Ht Rate     Temp 2     Vitals shown include unvalidated device data.      Electronically Signed By: Nakul House DO, July 3, 2020, 1:39 PM

## 2020-07-03 NOTE — PROVIDER NOTIFICATION
Dr House updated re: pt breathing well, oral airway in placed, VSS, not responding to stimuli, pt in pacu x1 hour.  No new orders, Dr House reviewing chart.

## 2020-07-07 ENCOUNTER — TELEPHONE (OUTPATIENT)
Dept: ENDOCRINOLOGY | Facility: CLINIC | Age: 55
End: 2020-07-07

## 2020-07-07 DIAGNOSIS — N62 GYNECOMASTIA: Primary | ICD-10-CM

## 2020-07-07 NOTE — TELEPHONE ENCOUNTER
Action Needed --  I've placed a hold that needs scheduling. Please see below.  Department: Endocrine [facility]  Provider:Dr Martinez   Date/Time:Monday 7/13/20  2 PM telephone visit   RFV: Gynecomastia   Patieint aware please put in   Tatyana Lopez RN on 7/7/2020 at 3:50 PM

## 2020-07-08 ENCOUNTER — COMMUNICATION - HEALTHEAST (OUTPATIENT)
Dept: CARE COORDINATION | Facility: CLINIC | Age: 55
End: 2020-07-08

## 2020-07-09 ENCOUNTER — COMMUNICATION - HEALTHEAST (OUTPATIENT)
Dept: FAMILY MEDICINE | Facility: CLINIC | Age: 55
End: 2020-07-09

## 2020-07-09 DIAGNOSIS — G82.20 PARAPLEGIA (H): ICD-10-CM

## 2020-07-09 NOTE — PROGRESS NOTES
Gynecomastia  Jeniffer Reed CMA    We called at least several times.     No show    Haley Martinez MD  Staff Physician  Endocrinology and Metabolism  [facility]  License: [place] [id number]  Pager: [phone]

## 2020-07-10 ENCOUNTER — TELEPHONE (OUTPATIENT)
Dept: UROLOGY | Facility: CLINIC | Age: 55
End: 2020-07-10

## 2020-07-10 NOTE — TELEPHONE ENCOUNTER
Patient expressed that he has already done this procedure. This information will be relayed to the care team.

## 2020-07-12 NOTE — TELEPHONE ENCOUNTER
"APPT INFORMATION    Date /Time: Rescheduled To:   10/31/17   3:00 PM   Reason for Appt: Chronic Skin Ulcer Right Thigh   Ref Provider/Clinic: Dr Petit, [facility]   Patient Contact (Y/N) & Call Details: No referred  Has Right BKA   Action: Requested records/imaging     RECORDS CLINIC NAME  (\"None\" if no records ) RECEIVED RECS & IMG? Y/N   (may include other helpful notes)   Internal Clinics: None         External Clinics: [facility] Records sent to clinic;No h/o recent imaging            "
ACTION    What did you do? [facility] [place] [phone]   and [facility] for any related records/imaging       
Note:   Appt Rescheduled:   10/31/17   3:00 PM       
Records Received From:  [facility]     Date/Exam/Location  (specify location if different)   Office Notes:  7/20/17, 7/26/17   ED/Hosp Notes:  10/25/14   Missing:  Records with Dr. Brown or Varinder- plastic surgery, saw [facility] for amputation       
Records Received From: [facility]    Date/Exam/Location  (specify location if different)   PT/OT Notes: 1/24/17 (OT)     
[FreeTextEntry1] : Patient presents for followup on hypertension/repeat labs.Patient is currently on Toprol/norvasc/benicar for hypertension (Hydrochlorothiazide was discontinued secondary to hypokalemia), last potassium was normal and low potassium was attributed to water pill, potassium supplement was decreased to t.i.d. in hopes of keeping normal and ultimately discontinuing\par -Patient is newly on Jardiance for type 2 diabetes, taking without any adverse reactions\par -she is fasting and due to fasting labs\par -Patient states 3 days ago she felt discomfort in her left lower leg and noticed puffiness / swelling, notices it on both but only had discomfort on the left. Patient states it still feels swollen.

## 2020-07-13 ENCOUNTER — VIRTUAL VISIT (OUTPATIENT)
Dept: ENDOCRINOLOGY | Facility: CLINIC | Age: 55
End: 2020-07-13
Payer: MEDICARE

## 2020-07-13 DIAGNOSIS — N62 GYNECOMASTIA: Primary | ICD-10-CM

## 2020-07-13 NOTE — LETTER
7/13/2020       RE: Josiah Crump  [address]  [address]  [place] [address]     Dear Colleague,    Thank you for referring your patient, Josiah Crump, to the [facility] ENDOCRINOLOGY at [facility]. Please see a copy of my visit note below.    Gynecomastia  Jeniffer Reed CMA    We called at least several times.     No show    Haley Martinez MD  Staff Physician  Endocrinology and Metabolism  [facility]  License: [id number]  Pager: [phone]    Again, thank you for allowing me to participate in the care of your patient.      Sincerely,    Haley Martinez MD

## 2020-07-15 ENCOUNTER — COMMUNICATION - HEALTHEAST (OUTPATIENT)
Dept: NURSING | Facility: CLINIC | Age: 55
End: 2020-07-15

## 2020-07-15 ENCOUNTER — PRE VISIT (OUTPATIENT)
Dept: UROLOGY | Facility: CLINIC | Age: 55
End: 2020-07-15

## 2020-07-15 NOTE — TELEPHONE ENCOUNTER
Reason for visit: Post op      Relevant information: second stage urethroplasty    Records/imaging/labs/orders: imaging is shceduled    Pt called: no need for a call    At Rooming: standard

## 2020-07-17 ENCOUNTER — NURSE TRIAGE (OUTPATIENT)
Dept: NURSING | Facility: CLINIC | Age: 55
End: 2020-07-17

## 2020-07-17 ENCOUNTER — TELEPHONE (OUTPATIENT)
Dept: UROLOGY | Facility: CLINIC | Age: 55
End: 2020-07-17

## 2020-07-17 DIAGNOSIS — N32.89 BLADDER SPASMS: ICD-10-CM

## 2020-07-17 DIAGNOSIS — N32.89 BLADDER SPASMS: Primary | ICD-10-CM

## 2020-07-17 RX ORDER — OXYBUTYNIN CHLORIDE 15 MG/1
15 TABLET, EXTENDED RELEASE ORAL DAILY
Qty: 30 TABLET | Refills: 3 | Status: SHIPPED | OUTPATIENT
Start: 2020-07-17 | End: 2020-07-21

## 2020-07-17 NOTE — TELEPHONE ENCOUNTER
[facility] Call Center    Phone Message    May a detailed message be left on voicemail: yes     Reason for Call: Other: The pt is anxious to talk with someone. He called around 10 this am and hasn't heard from anyone. Please call as soon as you can. Thanks.    Action Taken: Message routed to:  [facility]    Travel Screening: Not Applicable

## 2020-07-17 NOTE — TELEPHONE ENCOUNTER
I spoke with patient regarding the penis leakage of urine while having supra pubic catheter in place  Had botox placed 2 weeks ago and needs something to stop the leakage it is coming all the time.  He states that he gets urine from supra pubic catheter as well. Sent dr morocho a message to call me with plan of action.  Possible medication to reduce the bladder spasms Gladis Pérez LPN Staff Nurse

## 2020-07-17 NOTE — TELEPHONE ENCOUNTER
Patient called back I called bailee and ordered him oxybutynin 15 xl one per day  Called patient and pharmacy Gladis Pérez LPN Staff Nurse

## 2020-07-20 NOTE — TELEPHONE ENCOUNTER
Called patient to see how his symptoms were doing as promised  Had to leave message to call me back Gladis Pérez, TRACIEN Staff Nurse

## 2020-07-21 ENCOUNTER — RECORDS - HEALTHEAST (OUTPATIENT)
Dept: ADMINISTRATIVE | Facility: OTHER | Age: 55
End: 2020-07-21

## 2020-07-21 RX ORDER — OXYBUTYNIN CHLORIDE 15 MG/1
TABLET, EXTENDED RELEASE ORAL
Qty: 90 TABLET | Refills: 0 | Status: SHIPPED | OUTPATIENT
Start: 2020-07-21 | End: 2021-03-29

## 2020-07-21 NOTE — TELEPHONE ENCOUNTER
LVD:Second stage urethroplasty, bladder botox injection, insertion of suprapubic tube, cystoscopy  #30/3 refills sent 7/17/20, may have 90 day refill instead

## 2020-07-23 ENCOUNTER — TELEPHONE (OUTPATIENT)
Dept: UROLOGY | Facility: CLINIC | Age: 55
End: 2020-07-23

## 2020-07-23 NOTE — TELEPHONE ENCOUNTER
[facility] Call Center    Phone Message    May a detailed message be left on voicemail: yes     Reason for Call: Other: Pt calling because he is bloated, and discomfort. His catheter is leaking every single night. Pt would like to come in to get this changed. He does not want to be dealing with this every night. He called earlier today.   pt would like a call back today.     Action Taken: Message routed to:  [facility] urology    Travel Screening: Not Applicable

## 2020-07-23 NOTE — TELEPHONE ENCOUNTER
[facility] Call Center    Phone Message    May a detailed message be left on voicemail: yes     Reason for Call: Other: Pt reports 2 nights in a row now the catheter has not had urine going through it; Pt reports the urine is going around the catheter instead and coming out through his urethra. Pt wants some direction on if he needs a nurse visit to fix it or something else. Please call Pt back.     Action Taken: Message routed to:  [facility] UROLOGY ADULT [facility]    Travel Screening: Not Applicable

## 2020-07-27 NOTE — TELEPHONE ENCOUNTER
Called and spoke with patient to offer him an appointment to have his SP looked at. He is not longer leaking appointment not needed. Lilibeth Roberts RN

## 2020-08-08 ENCOUNTER — COMMUNICATION - HEALTHEAST (OUTPATIENT)
Dept: FAMILY MEDICINE | Facility: CLINIC | Age: 55
End: 2020-08-08

## 2020-08-08 DIAGNOSIS — I63.9 ACUTE CVA (CEREBROVASCULAR ACCIDENT) (H): ICD-10-CM

## 2020-08-10 ENCOUNTER — RECORDS - HEALTHEAST (OUTPATIENT)
Dept: ADMINISTRATIVE | Facility: OTHER | Age: 55
End: 2020-08-10

## 2020-08-18 ENCOUNTER — PRE VISIT (OUTPATIENT)
Dept: UROLOGY | Facility: CLINIC | Age: 55
End: 2020-08-18

## 2020-08-18 NOTE — TELEPHONE ENCOUNTER
Reason for visit: Post op              Relevant information: Second stage urethroplasty, bladder botox injection, insertion of suprapubic tube, cystoscopy     Records/imaging/labs/orders: imaging is shceduled     Pt called: no need for a call     At Rooming: standard

## 2020-08-31 ENCOUNTER — RECORDS - HEALTHEAST (OUTPATIENT)
Dept: ADMINISTRATIVE | Facility: OTHER | Age: 55
End: 2020-08-31

## 2020-08-31 ENCOUNTER — OFFICE VISIT (OUTPATIENT)
Dept: UROLOGY | Facility: CLINIC | Age: 55
End: 2020-08-31
Payer: MEDICARE

## 2020-08-31 VITALS — BODY MASS INDEX: 25.57 KG/M2 | WEIGHT: 210 LBS | HEIGHT: 76 IN

## 2020-08-31 DIAGNOSIS — N31.9 NEUROGENIC BLADDER: Primary | ICD-10-CM

## 2020-08-31 ASSESSMENT — PAIN SCALES - GENERAL: PAINLEVEL: NO PAIN (0)

## 2020-08-31 ASSESSMENT — MIFFLIN-ST. JEOR: SCORE: 1899.05

## 2020-08-31 NOTE — PATIENT INSTRUCTIONS
Please schedule a follow up appointment with Dr. Frank in 4 weeks.     It was a pleasure meeting with you today.  Thank you for allowing me and my team the privilege of caring for you today.  YOU are the reason we are here, and I truly hope we provided you with the excellent service you deserve.  Please let us know if there is anything else we can do for you so that we can be sure you are leaving completely satisfied with your care experience.
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
## 2020-08-31 NOTE — NURSING NOTE
Chief Complaint   Patient presents with     RECHECK      Second stage urethroplasty, bladder botox injection, insertion of suprapubic tube, cystoscopy       Eleni Howard MA

## 2020-09-04 ENCOUNTER — RECORDS - HEALTHEAST (OUTPATIENT)
Dept: HEALTH INFORMATION MANAGEMENT | Facility: CLINIC | Age: 55
End: 2020-09-04

## 2020-09-15 ENCOUNTER — TELEPHONE (OUTPATIENT)
Dept: UROLOGY | Facility: CLINIC | Age: 55
End: 2020-09-15

## 2020-09-15 NOTE — TELEPHONE ENCOUNTER
[facility] Call Center    Phone Message    May a detailed message be left on voicemail: no     Reason for Call: Other: PtJosiah calling stating the Superpubic Cath is leaking from stomach. Please call: [phone] Josiah     Action Taken: Message routed to:  [facility] Urology    Travel Screening: Not Applicable

## 2020-09-15 NOTE — TELEPHONE ENCOUNTER
Flash Frank,      Patient states that he is going out of town on 9/18/2020. He states that he is having leakage from his urethra and SP tube cathter. He states that his SP tube is still draining. He states that he is still taking the Oxybutynin. He would like to know the next steps before he goes out of town on Friday. Please advise.      Thanks, Graciela Gupta MA

## 2020-09-15 NOTE — TELEPHONE ENCOUNTER
He can try to flush the SPT to make sure that it is not clogged at all. Usually that's why it leaks. If he does not have the supplies, maybe we can book him for a nursing visit. Otherwise, continue the oxybutynin. He may require botox again if both are working well.    Dr. Lorri Frank

## 2020-09-15 NOTE — TELEPHONE ENCOUNTER
Message left on the patient voicemail stating that he can try to flush the SPT to make sure that it is not clogged at all. Usually that's why it leaks. If he does not have the supplies, maybe we can book him for a nursing visit. Otherwise, continue the oxybutynin. He may require botox again if both are working well.    Message states for the patient to call and let us know if hs supplies to try and flush the SPT or needs to schedule a nurse visit.    Graciela Gupta MA

## 2020-09-16 NOTE — TELEPHONE ENCOUNTER
Message left on the patient voicemail stating that he can try to flush the SPT to make sure that it is not clogged at all. Usually that's why it leaks. If he does not have the supplies, maybe we can book him for a nursing visit. Otherwise, continue the oxybutynin. He may require botox again if both are working well.     Message states for the patient to call and let us know if hs supplies to try and flush the SPT or needs to schedule a nurse visit.     Graciela Gupta MA   Wheelchair/Stroller

## 2020-09-16 NOTE — TELEPHONE ENCOUNTER
Patient states that he was out of his Oxybutynin. After refilling his Oxybutynin and taking the medication his leakage has subsided. Patient will contact us if his returns.      Graciela Gupta MA

## 2020-09-21 ENCOUNTER — PRE VISIT (OUTPATIENT)
Dept: UROLOGY | Facility: CLINIC | Age: 55
End: 2020-09-21

## 2020-09-21 NOTE — TELEPHONE ENCOUNTER
Visit Type : Clinic-Return    Hx/Sx: NGB w/ SP and CIC    Records/Orders: Yes    Pt Contacted: n/a    At Rooming: Possible SP change or removal

## 2020-09-28 ENCOUNTER — RECORDS - HEALTHEAST (OUTPATIENT)
Dept: ADMINISTRATIVE | Facility: OTHER | Age: 55
End: 2020-09-28

## 2020-09-28 ENCOUNTER — OFFICE VISIT (OUTPATIENT)
Dept: UROLOGY | Facility: CLINIC | Age: 55
End: 2020-09-28
Payer: MEDICARE

## 2020-09-28 DIAGNOSIS — Z46.6 URINARY CATHETER (FOLEY) CHANGE REQUIRED: ICD-10-CM

## 2020-09-28 DIAGNOSIS — N31.9 NEUROGENIC BLADDER: Primary | ICD-10-CM

## 2020-09-28 RX ORDER — CIPROFLOXACIN 500 MG/1
500 TABLET, FILM COATED ORAL ONCE
Status: COMPLETED | OUTPATIENT
Start: 2020-09-28 | End: 2020-09-28

## 2020-09-28 RX ADMIN — CIPROFLOXACIN 500 MG: 500 TABLET, FILM COATED ORAL at 14:45

## 2020-09-28 ASSESSMENT — PAIN SCALES - GENERAL: PAINLEVEL: NO PAIN (0)
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
## 2020-09-28 NOTE — NURSING NOTE
"Chief Complaint   Patient presents with     Follow Up       There were no vitals taken for this visit. There is no height or weight on file to calculate BMI.    Patient Active Problem List   Diagnosis     Ulcer of right thigh, unspecified ulcer stage (H)     Benign essential hypertension     Chronic pain     General symptom     Gynecomastia     Hx of BKA, right (H)     Hydrocele     Infected penile implant (H)     Insomnia     Lateral epicondylitis     Neurogenic bladder     Other tenosynovitis of hand and wrist     Pain in limb     Paraplegia (H)     Right shoulder strain     Unspecified site of spinal cord injury without evidence of spinal bone injury     Vitamin D deficiency     Status post hip surgery     Heterotopic ossification of bone     Physical deconditioning     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, initial encounter (H)     Spasticity     History of disarticulation of right hip     Injury of thoracic spinal cord, sequela (H)     Ulcer of trochanteric region of right hip (H)     Urinary incontinence       No Known Allergies    Current Outpatient Medications   Medication Sig Dispense Refill     anastrozole (ARIMIDEX) 1 MG tablet Take 1 tablet (1 mg) by mouth daily 90 tablet 3     baclofen (LIORESAL) 20 MG tablet Take 1 tablet (20 mg) by mouth 4 times daily as needed for muscle spasms 90 tablet 0     ferrous sulfate (FEROSUL) 325 (65 Fe) MG tablet Take 1 tablet (325 mg) by mouth daily (with breakfast) 30 tablet 0     Fesoterodine Fumarate (TOVIAZ) 8 MG TB24        metoprolol succinate ER (TOPROL-XL) 50 MG 24 hr tablet Take 1 tablet (50 mg) by mouth daily 30 tablet 0     order for [facility] Order Phone [phone] Fax [phone]  Secondary Dressing Gauze 4X4 Qty 1 loaf  Secondary Dressing ABD Qty 60  Secondary Dressing Medipore tape 2\" Qty 4  Extra Large Gloves Qty  1 box  Wound Cleanser Qty 1 large bottle  Length of Need: 1 month  Frequency of dressing change: daily 30 days " 0     oxybutynin ER (DITROPAN XL) 15 MG 24 hr tablet TAKE 1 TABLET(15 MG) BY MOUTH DAILY 90 tablet 0     oxyCODONE (ROXICODONE) 5 MG tablet Take 1 tablet (5 mg) by mouth every 6 hours as needed for moderate to severe pain 10 tablet 0     SENNA-docusate sodium (SENNA S) 8.6-50 MG tablet Take 1 tablet by mouth 2 times daily as needed (constipation) 20 tablet 0       Social History     Tobacco Use     Smoking status: Never Smoker     Smokeless tobacco: Never Used   Substance Use Topics     Alcohol use: No     Frequency: Never     Drug use: No       The following medication was given:     MEDICATION:  Ciprofloxacin  ROUTE: PO  SITE: Medication was given orally   DOSE: 500mg  LOT #: [id number]  : "[employer]  EXPIRATION DATE: 12/21  NDC#: [address]-66   Was there drug waste? No      SIVA Vitale  September 28, 2020          SIVA Vitale  9/28/2020  2:47 PM

## 2020-09-28 NOTE — PATIENT INSTRUCTIONS
Please schedule appointment in 6 weeks for cystoscopy and catheterization check. We'll see if we can catheterize you through your urethra. If we can't, we'll perform the cystoscopy for evaluation. You'll have your SPT change at that time as well.    It was a pleasure meeting with you today.  Thank you for allowing me and my team the privilege of caring for you today.  YOU are the reason we are here, and I truly hope we provided you with the excellent service you deserve.  Please let us know if there is anything else we can do for you so that we can be sure you are leaving completely satisfied with your care experience.

## 2020-10-06 ENCOUNTER — COMMUNICATION - HEALTHEAST (OUTPATIENT)
Dept: FAMILY MEDICINE | Facility: CLINIC | Age: 55
End: 2020-10-06

## 2020-10-06 DIAGNOSIS — I10 ESSENTIAL HYPERTENSION, BENIGN: ICD-10-CM

## 2020-10-18 ENCOUNTER — RECORDS - HEALTHEAST (OUTPATIENT)
Dept: ADMINISTRATIVE | Facility: OTHER | Age: 55
End: 2020-10-18

## 2020-10-26 ENCOUNTER — PRE VISIT (OUTPATIENT)
Dept: UROLOGY | Facility: CLINIC | Age: 55
End: 2020-10-26

## 2020-10-26 NOTE — TELEPHONE ENCOUNTER
Chief Complaint : Cysto    Hx/Sx: NGB secondary to SCI, ventral urethral erosion (s/p SP placement and urethroplasty 7/3/20)    Records/Orders: BHASKAR, message sent to Dr. Frank about if patient to obtain prior    Pt Contacted: N/a    At Rooming: Attempt CIC, will scope if unable to CIC    Rosalio Rosas, EMT

## 2020-10-29 DIAGNOSIS — N31.9 NEUROGENIC BLADDER: Primary | ICD-10-CM

## 2020-10-29 DIAGNOSIS — Q64.32 CONGENITAL STRICTURE OF URETHRA: ICD-10-CM

## 2020-10-30 ENCOUNTER — TELEPHONE (OUTPATIENT)
Dept: UROLOGY | Facility: CLINIC | Age: 55
End: 2020-10-30

## 2020-10-30 NOTE — TELEPHONE ENCOUNTER
Left message to schedule US prior to visit with Dr Frank on 11/9. Left imaging number and clinic number

## 2020-10-30 NOTE — TELEPHONE ENCOUNTER
----- Message from SIVA Vitale sent at 10/29/2020  2:41 PM CDT -----  Regarding: FW: BHASKAR  Hello,    Could someone please help schedule this patient for an US prior to their appointment with Dr. Frank? Orders are in.    ThanksRosalio  ----- Message -----  From: Lorri Frank MD  Sent: 10/26/2020   7:26 PM CDT  To: SIVA Vitale  Subject: RE: BHASKAR                                          Yes that would be great!  ----- Message -----  From: Rosalio Rosas EMT  Sent: 10/26/2020   6:07 PM CDT  To: Lorri Frank MD  Subject: BHASKAR Avendano,     Looks like your note stated you wanted BHASKAR for this upcoming visit. Did you want to have that prior to appointment? If so, I can add orders and have patient scheduled for US.     ThanksRosalio

## 2020-11-02 ENCOUNTER — TELEPHONE (OUTPATIENT)
Dept: UROLOGY | Facility: CLINIC | Age: 55
End: 2020-11-02

## 2020-11-09 ENCOUNTER — TELEPHONE (OUTPATIENT)
Dept: WOUND CARE | Facility: CLINIC | Age: 55
End: 2020-11-09

## 2020-11-09 NOTE — TELEPHONE ENCOUNTER
Returned call to patient who reports that he has been running fevers for the last 2 days and when he transferred to his chair this morning he had a large amount of clear drainage come from is old surgical incision. States that his fevers when away when the fluid was expelled and feels much better. Covered with ABD pad. Made an appointment for patient to see Myra this Wednesday for wound check.

## 2020-11-09 NOTE — TELEPHONE ENCOUNTER
Has a large amount of fluid coming out of his backside and very concerned.   Wanted to see Neymar tomorrow.

## 2020-11-17 ENCOUNTER — HOSPITAL ENCOUNTER (OUTPATIENT)
Dept: WOUND CARE | Facility: CLINIC | Age: 55
Discharge: HOME | End: 2020-11-17
Attending: SURGERY | Admitting: SURGERY
Payer: MEDICARE

## 2020-11-17 VITALS
SYSTOLIC BLOOD PRESSURE: 142 MMHG | HEART RATE: 79 BPM | TEMPERATURE: 97.2 F | DIASTOLIC BLOOD PRESSURE: 75 MMHG | RESPIRATION RATE: 18 BRPM

## 2020-11-17 DIAGNOSIS — L97.114: Primary | ICD-10-CM

## 2020-11-17 PROCEDURE — G0463 HOSPITAL OUTPT CLINIC VISIT: HCPCS | Mod: 25

## 2020-11-17 PROCEDURE — 99213 OFFICE O/P EST LOW 20 MIN: CPT | Performed by: SURGERY

## 2020-11-17 PROCEDURE — 97602 WOUND(S) CARE NON-SELECTIVE: CPT

## 2020-11-17 RX ORDER — AMLODIPINE BESYLATE 5 MG/1
TABLET ORAL
COMMUNITY
Start: 2020-10-08 | End: 2021-08-18

## 2020-11-17 RX ORDER — SIMVASTATIN 20 MG
TABLET ORAL
COMMUNITY
Start: 2020-09-14 | End: 2021-08-18

## 2020-11-17 RX ORDER — ASPIRIN 81 MG/1
81 TABLET, CHEWABLE ORAL DAILY
COMMUNITY
Start: 2020-11-07 | End: 2022-01-03

## 2020-11-17 RX ORDER — CLOPIDOGREL BISULFATE 75 MG/1
75 TABLET ORAL DAILY
Status: ON HOLD | COMMUNITY
Start: 2020-04-15 | End: 2021-09-21

## 2020-11-17 NOTE — PROGRESS NOTES
[facility] [place] Progress Note    Subject: Josiah DEEP Theron status post right hip disarticulation with creation of flap, Dr. Blackmon, presents with sacral skin breakdown and 10 cm area of undermining along flap incisional line.  Had significant drainage from the site several weeks ago.  Denies current fevers chills sweats.  Most recently mapped at [facility] approximately 12 months ago.  Does not utilize a Roho cushion cushion as does not provide adequate upper torso support, utilizes a J cushion.  Plays basketball, tennis, very active, works at [employer].  Does not utilize tobacco.  Nondiabetic.  History of injury to the thoracic spinal cord.      Patient Active Problem List   Diagnosis     Ulcer of right thigh, unspecified ulcer stage (H)     Benign essential hypertension     Chronic pain     General symptom     Gynecomastia     Hx of BKA, right (H)     Hydrocele     Infected penile implant (H)     Insomnia     Lateral epicondylitis     Neurogenic bladder     Other tenosynovitis of hand and wrist     Pain in limb     Paraplegia (H)     Right shoulder strain     Unspecified site of spinal cord injury without evidence of spinal bone injury     Vitamin D deficiency     Status post hip surgery     Heterotopic ossification of bone     Physical deconditioning     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, initial encounter (H)     Spasticity     History of disarticulation of right hip     Injury of thoracic spinal cord, sequela (H)     Ulcer of trochanteric region of right hip (H)     Urinary incontinence     Past Medical History:   Diagnosis Date     Hypertension      Spinal cord injury at T8 level (H)     paraplegia     Exam:  BP (!) 142/75   Pulse 79   Temp 97.2  F (36.2  C) (Temporal)   Resp 18   Wound (used by OP [facility] only) 11/17/20 0823 Right (Active)   Dressing Appearance moist drainage 11/17/20 0800   Length (cm) 0.7 11/17/20 0800   Width (cm) 0.5 11/17/20 0800   Depth  (cm) 0.7 11/17/20 0800   Wound (cm^2) 0.35 cm^2 11/17/20 0800   Wound Volume (cm^3) 0.24 cm^3 11/17/20 0800   Tunneling [Depth (cm)/Location] 10 O'clock/8.8cm 11/17/20 0800   Drainage Characteristics/Odor serous 11/17/20 0800   Drainage Amount moderate 11/17/20 0800       Wound (used by OP [facility] only) 11/17/20 0823 (Active)   Dressing Appearance moist drainage 11/17/20 0800   Length (cm) 4.8 11/17/20 0800   Width (cm) 4 11/17/20 0800   Depth (cm) 0.1 11/17/20 0800   Wound (cm^2) 19.2 cm^2 11/17/20 0800   Wound Volume (cm^3) 1.92 cm^3 11/17/20 0800   Drainage Characteristics/Odor serosanguineous 11/17/20 0800   Drainage Amount moderate 11/17/20 0800       Wound (used by OP [facility] only) 11/17/20 0823 (Active)   Dressing Appearance moist drainage 11/17/20 0800   Length (cm) 1.7 11/17/20 0800   Width (cm) 1.1 11/17/20 0800   Depth (cm) 0.1 11/17/20 0800   Wound (cm^2) 1.87 cm^2 11/17/20 0800   Wound Volume (cm^3) 0.19 cm^3 11/17/20 0800   Drainage Characteristics/Odor serosanguineous 11/17/20 0800   Drainage Amount moderate 11/17/20 0800     54-year-old male, areas of stage II pressure ulcerations, sacrum, 10 cm area of undermining, no purulence, no foul odor, no cellulitis, minimal induration along the incision.  Likely was a seroma associated with previous lap surgical procedure that spontaneously drained.  Given the length of undermining, concern for underlying osteomyelitis.        Impression: Rule out osteomyelitis, right ischial tuberosity, 10 cm of undermining along incision line, status post hip articulation and flap surgical procedure    Plan: We will dress the wounds with iodinated strips, remap cushion and [place]'s, MRI of pelvis evaluate for osteomyelitis, return to my clinic for further evaluation on a Thursday at which time I will discuss with Dr. Blackmon..  Patient will return to the clinic in 2-3 weeks time or sooner as MRI can be done.    Yayo Overton MD on 11/17/2020 at 9:18 AM

## 2020-11-17 NOTE — DISCHARGE INSTRUCTIONS
"   [facility]  [address] [facility] [address], [facility] [address]    Call us at [phone] if you have any questions about your wounds, have redness or swelling around your wound, have a fever of 101 or greater or if you have any other problems or concerns. We answer the phone Monday through Friday 8 am to 4 pm, please leave a message as we check the voicemail frequently throughout the day.     Josiah Crump      1965    Wound Dressing Change:Right Trochanter  Cleanse wound and surrounding skin with: saline  Pack wound with 1/2\" iodoform packing stripe to depth of 10 cm cover with gauze or abd pad and tape   Change dressing daily  Use critic aid to scrotum area daily  Repositioning:    Bed:  Reposition pt MINIMALLY every 1-2 hours in bed to relieve pressure and promote perfusion to tissue.     Chair:  When up to chair pt should not sit for longer than one hour total before either standing or returning to bed for at least 10 minutes, again to relieve pressure and promote perfusion to tissue.     o Pt should also sit on a chair cushion when up to the chair.     Please call [facility]AN[facility] on Monday November 23rd 6:30am check in at the information deski       ISABELA Overton M.D. November 17, 2020              "

## 2020-12-01 ENCOUNTER — MEDICAL CORRESPONDENCE (OUTPATIENT)
Dept: HEALTH INFORMATION MANAGEMENT | Facility: CLINIC | Age: 55
End: 2020-12-01

## 2020-12-03 ENCOUNTER — TELEPHONE (OUTPATIENT)
Dept: WOUND CARE | Facility: CLINIC | Age: 55
End: 2020-12-03

## 2020-12-03 NOTE — TELEPHONE ENCOUNTER
[facility]    Who is the name of the provider?:  Neymar      What is the location you see this provider at?: [place]    Reason for call:  Missed appt 12/3 due to car problems.  Needs to reschedule.     Can we leave a detailed message on this number?  YES

## 2020-12-07 ENCOUNTER — RECORDS - HEALTHEAST (OUTPATIENT)
Dept: ADMINISTRATIVE | Facility: OTHER | Age: 55
End: 2020-12-07

## 2020-12-07 ENCOUNTER — ANCILLARY PROCEDURE (OUTPATIENT)
Dept: ULTRASOUND IMAGING | Facility: CLINIC | Age: 55
End: 2020-12-07
Attending: STUDENT IN AN ORGANIZED HEALTH CARE EDUCATION/TRAINING PROGRAM
Payer: MEDICARE

## 2020-12-07 ENCOUNTER — TELEPHONE (OUTPATIENT)
Dept: UROLOGY | Facility: CLINIC | Age: 55
End: 2020-12-07

## 2020-12-07 ENCOUNTER — OFFICE VISIT (OUTPATIENT)
Dept: UROLOGY | Facility: CLINIC | Age: 55
End: 2020-12-07
Payer: MEDICARE

## 2020-12-07 VITALS — DIASTOLIC BLOOD PRESSURE: 85 MMHG | HEART RATE: 65 BPM | SYSTOLIC BLOOD PRESSURE: 149 MMHG

## 2020-12-07 DIAGNOSIS — N31.9 NEUROGENIC BLADDER: ICD-10-CM

## 2020-12-07 DIAGNOSIS — Q64.32 CONGENITAL STRICTURE OF URETHRA: ICD-10-CM

## 2020-12-07 DIAGNOSIS — Z46.6 URINARY CATHETER (FOLEY) CHANGE REQUIRED: Primary | ICD-10-CM

## 2020-12-07 PROCEDURE — 52000 CYSTOURETHROSCOPY: CPT | Performed by: STUDENT IN AN ORGANIZED HEALTH CARE EDUCATION/TRAINING PROGRAM

## 2020-12-07 PROCEDURE — 76770 US EXAM ABDO BACK WALL COMP: CPT | Mod: GC | Performed by: RADIOLOGY

## 2020-12-07 RX ORDER — CIPROFLOXACIN 500 MG/1
500 TABLET, FILM COATED ORAL ONCE
Status: COMPLETED | OUTPATIENT
Start: 2020-12-07 | End: 2020-12-07

## 2020-12-07 RX ADMIN — CIPROFLOXACIN 500 MG: 500 TABLET, FILM COATED ORAL at 16:44

## 2020-12-07 ASSESSMENT — PAIN SCALES - GENERAL: PAINLEVEL: NO PAIN (0)

## 2020-12-07 NOTE — TELEPHONE ENCOUNTER
[facility]    Phone Message    May a detailed message be left on voicemail: yes     Reason for Call: Other: Pt would like acall back to discuss making an appt to change his catheter and keeps missing calls but he is off tomorrow and wouldl like a call back and also would like an order put in for more supplies for cathere care     Action Taken: Message routed to:  [facility] urology    Travel Screening: Not Applicable

## 2020-12-07 NOTE — NURSING NOTE
Chief Complaint   Patient presents with     Cystoscopy     evaluate urethral false passages       There were no vitals taken for this visit. There is no height or weight on file to calculate BMI.    Patient Active Problem List   Diagnosis     Ulcer of right thigh, unspecified ulcer stage (H)     Benign essential hypertension     Chronic pain     General symptom     Gynecomastia     Hx of BKA, right (H)     Hydrocele     Infected penile implant (H)     Insomnia     Lateral epicondylitis     Neurogenic bladder     Other tenosynovitis of hand and wrist     Pain in limb     Paraplegia (H)     Right shoulder strain     Unspecified site of spinal cord injury without evidence of spinal bone injury     Vitamin D deficiency     Status post hip surgery     Heterotopic ossification of bone     Physical deconditioning     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, initial encounter (H)     Spasticity     History of disarticulation of right hip     Injury of thoracic spinal cord, sequela (H)     Ulcer of trochanteric region of right hip (H)     Urinary incontinence       No Known Allergies    Current Outpatient Medications   Medication Sig Dispense Refill     amLODIPine (NORVASC) 5 MG tablet TAKE 1 TABLET(5 MG) BY MOUTH DAILY       anastrozole (ARIMIDEX) 1 MG tablet Take 1 tablet (1 mg) by mouth daily 90 tablet 3     aspirin (ASA) 81 MG chewable tablet        baclofen (LIORESAL) 20 MG tablet Take 1 tablet (20 mg) by mouth 4 times daily as needed for muscle spasms 90 tablet 0     clopidogrel (PLAVIX) 75 MG tablet        ferrous sulfate (FEROSUL) 325 (65 Fe) MG tablet Take 1 tablet (325 mg) by mouth daily (with breakfast) 30 tablet 0     Fesoterodine Fumarate (TOVIAZ) 8 MG TB24        metoprolol succinate ER (TOPROL-XL) 50 MG 24 hr tablet Take 1 tablet (50 mg) by mouth daily 30 tablet 0     order for [facility] Phone [phone] Fax [phone]  Secondary Dressing Gauze 4X4 Qty 1 loaf  Secondary  "Dressing ABD Qty 60  Secondary Dressing Medipore tape 2\" Qty 4  Extra Large Gloves Qty  1 box  Wound Cleanser Qty 1 large bottle  Length of Need: 1 month  Frequency of dressing change: daily 30 days 0     oxybutynin ER (DITROPAN XL) 15 MG 24 hr tablet TAKE 1 TABLET(15 MG) BY MOUTH DAILY 90 tablet 0     oxyCODONE (ROXICODONE) 5 MG tablet Take 1 tablet (5 mg) by mouth every 6 hours as needed for moderate to severe pain 10 tablet 0     SENNA-docusate sodium (SENNA S) 8.6-50 MG tablet Take 1 tablet by mouth 2 times daily as needed (constipation) 20 tablet 0     simvastatin (ZOCOR) 20 MG tablet          Social History     Tobacco Use     Smoking status: Never Smoker     Smokeless tobacco: Never Used   Substance Use Topics     Alcohol use: No     Frequency: Never     Drug use: No       Invasive Procedure Safety Checklist:    Procedure: Cystoscopy    Action: Complete sections and checkboxes as appropriate.    Pre-procedure:  1. Patient ID Verified with 2 identifiers (Sully and  or MRN) : YES    2. Procedure and site verified with patient/designee (when able) : YES    3. Accurate consent documentation in medical record : YES    4. H&P (or appropriate assessment) documented in medical record : N/A  H&P must be up to 30 days prior to procedure an updated within 24 hours of                 Procedure as applicable.     5. Relevant diagnostic and radiology test results appropriately labeled and displayed as applicable : YES    6. Blood products, implants, devices, and/or special equipment available for the procedure as applicable : YES    7. Procedure site(s) marked with provider initials [Exclusions: none] : NO    8. Marking not required. Reason : Yes  Procedure does not require site marking    Time Out:     Time-Out performed immediately prior to starting procedure, including verbal and active participation of all team members addressing: YES    1. Correct patient identity.  2. Confirmed that the correct side and site are " marked.  3. An accurate procedure to be done.  4. Agreement on the procedure to be done.  5. Correct patient position.  6. Relevant images and results are properly labeled and appropriately displayed.  7. The need to administer antibiotics or fluids for irrigation purposes during the procedure as applicable.  8. Safety precautions based on patient history or medication use.    During Procedure: Verification of correct person, site, and procedure occurs any time the responsibility for care of the patient is transferred to another member of the care team.    The following medication was given:     MEDICATION: Ciproflacin   ROUTE: oral  DOSE: 500mg  LOT #: [id number]  : GadgetATM  EXPIRATION DATE: 07/22  NDC#: [id number]  Was there drug waste? No    Prior to med admin, verified patient identity using patient's name and date of birth.  Due to med administration, patient instructed to remain in clinic for 15 minutes  afterwards, and to report any adverse reaction to me immediately.    Drug Amount Wasted:  None.  Vial/Syringe: NELLI Feng EMT  12/7/2020  3:31 PM

## 2020-12-07 NOTE — LETTER
12/7/2020       RE: Josiah Crump  [address]  [address]  [place] [address]     Dear Colleague,    Thank you for referring your patient, Josiah Crump, to the [facility] [place] at [facility]. Please see a copy of my visit note below.    Cystoscopy Procedure Note    PRE-PROCEDURE DIAGNOSIS: neurogenic bladder     POST-PROCEDURE DIAGNOSIS: neurogenic bladder     PROCEDURE: Urethroscopy, cystoscopy    HISTORY: Josiah Crump is a 55-year-old man with spinal cord injury and right hip disarticulation who had an indwelling urethral Shah catheter and developed ventral urethral erosion who is now s/p suprapubic tube placement and second stage urethroplasty for meatal erosion on 7/3/2020 with botox injection to bladder (200u). During cystoscopy, he was noted to have 2 large false passes in the posterior wall of his prostatic urethra which would make catheterization difficult. His SPT was last exchanged 9/28/2020 and at the time, CIC was unsuccessful, possibly secondary to a false pass     Continues on ditropan. He denies urethral leakage. No issues with SPT, it has been draining well.      DESCRIPTION OF PROCEDURE:  After informed consent was obtained, the patient was brought to the procedure room where they were placed in the modified lithotomy position with all pressure points well padded. The patient was prepped and draped in a sterile fashion. A flexible cystoscope was introduced through a well-lubricated urethra. There were no urethral strictures or false passes. The bladder was small with no discernable large lesion though cystitis was noted throughout. The urine was clear. There were no tumors, stones, or other abnormalities in the bladder. The patient was then catheterized to empty the bladder. The patient tolerated the procedure well. The SPT was left clamped.    BHASKAR today: IMPRESSION:  Normal renal ultrasound. No  hydronephrosis.    ASSESSMENT AND PLAN:  - Cont SPT clamped for now, patient to resume CIC with 14Fr straight cath, if unable 14 Fr coude.  - Return to clinic 1 month for Botox  - Possible SPT removal if patient is CIC well, every 3-4 hours without issues.    Lorri Frank MD  Reconstructive Urology

## 2020-12-07 NOTE — PATIENT INSTRUCTIONS
Please schedule cystoscopy with Dr. Frank in one month for botox injection.     It was a pleasure meeting with you today.  Thank you for allowing me and my team the privilege of caring for you today.  YOU are the reason we are here, and I truly hope we provided you with the excellent service you deserve.  Please let us know if there is anything else we can do for you so that we can be sure you are leaving completely satisfied with your care experience.

## 2020-12-07 NOTE — PROGRESS NOTES
Cystoscopy Procedure Note    PRE-PROCEDURE DIAGNOSIS: neurogenic bladder     POST-PROCEDURE DIAGNOSIS: neurogenic bladder     PROCEDURE: Urethroscopy, cystoscopy    HISTORY: Josiah Crump is a 55-year-old man with spinal cord injury and right hip disarticulation who had an indwelling urethral Shah catheter and developed ventral urethral erosion who is now s/p suprapubic tube placement and second stage urethroplasty for meatal erosion on 7/3/2020 with botox injection to bladder (200u). During cystoscopy, he was noted to have 2 large false passes in the posterior wall of his prostatic urethra which would make catheterization difficult. His SPT was last exchanged 9/28/2020 and at the time, CIC was unsuccessful, possibly secondary to a false pass     Continues on ditropan. He denies urethral leakage. No issues with SPT, it has been draining well.      DESCRIPTION OF PROCEDURE:  After informed consent was obtained, the patient was brought to the procedure room where they were placed in the modified lithotomy position with all pressure points well padded. The patient was prepped and draped in a sterile fashion. A flexible cystoscope was introduced through a well-lubricated urethra. There were no urethral strictures or false passes. The bladder was small with no discernable large lesion though cystitis was noted throughout. The urine was clear. There were no tumors, stones, or other abnormalities in the bladder. The patient was then catheterized to empty the bladder. The patient tolerated the procedure well. The SPT was left clamped.    BHASKAR today: IMPRESSION:  Normal renal ultrasound. No hydronephrosis.    ASSESSMENT AND PLAN:  - Cont SPT clamped for now, patient to resume CIC with 14Fr straight cath, if unable 14 Fr coude.  - Return to clinic 1 month for Botox  - Possible SPT removal if patient is CIC well, every 3-4 hours without issues.    Lorri Frank MD  Reconstructive Urology

## 2020-12-07 NOTE — PROGRESS NOTES
"[facility]    Once completed please fax to [phone]  E-script to [email or web address]    A. Please include patient demographics and chart notes (ex: indefinite retention) with this order     Name: Josiah Crump   : 1965   Phone: [phone]  Address: [address] [place], [place] [address]  [address] [place] [address]    B. Diagnosis     Retention of urine (788.20/R33.9)   Urinary Incontinence (788.30/R30)    Incomplete Bladder Emptying (788.21/R39-14)   Urge Incontinence (788.31/N39.41)    Other Specified Retention of Urine (788.29/R33.8)  X Other: Neurogenic bladder     C. Order Information/Start Date: 20    Number of Refills: 99  Length of Need: indefinite months    X Patient may receive up to a 90-day supply at one time; therefore, quantity will be three (3) times amount below.    Type (check one):  Hydrophilic    Silicone    Red Rubber    PVC Clear                                      CHECK PRODUCT Maltese FREQ OF USE QUANTITY PER STRAIGHT  COUDE    ONE  SIZE PER DAY MONTH TO DISPESE      Intermittent Catheter         Intermittent Catheter with         Insertion Supplies          Condom or External Catheters          ADDITIONAL PRODUCTS/ITEMS ORDERED (check all that apply)     PRODUCT FREQ OF USE QUANTITY PER  _X__ Patient Choice     PER MONTH MONTH TO DISPENSE  ___ Bard   X Tube of Lubricant  150 150  ___ Coloplast    Leg Bags    ___ Cure Medical    Night Bags    ___ GentleCath    Penile Clamp (Cunningham)    ___ Hi-Slip    Disinfection/BZK-PDI Wipes    ___ Jhonny   X Vinyl Exam Gloves (Large) 150 150  ___Lo Fric   X 2\" Adhesive tape 10 10  ___Magic3    Shah Catheters:    ___ MTG    Straight    ___ Chaz-Teleflex    Coude    ___ SpediCath    Panamanian size        Balloon size         D. Physician's Information:      Physician's Name: Dr. Lorri Frank  Phone: [phone]  Date: 20    [facility] " [facility]  [address] [place] [address] [id number]  [place], [place], [address]    Zeny Perez,   Office: [phone]  Mobile: [phone]  Fax: [phone]  [email or web address]    -

## 2020-12-14 DIAGNOSIS — R30.0 DYSURIA: ICD-10-CM

## 2020-12-14 DIAGNOSIS — N31.9 NEUROGENIC BLADDER: Primary | ICD-10-CM

## 2020-12-14 RX ORDER — ONABOTULINUMTOXINA 200 [USP'U]/1
200 INJECTION, POWDER, LYOPHILIZED, FOR SOLUTION INTRADERMAL; INTRAMUSCULAR ONCE
Qty: 200 UNITS | Refills: 0 | Status: SHIPPED | OUTPATIENT
Start: 2020-12-14 | End: 2020-12-14

## 2020-12-16 ENCOUNTER — TELEPHONE (OUTPATIENT)
Dept: ENDOCRINOLOGY | Facility: CLINIC | Age: 55
End: 2020-12-16

## 2020-12-16 NOTE — TELEPHONE ENCOUNTER
"Josiah called  Upset that the medication anastrozole ( Arimidex) for his \"man boobs\" is only making  them larger . He questions if theres  A different medication  That is more effective for him. Last visit was 7/20 and I was going to work him in today but he has to go to work  at 3:30 PM today  Just asking what else can be prescribed. Tatyana Lopez RN on 12/16/2020 at 12:27 PM    "

## 2020-12-16 NOTE — TELEPHONE ENCOUNTER
" Health Call Center    Phone Message    May a detailed message be left on voicemail: yes     Reason for Call: Other: Patient calling to state that the current medication he is on for \"manboobs\" and he states that it seems to be making his chest bigger, Patient would like a return call to discuss thank you.      Action Taken: Message routed to:  [facility] endo    Travel Screening: Not Applicable                                                                        "

## 2020-12-23 ENCOUNTER — TELEPHONE (OUTPATIENT)
Dept: UROLOGY | Facility: CLINIC | Age: 55
End: 2020-12-23

## 2020-12-23 NOTE — TELEPHONE ENCOUNTER
I left a message with Josiah to call and reschedule his cysto with Botox procedure. Prior authorization cannot be verified until after the start of 2021 and we require 2 weeks prior to the procedure to obtain this clearance.

## 2021-01-15 ENCOUNTER — TELEPHONE (OUTPATIENT)
Dept: UROLOGY | Facility: CLINIC | Age: 56
End: 2021-01-15

## 2021-01-15 ENCOUNTER — PRE VISIT (OUTPATIENT)
Dept: UROLOGY | Facility: CLINIC | Age: 56
End: 2021-01-15

## 2021-01-15 NOTE — TELEPHONE ENCOUNTER
Reason for visit: Catheter change     Relevant information: 14 Fr, SPT    Records/imaging/labs/orders: available    Pt called: called to confirm appointment    At Rooming: catheter change supplies

## 2021-01-15 NOTE — TELEPHONE ENCOUNTER
Called Josiah to confirm appointment on 2/15/21 at 2pm, scheduled the appointment but told him to call the clinic if the time doesn't work.    - Lou Mera,   EMT Clinic Support

## 2021-01-18 ENCOUNTER — DOCUMENTATION ONLY (OUTPATIENT)
Dept: CARE COORDINATION | Facility: CLINIC | Age: 56
End: 2021-01-18

## 2021-01-18 ENCOUNTER — TELEPHONE (OUTPATIENT)
Dept: UROLOGY | Facility: CLINIC | Age: 56
End: 2021-01-18

## 2021-01-18 NOTE — TELEPHONE ENCOUNTER
Health Call Center    Phone Message    May a detailed message be left on voicemail: yes     Reason for Call: Other: Josiah calling to see if it would be ok to wait until his Cysto on 2/15 to have his catheter removed.  He states that he is wheelchair bound and it is difficult for him to get around.  He did request the appointment today be cancelled.  Please call him back to discuss     Action Taken: Message routed to:  [facility]  Urology    Travel Screening: Not Applicable

## 2021-01-20 ENCOUNTER — COMMUNICATION - HEALTHEAST (OUTPATIENT)
Dept: FAMILY MEDICINE | Facility: CLINIC | Age: 56
End: 2021-01-20

## 2021-01-20 ENCOUNTER — RECORDS - HEALTHEAST (OUTPATIENT)
Dept: ADMINISTRATIVE | Facility: OTHER | Age: 56
End: 2021-01-20

## 2021-01-20 DIAGNOSIS — I63.9 ACUTE CVA (CEREBROVASCULAR ACCIDENT) (H): ICD-10-CM

## 2021-01-26 ENCOUNTER — AMBULATORY - HEALTHEAST (OUTPATIENT)
Dept: SURGERY | Facility: HOSPITAL | Age: 56
End: 2021-01-26

## 2021-01-26 DIAGNOSIS — Z11.59 ENCOUNTER FOR SCREENING FOR OTHER VIRAL DISEASES: ICD-10-CM

## 2021-01-29 ENCOUNTER — COMMUNICATION - HEALTHEAST (OUTPATIENT)
Dept: FAMILY MEDICINE | Facility: CLINIC | Age: 56
End: 2021-01-29

## 2021-02-01 ENCOUNTER — PRE VISIT (OUTPATIENT)
Dept: UROLOGY | Facility: CLINIC | Age: 56
End: 2021-02-01

## 2021-02-01 DIAGNOSIS — Q64.32 CONGENITAL STRICTURE OF URETHRA: ICD-10-CM

## 2021-02-01 DIAGNOSIS — N31.9 NEUROGENIC BLADDER: Primary | ICD-10-CM

## 2021-02-01 DIAGNOSIS — N32.89 BLADDER SPASMS: ICD-10-CM

## 2021-02-01 DIAGNOSIS — N36.8 URETHRAL EROSION: ICD-10-CM

## 2021-02-01 DIAGNOSIS — R32 INCONTINENCE: ICD-10-CM

## 2021-02-01 NOTE — TELEPHONE ENCOUNTER
Reason for visit: cysto with botox     Relevant information: NGB, paraplegic     Records/imaging/labs/orders: emailed to confirm coverage    Pt called: call 1/10 with ABX and prep instructions    At [facility] units botox, room in 4.2 for easier ambulating.

## 2021-02-02 ENCOUNTER — OFFICE VISIT - HEALTHEAST (OUTPATIENT)
Dept: FAMILY MEDICINE | Facility: CLINIC | Age: 56
End: 2021-02-02

## 2021-02-02 DIAGNOSIS — I10 ESSENTIAL HYPERTENSION, BENIGN: ICD-10-CM

## 2021-02-02 DIAGNOSIS — Z99.3 WHEELCHAIR DEPENDENT: ICD-10-CM

## 2021-02-02 DIAGNOSIS — N36.8 EROSION OF URETHRA DUE TO CATHETERIZATION OF URINARY TRACT, SEQUELA: ICD-10-CM

## 2021-02-02 DIAGNOSIS — T83.89XS EROSION OF URETHRA DUE TO CATHETERIZATION OF URINARY TRACT, SEQUELA: ICD-10-CM

## 2021-02-02 DIAGNOSIS — Z89.621 HISTORY OF DISARTICULATION OF RIGHT HIP: ICD-10-CM

## 2021-02-02 DIAGNOSIS — G89.29 OTHER CHRONIC PAIN: ICD-10-CM

## 2021-02-02 DIAGNOSIS — G82.20 PARAPLEGIA, UNSPECIFIED (H): ICD-10-CM

## 2021-02-09 ENCOUNTER — OFFICE VISIT - HEALTHEAST (OUTPATIENT)
Dept: FAMILY MEDICINE | Facility: CLINIC | Age: 56
End: 2021-02-09

## 2021-02-09 DIAGNOSIS — R79.9 ABNORMAL FINDING OF BLOOD CHEMISTRY, UNSPECIFIED: ICD-10-CM

## 2021-02-09 DIAGNOSIS — T83.61XA: ICD-10-CM

## 2021-02-09 DIAGNOSIS — Z13.1 SCREENING FOR DIABETES MELLITUS: ICD-10-CM

## 2021-02-09 DIAGNOSIS — Z11.59 ENCOUNTER FOR HCV SCREENING TEST FOR LOW RISK PATIENT: ICD-10-CM

## 2021-02-09 DIAGNOSIS — Z01.818 PRE-OPERATIVE GENERAL PHYSICAL EXAMINATION: ICD-10-CM

## 2021-02-09 DIAGNOSIS — R76.8 HCV ANTIBODY POSITIVE: ICD-10-CM

## 2021-02-09 LAB
ALBUMIN SERPL-MCNC: 2.9 G/DL (ref 3.5–5)
ALP SERPL-CCNC: 107 U/L (ref 45–120)
ALT SERPL W P-5'-P-CCNC: 12 U/L (ref 0–45)
ANION GAP SERPL CALCULATED.3IONS-SCNC: 10 MMOL/L (ref 5–18)
AST SERPL W P-5'-P-CCNC: 15 U/L (ref 0–40)
BILIRUB SERPL-MCNC: 0.3 MG/DL (ref 0–1)
BUN SERPL-MCNC: 13 MG/DL (ref 8–22)
CALCIUM SERPL-MCNC: 8.9 MG/DL (ref 8.5–10.5)
CHLORIDE BLD-SCNC: 105 MMOL/L (ref 98–107)
CO2 SERPL-SCNC: 25 MMOL/L (ref 22–31)
CREAT SERPL-MCNC: 0.78 MG/DL (ref 0.7–1.3)
ERYTHROCYTE [DISTWIDTH] IN BLOOD BY AUTOMATED COUNT: 20.2 % (ref 11–14.5)
GFR SERPL CREATININE-BSD FRML MDRD: >60 ML/MIN/1.73M2
GLUCOSE BLD-MCNC: 72 MG/DL (ref 70–125)
HBA1C MFR BLD: 5.1 %
HCT VFR BLD AUTO: 45.9 % (ref 40–54)
HGB BLD-MCNC: 13.1 G/DL (ref 14–18)
MCH RBC QN AUTO: 20.3 PG (ref 27–34)
MCHC RBC AUTO-ENTMCNC: 28.5 G/DL (ref 32–36)
MCV RBC AUTO: 71 FL (ref 80–100)
PLATELET # BLD AUTO: 270 THOU/UL (ref 140–440)
PMV BLD AUTO: 9.3 FL (ref 7–10)
POTASSIUM BLD-SCNC: 4.1 MMOL/L (ref 3.5–5)
PROT SERPL-MCNC: 8.3 G/DL (ref 6–8)
RBC # BLD AUTO: 6.44 MILL/UL (ref 4.4–6.2)
SODIUM SERPL-SCNC: 140 MMOL/L (ref 136–145)
WBC: 6.4 THOU/UL (ref 4–11)

## 2021-02-09 ASSESSMENT — MIFFLIN-ST. JEOR: SCORE: 1916.73

## 2021-02-10 LAB — HCV AB SERPL QL IA: POSITIVE

## 2021-02-11 ENCOUNTER — COMMUNICATION - HEALTHEAST (OUTPATIENT)
Dept: FAMILY MEDICINE | Facility: CLINIC | Age: 56
End: 2021-02-11

## 2021-02-11 DIAGNOSIS — I63.9 ACUTE CVA (CEREBROVASCULAR ACCIDENT) (H): ICD-10-CM

## 2021-02-11 DIAGNOSIS — R25.2 SPASTICITY: ICD-10-CM

## 2021-02-11 LAB
HCV RNA SERPL NAA+PROBE-ACNC: NORMAL [IU]/ML
HCV RNA SERPL NAA+PROBE-LOG IU: NORMAL LOG IU/ML

## 2021-02-12 ENCOUNTER — COMMUNICATION - HEALTHEAST (OUTPATIENT)
Dept: FAMILY MEDICINE | Facility: CLINIC | Age: 56
End: 2021-02-12

## 2021-02-15 ENCOUNTER — OFFICE VISIT (OUTPATIENT)
Dept: UROLOGY | Facility: CLINIC | Age: 56
End: 2021-02-15
Payer: MEDICARE

## 2021-02-15 ENCOUNTER — RECORDS - HEALTHEAST (OUTPATIENT)
Dept: ADMINISTRATIVE | Facility: OTHER | Age: 56
End: 2021-02-15

## 2021-02-15 DIAGNOSIS — N36.8 URETHRAL EROSION: ICD-10-CM

## 2021-02-15 DIAGNOSIS — N32.89 BLADDER SPASMS: ICD-10-CM

## 2021-02-15 DIAGNOSIS — T83.89XD EROSION OF URETHRA DUE TO CATHETERIZATION OF URINARY TRACT, SUBSEQUENT ENCOUNTER: ICD-10-CM

## 2021-02-15 DIAGNOSIS — N36.8 EROSION OF URETHRA DUE TO CATHETERIZATION OF URINARY TRACT, SUBSEQUENT ENCOUNTER: ICD-10-CM

## 2021-02-15 DIAGNOSIS — N31.9 NEUROGENIC BLADDER: Primary | ICD-10-CM

## 2021-02-15 DIAGNOSIS — Z46.6 URINARY CATHETER (FOLEY) CHANGE REQUIRED: ICD-10-CM

## 2021-02-15 DIAGNOSIS — Q64.32 CONGENITAL STRICTURE OF URETHRA: ICD-10-CM

## 2021-02-15 PROCEDURE — 52204 CYSTOSCOPY W/BIOPSY(S): CPT | Performed by: STUDENT IN AN ORGANIZED HEALTH CARE EDUCATION/TRAINING PROGRAM

## 2021-02-15 PROCEDURE — 52287 CYSTOSCOPY CHEMODENERVATION: CPT | Performed by: STUDENT IN AN ORGANIZED HEALTH CARE EDUCATION/TRAINING PROGRAM

## 2021-02-15 PROCEDURE — 88305 TISSUE EXAM BY PATHOLOGIST: CPT | Performed by: PATHOLOGY

## 2021-02-15 RX ORDER — CIPROFLOXACIN 500 MG/1
500 TABLET, FILM COATED ORAL 2 TIMES DAILY
Qty: 6 TABLET | Refills: 0 | Status: SHIPPED | OUTPATIENT
Start: 2021-02-15 | End: 2021-02-18

## 2021-02-15 NOTE — NURSING NOTE
Chief Complaint   Patient presents with     Cystoscopy     cysto with Botox       There were no vitals taken for this visit. There is no height or weight on file to calculate BMI.    Patient Active Problem List   Diagnosis     Ulcer of right thigh, unspecified ulcer stage (H)     Benign essential hypertension     Chronic pain     General symptom     Gynecomastia     Hx of BKA, right (H)     Hydrocele     Infected penile implant (H)     Insomnia     Lateral epicondylitis     Neurogenic bladder     Other tenosynovitis of hand and wrist     Pain in limb     Paraplegia (H)     Right shoulder strain     Unspecified site of spinal cord injury without evidence of spinal bone injury     Vitamin D deficiency     Status post hip surgery     Heterotopic ossification of bone     Physical deconditioning     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, initial encounter (H)     Spasticity     History of disarticulation of right hip     Injury of thoracic spinal cord, sequela (H)     Ulcer of trochanteric region of right hip (H)     Urinary incontinence       No Known Allergies    Current Outpatient Medications   Medication Sig Dispense Refill     amLODIPine (NORVASC) 5 MG tablet TAKE 1 TABLET(5 MG) BY MOUTH DAILY       anastrozole (ARIMIDEX) 1 MG tablet Take 1 tablet (1 mg) by mouth daily 90 tablet 3     aspirin (ASA) 81 MG chewable tablet        baclofen (LIORESAL) 20 MG tablet Take 1 tablet (20 mg) by mouth 4 times daily as needed for muscle spasms 90 tablet 0     ciprofloxacin (CIPRO) 500 MG tablet Take 1 tablet (500 mg) by mouth 2 times daily for 3 days 6 tablet 0     clopidogrel (PLAVIX) 75 MG tablet        ferrous sulfate (FEROSUL) 325 (65 Fe) MG tablet Take 1 tablet (325 mg) by mouth daily (with breakfast) 30 tablet 0     Fesoterodine Fumarate (TOVIAZ) 8 MG TB24        metoprolol succinate ER (TOPROL-XL) 50 MG 24 hr tablet Take 1 tablet (50 mg) by mouth daily 30 tablet 0     order for [facility]  "Phone [phone] Fax [phone]  Secondary Dressing Gauze 4X4 Qty 1 loaf  Secondary Dressing ABD Qty 60  Secondary Dressing Medipore tape 2\" Qty 4  Extra Large Gloves Qty  1 box  Wound Cleanser Qty 1 large bottle  Length of Need: 1 month  Frequency of dressing change: daily 30 days 0     oxybutynin ER (DITROPAN XL) 15 MG 24 hr tablet TAKE 1 TABLET(15 MG) BY MOUTH DAILY 90 tablet 0     simvastatin (ZOCOR) 20 MG tablet          Social History     Tobacco Use     Smoking status: Never Smoker     Smokeless tobacco: Never Used   Substance Use Topics     Alcohol use: No     Frequency: Never     Drug use: No       Invasive Procedure Safety Checklist:    Procedure: Cystoscopy with Botox injection    Action: Complete sections and checkboxes as appropriate.    Pre-procedure:  1. Patient ID Verified with 2 identifiers (Sully and  or MRN) : YES    2. Procedure and site verified with patient/designee (when able) : YES    3. Accurate consent documentation in medical record : YES    4. H&P (or appropriate assessment) documented in medical record : N/A  H&P must be up to 30 days prior to procedure an updated within 24 hours of                 Procedure as applicable.     5. Relevant diagnostic and radiology test results appropriately labeled and displayed as applicable : YES    6. Blood products, implants, devices, and/or special equipment available for the procedure as applicable : YES    7. Procedure site(s) marked with provider initials [Exclusions: none] : NO    8. Marking not required. Reason : Yes  Procedure does not require site marking    Time Out:     Time-Out performed immediately prior to starting procedure, including verbal and active participation of all team members addressing: YES    1. Correct patient identity.  2. Confirmed that the correct side and site are marked.  3. An accurate procedure to be done.  4. Agreement on the procedure to be done.  5. Correct patient position.  6. Relevant images and results " are properly labeled and appropriately displayed.  7. The need to administer antibiotics or fluids for irrigation purposes during the procedure as applicable.  8. Safety precautions based on patient history or medication use.    During Procedure: Verification of correct person, site, and procedure occurs any time the responsibility for care of the patient is transferred to another member of the care team.    Patient verified with three identifiers, allergies reviewed. Verified patient stopped blood thinning medications and started Cipro.     Dr. Frank proceeded with 100 units Botox injection (in 10 mL sterile saline). Patient tolerated procedure well with no noted complications.    The following medication was given:     MEDICATION:  Botox  ROUTE: administered by physician - bladder wall   SITE: administered by physician - bladder wall    DOSE: 200 units  LOT #: [id number]  : Kybernesis  EXPIRATION DATE:   NDC#: [id number]   Was there drug waste? No    Prior to injection, verified patient identity using patient's name and date of birth.  Due to injection administration, patient instructed to remain in clinic for 15 minutes  afterwards, and to report any adverse reaction to me immediately.    Drug Amount Wasted:  None.  Vial/Syringe: Single dose vial    Prior to med admin, verified patient identity using patient's name and date of birth.  Due to med administration, patient instructed to remain in clinic for 15 minutes  afterwards, and to report any adverse reaction to me immediately.    NELLI BAIRD, EMT  2/15/2021  3:31 PM

## 2021-02-15 NOTE — PROGRESS NOTES
Cystoscopy Procedure Note    PRE-PROCEDURE DIAGNOSIS: neurogenic bladder     POST-PROCEDURE DIAGNOSIS: neurogenic bladder     PROCEDURE: Cystoscopy with Botox Injection, bladder biopsy    HISTORY: Josiah Crump is a 55-year-old man with spinal cord injury and right hip disarticulation who had an indwelling urethral Shah catheter and developed ventral urethral erosion who is now s/p suprapubic tube placement and second stage urethroplasty for meatal erosion on 7/3/2020 with botox injection to bladder (200u). During cystoscopy, he was noted to have 2 large false passes in the posterior wall of his prostatic urethra which would make catheterization difficult. We have been chanign his SPT as CIC was unsuccessful, possibly secondary to a false pass. At the last visit on 12/7/2020, we did not see a significant false pass and were able to CIC. He has been CIC'ing 2-3x a day, leaving SPT to drainage at night time as he is starting to have some leakage per urethra. Continues on ditropan.     He is having IPP replaced on 2/22 (non-functional) by [facility], operated by [facility].    DESCRIPTION OF PROCEDURE:  We confirmed pre-operative antibiotics were started. After informed consent was obtained, the patient was brought to the procedure room where they were placed in the modified lithotomy position with all pressure points well padded. The patient was prepped and draped in a sterile fashion.     A flexible cystoscope was introduced through a well-lubricated urethra. There were no urethral strictures or false passes. At the prostate there were two shallow false passes, to the left and to the right of midline. The bladder was small with severe trabeculations with no discernable large lesion though cystitis was noted throughout increased at the bladder neck. We took a biopsy at that site. The urine was clear. We then mixed 200u of Botox in 10ml of NS and performed injections using a template in 10 areas. There was good hemostasis. The scope  was then removed. The patient then catheterized with a 14Fr olive tip cath to empty the bladder without any issues The patient tolerated the procedure well. The SPT was deflated and removed. The patient tolerated the procedure well.    ASSESSMENT AND PLAN:  #Neurogenic bladder  - CIC q2-3 hours, will use condom cath at night for leakage, CIC once at night  - Follow up bladder biopsy  - Return to clinic in 6 months for repeat Botox  - Yearly renal ultrasound (next 12/21)    #Health Maintenance   - PSA at next appointment (ordered)    Lorri Frank MD  Reconstructive Urology

## 2021-02-15 NOTE — LETTER
2/15/2021       RE: Josiah Crump  [address]  [address]  [place] [address]     Dear Colleague,    Thank you for referring your patient, Josiah Crump, to the [facility] [place] at [facility]. Please see a copy of my visit note below.    Cystoscopy Procedure Note    PRE-PROCEDURE DIAGNOSIS: neurogenic bladder     POST-PROCEDURE DIAGNOSIS: neurogenic bladder     PROCEDURE: Cystoscopy with Botox Injection, bladder biopsy    HISTORY: Josiah Crump is a 55-year-old man with spinal cord injury and right hip disarticulation who had an indwelling urethral Shah catheter and developed ventral urethral erosion who is now s/p suprapubic tube placement and second stage urethroplasty for meatal erosion on 7/3/2020 with botox injection to bladder (200u). During cystoscopy, he was noted to have 2 large false passes in the posterior wall of his prostatic urethra which would make catheterization difficult. We have been chanign his SPT as CIC was unsuccessful, possibly secondary to a false pass. At the last visit on 12/7/2020, we did not see a significant false pass and were able to CIC. He has been CIC'ing 2-3x a day, leaving SPT to drainage at night time as he is starting to have some leakage per urethra. Continues on ditropan.     He is having IPP replaced on 2/22 (non-functional) by [facility].    DESCRIPTION OF PROCEDURE:  We confirmed pre-operative antibiotics were started. After informed consent was obtained, the patient was brought to the procedure room where they were placed in the modified lithotomy position with all pressure points well padded. The patient was prepped and draped in a sterile fashion.     A flexible cystoscope was introduced through a well-lubricated urethra. There were no urethral strictures or false passes. At the prostate there were two shallow false passes, to the left and to the right of midline. The  bladder was small with severe trabeculations with no discernable large lesion though cystitis was noted throughout increased at the bladder neck. We took a biopsy at that site. The urine was clear. We then mixed 200u of Botox in 10ml of NS and performed injections using a template in 10 areas. There was good hemostasis. The scope was then removed. The patient then catheterized with a 14Fr olive tip cath to empty the bladder without any issues The patient tolerated the procedure well. The SPT was deflated and removed. The patient tolerated the procedure well.    ASSESSMENT AND PLAN:  #Neurogenic bladder  - CIC q2-3 hours, will use condom cath at night for leakage, CIC once at night  - Follow up bladder biopsy  - Return to clinic in 6 months for repeat Botox  - Yearly renal ultrasound (next 12/21)    #Health Maintenance   - PSA at next appointment (ordered)    Lorri Frank MD  Reconstructive Urology

## 2021-02-16 LAB — COPATH REPORT: NORMAL

## 2021-02-18 ENCOUNTER — COMMUNICATION - HEALTHEAST (OUTPATIENT)
Dept: FAMILY MEDICINE | Facility: CLINIC | Age: 56
End: 2021-02-18

## 2021-02-18 ENCOUNTER — TELEPHONE (OUTPATIENT)
Dept: FAMILY MEDICINE | Facility: CLINIC | Age: 56
End: 2021-02-18

## 2021-02-18 DIAGNOSIS — Z01.818 PRE-OP EXAM: Primary | ICD-10-CM

## 2021-02-18 DIAGNOSIS — Z11.59 SCREENING FOR VIRAL DISEASE: ICD-10-CM

## 2021-02-18 PROCEDURE — U0005 INFEC AGEN DETEC AMPLI PROBE: HCPCS

## 2021-02-18 PROCEDURE — U0003 INFECTIOUS AGENT DETECTION BY NUCLEIC ACID (DNA OR RNA); SEVERE ACUTE RESPIRATORY SYNDROME CORONAVIRUS 2 (SARS-COV-2) (CORONAVIRUS DISEASE [COVID-19]), AMPLIFIED PROBE TECHNIQUE, MAKING USE OF HIGH THROUGHPUT TECHNOLOGIES AS DESCRIBED BY CMS-2020-01-R: HCPCS

## 2021-02-18 NOTE — TELEPHONE ENCOUNTER
Call received from Central Scheduling staff member asking if patient's pre-procedure COVID-19 test could be done while patient in car, as patient is in wheelchair.    Writer asked lab staff who stated this cannot be accommodated.  Writer relayed this information to Central Scheduling staff member and reviewed there is street side handicap parking spots and lower level of ramp-take sharp left upon entering ramp-is where elevator access is located.    RADHA KimN, RN  [facility]

## 2021-02-19 ENCOUNTER — COMMUNICATION - HEALTHEAST (OUTPATIENT)
Dept: FAMILY MEDICINE | Facility: CLINIC | Age: 56
End: 2021-02-19

## 2021-02-19 LAB
LABORATORY COMMENT REPORT: NORMAL
SARS-COV-2 RNA RESP QL NAA+PROBE: NEGATIVE
SARS-COV-2 RNA RESP QL NAA+PROBE: NORMAL
SPECIMEN SOURCE: NORMAL
SPECIMEN SOURCE: NORMAL

## 2021-02-19 ASSESSMENT — MIFFLIN-ST. JEOR: SCORE: 1916.73

## 2021-02-22 ENCOUNTER — SURGERY - HEALTHEAST (OUTPATIENT)
Dept: SURGERY | Facility: HOSPITAL | Age: 56
End: 2021-02-22

## 2021-02-22 ENCOUNTER — ANESTHESIA - HEALTHEAST (OUTPATIENT)
Dept: SURGERY | Facility: HOSPITAL | Age: 56
End: 2021-02-22

## 2021-03-15 ENCOUNTER — COMMUNICATION - HEALTHEAST (OUTPATIENT)
Dept: FAMILY MEDICINE | Facility: CLINIC | Age: 56
End: 2021-03-15

## 2021-03-15 ENCOUNTER — AMBULATORY - HEALTHEAST (OUTPATIENT)
Dept: NURSING | Facility: CLINIC | Age: 56
End: 2021-03-15

## 2021-03-16 ENCOUNTER — TELEPHONE (OUTPATIENT)
Dept: ENDOCRINOLOGY | Facility: CLINIC | Age: 56
End: 2021-03-16

## 2021-03-16 NOTE — TELEPHONE ENCOUNTER
Dr Martinez,    Can we add this patient onto the end of your 4/12 FTF clinic? Please advise. Thank you!

## 2021-03-16 NOTE — TELEPHONE ENCOUNTER
"Spoke w/ Pt: States this medication is not working, does not have a computer and needs a face to face appointment. Pt will get labs drawn and would like to schedule face to face visit. States he is willing to wait until face to face is available. Would like to try a more aggressive approach to his diagnosis as what is in place currently \"is not working\".  Provider notified.   CCs Notified.   Ly Howard RN on 3/16/2021 at 10:31 AM     RE    anastrozole (ARIMIDEX) 1 MG tablet        "

## 2021-03-16 NOTE — TELEPHONE ENCOUNTER
Health Call Center    Phone Message    May a detailed message be left on voicemail: yes     Reason for Call: Medication Question or concern regarding medication   Prescription Clarification  Name of Medication: anastrozole (ARIMIDEX) 1 MG tablet  Prescribing Provider: Haley Martinez   Pharmacy: [id number] [address]   What on the order needs clarification? Patient is requesting a call back from Dr. Martinez's Nurse to discuss medication. Patient states the medication is not working for him. Please call patient to advise.     Action Taken: Message routed to:  [facility] ([facility] ENDO    Travel Screening: Not Applicable

## 2021-03-17 ENCOUNTER — HOSPITAL ENCOUNTER (OUTPATIENT)
Dept: WOUND CARE | Facility: CLINIC | Age: 56
Discharge: HOME | End: 2021-03-17
Attending: PHYSICIAN ASSISTANT | Admitting: PHYSICIAN ASSISTANT
Payer: MEDICARE

## 2021-03-17 VITALS — TEMPERATURE: 98.1 F | SYSTOLIC BLOOD PRESSURE: 160 MMHG | HEART RATE: 87 BPM | DIASTOLIC BLOOD PRESSURE: 85 MMHG

## 2021-03-17 DIAGNOSIS — E66.9 OBESITY: Primary | ICD-10-CM

## 2021-03-17 DIAGNOSIS — L97.114: ICD-10-CM

## 2021-03-17 DIAGNOSIS — Z71.9 ENCOUNTER FOR CONSULTATION: ICD-10-CM

## 2021-03-17 DIAGNOSIS — E66.09 OBESITY DUE TO EXCESS CALORIES, UNSPECIFIED CLASSIFICATION, UNSPECIFIED WHETHER SERIOUS COMORBIDITY PRESENT: ICD-10-CM

## 2021-03-17 PROBLEM — L97.119: Status: RESOLVED | Noted: 2020-03-25 | Resolved: 2021-03-17

## 2021-03-17 PROBLEM — L97.119: Status: RESOLVED | Noted: 2017-10-31 | Resolved: 2021-03-17

## 2021-03-17 PROCEDURE — G0463 HOSPITAL OUTPT CLINIC VISIT: HCPCS

## 2021-03-17 PROCEDURE — 99213 OFFICE O/P EST LOW 20 MIN: CPT | Performed by: PHYSICIAN ASSISTANT

## 2021-03-17 RX ORDER — OXYCODONE HYDROCHLORIDE 5 MG/1
TABLET ORAL
COMMUNITY
Start: 2020-07-03 | End: 2021-08-18

## 2021-03-17 NOTE — PROGRESS NOTES
[facility]    ASSESSMENT:   1. obesity    PLAN/DISCUSSION:    1. Refer to medical weight loss team  2. Rut will discuss with Dr. Blackmon tomorrow to see if she has any recommendations for surgical referrals. She typically does not do any cosmetic work like Mr. Crump is requesting.     HISTORY OF PRESENT ILLNESS:   Josiah Crump is a 55 year old paraplegic male who is well-known to our clinic. On 5/23/19 underwent surgery for hip disarticulation and to remove a large amount of heterotopic bone, this was closed with a myocutaneous anterior flap. Fortunately this is nearly entirely healed today. He has a few scuff marks for which he applies barrier cream and requests more of this today. However, his main concern today is to discuss his excess abdominal tissue. He is interested in surgical management. Not currently working with any medical weight loss folks.     VITALS: BP (!) 160/85   Pulse 87   Temp 98.1  F (36.7  C)      PHYSICAL EXAM:  GENERAL: Patient is alert and oriented and in no acute distress  INTEGUMENTARY: incisions well healed, small amount of callus on right buttock      MDM: 20-29 minutes was spent on the day of visit reviewing previous chart notes, assessing the patient and developing the plan of care.     DEXTER JONES PA-C

## 2021-03-18 ENCOUNTER — TELEPHONE (OUTPATIENT)
Dept: WOUND CARE | Facility: CLINIC | Age: 56
End: 2021-03-18

## 2021-03-18 NOTE — TELEPHONE ENCOUNTER
Discussed patient request for plastic surgery with Dr. Blackmon for abdominal fat removal. Dr. Blackmon would not recommend elective surgery on patient for cosmetic reasons as the reason that he has excessive adipose tissue in this area is due to lack of abdominal wall muscle usage from being paraplegic and abdominal fat would reaccumulate. Patient to follow up with [facility]. Patient verbalized understanding.

## 2021-03-22 DIAGNOSIS — N31.9 NEUROGENIC BLADDER: ICD-10-CM

## 2021-03-22 DIAGNOSIS — N62 GYNECOMASTIA: ICD-10-CM

## 2021-03-22 DIAGNOSIS — Z83.3 FAMILY HISTORY OF DIABETES MELLITUS: ICD-10-CM

## 2021-03-22 LAB
ESTRADIOL SERPL-MCNC: 42 PG/ML (ref 6–50)
HBA1C MFR BLD: 5.7 % (ref 0–5.6)

## 2021-03-22 PROCEDURE — 36415 COLL VENOUS BLD VENIPUNCTURE: CPT | Performed by: STUDENT IN AN ORGANIZED HEALTH CARE EDUCATION/TRAINING PROGRAM

## 2021-03-22 PROCEDURE — 83036 HEMOGLOBIN GLYCOSYLATED A1C: CPT | Performed by: STUDENT IN AN ORGANIZED HEALTH CARE EDUCATION/TRAINING PROGRAM

## 2021-03-22 PROCEDURE — 84403 ASSAY OF TOTAL TESTOSTERONE: CPT | Mod: 90 | Performed by: STUDENT IN AN ORGANIZED HEALTH CARE EDUCATION/TRAINING PROGRAM

## 2021-03-22 PROCEDURE — 84154 ASSAY OF PSA FREE: CPT | Mod: 90 | Performed by: STUDENT IN AN ORGANIZED HEALTH CARE EDUCATION/TRAINING PROGRAM

## 2021-03-22 PROCEDURE — 84153 ASSAY OF PSA TOTAL: CPT | Mod: 90 | Performed by: STUDENT IN AN ORGANIZED HEALTH CARE EDUCATION/TRAINING PROGRAM

## 2021-03-22 PROCEDURE — 99000 SPECIMEN HANDLING OFFICE-LAB: CPT | Performed by: STUDENT IN AN ORGANIZED HEALTH CARE EDUCATION/TRAINING PROGRAM

## 2021-03-22 PROCEDURE — 82670 ASSAY OF TOTAL ESTRADIOL: CPT | Performed by: STUDENT IN AN ORGANIZED HEALTH CARE EDUCATION/TRAINING PROGRAM

## 2021-03-24 LAB
PSA FREE MFR SERPL: 8 %
PSA FREE SERPL-MCNC: 0.3 NG/ML
PSA SERPL-MCNC: 3.9 NG/ML (ref 0–4)
TESTOST SERPL-MCNC: 280 NG/DL (ref 240–950)

## 2021-03-25 ENCOUNTER — COMMUNICATION - HEALTHEAST (OUTPATIENT)
Dept: FAMILY MEDICINE | Facility: CLINIC | Age: 56
End: 2021-03-25

## 2021-03-25 DIAGNOSIS — N32.89 BLADDER SPASMS: ICD-10-CM

## 2021-03-25 DIAGNOSIS — I10 ESSENTIAL HYPERTENSION, BENIGN: ICD-10-CM

## 2021-03-29 ENCOUNTER — TELEPHONE (OUTPATIENT)
Dept: ENDOCRINOLOGY | Facility: CLINIC | Age: 56
End: 2021-03-29

## 2021-03-29 RX ORDER — OXYBUTYNIN CHLORIDE 15 MG/1
15 TABLET, EXTENDED RELEASE ORAL DAILY
Qty: 90 TABLET | Refills: 2 | Status: SHIPPED | OUTPATIENT
Start: 2021-03-29 | End: 2021-08-18

## 2021-03-29 NOTE — TELEPHONE ENCOUNTER
----- Message from Haley Martinez MD sent at 3/26/2021  9:18 PM CDT -----  Regarding: result and treatment plan  I reviewed your blood work result, for some reason, this time tesosterone low.  it may be better to spend time and discuss the treatment plan together. If you want, please make appt.     Haley Martinez MD

## 2021-03-29 NOTE — TELEPHONE ENCOUNTER
Please contact Josiah to schedule in first available FTF  clinc for Dr Martinez to discuss medication change . She could do an add on  4/12/21 at 4 PM if he can make it in . Tatyana Lopez RN on 3/29/2021 at 1:43 PM

## 2021-03-30 NOTE — TELEPHONE ENCOUNTER
CLINIC COORDINATOR SCHEDULING NOTES    CALL RESULT: LVM    APPT TYPE: UMP RETURN ENDOCRINE    PROVIDER: Michelle COLLIER APPT NEEDED: 4/12    ADDITIONAL NOTES: OK to add-on to end of 4/12 schedule at 4pm.

## 2021-04-05 ENCOUNTER — AMBULATORY - HEALTHEAST (OUTPATIENT)
Dept: NURSING | Facility: CLINIC | Age: 56
End: 2021-04-05

## 2021-04-12 ENCOUNTER — TELEPHONE (OUTPATIENT)
Dept: ENDOCRINOLOGY | Facility: CLINIC | Age: 56
End: 2021-04-12

## 2021-04-12 ENCOUNTER — RECORDS - HEALTHEAST (OUTPATIENT)
Dept: ADMINISTRATIVE | Facility: OTHER | Age: 56
End: 2021-04-12

## 2021-04-12 ENCOUNTER — OFFICE VISIT (OUTPATIENT)
Dept: ENDOCRINOLOGY | Facility: CLINIC | Age: 56
End: 2021-04-12
Payer: MEDICARE

## 2021-04-12 VITALS — SYSTOLIC BLOOD PRESSURE: 133 MMHG | DIASTOLIC BLOOD PRESSURE: 84 MMHG | HEART RATE: 96 BPM

## 2021-04-12 DIAGNOSIS — Z01.411 ENCOUNTER FOR GYNECOLOGICAL EXAMINATION WITH ABNORMAL FINDING: ICD-10-CM

## 2021-04-12 DIAGNOSIS — E29.1 HYPOGONADISM MALE: Primary | ICD-10-CM

## 2021-04-12 PROCEDURE — 99214 OFFICE O/P EST MOD 30 MIN: CPT | Performed by: INTERNAL MEDICINE

## 2021-04-12 RX ORDER — TESTOSTERONE CYPIONATE 200 MG/ML
100 INJECTION, SOLUTION INTRAMUSCULAR
Qty: 3 ML | Refills: 5 | Status: SHIPPED | OUTPATIENT
Start: 2021-04-12 | End: 2021-12-07

## 2021-04-12 RX ORDER — ANASTROZOLE 1 MG/1
1 TABLET ORAL DAILY
Qty: 90 TABLET | Refills: 3 | Status: SHIPPED | OUTPATIENT
Start: 2021-04-12 | End: 2021-08-18

## 2021-04-12 ASSESSMENT — PAIN SCALES - GENERAL: PAINLEVEL: NO PAIN (0)
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
## 2021-04-12 NOTE — TELEPHONE ENCOUNTER
[facility] Prior Authorization Team Request    Medication: Testosterone CYP 200mg/ml inj   Dosing: inject 0.5ml (100mg) into the muscle every 14 days  Qty: 3  Day Supply: 42  NDC (required for Medicaid members): [address]-37    Insurance   BIN: [id number]  PCN: [id number]  Grp: [id number]  ID: [id number]    CoverMyMeds Key (if applicable):     Additional documentation: Need a PA      Filling Pharmacy: [facility]    Phone Number: [phone]  Contact:    Pharmacy NPI (required for Medicaid members): [id number]
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
## 2021-04-18 ENCOUNTER — COMMUNICATION - HEALTHEAST (OUTPATIENT)
Dept: SCHEDULING | Facility: CLINIC | Age: 56
End: 2021-04-18

## 2021-04-19 ASSESSMENT — MIFFLIN-ST. JEOR: SCORE: 1853.23

## 2021-04-20 ENCOUNTER — SURGERY - HEALTHEAST (OUTPATIENT)
Dept: SURGERY | Facility: CLINIC | Age: 56
End: 2021-04-20

## 2021-04-20 ENCOUNTER — ANESTHESIA - HEALTHEAST (OUTPATIENT)
Dept: MEDSURG UNIT | Facility: CLINIC | Age: 56
End: 2021-04-20

## 2021-04-21 ENCOUNTER — COMMUNICATION - HEALTHEAST (OUTPATIENT)
Dept: FAMILY MEDICINE | Facility: CLINIC | Age: 56
End: 2021-04-21

## 2021-04-21 DIAGNOSIS — D50.9 IRON DEFICIENCY ANEMIA, UNSPECIFIED IRON DEFICIENCY ANEMIA TYPE: ICD-10-CM

## 2021-04-23 ENCOUNTER — AMBULATORY - HEALTHEAST (OUTPATIENT)
Dept: CARE COORDINATION | Facility: CLINIC | Age: 56
End: 2021-04-23

## 2021-04-23 DIAGNOSIS — N30.00 ACUTE CYSTITIS WITHOUT HEMATURIA: ICD-10-CM

## 2021-04-23 NOTE — TELEPHONE ENCOUNTER
Prior Authorization Approval    Authorization Effective Date: 1/23/2021  Authorization Expiration Date: 4/23/2022  Medication: testosterone cypionate (DEPOTESTOSTERONE) 200 MG/ML injection--APPROVED  Approved Dose/Quantity:   Reference #:     Insurance Company: [employer] Part D - Phone [phone] Fax [phone]  Expected CoPay:       CoPay Card Available:      Foundation Assistance Needed:    Which Pharmacy is filling the prescription (Not needed for infusion/clinic administered): [facility] [address] 4[address]  Pharmacy Notified: Yes  Patient Notified: Yes **Instructed pharmacy to notify patient when script is ready to /ship.**

## 2021-04-23 NOTE — TELEPHONE ENCOUNTER
PA Initiation    Medication: testosterone cypionate (DEPOTESTOSTERONE) 200 MG/ML injection   Insurance Company: [employer] Part D - Phone [phone] Fax [phone]  Pharmacy Filling the Rx: [facility] [place], [place] - [address] [place] [address] [phone]  Filling Pharmacy Phone: [phone]  Filling Pharmacy Fax: [phone]  Start Date: 4/23/2021

## 2021-04-26 ENCOUNTER — COMMUNICATION - HEALTHEAST (OUTPATIENT)
Dept: NURSING | Facility: CLINIC | Age: 56
End: 2021-04-26

## 2021-04-26 NOTE — TELEPHONE ENCOUNTER
Testosterone approved nothing else needed from provider Tatyana Lopez RN on 4/26/2021 at 8:43 AM

## 2021-04-27 ENCOUNTER — COMMUNICATION - HEALTHEAST (OUTPATIENT)
Dept: NURSING | Facility: CLINIC | Age: 56
End: 2021-04-27

## 2021-04-28 ENCOUNTER — AMBULATORY - HEALTHEAST (OUTPATIENT)
Dept: SURGERY | Facility: CLINIC | Age: 56
End: 2021-04-28

## 2021-04-28 ENCOUNTER — RECORDS - HEALTHEAST (OUTPATIENT)
Dept: ADMINISTRATIVE | Facility: OTHER | Age: 56
End: 2021-04-28

## 2021-04-28 DIAGNOSIS — Z11.59 ENCOUNTER FOR SCREENING FOR OTHER VIRAL DISEASES: ICD-10-CM

## 2021-05-06 ENCOUNTER — COMMUNICATION - HEALTHEAST (OUTPATIENT)
Dept: NURSING | Facility: CLINIC | Age: 56
End: 2021-05-06

## 2021-05-12 ENCOUNTER — COMMUNICATION - HEALTHEAST (OUTPATIENT)
Dept: FAMILY MEDICINE | Facility: CLINIC | Age: 56
End: 2021-05-12

## 2021-05-12 DIAGNOSIS — R25.2 SPASTICITY: ICD-10-CM

## 2021-05-13 ENCOUNTER — OFFICE VISIT - HEALTHEAST (OUTPATIENT)
Dept: FAMILY MEDICINE | Facility: CLINIC | Age: 56
End: 2021-05-13

## 2021-05-13 DIAGNOSIS — G82.20 PARAPLEGIA, UNSPECIFIED (H): ICD-10-CM

## 2021-05-13 DIAGNOSIS — Z86.73 HISTORY OF CVA (CEREBROVASCULAR ACCIDENT): ICD-10-CM

## 2021-05-13 DIAGNOSIS — I10 ESSENTIAL HYPERTENSION: ICD-10-CM

## 2021-05-13 DIAGNOSIS — T83.61XS: ICD-10-CM

## 2021-05-13 DIAGNOSIS — N62 GYNECOMASTIA: ICD-10-CM

## 2021-05-13 DIAGNOSIS — D50.9 IRON DEFICIENCY ANEMIA, UNSPECIFIED IRON DEFICIENCY ANEMIA TYPE: ICD-10-CM

## 2021-05-13 DIAGNOSIS — G56.03 BILATERAL CARPAL TUNNEL SYNDROME: ICD-10-CM

## 2021-05-13 LAB
ERYTHROCYTE [DISTWIDTH] IN BLOOD BY AUTOMATED COUNT: 21.9 % (ref 11–14.5)
HCT VFR BLD AUTO: 37.9 % (ref 40–54)
HGB BLD-MCNC: 11.1 G/DL (ref 14–18)
MCH RBC QN AUTO: 20.6 PG (ref 27–34)
MCHC RBC AUTO-ENTMCNC: 29.3 G/DL (ref 32–36)
MCV RBC AUTO: 70 FL (ref 80–100)
PLATELET # BLD AUTO: 273 THOU/UL (ref 140–440)
PMV BLD AUTO: 9.9 FL (ref 7–10)
RBC # BLD AUTO: 5.4 MILL/UL (ref 4.4–6.2)
WBC: 11.5 THOU/UL (ref 4–11)

## 2021-05-17 ENCOUNTER — COMMUNICATION - HEALTHEAST (OUTPATIENT)
Dept: FAMILY MEDICINE | Facility: CLINIC | Age: 56
End: 2021-05-17

## 2021-05-20 ENCOUNTER — RECORDS - HEALTHEAST (OUTPATIENT)
Dept: ADMINISTRATIVE | Facility: OTHER | Age: 56
End: 2021-05-20

## 2021-05-26 VITALS — HEART RATE: 93 BPM | DIASTOLIC BLOOD PRESSURE: 82 MMHG | SYSTOLIC BLOOD PRESSURE: 138 MMHG

## 2021-05-26 NOTE — TELEPHONE ENCOUNTER
RN cannot approve Refill Request    RN can NOT refill this medication med is not covered by policy/route to provider. Last office visit: 3/21/2019 Marvel Petit MD Last Physical: 3/6/2017 Last MTM visit: Visit date not found Last visit same specialty: 3/21/2019 Marvel Petit MD.  Next visit within 3 mo: Visit date not found  Next physical within 3 mo: Visit date not found      Krystyna Jackson, [facility] Connection Triage/Med Refill 3/23/2019    Requested Prescriptions   Pending Prescriptions Disp Refills     tolterodine (DETROL LA) 4 MG ER capsule [Pharmacy Med Name: TOLTERODINE TART ER 4MG CAPSULES] 60 capsule 0     Sig: TAKE 1-2 CAPSULE BY MOUTH DAILY    There is no refill protocol information for this order

## 2021-05-27 VITALS — HEART RATE: 102 BPM | DIASTOLIC BLOOD PRESSURE: 78 MMHG | SYSTOLIC BLOOD PRESSURE: 134 MMHG

## 2021-05-27 ASSESSMENT — PATIENT HEALTH QUESTIONNAIRE - PHQ9: SUM OF ALL RESPONSES TO PHQ QUESTIONS 1-9: 0

## 2021-05-27 NOTE — TELEPHONE ENCOUNTER
I left a message with the results for the patient.  I discussed Dr. Humphrey's comments recommendations etc. as well    Patient will be seeing me for preop sometime in the next couple months and we will review this again   no

## 2021-05-27 NOTE — TELEPHONE ENCOUNTER
"Late Entry: Called pt last night and he stated that [facility] [address] will \"try to transfer\" the medication to the [facility] in [place].     Encouraged pt to call back with additional questions. He stated understanding.   "

## 2021-05-27 NOTE — TELEPHONE ENCOUNTER
Patient has a mychart.     PT communication preferences: Mychart.     Message was sent postpone task will review to see if he read message within 3 days.

## 2021-05-27 NOTE — TELEPHONE ENCOUNTER
----- Message from Renato Humphrey MD sent at 3/26/2019 12:39 PM CDT -----  Dear Dr Petit    There are multiple causes for high prolactin. Starting with liver and kidney disease and going through long list of drugs and ending with multiple pituitary disorders. Usually pituitary tumors have levels 100 or above depending on the size of the tumor.    The pt has mildly elevated prolactin, has no headache or visual field defects/ symptoms to suggest pituitary disease.    He is on oxycodone which is the underlying cause for the high prolactin. He has refused medical or surgical treat ment for his gynecomastia. For all practical purposes, surgical treatment is the only effective treatment in this case. Medical is only helpful in early cases. At this stage , He does not need any further interference. No harm from the mildly elevated prolactin unless this is suppressing his testosterone and putting him at risk of osteoporosis. His testosterone is normal.    Renato Humphrey  ----- Message -----  From: Marvel Petit MD  Sent: 3/25/2019  12:25 PM  To: MD Dr. Kam Newton , you saw this patient back in August for gynecomastia.  He has persisting elevated prolactin level.  Is this significant, does he need further work up, would you like to see him back?

## 2021-05-27 NOTE — TELEPHONE ENCOUNTER
Medication Question or Clarification  Who is calling: Patient  What medication are you calling about ?: Zolpidem (AMBIEN) 10 mg tablet  What dose do you take ?:   How often are you taking the medication ?:   Who prescribed the medication ?:   What is your question/concern?: Pt requesting alternative medication -medication not working any more for patient.   Pharmacy:  [id number] - [place], [place] - [address]  [phone]  Okay to leave a detailed message?: No  Site CMT - Please call the pharmacy to obtain any additional needed information.

## 2021-05-27 NOTE — TELEPHONE ENCOUNTER
Controlled Substance Refill Request  Medication:   Requested Prescriptions     Pending Prescriptions Disp Refills     zolpidem (AMBIEN) 10 mg tablet [Pharmacy Med Name: ZOLPIDEM 10MG TABLETS] 30 tablet 0     Sig: TAKE 1 TABLET BY MOUTH EVERY NIGHT AT BEDTIME     Date Last Fill: 1/30/19  Pharmacy: [id number]   Submit electronically to pharmacy  Controlled Substance Agreement on File:   Encounter-Level CSA Scan Date - 07/26/2017:    Scan on 8/1/2017  6:29 AM (below)             Encounter-Level CSA Scan Date - 05/31/2016:    Scan on 6/6/2016  2:51 PM (below)         Last office visit: Last office visit pertaining to requested medication was 3/21/19.

## 2021-05-27 NOTE — TELEPHONE ENCOUNTER
Let us change to trazodone 25 mg 1 at bedtime number 30 tablets refill x2.    I sent this prescription to the pharmacy

## 2021-05-27 NOTE — PROGRESS NOTES
Subjective: Patient comes in for follow-up evaluation.    Patient has continued to see the orthopedic surgeon Dr. parada at [facility] also will be having a plastic surgeon Dr. Charlotte Blackmon involved in the right hip disarticulation and then closure.    Please see previous discussion she has had a chronic right decubitus ulcer.    Patient does have a new wheelchair now.  He will be getting that refitted once he has the surgery    Preop evaluation on 4/2/2019 over at [facility] surgeries on 4/29/2019.  He will be checking with him regarding the need for a preop here at the clinic.    Patient had his left shoulder injected at [facility] he is doing better there he has a left supraspinatus tendinopathy.    He has the unilateral gynecomastia.  I had seen him had lab work he went on for mammogram which was negative, went on to see endocrine this summer, August 2018, Dr. Humphrey    I reviewed the report.    Patient is not interested in any surgery at this time.    He did have the elevated prolactin level on a couple occasions I did recheck that today as well as AFP and hCG please see below.    Otherwise is been stable he is no longer getting pain medication    Tobacco status: He  reports that  has never smoked. he has never used smokeless tobacco.    Patient Active Problem List    Diagnosis Date Noted     Gynecomastia 08/17/2018     Hx of BKA, right (H) 04/09/2018     Right shoulder strain 12/30/2014     Ulcer of right thigh, unspecified ulcer stage (H) 10/26/2014     Hydrocele 07/16/2014     Infected penile implant (H) 07/16/2014     Paraplegia Of Unknown Etiology      Pressure ulcer      Tenosynovitis Of The Left Wrist      Neurogenic Bladder      Vitamin D Deficiency      Benign Essential Hypertension      Chronic Pain      Epicondylitis      Insomnia        Current Outpatient Medications   Medication Sig Dispense Refill     baclofen (LIORESAL) 20 MG tablet TAKE 1 TABLET BY MOUTH FOUR TIMES  DAILY 120 tablet 6     ferrous sulfate 325 (65 FE) MG tablet 1 po two times a day 60 tablet 5     food supplemt, lactose-reduced (ENSURE HIGH PROTEIN) Liqd 1 can po qd 30 Can 3     metoprolol succinate (TOPROL-XL) 50 MG 24 hr tablet Take 1 tablet (50 mg total) by mouth daily. 90 tablet 3     tolterodine (DETROL LA) 4 MG ER capsule TAKE 2 CAPSULES BY MOUTH DAILY 180 capsule 2     zolpidem (AMBIEN) 10 mg tablet TAKE 1 TABLET BY MOUTH EVERY NIGHT AT BEDTIME 30 tablet 0     ergocalciferol (ERGOCALCIFEROL) 50,000 unit capsule Take 1 tabs every 10 days. 6 capsule 1     tolterodine (DETROL LA) 4 MG ER capsule TAKE 1-2 CAPSULE BY MOUTH DAILY 60 capsule 3     Current Facility-Administered Medications   Medication Dose Route Frequency Provider Last Rate Last Dose     lidocaine 2 % jelly (XYLOCAINE)   Topical PRN Gabriele Brown MD   1 application at 03/21/17 1312       ROS: 10 point review of systems positive as outlined otherwise negative    Objective:    /70 (Patient Site: Left Arm, Patient Position: Sitting, Cuff Size: Adult Large)   Pulse 91   Resp 22   SpO2 98%   There is no height or weight on file to calculate BMI.      General appearance no acute distress    Shoulder with better movement on the left    Neck negative oropharynx clear    Lungs clear no rales or rhonchi heart regular rate at 90 O2 sat 98%    Right sided gynecomastia slightly smaller than previous.    Results for orders placed or performed in visit on 03/21/19   Beta-hCG Tumor Marker (HCGTM)   Result Value Ref Range    Beta-HCG Tumor Marker <3 <5 IU/L   AFP-Tumor Marker   Result Value Ref Range    AFP-Tumor Marker <2.0 <=8.5 ug/mL   Prolactin   Result Value Ref Range    Prolactin 25.5 (H) 0.0 - 15.0 ng/mL       Assessment:  1. Gynecomastia  Beta-hCG Tumor Marker (HCGTM)    AFP-Tumor Marker    Prolactin   2. Ulcer of right thigh, unspecified ulcer stage (H)     3. Paraplegia Of Unknown Etiology     4. Elevated prolactin level (H)       Lab work  showed increasing prolactin level will contact Dr. Humphrey regarding any further workup regarding that.  He has had a normal testosterone and other lab tests were normal    Right ulcer chronic, paraplegia    We will plan for right hip disarticulation later in April    Likely will need a preop here sometime in the middle of April    Plan: As outlined above    This transcription uses voice recognition software, which may contain typographical errors.

## 2021-05-27 NOTE — TELEPHONE ENCOUNTER
Patient read [employer] message     Last read by Josiah Crump Sr. at 12:50 PM on 4/1/2019.       No response.

## 2021-05-27 NOTE — TELEPHONE ENCOUNTER
Medication Question or Clarification  Who is calling: Patient  What medication are you calling about ?: Zolpidem (AMBIEN) 10 mg tablet  What dose do you take ?:   How often are you taking the medication ?:   Who prescribed the medication ?:   What is your question/concern?: Pt requesting alternative medication -medication not working any more for patient .   Pharmacy:  [id number] - [place], [place] - [address]  [phone]  Okay to leave a detailed message?: No  Site CMT - Please call the pharmacy to obtain any additional needed information.

## 2021-05-27 NOTE — TELEPHONE ENCOUNTER
Spoke with Dr Petit - Pt's Prolactin has continued to rise and He would like to know what the plan is - no clear direction in Dr Humphrey's note except that he didn't need to see the patient again.  If Dr Humphrey cannot provide an answer, I told Dr Petit that we would escalate to another Endo to get an answer for him. He was satisfied with this plan.

## 2021-05-28 NOTE — TELEPHONE ENCOUNTER
Refill Approved    Rx renewed per Medication Renewal Policy. Medication was last renewed on 4/14/2018.         Luís Delaney, [facility] Connection Triage/Med Refill 4/29/2019     Requested Prescriptions   Pending Prescriptions Disp Refills     metoprolol succinate (TOPROL-XL) 50 MG 24 hr tablet [Pharmacy Med Name: METOPROLOL ER SUCCINATE 50MG TABS] 90 tablet 0     Sig: TAKE 1 TABLET(50 MG) BY MOUTH DAILY       Beta-Blockers Refill Protocol Passed - 4/29/2019  2:24 PM        Passed - PCP or prescribing provider visit in past 12 months or next 3 months     Last office visit with prescriber/PCP: 3/21/2019 Marvel Petit MD OR same dept: 3/21/2019 Marvel Petit MD OR same specialty: 3/21/2019 Marvel Petit MD  Last physical: 3/6/2017 Last MTM visit: Visit date not found   Next visit within 3 mo: Visit date not found  Next physical within 3 mo: Visit date not found  Prescriber OR PCP: Marvel Petit MD  Last diagnosis associated with med order: 1. HTN (hypertension)  - metoprolol succinate (TOPROL-XL) 50 MG 24 hr tablet [Pharmacy Med Name: METOPROLOL ER SUCCINATE 50MG TABS]; TAKE 1 TABLET(50 MG) BY MOUTH DAILY  Dispense: 90 tablet; Refill: 0    If protocol passes may refill for 12 months if within 3 months of last provider visit (or a total of 15 months).             Passed - Blood pressure filed in past 12 months     BP Readings from Last 1 Encounters:   03/21/19 114/70

## 2021-05-28 NOTE — TELEPHONE ENCOUNTER
Called and spoke to Josee at [facility]. Forms were refaxed and are on provider's desk.

## 2021-05-28 NOTE — TELEPHONE ENCOUNTER
Orders being requested: Incontinent  supplies   Reason service is needed/diagnosis: Patient is calling stating I am out of these supplies and I need them ASAP  When are orders needed by: ASAP  Where to send Orders: [facility] medical supplier is faxing order over today.   Okay to leave detailed message?  Yes

## 2021-05-29 NOTE — TELEPHONE ENCOUNTER
Fax received from [facility] pharmacy requesting Prior Authorization    Medication Name:   tolterodine (DETROL LA) 4 MG ER capsule 180 capsule 3 5/28/2019     Sig: TAKE 1-2 CAPSULE BY MOUTH DAILY    Sent to pharmacy as: tolterodine (DETROL LA) 4 MG ER capsule          Insurance Plan: Medicare Part D   PBM: Kim   Patient ID Number: [id number]    Please start PA process

## 2021-05-29 NOTE — TELEPHONE ENCOUNTER
Central PA team  [phone]  Pool: HE PA MED ([id number])    PA has been initiated.       PA form completed and faxed insurance via Cover My Meds     Key:  [id number] - PA Case ID: [id number]      Medication:  Tolterodine Tartrate ER 4MG er capsules    Insurance:  [employer] Electronic PA Form        Response will be received via fax and may take up to 5-10 business days depending on plan

## 2021-05-29 NOTE — TELEPHONE ENCOUNTER
RN cannot approve Refill Request    RN can NOT refill this medication Protocol failed and NO refill given. Last office visit: 3/21/2019 Marvel Petit MD Last Physical: 3/6/2017 Last MTM visit: Visit date not found Last visit same specialty: 3/21/2019 Marvel Petit MD.  Next visit within 3 mo: Visit date not found  Next physical within 3 mo: Visit date not found      Anastasiya Vieira, [facility] Triage/Med Refill 5/26/2019    Requested Prescriptions   Pending Prescriptions Disp Refills     ferrous sulfate 325 (65 FE) MG tablet [Pharmacy Med Name: FERROUS SULFATE 325MG (5GR) TABS] 60 tablet 0     Sig: TAKE 1 TABLET BY MOUTH TWICE DAILY       Iron Supplements Refill Protocol Passed - 5/24/2019  5:09 AM        Passed - PCP or prescribing provider visit in past 12 months       Last office visit with prescriber/PCP: 3/21/2019 Marvel Petit MD OR same dept: 3/21/2019 Marvel Petit MD OR same specialty: 3/21/2019 Marvel Petit MD  Last physical: 3/6/2017 Last MTM visit: Visit date not found   Next visit within 3 mo: Visit date not found  Next physical within 3 mo: Visit date not found  Prescriber OR PCP: Marvel Petit MD  Last diagnosis associated with med order: 1. Anemia  - ferrous sulfate 325 (65 FE) MG tablet [Pharmacy Med Name: FERROUS SULFATE 325MG (5GR) TABS]; TAKE 1 TABLET BY MOUTH TWICE DAILY  Dispense: 60 tablet; Refill: 0    2. Spasticity  - baclofen (LIORESAL) 20 MG tablet [Pharmacy Med Name: BACLOFEN 20MG TABLETS]; TAKE 1 TABLET BY MOUTH FOUR TIMES DAILY  Dispense: 360 tablet; Refill: 6    3. Neurogenic bladder  - tolterodine (DETROL LA) 4 MG ER capsule [Pharmacy Med Name: TOLTERODINE TART ER 4MG CAPSULES]; TAKE 1-2 CAPSULE BY MOUTH DAILY  Dispense: 180 capsule; Refill: 3    4. Insomnia, unspecified type  - traZODone (DESYREL) 50 MG tablet [Pharmacy Med Name: TRAZODONE 50MG TABLETS]; TAKE 1 TABLET BY MOUTH AT BEDTIME  Dispense: 90 tablet; Refill: 2    If protocol passes may refill for  12 months if within 3 months of last provider visit (or a total of 15 months).             Passed - Hemoglobin or Hematocrit in last 12 months     Hemoglobin   Date Value Ref Range Status   10/23/2018 8.9 (L) 14.0 - 18.0 g/dL Final     Hematocrit   Date Value Ref Range Status   10/23/2018 32.1 (L) 40.0 - 54.0 % Final             baclofen (LIORESAL) 20 MG tablet [Pharmacy Med Name: BACLOFEN 20MG TABLETS] 360 tablet 6     Sig: TAKE 1 TABLET BY MOUTH FOUR TIMES DAILY       There is no refill protocol information for this order        tolterodine (DETROL LA) 4 MG ER capsule [Pharmacy Med Name: TOLTERODINE TART ER 4MG CAPSULES] 180 capsule 3     Sig: TAKE 1-2 CAPSULE BY MOUTH DAILY       There is no refill protocol information for this order        traZODone (DESYREL) 50 MG tablet [Pharmacy Med Name: TRAZODONE 50MG TABLETS] 90 tablet 2     Sig: TAKE 1 TABLET BY MOUTH AT BEDTIME       Tricyclics/Misc Antidepressant/Antianxiety Meds Refill Protocol Passed - 5/24/2019  5:09 AM        Passed - PCP or prescribing provider visit in last year     Last office visit with prescriber/PCP: 3/21/2019 Marvel Petit MD OR same dept: 3/21/2019 Marvel Petit MD OR same specialty: 3/21/2019 Marvel Petit MD  Last physical: 3/6/2017 Last MTM visit: Visit date not found   Next visit within 3 mo: Visit date not found  Next physical within 3 mo: Visit date not found  Prescriber OR PCP: Marvel Petit MD  Last diagnosis associated with med order: 1. Anemia  - ferrous sulfate 325 (65 FE) MG tablet [Pharmacy Med Name: FERROUS SULFATE 325MG (5GR) TABS]; TAKE 1 TABLET BY MOUTH TWICE DAILY  Dispense: 60 tablet; Refill: 0    2. Spasticity  - baclofen (LIORESAL) 20 MG tablet [Pharmacy Med Name: BACLOFEN 20MG TABLETS]; TAKE 1 TABLET BY MOUTH FOUR TIMES DAILY  Dispense: 360 tablet; Refill: 6    3. Neurogenic bladder  - tolterodine (DETROL LA) 4 MG ER capsule [Pharmacy Med Name: TOLTERODINE TART ER 4MG CAPSULES]; TAKE 1-2 CAPSULE BY MOUTH  DAILY  Dispense: 180 capsule; Refill: 3    4. Insomnia, unspecified type  - traZODone (DESYREL) 50 MG tablet [Pharmacy Med Name: TRAZODONE 50MG TABLETS]; TAKE 1 TABLET BY MOUTH AT BEDTIME  Dispense: 90 tablet; Refill: 2    If protocol passes may refill for 12 months if within 3 months of last provider visit (or a total of 15 months).
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
## 2021-05-31 NOTE — TELEPHONE ENCOUNTER
Who is calling:  Patient  Reason for Call:  Patient is trying to pick-up his catheter supplies this morning.  Please see scan from 7/31/19. The way this order is written is only for 1 month with no PRN.   Jb Santana needs 1 year of orders so patient can pick-up:  1 muhammad catheter 16 Nepalese, 1 muhammad insertion tray, 1 leg bag and 1 drain bag each month. Please send the above supplies with 11 refills and 1 PRN  Date of last appointment with primary care: 8/19/19  Okay to leave a detailed message: Yes, please reach out to patient once refills are sent, [phone]

## 2021-05-31 NOTE — TELEPHONE ENCOUNTER
Orders being requested: Muhammad catheter 16 Romanian, muhammad insertion tray, 1 leg bag and 1 drain bag, 1 PRN plus 11 months of refills.  Reason service is needed/diagnosis: Neurogenic bladder  When are orders needed by: As soon as possible  Where to send Orders: Fax: [phone]  Okay to leave detailed message?  Yes, Katie from [facility], [phone], option 3

## 2021-05-31 NOTE — TELEPHONE ENCOUNTER
Called and spoke with patient and informed him of Dr. Petit's message. No further questions.   Closing encounter.

## 2021-05-31 NOTE — TELEPHONE ENCOUNTER
Dr. Petit patient asking order for 11 refills and 1 prn for his caths, you provided scrip for one month, ok with this order?

## 2021-05-31 NOTE — TELEPHONE ENCOUNTER
----- Message from Marvel Petit MD sent at 8/20/2019  7:44 AM CDT -----  Please contact this patient, let him know that the hemoglobin is up to 12.7.  Stay on the iron once a day and will recheck in 4 weeks when he follows up again

## 2021-05-31 NOTE — PROGRESS NOTES
Subjective: This patient comes in for follow-up he was here back 3 weeks ago.  He has chronic pain from his paraplegia and recent disarticulation of right hip.    He did see the plastic surgeon, Dr. Blackmon, there is a small area left which is healing in secondarily no sign of infection.  Patient's been using some calmoseptine for this.    Regarding pain he was on the oxycodone 5 mg taking 6 a day for 7 days 5 a day for 7 days then for a day for an additional 7 days.  Follows up now he states he is got about 15 tablets left.    I discussed further weaning he will be on for a day for 7 days 3 a day for 14 days then 2 a day for 7 days and recheck again in 4 weeks.    Back on 7/18/2019 patient had hemoglobin 10.9 he is on iron once a day I did recheck that day    Also normal renal function at that time with GFR over 90.    Regarding his sleep he does not feel like the trazodone is been helping much we discussed options will use some over-the-counter Tylenol PM as well as melatonin 5 mg.    He did get a part-time job working at [employer].    He also has a new "[employer] seating cushion, and back cushion for his wheelchair.    Tobacco status: He  reports that he has never smoked. He has never used smokeless tobacco.    Patient Active Problem List    Diagnosis Date Noted     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     Gynecomastia 08/17/2018     Hx of BKA, right (H) 04/09/2018     Right shoulder strain 12/30/2014     Ulcer of right thigh, unspecified ulcer stage (H) 10/26/2014     Hydrocele 07/16/2014     Infected penile implant (H) 07/16/2014     Paraplegia Of Unknown Etiology      Pressure ulcer      Tenosynovitis Of The Left Wrist      Neurogenic Bladder      Vitamin D Deficiency      Benign Essential Hypertension      Chronic Pain      Epicondylitis      Insomnia        Current Outpatient Medications   Medication Sig Dispense Refill     baclofen (LIORESAL) 20 MG tablet TAKE 1 TABLET BY MOUTH FOUR TIMES DAILY 360  tablet 6     ferrous sulfate 325 (65 FE) MG tablet TAKE 1 TABLET BY MOUTH TWICE DAILY 60 tablet 0     food supplemt, lactose-reduced (ENSURE HIGH PROTEIN) Liqd 1 can po qd 30 Can 3     hydrOXYzine HCl (ATARAX) 25 MG tablet 1-2 po three times a day prn pain 90 tablet 1     metoprolol succinate (TOPROL-XL) 50 MG 24 hr tablet TAKE 1 TABLET(50 MG) BY MOUTH DAILY 90 tablet 3     oxyCODONE (ROXICODONE) 5 MG immediate release tablet 4 per day for 7 days then 3 per day for14 days then 2 per day for 7 days 84 tablet 0     tolterodine (DETROL LA) 4 MG ER capsule TAKE 1-2 CAPSULE BY MOUTH DAILY 180 capsule 3     Current Facility-Administered Medications   Medication Dose Route Frequency Provider Last Rate Last Dose     lidocaine 2 % jelly (XYLOCAINE)   Topical PRN Gabriele Brown MD   1 application at 03/21/17 1312       ROS:   10 point review of systems positive as outlined above otherwise negative    Objective:    /76 (Patient Site: Left Arm, Patient Position: Sitting, Cuff Size: Adult Large)   Pulse 78   Resp 18   There is no height or weight on file to calculate BMI.      General appearance no acute distress at rest vital signs are stable  HEENT was react normally extract movements full neck negative no adenopathy oropharynx clear  Lungs are clear no rales or rhonchi heart was regular rate and 70s    By history does have a small open area there is still see healing in secondarily from his surgery please see above    No significant swelling in through the left leg.    Skin otherwise normal    He feels like his shoulder is doing a little better now also he states he does take glucosamine.        Assessment:  1. History of disarticulation of right hip  oxyCODONE (ROXICODONE) 5 MG immediate release tablet   2. Paraplegia Of Unknown Etiology     3. Neurogenic bladder     4. Benign Essential Hypertension     5. Anemia, unspecified type  HM2(CBC w/o Differential)     History of disarticulation of right hip with flap  closure healing as outlined.  I think he follows up in 1 month with Dr. Blackmon.  I did review her consultation from 8/15/2019.    Neurogenic bladder patient needs some ongoing supplies this was prescribed today.    Hypertension controlled    Anemia await CBC    Pain control please see above  Plan: Recheck in 4 weeks    This transcription uses voice recognition software, which may contain typographical errors.

## 2021-06-01 ENCOUNTER — RECORDS - HEALTHEAST (OUTPATIENT)
Dept: ADMINISTRATIVE | Facility: CLINIC | Age: 56
End: 2021-06-01

## 2021-06-01 NOTE — TELEPHONE ENCOUNTER
RN cannot approve Refill Request    RN can NOT refill this medication med is not covered by policy/route to provider. Last office visit: 9/18/2019 Marvel Petit MD Last Physical: 3/6/2017 Last MTM visit: Visit date not found Last visit same specialty: 9/18/2019 Marvel Petit MD.  Next visit within 3 mo: Visit date not found  Next physical within 3 mo: Visit date not found      Krystyna Jackson, [facility] Triage/Med Refill 9/21/2019    Requested Prescriptions   Pending Prescriptions Disp Refills     baclofen (LIORESAL) 20 MG tablet [Pharmacy Med Name: BACLOFEN 20MG TABLETS] 120 tablet 0     Sig: TAKE 1 TABLET BY MOUTH FOUR TIMES DAILY       There is no refill protocol information for this order

## 2021-06-01 NOTE — PROGRESS NOTES
Subjective: This patient comes in for follow-up.  He had a hip disarticulation he does follow again with Dr. Blackmon the plastic surgeon.    Back on 8/19/2019 hemoglobin is 12.7 he is can stop the iron we will see him back in 4 to 6 weeks recheck CBC    Regarding spasticity he is run out of his baclofen he needs to pick that up today has had some increased symptoms.    Patient also has chronic pain with this but has improved and were weaning him off the oxycodone.  Last visit on 8/19/2019 we had him take the oxycodone 5 mg for a day for 7 days then 3 a day for 14 days then 2 a day for 7 days.  He is presently on 2 a day.    I discussed further weaning he will be on 2 a day for 2 weeks and then 1 a day for 2 weeks and then stop.    Patient does have some ongoing shoulder problems he does have the manual wheelchair.  He feels like it does give him a good workout and is good for him physically but he is wondering if it is too much on his shoulders.  He is contemplating getting scooter.    For now he will keep it with a manual wheelchair.    I discussed getting a flu shot he will plan to recheck in mid October and get it at that point.    No additional concerns or issues    Tobacco status: He  reports that he has never smoked. He has never used smokeless tobacco.    Patient Active Problem List    Diagnosis Date Noted     Spasticity 09/18/2019     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     Gynecomastia 08/17/2018     Hx of BKA, right (H) 04/09/2018     Right shoulder strain 12/30/2014     Ulcer of right thigh, unspecified ulcer stage (H) 10/26/2014     Hydrocele 07/16/2014     Infected penile implant (H) 07/16/2014     Paraplegia Of Unknown Etiology      Pressure ulcer      Tenosynovitis Of The Left Wrist      Neurogenic Bladder      Vitamin D Deficiency      Benign Essential Hypertension      Chronic Pain      Epicondylitis      Insomnia        Current Outpatient Medications   Medication Sig Dispense Refill      baclofen (LIORESAL) 20 MG tablet TAKE 1 TABLET BY MOUTH FOUR TIMES DAILY 360 tablet 6     metoprolol succinate (TOPROL-XL) 50 MG 24 hr tablet TAKE 1 TABLET(50 MG) BY MOUTH DAILY 90 tablet 3     oxyCODONE (ROXICODONE) 5 MG immediate release tablet 2 po daily for 14 days then 1 po daily for 14 days 42 tablet 0     tolterodine (DETROL LA) 4 MG ER capsule TAKE 1-2 CAPSULE BY MOUTH DAILY 180 capsule 3     Current Facility-Administered Medications   Medication Dose Route Frequency Provider Last Rate Last Dose     lidocaine 2 % jelly (XYLOCAINE)   Topical PRN Gabriele Brown MD   1 application at 03/21/17 1312       ROS:   10 point review of systems positive as outlined above otherwise negative    Objective:    /80 (Patient Site: Right Arm, Patient Position: Sitting, Cuff Size: Adult Large)   Pulse 94   Wt 215 lb (97.5 kg) Comment: per pt  BMI 26.87 kg/m    Body mass index is 26.87 kg/m .      General appearance no acute distress    HEENT neck supple oropharynx clear pupils react normally.    Lungs clear no rales or rhonchi, heart was regular S1-S2    He denies any abdominal pain    He is having no further oozing from any of the incisions.    No peripheral edema..    CBC from 8/19/2019 noted        Results for orders placed or performed in visit on 08/19/19   HM2(CBC w/o Differential)   Result Value Ref Range    WBC 14.2 (H) 4.0 - 11.0 thou/uL    RBC 6.55 (H) 4.40 - 6.20 mill/uL    Hemoglobin 12.7 (L) 14.0 - 18.0 g/dL    Hematocrit 45.6 40.0 - 54.0 %    MCV 70 (L) 80 - 100 fL    MCH 19.4 (L) 27.0 - 34.0 pg    MCHC 27.9 (L) 32.0 - 36.0 g/dL    RDW 23.9 (H) 11.0 - 14.5 %    Platelets 410 140 - 440 thou/uL       Assessment:  1. History of disarticulation of right hip  oxyCODONE (ROXICODONE) 5 MG immediate release tablet   2. Benign Essential Hypertension     3. Paraplegia Of Unknown Etiology     4. Neurogenic bladder     5. Chronic Pain     6. Spasticity       History of disarticulation right hip.  Please see  pain control.  Wean off the oxycodone over the next 4 weeks    Hypertension controlled continue metoprolol    Baclofen for the spasticity refill    Ongoing Detrol for neurogenic bladder.    Does have follow-up with plastic surgery.    Follow-up here in a month to get a flu shot    Also check on iron ferritin and CBC he will be off the iron.    Plan: As outlined above    This transcription uses voice recognition software, which may contain typographical errors.

## 2021-06-03 VITALS — SYSTOLIC BLOOD PRESSURE: 138 MMHG | DIASTOLIC BLOOD PRESSURE: 78 MMHG | TEMPERATURE: 98.3 F | HEART RATE: 72 BPM

## 2021-06-03 VITALS
HEART RATE: 94 BPM | WEIGHT: 215 LBS | BODY MASS INDEX: 26.17 KG/M2 | DIASTOLIC BLOOD PRESSURE: 80 MMHG | SYSTOLIC BLOOD PRESSURE: 128 MMHG

## 2021-06-03 NOTE — TELEPHONE ENCOUNTER
Patient Returning Call  Reason for call:  Return call   Information relayed to patient:  Patient stated that he had contacted Dr. Blackmon and was told that Marvel Petit MD has to be the one prescribing the medication due to being the primary provider and if patient is wanting to Continue  the pain medication. Patient stated that Dr. Blackmon stated if Marvel Petit MD has any questions to contact [phone].   Patient has additional questions:  No  If YES, what are your questions/concerns:  n/a  Okay to leave a detailed message?: Yes   [phone]

## 2021-06-03 NOTE — TELEPHONE ENCOUNTER
Spoke to the patient and relayed Dr. Petit's message to the pt. Per pt said that he only received enough to get through the weekend and was told to follow up with PCP for more refills.  I again relayed Dr. Petit's message to the pt and he said that he will contact Dr. Blackmon's office.

## 2021-06-03 NOTE — TELEPHONE ENCOUNTER
Patient Returning Call  Reason for call:  medication  Information relayed to patient:  The patient only has enough medication for the weekend from Dr Blackmon.  The patient is to see Dr Petit for more supply of the oxycodone.  He uses [facility] on [address].  Please advise  Patient has additional questions:  No  If YES, what are your questions/concerns:  NA  Okay to leave a detailed message?: Yes [phone]

## 2021-06-03 NOTE — TELEPHONE ENCOUNTER
He would need to get any pain medications from Dr. Blackmon.  The surgeon is responsible for short-term pain management.

## 2021-06-03 NOTE — TELEPHONE ENCOUNTER
Dr. Petit, you do not have any openings on Monday and per message, pt wants to see you only. Ok to double book?

## 2021-06-03 NOTE — TELEPHONE ENCOUNTER
New Appointment Needed  What is the reason for the visit:    He saw Dr Blackmon at [facility] today for a minor surgery on his right hip and was prescribed oxycodone 10 mg. states he only has enough for the weekend and is requesting to see pcp on Monday-11/25/19.  Provider Preference: PCP only  How soon do you need to be seen?: 11/25/2019  Waitlist offered?: No  Okay to leave a detailed message:  Yes

## 2021-06-04 VITALS
DIASTOLIC BLOOD PRESSURE: 72 MMHG | HEART RATE: 73 BPM | BODY MASS INDEX: 25.87 KG/M2 | SYSTOLIC BLOOD PRESSURE: 112 MMHG | OXYGEN SATURATION: 96 % | TEMPERATURE: 97.7 F | RESPIRATION RATE: 16 BRPM | WEIGHT: 207 LBS

## 2021-06-04 NOTE — TELEPHONE ENCOUNTER
Orders being requested: wheel chair repair  Reason service is needed/diagnosis: Patient stated they need this order to continue to work on his wheel chair. Patient stated he currently has a flat tire.  When are orders needed by: as soon as possible   Where to send Orders: Fax: [phone], to [facility] to leave detailed message?  Yes  [phone]

## 2021-06-04 NOTE — TELEPHONE ENCOUNTER
Orders being requested: The wheel chair order for repair  Reason service is needed/diagnosis: per order  When are orders needed by:Today if possible The order was faxed to wrong number.  Please note new fax number to resend form.    Where to send Orders: Fax: [phone]  Okay to leave detailed message?  Yes Darnell Manriquez [phone]

## 2021-06-04 NOTE — TELEPHONE ENCOUNTER
Refill Approved    Rx renewed per Medication Renewal Policy.   Medication was last renewed on 4/29/19.  Last OV 12/5/2019.    Molly Ornelas, [facility] Connection Triage/Med Refill 12/17/2019     Requested Prescriptions   Pending Prescriptions Disp Refills     metoprolol succinate (TOPROL-XL) 50 MG 24 hr tablet [Pharmacy Med Name: METOPROLOL ER SUCCINATE 50MG TABS] 90 tablet 0     Sig: TAKE 1 TABLET(50 MG) BY MOUTH DAILY       Beta-Blockers Refill Protocol Passed - 12/17/2019 11:45 AM        Passed - PCP or prescribing provider visit in past 12 months or next 3 months     Last office visit with prescriber/PCP: 12/5/2019 Marvel Petit MD OR same dept: 12/5/2019 Marvel Petit MD OR same specialty: 12/5/2019 Marvel Petit MD  Last physical: 3/6/2017 Last MTM visit: Visit date not found   Next visit within 3 mo: Visit date not found  Next physical within 3 mo: Visit date not found  Prescriber OR PCP: Marvel Petit MD  Last diagnosis associated with med order: 1. HTN (hypertension)  - metoprolol succinate (TOPROL-XL) 50 MG 24 hr tablet [Pharmacy Med Name: METOPROLOL ER SUCCINATE 50MG TABS]; TAKE 1 TABLET(50 MG) BY MOUTH DAILY  Dispense: 90 tablet; Refill: 0    If protocol passes may refill for 12 months if within 3 months of last provider visit (or a total of 15 months).             Passed - Blood pressure filed in past 12 months     BP Readings from Last 1 Encounters:   12/10/19 119/71

## 2021-06-04 NOTE — PROGRESS NOTES
Subjective: This patient comes in for follow-up on his medications.    He has had a history of disarticulation of the right hip he had surgery with [facility] and plastic surgery.    He follows with Dr. Blackmon from plastic surgery.  She has him still packing the wound he actually has a couple wounds one near the scrotum and 1 more in the buttocks area.    There is no infection he is using some endoform packing    I reviewed the procedure and consultation from 11/21/2019.  She had given him 20 oxycodone 10 mg tablets for the post procedure pain.  In reviewing the records.  She felt that this was enough for this pain but may need pain for other non-surgery related pain.    I discussed with the patient that I would not restart narcotics but I could use different pain medications such as anti-inflammatories or possibly gabapentin etc. for his joint symptoms and any burning type pains he may have.    He only wanted narcotics and did not want any other medications.    I also offered to have him see the pain clinic but he did not want to do that.    He will be traveling down to see his mother in [place] later this month he will be gone for 2 weeks.    He is now off iron we are planning to recheck on his iron and this was done today with CBC iron and ferritin ordered.    He understands will be gone next week and will check on my chart he was given instructions on how to do that.    No additional concerns or issues I did review his medications, he is on the metoprolol for blood pressure takes Detrol LA for his neurogenic bladder and baclofen for the spasticity.  He feels those things are stable.        Tobacco status: He  reports that he has never smoked. He has never used smokeless tobacco.    Patient Active Problem List    Diagnosis Date Noted     Spasticity 09/18/2019     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     Gynecomastia 08/17/2018     Hx of BKA, right (H) 04/09/2018     Right shoulder strain 12/30/2014      Ulcer of right thigh, unspecified ulcer stage (H) 10/26/2014     Hydrocele 07/16/2014     Infected penile implant (H) 07/16/2014     Paraplegia Of Unknown Etiology      Pressure ulcer      Tenosynovitis Of The Left Wrist      Neurogenic Bladder      Vitamin D Deficiency      Benign Essential Hypertension      Chronic Pain      Epicondylitis      Insomnia        Current Outpatient Medications   Medication Sig Dispense Refill     baclofen (LIORESAL) 20 MG tablet TAKE 1 TABLET BY MOUTH FOUR TIMES DAILY 360 tablet 6     baclofen (LIORESAL) 20 MG tablet TAKE 1 TABLET BY MOUTH FOUR TIMES DAILY 120 tablet 0     metoprolol succinate (TOPROL-XL) 50 MG 24 hr tablet TAKE 1 TABLET(50 MG) BY MOUTH DAILY 90 tablet 3     tolterodine (DETROL LA) 4 MG ER capsule TAKE 1-2 CAPSULE BY MOUTH DAILY 180 capsule 3     Current Facility-Administered Medications   Medication Dose Route Frequency Provider Last Rate Last Dose     lidocaine 2 % jelly (XYLOCAINE)   Topical PRN Gabriele Brown MD   1 application at 03/21/17 1312       ROS:   10 point review of systems positive as discussed above otherwise negative    Objective:    /78 (Patient Site: Right Arm, Patient Position: Sitting, Cuff Size: Adult Large)   Pulse 72   Temp 98.3  F (36.8  C) (Oral)   There is no height or weight on file to calculate BMI.      General appearance no acute distress    HEENT unremarkable oropharynx clear    His lungs are clear no rales or rhonchi heart was regular S1-S2.  Rate in the 70s    Initial blood pressure 143/81 recheck 138/78    He is afebrile    Abdomen nontender    He complains of some pain in through the shoulder.  Pain with rotation.    Review of his wounds from the plastic surgeon notes as discussed above.    Left leg without significant edema.    CBC ferritin and iron pending        Assessment:  1. History of disarticulation of right hip     2. Benign Essential Hypertension     3. Spasticity     4. Anemia, unspecified type  HM2(CBC  w/o Differential)    Iron    Ferritin   5. Paraplegia Of Unknown Etiology     6. Neurogenic bladder     7. Chronic Pain       History of disarticulation of the right hip still some wound is healing.  Follow-up with plastic surgery next month he has an appointment    Hypertension controlled with metoprolol    Spasticity stable also on baclofen.    He does have neurogenic bladder and uses the Detrol no changes.    Chronic pain issues discussed above please see options for the patient but he elected to hold off on anything, if he could not get oxycodone.    Plan: As discussed above.        Addendum: Patient is using a manual wheelchair.  He will continue to use one as it helps him with his upper body strength.    He will need repairs on the wheelchair.  He has had problems in the past with breakdown of the wheelchair, flat tire, etc.  I believe he has been to [facility] for the wheelchair repair in the past.    Patient continues to need to use daily his manual wheelchair and the wheelchair will continue to need repairs for the next 12 months.    This transcription uses voice recognition software, which may contain typographical errors.

## 2021-06-04 NOTE — TELEPHONE ENCOUNTER
Refill Request  Did you contact pharmacy: Yes  Medication name:   Requested Prescriptions     Pending Prescriptions Disp Refills     metoprolol succinate (TOPROL-XL) 50 MG 24 hr tablet [Pharmacy Med Name: METOPROLOL ER SUCCINATE 50MG TABS] 90 tablet 0     Sig: TAKE 1 TABLET(50 MG) BY MOUTH DAILY     Who prescribed the medication: Marvel Petit MD  [facility]  Is patient out of medication: Leaving on vacation tomorrow and does not have enough supply on hand.   Patient notified refills processed in 72 hours:  yes  Okay to leave a detailed message: no

## 2021-06-04 NOTE — TELEPHONE ENCOUNTER
Called pt to inform him of results, but was redirected to voicemail. Left message to call back.     Okay to relay message. Attempt #1.    ----------------------------------------------  Notes recorded by Marvel Petit MD on 12/17/2019 at 1:47 PM CST  Please contact this patient, let him know the iron level was slightly low.  3    He should go on iron sulfate 325 mg 1 a day.  I sent a prescription to his pharmacy.  We could recheck his level in 3 months again

## 2021-06-05 VITALS
OXYGEN SATURATION: 98 % | SYSTOLIC BLOOD PRESSURE: 134 MMHG | TEMPERATURE: 96.8 F | BODY MASS INDEX: 26.18 KG/M2 | RESPIRATION RATE: 20 BRPM | HEART RATE: 73 BPM | HEIGHT: 76 IN | DIASTOLIC BLOOD PRESSURE: 82 MMHG | WEIGHT: 215 LBS

## 2021-06-05 VITALS — WEIGHT: 215 LBS | BODY MASS INDEX: 29.12 KG/M2 | HEIGHT: 72 IN

## 2021-06-05 VITALS — HEIGHT: 76 IN | BODY MASS INDEX: 26.18 KG/M2 | WEIGHT: 215 LBS

## 2021-06-05 NOTE — TELEPHONE ENCOUNTER
Called and spoke with Darnell from [facility]. Per Darnell they received the order, however, Patient now has Medicare and Medicare is wanting a face to face visit stating patient was seen in clinic with a manual wheel chair. Darnell was wondering if Dr. Petit will addend the noted from 12/5/2019 stating patient was in clinic to be seen with a manual wheel chair. Per Darnell once that is completed we do not need to fax the ofv notes to them. They are able to pull the ofv note through their epic.     Please advise are you willing to addend the notes or does pt ntbs?

## 2021-06-05 NOTE — TELEPHONE ENCOUNTER
Called and spoke with jules that the visit was addenddum. They will get a hold of patient and let him know.     Completed task.

## 2021-06-05 NOTE — TELEPHONE ENCOUNTER
"Pt states \"cold for two weeks.\"  \"I need a Z-Pack.\"    Cough.  Runny nose.  Chest congestion.  \"Coughing up a whole lot of phlegm.\"  Pt states \"I can't believe a human being can produce all this much phlegm.\"    Pt has tried OTC Mucinex, Dayquil and Nyquil.  \"Can't get rid of this.\"  Constant coughing with prolific phlegm.  Pt's voice exhibits significant congestion.  Pt reiterates \"2 weeks of feeling miserable.\"    Advised warm fluids with honey to relax tight airway and quell coughs.  Pt does not enthusiastically agree to try this.    Pt's QUESTIONS:  1)  May he have a Z-Pack transmitted to pharmacy?  2)  May he have a codeine cough suppressant for night (if appropriate)?  - Other advice?    Please advise; thank you.  Pharmacy is verified in chart.  Detailed voicemail message okay.     Alison Ornelas RN  [facility] Triage     Reason for Disposition    SEVERE coughing spells (e.g., whooping sound after coughing, vomiting after coughing)     Prolific phlegm per pt's report ...    Protocols used: COUGH-A-OH      "

## 2021-06-05 NOTE — TELEPHONE ENCOUNTER
Faxed signed order for wheelchair repair to number provided in encounter. Copy made and given to Dr. Petit's [place] for reference.   Miriam Noble

## 2021-06-05 NOTE — TELEPHONE ENCOUNTER
Patient Returning Call  Reason for call:  Patient returning call to check on the status of this call.   Information relayed to patient:  Patient stated I have been waiting for 3 days to get this wheel chair fixed as I have a flat tire!  Patient has additional questions:  Yes  If YES, what are your questions/concerns:  Can someone get back to me about this ASAP?  Okay to leave a detailed message?: Yes

## 2021-06-05 NOTE — TELEPHONE ENCOUNTER
Please let Darnell Chew know that I did dictate an addendum.    Also please let the patient know the status of this and what is going on.

## 2021-06-05 NOTE — TELEPHONE ENCOUNTER
Printed out last office note from 9/18/2019 and place it on your desk.      Okay to sign, please advise?

## 2021-06-05 NOTE — TELEPHONE ENCOUNTER
Who is calling:  Tania from [facility]  Reason for Call:  Caller stated the chart notes from 9/18/19 is not the detailed report so therefore it does not say the chart notes were electronically signed by Marvel Petit MD. Please either send the detailed report or have Marvel Petit MD physically sign the chart notes and fax it back to them at [phone]. Caller stated to please do this as soon as possible because the patient has been waiting a long time and not very happy with how long this is taking.  Date of last appointment with primary care: 12/5/19  Okay to leave a detailed message: No

## 2021-06-05 NOTE — TELEPHONE ENCOUNTER
Referral Request  Type of referral: Endocrinology  Who s requesting: patient  Have you been seen for this request: Yes  Does patient have a preference on a group/provider [facility]  Okay to leave a detailed message?  Yes

## 2021-06-05 NOTE — TELEPHONE ENCOUNTER
Who is calling:  Darnell Hernández   Reason for Call:  Caller states the repair for Wheel Chair order need to be sent as prescription and signed by provider and also caller requested to Refax all the paper work that was faxed yesterday . Fax # [phone]. For questions please reach vandana Patrick  Phone # [phone]  Date of last appointment with primary care: 12/05/19  Okay to leave a detailed message: No

## 2021-06-05 NOTE — TELEPHONE ENCOUNTER
Who is calling:  patient  Reason for Call:  Caller is checking on the status of his order that he sent over. Caller stated that he is really in need to get the repair done as soon as possible. Caller did provide fax number of Fax: [phone]   Date of last appointment with primary care: n/a  Okay to leave a detailed message: Yes

## 2021-06-05 NOTE — TELEPHONE ENCOUNTER
Left detailed message for patient with 's message. Nothing else needed at this time. Completing encounter.

## 2021-06-05 NOTE — TELEPHONE ENCOUNTER
Patient Returning Call  Reason for call:  Message from clinic  Information relayed to patient:  Dr. Petit sent in prescription for cough medicine and antibiotic.  Patient has additional questions:  Yes  If YES, what are your questions/concerns:  n/a  Okay to leave a detailed message?: No call back needed

## 2021-06-06 NOTE — TELEPHONE ENCOUNTER
----- Message from Marvel Petit MD sent at 2/28/2020  2:02 PM CST -----  Please fax the lab results to [phone]

## 2021-06-06 NOTE — TELEPHONE ENCOUNTER
Orders being requested: Depends size: XL and Chucks  Reason service is needed/diagnosis: Incontinence   When are orders needed by: as soon as possible   Where to send Orders: Fax: [phone] and Phone:  [phone] to [facility]: [place]  Okay to leave detailed message?  Yes  [phone]
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
## 2021-06-06 NOTE — TELEPHONE ENCOUNTER
I am okay with these orders but I need number per month that he needs, both extra-large depends and the chucks.    Either set this up as an order and I will sign it or give me the details and I will write it out and sign it

## 2021-06-07 NOTE — PROGRESS NOTES
Clinic Care Coordination Contact  [facility]/Voicemail       Clinical Data: Care Coordinator Outreach  Outreach attempted x 1.  Left message on patient's voicemail with call back information and requested return call.  Plan: Care Coordinator will try to reach patient again in 1-2 business days.

## 2021-06-07 NOTE — PROGRESS NOTES
"Discharge Protocol       Tell me how you are doing now that you are home?\"    Patient stated that he is doing fine.      Discharge Instructions    \"Do you understand your discharge instructions?\"    Pt response: Yes      \"Do you have an appointment to follow up with your primary care provider?\"  No        \"No: Assist pt in scheduling an 'INF' visit w/PCP, refer to AVS for specified follow up time frame.\" Patient will call clinic to schedule appointment.      Medications    \"Did you have any medication changes?\"  Yes   Do you have any concerns with obtaining the medications or questions about your medications, that you would like a RN to review with you?     No  **If yes, escalate to CC RN responsible for patient's clinic or [facility] RN  Send an inbasket Epic message if RN is unavailable after call.     \"Please bring your medications with you to your follow up appointment.\"      Call Summary    \"What questions or concerns do you have about your recent visit and your follow-up care?\"      Patient will call clinic to ask if the INF visit with PCP will be over the phone or face to face.    Can be addressed if scheduled with RN or SW, either Care Coordination, or if declining, to triage for further questions.         \"If you have questions or things don't continue to improve, we encourage you contact us through the main clinic number [phone](give number).  Even if the clinic is not open, triage nurses are available 24/7 to help you.\"         I am with Clinic Care Coordination, a team of nurses, social workers, community health workers, and financial resource workers, who work together with your primary doctor.  We are here to see if we can help you with a variety of things - from resources in the community such as food assistance, transportation, help in the home, equipment needs, assistance with medications, coordination of your appointments, help with any medical questions, and things like this.      We would have a " nurse or  speak with you and together come up with goals to help you to improve your health and wellness and do the best to help you stay out of the hospital.          Appointment for Care Coordination assessment has been made with CCC RN on 44/27/2020 at 11:00AM. .

## 2021-06-07 NOTE — PROGRESS NOTES
Clinic Care Coordination Contact    BP-A     Due Date:  Within 1 week from today's date, 4/27/2020  Delegation:  No Show for RN phone appointment.  Please follow unsuccessful outreach, no show standard work.      Krystyna Chacon RN Clinic Care Coordinator

## 2021-06-08 ENCOUNTER — COMMUNICATION - HEALTHEAST (OUTPATIENT)
Dept: FAMILY MEDICINE | Facility: CLINIC | Age: 56
End: 2021-06-08

## 2021-06-08 ENCOUNTER — OFFICE VISIT - HEALTHEAST (OUTPATIENT)
Dept: FAMILY MEDICINE | Facility: CLINIC | Age: 56
End: 2021-06-08

## 2021-06-08 DIAGNOSIS — N39.0 URINARY TRACT INFECTION WITHOUT HEMATURIA, SITE UNSPECIFIED: ICD-10-CM

## 2021-06-08 DIAGNOSIS — I63.9 ACUTE CVA (CEREBROVASCULAR ACCIDENT) (H): ICD-10-CM

## 2021-06-08 DIAGNOSIS — Z01.818 PREOP GENERAL PHYSICAL EXAM: ICD-10-CM

## 2021-06-08 DIAGNOSIS — I10 ESSENTIAL HYPERTENSION: ICD-10-CM

## 2021-06-08 DIAGNOSIS — T83.61XS: ICD-10-CM

## 2021-06-08 DIAGNOSIS — K82.8 PORCELAIN GALLBLADDER: ICD-10-CM

## 2021-06-08 DIAGNOSIS — G82.20 PARAPLEGIA, UNSPECIFIED (H): ICD-10-CM

## 2021-06-08 DIAGNOSIS — D50.9 IRON DEFICIENCY ANEMIA, UNSPECIFIED IRON DEFICIENCY ANEMIA TYPE: ICD-10-CM

## 2021-06-08 DIAGNOSIS — N62 GYNECOMASTIA: ICD-10-CM

## 2021-06-08 DIAGNOSIS — Z71.85 IMMUNIZATION COUNSELING: ICD-10-CM

## 2021-06-08 LAB
ALBUMIN SERPL-MCNC: 2.7 G/DL (ref 3.5–5)
ALBUMIN UR-MCNC: ABNORMAL G/DL
ALP SERPL-CCNC: 86 U/L (ref 45–120)
ALT SERPL W P-5'-P-CCNC: 13 U/L (ref 0–45)
ANION GAP SERPL CALCULATED.3IONS-SCNC: 11 MMOL/L (ref 5–18)
APPEARANCE UR: ABNORMAL
AST SERPL W P-5'-P-CCNC: 14 U/L (ref 0–40)
BACTERIA #/AREA URNS HPF: ABNORMAL /[HPF]
BILIRUB SERPL-MCNC: 0.3 MG/DL (ref 0–1)
BILIRUB UR QL STRIP: NEGATIVE
BUN SERPL-MCNC: 14 MG/DL (ref 8–22)
CALCIUM SERPL-MCNC: 9.2 MG/DL (ref 8.5–10.5)
CHLORIDE BLD-SCNC: 103 MMOL/L (ref 98–107)
CO2 SERPL-SCNC: 25 MMOL/L (ref 22–31)
COLOR UR AUTO: YELLOW
CREAT SERPL-MCNC: 0.93 MG/DL (ref 0.7–1.3)
ERYTHROCYTE [DISTWIDTH] IN BLOOD BY AUTOMATED COUNT: 22.5 % (ref 11–14.5)
GFR SERPL CREATININE-BSD FRML MDRD: >60 ML/MIN/1.73M2
GLUCOSE BLD-MCNC: 90 MG/DL (ref 70–125)
GLUCOSE UR STRIP-MCNC: NEGATIVE MG/DL
HCT VFR BLD AUTO: 40.5 % (ref 40–54)
HGB BLD-MCNC: 11.3 G/DL (ref 14–18)
HGB UR QL STRIP: ABNORMAL
KETONES UR STRIP-MCNC: NEGATIVE MG/DL
LEUKOCYTE ESTERASE UR QL STRIP: ABNORMAL
MCH RBC QN AUTO: 20.4 PG (ref 27–34)
MCHC RBC AUTO-ENTMCNC: 27.9 G/DL (ref 32–36)
MCV RBC AUTO: 73 FL (ref 80–100)
NITRATE UR QL: NEGATIVE
PH UR STRIP: 6 [PH] (ref 5–8)
PLATELET # BLD AUTO: 394 THOU/UL (ref 140–440)
PMV BLD AUTO: 10.5 FL (ref 8.5–12.5)
POTASSIUM BLD-SCNC: 4 MMOL/L (ref 3.5–5)
PROT SERPL-MCNC: 8.5 G/DL (ref 6–8)
RBC # BLD AUTO: 5.53 MILL/UL (ref 4.4–6.2)
RBC #/AREA URNS AUTO: ABNORMAL HPF
SODIUM SERPL-SCNC: 139 MMOL/L (ref 136–145)
SP GR UR STRIP: 1.02 (ref 1–1.03)
SQUAMOUS #/AREA URNS AUTO: ABNORMAL LPF
UROBILINOGEN UR STRIP-ACNC: ABNORMAL
WBC #/AREA URNS AUTO: >100 HPF
WBC: 9.6 THOU/UL (ref 4–11)

## 2021-06-08 NOTE — PROGRESS NOTES
Clinic Care Coordination Contact  [facility]/Voicemail    BPCI-A     Background:  Patient was hospitalized at [facility] from 4/13/20 to 4/15/20 with acute CVA.  He has additional medical history of right AKA, paraplegia, neurogenic bladder, HTN.    Clinical Data: Care Coordinator Outreach  Outreach attempted x 1.  Left a generic message on patient's voicemail informing RN Care Coordinator will attempt call back in 3-5 business days.  Plan: Care Coordinator will try to reach patient again in 3-5 business days.      Krystyna Chacon RN Clinic Care Coordinator

## 2021-06-08 NOTE — PROGRESS NOTES
Subjective: This patient comes in for checkup and follow-up.  Patient is now 51 we discussed health care maintenance issues and he'll get a colonoscopy.    A follow-up for physical in a couple months will check a urine drug screen    Patient has the paraplegia he's in a wheelchair manual.  He is getting fitted for a new wheelchair.  Unfortunately he continues to have decubitus ulcer from the present wheelchair that he is in.  He does see the plastic surgeon, Dr. Reeder next week for debridement of the ulcer there's been no infection recently he states.    She has hypertension continues on metoprolol 50 mg a day also has neurogenic bladder kidneys on Detrol.    We discussed getting a colonoscopy in the prep and he feels he is able to do that.    No additional concerns other than refill on his medication.  He continues on the oxycodone 60 mg 1 tablet 3 times a day as well as 5 mg oxycodone 3 times a day daily as needed for additional pain control.  He's been stable on that dose.    Tobacco status: He  reports that he has never smoked. He does not have any smokeless tobacco history on file.    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     Paraplegia Of Unknown Etiology      Pressure ulcer      Tenosynovitis Of The Left Wrist      Neurogenic Bladder      Vitamin D Deficiency      Benign Essential Hypertension      Chronic Pain      Epicondylitis      Insomnia        Current Outpatient Prescriptions   Medication Sig Dispense Refill     baclofen (LIORESAL) 10 MG tablet Take 2 tablets (20 mg total) by mouth 3 (three) times a day. 90 tablet 3     metoprolol succinate (TOPROL-XL) 50 MG 24 hr tablet TAKE 1 TABLET BY MOUTH DAILY 90 tablet 3     oxyCODONE (ROXICODONE) 5 MG immediate release tablet Take 1 Tablets 3 times daily as needed 90 tablet 0     oxyCODONE 60 mg TR12 Take 1  tablet 3 times per day 90 tablet 0     tolterodine (DETROL LA) 4 MG ER capsule TAKE 1-2 CAPSULE BY MOUTH DAILY 60 capsule 5     zolpidem (AMBIEN) 10 mg tablet TAKE 1 TABLET BY MOUTH EVERY DAY AT BEDTIME 30 tablet 0     No current facility-administered medications for this visit.        ROS:   10 point review of systems negative other than as outlined above    Objective:    Visit Vitals     /70 (Patient Site: Right Arm, Patient Position: Sitting, Cuff Size: Adult Large)     Pulse 60     Resp 20         General appearance presently without distress    In a wheelchair.    Vital signs are stable no new findings on exam no increased edema    Lungs clear, heart was regular S1-S2        Assessment:  1. Paraplegia Of Unknown Etiology     2. Chronic Pain  oxyCODONE 60 mg TR12    oxyCODONE (ROXICODONE) 5 MG immediate release tablet   3. Pressure ulcer     4. Benign Essential Hypertension     5. Neurogenic bladder     6. Health care maintenance  Ambulatory referral for Colonoscopy     Total time with patient 25 minutes over 20 minutes spent in counseling reviewing medications and coordinating care    Discussing healthcare maintenance issues as well    Plan:  Recheck in 2 months check physical do a PSA prostate exam etc. additional screening labs and also    This transcription uses voice recognition software, which may contain typographical errors.

## 2021-06-08 NOTE — TELEPHONE ENCOUNTER
Can someone please call this patient to help set up xray and labs ASAP-fasting. He is not scheduled. Thank you!      New Appointment Needed for Friday, 6/19   What is the reason for the visit:    Patient wants to come in to the [facility] lab on Friday in the am to do his labs that Dr. Petit told him to do. He also has an xray that needs to be done. However, there are no lab appointments to schedule at [facility] in the [facility] on Friday 6/19, and did not want to schedule the xray unless he can acutally go on Friday in the early morning to have all of this done. Patient requesting as he needs to fast.  I work after hours so just need the assistance of day time to help schedule.Thank you!  Provider Preference: Lab [facility] and XRAY [facility]  How soon do you need to be seen?:Friday, 6/19  AM, please give fasting instructions   Waitlist offered?: No  Okay to leave a detailed message:  Yes

## 2021-06-08 NOTE — PROGRESS NOTES
"Josiah Crump . is a 54 y.o. male who is being evaluated via a billable video visit.      The patient has been notified of following:     \"This video visit will be conducted via a call between you and your physician/provider. We have found that certain health care needs can be provided without the need for an in-person physical exam.  This service lets us provide the care you need with a video conversation.  If a prescription is necessary we can send it directly to your pharmacy.  If lab work is needed we can place an order for that and you can then stop by our lab to have the test done at a later time.    Video visits are billed at different rates depending on your insurance coverage. Please reach out to your insurance provider with any questions.    If during the course of the call the physician/provider feels a video visit is not appropriate, you will not be charged for this service.\"    Patient has given verbal consent to a Video visit? Yes        Will anyone else be joining your video visit? No        Video Start Time: 2:22 PM    Preoperative Exam    Scheduled Procedure: Anterior second stage urethroplasty  Surgery Date:  7/3/202   Surgery Location: [facility]  Fax [phone]  Surgeon:  Dr. Savage    Assessment/Plan:     1. Preop general physical exam  Comprehensive Metabolic Panel    HM2(CBC w/o Differential)    XR Chest 2 Views    Electrocardiogram Perform and Read   2. Benign Essential Hypertension  Comprehensive Metabolic Panel    Electrocardiogram Perform and Read    metoprolol succinate (TOPROL-XL) 25 MG    amLODIPine (NORVASC) 5 MG tablet   3. History of CVA (cerebrovascular accident)  Comprehensive Metabolic Panel    right lacunar thalamic     4. 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 Neurogenic bladder     6. Injury of thoracic spinal cord, sequela (H)     7. Gynecomastia     8. Dyslipidemia  Lipid [place] FASTING   9. Acute CVA " (cerebrovascular accident) (H)  simvastatin (ZOCOR) 20 MG tablet    aspirin 81 mg chewable tablet       Surgical Procedure Risk: Low (reported cardiac risk generally < 1%)  Have you had prior anesthesia?: Yes  Have you or any family members had a previous anesthesia reaction:  No  Do you or any family members have a history of a clotting or bleeding disorder?: No  Cardiac Risk Assessment: no increased risk for major cardiac complications    APPROVAL GIVEN to proceed with proposed procedure, without further diagnostic evaluation    Patient will be coming in for chest x-ray EKG CMP lipid and CBC in 1 week    Patient had paresthesias with left-sided face and arm numbness.  He had evidence of acute right thalamic lacunar infarct on 4/13/2020.  He had a MR of the brain and [place] [facility] [place] which showed this.  He was treated with Plavix for 21 days.  He was also treated with aspirin 81 mg simvastatin 20 mg amlodipine 5 mg added and his metoprolol recently was reduced to 25 mg.    He is done with the Plavix and he actually stopped the other medicines as he has not had any follow-up since hospitalization.    He needs to get back on the aspirin 81 mg simvastatin 20 mg amlodipine 5 mg and metoprolol 25 mg.  Stay off the Plavix.  The prescriptions for aspirin simvastatin amlodipine and metoprolol were sent to his pharmacy.    Other meds include his baclofen and Toviaz.    No previous history of anesthesia or bleeding problems for patient or family members.        Functional Status: Partially Dependent: in wheelchair  Patient plans to recover patient don't know     Subjective:      Josiah Crump . is a 54 y.o. male who presents for a preoperative consultation.  Patient's had some urethral erosion from chronic Shah.    Also has a penile prosthesis.    Surgery as outlined above.    No COVID-19 symptoms or exposure.  Denies any fever cough congestion.    He has had no recurrence of any paresthesias the numbness in his left  face and arm resolved while he was in the hospital from April 13 through April 15.    No sequelae.    Patient's had gynecomastia he is on some testosterone per the endocrinologist.    Patient denies any headache chest pain weakness numbness.    10 point review of systems reviewed and are negative, other than those listed in the HPI.    Pertinent History  Do you have difficulty breathing or chest pain after walking up a flight of stairs: No  History of obstructive sleep apnea: No  Steroid use in the last 6 months: No  Frequent Aspirin/NSAID use: No  Prior Blood Transfusion: Yes: years ago  Prior Blood Transfusion Reaction: No  If for some reason prior to, during or after the procedure, if it is medically indicated, would you be willing to have a blood transfusion?:  There is no transfusion refusal.    Current Outpatient Medications   Medication Sig Dispense Refill     amLODIPine (NORVASC) 5 MG tablet Take 1 tablet (5 mg total) by mouth daily. 30 tablet 5     aspirin 81 mg chewable tablet Chew 1 tablet (81 mg total) daily. 30 tablet 5     baclofen (LIORESAL) 20 MG tablet TAKE 1 TABLET BY MOUTH FOUR TIMES DAILY 360 tablet 0     ferrous sulfate 325 (65 FE) MG tablet Take 1 tablet by mouth daily with breakfast.       fesoterodine (TOVIAZ) 8 mg Tb24 ER tablet Take 8 mg by mouth daily.       metoprolol succinate (TOPROL-XL) 25 MG Take 1 tablet (25 mg total) by mouth daily. 30 tablet 5     simvastatin (ZOCOR) 20 MG tablet Take 1 tablet (20 mg total) by mouth daily. 30 tablet 5     UNABLE TO FIND Take 3 capsules by mouth daily. Med Name: Nugenix Total-T   (contains cholecalciferol, d-aspartic acid, tribulous extract, Tesnor, cocoa extract, stingling needle extract, kavita extract, eurycoma longifolia extract, epimedium extract, diindolymethane, boron)       No current facility-administered medications for this visit.         No Known Allergies    Patient Active Problem List   Diagnosis     Paraplegia Of Unknown Etiology      Pressure ulcer     Tenosynovitis Of The Left Wrist     Neurogenic Bladder     Vitamin D Deficiency     Benign Essential Hypertension     Chronic Pain     Epicondylitis     Insomnia     Hydrocele     Infected penile implant (H)     Ulcer of right thigh, unspecified ulcer stage (H)     Right shoulder strain     Hx of BKA, right (H)     Gynecomastia     History of disarticulation of right hip     Spasticity     Injury of thoracic spinal cord, sequela (H)     Urine incontinence     Paresthesias     Paresthesia     Acute CVA (cerebrovascular accident) (H)     History of CVA (cerebrovascular accident)       Past Medical History:   Diagnosis Date     Benign Essential Hypertension     Created by Conversion      Chronic indwelling Shah catheter      Chronic pain      Decubitus ulcer      Decubitus Ulcer     Created by Conversion [employer] [facility] Annotation: Oct 22 2007 11:03AM - Marvel Petit: Right thigh      Epicondylitis      Epicondylitis     Created by Conversion [employer] [facility] Annotation: Dec  1 2008  1:45PM - Starr Galicia: Elbows      Hydrocele      Hypertension      Infected penile implant (H) 7/16/2014     Insomnia      Insomnia     Created by Conversion      Neurogenic bladder      Neurogenic Bladder     Created by Conversion      Paraplegia (H)      Right shoulder strain      Skin ulcer of right thigh (H) 10/26/2014     Spasticity      Tenosynovitis     left wrist     Tenosynovitis Of The Left Wrist     Created by Conversion      Vitamin D deficiency      Vitamin D Deficiency     Created by Conversion        Past Surgical History:   Procedure Laterality Date     HYDROCELE EXCISION / REPAIR Bilateral 7/16/2014    Procedure: SCROTAL EXPLORATION, BILATERAL HYDROCELETOMY, EXPLANT INFECTED PENILE PROTHESIS;  Surgeon: Richard Byers MD;  Location[facility];  Service:      PENILE PROSTHESIS IMPLANT       PENILE PROSTHESIS IMPLANT N/A 8/10/2016    Procedure: INFLATABLE PENILE PROSTHESIS ;  Surgeon: Richard Mchugh  MD Zeeshan;  Location: [facility] OR;  Service:      AL AMPUTATION LOW LEG THRU TIB/FIB      Description: Amputation Of Leg Below Knee;  Recorded: 12/01/2008;     skin grafts Left    Hip disarticulation    Social History     Socioeconomic History     Marital status: Single     Spouse name: Not on file     Number of children: Not on file     Years of education: Not on file     Highest education level: Not on file   Occupational History     Not on file   Social Needs     Financial resource strain: Not very hard     Food insecurity     Worry: Never true     Inability: Never true     Transportation needs     Medical: No     Non-medical: No   Tobacco Use     Smoking status: Never Smoker     Smokeless tobacco: Never Used   Substance and Sexual Activity     Alcohol use: No     Drug use: No     Sexual activity: Not on file   Lifestyle     Physical activity     Days per week: Not on file     Minutes per session: Not on file     Stress: Not at all   Relationships     Social connections     Talks on phone: More than three times a week     Gets together: Not on file     Attends [employer] service: Not on file     Active member of club or organization: Not on file     Attends meetings of clubs or organizations: Not on file     Relationship status: Not on file     Intimate partner violence     Fear of current or ex partner: No     Emotionally abused: No     Physically abused: No     Forced sexual activity: No   Other Topics Concern     Not on file   Social History Narrative     Not on file         Objective:     There were no vitals filed for this visit.    Patient will come in for vital signs with his preop labs, blood pressure will be checked then      Physical Exam      General appearance no acute distress    HEENT neck is supple    No scleral icterus    Mucous membranes moist.    There is no weakness in through face or upper extremities.    He has had a right hip disarticulation, his ulcer has now healed.    No swelling  in the left leg.    By history urethral erosion.    He denies any paresthesias.    Skin without rash.    Lungs: Normal conversation nonlabored breathing no wheezing by history    Heart: No palpitations or heart racing.    Abdomen denies any abdominal pain or masses or ascites.        Please refer to the physical examination documented in the anesthesia record on the day of surgery.    There are no Patient Instructions on file for this visit.    Patient will come in for EKG    Patient will come in for chest x-ray    Labs:  Patient will come in for CMP lipid and CBC    Immunization History   Administered Date(s) Administered     INFLUENZA,RECOMBINANT,INJ,PF QUADRIVALENT 18+YRS 03/21/2019     Influenza [id number], 12/03/2009     Influenza, inj, historic,unspecified 10/22/2007, 10/30/2008     Influenza, seasonal,quad inj 6-35 mos 10/08/2009, 09/14/2010, 09/29/2011, 09/12/2012, 09/25/2013     Influenza,seasonal quad, PF 09/23/2014     Influenza,seasonal, Inj IIV3 10/23/2003, 11/02/2005     Influenza,seasonal,quad inj =/> 6months 11/14/2016, 10/18/2017     Pneumo Conj 13-V (2010&after) 08/09/2018     Pneumo Polysac 23-V 08/15/2012     Td, Adult, Absorbed 09/02/2001     Td, adult adsorbed, PF 08/09/2018     Td,adult,historic,unspecified 04/19/2007     Tdap 04/19/2007       Video-Visit Details    Type of service:  Video Visit    Video End Time (time video stopped): 2:48 PM  Originating Location (pt. Location): Home    Distant Location (provider location):  [facility]     Platform used for Video Visit: Brandan       Electronically signed by Marvel Petit MD 06/16/20 1:58 PM

## 2021-06-08 NOTE — PROGRESS NOTES
Clinic Care Coordination Contact    Community Health Worker Follow Up    Goals:   Goals Addressed    None         CHW Next Follow-up: 2 weeks     CHW Plan: Ask patient if he was able to order his medical supplies and offer assistance if needed.

## 2021-06-08 NOTE — PROGRESS NOTES
Clinic Care Coordination Contact  [facility]/Voicemail       Clinical Data: Care Coordinator Outreach  Outreach attempted x 2.  Left message on patient's voicemail with call back information and requested return call.  Plan:Care Coordinator will try to reach patient again in 10 business days.

## 2021-06-08 NOTE — PROGRESS NOTES
Clinic Care Coordination Contact  [facility]/Voicemail       Clinical Data: Care Coordinator Outreach  Outreach attempted x 1.  Left message on patient's voicemail with call back information and requested return call.  Plan:  Care Coordinator will try to reach patient again in 10 business days.

## 2021-06-08 NOTE — TELEPHONE ENCOUNTER
"Left Message for patient to return call.  \"Okay to relay message\"     Please help setup      Lab: bloodwork/Xray      CSS: BP/XRay  "

## 2021-06-08 NOTE — PROGRESS NOTES
Plastic Surgery Assessment Note    Today's Visit:  Pt is doing well and has no concerns or complaints.  Pt is tolerating the dressings well.  Pt has no fever or chills.      Labs:  No results for input(s): CRP, HGBA1C, LABPRE, ALBUMIN in the last 72 hours.    Invalid input(s): CDIFF, HEM    Vitals:  Vitals:    01/31/17 1328   BP: 120/62   Pulse: 80   Resp: 20   Temp: 97.4  F (36.3  C)       [place]:           There is no height or weight on file to calculate BMI.    Output:                       Physical Exam:  General Appearance:  Appears comfortable, Alert, cooperative, no distress,   Head: Normocephalic, without obvious abnormality, atraumatic  Eyes: PERRL, conjunctiva/corneas clear, EOM's intact, both eyes   Nose: Nares normal, no drainage   Throat: Lips, mucosa, and tongue normal; teeth and gums normal  Neck: Supple, symmetrical, trachea midline, no adenopathy;    Lungs: Clear to auscultation bilaterally, respirations unlabored  Chest Wall: No tenderness or deformity  Heart: Regular rate and rhythm, S1 and S2 normal, no murmur, rubs or gallop  Abdomen: Soft, non-tender, bowel sounds active all four quadrants,   no masses, no organomegaly  Extremities: Extremities normal, atraumatic, no cyanosis or edema  Pulses: DP pulses are 1-2+ bilat.    Skin: no rashes or lesions  Neurologic: normal and equal strength bilat in upper and lower extremities    Wound Ostomy Assessment/Plan:          Wound 07/12/16 Right IT/ Trohanteric (Active)   Pre Size Length 11.5 1/31/2017  1:00 PM   Pre Size Width 15.5 1/31/2017  1:00 PM   Pre Total Sq cm 178.25 1/31/2017  1:00 PM         Both right IT pressure ulcer and right chronic posterior thigh wound are clean with good granulation tissue. There is some slough at the base. The dressings were all removed. Using a currette, incisional debridement of Both right IT pressure ulcer and right chronic posterior thigh wound were done. Debrided 3x3 cm of necrotic sq to healthy bleeding tissue.  Hemostasis was well obtained. He tolerated it well and no complications were noted. Dresssings were reapplied. I recommend silvercell dressings. Follow up 1 month.  We will run pt for hylomatrix graft.        Gabriele Brown MD

## 2021-06-09 NOTE — PROGRESS NOTES
Clinic Care Coordination Contact  [facility]/Voicemail       Clinical Data: Care Coordinator Outreach  Outreach attempted x 4.  Left message on patient's voicemail with call back information and requested return call.  Plan: .Care Coordinator will try to reach patient again in 10 business days.

## 2021-06-09 NOTE — PROGRESS NOTES
Subjective: This patient comes in for physical exam-year-old male he has paraplegia seen on wheelchair.  He has the right below-the-knee amputations while    He has a chronic ulcer on the thigh, decubitus cc Dr. Reeder from plastic surgery.    There are doing some mapping for a wheelchair and he'll be getting that soon it'll be a manual one.  They're also going to try some new product to help with the nonhealing ulcer.    He continues on Detrol metoprolol baclofen and zolpidem.  Also chronic pain.    He self caths about 3-4 times a day.    We talked about a colonoscopy limits hard to do at home he knows he'll get his wound infected.  Down the line after things heal up hopefully we can get this done maybe have him stay in the hospital the night prior further prep.    Otherwise patient feels like things of been stable he has felt like he's put on weight a little bit.    No recent UTI problems.    Tobacco status: He  reports that he has never smoked. He does not have any smokeless tobacco history on file.    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     Paraplegia Of Unknown Etiology      Pressure ulcer      Tenosynovitis Of The Left Wrist      Neurogenic Bladder      Vitamin D Deficiency      Benign Essential Hypertension      Chronic Pain      Epicondylitis      Insomnia        Current Outpatient Prescriptions   Medication Sig Dispense Refill     baclofen (LIORESAL) 10 MG tablet TAKE 2 TABLETS(20 MG) BY MOUTH THREE TIMES DAILY 90 tablet 0     metoprolol succinate (TOPROL-XL) 50 MG 24 hr tablet TAKE 1 TABLET BY MOUTH DAILY 90 tablet 3     oxyCODONE (ROXICODONE) 5 MG immediate release tablet Take 1 Tablets 3 times daily as needed 90 tablet 0     oxyCODONE 60 mg TR12 Take 1 tablet 3 times per day 90 tablet 0     tolterodine (DETROL LA) 4 MG ER capsule TAKE 1-2 CAPSULE BY  MOUTH DAILY 60 capsule 5     zolpidem (AMBIEN) 10 mg tablet TAKE 1 TABLET BY MOUTH EVERY DAY AT BEDTIME 30 tablet 0     Current Facility-Administered Medications   Medication Dose Route Frequency Provider Last Rate Last Dose     lidocaine 2 % jelly (XYLOCAINE)   Topical PRN Gabriele Brown MD   1 application at 01/31/17 1337       ROS: 10 point review of systems negative other than as outlined above    Objective:    Visit Vitals     /66 (Patient Site: Left Arm, Patient Position: Sitting, Cuff Size: Adult Regular)     Pulse 84     Temp 97  F (36.1  C) (Oral)     Resp 10         General appearance no acute distress, talkative    Vital signs are stable afebrile    HEENT canals and TMs normal oropharynx was clear    Pupils react normally extraocular movements are normal    Neck without adenopathy neck is supple no cervical pain    Lungs are clear no rales or rhonchi, heart regular S1-S2 no murmur    Abdomen slightly overweight no masses or guarding.    Patient wanted to hold off on a rectal at this point he said we could check a dome liner did check a PSA.    He does have edema in the distal BKA on the right which is dependent.    No issues with the left leg regarding skin or swelling.    Labs    CMP lipid PSA CBC TSH pending    Assessment:  1. Routine general medical examination at a health care facility  Lipid [place] FASTING   2. Chronic Pain  oxyCODONE 60 mg TR12    oxyCODONE (ROXICODONE) 5 MG immediate release tablet   3. Paraplegia Of Unknown Etiology     4. Benign Essential Hypertension  Comprehensive Metabolic Panel    Thyroid Stimulating Hormone (TSH)   5. Neurogenic bladder     6. Hx of BKA, right  PSA (Prostatic-Specific Antigen), Annual Screen   7. Skin ulcer of right thigh, with unspecified severity  HM2(CBC w/o Differential)     Physical stable    Chronic pain refill on pain medication    Paraplegia with decubitus ulcer right thigh    History of BKA on the right with some dependently edema    Need  for mapping for wheelchair getting this done a chill at    Colonoscopy will put on hold for right now no family history for colon cancer    Neurogenic bladder no signs of UTI    Hypertension controlled    Plan:  As above, I will contact him regarding the results     This transcription uses voice recognition software, which may contain typographical errors.      Lab results showed hemoglobin 11.1 MCV was 68 and did get iron studies which showed iron 12 ferritin 69    I reviewed the normal lipid PSA TSH and CMP with the patient    He has been on some iron in the past I'm going to restart that at 325 mg twice a day recheck in 2 months.  He's had no black stools or signs of bleeding

## 2021-06-09 NOTE — PROGRESS NOTES
I met with Josiah Crump Sr. at the request of Dr. Petit to recheck his blood pressure.  Blood pressure medications on the MAR were reviewed with patient.    Patient has taken all medications as per usual regimen: Yes  Patient reports tolerating them without any issues or concerns: No    Vitals:    06/25/20 1411   BP: 138/82   Patient Site: Left Arm   Patient Position: Sitting   Cuff Size: Adult Large   Pulse: 93       Blood pressure was taken, previous encounter was reviewed, recorded blood pressure below 140/90.  Patient was discharged and the note will be sent to the provider for final review.

## 2021-06-09 NOTE — TELEPHONE ENCOUNTER
Please write up this order, the diagnosis is paraplegia.    Please have the doctor of the day sign it

## 2021-06-09 NOTE — TELEPHONE ENCOUNTER
Orders being requested:   Wheelchair Supply Orders.  1. Back cover replacement.  2. Seat cover replacement.  3. Leg strap for his left leg.    Reason service is needed/diagnosis:   Paraplegic.    When are orders needed by:   ASAP    Where to send Orders:   Attn: [facility] Fax: [phone]     Okay to leave detailed message?  Yes    Please place order, sign and fax.

## 2021-06-09 NOTE — PROGRESS NOTES
Clinic Care Coordination Contact  [facility]/Voicemail    BPCI-A    Clinical Data: Care Coordinator Outreach  Outreach attempted x 1.  Left message on patient's voicemail with call back information and requested return call.  Plan: Care Coordinator will try to reach patient again in 3-5 business days.      Krystyna Chacon RN Clinic Care Coordinator

## 2021-06-09 NOTE — TELEPHONE ENCOUNTER
Called and spoke with Josiah. Message was given. No other questions. He understood the message.

## 2021-06-09 NOTE — TELEPHONE ENCOUNTER
----- Message from Marvel Petit MD sent at 6/29/2020 10:55 PM CDT -----  Please contact this patient and let her know that the lab tests and EKG and chest x-ray all look good    Also please fax these labs as well as the chest x-ray and EKG to the fax number on his last visit (preop) over [facility]

## 2021-06-09 NOTE — PROGRESS NOTES
"Clinic Care Coordination Contact    Follow Up Progress Note - BPCI-A     Background:  Patient was hospitalized at [facility] from 4/13/20 to 4/15/20 with acute CVA.     Assessment:  Pt states he's doing \"great.\"  He had his pre-op physical last week, 6/16/20 and had his pre-op labs done at the [facility] on Monday, 6/22/20; however, forgot he needed a CXR and EKG for his upcoming surgery next Friday (7/3/20).  He's asking for assisting in scheduling his CXR and EKG at the [facility] asa this week.      RN Care Coordinator confirmed pt restarted amlodipine, ASA, and simvastatin, as ordered by Dr. Petit at his pre-op physical last week on 6/16/20.          Goals addressed this encounter:   Goals Addressed                 This Visit's Progress       Patient Stated      Monitoring (pt-stated)   On track     Goal Statement:  I want to stay out of the hospital for the next 90 days.  Date Goal set:  6/3/2020  Barriers:  Recent stroke  Strengths:  Able to identify and advocate for needs, good support system from family  Date to Achieve By:  7/15/2020 (based on hospital discharge date of 4/15/2020)  Patient expressed understanding of goal:  Yes  Action steps to achieve this goal:  1. I will attend all my appts as scheduled, and recommended follow up appts  2. I will call my clinic if I start to feel sick or notice any change(s) in my health, and schedule an appt if needed  3. I will call the triage nurse line for advice, before going to the hospital  4. I will go to  or Walk-In-Clinic before going to the hospital, if I am unable to get an appt with my PCP  5. I will update the Community Healthcare Worker during outreach calls and ask for additional support or resources, if needed      UPDATED:  6/24/2020 (pt has upcoming surgery scheduled on 7/3/20)             Intervention/Education provided during outreach:  RN Care Coordinator called the [facility] backdoor number and spoke with Nya, who assisted scheduling pt's CXR, EKG, and BP check " tomorrow, 6/25/20, at the clinic.           Plan:  90 day post hospitalization monitoring ends 7/15/20, based on hospital d/c date of 4/15/20, per BPCI-A standard workflow.  Informed pt he can continue in CCC through his primary clinic, and will review again during next outreach call.  RN Care Coordinator final outreach on 7/8/20, for post-op check in following pt's 7/3/20 surgery.        Krystyna Chacon RN Clinic Care Coordinator

## 2021-06-09 NOTE — PROGRESS NOTES
Clinic Care Coordination Contact    Situation:  Patient chart reviewed by RN Care Coordinator for BPCI-A graduation approval.    Background:  Patient was hospitalized at [facility] from 4/13/20 to 4/15/20 with acute CVA, and has been followed by Care Coordination for 90 day post hospitalization monitoring, per BPCI-A guidelines.            Assessment:  90 day BPCI-A monitoring ended 7/15/20.  RN Care Coordinator did not attempt another outreach as CHW attempted outreach 7/15/20 and left a message for pt.  Pt has not returned calls from either RN Care Coordinator or CHW.     Plan/Recommendations:  BPCI-A monitoring completed.  Updated Care Team to remove Clinic Care Coordination team, and BPCI-A episode resolved.  No further follow up needed.  Graduation letter sent via [employer].      Krystyna Chacon RN Clinic Care Coordinator

## 2021-06-09 NOTE — PROGRESS NOTES
Plastic Surgery Assessment Note    Today's Visit:  Pt is doing well and has no concerns or complaints.  Pt is tolerating the dressings well.  Pt has no fever or chills.      Labs:  No results for input(s): CRP, HGBA1C, LABPRE, ALBUMIN in the last 72 hours.    Invalid input(s): CDIFF, HEM    Vitals:  Vitals:    03/21/17 1304   BP: 110/70   Pulse: 88   Resp: 16   Temp: 97.6  F (36.4  C)       [place]:           There is no height or weight on file to calculate BMI.    Output:                       Physical Exam:  General Appearance:  Appears comfortable, Alert, cooperative, no distress,   Head: Normocephalic, without obvious abnormality, atraumatic  Eyes: PERRL, conjunctiva/corneas clear, EOM's intact, both eyes   Nose: Nares normal, no drainage   Throat: Lips, mucosa, and tongue normal; teeth and gums normal  Neck: Supple, symmetrical, trachea midline, no adenopathy;    Lungs: Clear to auscultation bilaterally, respirations unlabored  Chest Wall: No tenderness or deformity  Heart: Regular rate and rhythm, S1 and S2 normal, no murmur, rubs or gallop  Abdomen: Soft, non-tender, bowel sounds active all four quadrants,   no masses, no organomegaly  Extremities: Extremities normal, atraumatic, no cyanosis or edema  Pulses: DP pulses are 1-2+ bilat.    Skin: no rashes or lesions  Neurologic: normal and equal strength bilat in upper and lower extremities    Wound Ostomy Assessment/Plan:    Pre Size Length 12 3/21/2017  1:00 PM   Pre Size Width 15.6 3/21/2017  1:00 PM   Pre Total Sq cm 187.2 3/21/2017  1:00 PM   Prodcut Used Gauze 3/21/2017  1:00 PM           Both right IT pressure ulcer and right chronic posterior thigh wound are clean with good granulation tissue. There is some slough at the base. The dressings were all removed. Using a currette, incisional debridement of Both right IT pressure ulcer and right chronic posterior thigh wound were done. Debrided 4x4 cm of necrotic sq to healthy bleeding tissue. Hemostasis was  well obtained. He tolerated it well and no complications were noted. Dresssings were reapplied. I recommend silvercell dressings. Follow up 1 month. He is scheduled for colonoscopy and pt states that the prep is difficult for him as stool can get into the wound and make it worse.  He may benefit from having this done in-patient so he can better care of his wounds during the prep for the colonoscopy.  He may benefit from a girddle stone procedure to remove the femur.  He feels that the right femur is weighing him down.    Gabriele Brown MD

## 2021-06-10 NOTE — PROGRESS NOTES
Subjective: This patient comes in for med refill his mother is ill and he needs to go down to believe it is [place] where she lives she is in the ICU she is suffered another stroke.    Patient has ongoing problems with his shoulder anterior glenohumeral joint have some pain with hyperabduction and external rotation.  He is in a wheelchair, he uses his arms to propel.  I discussed he likely has some rotator cuff injury.  He did get an x-ray today there is high position of the humeral head suggesting some chronic rotator cuff disease.  This is consistent with his symptoms and exam.    Patient needed refill on his medicines as he is going out of town this is a week early.  He understands he cannot  the narcotic pain medicine for 5 weeks then.    No additional issues or problems.    He needed refill on the Ambien as well as the pain medication.    Tobacco status: He  reports that he has never smoked. He does not have any smokeless tobacco history on file.    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     Paraplegia Of Unknown Etiology      Pressure ulcer      Tenosynovitis Of The Left Wrist      Neurogenic Bladder      Vitamin D Deficiency      Benign Essential Hypertension      Chronic Pain      Epicondylitis      Insomnia        Current Outpatient Prescriptions   Medication Sig Dispense Refill     baclofen (LIORESAL) 10 MG tablet TAKE 2 TABLETS(20 MG) BY MOUTH THREE TIMES DAILY 90 tablet 0     ferrous sulfate 325 (65 FE) MG tablet 1 by mouth twice a day 60 tablet 5     metoprolol succinate (TOPROL-XL) 50 MG 24 hr tablet TAKE 1 TABLET BY MOUTH DAILY 90 tablet 3     oxyCODONE (ROXICODONE) 5 MG immediate release tablet Take 1 Tablets 3 times daily as needed 90 tablet 0     oxyCODONE 60 mg TR12 Take 1 tablet 3 times per day 90 tablet 0     tolterodine (DETROL LA)  4 MG ER capsule TAKE 2 CAPSULE BY MOUTH DAILY 60 capsule 5     zolpidem (AMBIEN) 10 mg tablet TAKE 1 TABLET BY MOUTH EVERY DAY AT BEDTIME 30 tablet 0     Current Facility-Administered Medications   Medication Dose Route Frequency Provider Last Rate Last Dose     lidocaine 2 % jelly (XYLOCAINE)   Topical PRN Gabriele Brown MD   1 application at 03/21/17 1312       ROS: 10 point review of systems negative other than as outlined above    Objective:    /84 (Patient Site: Right Arm, Patient Position: Sitting, Cuff Size: Adult Large)  Pulse 84  Resp 16  There is no height or weight on file to calculate BMI.      General appearance no acute distress vital signs are stable    Lungs are clear no rales or rhonchi heart regular S1-S2 neck was supple    Right shoulder with tenderness at the anterior glenohumeral joint pain with abduction past 90  also pain with external rotation x-ray as discussed above.    No new findings on lower extremity    Assessment:  1. Paraplegia Of Unknown Etiology     2. Insomnia  zolpidem (AMBIEN) 10 mg tablet   3. Chronic Pain  oxyCODONE 60 mg TR12    oxyCODONE (ROXICODONE) 5 MG immediate release tablet   4. Shoulder pain, right  Ambulatory referral to Orthopedic Surgery    XR Shoulder Right 2 or More VWS    rotater cuff strain     Refill medication as outlined    Rotator cuff strain we will see orthopedics, continue same meds    Plan: As outlined above    This transcription uses voice recognition software, which may contain typographical errors.

## 2021-06-10 NOTE — TELEPHONE ENCOUNTER
Who is calling:  Patient  Reason for Call:  Pt calling wondering why his request for Amoxicillin has been refused? Pt states he has a toothache, please call him.  Please advise.  Date of last appointment with primary care: N/A  Okay to leave a detailed message: Yes

## 2021-06-10 NOTE — PROGRESS NOTES
Subjective: This patient comes in for follow-up he had a physical back on March 6 hemoglobin was 11.1 ferritin 69 iron was 12 MCV 68    I rechecked her hemoglobin today came back at 10.9.    Patient's been on iron twice a day we will have him stay on that and recheck again in 2-3 months    Additional labs LDL 50 PSA 1.0.    Patient otherwise is doing well continue same chronic pain medication    He is getting a seating evaluation for his wheelchair over at [place].    Tobacco status: He  reports that he has never smoked. He does not have any smokeless tobacco history on file.    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     Paraplegia Of Unknown Etiology      Pressure ulcer      Tenosynovitis Of The Left Wrist      Neurogenic Bladder      Vitamin D Deficiency      Benign Essential Hypertension      Chronic Pain      Epicondylitis      Insomnia        Current Outpatient Prescriptions   Medication Sig Dispense Refill     baclofen (LIORESAL) 10 MG tablet TAKE 2 TABLETS(20 MG) BY MOUTH THREE TIMES DAILY 90 tablet 0     baclofen (LIORESAL) 10 MG tablet TAKE 2 TABLETS(20 MG) BY MOUTH THREE TIMES DAILY 90 tablet 0     ferrous sulfate 325 (65 FE) MG tablet 1 by mouth twice a day 60 tablet 5     metoprolol succinate (TOPROL-XL) 50 MG 24 hr tablet TAKE 1 TABLET BY MOUTH DAILY 90 tablet 3     oxyCODONE (ROXICODONE) 5 MG immediate release tablet Take 1 Tablets 3 times daily as needed 90 tablet 0     oxyCODONE 60 mg TR12 Take 1 tablet 3 times per day 90 tablet 0     tolterodine (DETROL LA) 4 MG ER capsule TAKE 2 CAPSULE BY MOUTH DAILY 60 capsule 5     zolpidem (AMBIEN) 10 mg tablet TAKE 1 TABLET BY MOUTH EVERY DAY AT BEDTIME 30 tablet 0     Current Facility-Administered Medications   Medication Dose Route Frequency Provider Last Rate Last Dose     lidocaine 2 % jelly (XYLOCAINE)    Topical PRN Gabriele Brown MD   1 application at 03/21/17 1312       ROS:   Review of systems negative other than as outlined above    Objective:    /60 (Patient Site: Right Arm, Patient Position: Sitting, Cuff Size: Adult Large)  Pulse 80  Temp 96.8  F (36  C) (Oral)   Resp 24  SpO2 96% Comment: at rest with room air  There is no height or weight on file to calculate BMI.      General appearance no acute distress vital signs are stable O2 sat 96%.    No abdominal pain lungs are clear no rales or rhonchi, heart was regular S1-S2    Results for orders placed or performed in visit on 05/01/17   Hemoglobin   Result Value Ref Range    Hemoglobin 10.9 (L) 14.0 - 18.0 g/dL       Assessment:  1. Anemia  Hemoglobin   2. Chronic Pain  oxyCODONE 60 mg TR12    oxyCODONE (ROXICODONE) 5 MG immediate release tablet   3. Paraplegia Of Unknown Etiology     4. Hx of BKA, right       Anemia chronic on iron will continue recheck in 2-3 months    Chronic pain refill on oxycodone    Paraplegia with chronic decubitus ulcers getting a seating evaluation for his wheelchair at [place]    Plan: As above    This transcription uses voice recognition software, which may contain typographical errors.

## 2021-06-10 NOTE — TELEPHONE ENCOUNTER
RN cannot approve Refill Request    RN can NOT refill this medication med is not covered by policy/route to provider. Last office visit: 12/5/2019 Marvel Petit MD Last Physical: 2/27/2020 Last MTM visit: Visit date not found Last visit same specialty: 12/5/2019 Marvel Petit MD.  Next visit within 3 mo: Visit date not found  Next physical within 3 mo: Visit date not found      Anastasiya Vieira, [facility] Triage/Med Refill 8/9/2020    Requested Prescriptions   Pending Prescriptions Disp Refills     amoxicillin (AMOXIL) 500 MG capsule [Pharmacy Med Name: AMOXICILLIN 500MG CAPSULES] 15 capsule 0     Sig: TAKE 1 CAPSULE BY MOUTH THREE TIMES DAILY UNTIL GONE       There is no refill protocol information for this order        aspirin 81 mg chewable tablet [Pharmacy Med Name: ASPIRIN 81MG CHEWABLE TABLETS] 30 tablet 5     Sig: CHEW AND SWALLOW 1 TABLET(81 MG) BY MOUTH DAILY       Aspirin/Dipyridamole Refill Protocol Passed - 8/8/2020  2:52 PM        Passed - PCP or prescribing provider visit in past 12 months       Last office visit with prescriber/PCP: 12/5/2019 Marvel Petit MD OR same dept: 12/5/2019 Marvel Petit MD OR same specialty: 12/5/2019 Marvel Petit MD  Last physical: 2/27/2020 Last MTM visit: Visit date not found    Next appt within 3 mo: Visit date not found Next physical within 3 mo: Visit date not found  Prescriber OR PCP: Marvel Petit MD  Last diagnosis associated with med order: 1. Acute CVA (cerebrovascular accident) (H)  - aspirin 81 mg chewable tablet [Pharmacy Med Name: ASPIRIN 81MG CHEWABLE TABLETS]; CHEW AND SWALLOW 1 TABLET(81 MG) BY MOUTH DAILY  Dispense: 30 tablet; Refill: 5    If protocol passes may refill for 6 months if within 3 months of last provider visit (or a total of 9 months).

## 2021-06-11 LAB
BACTERIA SPEC CULT: ABNORMAL
BACTERIA SPEC CULT: ABNORMAL

## 2021-06-11 NOTE — PROGRESS NOTES
Subjective: This patient comes in for follow-up he had an MRI yesterday at [facility] the results are not back yet.  He thinks he probably has a rotator cuff tear I did review the consultation will await the findings    He is only [place] to abduct actively about 80 .    He has a wheelchair appointment tomorrow at [place] for his fitting.    I do need to get a drugs of abuse screen he will come in 6/30/2017 will give him a urine container and check it as a lab only done.    I did refill his medication he continues on the oxycodone 60 mg 3 times daily as well as oxycodone immediate release 1 5 mg 3 times daily as well as needed.    No new findings or symptoms.    Tobacco status: He  reports that he has never smoked. He does not have any smokeless tobacco history on file.    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     Paraplegia Of Unknown Etiology      Pressure ulcer      Tenosynovitis Of The Left Wrist      Neurogenic Bladder      Vitamin D Deficiency      Benign Essential Hypertension      Chronic Pain      Epicondylitis      Insomnia        Current Outpatient Prescriptions   Medication Sig Dispense Refill     baclofen (LIORESAL) 10 MG tablet TAKE 2 TABLETS(20 MG) BY MOUTH THREE TIMES DAILY 90 tablet 0     baclofen (LIORESAL) 10 MG tablet TAKE 2 TABLETS(20 MG) BY MOUTH THREE TIMES DAILY 90 tablet 0     ferrous sulfate 325 (65 FE) MG tablet 1 by mouth twice a day 60 tablet 5     metoprolol succinate (TOPROL-XL) 50 MG 24 hr tablet TAKE 1 TABLET BY MOUTH DAILY 90 tablet 3     oxyCODONE (ROXICODONE) 5 MG immediate release tablet Take 1 Tablets 3 times daily as needed 90 tablet 0     oxyCODONE 60 mg TR12 Take 1 tablet 3 times per day 90 tablet 0     tolterodine (DETROL LA) 4 MG ER capsule TAKE 2 CAPSULE BY MOUTH DAILY 60 capsule 5     zolpidem (AMBIEN) 10 mg  tablet TAKE 1 TABLET BY MOUTH AT BEDTIME 30 tablet 2     Current Facility-Administered Medications   Medication Dose Route Frequency Provider Last Rate Last Dose     lidocaine 2 % jelly (XYLOCAINE)   Topical PRN Gabriele Brown MD   1 application at 03/21/17 1312       ROS:   Review of systems positive as outlined otherwise negative    Objective:    /70 (Patient Site: Left Arm, Patient Position: Sitting, Cuff Size: Adult Large)  Pulse 72  Temp 97  F (36.1  C) (Oral)   Resp 20  SpO2 95% Comment: at rest with room air  There is no height or weight on file to calculate BMI.    Patient is in a wheelchair no acute distress    Vital signs are stable    Lungs are clear no rales or rhonchi heart regular S1-S2 blood pressure looks good    Right shoulder with tender anterior glenohumeral area and decreased abduction MRI pending    Chronic leg and back pain as before.  Picture of his decubitus ulcer noted in the chart recently as well        Assessment:  1. Chronic Pain  oxyCODONE (ROXICODONE) 5 MG immediate release tablet    oxyCODONE 60 mg TR12    Drug Abuse 1+, Urine    CANCELED: Drugs of Abuse 1,Urine   2. Shoulder pain, right       Refill on medicines, check drugs of abuse screen    Shoulder pain with likely rotator cuff strain    Plan: As outlined above    This transcription uses voice recognition software, which may contain typographical errors.

## 2021-06-12 NOTE — PROGRESS NOTES
Subjective: This patient comes in for evaluation this is a 51-year-old male.  He has chronic pain he has chronic spasticity from his paraplegia.  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 Reeder from plastic surgery.    He continues on baclofen he does not feel like that is working real great he did better when he is on diazepam he states.    Patient wants to get the leg amputated.  He will talk to Dr. Davies regarding that further.  He has seen [place] orthopedist regarding this as well.    Patient is on baclofen 10 mg 2 3 times daily.    He continues on chronic pain medications of oxycodone.    He had a urine drug screen which showed evidence of the oxycodone and opiates also positive for cocaine.    I did talk to the patient regarding this he comes in to discuss this.    Patient admits that he did use cocaine approximately 7-9 days prior to the urine drug screen.    He felt like he used to to help regarding his pain and muscle spasm.    He knows that he signed a contract regarding this, a narcotics agreement.    He had negative urine back last May.    I had a long discussion with the patient regarding his situation.  I definitely believe he does have chronic pain he has spasticity he has significant decubitus ulcer.  I think he does need the pain medication.    He feels that he will not use any illegal drugs including cocaine.  He said he would get checked weekly if needed.    I can see him back next Wednesday.  We will get a urine drug screen.  He does need to cath his urine he understands every time he comes in he needs to bring a catheter.  I will have him sign a new narcotics agreement.  Will be very clear on ramifications of any further drug use or irregularities.    We will leave him on the baclofen for now consider different options for spasticity down the line    Follow-up with Dr. Reeder and [facility]  orthopedist regarding possible amputation    Tobacco status: He  reports that he has never smoked. He does not have any smokeless tobacco history on file.    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     Paraplegia Of Unknown Etiology      Pressure ulcer      Tenosynovitis Of The Left Wrist      Neurogenic Bladder      Vitamin D Deficiency      Benign Essential Hypertension      Chronic Pain      Epicondylitis      Insomnia        Current Outpatient Prescriptions   Medication Sig Dispense Refill     baclofen (LIORESAL) 10 MG tablet TAKE 2 TABLETS(20 MG) BY MOUTH THREE TIMES DAILY 90 tablet 0     ferrous sulfate 325 (65 FE) MG tablet 1 by mouth twice a day 60 tablet 5     metoprolol succinate (TOPROL-XL) 50 MG 24 hr tablet TAKE 1 TABLET BY MOUTH DAILY 90 tablet 3     oxyCODONE (ROXICODONE) 5 MG immediate release tablet Take 1 Tablets 3 times daily as needed 90 tablet 0     oxyCODONE 60 mg TR12 Take 1 tablet 3 times per day 90 tablet 0     tolterodine (DETROL LA) 4 MG ER capsule TAKE 2 CAPSULE BY MOUTH DAILY 60 capsule 5     zolpidem (AMBIEN) 10 mg tablet TAKE 1 TABLET BY MOUTH AT BEDTIME 30 tablet 2     Current Facility-Administered Medications   Medication Dose Route Frequency Provider Last Rate Last Dose     lidocaine 2 % jelly (XYLOCAINE)   Topical PRN Gabriele Brown MD   1 application at 03/21/17 1312       ROS:   Review of systems positive as outlined otherwise negative    Objective:    /74 (Patient Site: Left Arm, Patient Position: Sitting, Cuff Size: Adult Large)  Pulse 72  Temp 97.8  F (36.6  C) (Oral)   Resp 16  There is no height or weight on file to calculate BMI.      Pictures of the decubitus ulcer reviewed no additional physical exam    Results for orders placed or performed in visit on 07/14/17   Drug Abuse 1+, Urine   Result Value Ref Range     Amphetamines Screen Negative Screen Negative    Benzodiazepines Screen Negative Screen Negative    Opiates (!) Screen Negative     Screen Positive (Confirmation available on request)    Phencyclidine Screen Negative Screen Negative    THC Screen Negative Screen Negative    Barbiturates Screen Negative Screen Negative    Cocaine Metabolite (!) Screen Negative     Screen Positive (Confirmation available on request)    Methadone Screen Negative Screen Negative    Oxycodone (!) Screen Negative     Screen Positive (Confirmation available on request)    Creatinine, Urine 62.0 mg/dL       Assessment:  1. Chronic Pain     2. Paraplegia Of Unknown Etiology     3. Skin ulcer of right thigh, with unspecified severity     4. Benign Essential Hypertension       Total time with patient 25 minutes entire time spent on counseling as outlined above regarding his paraplegia chronic pain decubitus ulcer drug use narcotic use    Plan: Follow-up as discussed    This transcription uses voice recognition software, which may contain typographical errors.     Secondary Intention Text (Leave Blank If You Do Not Want): The defect will heal with secondary intention.

## 2021-06-12 NOTE — PROGRESS NOTES
Subjective: This patient comes in for evaluation is a 51-year-old male..  Please see the office visit from last week.  I discussed the patient's positive drug screen for cocaine the results of the opiates and the oxycodone.    He comes in today to discuss ongoing medication and sign a new narcotics agreement.  He states he has not used any further cocaine.  He does have chronic pain I believe he does need chronic pain medicine he is paraplegic who has a right below the knee amputation and the large decubitus ulcer please see previous discussion.  May soon need a amputation.    Patient has a neurogenic bladder and needs to self cath.    We discussed the controlled substance treatment agreement for chronic medication I filled out the form and he signed this and he got a copy of this.  I did get a urine drug screen.    The patient self cath.    Please see below regarding results    Regarding his shoulder on the right he did get an injection he is going to physical therapy he will be going every week in August.    Regarding his spasticity has been on baclofen 10 mg 2 tablets 3 times daily I am going to increase that to 20 mg tablets 1 4 times daily.        Tobacco status: He  reports that he has never smoked. He has never used smokeless tobacco.    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     Paraplegia Of Unknown Etiology      Pressure ulcer      Tenosynovitis Of The Left Wrist      Neurogenic Bladder      Vitamin D Deficiency      Benign Essential Hypertension      Chronic Pain      Epicondylitis      Insomnia        Current Outpatient Prescriptions   Medication Sig Dispense Refill     ferrous sulfate 325 (65 FE) MG tablet 1 by mouth twice a day 60 tablet 5     metoprolol succinate (TOPROL-XL) 50 MG 24 hr tablet TAKE 1 TABLET BY MOUTH DAILY 90 tablet 3     oxyCODONE  (ROXICODONE) 5 MG immediate release tablet Take 1 Tablets 3 times daily as needed 90 tablet 0     oxyCODONE 60 mg TR12 Take 1 tablet 3 times per day 90 tablet 0     tolterodine (DETROL LA) 4 MG ER capsule TAKE 2 CAPSULE BY MOUTH DAILY 60 capsule 5     zolpidem (AMBIEN) 10 mg tablet TAKE 1 TABLET BY MOUTH AT BEDTIME 30 tablet 2     baclofen (LIORESAL) 20 MG tablet 1 po qid 120 tablet 5     Current Facility-Administered Medications   Medication Dose Route Frequency Provider Last Rate Last Dose     lidocaine 2 % jelly (XYLOCAINE)   Topical PRN Gabriele Brown MD   1 application at 03/21/17 1312       ROS: Review of systems positive as outlined otherwise negative  Patient additionally needs a shower bench because of his paraplegia and a prescription was given for that.  Objective:    /70 (Patient Site: Right Arm, Patient Position: Sitting, Cuff Size: Adult Large)  Pulse 92  Resp 18  There is no height or weight on file to calculate BMI.      No additional exam was done.    Results noted below he states he took his oxycodone last night he is on 60 mg 3 times a day plus an additional 5 mg short-acting 3 times daily as needed.    Results for orders placed or performed in visit on 07/26/17   Drug Abuse 1+, Urine   Result Value Ref Range    Amphetamines Screen Negative Screen Negative    Benzodiazepines Screen Negative Screen Negative    Opiates Screen Negative Screen Negative    Phencyclidine Screen Negative Screen Negative    THC Screen Negative Screen Negative    Barbiturates Screen Negative Screen Negative    Cocaine Metabolite Screen Negative Screen Negative    Methadone Screen Negative Screen Negative    Oxycodone Screen Negative Screen Negative    Creatinine, Urine 10.5 mg/dL       Assessment:  1. Chronic Pain  oxyCODONE 60 mg TR12    oxyCODONE (ROXICODONE) 5 MG immediate release tablet    Drug Abuse 1+, Urine   2. Paraplegia Of Unknown Etiology     3. Skin ulcer of right thigh, with unspecified severity      4. Spasticity  baclofen (LIORESAL) 20 MG tablet   5. Cocaine use       Previous history of cocaine use no evidence in the urine.  But also no evidence of oxycodone in the urine.  He stated he took it last night.    The notation on the lab result was that the urine was dilute.    I discussed with the patient these findings.  I am concerned regarding the discrepancy in the urine.  He states that it is his urine and he did take his medicine.    Plan: As outlined above.  At this time I feel that I need to have him see a pain specialist for the ongoing chronic pain medications.    Regarding spasticity and increased his baclofen as outlined    Regarding right shoulder continue therapy status post injection    A referral was placed for chronic pain consultation and med management.  I will continue to supply him medication until he establishes with the pain consult    This transcription uses voice recognition software, which may contain typographical errors.

## 2021-06-13 NOTE — PROGRESS NOTES
Subjective: Patient comes in for follow-up.  His left axillary infection/furuncle improved with warm packs and Keflex    He still has right gynecomastia slightly tender order to get a mammogram consider seeing surgery    He is going to the pain clinic on 11/10/2017 I refilled his pain meds today.    I reviewed his consultation from [facility] orthopedic department.  He will be going on to see the plastic surgeon.  Plan is to consider the hip disarticulation procedure, jointly done by orthopedics and plastic surgery.    No new findings or issues    Tobacco status: He  reports that he has never smoked. He has never used smokeless tobacco.    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     Paraplegia Of Unknown Etiology      Pressure ulcer      Tenosynovitis Of The Left Wrist      Neurogenic Bladder      Vitamin D Deficiency      Benign Essential Hypertension      Chronic Pain      Epicondylitis      Insomnia        Current Outpatient Prescriptions   Medication Sig Dispense Refill     baclofen (LIORESAL) 20 MG tablet 1 po qid 120 tablet 5     ferrous sulfate 325 (65 FE) MG tablet 1 by mouth twice a day 60 tablet 5     metoprolol succinate (TOPROL-XL) 50 MG 24 hr tablet TAKE 1 TABLET BY MOUTH DAILY 90 tablet 1     oxyCODONE (ROXICODONE) 5 MG immediate release tablet Take 1 Tablets 3 times daily as needed 25 tablet 0     oxyCODONE 60 mg TR12 Take 1 tablet 3 times per day 25 tablet 0     tolterodine (DETROL LA) 4 MG ER capsule TAKE 2 CAPSULES BY MOUTH DAILY 180 capsule 2     zolpidem (AMBIEN) 10 mg tablet TAKE 1 TABLET BY MOUTH EVERY NIGHT AT BEDTIME 30 tablet 1     Current Facility-Administered Medications   Medication Dose Route Frequency Provider Last Rate Last Dose     lidocaine 2 % jelly (XYLOCAINE)   Topical PRN Gabriele Brown MD   1 application at  03/21/17 1312       ROS:   Review of systems positive as outlined otherwise negative    Objective:    /64 (Patient Site: Left Arm, Patient Position: Sitting, Cuff Size: Adult Large)  Pulse 92  Resp 16  There is no height or weight on file to calculate BMI.      General appearance no acute distress    Right breast with fullness under the areola about 4 cm in diameter.  Will get a mammogram    Left axilla furuncle minimal no tenderness.    Lungs clear no rales rhonchi heart regular S1-S2    No changes in through the lower extremities.  With the BKA on the right ongoing decubitus ulcer right thigh buttocks.        Assessment:  1. Gynecomastia  Mammo Diagnostic Bilateral    CANCELED: Mammo Diagnostic Right   2. Chronic Pain  oxyCODONE 60 mg TR12    oxyCODONE (ROXICODONE) 5 MG immediate release tablet   3. Hx of BKA, right     4. Pressure ulcer       Gynecomastia check mammogram chronic pain refill medicine    Ongoing evaluation for possible right hip disarticulation procedure, paraplegia    Plan: As above    This transcription uses voice recognition software, which may contain typographical errors.

## 2021-06-13 NOTE — PROGRESS NOTES
Subjective: Patient comes in for evaluation is a 51-year-old male.  Patient has been followed by Dr. Reeder from plastic surgery.  Unfortunately his right decubitus ulcer has worsened and he probably will need an amputation.  Dr. Reeder recommended him going to [facility] and see orthopedist with plastic surgeon to figure out the best approach.  I gave him referral for that    Patient goes to the pain clinic next week I gave him another week's worth of medication today.    He has had a couple spots 1 on the left axilla has a furuncle there and on also has some gynecomastia on the right which is tender although not warm he definitely has some subareolar fullness though about 3-4 cm more diffuse not discrete.    His skin is warm pack both areas go on cephalexin and then call me in about a week, consider mammogram for the right gynecomastia.    Patient has otherwise no additional problems or concerns.    Tobacco status: He  reports that he has never smoked. He has never used smokeless tobacco.    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     Paraplegia Of Unknown Etiology      Pressure ulcer      Tenosynovitis Of The Left Wrist      Neurogenic Bladder      Vitamin D Deficiency      Benign Essential Hypertension      Chronic Pain      Epicondylitis      Insomnia        Current Outpatient Prescriptions   Medication Sig Dispense Refill     baclofen (LIORESAL) 20 MG tablet 1 po qid 120 tablet 5     cephalexin (KEFLEX) 500 MG capsule Take 1 capsule (500 mg total) by mouth 4 (four) times a day for 10 days. 40 capsule 0     ferrous sulfate 325 (65 FE) MG tablet 1 by mouth twice a day 60 tablet 5     metoprolol succinate (TOPROL-XL) 50 MG 24 hr tablet TAKE 1 TABLET BY MOUTH DAILY 90 tablet 3     oxyCODONE (ROXICODONE) 5 MG immediate release tablet Take 1 Tablets 3 times  daily as needed 25 tablet 0     oxyCODONE 60 mg TR12 Take 1 tablet 3 times per day 25 tablet 0     tolterodine (DETROL LA) 4 MG ER capsule TAKE 2 CAPSULES BY MOUTH DAILY 180 capsule 2     zolpidem (AMBIEN) 10 mg tablet TAKE 1 TABLET BY MOUTH AT BEDTIME 30 tablet 2     Current Facility-Administered Medications   Medication Dose Route Frequency Provider Last Rate Last Dose     lidocaine 2 % jelly (XYLOCAINE)   Topical PRN Gabriele Brown MD   1 application at 03/21/17 1312       ROS: Review of systems, 10 point negative other than as outlined above    Objective:    /80 (Patient Site: Left Arm, Patient Position: Sitting, Cuff Size: Adult Large)  Pulse 91  Temp 98.1  F (36.7  C) (Oral)   There is no height or weight on file to calculate BMI.      General appearance somewhat uncomfortable.    Skin exam as outlined above    His lungs are clear no rales or rhonchi O2 sat was 94% heart was regular S1-S2 rate in the 90s    Skin exam: He showed me pictures of the decubitus ulcer which is near circumferential.    Abdomen was nontender    Left axilla as outlined with the furuncle    Right gynecomastia.    No new neurologic issues        Assessment:  1. Chronic Pain  oxyCODONE 60 mg TR12    oxyCODONE (ROXICODONE) 5 MG immediate release tablet   2. Paraplegia Of Unknown Etiology  Ambulatory referral to Plastic Surgery    Ambulatory referral to Orthopedic Surgery   3. Hx of BKA, right  Ambulatory referral to Plastic Surgery    Ambulatory referral to Orthopedic Surgery   4. Skin ulcer of right thigh, with unspecified severity  Ambulatory referral to Plastic Surgery    Ambulatory referral to Orthopedic Surgery   5. Boil  cephalexin (KEFLEX) 500 MG capsule   6. Gynecomastia       Neck pain refill on the oxycodone.  Patient will be seeing pain clinic next week    History of right BKA history of paraplegia now with the decubitus ulcer, see plastic and Orth O at [facility]    Antibiotics for the boil warm packs  also    Update me in a week regarding the gynecomastia consider mammogram    Plan: As above, no new medications denies any drug use i.e. marijuana etc.    This transcription uses voice recognition software, which may contain typographical errors.

## 2021-06-13 NOTE — PROGRESS NOTES
Plastic Surgery Assessment Note    Today's Visit:  Pt is doing well and has no concerns or complaints.  Pt is tolerating the dressings well.  Pt has no fever or chills.      Labs:  No results for input(s): CRP, HGBA1C, LABPRE, ALBUMIN in the last 72 hours.    Invalid input(s): CDIFF, HEM    Vitals:  There were no vitals filed for this visit.    [place]:           There is no height or weight on file to calculate BMI.    Output:                       Physical Exam:  General Appearance:  Appears comfortable, Alert, cooperative, no distress,   Head: Normocephalic, without obvious abnormality, atraumatic  Eyes: PERRL, conjunctiva/corneas clear, EOM's intact, both eyes   Nose: Nares normal, no drainage   Throat: Lips, mucosa, and tongue normal; teeth and gums normal  Neck: Supple, symmetrical, trachea midline, no adenopathy;    Lungs: Clear to auscultation bilaterally, respirations unlabored  Chest Wall: No tenderness or deformity  Heart: Regular rate and rhythm, S1 and S2 normal, no murmur, rubs or gallop  Abdomen: Soft, non-tender, bowel sounds active all four quadrants,   no masses, no organomegaly  Extremities: Extremities normal, atraumatic, no cyanosis or edema  Pulses: DP pulses are 1-2+ bilat.    Skin: no rashes or lesions  Neurologic: normal and equal strength bilat in upper and lower extremities    Wound Ostomy Assessment/Plan:          Wound 07/12/16 Right IT/ Trohanteric (Active)   Pre Size Length 17 9/26/2017  2:00 PM   Pre Size Width 23 9/26/2017  2:00 PM   Pre Total Sq cm 391 9/26/2017  2:00 PM   Prodcut Used Gauze 9/26/2017  2:00 PM         Both right IT pressure ulcer and right chronic posterior thigh wound are clean with good granulation tissue. There is some slough at the base. The dressings were all removed. Using a currette, incisional debridement of Both right IT pressure ulcer and right chronic posterior thigh wound were done. Debrided 2x2 cm of necrotic sq to healthy bleeding tissue. Hemostasis was  well obtained. He tolerated it well and no complications were noted. Dresssings were reapplied. I recommend silvercell dressings. Follow up 1 month.  He may benefit from a girddle stone procedure to remove the entire femur.  He feels that the right femur is weighing him down.  I do not do this procedure and I recommend that the patient get evaluated at the [facility] to coordinate with ortho and plastics there for the surgery.         Gabriele Brown MD

## 2021-06-14 NOTE — PROGRESS NOTES
Patient again has failed to go to the pain clinic    I discussed with him at his last visit that I will no longer be prescribing his narcotic pain medicine.    I am discontinuing the short-acting immediate release.  I will continue him on the 60 mg extended release 1, 3 times daily.  25 tablets.    He will call next week for refill.  I will see if he has set up an appointment to see the pain clinic.    Otherwise we will continue to wean him off of this morphine

## 2021-06-14 NOTE — TELEPHONE ENCOUNTER
Refill Approved    Rx renewed per Medication Renewal Policy. Medication was last renewed on 6/16/20.    Felicita Ferreira, [facility] Triage/Med Refill 1/21/2021     Requested Prescriptions   Pending Prescriptions Disp Refills     simvastatin (ZOCOR) 20 MG tablet [Pharmacy Med Name: SIMVASTATIN 20MG TABLETS] 30 tablet 5     Sig: TAKE 1 TABLET(20 MG) BY MOUTH DAILY       Statins Refill Protocol (Hmg CoA Reductase Inhibitors) Passed - 1/20/2021 10:34 AM        Passed - PCP or prescribing provider visit in past 12 months      Last office visit with prescriber/PCP: 12/5/2019 Marvel Petit MD OR same dept: Visit date not found OR same specialty: 12/5/2019 Marvel Petit MD  Last physical: 2/27/2020 Last MTM visit: Visit date not found   Next visit within 3 mo: Visit date not found  Next physical within 3 mo: Visit date not found  Prescriber OR PCP: Marvel Petit MD  Last diagnosis associated with med order: 1. Acute CVA (cerebrovascular accident) (H)  - simvastatin (ZOCOR) 20 MG tablet [Pharmacy Med Name: SIMVASTATIN 20MG TABLETS]; TAKE 1 TABLET(20 MG) BY MOUTH DAILY  Dispense: 30 tablet; Refill: 5    If protocol passes may refill for 12 months if within 3 months of last provider visit (or a total of 15 months).

## 2021-06-14 NOTE — TELEPHONE ENCOUNTER
Travel questionnaire was asked. Verified that they have no signs of COVID-19 symptoms.    Patient dropped off work note for disability for  to fill out. Placed the original copies in the 's slot.    When forms are completed, patient would like it:    -Please call patient to let them know it's ready   Patient requested to have this form done a/s/a/p as he can not be scheduled to go back to work without this form being completed.    Ok to leave a detailed message if unable to get a hold of the Patient.    Please re-route task back to the  to shred the copied forms and complete the task. Thanks!

## 2021-06-14 NOTE — PROGRESS NOTES
"Josiah Crump Sr. is a 55 y.o. male who is being evaluated via a billable telephone visit.      What phone number would you like to be contacted at? [phone]  How would you like to obtain your AVS? AVS Preference: Yves.  Assessment & Plan     Josiah was seen today for paperwork.    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  Limited in his work ability but actually is doing quite well at [employer].  Form was filled out he will  a copy when he comes in.    He needs a preop for his surgery upcoming in February.        Addendum: 3/15/2021    Because of patient's paraplegia he is wheelchair dependent.  He continues to need and uses wheelchair and will be wheelchair-bound his entire life.    In addition he can have repairs of the wheelchair up to 12 months.    Marvel Petit MD      25 minutes spent on the date of the encounter doing chart review, history and exam, documentation and further activities as noted above, as well as below and filling out form    [id number]}     BMI:   Estimated body mass index is 26.17 kg/m  as calculated from the following:    Height as of 2/19/21: 6' 4\" (1.93 m).    Weight as of 2/19/21: 215 lb (97.5 kg).         Return in about 3 months (around 5/2/2021) for Recheck.    Marvel Petit MD  [facility]    Subjective     Josiah Crump Sr. is 55 y.o. and presents to clinic today for the following health issues   HPI   This patient had a virtual visit, telephone, due to the coronavirus pandemic.    Initially working to do his preop of he could get his video capabilities done so is going to come in and see one of my partners for preop on Thursday    He does need a form filled out " for his work, he works at [employer]    He has paraplegia    He is able to lift but no more than 50 pounds.  Also not able to push multiple shopping carts.  Those are the only restrictions he feels are needed.  I did fill the form out he will pick those up on Thursday when he comes in.    He needs surgery for his penile prosthesis.    Otherwise no new symptoms or problems      Review of Systems  10 point review of systems negative other than as outlined above      Objective       Vitals:  No vitals were obtained today due to virtual visit.    Physical Exam  General no acute distress    HEENT denies any nasal congestion or sore throat    Lungs: Nonlabored breathing no wheezing.    Heart: No palpitations    Denies any ulcerations    Denies any abdominal pain.    He has had a previous paraplegia and also the right hip disarticulation.            Phone call duration: 21 minutes, between trying to help him with his video capabilities and the visit itself.

## 2021-06-14 NOTE — PROGRESS NOTES
Subjective: This patient comes in for follow-up.  Patient continues on iron he has anemia he has had low protein.  They are considering the amputation, and hip disarticulation.  He seen [facility] orthopedic.  Also will be seeing Dr. Blackmon from plastic surgery on 11/30/2017.    Patient has not gotten over to the pain center.  I have continued to feel his pain medications but told him that I would not keep doing this he needs to get over there.    I gave him enough to get through to when he sees them.    Otherwise no new findings or issues.    We discussed increased protein diet I did give him a food supplement.    Also check labs with regards to his iron.  The circulating iron was low, ferritin was normal B12 normal.    Hemoglobin about the same at 10.4.  He will stay on iron sulfate 325 mg 1 twice daily    Tobacco status: He  reports that he has never smoked. He has never used smokeless tobacco.    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     Paraplegia Of Unknown Etiology      Pressure ulcer      Tenosynovitis Of The Left Wrist      Neurogenic Bladder      Vitamin D Deficiency      Benign Essential Hypertension      Chronic Pain      Epicondylitis      Insomnia        Current Outpatient Prescriptions   Medication Sig Dispense Refill     baclofen (LIORESAL) 20 MG tablet 1 po qid 120 tablet 5     ferrous sulfate 325 (65 FE) MG tablet 1 by mouth twice a day 60 tablet 5     food supplemt, lactose-reduced (ENSURE HIGH PROTEIN) Liqd 1 can po qd 30 Can 3     metoprolol succinate (TOPROL-XL) 50 MG 24 hr tablet TAKE 1 TABLET BY MOUTH DAILY 90 tablet 1     oxyCODONE (ROXICODONE) 5 MG immediate release tablet Take 1 Tablets 3 times daily as needed 25 tablet 0     oxyCODONE 60 mg TR12 Take 1 tablet 3 times per day 25 tablet 0     tolterodine (DETROL LA) 4 MG ER  capsule TAKE 2 CAPSULES BY MOUTH DAILY 180 capsule 2     zolpidem (AMBIEN) 10 mg tablet TAKE 1 TABLET BY MOUTH EVERY NIGHT AT BEDTIME 30 tablet 1     Current Facility-Administered Medications   Medication Dose Route Frequency Provider Last Rate Last Dose     lidocaine 2 % jelly (XYLOCAINE)   Topical PRN Gabriele Brown MD   1 application at 03/21/17 1312       ROS: Review of systems negative other than as outlined above    Objective:    /70 (Patient Site: Left Arm, Patient Position: Sitting, Cuff Size: Adult Large)  Pulse 96  Temp 97  F (36.1  C) (Oral)   Resp 24  There is no height or weight on file to calculate BMI.      General appearance no acute distress    Vital signs are stable    Lungs were clear no rales or rhonchi heart regular S1-S2    Unchanged exam ulcer unchanged by history.    Lab work is outlined below    Results for orders placed or performed in visit on 11/16/17   HM2(CBC w/o Differential)   Result Value Ref Range    WBC 7.5 4.0 - 11.0 thou/uL    RBC 5.64 4.40 - 6.20 mill/uL    Hemoglobin 10.4 (L) 14.0 - 18.0 g/dL    Hematocrit 37.7 (L) 40.0 - 54.0 %    MCV 67 (L) 80 - 100 fL    MCH 18.4 (L) 27.0 - 34.0 pg    MCHC 27.6 (L) 32.0 - 36.0 g/dL    RDW 21.9 (H) 11.0 - 14.5 %    Platelets 498 (H) 140 - 440 thou/uL    MPV 10.1 8.5 - 12.5 fL   Ferritin   Result Value Ref Range    Ferritin 66 27 - 300 ng/mL   Vitamin B12   Result Value Ref Range    Vitamin B-12 480 213 - 816 pg/mL   Iron   Result Value Ref Range    Iron 23 (L) 42 - 175 ug/dL       Assessment:  1. Hypoalbuminemia  food supplemt, lactose-reduced (ENSURE HIGH PROTEIN) Liqd   2. Gynecomastia     3. Hx of BKA, right     4. Pressure ulcer  food supplemt, lactose-reduced (ENSURE HIGH PROTEIN) Liqd   5. Anemia  HM2(CBC w/o Differential)    Ferritin    Vitamin B12    Iron     By history at the plastic surgeons low protein probably low albumin, patient has large ulcer which he loses protein from.  Add food supplement Ensure high-protein  liquid.  He has had normal renal function.    Gynecomastia history mammogram upcoming    History of right BKA with the pressure ulcer planning for possible amputation at the hip.    Anemia continue iron twice daily.    Plan: As outlined above    This transcription uses voice recognition software, which may contain typographical errors.

## 2021-06-15 ENCOUNTER — COMMUNICATION - HEALTHEAST (OUTPATIENT)
Dept: FAMILY MEDICINE | Facility: CLINIC | Age: 56
End: 2021-06-15

## 2021-06-15 NOTE — ANESTHESIA POSTPROCEDURE EVALUATION
Patient: Josiah ADAME Theron Lorenzo.  Procedure(s):  FLEXIBLE CYSTOSCOPY SANTIAGO CATHETER PLACEMENT  Anesthesia type: general    Patient location: PACU and phase II  Last vitals:   Vitals Value Taken Time   /72 02/22/21 1200   Temp 36.4  C (97.6  F) 02/22/21 1110   Pulse 80 02/22/21 1200   Resp 20 02/22/21 1200   SpO2 98 % 02/22/21 1200     Post vital signs: stable  Level of consciousness: awake and responds to simple questions  Post-anesthesia pain: pain controlled  Post-anesthesia nausea and vomiting: no  Pulmonary: unassisted, return to baseline  Cardiovascular: stable and blood pressure at baseline  Hydration: adequate  Anesthetic events: no    QCDR Measures:  ASA# 11 - Alycia-op Cardiac Arrest: ASA11B - Patient did NOT experience unanticipated cardiac arrest  ASA# 12 - Alycia-op Mortality Rate: ASA12B - Patient did NOT die  ASA# 13 - PACU Re-Intubation Rate: ASA13B - Patient did NOT require a new airway mgmt  ASA# 10 - Composite Anes Safety: ASA10A - No serious adverse event    Additional Notes:

## 2021-06-15 NOTE — PROGRESS NOTES
Subjective: This patient comes in for follow-up.  Patient has followed up on his chronic pain as well as his gynecomastia.  He also has a follow-up appointment regarding his chronic decubitus ulcer tomorrow    Unfortunately he has been sick over the past 2-3 days he is running a temperature here 100.9 he has had cough nonproductive dry cough with muscle aches and fever.  He has taken Tylenol    He did have a flu shot on 10/18/2017.    He ended up having a positive influenza swab here and I treated him with Tamiflu please see below    Regarding workup for the gynecomastia he did have a slightly elevated estradiol level at 64 LDH was normal    He had an elevated blood protein level but low albumin.  He had a serum protein electrophoresis and they suggested getting a serum immunofixation please see the report there.    He continues to have the right gynecomastia he had a negative mammogram I can have him see endocrine regarding further evaluation regarding this.    Finally regarding his chronic pain is been on oxycodone 20 mg, 5 tablets a day for 2 weeks and then over the last couple weeks 2 tablets twice a day for a total of 4 tablets a day.    I am going to decrease that down to 1 tablet 3 times a day and do that for 3 weeks again 63 tablets will recheck in 3 weeks.    Tobacco status: He  reports that he has never smoked. He has never used smokeless tobacco.    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     Paraplegia Of Unknown Etiology      Pressure ulcer      Tenosynovitis Of The Left Wrist      Neurogenic Bladder      Vitamin D Deficiency      Benign Essential Hypertension      Chronic Pain      Epicondylitis      Insomnia        Current Outpatient Prescriptions   Medication Sig Dispense Refill     baclofen (LIORESAL) 20 MG tablet 1 po qid 120 tablet 5     ferrous  sulfate 325 (65 FE) MG tablet 1 by mouth twice a day 60 tablet 5     food supplemt, lactose-reduced (ENSURE HIGH PROTEIN) Liqd 1 can po qd 30 Can 3     metoprolol succinate (TOPROL-XL) 50 MG 24 hr tablet TAKE 1 TABLET BY MOUTH DAILY 90 tablet 1     oxyCODONE (OXYCONTIN) 20 mg 12 hr tablet 1 po tid for 3 weeks 63 tablet 0     tolterodine (DETROL LA) 4 MG ER capsule TAKE 2 CAPSULES BY MOUTH DAILY 180 capsule 2     tolterodine (DETROL LA) 4 MG ER capsule Take 1-2 capsules (4-8 mg total) by mouth daily. 60 capsule 0     zolpidem (AMBIEN) 10 mg tablet TAKE 1 TABLET BY MOUTH EVERY NIGHT AT BEDTIME 30 tablet 1     zolpidem (AMBIEN) 10 mg tablet Take 1 tablet (10 mg total) by mouth at bedtime. 30 tablet 0     oseltamivir (TAMIFLU) 75 MG capsule Take 1 capsule (75 mg total) by mouth 2 (two) times a day for 5 days. 10 capsule 0     Current Facility-Administered Medications   Medication Dose Route Frequency Provider Last Rate Last Dose     lidocaine 2 % jelly (XYLOCAINE)   Topical PRN Gabriele Brown MD   1 application at 03/21/17 1312       ROS:   10 point review of systems positive as outlined otherwise negative    Objective:    /52 (Patient Site: Right Arm, Patient Position: Sitting, Cuff Size: Adult Large)  Pulse (!) 116  Temp (!) 100.9  F (38.3  C) (Oral)   Resp 28  SpO2 98% Comment: at rest with room air  There is no height or weight on file to calculate BMI.      General appearance tired    Vital signs with pulse of 116 temp 100.9    HEENT oropharynx is clear his neck was supple no adenopathy    His lungs had clear breath sounds throughout, heart was regular O2 sat 98%    No increased edema in the leg ongoing decubitus ulcer as before on the right side he has amputation on the right side.    No focal weakness or numbness elsewhere.  He is in a wheelchair from his paraplegia.    Influenza A positive    His serum immunofixation pending        Results for orders placed or performed in visit on 01/31/18    Influenza A/B Rapid Test   Result Value Ref Range    Influenza  A, Rapid Antigen Influenza A antigen detected (!) No Influenza A antigen detected    Influenza B, Rapid Antigen No Influenza B antigen detected No Influenza B antigen detected       Assessment:  1. Influenza A  oseltamivir (TAMIFLU) 75 MG capsule   2. Bronchitis  Influenza A/B Rapid Test   3. Gynecomastia  Ambulatory referral to Endocrinology   4. Chronic Pain  oxyCODONE (OXYCONTIN) 20 mg 12 hr tablet   5. Elevated blood protein  Immunofixation Electrophoresis, Serum     Influenza A treated with Tamiflu and Tylenol    Gynecomastia on the right negative mammogram workup positive for elevated estradiol will have him see endocrine.    Chronic pain continue to wean.  He was again offered the referral back to the pain clinic he does not want to do that.  He will be on 20 mg tablets 1 p.o. 3 times daily    Elevated protein with abnormal serum protein electrophoresis will check immunofixation    Plan: As outlined above, I will contact him regarding results of the blood test    Follow-up in 3 weeks    This transcription uses voice recognition software, which may contain typographical errors.

## 2021-06-15 NOTE — TELEPHONE ENCOUNTER
RN cannot approve Refill Request    RN can NOT refill this medication med is not covered by policy/route to provider. Last office visit: 12/5/2019 Marvel Petit MD Last Physical: 2/27/2020 Last MTM visit: Visit date not found Last visit same specialty: 2/9/21 Cassidy Petit Gervais.  Next visit within 3 mo: Visit date not found  Next physical within 3 mo: Visit date not found      Tammie F Severson, [facility] Connection Triage/Med Refill 2/11/2021    Requested Prescriptions   Pending Prescriptions Disp Refills     baclofen (LIORESAL) 20 MG tablet [Pharmacy Med Name: BACLOFEN 20MG TABLETS] 360 tablet 0     Sig: TAKE 1 TABLET BY MOUTH FOUR TIMES DAILY       There is no refill protocol information for this order

## 2021-06-15 NOTE — TELEPHONE ENCOUNTER
Reason for Call:  Form, our goal is to have forms completed with 72 hours, however, some forms may require a visit or additional information.    Type of letter, form or note:  handicap    Who is the form from?: Patient    Where did the form come from: Patient or family brought in       What clinic location was the form placed at?: [facility]    Where the form was placed:     What number is listed as a contact on the form?: [phone]       Additional comments: n/a    Call taken on 2/18/2021 at 3:08 PM by Blaire Coffman

## 2021-06-15 NOTE — PROGRESS NOTES
Subjective: This patient comes in for follow-up.  He will be seeing the plastic surgeon over at [facility].  The plan is to go on for the amputation higher up in surgical repair to resolve his ulceration issue.    Regarding his medications he continues on Detrol and baclofen takes oxycodone 20 mg he presently is on 2 in the morning 2 at noon and 2 at night.    We will have him go down to 2 in the morning 1 at noon and 2 at night for 2 weeks then 2 in the morning 2 at night for 2 weeks and I will recheck him in 4 weeks.    I did check some labs he does have the right-sided gynecomastia mammogram was negative.  He denies any significant pain but does have a little discomfort in that area.  We discussed possibly seeing a surgeon but I wanted to check some hormonal tests first.    Please see below regarding testosterone thyroid and CMP.    No additional concerns or issues    No drainage from the breast    Denies any nipple discharge.    Has had no headache visual problems.    Tobacco status: He  reports that he has never smoked. He has never used smokeless tobacco.    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     Paraplegia Of Unknown Etiology      Pressure ulcer      Tenosynovitis Of The Left Wrist      Neurogenic Bladder      Vitamin D Deficiency      Benign Essential Hypertension      Chronic Pain      Epicondylitis      Insomnia        Current Outpatient Prescriptions   Medication Sig Dispense Refill     baclofen (LIORESAL) 20 MG tablet 1 po qid 120 tablet 5     ferrous sulfate 325 (65 FE) MG tablet 1 by mouth twice a day 60 tablet 5     food supplemt, lactose-reduced (ENSURE HIGH PROTEIN) Liqd 1 can po qd 30 Can 3     metoprolol succinate (TOPROL-XL) 50 MG 24 hr tablet TAKE 1 TABLET BY MOUTH DAILY 90 tablet 1     oxyCODONE (OXYCONTIN) 20 mg 12 hr tablet 2 in a.m., 1  in midday, 2 at night for 14 days, then 2 in a.m. and 2 at night 126 tablet 0     tolterodine (DETROL LA) 4 MG ER capsule TAKE 2 CAPSULES BY MOUTH DAILY 180 capsule 2     tolterodine (DETROL LA) 4 MG ER capsule Take 1-2 capsules (4-8 mg total) by mouth daily. 60 capsule 0     zolpidem (AMBIEN) 10 mg tablet TAKE 1 TABLET BY MOUTH EVERY NIGHT AT BEDTIME 30 tablet 1     zolpidem (AMBIEN) 10 mg tablet Take 1 tablet (10 mg total) by mouth at bedtime. 30 tablet 0     Current Facility-Administered Medications   Medication Dose Route Frequency Provider Last Rate Last Dose     lidocaine 2 % jelly (XYLOCAINE)   Topical PRN Gabriele Brown MD   1 application at 03/21/17 1312       ROS: Review of systems negative other than as outlined above    Objective:    /60 (Patient Site: Left Arm, Patient Position: Sitting, Cuff Size: Adult Large)  Pulse 82  Temp 96.8  F (36  C) (Oral)   Resp 24  SpO2 94% Comment: at rest with room air  There is no height or weight on file to calculate BMI.      General appearance no acute distress    Vital signs are stable O2 sat 94%    He has ulceration as noted before I did see pictures of this.  I reviewed the orthopedic doctor at the [facility] and he has had a preliminary screening to see the plastic surgeon at [place].    Patient has a negative mammogram    He does have enlarged breast tissue more on the right than the left slight tenderness on the right no adenopathy.    Lungs are clear no rales or rhonchi O2 sat 94%    He denies any abdominal pain.    Skin otherwise normal.    No thyroid fullness.  HEENT was negative.    Labs normal thyroid, normal testosterone slightly elevated prolactin and his albumin is low but protein is high at 9.3    Results for orders placed or performed in visit on 01/03/18   Thyroid Stimulating Hormone (TSH)   Result Value Ref Range    TSH 2.19 0.30 - 5.00 uIU/mL   Testosterone, Total and Free   Result Value Ref Range    Testosterone, Free 8.13  4.06 - 15.6 ng/dL    Testosterone, Total 325 240 - 950 ng/dL   Comprehensive Metabolic Panel   Result Value Ref Range    Sodium 138 136 - 145 mmol/L    Potassium 4.3 3.5 - 5.0 mmol/L    Chloride 102 98 - 107 mmol/L    CO2 24 22 - 31 mmol/L    Anion Gap, Calculation 12 5 - 18 mmol/L    Glucose 94 70 - 125 mg/dL    BUN 10 8 - 22 mg/dL    Creatinine 0.75 0.70 - 1.30 mg/dL    GFR MDRD Af Amer >60 >60 mL/min/1.73m2    GFR MDRD Non Af Amer >60 >60 mL/min/1.73m2    Bilirubin, Total 0.3 0.0 - 1.0 mg/dL    Calcium 9.3 8.5 - 10.5 mg/dL    Protein, Total 9.3 (H) 6.0 - 8.0 g/dL    Albumin 2.6 (L) 3.5 - 5.0 g/dL    Alkaline Phosphatase 84 45 - 120 U/L    AST 9 0 - 40 U/L    ALT <6 0 - 45 U/L   Prolactin   Result Value Ref Range    Prolactin 20.5 (H) 0.0 - 15.0 ng/mL   T4, Free   Result Value Ref Range    Free T4 1.1 0.7 - 1.8 ng/dL       Assessment:  1. Gynecomastia  Testosterone, Total and Free    Comprehensive Metabolic Panel    Prolactin   2. Chronic Pain  oxyCODONE (OXYCONTIN) 20 mg 12 hr tablet   3. Pressure ulcer     4. Night sweats  Thyroid Stimulating Hormone (TSH)    T4, Free     Gynecomastia, further workup will check LH, estradiol, hCG    Also will check a serum protein electrophoresis for the elevated protein.    Continue to wean off the oxycodone please see above discussion recheck in 4 weeks with office visit    Pressure ulcer please see above discussion and await consultation from plastic surgery    Plan: As outlined above    This transcription uses voice recognition software, which may contain typographical errors.

## 2021-06-15 NOTE — TELEPHONE ENCOUNTER
Refill Approved    Rx renewed per Medication Renewal Policy. Medication was last renewed on 1/21/2021.  LOV 2/9/2021  Shoshana Méndez, [facility] Connection Triage/Med Refill 2/12/2021     Requested Prescriptions   Pending Prescriptions Disp Refills     simvastatin (ZOCOR) 20 MG tablet 90 tablet 0     Sig: Take 1 tablet (20 mg total) by mouth daily.       Statins Refill Protocol (Hmg CoA Reductase Inhibitors) Passed - 2/11/2021  5:00 PM        Passed - PCP or prescribing provider visit in past 12 months      Last office visit with prescriber/PCP: 12/5/2019 Marvel Petit MD OR same dept: Visit date not found OR same specialty: 12/5/2019 Marvel Petit MD  Last physical: 2/27/2020 Last MTM visit: Visit date not found   Next visit within 3 mo: Visit date not found  Next physical within 3 mo: Visit date not found  Prescriber OR PCP: Marvel Petit MD  Last diagnosis associated with med order: 1. Acute CVA (cerebrovascular accident) (H)  - simvastatin (ZOCOR) 20 MG tablet; Take 1 tablet (20 mg total) by mouth daily.  Dispense: 90 tablet; Refill: 0    If protocol passes may refill for 12 months if within 3 months of last provider visit (or a total of 15 months).

## 2021-06-15 NOTE — TELEPHONE ENCOUNTER
Reason for Call:  Addendum to chart notes for wheelchair repair     Detailed comments: Nannette called from [facility] to request provider to addendum chart notes for the  02/02/2021 visit. Patient is needing his wheelchair repaired and due to insurance purposes, chart notes need to include that that patient continues to need and use his wheelchair, and can have repairs up to 12 months. Please fax chart notes to [phone], Attention: Nannette when notes are addendum.      Phone Number Patient can be reached at: Other phone number:  [phone]    Best Time: any    Can we leave a detailed message on this number?: Yes    Call taken on 3/15/2021 at 9:41 AM by Tahir Brown

## 2021-06-15 NOTE — TELEPHONE ENCOUNTER
Dropped off forms were put in Dr. Petit's inbox. There is already a task on the drop off forms. Completing task.
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
## 2021-06-15 NOTE — TELEPHONE ENCOUNTER
Called and left a message for patient to inform him that forms is ready to be picked up and place at the  for patient to . A copy was made and place to be scanned.

## 2021-06-15 NOTE — ANESTHESIA CARE TRANSFER NOTE
Last vitals:   Vitals:    02/22/21 1020   BP: 132/79   Pulse: 84   Resp: 16   Temp: 36.4  C (97.5  F)   SpO2: 100%     Patient's level of consciousness is drowsy  Spontaneous respirations: yes  Maintains airway independently: yes  Dentition unchanged: yes  Oropharynx: oropharynx clear of all foreign objects    QCDR Measures:  ASA# 20 - Surgical Safety Checklist: WHO surgical safety checklist completed prior to induction    PQRS# 430 - Adult PONV Prevention: 4558F - Pt received => 2 anti-emetic agents (different classes) preop & intraop  ASA# 8 - Peds PONV Prevention: NA - Not pediatric patient, not GA or 2 or more risk factors NOT present  PQRS# 424 - Alycia-op Temp Management: 4559F - At least one body temp DOCUMENTED => 35.5C or 95.9F within required timeframe  PQRS# 426 - PACU Transfer Protocol: - Transfer of care checklist used  ASA# 14 - Acute Post-op Pain: ASA14B - Patient did NOT experience pain >= 7 out of 10

## 2021-06-16 NOTE — TELEPHONE ENCOUNTER
Telephone Encounter by Mary Dolan MD at 11/22/2019 12:42 PM     Author: Mary Dolan MD Service: -- Author Type: Physician    Filed: 11/22/2019 12:46 PM Encounter Date: 11/21/2019 Status: Signed    : Mary Dolan MD (Physician)       It looks like he was last seen on 9/18/19 with a plan to wean off of his pain medicine within 4 weeks. He was supposed to follow up in mid-October, but hasn't been in.    Since he is supposed to be off this medication, and because post-surgical pain should be resolved by now, and because he is due for follow up, and because he hasn't had any recent refills of the medication (indicating low risk for withdrawal), and because his last drug screen had cocaine in it (indicating increased risk for opiate use), this patient is not appropriate for refill over the phone and will need to be seen. Please schedule with Dr. Petit.    Last Visit  9/18/2019 Marvel Petit MD  No future appointments.    Controlled Substance Agreement  On file (>1 year ago).    Last Drug Screen  Lab Results   Component Value Date    AMPHET Screen Negative 10/23/2018    HEBENZODIAZ Screen Negative 10/23/2018    PCP Screen Negative 10/23/2018    THC Screen Negative 10/23/2018    BARBIT Screen Negative 10/23/2018    COCAINEMETAB (!) 10/23/2018     Screen Positive (Confirmation available on request)    METHADNE Screen Negative 10/23/2018    OPIATES Screen Negative 10/23/2018    CREAUR 69.4 10/23/2018        [place]  Review

## 2021-06-16 NOTE — TELEPHONE ENCOUNTER
Telephone Encounter by Gianna Matias at 6/3/2019 10:49 AM     Author: Gianna Matias Service: -- Author Type: --    Filed: 6/3/2019 10:53 AM Encounter Date: 5/30/2019 Status: Signed    : Gianna Matias APPROVED:    Approval start date:3/2/19   Approval end date:5/30/20    Pharmacy has been notified of approval and will contact patient when medication is ready for pickup.

## 2021-06-16 NOTE — PROGRESS NOTES
Subjective: Patient follows up for a few things.  He has the gynecomastia is actually gotten a little better he had the elevated estradiol level he does see endocrine in May    Mammogram was negative    There is less fullness and tenderness on the right side than before.    Patient had elevated proteins I did get a immunofixation that was unremarkable    Regarding his chronic pain is on oxycodone 20 mg 1 3 times daily over the last 3 weeks.  We will continue to wean down on that please see previous discussion he will be on 10 mg tablets taking 5 a day for 2 weeks and 4 day for 2 weeks.  He can follow-up then or call and we can discuss moving forward after that.    He is over the flu he did have the influenza at his last visit a month ago.    He sees the plastic surgeon regarding his right decubitus ulcer.  Please see previous discussion or coordinating with [facility] orthopedic department and the plastic surgeon regarding amputation higher up.    Tobacco status: He  reports that he has never smoked. He has never used smokeless tobacco.    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     Paraplegia Of Unknown Etiology      Pressure ulcer      Tenosynovitis Of The Left Wrist      Neurogenic Bladder      Vitamin D Deficiency      Benign Essential Hypertension      Chronic Pain      Epicondylitis      Insomnia        Current Outpatient Prescriptions   Medication Sig Dispense Refill     baclofen (LIORESAL) 20 MG tablet 1 po qid 120 tablet 5     ferrous sulfate 325 (65 FE) MG tablet 1 by mouth twice a day 60 tablet 5     food supplemt, lactose-reduced (ENSURE HIGH PROTEIN) Liqd 1 can po qd 30 Can 3     metoprolol succinate (TOPROL-XL) 50 MG 24 hr tablet TAKE 1 TABLET BY MOUTH DAILY 90 tablet 1     tolterodine (DETROL LA) 4 MG ER capsule TAKE 2 CAPSULES BY MOUTH  DAILY 180 capsule 2     tolterodine (DETROL LA) 4 MG ER capsule Take 1-2 capsules (4-8 mg total) by mouth daily. 60 capsule 0     zolpidem (AMBIEN) 10 mg tablet TAKE 1 TABLET BY MOUTH EVERY NIGHT AT BEDTIME 30 tablet 1     zolpidem (AMBIEN) 10 mg tablet Take 1 tablet (10 mg total) by mouth at bedtime. 30 tablet 0     oxyCODONE (OXYCONTIN) 10 mg 12 hr tablet 5 po qd for 2 weeks the 4 po qd 120 tablet 0     Current Facility-Administered Medications   Medication Dose Route Frequency Provider Last Rate Last Dose     lidocaine 2 % jelly (XYLOCAINE)   Topical PRN Gabriele Brown MD   1 application at 03/21/17 1312       ROS:   10 point review of systems negative other than as outlined above    Objective:    /76 (Patient Site: Right Arm, Patient Position: Sitting, Cuff Size: Adult Large)  Pulse 72  Resp 16  There is no height or weight on file to calculate BMI.      General appearance right breast with decreased fullness no discrete mass less tender    Lungs clear no rales rhonchi heart regular S1-S2    No change in extremities    He denies any abdominal pain    Previous influenza A was detected he is resolved on that that was from 1/31/2018 and an unremarkable immunofixation electrophoresis noted below as well    Results for orders placed or performed in visit on 01/31/18   Influenza A/B Rapid Test   Result Value Ref Range    Influenza  A, Rapid Antigen Influenza A antigen detected (!) No Influenza A antigen detected    Influenza B, Rapid Antigen No Influenza B antigen detected No Influenza B antigen detected   Immunofixation Electrophoresis, Serum   Result Value Ref Range    Immunofixation Electrophoresis, Serum Unremarkable immunofixation electrophoresis.         Path ICD: E88.90     Interpreted By: Landon Leon MD        Assessment:  1. Chronic Pain  oxyCODONE (OXYCONTIN) 10 mg 12 hr tablet   2. Paraplegia Of Unknown Etiology     3. Gynecomastia     4. Ulcer of right thigh, unspecified ulcer stage        As outlined above weaning down on oxycodone please see above will be on 10 mg 5 tablets daily for 2 weeks and 4 tablets a day for another 2 weeks then follow-up with the call her office visit    Await plastic surgery input.    Gynecomastia improved will be seeing endocrine for the elevated estradiol, and the gynecomastia    Plan: As above    This transcription uses voice recognition software, which may contain typographical errors.

## 2021-06-16 NOTE — ANESTHESIA PREPROCEDURE EVALUATION
Anesthesia Evaluation      Patient summary reviewed   No history of anesthetic complications     Airway   Mallampati: II  Neck ROM: full   Pulmonary - normal exam    breath sounds clear to auscultation  (-) sleep apnea, not a smoker                         Cardiovascular - normal exam  (+) hypertension, ,     ECG reviewed  Rhythm: regular  Rate: normal,      ROS comment:   Left Ventricle: Normal left ventricular size.The estimated left ventricular ejection fraction is 55%. This represents a normal ejection fraction. Mild concentric hypertrophy noted. E/e' < 8, suggesting normal LV filling pressure.Normal left atrial pressure. Left ventricular diastolic function is normal.    The left ventricular wall motion is normal.    Right Ventricle: Normal right ventricular size and systolic function. TAPSE is normal, which is consistent with normal right ventricular systolic function.    Pericardium: Trace pericardial effusion.    No hemodynamically significant valvular heart abnormalities.    No previous study for comparison.     Neuro/Psych    (+) neuromuscular disease,  CVA , chronic pain    Comments: Paraplegia  Spasticity  Insomnia      Endo/Other - negative ROS      GI/Hepatic/Renal      Comments: Chronic muhammad  Hydrocele  Infected penile prosthesis  Neurogenic bladder          Dental    (+) poor dentition                         Anesthesia Plan  Planned anesthetic: general endotracheal    ASA 3   Induction: intravenous   Anesthetic plan and risks discussed with: patient  Anesthesia plan special considerations: antiemetics,   Post-op plan: routine recovery

## 2021-06-16 NOTE — TELEPHONE ENCOUNTER
Refill Approved    Rx renewed per Medication Renewal Policy. Medication was last renewed on 10/8/20.    Nain Nunez, [facility] Triage/Med Refill 3/26/2021     Requested Prescriptions   Pending Prescriptions Disp Refills     amLODIPine (NORVASC) 5 MG tablet [Pharmacy Med Name: AMLODIPINE BESYLATE 5MG TABLETS] 30 tablet 2     Sig: TAKE 1 TABLET(5 MG) BY MOUTH DAILY       Calcium-Channel Blockers Protocol Passed - 3/25/2021 12:37 PM        Passed - PCP or prescribing provider visit in past 12 months or next 3 months     Last office visit with prescriber/PCP: 12/5/2019 Marvel Petit MD OR same dept: Visit date not found OR same specialty: 12/5/2019 Marvel Petit MD  Last physical: 2/27/2020 Last MTM visit: Visit date not found   Next visit within 3 mo: Visit date not found  Next physical within 3 mo: Visit date not found  Prescriber OR PCP: Marvel Petit MD  Last diagnosis associated with med order: 1. Benign Essential Hypertension  - amLODIPine (NORVASC) 5 MG tablet [Pharmacy Med Name: AMLODIPINE BESYLATE 5MG TABLETS]; TAKE 1 TABLET(5 MG) BY MOUTH DAILY  Dispense: 30 tablet; Refill: 2    If protocol passes may refill for 12 months if within 3 months of last provider visit (or a total of 15 months).             Passed - Blood pressure filed in past 12 months     BP Readings from Last 1 Encounters:   02/22/21 120/72

## 2021-06-16 NOTE — TELEPHONE ENCOUNTER
RN cannot approve Refill Request    RN can NOT refill this medication ordered as OTC.  Provider input needed. Last office visit: 12/5/2019 Marvel Petit MD Last Physical: 2/27/2020 Last MTM visit: Visit date not found Last visit same specialty: 12/5/2019 Marvel Petit MD.  Next visit within 3 mo: Visit date not found  Next physical within 3 mo: Visit date not found      Nain Nunez, [facility] Connection Triage/Med Refill 4/22/2021    Requested Prescriptions   Pending Prescriptions Disp Refills     FEROSUL 325 mg (65 mg iron) tablet [Pharmacy Med Name: FERROUS SULFATE 325MG (5GR) TABS] 90 tablet 0     Sig: TAKE 1 TABLET BY MOUTH DAILY       Iron Supplements Refill Protocol Passed - 4/21/2021  2:54 PM        Passed - PCP or prescribing provider visit in past 12 months       Last office visit with prescriber/PCP: 12/5/2019 Marvel Petit MD OR same dept: Visit date not found OR same specialty: 12/5/2019 Marvel Petit MD  Last physical: 2/27/2020 Last MTM visit: Visit date not found   Next visit within 3 mo: Visit date not found  Next physical within 3 mo: Visit date not found  Prescriber OR PCP: Marvel Petit MD  Last diagnosis associated with med order: 1. Iron deficiency anemia, unspecified iron deficiency anemia type  - FEROSUL 325 mg (65 mg iron) tablet [Pharmacy Med Name: FERROUS SULFATE 325MG (5GR) TABS]; TAKE 1 TABLET BY MOUTH DAILY  Dispense: 90 tablet; Refill: 0    If protocol passes may refill for 12 months if within 3 months of last provider visit (or a total of 15 months).             Passed - Hemoglobin or Hematocrit in last 12 months     Hemoglobin   Date Value Ref Range Status   04/21/2021 9.3 (L) 14.0 - 18.0 g/dL Final     Hematocrit   Date Value Ref Range Status   04/21/2021 32.2 (L) 40.0 - 54.0 % Final

## 2021-06-17 NOTE — PROGRESS NOTES
Assessment & Plan     Josiah was seen today for numbness in hands.    Diagnoses and all orders for this visit:    Bilateral carpal tunnel syndrome  -     Ambulatory referral to Orthopedic Surgery    Essential hypertension    History of CVA (cerebrovascular accident)    Paraplegia, unspecified (H)    Infection of penile implant, sequela    Gynecomastia    Iron deficiency anemia, unspecified iron deficiency anemia type  -     HM2(CBC w/o Differential)        Ongoing symptoms of bilateral carpal tunnel syndrome, referral to hand surgery at [place] orthopedist    Infected penile implant followed by urology, Dr. Amaya    Gynecomastia with testosterone deficiency, on Arimidex and testosterone per endocrine    Iron deficiency anemia now on iron replacement CBC pending.    Hypertension controlled    Patient be contacted with the results discuss follow-up            BMI:   Estimated body mass index is 29.16 kg/m  as calculated from the following:    Height as of 4/19/21: 6' (1.829 m).    Weight as of 4/19/21: 215 lb (97.5 kg).       Return in about 3 months (around 8/13/2021) for Recheck.    Marvel Petit MD  [facility]   Josiah Crump Sr. is 55 y.o. and presents today for the following health issues   HPI   This patient has follow-up today regarding his medications.  He needs recheck on his hemoglobin.    Back on 4/20/2021 iron 16 hemoglobin 9.3 he is now on iron supplementation.    Patient takes Arimidex for his gynecomastia and also gets testosterone injections every 14 days.  He is followed by endocrine.    I refilled his metoprolol and simvastatin.    He had an infected penile implant and have surgery again next month with Dr. Byers.    From endocrine he sees ,, from endocrine at [facility]    Patient was hospitalized April 18 through the 21st.    He continues to work at [employer].    He did have a PSA level checked as part of his lab tests with  the endocrinologist.    He does have some ongoing symptoms with numbness in his median nerve distribution bilaterally.    He has had wrist splints in the past has been minimally effective.  I did discuss with him I think he needs to go on to see the hand surgeon regarding this.    No additional concern or issue.        Review of Systems  10 point review of systems positive as outlined above otherwise negative      Objective    /78 (Patient Site: Left Arm, Patient Position: Sitting, Cuff Size: Adult Large)   Pulse (!) 102   There is no height or weight on file to calculate BMI.  Physical Exam  General appearance no acute distress, he is in wheelchair as before he has the paraplegia.    HEENT neck negative    Oropharynx clear nose without drainage    Lungs clear no rales or rhonchi heart regular S1-S2    Positive Tinel positive Phalen bilaterally no muscle wasting he does not feel he has any weakness in the hands.    Previous labs as outlined last hemoglobin 9.3 iron 16.  CBC from today is pending.

## 2021-06-17 NOTE — PROGRESS NOTES
Clinic Care Coordination Contact:  [facility]/Voicemail    Reason: [id number] - Ambulatory referral to Care Management (Primary Care)    Clinical Data: Care Coordinator Outreach:  Outreach attempted x 2.  Left message on patient's voicemail with call back information and requested return call.  Plan: Care Coordinator will try to reach patient again in 5-10 business days.

## 2021-06-17 NOTE — PROGRESS NOTES
Clinic Care Coordination Contact:  [facility]/Voicemail    Reason: [id number] - Ambulatory referral to Care Management (Primary Care)    Clinical Data: Care Coordinator Outreach:  Outreach attempted x 1.  Left message on patient's voicemail with call back information and requested return call.  Plan: Care Coordinator will try to reach patient again in 1-2 business days.

## 2021-06-17 NOTE — PROGRESS NOTES
Clinic Care Coordination Contact:  Community Health Worker Initial Outreach    Reason: [id number] - Ambulatory referral to Care Management (Primary Care)    CHW Initial Information Gathering:  Referral Source: PCP  Preferred Hospital: [facility]  [phone]  Preferred Urgent Care: [facility], [phone]  Current living arrangement:: Not Assessed (Note/comment: not assessed due to patient's time).        Discussion (attempt #3): CCC CHW contacted the patient two times today. The clinic Community Health Worker talked with the patient today (at attempt #2) at the request of the PCP to discuss possible Clinic Care Coordination enrollment. The service was described to the patient and immediate needs were discussed. The patient declined enrollement at this time. Pt is informed to connect PCP office in the future if needs change. Pt confirmed understand and no other questions. The PCP is encouraged to refer in the future if the patient's needs change.    Patient accepts CC: No, has no resources need or concerns at this time per patient.     CCC Plan: no further action need from CCC/CHW at this time.

## 2021-06-17 NOTE — PROGRESS NOTES
Subjective: Patient comes in for follow-up.  He is been seen by [facility] orthopedic department and plastic surgery department    Plan will be for a two-part procedure where the patient has removal of the bone for the amputation of the right upper leg by orthopedics    They will monitor and see if the skin is viable then have a closure from Dr. Blackmon the orthopedist.    They will not do anything until he gets in a wheelchair.  He wants a manual wheelchair to keep in shape.  He needs a specialized manual fitted wheelchair with a cushion seat.  He has been at [facility] before for fittings for this.    He is down to oxycodone 10 mg   1, 3 times daily I did refill this I do want him to see the pain clinic as outlined please see previous discussions over the past fall.  He does not feel like he can wean down any further he has come down quite a bit.    Patient otherwise denies any new problems or issues continues on his baclofen for spasms as well as the metoprolol for blood pressure.  He has Detrol also for his neurogenic bladder    Tobacco status: He  reports that he has never smoked. He has never used smokeless tobacco.    Patient Active Problem List    Diagnosis Date Noted     Hx of BKA, right 04/09/2018     Right shoulder strain 12/30/2014     Ulcer of right thigh, unspecified ulcer stage 10/26/2014     Hydrocele 07/16/2014     Infected penile implant 07/16/2014     Paraplegia Of Unknown Etiology      Pressure ulcer      Tenosynovitis Of The Left Wrist      Neurogenic Bladder      Vitamin D Deficiency      Benign Essential Hypertension      Chronic Pain      Epicondylitis      Insomnia        Current Outpatient Prescriptions   Medication Sig Dispense Refill     baclofen (LIORESAL) 20 MG tablet TAKE 1 TABLET BY MOUTH FOUR TIMES DAILY 120 tablet 0     food supplemt, lactose-reduced (ENSURE HIGH PROTEIN) Liqd 1 can po qd 30 Can 3     metoprolol succinate (TOPROL-XL) 50 MG 24 hr tablet TAKE 1 TABLET BY MOUTH  DAILY 90 tablet 1     oxyCODONE (OXYCONTIN) 10 mg 12 hr tablet 3 po qd 90 tablet 0     tolterodine (DETROL LA) 4 MG ER capsule TAKE 2 CAPSULES BY MOUTH DAILY 180 capsule 2     tolterodine (DETROL LA) 4 MG ER capsule Take 1-2 capsules (4-8 mg total) by mouth daily. 60 capsule 0     zolpidem (AMBIEN) 10 mg tablet Take 1 tablet (10 mg total) by mouth at bedtime. 30 tablet 0     zolpidem (AMBIEN) 10 mg tablet TAKE 1 TABLET BY MOUTH EVERY NIGHT AT BEDTIME 30 tablet 1     Current Facility-Administered Medications   Medication Dose Route Frequency Provider Last Rate Last Dose     lidocaine 2 % jelly (XYLOCAINE)   Topical PRN Gabriele Brown MD   1 application at 03/21/17 1312       ROS: 10 point review of systems negative other than as outlined above    Objective:    /58 (Patient Site: Left Arm, Patient Position: Sitting, Cuff Size: Adult Large)  Pulse 92  Resp 16  There is no height or weight on file to calculate BMI.      General appearance no acute distress    I reviewed the plastic surgeons notes from visit there on 3/28/2018.    No additional exam        Assessment:  1. Chronic Pain  Ambulatory referral to Pain Clinic    oxyCODONE (OXYCONTIN) 10 mg 12 hr tablet   2. Paraplegia Of Unknown Etiology  Ambulatory referral to PT/OT    Ambulatory referral to Pain Clinic   3. Ulcer of right thigh, unspecified ulcer stage  Ambulatory referral to PT/OT    Ambulatory referral to Pain Clinic   4. Hx of BKA, right  Ambulatory referral to PT/OT    Ambulatory referral to Pain Clinic     Chronic pain referral to pain clinic refill 1 month of the oxycodone 10 mg 1 tablet 3 times daily.    Plan for additional amputation right upper leg [facility] orthopedics and plastic surgery    Need for a specialized manual wheelchair fitted with cushioned seat.    The fitting will be done at [place].    Plan: Total time with patient was 15 minutes over 10 minutes spent in counseling reviewing records and  consultations    Referral and discussion regarding pain medication    This transcription uses voice recognition software, which may contain typographical errors.

## 2021-06-17 NOTE — TELEPHONE ENCOUNTER
RN cannot approve Refill Request    RN can NOT refill this medication med is not covered by policy/route to provider. Last office visit: 12/5/2019 Marvel Petit MD Last Physical: 2/27/2020 Last MTM visit: Visit date not found Last visit same specialty: 12/5/2019 Marvel Petit MD.  Next visit within 3 mo: Visit date not found  Next physical within 3 mo: Visit date not found      Mary Cerna, Care Connection Triage/Med Refill 5/12/2021    Requested Prescriptions   Pending Prescriptions Disp Refills     baclofen (LIORESAL) 20 MG tablet [Pharmacy Med Name: BACLOFEN 20MG TABLETS] 360 tablet 0     Sig: TAKE 1 TABLET BY MOUTH FOUR TIMES DAILY       There is no refill protocol information for this order

## 2021-06-19 ENCOUNTER — NURSE TRIAGE (OUTPATIENT)
Dept: NURSING | Facility: CLINIC | Age: 56
End: 2021-06-19

## 2021-06-19 ENCOUNTER — HOSPITAL ENCOUNTER (EMERGENCY)
Dept: EMERGENCY MEDICINE | Facility: CLINIC | Age: 56
Discharge: LEFT BEFORE DISCHARGE | End: 2021-06-19
Payer: MEDICARE

## 2021-06-19 ENCOUNTER — RECORDS - HEALTHEAST (OUTPATIENT)
Dept: ADMINISTRATIVE | Facility: OTHER | Age: 56
End: 2021-06-19

## 2021-06-19 NOTE — PROGRESS NOTES
Subjective: Patient comes in for follow-up.  Patient has been seen over at [facility] system with upcoming surgery likely for disarticulation of the right leg and primary closure.  Dr. Murphy from Orth O and Dr. Blackmon from plastic surgery will be the physicians involved.    Patient is awaiting his manual wheelchair he needed that in place before he can have the surgery.    He has ongoing chronic pain but we have weaned down on his pain medication.  He has been on the OxyContin 10 mg 3 times a day we will continue to wean down go to twice a day.  He felt he was able to do this and he would rather do this and go see the pain clinic people    Please see the previous issues surrounding that.    Patient told me that when he was down in [place] at his wife's  that he had some bleeding from his leg and his hemoglobin dropped down to 7 he was given 2 units of blood.    It did recheck that today this is being sent out but preliminary showed hemoglobin 10.1 white count 13,800 platelets 509,000.    I also checked a BMP    In the past he has had some hemoglobin in the 10-11 range with a low MCV.  He had had low iron but normal iron stores.    We will see him back in a month and recheck on things and get a hemoglobin electrophoresis ferritin iron etc.    Patient feels well denies any dizziness vital signs are stable he is not tachycardic.  No chest pain.  No breathing problems.    Tobacco status: He  reports that he has never smoked. He has never used smokeless tobacco.    Patient Active Problem List    Diagnosis Date Noted     Hx of BKA, right (H) 2018     Right shoulder strain 2014     Ulcer of right thigh, unspecified ulcer stage (H) 10/26/2014     Hydrocele 2014     Infected penile implant (H) 2014     Paraplegia Of Unknown Etiology      Pressure ulcer      Tenosynovitis Of The Left Wrist      Neurogenic Bladder      Vitamin D Deficiency      Benign Essential Hypertension       Chronic Pain      Epicondylitis      Insomnia        Current Outpatient Prescriptions   Medication Sig Dispense Refill     baclofen (LIORESAL) 20 MG tablet TAKE 1 TABLET BY MOUTH FOUR TIMES DAILY 120 tablet 3     food supplemt, lactose-reduced (ENSURE HIGH PROTEIN) Liqd 1 can po qd 30 Can 3     metoprolol succinate (TOPROL-XL) 50 MG 24 hr tablet Take 1 tablet (50 mg total) by mouth daily. 90 tablet 3     oxyCODONE (OXYCONTIN) 10 mg 12 hr tablet 1 p.o. twice daily 60 tablet 0     tolterodine (DETROL LA) 4 MG ER capsule TAKE 2 CAPSULES BY MOUTH DAILY 180 capsule 2     zolpidem (AMBIEN) 10 mg tablet TAKE 1 TABLET BY MOUTH EVERY NIGHT AT BEDTIME 30 tablet 1     Current Facility-Administered Medications   Medication Dose Route Frequency Provider Last Rate Last Dose     lidocaine 2 % jelly (XYLOCAINE)   Topical PRN Gabriele Brown MD   1 application at 03/21/17 1312       ROS:   10 point review of systems positive as outlined otherwise negative    Objective:    /54 (Patient Site: Right Arm, Patient Position: Sitting, Cuff Size: Adult Regular)  Pulse 84  Resp 20  There is no height or weight on file to calculate BMI.      General appearance no acute distress.    In a wheelchair    He denies any active bleeding from the leg is at the below the knee amputation.    Lungs are clear no rales or rhonchi heart regular rate at 80    Abdomen nontender.    As discussed above he also has a large ulcer in the right decubital area    Lab work BMP pending CBC as outlined with initial reading on the hemoglobin 10.1 but this will be sent out along with a white count and platelets.        Assessment:  1. Chronic Pain  oxyCODONE (OXYCONTIN) 10 mg 12 hr tablet   2. Screening for colon cancer  Cologuard   3. Ulcer of right thigh, unspecified ulcer stage (H)     4. Paraplegia Of Unknown Etiology     5. Benign Essential Hypertension     6. Anemia  HM2(CBC w/o Differential)    Basic Metabolic Panel     Chronic pain wean down on the  oxycodone to 1 tablet twice a day 60 tablets given for the month    Screening for colon cancer with [place] guard    Ulcer right thigh/decubital.  Plan for complete amputation of the right leg with primary closure    Paraplegia history    Hypertension controlled    Anemia baseline around 10-11 now with some acute blood loss dropping it down to 7 range by history.  He had transfusion of 2 units.  Recheck hemoglobin around 10.1    Plan: As outlined above we will contact him with the labs in addition BMP is pending    Plan to see him back in a month with additional CBC hemoglobin electrophoresis iron ferritin etc.    This transcription uses voice recognition software, which may contain typographical errors.

## 2021-06-19 NOTE — PROGRESS NOTES
Progress Note    Reason for Visit:  Chief Complaint     gynecomastia          Progress Note:    HPI: This patient seen saltation at the request of the primary care physician because of right-sided unilateral gynecomastia.  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    Patient is recently  his wife  2 months ago from liver disease due to increased alcohol intake.    The patient denies any alcohol abuse or drug abuse.  But he is taking oxycodone 10 mg twice a day because of right leg ulcer.    He is also on iron 2 tablets a day metoprolol 50 mg for hypertension.    His creatinine is 0.79 sodium 135.  Prolactin 20.5 total testosterone 325 TSH 2.19 LH 4.8.    Unilateral gynecomastia I discussed the path of the surgery of the disease with the patient    Component      Latest Ref Rng & Units 3/6/2017 1/3/2018   TESTOSTERONE, FREE, S      4.06 - 15.6 ng/dL  8.13   Testosterone, Total      240 - 950 ng/dL  325   TSH      0.30 - 5.00 uIU/mL 1.40 2.19   Prolactin      0.0 - 15.0 ng/mL  20.5 (H)   Free T4      0.7 - 1.8 ng/dL  1.1   Estradiol      pg/mL     LH      mIU/mL         MAMMO DIAGNOSTIC 2D BILATERAL, US BREAST LIMITED (FOCAL) RIGHT  2017 12:45 PM     INDICATION: Palpable lump subareolar right breast.  COMPARISON: None. Baseline exam.     MAMMOGRAPHIC FINDINGS: Bilateral full-field digital diagnostic mammograms performed. There is prominent asymmetric gynecomastia on the right with heterogeneously dense fibroglandular tissue extending from the subareolar region to the upper outer   quadrant. Minimal gynecomastia on the left. Ultrasound will be performed to exclude underlying lesion on the right. Images evaluated with the assistance of CAD.     ULTRASOUND FINDINGS: A targeted ultrasound  was performed by the sonographer and myself in the subareolar right breast and right upper outer quadrant. Normal-appearing glandular tissue with no suspicious underlying mass.      IMPRESSION:   Prominent asymmetric right-sided gynecomastia with no suspicious underlying mass. If symptoms persist, consider surgical consult.     ACR BI-RADS Category 2: Benign.    MAMMO DIAGNOSTIC 2D BILATERAL, US BREAST LIMITED (FOCAL) RIGHT  12/11/2017 12:45 PM     INDICATION: Palpable lump subareolar right breast.  COMPARISON: None. Baseline exam.     MAMMOGRAPHIC FINDINGS: Bilateral full-field digital diagnostic mammograms performed. There is prominent asymmetric gynecomastia on the right with heterogeneously dense fibroglandular tissue extending from the subareolar region to the upper outer   quadrant. Minimal gynecomastia on the left. Ultrasound will be performed to exclude underlying lesion on the right. Images evaluated with the assistance of CAD.     ULTRASOUND FINDINGS: A targeted ultrasound was performed by the sonographer and myself in the subareolar right breast and right upper outer quadrant. Normal-appearing glandular tissue with no suspicious underlying mass.      IMPRESSION:   Prominent asymmetric right-sided gynecomastia with no suspicious underlying mass. If symptoms persist, consider surgical consult.          Review of Systems:    Nervous System: No headache, dizziness, fainting or memory loss. No tingling sensation of hand or feet.  Ears: No hearing loss or ringing in the ears  Eyes: No blurring of vision, redness, itching or dryness.  Nose: No nosebleed or loss of smell  Mouth: No mouth sores or loss of taste  Throat: No hoarseness or difficulty swallowing  Neck: No enlarged thyroid or lymph nodes.  Heart: No chest pain, palpitation or irregular heartbeat. No swelling of hands or feet  Lungs: No shortness of breath, cough, night sweats, wheezing or hemoptysis.  Gastrointestinal: No nausea or vomiting,  constipation or diarrhea.  No acid reflux, abdominal pain or blood in stools.  Kidney/Bladdr: No polyuria, polydipsia, nocturia or hematuria.  Genital/Sexual: No loss of libido  Skin: No rash, hair loss or hirsutism.  No abnormal striae  Muscles/Joints/Bones: No morning stiffness, muscle aches and pain or loss of height.    Current Medications:  Current Outpatient Prescriptions   Medication Sig     baclofen (LIORESAL) 20 MG tablet TAKE 1 TABLET BY MOUTH FOUR TIMES DAILY     ferrous sulfate 325 (65 FE) MG tablet 1 po two times a day     food supplemt, lactose-reduced (ENSURE HIGH PROTEIN) Liqd 1 can po qd     metoprolol succinate (TOPROL-XL) 50 MG 24 hr tablet Take 1 tablet (50 mg total) by mouth daily.     oxyCODONE (OXYCONTIN) 10 mg 12 hr tablet 1 p.o. twice daily     tolterodine (DETROL LA) 4 MG ER capsule TAKE 2 CAPSULES BY MOUTH DAILY     zolpidem (AMBIEN) 10 mg tablet TAKE 1 TABLET BY MOUTH EVERY NIGHT AT BEDTIME       Patients Active Problems:  Patient Active Problem List   Diagnosis     Paraplegia Of Unknown Etiology     Pressure ulcer     Tenosynovitis Of The Left Wrist     Neurogenic Bladder     Vitamin D Deficiency     Benign Essential Hypertension     Chronic Pain     Epicondylitis     Insomnia     Hydrocele     Infected penile implant (H)     Ulcer of right thigh, unspecified ulcer stage (H)     Right shoulder strain     Hx of BKA, right (H)       History:   reports that he has never smoked. He has never used smokeless tobacco. He reports that he does not drink alcohol or use illicit drugs.   reports that he has never smoked. He has never used smokeless tobacco. He reports that he does not drink alcohol or use illicit drugs.  History   Smoking Status     Never Smoker   Smokeless Tobacco     Never Used      reports that he has never smoked. He has never used smokeless tobacco. He reports that he does not drink alcohol or use illicit drugs.  History   Sexual Activity     Sexual activity: Not on file      Past Medical History:   Diagnosis Date     Benign Essential Hypertension     Created by Conversion      Chronic indwelling Shah catheter      Chronic pain      Decubitus ulcer      Decubitus Ulcer     Created by Conversion [facility] Annotation: Oct 22 2007 11:03Marvel Xiao: Right thigh      Epicondylitis      Epicondylitis     Created by Conversion [facility] Annotation: Dec  1 2008  1:45PM - Starr Galicia: Elbows      Hydrocele      Hypertension      Infected penile implant (H) 7/16/2014     Insomnia      Insomnia     Created by Conversion      Neurogenic bladder      Neurogenic Bladder     Created by Conversion      Paraplegia (H)      Right shoulder strain      Skin ulcer of right thigh (H) 10/26/2014     Spasticity      Tenosynovitis     left wrist     Tenosynovitis Of The Left Wrist     Created by Conversion      Vitamin D deficiency      Vitamin D Deficiency     Created by Conversion      No family history on file.  Past Medical History:   Diagnosis Date     Benign Essential Hypertension     Created by Conversion      Chronic indwelling Shah catheter      Chronic pain      Decubitus ulcer      Decubitus Ulcer     Created by Conversion [facility] Annotation: Oct 22 2007 11:03Marvel Xiao: Right thigh      Epicondylitis      Epicondylitis     Created by Conversion [facility] Annotation: Dec  1 2008  1:45PM - Starr Galicia: Elbows      Hydrocele      Hypertension      Infected penile implant (H) 7/16/2014     Insomnia      Insomnia     Created by Conversion      Neurogenic bladder      Neurogenic Bladder     Created by Conversion      Paraplegia (H)      Right shoulder strain      Skin ulcer of right thigh (H) 10/26/2014     Spasticity      Tenosynovitis     left wrist     Tenosynovitis Of The Left Wrist     Created by Conversion      Vitamin D deficiency      Vitamin D Deficiency     Created by Conversion      Past Surgical History:   Procedure Laterality Date     HYDROCELE EXCISION  / REPAIR Bilateral 7/16/2014    Procedure: SCROTAL EXPLORATION, BILATERAL HYDROCELETOMY, EXPLANT INFECTED PENILE PROTHESIS;  Surgeon: Richard Byers MD;  Location: [facility];  Service:      PENILE PROSTHESIS IMPLANT       PENILE PROSTHESIS IMPLANT N/A 8/10/2016    Procedure: INFLATABLE PENILE PROSTHESIS ;  Surgeon: Richard Byers MD;  Location: [facility];  Service:      MI AMPUTATION LOW LEG THRU TIB/FIB      Description: Amputation Of Leg Below Knee;  Recorded: 12/01/2008;     skin grafts Left        Vitals   blood pressure is 116/78.         Exam  General appearance: The patient looked well, not in acute distress.  Eyes: no evidence of thyroid eye disease.   Retinal exam: No evidence of diabetic retinopathy.  Mouth and Throat: Normal  Neck: No evidence of thyromegaly, enlarged lymph node or tenderness  Chest: Trachea is central. Chest is clear to auscultation and percussion. Breat sounds are normal.  Cardiovascular exam: JVP is not raised. Heart sounds are normal, no murmurs or rub  Peripheral pulses are palpable.   Abdomen: No masses or tenderness.    Back: No vertebral tenderness or kyphosis.  Extremities: No evidence of leg edema.   Skin: Normal to touch.  No abnormal striae  Neurologic exam:  Visual fields are intact by confrontation, grossly intact. No evidence of peripheral neuropathy.  Detailed foot exam normal.        Diagnosis:  No diagnosis found.    Orders:   No orders of the defined types were placed in this encounter.        Assessment and Plan:.  Unilateral gynecomastia I discussed the pathophysiology of the disease with the patient.  The patient had mammogram and ultrasound which did not show any worrisome masses.    He is on oxycodone he does have mildly elevated prolactin due to the narcotics.    Multiple causes in this case could be drug abuse alcohol abuse or idiopathic the patient is denying any of these in the past or current history of drug abuse or alcohol  abuse.    He is denying erectile dysfunction.    He is denying any testicular lumps and he declined exam.    I would check his alpha-fetoprotein and [facility] in.    I will check his pituitary function testosterone PSA TSH.    I discussed different options of treatment including Medi-Delfino for surgery patient declined both.  I would do the labs and I will be in touch with him as soon as the results are available with any abnormality otherwise I have not given him any further appointment.  Patient does not want active treatment.    I have reviewed and ordered clinical lab test    I have reviewed and ordered radiology tests.    I have reviewed and ordered her medication as required.    I have reviewed her test results and advised with the performing physician.    I have reviewed the patient's old records.    I have reviewed and summarized the patient old records.    I did spend 60 minutes with the patient more than 50% was spent on counseling and managing her care.

## 2021-06-19 NOTE — PROGRESS NOTES
Subjective: Patient comes in for evaluation this patient is a 52-year-old male.  He is waiting for the wheelchair to be finished so that he can then proceed with his surgery.  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 Murphy at [facility] along with plastic surgeon Dr. Blackmon.    Patient has had health maintenance with [place] guard which was negative.    He saw me back in July 11 had a hemoglobin of 9.9 MCV was 68 he says he takes B12 2 a day but only for less than a month.  He has not been on iron.    I did check B12 ferritin iron CBC and a hemoglobin electrophoresis.    Please see discussion from down in [place] where he had a transfusion.  He had been bleeding from the chronic decubitus ulcer when he was down in [place].    Recheck today please see below    Also discussed pain medication she is on oxycodone 10 mg 1 twice daily.    We have been weaning down on this which is a significant amount from previous he is not sure he is able to wean down any further at this point.    We discussed recheck in a month we will check a urine drug screen at this time he will bring a catheter as he does self caths.  Tobacco status: He  reports that he has never smoked. He has never used smokeless tobacco.    Patient Active Problem List    Diagnosis Date Noted     Hx of BKA, right (H) 04/09/2018     Right shoulder strain 12/30/2014     Ulcer of right thigh, unspecified ulcer stage (H) 10/26/2014     Hydrocele 07/16/2014     Infected penile implant (H) 07/16/2014     Paraplegia Of Unknown Etiology      Pressure ulcer      Tenosynovitis Of The Left Wrist      Neurogenic Bladder      Vitamin D Deficiency      Benign Essential Hypertension      Chronic Pain      Epicondylitis      Insomnia        Current Outpatient Prescriptions   Medication Sig Dispense Refill     baclofen (LIORESAL) 20 MG tablet  TAKE 1 TABLET BY MOUTH FOUR TIMES DAILY 120 tablet 3     food supplemt, lactose-reduced (ENSURE HIGH PROTEIN) Liqd 1 can po qd 30 Can 3     metoprolol succinate (TOPROL-XL) 50 MG 24 hr tablet Take 1 tablet (50 mg total) by mouth daily. 90 tablet 3     oxyCODONE (OXYCONTIN) 10 mg 12 hr tablet 1 p.o. twice daily 60 tablet 0     tolterodine (DETROL LA) 4 MG ER capsule TAKE 2 CAPSULES BY MOUTH DAILY 180 capsule 2     tolterodine (DETROL LA) 4 MG ER capsule TAKE 1-2 CAPSULE BY MOUTH DAILY 60 capsule 5     zolpidem (AMBIEN) 10 mg tablet TAKE 1 TABLET BY MOUTH EVERY NIGHT AT BEDTIME 30 tablet 1     ferrous sulfate 325 (65 FE) MG tablet 1 po two times a day 60 tablet 5     Current Facility-Administered Medications   Medication Dose Route Frequency Provider Last Rate Last Dose     lidocaine 2 % jelly (XYLOCAINE)   Topical PRN Gabriele Brown MD   1 application at 03/21/17 1312       ROS: 10 point review of systems positive as outlined otherwise negative  Objective:    /60 (Patient Site: Left Arm, Patient Position: Sitting, Cuff Size: Adult Large)  Pulse 73  Temp 98.3  F (36.8  C) (Oral)   There is no height or weight on file to calculate BMI.      General appearance no acute distress    I did review his immunizations and he needs a TD shot    He is also due for PCV 13 and this was given.    HEENT neck negative oropharynx clear pupils react normally    Lungs clear no rales rhonchi heart regular rate and 70s O2 sat 97%    Abdomen soft bowel sounds normal no guarding rebound or graph he denies any hemoptysis or blood in stool.    He says he gets a fair amount of oozing from the ulceration on the leg.    Lab work is outlined below with MCV 68 hemoglobin 9.0 platelets 553,000    The iron was low ferritin normal hemoglobin electrophoresis negative and B12 was normal        Results for orders placed or performed in visit on 08/09/18   HM2(CBC w/o Differential)   Result Value Ref Range    WBC 10.1 4.0 - 11.0 thou/uL    RBC  4.98 4.40 - 6.20 mill/uL    Hemoglobin 9.0 (L) 14.0 - 18.0 g/dL    Hematocrit 33.8 (L) 40.0 - 54.0 %    MCV 68 (L) 80 - 100 fL    MCH 18.1 (L) 27.0 - 34.0 pg    MCHC 26.6 (L) 32.0 - 36.0 g/dL    RDW 25.2 (H) 11.0 - 14.5 %    Platelets 553 (H) 140 - 440 thou/uL    MPV 10.0 8.5 - 12.5 fL   Ferritin   Result Value Ref Range    Ferritin 77 27 - 300 ng/mL   Iron   Result Value Ref Range    Iron 20 (L) 42 - 175 ug/dL   Hemoglobinopathy/Thalassemia Cascade   Result Value Ref Range    Hgb A2 Quant 3.0 2.2 - 3.5 %    Hgb F Quant <0.8 0.0 - 2.0 %    Hemoglobin,ELP Normal hemoglobin electrophoresis pattern.     Path ICD: D64.9     Interpreted By: Khari Tilley MD    Vitamin B12   Result Value Ref Range    Vitamin B-12 821 (H) 213 - 816 pg/mL       Assessment:  1. Paraplegia Of Unknown Etiology     2. Need for TD vaccine  Td, Preservative Free (green label)   3. Chronic Pain  oxyCODONE (OXYCONTIN) 10 mg 12 hr tablet   4. Anemia  HM2(CBC w/o Differential)    Ferritin    Iron    Hemoglobinopathy/Thalassemia Cascade    Vitamin B12    ferrous sulfate 325 (65 FE) MG tablet   5. Immunization counseling  Pneumococcal conjugate vaccine 13-valent 6wks-17yrs; >50yrs     Patient has iron deficient anemia will treat with ferrous sulfate 325 mg 1 twice daily.  Most likely losing the blood from the chronic drainage from the large decubitus ulcer    Immunization update with Td and PCV 13    Chronic pain discussion stay on oxycodone 10 mg 1 twice daily    Chronic decubitus ulcer, plan for disarticulation right hip surgery in the near future    Plan: As above recheck 1 month check urine drug screen  This transcription uses voice recognition software, which may contain typographical errors.

## 2021-06-20 NOTE — LETTER
Letter by Krystyna Chacon RN at      Author: Krystyna Chacon RN Service: -- Author Type: --    Filed:  Encounter Date: 7/8/2020 Status: (Other)       CARE COORDINATION  [facility]  [address] [place] [address]         July 17, 2020      Josiah Crump Sr.  [address]  [place] [address]      Dear Josiah,    Your Care Team congratulates you on your journey to maintain wellness. This document will help guide you on your journey to maintain a healthy lifestyle.  You can use this to help you overcome any barriers you may encounter.  If you should have any questions or concerns, you can contact the members of your Care Team or contact your Primary Care Clinic for assistance.        My Access Plan  Medical Emergency 911   Primary Clinic Line Marvel Petit MD - [phone]   24 Hour Appointment Line [phone] or  [phone] ([phone]) (toll-free)   24 Hour Nurse Line [phone]   Preferred Urgent Care     Preferred Hospital     Preferred Pharmacy [id number] [address] AT [address]     Behavioral Health Crisis Line The National Suicide Prevention Lifeline at 1-685.480.5592 or 911     My Care Team Members  Patient Care Team       Relationship Specialty Notifications Start End    Marvel Petit MD PCP - General   4/19/07     Phone: [phone] Fax: [phone] [address] [address]    Marvel Petit MD Assigned PCP   7/28/19     Phone: [phone] Fax: [phone]         [address] [address]              Goals        Patient Stated    ? COMPLETED: Monitoring (pt-stated)      Goal Statement:  I want to stay out of the hospital for the next 90 days.  Date Goal set:  6/3/2020  Barriers:  Recent stroke  Strengths:  Able to identify and advocate for needs, good support system from family  Date to Achieve By:  7/15/2020 (based on hospital discharge date of  4/15/2020)  Patient expressed understanding of goal:  Yes  Action steps to achieve this goal:  1. I will attend all my appts as scheduled, and recommended follow up appts  2. I will call my clinic if I start to feel sick or notice any change(s) in my health, and schedule an appt if needed  3. I will call the triage nurse line for advice, before going to the hospital  4. I will go to  or Walk-In-Clinic before going to the hospital, if I am unable to get an appt with my PCP  5. I will update the Community Healthcare Worker during outreach calls and ask for additional support or resources, if needed      UPDATED:  6/24/2020 (pt has upcoming surgery scheduled on 7/3/20)             Advance Care Plans/Directives Type:      We notice that you do not have an Advance Directive on file. Upon completion of your Health Care Directive, please bring a copy with you to your next office visit.    It has been your Clinic Care Team's pleasure to work with you on your goals.    Regards,  Your Clinic Care Team

## 2021-06-20 NOTE — LETTER
Letter by Krystyna Chacon RN at      Author: Krystyna Chacon RN Service: -- Author Type: --    Filed:  Encounter Date: 6/3/2020 Status: (Other)       Care Plan  About Me:    Patient Name:  Josiah Crump Sr.    YOB: 1965  Age:         54 y.o.   [facility] MRN:    [id number] Telephone Information:  Home Phone [phone]   Mobile [phone]       Address:  [address] [place] [address] Email address:  [email or web address]      Emergency Contact(s)  Extended Emergency Contact Information      Name: Martinez Wood    Home Phone Number: [phone]  Relation: Sibling      Name: Krystyna Crump    Home Phone Number: [phone]  Relation: Friend          Primary language:  English     needed? No   [employer]:  [phone] op. 1  Other communication barriers: None  Preferred Method of Communication:  Jost  Current living arrangement: I live alone  Mobility Status/ Medical Equipment: Dependent/Assisted by Another    Health Maintenance  Health Maintenance Reviewed: Up to date    My Access Plan  Medical Emergency 911   Primary Clinic Line Marvel Petit MD - [phone]   24 Hour Appointment Line [phone] or  [phone]) (toll-free)   24 Hour Nurse Line [phone] (toll-free)   Preferred Urgent Care     Preferred Hospital [facility]  [phone]   Preferred Pharmacy [id number] [address]     Behavioral Health Crisis Line The National Suicide Prevention Lifeline at 1-120.241.3334 or 911         My Care Team Members  Patient Care Team       Relationship Specialty Notifications Start End    Marvel Petit MD PCP - General   4/19/07     Phone: [phone] Fax: [phone]         [address] [place] [address]    Marvel Petti MD Assigned PCP   7/28/19     Phone: [phone] Fax: [phone]          [address] [facility] [address]    Dayday Torres, CHW Community Health Worker Primary Care - CC Admissions 4/15/20     BPCI-A    Krystyna Chacon, RN Lead Care Coordinator Primary Care - CC Admissions 4/27/20     BPCI-A            My Care Plans  Self Management and Treatment Plan  Goals and (Comments)  Goals        General    Monitoring (pt-stated)     Notes - Note edited  6/3/2020  3:56 PM by Krystyna Chacon, RN    Goal Statement:  I want to stay out of the hospital for the next 90 days.  Date Goal set:  6/3/2020  Barriers:  Recent stroke  Strengths:  Able to identify and advocate for needs, good support system from family  Date to Achieve By:  7/15/2020 (based on hospital discharge date of 4/15/2020)  Patient expressed understanding of goal:  Yes  Action steps to achieve this goal:  1. I will attend all my appts as scheduled, and recommended follow up appts  2. I will call my clinic if I start to feel sick or notice any change(s) in my health, and schedule an appt if needed  3. I will call the triage nurse line for advice, before going to the hospital  4. I will go to  or Walk-In-Clinic before going to the hospital, if I am unable to get an appt with my PCP  5. I will update the Community Healthcare Worker during outreach calls and ask for additional support or resources, if needed        Other (pt-stated)     Notes - Note created  6/3/2020  4:01 PM by Krystyna Chcaon, RN    Goal Statement: I want to get back to work in the next two months.  Date Goal set: 6/3/2020   Barriers:  COVID-19, upcoming surgery next month  Strengths:  Eager to return to work, good support system  Date to Achieve By: 8/2/2020  Patient expressed understanding of goal: Yes  Action steps to achieve this goal:  1. I will complete and submit job applications for jobs I am interested in  2. I will follow up on previous job interviews I had set up prior to the COVID-19 closures  3. I will update the Community Healthcare Worker during  outreach calls and ask for additional support or resources, if needed                Advance Care Plans/Directives Type:        My Medical and Care Information  Problem List   Patient Active Problem List   Diagnosis   ? Paraplegia Of Unknown Etiology   ? Pressure ulcer   ? Tenosynovitis Of The Left Wrist   ? Neurogenic Bladder   ? Vitamin D Deficiency   ? Benign Essential Hypertension   ? Chronic Pain   ? Epicondylitis   ? Insomnia   ? Hydrocele   ? Infected penile implant (H)   ? Ulcer of right thigh, unspecified ulcer stage (H)   ? Right shoulder strain   ? Hx of BKA, right (H)   ? Gynecomastia   ? History of disarticulation of right hip   ? Spasticity   ? Injury of thoracic spinal cord, sequela (H)   ? Urine incontinence   ? Paresthesias   ? Paresthesia   ? Acute CVA (cerebrovascular accident) (H)          Current Medications and Allergies:    Current Outpatient Medications   Medication Sig Dispense Refill   ? aspirin 81 mg chewable tablet Chew 1 tablet (81 mg total) daily.  0   ? baclofen (LIORESAL) 20 MG tablet TAKE 1 TABLET BY MOUTH FOUR TIMES DAILY 360 tablet 0   ? metoprolol succinate (TOPROL-XL) 25 MG Take 1 tablet (25 mg total) by mouth daily. 30 tablet 0   ? amLODIPine (NORVASC) 5 MG tablet Take 1 tablet (5 mg total) by mouth daily. 30 tablet 0   ? clopidogreL (PLAVIX) 75 mg tablet Take 1 tablet (75 mg total) by mouth daily for 20 days. 20 tablet 0   ? ferrous sulfate 325 (65 FE) MG tablet Take 1 tablet by mouth daily with breakfast.     ? fesoterodine (TOVIAZ) 8 mg Tb24 ER tablet Take 8 mg by mouth daily.     ? simvastatin (ZOCOR) 20 MG tablet Take 1 tablet (20 mg total) by mouth daily. 30 tablet 0   ? UNABLE TO FIND Take 3 capsules by mouth daily. Med Name: Nugenix Total-T   (contains cholecalciferol, d-aspartic acid, tribulous extract, Tesnor, cocoa extract, stingling needle extract, kavita extract, eurycoma longifolia extract, epimedium extract, diindolymethane, boron)       No current  facility-administered medications for this visit.           Care Coordination Start Date: No linked episodes   Frequency of Care Coordination:     Form Last Updated: 06/08/2020

## 2021-06-20 NOTE — LETTER
Letter by Krystyna Chacon RN at      Author: Krystyna Chacon RN Service: -- Author Type: --    Filed:  Encounter Date: 6/3/2020 Status: (Other)       CARE COORDINATION  [facility] [address] [place] [address]         June 8, 2020      Josiahestela Crump Sr.  [address]  [place] [address]      Dear Josiah,    I am a clinic care coordinator  who works with Marvel Petit MD at [facility].  I wanted to thank you for spending the time to talk with me.  Below is a description of clinic care coordination and how I can further assist you.      The clinic care coordination team is made up of a registered nurse,  and community health worker who understand the health care system. The goal of clinic care coordination is to help you manage your health and improve access to the health care system in the most efficient manner. The team can assist you in meeting your health care goals by providing education, coordinating services, strengthening the communication among your providers and supporting you with any resource needs.    Please feel free to contact the Community Health Worker, Dayday, at [phone] with any questions or concerns. We are focused on providing you with the highest-quality healthcare experience possible and that all starts with you.     Sincerely,     Krystyna Chacon RN Clinic Care Coordinator     Enclosed: I have enclosed a copy of the Care Plan. This has helpful information and goals that we have talked about. Please keep this in an easy to access place to use as needed.

## 2021-06-20 NOTE — PROGRESS NOTES
Subjective: Patient comes in for follow-up he is due for a urine drug screen today    He stated that he ran out of catheters 3 days ago so he does not have a catheter.    We discussed his urinary symptoms he says he is losing some urine    I discussed with him that we should check him for an infection.  He stated he could go to the pharmacy and just buy some on his own has he has been waiting for them to come from the mail order.    I told him we should get a urine sample here and he really did not want to do that initially.  I did get a catheter and gloves for him and asked him to give a urine sample.  We wanted to check a UA and UC.  He denies any fever chills but states he has not been feeling very well.    I also ordered a urine drug screen.    Patient went into the bathroom and understood the instructions of doing the catheterization and then placing the urine sample in the container in the designated cabinet which attaches to the lab.        Patient also states that he got his wheelchair they are waiting to get fitted till after he gets his surgery about.    Additionally he did see endocrinologist for the gynecomastia.  Prolactin was again elevated.  He felt that the patient had enlargement of breast tissue from the elevated prolactin which was from the chronic pain medication.    Also was told to start on vitamin D    Tobacco status: He  reports that he has never smoked. He has never used smokeless tobacco.    Patient Active Problem List    Diagnosis Date Noted     Gynecomastia 08/17/2018     Hx of BKA, right (H) 04/09/2018     Right shoulder strain 12/30/2014     Ulcer of right thigh, unspecified ulcer stage (H) 10/26/2014     Hydrocele 07/16/2014     Infected penile implant (H) 07/16/2014     Paraplegia Of Unknown Etiology      Pressure ulcer      Tenosynovitis Of The Left Wrist      Neurogenic Bladder      Vitamin D Deficiency      Benign Essential Hypertension      Chronic Pain      Epicondylitis       Insomnia        Current Outpatient Prescriptions   Medication Sig Dispense Refill     baclofen (LIORESAL) 20 MG tablet TAKE 1 TABLET BY MOUTH FOUR TIMES DAILY 120 tablet 3     ergocalciferol (ERGOCALCIFEROL) 50,000 unit capsule Take 1 tabs every 10 days. 6 capsule 1     ferrous sulfate 325 (65 FE) MG tablet 1 po two times a day 60 tablet 5     food supplemt, lactose-reduced (ENSURE HIGH PROTEIN) Liqd 1 can po qd 30 Can 3     metoprolol succinate (TOPROL-XL) 50 MG 24 hr tablet Take 1 tablet (50 mg total) by mouth daily. 90 tablet 3     oxyCODONE (OXYCONTIN) 10 mg 12 hr tablet 1 p.o. twice daily 60 tablet 0     tolterodine (DETROL LA) 4 MG ER capsule TAKE 2 CAPSULES BY MOUTH DAILY 180 capsule 2     zolpidem (AMBIEN) 10 mg tablet TAKE 1 TABLET BY MOUTH EVERY NIGHT AT BEDTIME 30 tablet 1     Current Facility-Administered Medications   Medication Dose Route Frequency Provider Last Rate Last Dose     lidocaine 2 % jelly (XYLOCAINE)   Topical PRN Gabriele Brown MD   1 application at 03/21/17 1312       ROS: 10 point review of systems negative other than as outlined above denies any fever chills denies any abdominal pain    Objective:    BP 98/48 (Patient Site: Left Arm, Patient Position: Sitting, Cuff Size: Adult Regular)  Pulse 88  Resp 20  There is no height or weight on file to calculate BMI.      General appearance quiet    HEENT unremarkable vital signs are stable he is not febrile    Lungs and heart exam negative lungs clear heart regular    No CVA abnormalities.    Labs from 8/17/2018 with endocrine noted below    UA UC and urine drug screen were not run because the urine sample never got to the lab.    Results for orders placed or performed in visit on 08/17/18   Prolactin   Result Value Ref Range    Prolactin 22.0 (H) 0.0 - 15.0 ng/mL   Potassium   Result Value Ref Range    Potassium 4.8 3.5 - 5.0 mmol/L   Calcium   Result Value Ref Range    Calcium 9.2 8.5 - 10.5 mg/dL   Vitamin D, Total (25-Hydroxy)    Result Value Ref Range    Vitamin D, Total (25-Hydroxy) 15.6 (L) 30.0 - 80.0 ng/mL   Creatinine   Result Value Ref Range    Creatinine 0.87 0.70 - 1.30 mg/dL    GFR MDRD Af Amer >60 >60 mL/min/1.73m2    GFR MDRD Non Af Amer >60 >60 mL/min/1.73m2   Parathyroid Hormone Intact   Result Value Ref Range    PTH 58 10 - 86 pg/mL   Follicle Stimulating Hormone (FSH)   Result Value Ref Range    FSH 4.7 0.0 - 12.0 mIU/mL   Luteinizing Hormone (LH)   Result Value Ref Range    LH 6.2 mIU/mL       Assessment:  1. Paraplegia Of Unknown Etiology     2. Neurogenic bladder     3. Chronic Pain  Drug Abuse 1+, Urine    oxyCODONE (OXYCONTIN) 10 mg 12 hr tablet   4. Urine retention  Urinalysis    Culture, Urine     Urine retention patient needs to self cath is going to get catheters    He did not leave a sample today, the lab checked and I checked I additionally checked the bathroom and there was no container    I contacted the patient and he stated that he left it behind the commode.  The commode is on the wall so that did not really make sense.    I asked him for urine drug screen last month and he did not give one, and now has not given one again.    He has had previous failed drug screens positive for cocaine on 2 occasions.    I had given him 60 tablets of the oxycodone 10 mg.  He has been on 1 twice daily    I discussed with him this would be his last fill of medication.  Please see previous discussions and referral to the pain clinic with [facility], which he had failed to go to multiple times this past summer.    I told him I could refer him to a different pain clinic if he wants he can think about that and let me know  Plan: Patient understands this is his last 60 tablets of pain medication from me he can wean down on the medication over the next 4-6 weeks.  I gave him a couple options as to how to do that.    I told him that I would continue to be his physician, but would not prescribe any further narcotics    This  transcription uses voice recognition software, which may contain typographical errors.

## 2021-06-20 NOTE — TELEPHONE ENCOUNTER
Pt called stating he bitted his tongue and he is still bleeding last couple hours. Pt reported he is on couple blood thinners and he doesn't know how to stop the bleeding.      Per protocal pt was advised to go to the emergency room and apply direct pressure for 10 minutes. Pt stated okay. RN called pt back after 10 minutes and he state he is still bleeding. Pt was advised to go to the emergency room and he stated okay.      Davon Segovia RN  [facility] Nurse Advisors       COVID 19 Nurse Triage Plan/Patient Instructions    Please be aware that novel coronavirus (COVID-19) may be circulating in the community. If you develop symptoms such as fever, cough, or SOB or if you have concerns about the presence of another infection including coronavirus (COVID-19), please contact your health care provider or visit [email or web address].     Disposition/Instructions    ED Visit recommended. Follow protocol based instructions.     Bring Your Own Device:  Please also bring your smart device(s) (smart phones, tablets, laptops) and their charging cables for your personal use and to communicate with your care team during your visit.    Thank you for taking steps to prevent the spread of this virus.  o Limit your contact with others.  o Wear a simple mask to cover your cough.  o Wash your hands well and often.    Resources    [facility]: About COVID-19: [email or web address]    CDC: What to Do If You're Sick: www.cdc.gov/coronavirus/2019-ncov/about/steps-when-sick.html    CDC: Ending Home Isolation: www.cdc.gov/coronavirus/2019-ncov/hcp/disposition-in-home-patients.html     CDC: Caring for Someone: www.cdc.gov/coronavirus/2019-ncov/if-you-are-sick/care-for-someone.html     [facility]: Interim Guidance for Hospital Discharge to Home: www.health.[facility].[place]/diseases/coronavirus/hcp/hospdischarge.pdf    [facility] clinical trials (COVID-19 research studies):  [email or web address].[facility]-clinical-trials     Below are the COVID-19 hotlines at the [place] Department of Health ([facility]). Interpreters are available.   o For health questions: Call 714-555-6376 or 1-268.231.7535 (7 a.m. to 7 p.m.)  o For questions about schools and childcare: Call 906-761-9827 or 1-304.291.1668 (7 a.m. to 7 p.m.)                               Reason for Disposition    [1] Bleeding from mouth AND [2] won't stop after 10 minutes of direct pressure    Additional Information    Negative: Severe difficulty breathing (e.g., struggling for each breath, speaks in single words, stridor)    Negative: Sounds like a life-threatening emergency to the triager    Negative: [1] Drooling or spitting out saliva (because can't swallow) AND [2] new onset    Protocols used: MOUTH SYMPTOMS-A-AH

## 2021-06-21 NOTE — LETTER
Letter by Bala Benjamin MD at      Author: Bala Benjamin MD Service: -- Author Type: --    Filed:  Encounter Date: 2/9/2021 Status: (Other)         February 9, 2021     Patient: Josiah Crump Sr.   YOB: 1965   Date of Visit: 2/9/2021       To Whom It May Concern:    It is my medical opinion that Josiah Crump needs an handicap licence plate because of his disability.    If you have any questions or concerns, please don't hesitate to call.    Sincerely,        Electronically signed by Bala Benjamin MD

## 2021-06-23 NOTE — TELEPHONE ENCOUNTER
Controlled Substance Refill Request  Medication:   Requested Prescriptions     Pending Prescriptions Disp Refills     zolpidem (AMBIEN) 10 mg tablet [Pharmacy Med Name: ZOLPIDEM 10MG TABLETS] 30 tablet 0     Sig: TAKE 1 TABLET BY MOUTH EVERY NIGHT AT BEDTIME     Date Last Fill: 12/3/18  Pharmacy: [id number]   Submit electronically to pharmacy  Controlled Substance Agreement on File:   Encounter-Level CSA Scan Date - 07/26/2017:    Scan on 8/1/2017  6:29 AM (below)             Encounter-Level CSA Scan Date - 05/31/2016:    Scan on 6/6/2016  2:51 PM (below)         Last office visit: Last office visit pertaining to requested medication was 9/10/18.

## 2021-06-25 NOTE — TELEPHONE ENCOUNTER
RN cannot approve Refill Request    RN can NOT refill this medication med is not covered by policy/route to provider. Last office visit: 5/13/2021 Marvel Petit MD Last Physical: 6/8/2021 Last MTM visit: Visit date not found Last visit same specialty: 5/13/2021 Marvel Petit MD.  Next visit within 3 mo: Visit date not found  Next physical within 3 mo: Visit date not found      Hamida Boudreaux, Care Connection Triage/Med Refill 6/9/2021    Requested Prescriptions   Pending Prescriptions Disp Refills     clopidogreL (PLAVIX) 75 mg tablet [Pharmacy Med Name: CLOPIDOGREL 75MG TABLETS] 90 tablet 0     Sig: TAKE 1 TABLET BY MOUTH EVERY DAY       Clopidogrel/Prasugrel/Ticagrelor Refill Protocol Passed - 6/8/2021  2:34 PM        Passed - PCP or prescribing provider visit in past 6 months       Last office visit with prescriber/PCP: 5/13/2021 OR same dept: 5/13/2021 Marvel Petit MD OR same specialty: 5/13/2021 Marvel Petit MD Last physical: 6/8/2021 Last MTM visit: Visit date not found     Next appt within 3 mo: Visit date not found  Next physical within 3 mo: Visit date not found  Prescriber OR PCP: Marvel Petit MD  Last diagnosis associated with med order: 1. Acute CVA (cerebrovascular accident) (H)  - clopidogreL (PLAVIX) 75 mg tablet [Pharmacy Med Name: CLOPIDOGREL 75MG TABLETS]; TAKE 1 TABLET BY MOUTH EVERY DAY  Dispense: 90 tablet; Refill: 0    If protocol passes may refill for 6 months if within 3 months of last provider visit (or a total of 9 months).              Passed - Hemoglobin in past 12 months     Hemoglobin   Date Value Ref Range Status   06/08/2021 11.3 (L) 14.0 - 18.0 g/dL Final

## 2021-06-25 NOTE — TELEPHONE ENCOUNTER
----- Message from Marvel Petit MD sent at 6/13/2021  9:50 AM CDT -----  Please contact this patient, let him know the labs look good.    Finish out the antibiotic for the bladder infection.    Please fax the office notes, preop, from 6/8/2021 to [facility] surgery.  Fax number [phone]

## 2021-06-26 ENCOUNTER — AMBULATORY - HEALTHEAST (OUTPATIENT)
Dept: FAMILY MEDICINE | Facility: CLINIC | Age: 56
End: 2021-06-26

## 2021-06-26 DIAGNOSIS — Z11.59 ENCOUNTER FOR SCREENING FOR OTHER VIRAL DISEASES: ICD-10-CM
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
## 2021-06-28 LAB
SARS-COV-2 PCR COMMENT: NORMAL
SARS-COV-2 RNA SPEC QL NAA+PROBE: NEGATIVE
SARS-COV-2 VIRUS SPECIMEN SOURCE: NORMAL

## 2021-06-29 ENCOUNTER — COMMUNICATION - HEALTHEAST (OUTPATIENT)
Dept: SCHEDULING | Facility: CLINIC | Age: 56
End: 2021-06-29

## 2021-06-29 NOTE — PROGRESS NOTES
Progress Notes by Krystyna Chacon RN at 5/11/2020  2:00 PM     Author: Krystyna Chacon RN Service: -- Author Type: Registered Nurse    Filed: 5/19/2020  8:48 AM Encounter Date: 5/11/2020 Status: Signed    : Krystyna Chacon RN (Registered Nurse)       Clinic Care Coordination Contact    Clinic Care Coordination Contact  OUTREACH    Referral Information:  Referral Source: IP Handoff (BPCI-A)    Primary Diagnosis: Cardiovascular - other(Acute CVA)      Patient was hospitalized at [facility] from 4/13/20 to 4/15/20 with acute CVA.    Pt reports he self catheterizes 3-4 times a day, depending on how much he drinks.  He has a BM every 2 days with digital stimulation; denies constipation.     His step daughter is his PCA for 6 hours a day, through [employer].    Pt had to go as his mom was calling him from a NH in [place]; requested call back to complete remaining assessment questions.          Chief Complaint   Patient presents with   ? Clinic Care Coordination - Initial     BPCI-A        Universal Utilization:      Utilization    Last refreshed: 5/18/2020 11:22 AM:  Hospital Admissions 1           Last refreshed: 5/18/2020 11:22 AM:  ED Visits 2           Last refreshed: 5/18/2020 11:22 AM:  No Show Count (past year) 3              Current as of: 5/18/2020 11:22 AM            Clinical Concerns:  Current Medical Concerns:  Acute CVA     Patient Active Problem List   Diagnosis   ? Paraplegia Of Unknown Etiology   ? Pressure ulcer   ? Tenosynovitis Of The Left Wrist   ? Neurogenic Bladder   ? Vitamin D Deficiency   ? Benign Essential Hypertension   ? Chronic Pain   ? Epicondylitis   ? Insomnia   ? Hydrocele   ? Infected penile implant (H)   ? Ulcer of right thigh, unspecified ulcer stage (H)   ? Right shoulder strain   ? Hx of BKA, right (H)   ? Gynecomastia   ? History of disarticulation of right hip   ? Spasticity   ? Injury of thoracic spinal cord, sequela (H)   ? Urine incontinence   ?  Paresthesias   ? Paresthesia   ? Acute CVA (cerebrovascular accident) (H)          Current Behavioral Concerns:  Patient denies any concerns      Education Provided to patient:  Educated on BPCI-A 90 day post hospitalization transition monitoring       Health Maintenance Reviewed:  Not reviewed; pt had to take an urgent call     Medication Management:  Patient manages his medications on his own.  RN Care Coordinator unable to review meds during call, as patient had to take an urgent call    Functional Status:  Dependent ADLs:: Bathing, Dressing, Incontinence, Wheelchair-independent  Dependent IADLs:: Cleaning, Cooking, Laundry, Shopping, Meal Preparation, Transportation  Bed or wheelchair confined:: Yes(W/C)  Mobility Status: Dependent/Assisted by Another    Living Situation:  Current living arrangement:: I live alone  Type of residence:: Apartment    Lifestyle & Psychosocial Needs:  Diet:: Regular  Tube Feeding: No  Transportation means:: [employer]     Restorationism or spiritual beliefs that impact treatment:: No  Mental health DX:: No  Mental health management concern (GOAL):: No  Informal Support system:: Family, Significant other, Other(Brother, SO, mom, & PCA is his step-daughter)   Socioeconomic History   ? Marital status: Single     Spouse name: Not on file   ? Number of children: Not on file   ? Years of education: Not on file   ? Highest education level: Not on file     Tobacco Use   ? Smoking status: Never Smoker   ? Smokeless tobacco: Never Used   Substance and Sexual Activity   ? Alcohol use: No   ? Drug use: No          Resources and Interventions:  Current Resources:      Community Resources: PCA, Transportation Services, [facility](PCA 6 hours/day; [employer]; [employer] , Olga [place])  Supplies used at home:: Incontinence Supplies, [employer]  Equipment Currently Used at Home: wheelchair, manual, grab bar, tub/shower, tub bench       Referrals Placed: None     Goals:  Unable to create  goals as patient had to take an urgent call.  RN Care Coordinator will call complete at next outreach      Patient/Caregiver understanding:             Plan:  RN Care Coordinator will follow up with patient in one to two weeks to complete remaining assessment, 5/20/20.        Krystyna Chacon RN Clinic Care Coordinator

## 2021-06-29 NOTE — PROGRESS NOTES
"Progress Notes by Krystyna Chacon RN at 6/3/2020  3:00 PM     Author: Krystyna Chacon RN Service: -- Author Type: Registered Nurse    Filed: 6/8/2020  5:16 PM Encounter Date: 6/3/2020 Status: Signed    : Krystyna Chacon RN (Registered Nurse)       Clinic Care Coordination Contact    Clinic Care Coordination Contact  OUTREACH    Referral Information:  Referral Source: IP Handoff (BPCI-A)    Primary Diagnosis: Cardiovascular - other (Acute CVA)    Background:  Patient was hospitalized at [facility] from 4/13/20 to 4/15/20 with acute CVA.     Assessment:  Completed remaining BPCI-A assessment and goals, as unable to complete during initial assessment call on 5/21/20.      Pt reports \"I'm fine, everything's good.\"  He had a follow up virtual visit with endocrinology today and was told he needed to have his testosterone and a diabetes test done, but not sure where he can go to have his labs drawn.  States he tried calling the clinic and was told he needed an appt, but he doesn't understand why an appt is needed.        Pt states he finished the prescriptions for amlodipine and simvastatin from the hospital and hasn't gotten either refilled as he thought he was only supposed to take it short term; \"my heart is good and my cholesterol has never been a problem.\"  He will discuss medications with Dr. Petit at his upcoming pre-op physical on 6/15/20, for his upcoming urethroplasty surgery that was rescheduled to 7/3/20 (original surgery date of 3/27/20 was cancelled due to COVID-19).  He is aware he'll need a pre-op physical scheduled with PCP, Dr. Petit, this month - RN Care Coordinator assisted in scheduling a pre-op physical with Dr. Petit on 6/15/20 at 11:00am.         Chief Complaint   Patient presents with   ? Clinic Care Coordination - Follow-up     BPCI-A        [place] Utilization:   Clinic Utilization  Difficulty keeping appointments:: No  Compliance Concerns: No  No-Show Concerns: No  No PCP office " "visit in Past Year: No  Utilization    Last refreshed: 6/5/2020  4:51 PM:  Hospital Admissions 1           Last refreshed: 6/5/2020  4:51 PM:  ED Visits 2           Last refreshed: 6/5/2020  4:51 PM:  No Show Count (past year) 3              Current as of: 6/5/2020  4:51 PM          Clinical Concerns:  Current Medical Concerns:   Acute CVA    Patient Active Problem List   Diagnosis   ? Paraplegia Of Unknown Etiology   ? Pressure ulcer   ? Tenosynovitis Of The Left Wrist   ? Neurogenic Bladder   ? Vitamin D Deficiency   ? Benign Essential Hypertension   ? Chronic Pain   ? Epicondylitis   ? Insomnia   ? Hydrocele   ? Infected penile implant (H)   ? Ulcer of right thigh, unspecified ulcer stage (H)   ? Right shoulder strain   ? Hx of BKA, right (H)   ? Gynecomastia   ? History of disarticulation of right hip   ? Spasticity   ? Injury of thoracic spinal cord, sequela (H)   ? Urine incontinence   ? Paresthesias   ? Paresthesia   ? Acute CVA (cerebrovascular accident) (H)          Current Behavioral Concerns:  Pt denies any concerns at this time.      Education Provided to patient:  Educated on BPCI-A 90 day post hospitalization transition monitoring      Pain  Pain (GOAL):: No  Health Maintenance Reviewed: Up to date       Medication Management:  Pt manages his own medications, taking as prescribed, aside from the amlodipine and simvastatin, which pt states he stopped taking after completing prescription from the hospital; wasn't aware he was supposed to continue taking it.  RN Care Coordinator explained reasons for taking amlodipine (better BP control) and simvastatin (to control his cholesterol and due to recent stroke).  Pt reports his BP \"is fine,\" stating his metoprolol was recently decreased to 25 mg a day.  He will follow up with PCP      Functional Status:  Dependent ADLs:: Bathing, Dressing, Incontinence, Wheelchair-independent  Dependent IADLs:: Cleaning, Cooking, Laundry, Shopping, Meal Preparation, " Transportation  Bed or wheelchair confined:: Yes(W/C)  Mobility Status: Dependent/Assisted by Another    Living Situation:  Current living arrangement:: I live alone  Type of residence:: Apartment    Lifestyle & Psychosocial Needs:  Lifestyle   ? Physical activity     Days per week: Not on file     Minutes per session: Not on file   ? Stress: Not at all     Social Needs   ? Financial resource strain: Not very hard   ? Food insecurity     Worry: Never true     Inability: Never true   ? Transportation needs     Medical: No     Non-medical: No     Diet:: Regular  Inadequate nutrition (GOAL):: No  Tube Feeding: No  Inadequate activity/exercise (GOAL):: No  Significant changes in sleep pattern (GOAL): No  Transportation means:: Public transportation     Bahai or spiritual beliefs that impact treatment:: No  Mental health DX:: No  Mental health management concern (GOAL):: No  Informal Support system:: Family, Significant other, Other(Brother, SO, mom, & PCA is his step-daughter)   Socioeconomic History   ? Marital status: Single     Spouse name: Not on file   ? Number of children: Not on file   ? Years of education: Not on file   ? Highest education level: Not on file   Relationships   ? Social connections     Talks on phone: More than three times a week     Gets together: Not on file     Attends Anabaptist service: Not on file     Active member of club or organization: Not on file     Attends meetings of clubs or organizations: Not on file     Relationship status: Not on file   ? Intimate partner violence     Fear of current or ex partner: No     Emotionally abused: No     Physically abused: No     Forced sexual activity: No     Tobacco Use   ? Smoking status: Never Smoker   ? Smokeless tobacco: Never Used   Substance and Sexual Activity   ? Alcohol use: No   ? Drug use: No         Resources and Interventions:  Current Resources:      Community Resources: PCA, Transportation Services, [facility] Programs(PCA 6 hours/day;  [employer]; [employer] , Olga Herndon)  Supplies used at home:: Incontinence Supplies, Chux  Equipment Currently Used at Home: wheelchair, manual, grab bar, tub/shower, tub bench         Referrals Placed: None     Goals:   Goals        General    Monitoring (pt-stated)     Notes - Note edited  6/3/2020  3:56 PM by Krystyna Cahcon, RN    Goal Statement:  I want to stay out of the hospital for the next 90 days.  Date Goal set:  6/3/2020  Barriers:  Recent stroke  Strengths:  Able to identify and advocate for needs, good support system from family  Date to Achieve By:  7/15/2020 (based on hospital discharge date of 4/15/2020)  Patient expressed understanding of goal:  Yes  Action steps to achieve this goal:  1. I will attend all my appts as scheduled, and recommended follow up appts  2. I will call my clinic if I start to feel sick or notice any change(s) in my health, and schedule an appt if needed  3. I will call the triage nurse line for advice, before going to the hospital  4. I will go to  or Walk-In-Clinic before going to the hospital, if I am unable to get an appt with my PCP  5. I will update the Community Healthcare Worker during outreach calls and ask for additional support or resources, if needed        Other (pt-stated)     Notes - Note created  6/3/2020  4:01 PM by Krystyna Chacon, RN    Goal Statement: I want to get back to work in the next two months.  Date Goal set: 6/3/2020   Barriers:  COVID-19, upcoming surgery next month  Strengths:  Eager to return to work, good support system  Date to Achieve By: 8/2/2020  Patient expressed understanding of goal: Yes  Action steps to achieve this goal:  1. I will complete and submit job applications for jobs I am interested in  2. I will follow up on previous job interviews I had set up prior to the COVID-19 closures  3. I will update the Community Healthcare Worker during outreach calls and ask for additional support or resources, if needed              Patient/Caregiver understanding:  Patient verbalized understanding and agrees with plan.       Future Appointments              In 1 week Marvel Petit MD [facility], [facility]    In 2 weeks Krystyna Chacon RN [facility]          Plan:      See above goals and action steps    RN Care Coordinator scheduled pt a pre-op physical with Dr. Petit on 6/15/20, for upcoming urethroplasty 7/3/20.  Patient to discuss medications, specifically resuming amlodipine and simvastatin, which pt thought he only needed to take short term.    Next RN Care Coordinator outreach, 6/24/20.    Krystyna Chacon RN Clinic Care Coordinator

## 2021-06-30 ENCOUNTER — TRANSFERRED RECORDS (OUTPATIENT)
Dept: HEALTH INFORMATION MANAGEMENT | Facility: CLINIC | Age: 56
End: 2021-06-30

## 2021-07-03 NOTE — ADDENDUM NOTE
Addendum Note by Marvel Tavarez MD at 3/7/2017 10:40 AM     Author: Marvel Tavarez MD Service: -- Author Type: Physician    Filed: 3/7/2017 10:40 AM Encounter Date: 3/6/2017 Status: Signed    : Marvel Tavarez MD (Physician)    Addended by: MARVEL TAVAREZ on: 3/7/2017 10:40 AM        Modules accepted: Orders

## 2021-07-03 NOTE — ADDENDUM NOTE
Addendum Note by Marvel Tavarez MD at 3/10/2017 10:54 AM     Author: Marvel Tavarez MD Service: -- Author Type: Physician    Filed: 3/10/2017 10:54 AM Encounter Date: 3/6/2017 Status: Signed    : Marvel Tavarez MD (Physician)    Addended by: MARVEL TAVAREZ on: 3/10/2017 10:54 AM        Modules accepted: Orders

## 2021-07-04 ENCOUNTER — HOSPITAL ENCOUNTER (EMERGENCY)
Dept: EMERGENCY MEDICINE | Facility: CLINIC | Age: 56
Discharge: HOME | End: 2021-07-04
Payer: MEDICARE

## 2021-07-04 DIAGNOSIS — R33.9 URINARY RETENTION: ICD-10-CM

## 2021-07-04 DIAGNOSIS — N30.00 ACUTE CYSTITIS WITHOUT HEMATURIA: ICD-10-CM

## 2021-07-04 DIAGNOSIS — T83.9XXA PROBLEM WITH FOLEY CATHETER, INITIAL ENCOUNTER (H): ICD-10-CM

## 2021-07-04 LAB
ALBUMIN UR-MCNC: ABNORMAL G/DL
ANION GAP SERPL CALCULATED.3IONS-SCNC: 10 MMOL/L (ref 5–18)
APPEARANCE UR: CLEAR
BACTERIA #/AREA URNS HPF: ABNORMAL /[HPF]
BASOPHILS # BLD AUTO: 0.1 THOU/UL (ref 0–0.2)
BASOPHILS NFR BLD AUTO: 1 % (ref 0–2)
BILIRUB UR QL STRIP: NEGATIVE
BUN SERPL-MCNC: 23 MG/DL (ref 8–22)
CALCIUM SERPL-MCNC: 9 MG/DL (ref 8.5–10.5)
CHLORIDE BLD-SCNC: 106 MMOL/L (ref 98–107)
CO2 SERPL-SCNC: 22 MMOL/L (ref 22–31)
COLOR UR AUTO: ABNORMAL
CREAT SERPL-MCNC: 1.01 MG/DL (ref 0.7–1.3)
EOSINOPHIL # BLD AUTO: 0.4 THOU/UL (ref 0–0.4)
EOSINOPHIL NFR BLD AUTO: 6 % (ref 0–6)
ERYTHROCYTE [DISTWIDTH] IN BLOOD BY AUTOMATED COUNT: 21.8 % (ref 11–14.5)
GFR SERPL CREATININE-BSD FRML MDRD: >60 ML/MIN/1.73M2
GLUCOSE BLD-MCNC: 92 MG/DL (ref 70–125)
GLUCOSE UR STRIP-MCNC: NEGATIVE MG/DL
HCT VFR BLD AUTO: 41.8 % (ref 40–54)
HGB BLD-MCNC: 11.9 G/DL (ref 14–18)
HGB UR QL STRIP: ABNORMAL
HYALINE CASTS: 3 LPF
IMM GRANULOCYTES # BLD: 0 THOU/UL
IMM GRANULOCYTES NFR BLD: 1 %
KETONES UR STRIP-MCNC: NEGATIVE MG/DL
LEUKOCYTE ESTERASE UR QL STRIP: ABNORMAL
LYMPHOCYTES # BLD AUTO: 1.3 THOU/UL (ref 0.8–4.4)
LYMPHOCYTES NFR BLD AUTO: 20 % (ref 20–40)
MCH RBC QN AUTO: 21.2 PG (ref 27–34)
MCHC RBC AUTO-ENTMCNC: 28.5 G/DL (ref 32–36)
MCV RBC AUTO: 75 FL (ref 80–100)
MONOCYTES # BLD AUTO: 0.6 THOU/UL (ref 0–0.9)
MONOCYTES NFR BLD AUTO: 9 % (ref 2–10)
MUCOUS THREADS #/AREA URNS LPF: PRESENT LPF
NEUTROPHILS # BLD AUTO: 4.2 THOU/UL (ref 2–7.7)
NEUTROPHILS NFR BLD AUTO: 64 % (ref 50–70)
NITRATE UR QL: NEGATIVE
PH UR STRIP: 6 [PH] (ref 5–8)
PLATELET # BLD AUTO: 314 THOU/UL (ref 140–440)
PMV BLD AUTO: 9.9 FL (ref 8.5–12.5)
POTASSIUM BLD-SCNC: 4.1 MMOL/L (ref 3.5–5)
RBC # BLD AUTO: 5.61 MILL/UL (ref 4.4–6.2)
RBC URINE: 26 HPF
SODIUM SERPL-SCNC: 138 MMOL/L (ref 136–145)
SP GR UR STRIP: 1.01 (ref 1–1.03)
SQUAMOUS EPITHELIAL: 2 /HPF
UROBILINOGEN UR STRIP-ACNC: ABNORMAL
WBC URINE: 40 HPF
WBC: 6.6 THOU/UL (ref 4–11)

## 2021-07-04 NOTE — ADDENDUM NOTE
Addendum Note by Estephania Freeman LPN at 4/28/2021  1:25 PM     Author: Estephania Freeman LPN Service: -- Author Type: Licensed Nurse    Filed: 5/3/2021 10:36 AM Encounter Date: 4/28/2021 Status: Signed    : Estephania Freeman LPN (Licensed Nurse)    Addended by: MARISELA FREEMAN on: 5/3/2021 10:36 AM        Modules accepted: Orders
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
## 2021-07-04 NOTE — ED TRIAGE NOTES
ED Triage Notes by Kamilah Mathew RN at 7/4/2021  9:35 AM     Author: Kamilah Mathew RN Service: -- Author Type: Registered Nurse    Filed: 7/4/2021  9:38 AM Date of Service: 7/4/2021  9:35 AM Status: Signed    : Kamilah Mathew RN (Registered Nurse)       Pt who had recent urological surgery presents with urinary retension after muhammad came out yesterday.

## 2021-07-05 ENCOUNTER — AMBULATORY - HEALTHEAST (OUTPATIENT)
Dept: CARE COORDINATION | Facility: CLINIC | Age: 56
End: 2021-07-05

## 2021-07-05 DIAGNOSIS — I63.9 ACUTE CVA (CEREBROVASCULAR ACCIDENT) (H): ICD-10-CM

## 2021-07-05 LAB — BACTERIA SPEC CULT: NO GROWTH

## 2021-07-06 VITALS
HEART RATE: 68 BPM | OXYGEN SATURATION: 99 % | SYSTOLIC BLOOD PRESSURE: 134 MMHG | TEMPERATURE: 98.3 F | DIASTOLIC BLOOD PRESSURE: 73 MMHG

## 2021-07-11 ENCOUNTER — HEALTH MAINTENANCE LETTER (OUTPATIENT)
Age: 56
End: 2021-07-11

## 2021-07-14 ENCOUNTER — PATIENT OUTREACH (OUTPATIENT)
Dept: NURSING | Facility: CLINIC | Age: 56
End: 2021-07-14

## 2021-07-14 NOTE — PROGRESS NOTES
"Clinic Care Coordination Contact  Community Health Worker Initial Outreach    Reason: [id number] - Ambulatory referral to Care Management (Primary Care)      Note     CHW TCM Outreach          Discussion: The Clinic Community Health Worker spoke with the patient today to discuss possible Clinic Care Coordination(CCC) enrollment. The service was described to the  patient and immediate needs were discussed. The patient declined enrollment into CCC service at this time. Completed TCM outreach with patient (below). The patient was refer to connect with PCP/referral provider's office in the future if change mind. Patient confirmed understand and no other questions or concerns.     Patient accepts CC: No, has no resources need or other concerns at this time per patient.     TCM DISCHARGE FOLLOW UP CALL:  \"Hi, my name is Isa Delcid IVON and I am calling from [facility]. I am calling to follow up and see how things are going for you after your recent hospitalization.      Tell me how you are doing now that you are home?\" Patient reported; I feel good. Doing well and no questions or concerns.     Discharge Instructions:   Do you understand your discharge instructions?  Pt. Response: Yes, and no questions.     \"Has an appointment with your primary care provider been scheduled?\" No per patient. CHW offered to assist with appt scheduling follow up with PCP. Pt declined. CHW suggested patient to contact prefer PCP office with any questions or concerns. Patient agreed.   \"If yes, when is that appointment?   N/A  If no, date of appointment scheduled: N/A   I can assist you to schedule that now.   CC refer to AVS to schedule when instructed.     Medications:  \"Did you have any medication changes?  No per patient.    Do you have any concerns with obtaining the medications or questions about your medications that you would like a RN to review with you?  No per patient. Pt declined to speak with a nurse or CCC RN at this " "time.   If yes, escalate to CC RN responsible for patient's clinic or CCRC RN  Send an inbasket Epic message if RN is unavailable after call.  \"When you see the provider, I would recommend that you bring your medications with you.\"    Call Summary:  \"What questions or concerns do you have about your recent visit and your follow-up care?\" None per patient.         Can be addressed if scheduled with RN or SW either Care Coordination or if declining to triage for further questions. N/A    \"If you have questions or things don't continue to improve, we encourage you contact us through the main clinic number [phone] (give number).  Even if the clinic is not open, triage nurses are available 24/7 to help you.      I am with Clinic Care Coordination, and we are a team of nurses, social workers, community health workers, and financial resource workers. We work together with your primary doctor.   We are here to see if we can help you with a variety of things - from resources in the community such as food assistance, transportation, help in the home, equipment needs, assistance with medications, coordination of your appointments, help with any medical questions, and things like this.      We would have a nurse or  speak with you and together come up with goals to help you to improve your health and wellness and do the best to help you stay out of the hospital.      If the patient agrees, then explain that the appointment can be in person (If has RN or SW in clinic) or on the telephone if remote or if easier.      I would like to assist to help you to make that appointment now.  The next available appointment is   .       Appointment for Care Coordination assessment has been made with _____N/A______ on (Date) at____N/A_____ (time)    Send in basket to CCC RN at clinic or in hub to put in order.     CHW Plan: Patient declined enrollment into CCC service. No further action need from CCC team at this time. "

## 2021-07-21 ENCOUNTER — TELEPHONE (OUTPATIENT)
Dept: UROLOGY | Facility: CLINIC | Age: 56
End: 2021-07-21

## 2021-07-21 NOTE — TELEPHONE ENCOUNTER
[facility] Call Center    Phone Message    May a detailed message be left on voicemail: no     Reason for Call: Other: PtJosiah calling about Not doing well after surgery, wants appt advised of October.  He said, he isn't waiting that long/ Call him at: [phone]     Action Taken: Message routed to:  [facility] urology    Travel Screening: Not Applicable

## 2021-07-21 NOTE — TELEPHONE ENCOUNTER
He has cysto set up in aug 23rd but is demanding to talk to bailee about his urethra  He is trying to have a penile pump put in and his other urologist cancels because of his urethra not taking a catheter.  I told him he needs cysto appt made 8/23 and appt to talk with bailee on Monday July 26th   he is nervous about video but he stated his phone can do video Gladis Pérez LPN Staff Nurse

## 2021-07-22 ENCOUNTER — PRE VISIT (OUTPATIENT)
Dept: UROLOGY | Facility: CLINIC | Age: 56
End: 2021-07-22
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
## 2021-07-23 ENCOUNTER — PRE VISIT (OUTPATIENT)
Dept: UROLOGY | Facility: CLINIC | Age: 56
End: 2021-07-23

## 2021-07-23 NOTE — TELEPHONE ENCOUNTER
Reason for visit: Consult     Relevant information: discuss urethral repair; IPP insertion    Records/imaging/labs/orders: in EPIC    Pt called: no    At Rooming: normal

## 2021-07-28 ENCOUNTER — OFFICE VISIT (OUTPATIENT)
Dept: UROLOGY | Facility: CLINIC | Age: 56
End: 2021-07-28
Payer: MEDICARE

## 2021-07-28 VITALS
WEIGHT: 200 LBS | HEART RATE: 75 BPM | DIASTOLIC BLOOD PRESSURE: 82 MMHG | HEIGHT: 76 IN | SYSTOLIC BLOOD PRESSURE: 136 MMHG | BODY MASS INDEX: 24.36 KG/M2

## 2021-07-28 DIAGNOSIS — T83.410A BREAKDOWN (MECHANICAL) OF IMPLANTED PENILE PROSTHESIS, INITIAL ENCOUNTER (H): ICD-10-CM

## 2021-07-28 DIAGNOSIS — Z01.812 PRE-PROCEDURE LAB EXAM: Primary | ICD-10-CM

## 2021-07-28 DIAGNOSIS — Z11.59 ENCOUNTER FOR SCREENING FOR OTHER VIRAL DISEASES: ICD-10-CM

## 2021-07-28 DIAGNOSIS — N39.0 URINARY TRACT INFECTION: ICD-10-CM

## 2021-07-28 DIAGNOSIS — N31.9 NEUROGENIC BLADDER: Primary | ICD-10-CM

## 2021-07-28 DIAGNOSIS — N39.0 URINARY TRACT INFECTION ASSOCIATED WITH INDWELLING URETHRAL CATHETER, SEQUELA: ICD-10-CM

## 2021-07-28 DIAGNOSIS — T83.511S URINARY TRACT INFECTION ASSOCIATED WITH INDWELLING URETHRAL CATHETER, SEQUELA: ICD-10-CM

## 2021-07-28 PROCEDURE — 99215 OFFICE O/P EST HI 40 MIN: CPT | Performed by: UROLOGY

## 2021-07-28 PROCEDURE — 87086 URINE CULTURE/COLONY COUNT: CPT | Performed by: UROLOGY

## 2021-07-28 PROCEDURE — 99207 PR NO CHARGE NURSE ONLY: CPT

## 2021-07-28 RX ORDER — NITROFURANTOIN 25; 75 MG/1; MG/1
100 CAPSULE ORAL AT BEDTIME
Qty: 60 CAPSULE | Refills: 0 | Status: SHIPPED | OUTPATIENT
Start: 2021-07-28 | End: 2021-09-16

## 2021-07-28 RX ORDER — AMPICILLIN 2 G/1
2 INJECTION, POWDER, FOR SOLUTION INTRAVENOUS
Status: CANCELLED | OUTPATIENT
Start: 2021-07-28

## 2021-07-28 ASSESSMENT — PAIN SCALES - GENERAL: PAINLEVEL: NO PAIN (0)

## 2021-07-28 ASSESSMENT — MIFFLIN-ST. JEOR: SCORE: 1848.69

## 2021-07-28 NOTE — NURSING NOTE
Pre Op Teaching Flowsheet - First Surgery                                    Pre and Post op Patient Education  Relevant Diagnosis: Neurogenic Bladder  Teaching Topic:  Pre and post op teaching for Cystoscope, Supra pubic catheter placement  Person Involved in teaching:  Patient      Motivation Level: High  Asks Questions: Yes  Eager to Learn:  Yes  Cooperative: Yes  Receptive (willing/able to accept information):  Yes  Patient demonstrates understanding of the following:  Date and time of surgery:  Aug 20th  [facility] of [facility]  History and Physical and any other testing necessary prior to surgery Pre Op Physical with: PCP on TBD patient to make appointment  Required time line for completion of History and Physical and any pre-op testing: No more than 30 days prior to surgery date    NPO Guidelines: NPO per Anesthesia Guidelines : Reviewed (surgery packet for further information).    Patient demonstrates understanding of the following:  Patient understands the need for a responsible adult to drive them home and someone to stay with them for the first 24 hours post-operatively: Patient aware  Pre-op bowel prep: N/A  Pre-op showering/scrub information with Hibiclens Soap: Yes  Medications to take the day of surgery: Pre op Physical for instruction.   Blood thinner medications discussed and when to stop (if applicable):  Yes  Diabetes medication management (if applicable):  Patient to discuss with Primary Care Provider  Discussed pain control after surgery: pain scale, pain medications and pain management techniques  Infection Prevention: Patient demonstrates understanding of the following:  Patient instructed on hand hygiene:  Yes  Surgical procedure site care taught: Yes  Signs and symptoms of infection taught:  Yes  Wound care will be taught at the time of discharge.    Urine anylisis and Urine Culture ordered on: UC on 07/28/21, Macrobid started for 60 days, daily, after UC obtained.  Pre op Covid testing  on: 8/16/21  Post-op follow-up:2nd surgery Sept 20th  Discussed how to contact the hospital, nurse, and clinic scheduling staff if necessary.     Surgical instructions given to patient in clinic: Yes.   Instructional materials used/given/mailed: Before your surgery packet , Medications to avoid before surgery , Showering or Bathing instructions before surgery  and What to expect after surgery    Total time with patient: 10 minutes  Sonya Mccann RN
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
## 2021-07-28 NOTE — NURSING NOTE
Pre Op Teaching Flowsheet   -  Second surgery                                     Pre and Post op Patient Education  Relevant Diagnosis: Malfunctioning of inflatable penile implant  Teaching Topic:  Pre and post op teaching for Inflatable Penile implant removal and replacement.  Person Involved in teaching: Patient        Motivation Level: High  Asks Questions: Yes  Eager to Learn:  Yes  Cooperative: Yes  Receptive (willing/able to accept information):  Yes  Patient demonstrates understanding of the following:  Date and time of surgery:  Sept 20th  Location of surgery: [facility]  History and Physical and any other testing necessary prior to surgery Pre Op Physical with: PCP on Patient to make appointment  Required time line for completion of History and Physical and any pre-op testing: No more than 30 days prior to surgery date    NPO Guidelines: NPO per Anesthesia Guidelines : Reviewed (surgery packet for further information).    Patient demonstrates understanding of the following:  Patient understands the need for a responsible adult to drive them home and someone to stay with them for the first 24 hours post-operatively: Yes  Pre-op bowel prep: N/A  Pre-op showering/scrub information with Hibiclens Soap: Yes  Medications to take the day of surgery: Pre op Physical for instruction.   Blood thinner medications discussed and when to stop (if applicable):  Yes  Diabetes medication management (if applicable):  Patient to discuss with Primary Care Provider  Discussed pain control after surgery: pain scale, pain medications and pain management techniques  Infection Prevention: Patient demonstrates understanding of the following:  Patient instructed on hand hygiene:  Yes  Surgical procedure site care taught: Yes  Signs and symptoms of infection taught:  Yes  Wound care will be taught at the time of discharge.    Urine anylisis and Urine Culture ordered on: NA  Pre op Covid testing on: Set 16th  Post-op follow-up:Oct  6th  Discussed how to contact the hospital, nurse, and clinic scheduling staff if necessary.     Surgical instructions given to patient in clinic: Yes.   Instructional materials used/given/mailed: Before your surgery packet , Medications to avoid before surgery , Showering or Bathing instructions before surgery  and What to expect after surgery    Total time with patient: 10 minutes  Sonya Mccann RN

## 2021-07-30 LAB
BACTERIA UR CULT: ABNORMAL
BACTERIA UR CULT: ABNORMAL

## 2021-07-31 DIAGNOSIS — Z11.59 ENCOUNTER FOR SCREENING FOR OTHER VIRAL DISEASES: ICD-10-CM

## 2021-08-03 PROBLEM — L97.119: Status: RESOLVED | Noted: 2020-03-25 | Resolved: 2021-02-09

## 2021-08-11 ENCOUNTER — TELEPHONE (OUTPATIENT)
Dept: FAMILY MEDICINE | Facility: CLINIC | Age: 56
End: 2021-08-11

## 2021-08-11 ENCOUNTER — PATIENT OUTREACH (OUTPATIENT)
Dept: UROLOGY | Facility: CLINIC | Age: 56
End: 2021-08-11

## 2021-08-11 ENCOUNTER — TELEPHONE (OUTPATIENT)
Dept: UROLOGY | Facility: CLINIC | Age: 56
End: 2021-08-11

## 2021-08-11 DIAGNOSIS — N36.8 URETHRAL EROSION: ICD-10-CM

## 2021-08-11 DIAGNOSIS — Q64.32 CONGENITAL STRICTURE OF URETHRA: ICD-10-CM

## 2021-08-11 DIAGNOSIS — N31.9 NEUROGENIC BLADDER: Primary | ICD-10-CM

## 2021-08-11 NOTE — TELEPHONE ENCOUNTER
Reason for Call:  Other appointment    Detailed comments: Josiah is having a catheter insertion done 8/20/21 by Dr. Goncalves [facility] and is needing a pre op done prior there is nothing until 8/24/2021 even looking with Dr. Petit's partners.    Phone Number Patient can be reached at: Cell number on file:    Telephone Information:   Mobile [phone]       Best Time: any    Can we leave a detailed message on this number? YES    Call taken on 8/11/2021 at 8:35 AM by Karen Rocha

## 2021-08-11 NOTE — TELEPHONE ENCOUNTER
[facility] Call Center    Phone Message    May a detailed message be left on voicemail: yes     Reason for Call: Other: Josiah calling to get his surgery time for 8/20/21. He also is wondering if he can get 1 physical for the two surgeries (8/20 & 9/20). Please give Josiah a call to discuss. Thank you!     Action Taken: Message routed to:  [facility] Uro    Travel Screening: Not Applicable

## 2021-08-12 NOTE — TELEPHONE ENCOUNTER
I did tell the patient in teaching he would need two physicals or for his MD to be made aware they are 31 days apart at the first physical.  please call him back and explain.  Thank you  Sonya

## 2021-08-16 ENCOUNTER — TELEPHONE (OUTPATIENT)
Dept: UROLOGY | Facility: CLINIC | Age: 56
End: 2021-08-16

## 2021-08-16 NOTE — TELEPHONE ENCOUNTER
" [facility] Call Center    Phone Message    May a detailed message be left on voicemail: yes     Reason for Call: Other: Juli [facility] - [phone] - called for Dr. Lira's care team about catheter orders for PT Josiah. Per Juli the catheter orders had \"other\" as fulfillment on the orders, and she needs clarification on whether she is supposed to fill it or not, as it is not clearly designated to their location. Pls call her to clarify and discuss.     Action Taken: Message routed to:  [facility] ([facility] URO    Travel Screening: Not Applicable                                                                      "

## 2021-08-16 NOTE — TELEPHONE ENCOUNTER
Patient called and told he will need another preop H & P to have done prior to second surgery Gladis Pérez LPN Staff Nurse

## 2021-08-16 NOTE — TELEPHONE ENCOUNTER
Contacted Juli to clarify. DME provider is Dr. Charles Lira. Ok to fill.  Anastasia Serrato LPN  Urology Clinic Service   [facility]

## 2021-08-17 ENCOUNTER — HOSPITAL ENCOUNTER (OUTPATIENT)
Dept: WOUND CARE | Facility: CLINIC | Age: 56
Discharge: HOME | End: 2021-08-17
Attending: PHYSICIAN ASSISTANT | Admitting: PHYSICIAN ASSISTANT
Payer: MEDICARE

## 2021-08-17 VITALS — DIASTOLIC BLOOD PRESSURE: 91 MMHG | HEART RATE: 71 BPM | TEMPERATURE: 97.6 F | SYSTOLIC BLOOD PRESSURE: 149 MMHG

## 2021-08-17 DIAGNOSIS — S31.819S: ICD-10-CM

## 2021-08-17 PROCEDURE — 99213 OFFICE O/P EST LOW 20 MIN: CPT | Performed by: PHYSICIAN ASSISTANT

## 2021-08-17 PROCEDURE — 97602 WOUND(S) CARE NON-SELECTIVE: CPT

## 2021-08-17 NOTE — PROGRESS NOTES
[facility]    ASSESSMENT:   1. Stage 3 pressure ulcer right sacrum    PLAN/DISCUSSION:    1. Apply barrier cream to ulcer BID  2. Order placed for briefs and barrier cream  3. Follow-up PRN    HISTORY OF PRESENT ILLNESS:   Josiah Crump is a 55 year old paraplegic male who is well-known to our clinic. On 5/23/19 underwent surgery for hip disarticulation and to remove a large amount of heterotopic bone, this was closed with a myocutaneous anterior flap. Today his main concern is a new, shallow pressure ulcer over his right buttocks/sacrum. He also requests an order for absorbant briefs, specifically extra large with tabs.     VITALS: BP (!) 149/91   Pulse 71   Temp 97.6  F (36.4  C) (Temporal)      PHYSICAL EXAM:  GENERAL: Patient is alert and oriented and in no acute distress  INTEGUMENTARY:  Wound (used by OP [facility] only) 08/17/21 0950 Right pressure injury (Active)   Thickness/Stage Stage 3 08/17/21 0900   Dressing Appearance moist drainage 08/17/21 0900   Base granulating;epithelialization 08/17/21 0900   Periwound intact 08/17/21 0900   Periwound Temperature warm 08/17/21 0900   Periwound Skin Turgor soft 08/17/21 0900   Edges open 08/17/21 0900   Length (cm) 1.8 08/17/21 0900   Width (cm) 1.8 08/17/21 0900   Depth (cm) 0 08/17/21 0900   Wound (cm^2) 3.24 cm^2 08/17/21 0900   Wound Volume (cm^3) 0 cm^3 08/17/21 0900   Drainage Characteristics/Odor serosanguineous 08/17/21 0900   Drainage Amount moderate 08/17/21 0900   Care, Wound non-select wound debridement performed 08/17/21 0900         MDM: 20-29 minutes was spent on the day of visit reviewing previous chart notes, assessing the patient and developing the plan of care.     DEXTER JONES PA-C

## 2021-08-17 NOTE — DISCHARGE INSTRUCTIONS
[facility]  [address] [address]    Call us at [phone] if you have any questions about your wounds, have redness or swelling around your wound, have a fever of 101 or greater or if you have any other problems or concerns. We answer the phone Monday through Friday 8 am to 4 pm, please leave a message as we check the voicemail frequently throughout the day.     Josiah Crump      1965    Wound Dressing Change:  Buttocks  Cleanse wound and surrounding skin with: soap and water and pat dry  Cover wound with Zinc Oxide paste- Critic aid paste  Apply paste twice a day and as needed  Change briefs when wet and as needed    Repositioning:    Bed:  Reposition pt MINIMALLY every 1-2 hours in bed to relieve pressure and promote perfusion to tissue.     Chair:  When up to chair pt should not sit for longer than one hour total before either tilting or returning to bed for at least 30 minutes, again to relieve pressure and promote perfusion to tissue.     o Pt should also sit on a chair cushion when up to the chair.      Molly Sung PA-C. August 17, 2021    Return to [facility] as needed  If you had a positive experience please indicate on your patient satisfaction survey form that [facility] will be sending you.    If you have any billing related questions please call the [facility] Business office at [phone]. The clinic staff does not handle billing related matters.

## 2021-08-18 ENCOUNTER — OFFICE VISIT (OUTPATIENT)
Dept: FAMILY MEDICINE | Facility: CLINIC | Age: 56
End: 2021-08-18
Payer: MEDICARE

## 2021-08-18 ENCOUNTER — PRE VISIT (OUTPATIENT)
Dept: UROLOGY | Facility: CLINIC | Age: 56
End: 2021-08-18

## 2021-08-18 VITALS
RESPIRATION RATE: 22 BRPM | TEMPERATURE: 97.8 F | DIASTOLIC BLOOD PRESSURE: 84 MMHG | OXYGEN SATURATION: 97 % | HEART RATE: 73 BPM | HEIGHT: 72 IN | WEIGHT: 220 LBS | BODY MASS INDEX: 29.8 KG/M2 | SYSTOLIC BLOOD PRESSURE: 138 MMHG

## 2021-08-18 DIAGNOSIS — G82.20 PARAPLEGIA (H): ICD-10-CM

## 2021-08-18 DIAGNOSIS — D50.9 IRON DEFICIENCY ANEMIA, UNSPECIFIED IRON DEFICIENCY ANEMIA TYPE: ICD-10-CM

## 2021-08-18 DIAGNOSIS — T83.61XS: ICD-10-CM

## 2021-08-18 DIAGNOSIS — Z86.73 HISTORY OF CVA (CEREBROVASCULAR ACCIDENT): ICD-10-CM

## 2021-08-18 DIAGNOSIS — Z01.818 PREOP GENERAL PHYSICAL EXAM: Primary | ICD-10-CM

## 2021-08-18 DIAGNOSIS — N31.9 NEUROGENIC BLADDER: ICD-10-CM

## 2021-08-18 DIAGNOSIS — I10 BENIGN ESSENTIAL HYPERTENSION: ICD-10-CM

## 2021-08-18 LAB
ALBUMIN SERPL-MCNC: 2.7 G/DL (ref 3.5–5)
ALP SERPL-CCNC: 117 U/L (ref 45–120)
ALT SERPL W P-5'-P-CCNC: 32 U/L (ref 0–45)
ANION GAP SERPL CALCULATED.3IONS-SCNC: 11 MMOL/L (ref 5–18)
AST SERPL W P-5'-P-CCNC: 20 U/L (ref 0–40)
BILIRUB SERPL-MCNC: 0.2 MG/DL (ref 0–1)
BUN SERPL-MCNC: 17 MG/DL (ref 8–22)
CALCIUM SERPL-MCNC: 9.4 MG/DL (ref 8.5–10.5)
CHLORIDE BLD-SCNC: 106 MMOL/L (ref 98–107)
CO2 SERPL-SCNC: 24 MMOL/L (ref 22–31)
CREAT SERPL-MCNC: 0.8 MG/DL (ref 0.7–1.3)
ERYTHROCYTE [DISTWIDTH] IN BLOOD BY AUTOMATED COUNT: 20.5 % (ref 10–15)
GFR SERPL CREATININE-BSD FRML MDRD: >90 ML/MIN/1.73M2
GLUCOSE BLD-MCNC: 92 MG/DL (ref 70–125)
HCT VFR BLD AUTO: 46.4 % (ref 40–53)
HGB BLD-MCNC: 13.4 G/DL (ref 13.3–17.7)
MCH RBC QN AUTO: 21.3 PG (ref 26.5–33)
MCHC RBC AUTO-ENTMCNC: 28.9 G/DL (ref 31.5–36.5)
MCV RBC AUTO: 74 FL (ref 78–100)
PLATELET # BLD AUTO: 311 10E3/UL (ref 150–450)
POTASSIUM BLD-SCNC: 4.8 MMOL/L (ref 3.5–5)
PROT SERPL-MCNC: 7.9 G/DL (ref 6–8)
RBC # BLD AUTO: 6.29 10E6/UL (ref 4.4–5.9)
SODIUM SERPL-SCNC: 141 MMOL/L (ref 136–145)
WBC # BLD AUTO: 8.6 10E3/UL (ref 4–11)

## 2021-08-18 PROCEDURE — 80053 COMPREHEN METABOLIC PANEL: CPT | Performed by: FAMILY MEDICINE

## 2021-08-18 PROCEDURE — U0005 INFEC AGEN DETEC AMPLI PROBE: HCPCS | Performed by: FAMILY MEDICINE

## 2021-08-18 PROCEDURE — 99214 OFFICE O/P EST MOD 30 MIN: CPT | Performed by: FAMILY MEDICINE

## 2021-08-18 PROCEDURE — 36415 COLL VENOUS BLD VENIPUNCTURE: CPT | Performed by: FAMILY MEDICINE

## 2021-08-18 PROCEDURE — 85027 COMPLETE CBC AUTOMATED: CPT | Performed by: FAMILY MEDICINE

## 2021-08-18 PROCEDURE — U0003 INFECTIOUS AGENT DETECTION BY NUCLEIC ACID (DNA OR RNA); SEVERE ACUTE RESPIRATORY SYNDROME CORONAVIRUS 2 (SARS-COV-2) (CORONAVIRUS DISEASE [COVID-19]), AMPLIFIED PROBE TECHNIQUE, MAKING USE OF HIGH THROUGHPUT TECHNOLOGIES AS DESCRIBED BY CMS-2020-01-R: HCPCS | Performed by: FAMILY MEDICINE

## 2021-08-18 RX ORDER — SIMVASTATIN 20 MG
TABLET ORAL
Qty: 90 TABLET | Refills: 1 | Status: SHIPPED | OUTPATIENT
Start: 2021-08-18 | End: 2022-06-12

## 2021-08-18 RX ORDER — FESOTERODINE FUMARATE 8 MG/1
8 TABLET, FILM COATED, EXTENDED RELEASE ORAL DAILY
COMMUNITY
Start: 2021-08-18 | End: 2023-04-17

## 2021-08-18 RX ORDER — AMLODIPINE BESYLATE 5 MG/1
TABLET ORAL
Qty: 90 TABLET | Refills: 1 | Status: SHIPPED | OUTPATIENT
Start: 2021-08-18 | End: 2022-02-18

## 2021-08-18 RX ORDER — FERROUS SULFATE 325(65) MG
325 TABLET ORAL
Qty: 30 TABLET | Refills: 4 | Status: ON HOLD | OUTPATIENT
Start: 2021-08-18 | End: 2021-10-07

## 2021-08-18 ASSESSMENT — MIFFLIN-ST. JEOR: SCORE: 1875.91
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
## 2021-08-18 NOTE — TELEPHONE ENCOUNTER
Reason for visit: Cystoscopy with Botox      Relevant information: neurogenic bladder    Problem list   Patient Active Problem List   Diagnosis     Benign essential hypertension     Chronic pain     General symptom     Gynecomastia     Hx of BKA, right (H)     Hydrocele     Infected penile implant (H)     Insomnia     Lateral epicondylitis     Neurogenic bladder     Other tenosynovitis of hand and wrist     Pain in limb     Paraplegia (H)     Right shoulder strain     Vitamin D deficiency     Status post hip surgery     Heterotopic ossification of bone     Physical deconditioning     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, initial encounter (H)     Spasticity     History of disarticulation of right hip     Injury of thoracic spinal cord, sequela (H)     Urinary incontinence     Breakdown (mechanical) of implanted penile prosthesis, initial encounter (H)     Gluteal cleft wound, right, sequela       Records/imaging/labs/orders: all records available     Pt called: yes, appointment cancelled, pt is having an SP tube placed with Dr. Lira 8/20/21    At Rooming: NA

## 2021-08-19 ENCOUNTER — ANESTHESIA EVENT (OUTPATIENT)
Dept: SURGERY | Facility: AMBULATORY SURGERY CENTER | Age: 56
End: 2021-08-19
Payer: MEDICARE

## 2021-08-19 LAB — SARS-COV-2 RNA RESP QL NAA+PROBE: NEGATIVE

## 2021-08-20 ENCOUNTER — HOSPITAL ENCOUNTER (OUTPATIENT)
Facility: AMBULATORY SURGERY CENTER | Age: 56
End: 2021-08-20
Attending: UROLOGY
Payer: MEDICARE

## 2021-08-20 ENCOUNTER — ANESTHESIA (OUTPATIENT)
Dept: SURGERY | Facility: AMBULATORY SURGERY CENTER | Age: 56
End: 2021-08-20
Payer: MEDICARE

## 2021-08-20 VITALS
HEART RATE: 72 BPM | BODY MASS INDEX: 25.57 KG/M2 | OXYGEN SATURATION: 98 % | TEMPERATURE: 98 F | RESPIRATION RATE: 18 BRPM | HEIGHT: 76 IN | DIASTOLIC BLOOD PRESSURE: 70 MMHG | SYSTOLIC BLOOD PRESSURE: 118 MMHG | WEIGHT: 210 LBS

## 2021-08-20 DIAGNOSIS — T83.410A BREAKDOWN (MECHANICAL) OF IMPLANTED PENILE PROSTHESIS, INITIAL ENCOUNTER (H): Primary | ICD-10-CM

## 2021-08-20 DIAGNOSIS — N31.9 NEUROGENIC BLADDER: ICD-10-CM

## 2021-08-20 PROCEDURE — 76942 ECHO GUIDE FOR BIOPSY: CPT | Mod: TC

## 2021-08-20 PROCEDURE — 51102 DRAIN BL W/CATH INSERTION: CPT | Mod: GC | Performed by: UROLOGY

## 2021-08-20 PROCEDURE — 52287 CYSTOSCOPY CHEMODENERVATION: CPT

## 2021-08-20 PROCEDURE — 76942 ECHO GUIDE FOR BIOPSY: CPT | Mod: 26 | Performed by: UROLOGY

## 2021-08-20 PROCEDURE — 51102 DRAIN BL W/CATH INSERTION: CPT

## 2021-08-20 PROCEDURE — 52287 CYSTOSCOPY CHEMODENERVATION: CPT | Mod: GC | Performed by: UROLOGY

## 2021-08-20 RX ORDER — AMPICILLIN 2 G/1
2 INJECTION, POWDER, FOR SOLUTION INTRAVENOUS
Status: COMPLETED | OUTPATIENT
Start: 2021-08-20 | End: 2021-08-20

## 2021-08-20 RX ORDER — OXYCODONE HYDROCHLORIDE 5 MG/1
5 TABLET ORAL EVERY 4 HOURS PRN
Status: DISCONTINUED | OUTPATIENT
Start: 2021-08-20 | End: 2021-08-21 | Stop reason: HOSPADM

## 2021-08-20 RX ORDER — SODIUM CHLORIDE, SODIUM LACTATE, POTASSIUM CHLORIDE, CALCIUM CHLORIDE 600; 310; 30; 20 MG/100ML; MG/100ML; MG/100ML; MG/100ML
INJECTION, SOLUTION INTRAVENOUS CONTINUOUS
Status: DISCONTINUED | OUTPATIENT
Start: 2021-08-20 | End: 2021-08-21 | Stop reason: HOSPADM

## 2021-08-20 RX ORDER — PROPOFOL 10 MG/ML
INJECTION, EMULSION INTRAVENOUS CONTINUOUS PRN
Status: DISCONTINUED | OUTPATIENT
Start: 2021-08-20 | End: 2021-08-20

## 2021-08-20 RX ORDER — ACETAMINOPHEN 325 MG/1
975 TABLET ORAL ONCE
Status: COMPLETED | OUTPATIENT
Start: 2021-08-20 | End: 2021-08-20

## 2021-08-20 RX ORDER — MEPERIDINE HYDROCHLORIDE 25 MG/ML
12.5 INJECTION INTRAMUSCULAR; INTRAVENOUS; SUBCUTANEOUS
Status: DISCONTINUED | OUTPATIENT
Start: 2021-08-20 | End: 2021-08-21 | Stop reason: HOSPADM

## 2021-08-20 RX ORDER — OXYCODONE HYDROCHLORIDE 5 MG/1
5 TABLET ORAL EVERY 6 HOURS PRN
Qty: 12 TABLET | Refills: 0 | Status: SHIPPED | OUTPATIENT
Start: 2021-08-20 | End: 2021-08-23

## 2021-08-20 RX ORDER — LIDOCAINE HYDROCHLORIDE 20 MG/ML
INJECTION, SOLUTION INFILTRATION; PERINEURAL PRN
Status: DISCONTINUED | OUTPATIENT
Start: 2021-08-20 | End: 2021-08-20

## 2021-08-20 RX ORDER — LABETALOL HYDROCHLORIDE 5 MG/ML
5 INJECTION, SOLUTION INTRAVENOUS
Status: DISCONTINUED | OUTPATIENT
Start: 2021-08-20 | End: 2021-08-20 | Stop reason: HOSPADM

## 2021-08-20 RX ORDER — LIDOCAINE HYDROCHLORIDE AND EPINEPHRINE 10; 10 MG/ML; UG/ML
INJECTION, SOLUTION INFILTRATION; PERINEURAL PRN
Status: DISCONTINUED | OUTPATIENT
Start: 2021-08-20 | End: 2021-08-20 | Stop reason: HOSPADM

## 2021-08-20 RX ORDER — ONDANSETRON 2 MG/ML
4 INJECTION INTRAMUSCULAR; INTRAVENOUS EVERY 30 MIN PRN
Status: DISCONTINUED | OUTPATIENT
Start: 2021-08-20 | End: 2021-08-21 | Stop reason: HOSPADM

## 2021-08-20 RX ORDER — ONDANSETRON 4 MG/1
4 TABLET, ORALLY DISINTEGRATING ORAL EVERY 30 MIN PRN
Status: DISCONTINUED | OUTPATIENT
Start: 2021-08-20 | End: 2021-08-21 | Stop reason: HOSPADM

## 2021-08-20 RX ORDER — SODIUM CHLORIDE, SODIUM LACTATE, POTASSIUM CHLORIDE, CALCIUM CHLORIDE 600; 310; 30; 20 MG/100ML; MG/100ML; MG/100ML; MG/100ML
INJECTION, SOLUTION INTRAVENOUS CONTINUOUS
Status: DISCONTINUED | OUTPATIENT
Start: 2021-08-20 | End: 2021-08-20 | Stop reason: HOSPADM

## 2021-08-20 RX ORDER — HYDRALAZINE HYDROCHLORIDE 20 MG/ML
5 INJECTION INTRAMUSCULAR; INTRAVENOUS EVERY 10 MIN PRN
Status: DISCONTINUED | OUTPATIENT
Start: 2021-08-20 | End: 2021-08-20 | Stop reason: HOSPADM

## 2021-08-20 RX ORDER — FENTANYL CITRATE 50 UG/ML
25 INJECTION, SOLUTION INTRAMUSCULAR; INTRAVENOUS EVERY 5 MIN PRN
Status: DISCONTINUED | OUTPATIENT
Start: 2021-08-20 | End: 2021-08-20 | Stop reason: HOSPADM

## 2021-08-20 RX ORDER — FENTANYL CITRATE 50 UG/ML
INJECTION, SOLUTION INTRAMUSCULAR; INTRAVENOUS PRN
Status: DISCONTINUED | OUTPATIENT
Start: 2021-08-20 | End: 2021-08-20

## 2021-08-20 RX ORDER — PROPOFOL 10 MG/ML
INJECTION, EMULSION INTRAVENOUS PRN
Status: DISCONTINUED | OUTPATIENT
Start: 2021-08-20 | End: 2021-08-20

## 2021-08-20 RX ORDER — HYDROMORPHONE HYDROCHLORIDE 1 MG/ML
0.2 INJECTION, SOLUTION INTRAMUSCULAR; INTRAVENOUS; SUBCUTANEOUS EVERY 5 MIN PRN
Status: DISCONTINUED | OUTPATIENT
Start: 2021-08-20 | End: 2021-08-20 | Stop reason: HOSPADM

## 2021-08-20 RX ORDER — DEXAMETHASONE SODIUM PHOSPHATE 4 MG/ML
INJECTION, SOLUTION INTRA-ARTICULAR; INTRALESIONAL; INTRAMUSCULAR; INTRAVENOUS; SOFT TISSUE PRN
Status: DISCONTINUED | OUTPATIENT
Start: 2021-08-20 | End: 2021-08-20

## 2021-08-20 RX ADMIN — PROPOFOL 100 MG: 10 INJECTION, EMULSION INTRAVENOUS at 11:26

## 2021-08-20 RX ADMIN — OXYCODONE HYDROCHLORIDE 5 MG: 5 TABLET ORAL at 12:39

## 2021-08-20 RX ADMIN — PROPOFOL 150 MCG/KG/MIN: 10 INJECTION, EMULSION INTRAVENOUS at 11:26

## 2021-08-20 RX ADMIN — FENTANYL CITRATE 25 MCG: 50 INJECTION, SOLUTION INTRAMUSCULAR; INTRAVENOUS at 11:25

## 2021-08-20 RX ADMIN — LIDOCAINE HYDROCHLORIDE 100 MG: 20 INJECTION, SOLUTION INFILTRATION; PERINEURAL at 11:24

## 2021-08-20 RX ADMIN — FENTANYL CITRATE 50 MCG: 50 INJECTION, SOLUTION INTRAMUSCULAR; INTRAVENOUS at 11:44

## 2021-08-20 RX ADMIN — ACETAMINOPHEN 975 MG: 325 TABLET ORAL at 10:31

## 2021-08-20 RX ADMIN — DEXAMETHASONE SODIUM PHOSPHATE 4 MG: 4 INJECTION, SOLUTION INTRA-ARTICULAR; INTRALESIONAL; INTRAMUSCULAR; INTRAVENOUS; SOFT TISSUE at 11:15

## 2021-08-20 RX ADMIN — SODIUM CHLORIDE, SODIUM LACTATE, POTASSIUM CHLORIDE, CALCIUM CHLORIDE: 600; 310; 30; 20 INJECTION, SOLUTION INTRAVENOUS at 11:15

## 2021-08-20 RX ADMIN — AMPICILLIN 2 G: 2 INJECTION, POWDER, FOR SOLUTION INTRAVENOUS at 11:15

## 2021-08-20 ASSESSMENT — MIFFLIN-ST. JEOR: SCORE: 1894.05

## 2021-08-20 NOTE — OP NOTE
OPERATIVE NOTE    PREOPERATIVE DIAGNOSIS:  Urethral stricture, meatal erosion, NGB  POSTOPERATIVE DIAGNOSIS:  Same    PROCEDURES PERFORMED:   1. Suprapubic cystostomy with suprapubic tube placement under ultrasound guidance.   2. Cystoscopy with Chemodenervation of Bladder Injection of 200U Botox  3. Abdominal Ultrasound with Interpretation of Images    STAFF SURGEON: Charles Lira MD    RESIDENT(S): Hanane Marin MD    ANESTHESIA: MAC and local    ESTIMATED BLOOD LOSS: Minimal, 2cc mL.     IV FLUIDS: see dictated anesthesia record    COMPLICATIONS: None.   TUBES: 16Fr sp tube  SIGNIFICANT FINDINGS: Clear trajectory to bladder, filled to capacity (large) under ultrasound and 10cc placed in sp tube catheter, 16Fr    BRIEF HISTORY OF PRESENT ILLNESS: Josiah Crump is a(n) 55 year old male with a history of NGB 2/2 SCI, urethral stricture 2/2 urethroplasty, and retention currently maanged with indwelling Shah catheter. We discussed options including urinary management with a suprapubic tube for bladder drainage. Patient understood risks, benefits and alternatives and agreed to proceed.    DESCRIPTION OF PROCEDURE: After obtaining informed consent, correctly identifying and marking the patient and confirming, preoperative antibiotics were given. He was brought back to the operating room table, placed supine where general anesthesia was induced.  He was then prepped and draped in the standard sterile fashion in the dorsal lithotomy position.     A 21-[place] Moss injection cystoscope was placed into the bladder.  The bladder mucosa appeared to be normal without stones, tumors or diverticulum on 360 degree inspection. The ureteral orifices were in the normal orthotopic position bilaterally.  We mixed 2 vials of 100 U botulinum toxin A into 20 mL of injectable saline for a total of (10 units/mL).  We performed a template injection procedure with 5 columns of 4 injections. All injections were placed deep to the  mucosa into the detrusor muscle.  We then emptied her bladder to re-inspect our injection sites and found that there was a minimal amount of blood.     We thenn proceeded with SPT placement. Next, using ultrasound guidance, we localized the bladder. Local anesthetic was instilled subcutaneously.  We then made a 1 centimeter incision with #11 blade scalpel approximately 2-3 fingerbreadths above the pubic bone. We then used a spinal needle to find a good trajectory under ultrasound guidance into the bladder. There was a left sided reservoir so we did a right sided favored location. Once we isolated this trajectory, we then inserted the Russ trocar in through the incision until urine returned and under vision we were in the bladder. We then removed the inner cannula of the trocar and placed the 16-[place] Shah catheter through the trocar and inflated the catheter with 10 mL in the balloon. A dressing was placed around the suprapubic catheter and a drainage bag was attached. The catheter was secured to her abdomen. The patient was awakened from anesthesia in stable condition.     As attending surgeon, Charles BUTLER MD, was scrubbed and present for the entire procedure.

## 2021-08-20 NOTE — DISCHARGE INSTRUCTIONS
[facility]  Home Care Following Anesthesia  For 24 hours after surgery:  1. Get plenty of rest.  A responsible adult must stay with you for at least 24 hours after you leave the surgery center.  2. Do not drive or use heavy equipment.  If you have weakness or tingling, don't drive or use heavy equipment until this feeling goes away.   3. Do not drink alcohol.   4. Avoid strenuous or risky activities.  Ask for help when climbing stairs.  5. You may feel lightheaded.  IF so, sit for a few minutes before standing.  Have someone help you get up.   6. If you have nausea (feel sick to your stomach): Drink only clear liquids such as apple juice, ginger ale, broth or 7-Up.  Rest may also help.  Be sure to drink enough fluids.  Move to a regular diet as you feel able.   7. You may have a slight fever.  Call the doctor if your fever is over 100 F (37.7 C) (taken under the tongue) or lasts longer than 24 hours.  8. You may have a dry mouth, a sore throat, muscle aches or trouble sleeping. These should go away after 24 hours.  9. Do not make important or legal decisions.   10. It is recommended to avoid smoking.               Tips for taking pain medications  To get the best pain relief possible, remember these points:    Take pain medications as directed, before pain becomes severe.    Pain medication can upset your stomach: taking it with food may help.    Constipation is a common side effect of pain medication. Drink plenty of  fluids.    Eat foods high in fiber. Take a stool softener if recommended by your doctor or pharmacist.    Do not drink alcohol, drive or operate machinery while taking pain medications.    Ask about other ways to control pain, such as with heat, ice or relaxation.    Tylenol/Acetaminophen Consumption  To help encourage the safe use of acetaminophen, the makers of TYLENOL  have lowered the maximum daily dose for single-ingredient Extra Strength TYLENOL   (acetaminophen) products sold in the [place] from 8 pills per day (4,000 mg) to 6 pills per day (3,000 mg). The dosing interval has also changed from 2 pills every 4-6 hours to 2 pills every 6 hours.    If you feel your pain relief is insufficient, you may take Tylenol/Acetaminophen in addition to your narcotic pain medication.     Be careful not to exceed 3,000 mg of Tylenol/Acetaminophen in a 24 hour period from all sources.    If you are taking extra strength Tylenol/acetaminophen (500 mg), the maximum dose is 6 tablets in 24 hours.    If you are taking regular strength acetaminophen (325 mg), the maximum dose is 9 tablets in 24 hours.    Tylenol 975 mg was given at 1030 AM. Okay to take next dose at 4:30 PM, then follow instructions on the bottle.     Call a doctor for any of the followin. Signs of infection (fever, growing tenderness at the surgery site, a large amount of drainage or bleeding, severe pain, foul-smelling drainage, redness, swelling).  2. It has been over 8 to 10 hours since surgery and you are still not able to urinate (pass water).  3. Headache for over 24 hours.  4. Numbness, tingling or weakness the day after surgery (if you had spinal anesthesia).  5. Signs of Covid-19 infection (temperature over 100 degrees, shortness of breath, cough, loss of taste/smell, generalized body aches, persistent headache, chills, sore throat, nausea/vomiting/diarrhea)  Your doctor is:       Dr. Charles Lira, Prostate and Urology: [phone]               Or dial [phone] and ask for the resident on call for:  Prostate Urology    Caring for Your Suprapubic Catheter    What is a suprapubic catheter?  This catheter is a tube that drains urine from the bladder.  The tube enters the body through a small cut in the belly. Stitches will hold the tube in place. You will need to return to the clinic to have the tube removed.  The tube connects to tubing attached to a bag--either a large drainage bag or a  smaller leg bag.  Urine flows through the tube into this bag. You will  empty the bag regularly.      Does it hurt?  You may have cramps (bladder spasms) the first few days you have the tube. You may receive medicine in case of pain.    Basic care  Prevent infections  If germs enter the body, they may cause an infection. Germs can get in:    Where the tube enters the your body.    Where the tube connects to the bag.    Through the drainage sprout on the bag.    To reduce the risk of infection, follow these steps carefully.  1. Always begin by washing your hands for 20 seconds.  2. Don t touch the spout of the bag with your fingers.  3. Be sure the spout doesn t touch the toilet.    Clean your skin each day  Do not take a tub bath while you have the tube.  You may take a shower.  If you have a bandage, you will need to change it daily and whenever it gets wet. Do not use creams, powders or ointments on the skin around the tube.    Secure the tube to your body  Keep the tube taped to your belly at all times.  This helps prevent the tube from getting pulled out.  To prevent kinks, you may also want to tape it to  your upper thigh, leaving a little bit of slack.  Your nurse can show you how to secure the tube.  Follow these steps:  1. Place a long piece of tape on your skin. You may want to shave any hair from the area first.  2. Wrap another long piece of tape around the tube. Tape this to the first piece of tape. There should be enough slack in the tube to let you stand and walk comfortably.  3. Change the tape every two to three days, using a different area each time.  You may ask your nurse or pharmacist about other products to help secure the tube (such as cloth or plastic Velcro straps).     Keep your bag and its tubing lower than your bladder  Keep your bag and tubing below the bladder at all times. This will prevent urine from flowing back into the bladder.  To drain well, the bag needs to be at the lowest part  of the system. Any loops of tubing must be higher than the bag.  When using a leg bag take the bag off your leg if you lie down. Hang it over the edge of the bed or couch. You may want to use a safety pin to  secure the bag. Be careful not to poke the bag or tubing with the pin.    At night,coil extra loops of tubing on the bed.  Use a clip to hold the loops. Hang the bag on the side of the bed (or place it on the floor in a bowl, bucket or clean trash can).    Keep your tubing from getting blocked  Empty the urine from your bag regularly. You may want to empty it when the bag becomes half full.  Check the tubing often for kinks that may block the flow of urine.  Do not clamp your tube unless your doctor or nurse asks you to.    Drink plenty of liquids--at least eight extra glasses of water each day (unless your doctor tells you not to).  If you have the tube for a long time, it should be changed every three to six  weeks. Your doctor will tell you how often to have your tube changed. You may need to come back to the clinic, or your home care nurse will change your tube.  Your doctor or nurse may teach you how to flush the tube, if it often gets plugged.    Caring for your tube site  If you have a StayFix bandage or tube stablizer, your nurse will show you how to use it. Change it once a week or each time it gets wet.  If you have other bandages, change them every day. Also change them if you have drainage or the bandages get wet.    You must clean the skin around the tube every day.  Follow these steps.  1. Clean your work area with alcohol (or soap and water) and a paper towel.  Wash your hands with soap and water for 20 seconds.  2. Place these items on your clean work area:          Bag to hold any old bandages           Soap           Cotton Swab or clean washcloth          Sterile 2 x 2 or 4 x 4 gauze bandages           1- inch wide paper tape or medipore tape.  3. Wash your hands well with soap and water for  20 seconds.  4. If you have bandages, remove them and put them in the bag for the trash. Be careful not to pull on your tubing or stitches. Do not use scissors--they could cut the tube.  5. Look at the skin around the tube. If you have stitches, check these as well. Call your clinic (or home care nurse) if you see broken stitches or increasing redness, swelling or drainage around the tube site.  6. Wash your hands again before cleaning the skin around the tube.  7. Put the Techni-Care soap on your cotton swab or washcloth.  8. Clean the skin around the tube site. (You may do this in the shower.) Start at the tube and move outward about 1 to 2 inches, using a circular motion.  9. Rinse the skin with water. Pat or air dry.  10. Replace the bandages (if you have them).  When you open the package, you will find two bandages.  (You may also use guaze bandages with splits cut into them.)          A. Fold one bandage in half and place it below the tube site.                                                                                                      Tube taped to skin            B. Fold the other bandage in half and place it over the tube site.          C. Tape the bandages in place.  11. Be sure that the tube is securely taped to the skin.  Leave a small amount of slack in the tubing.  This reduces stress (pulling) on the stitches. It also helps prevent the tube from being pulled out.  12. Tape the tube to the skin about 3 to 4 inches below the site where the tube enters the body.  13. Throw away the bag holding your used materials.  Clean your work area with soap, water and paper towel. Wash your hands with soap and water.    Choosing your bag  If you will only use the large bag  Some people prefer to use the large drainage bag night and day. This requires less time and effort--and fewer supplies. It is safer, too: you don t have to change the bag as often, and this lowers the risk of infection.    Change the bag  once a month or when it leaks. You may keep it in a shopping bag with handles during the day. This allows you to move around  easily, and it keeps the bag and tubing lower than your bladder.    If you will use a leg bag  Some people prefer to use a leg bag during the day and switch back to the larger bag at night. A leg bag gives you more freedom to move around. You can keep the bag hidden under loose-fitting slacks or a long skirt.  Because it is a smaller bag, you will need to empty it more often. Also, each time you change bags, germs can get into your body.    To set up a new leg bag, follow the steps below. These steps are for the Conveen leg bag. If you use another brand, follow the directions on the package insert.    1. Wash your hands for 20 seconds. Use soap and water. Clean your work area with alcohol (or soap and water) and a paper towel.  2. Place these items on your clean work area:  a. Leg bag kit (opened)  b. Clean scissors  c. Alcohol pads  3. Button the leg straps through the top and bottom of the bag. Place the wide strap at the top of the bag. You may trim the straps to fit your lower leg. The bag will hang below your knee.    4. Close the drainage spout at the bottom of the bag.  5. Decide how long the bag s tubing needs to be.  It should reach below the knee. Allow a little slack in the tubing, so you can walk and sit freely. If you decide to shorten the tubing:  a. Use an alcohol pad to clean your scissors well.   b. Use another alcohol pad to wipe the tubing.   c. Cut the tubing to the right size.   d. Do not allow the tip of the tubing to touch anything.   e. Insert the white spout into the bag s tubing.    f. Use an alcohol pad to wipe the spout.    g. Keep the gray cover on the large end.    h. Push the small end into the tubing. Once it's in, it can t be removed.    Changing bags  1. Wash your hands for 20 seconds. You may then put on clean gloves, if you wish.  2. Place a towel under the  tubes to catch any drops of urine.  3. Use an alcohol pad to clean where the current bag connects with the tube. Wipe three times in a row.  4. Gently twist the tubes apart. Don t use your nails (nails often carry germs.) Squeeze the tube gently to keep urine from dripping out.  5. Connect the tubing from the new bag to the bladder tube. Do not touch the ends. Check that the tubes connect tightly.    Cleaning your leg bag  Before you can reuse a bag, you must wash it with soap and rinse it with cleaning solution. Clean the bag as soon as you disconnect it from the tube. Do not re-use a bag without cleaning it first. You may use the bag for one week. After that, throw it away.   If you are not using a leg bag, you must change the bag at least once a month. You don t need to clean it--just throw it away.    To clean a bag, follow these steps:  1. Clean your work area with alcohol (or soap and water) and a paper towel. Wash your hands with soap and water.  2. Place these items on your clean work area:         Clean Funnel         Liquid dish soap          Cleaning solution (choose one):                -   cup white vinegar +   cup water, or                - 15 ml (mililiters) bleach + 150 ml water.  3. Empty the urine from the bag into the toilet   4. Fill the bag with cool tap water. A small funnel will help direct the stream of water.  5. Drain and fill the bag again, adding a couple drops of dish soap. Gently squeeze the bag several times to clean the inside.  Drain the water into the toilet.  Rinse the bag well with tap water.  6. Fill the bag with your cleaning solution.  Gently squeeze the bag several times.    For vinegar and water: Let it sit for 30 minutes.    For bleach and water: Let it sit for 30 seconds.  7. Empty the bag into the toilet.  8. Hang to air-dry with both ends pointing down.      Pull the sides of the bag apart to speed drying.      Do not hang the bag or tubing over a radiator or other source  of heat. This may lead to germs and infection.    You may wish to use a  to hang the bag.    You may cover the end of the tubing with clean paper towel.  Use a rubber band to hold the paper towel in place.  9. After the bag and tubing have dried, store them in a clean towel or covered container. If you used a paper towel, you may remove it.  10. Before you re-use the bag, clean the end of the tubing with an alcohol pad.    When to call for help  If the tube comes out, go to the emergency room.  (Tape a bandage over the opening in your skin.  Bring the tube with you to the emergency room.)    Call your home care nurse or doctor s office right away if:    No urine drains through the tube, or there's less urine than normal.    Your urine looks bloody or cloudy, it has changed color, or you see large blood clots.    Your urine has a bad odor.    You have pain that gets worse, doesn't improve or cannot be controlled with medicine.    You have pain in your back or lower belly area (abdomen).    You have a fever over 101 F  (38.3 C ) taken under the tongue.    Urine leaks around the tube for more than a day or two.    The skin around your tube is swollen, red, very tender or draining pus.      For emergency care, call the:  [facility]:  [phone] (TTY for hearing impaired: [phone])

## 2021-08-20 NOTE — ANESTHESIA CARE TRANSFER NOTE
Patient: Josiah Crump    Procedure(s):  CYSTOSCOPY, WITH SUPRAPUBIC CATHETER INSERTION  Cystoscopy, Inject Botox    Diagnosis: Neurogenic bladder [N31.9]  Diagnosis Additional Information: No value filed.    Anesthesia Type:   MAC     Note:      Level of Consciousness: awake          Vital Signs Stable: post-procedure vital signs reviewed and stable  Report to RN Given: handoff report given  Patient transferred to: Phase II    Handoff Report: Identifed the Patient, Identified the Reponsible Provider, Reviewed the pertinent medical history, Discussed the surgical course, Reviewed Intra-OP anesthesia mangement and issues during anesthesia, Set expectations for post-procedure period and Allowed opportunity for questions and acknowledgement of understanding      Vitals:  Vitals Value Taken Time   BP     Temp     Pulse     Resp     SpO2         Electronically Signed By: RUSLAN GRAY CRNA  August 20, 2021  12:05 PM

## 2021-08-20 NOTE — ANESTHESIA POSTPROCEDURE EVALUATION
Patient: Josiah Crmup    Procedure(s):  CYSTOSCOPY, WITH SUPRAPUBIC CATHETER INSERTION  Cystoscopy, Inject Botox    Diagnosis:Neurogenic bladder [N31.9]  Diagnosis Additional Information: No value filed.    Anesthesia Type:  MAC    Note:  Disposition: Outpatient   Postop Pain Control: Uneventful            Sign Out: Well controlled pain   PONV: No   Neuro/Psych: Uneventful            Sign Out: Acceptable/Baseline neuro status   Airway/Respiratory: Uneventful            Sign Out: Acceptable/Baseline resp. status   CV/Hemodynamics: Uneventful            Sign Out: Acceptable CV status; No obvious hypovolemia; No obvious fluid overload   Other NRE: NONE   DID A NON-ROUTINE EVENT OCCUR? No           Last vitals:  Vitals Value Taken Time   /67 08/20/21 1210   Temp 36.9  C (98.4  F) 08/20/21 1210   Pulse 94 08/20/21 1210   Resp 18 08/20/21 1210   SpO2 94 % 08/20/21 1210       Electronically Signed By: Elmira Isaacs MD  August 20, 2021  12:31 PM
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
## 2021-08-25 ENCOUNTER — TELEPHONE (OUTPATIENT)
Dept: UROLOGY | Facility: CLINIC | Age: 56
End: 2021-08-25

## 2021-08-25 DIAGNOSIS — G89.18 POSTOPERATIVE PAIN: Primary | ICD-10-CM

## 2021-08-25 RX ORDER — OXYCODONE HYDROCHLORIDE 5 MG/1
5 TABLET ORAL EVERY 6 HOURS PRN
Qty: 12 TABLET | Refills: 0 | Status: SHIPPED | OUTPATIENT
Start: 2021-08-25 | End: 2021-08-28

## 2021-08-25 NOTE — TELEPHONE ENCOUNTER
[facility] Call Center    Phone Message    May a detailed message be left on voicemail: yes     Reason for Call: Other: Pt calling to check in and see if he would be able to get a pain medication. He would like a call back to discuss.     Action Taken: Message routed to:  [facility] ([facility] uro    Travel Screening: Not Applicable

## 2021-08-25 NOTE — TELEPHONE ENCOUNTER
Patient called and Dr castano will call in oxycodone  I called and spoke to physician Gladis Pérez LPN Staff Nurse

## 2021-08-25 NOTE — TELEPHONE ENCOUNTER
He is miserable he states his pain with sp tube is unbearable  He is not happy that he went thru this surgery. He states his pain is at 9 most of the day and last night did not sleep at all.  He is also stating that he is leaking thru penis frequently soaking his diaper and pants.  He states the sp tube is draining clear annia urine and he empties several times per day.  He gets frustrated easily when I try to trouble shoot his symptoms  He wants oxycodone sent to [facility]ay in [facility] on [facility] and jewel Pérez LPN Staff Nurse

## 2021-08-26 NOTE — ANESTHESIA PREPROCEDURE EVALUATION
Anesthesia Evaluation      Patient summary reviewed   No history of anesthetic complications     Airway   Mallampati: II  Neck ROM: full   Pulmonary - normal exam    breath sounds clear to auscultation  (-) sleep apnea, not a smoker                         Cardiovascular - normal exam  (+) hypertension, ,     ECG reviewed  Rhythm: regular  Rate: normal,      ROS comment:   Left Ventricle: Normal left ventricular size.The estimated left ventricular ejection fraction is 55%. This represents a normal ejection fraction. Mild concentric hypertrophy noted. E/e' < 8, suggesting normal LV filling pressure.Normal left atrial pressure. Left ventricular diastolic function is normal.    The left ventricular wall motion is normal.    Right Ventricle: Normal right ventricular size and systolic function. TAPSE is normal, which is consistent with normal right ventricular systolic function.    Pericardium: Trace pericardial effusion.    No hemodynamically significant valvular heart abnormalities.    No previous study for comparison.     Neuro/Psych    (+) neuromuscular disease,  CVA , chronic pain    Comments: Paraplegia  Spasticity  Insomnia      Endo/Other - negative ROS      GI/Hepatic/Renal      Comments: Chronic muhammad  Hydrocele  Infected penile prosthesis  Neurogenic bladder          Dental    (+) poor dentition                       Anesthesia Plan  Planned anesthetic: general endotracheal    ASA 3   Induction: intravenous   Anesthetic plan and risks discussed with: patient  Anesthesia plan special considerations: antiemetics,   Post-op plan: routine recovery           90ft x2

## 2021-09-01 ENCOUNTER — TELEPHONE (OUTPATIENT)
Dept: UROLOGY | Facility: CLINIC | Age: 56
End: 2021-09-01

## 2021-09-01 NOTE — TELEPHONE ENCOUNTER
[facility] Call [facility]    Phone Message    May a detailed message be left on voicemail: yes     Reason for Call: Other: Pt would like a call back to discuss his procedure time and location asap so he can set up a ride     Action Taken: Message routed to:  [facility] ([facility] Uro    Travel Screening: Not Applicable

## 2021-09-01 NOTE — TELEPHONE ENCOUNTER
Patient called and told we cannot give out time until the surgery nurses tell him 2 days prior Gladis Pérez LPN Staff Nurse

## 2021-09-05 ENCOUNTER — HEALTH MAINTENANCE LETTER (OUTPATIENT)
Age: 56
End: 2021-09-05

## 2021-09-08 ENCOUNTER — TELEPHONE (OUTPATIENT)
Dept: FAMILY MEDICINE | Facility: CLINIC | Age: 56
End: 2021-09-08

## 2021-09-14 ENCOUNTER — PATIENT OUTREACH (OUTPATIENT)
Dept: UROLOGY | Facility: CLINIC | Age: 56
End: 2021-09-14

## 2021-09-14 DIAGNOSIS — N30.00 ACUTE CYSTITIS WITHOUT HEMATURIA: ICD-10-CM

## 2021-09-14 DIAGNOSIS — Z01.812 PRE-PROCEDURE LAB EXAM: Primary | ICD-10-CM

## 2021-09-15 RX ORDER — NITROFURANTOIN 25; 75 MG/1; MG/1
100 CAPSULE ORAL 2 TIMES DAILY
Qty: 10 CAPSULE | Refills: 0 | Status: ON HOLD | OUTPATIENT
Start: 2021-09-15 | End: 2021-09-21

## 2021-09-16 ENCOUNTER — OFFICE VISIT (OUTPATIENT)
Dept: FAMILY MEDICINE | Facility: CLINIC | Age: 56
End: 2021-09-16
Payer: MEDICARE

## 2021-09-16 VITALS
TEMPERATURE: 98.2 F | HEIGHT: 76 IN | HEART RATE: 94 BPM | DIASTOLIC BLOOD PRESSURE: 80 MMHG | BODY MASS INDEX: 25.57 KG/M2 | SYSTOLIC BLOOD PRESSURE: 129 MMHG | OXYGEN SATURATION: 97 % | WEIGHT: 210 LBS

## 2021-09-16 DIAGNOSIS — G82.20 PARAPLEGIA (H): ICD-10-CM

## 2021-09-16 DIAGNOSIS — I10 BENIGN ESSENTIAL HYPERTENSION: ICD-10-CM

## 2021-09-16 DIAGNOSIS — Z01.818 PREOP GENERAL PHYSICAL EXAM: Primary | ICD-10-CM

## 2021-09-16 DIAGNOSIS — Z86.73 HISTORY OF CVA (CEREBROVASCULAR ACCIDENT): ICD-10-CM

## 2021-09-16 DIAGNOSIS — T83.61XS: ICD-10-CM

## 2021-09-16 DIAGNOSIS — N31.9 NEUROGENIC BLADDER: ICD-10-CM

## 2021-09-16 LAB
ANION GAP SERPL CALCULATED.3IONS-SCNC: 10 MMOL/L (ref 5–18)
BUN SERPL-MCNC: 17 MG/DL (ref 8–22)
CALCIUM SERPL-MCNC: 9.3 MG/DL (ref 8.5–10.5)
CHLORIDE BLD-SCNC: 106 MMOL/L (ref 98–107)
CO2 SERPL-SCNC: 22 MMOL/L (ref 22–31)
CREAT SERPL-MCNC: 0.81 MG/DL (ref 0.7–1.3)
ERYTHROCYTE [DISTWIDTH] IN BLOOD BY AUTOMATED COUNT: 20.1 % (ref 10–15)
GFR SERPL CREATININE-BSD FRML MDRD: >90 ML/MIN/1.73M2
GLUCOSE BLD-MCNC: 90 MG/DL (ref 70–125)
HCT VFR BLD AUTO: 47 % (ref 40–53)
HGB BLD-MCNC: 13.9 G/DL (ref 13.3–17.7)
MCH RBC QN AUTO: 21.4 PG (ref 26.5–33)
MCHC RBC AUTO-ENTMCNC: 29.6 G/DL (ref 31.5–36.5)
MCV RBC AUTO: 72 FL (ref 78–100)
PLATELET # BLD AUTO: 300 10E3/UL (ref 150–450)
POTASSIUM BLD-SCNC: 4.7 MMOL/L (ref 3.5–5)
RBC # BLD AUTO: 6.49 10E6/UL (ref 4.4–5.9)
SODIUM SERPL-SCNC: 138 MMOL/L (ref 136–145)
WBC # BLD AUTO: 7.7 10E3/UL (ref 4–11)

## 2021-09-16 PROCEDURE — 80048 BASIC METABOLIC PNL TOTAL CA: CPT | Performed by: FAMILY MEDICINE

## 2021-09-16 PROCEDURE — 85027 COMPLETE CBC AUTOMATED: CPT | Performed by: FAMILY MEDICINE

## 2021-09-16 PROCEDURE — 99214 OFFICE O/P EST MOD 30 MIN: CPT | Mod: 25 | Performed by: FAMILY MEDICINE

## 2021-09-16 PROCEDURE — U0005 INFEC AGEN DETEC AMPLI PROBE: HCPCS | Performed by: FAMILY MEDICINE

## 2021-09-16 PROCEDURE — 36415 COLL VENOUS BLD VENIPUNCTURE: CPT | Performed by: FAMILY MEDICINE

## 2021-09-16 PROCEDURE — U0003 INFECTIOUS AGENT DETECTION BY NUCLEIC ACID (DNA OR RNA); SEVERE ACUTE RESPIRATORY SYNDROME CORONAVIRUS 2 (SARS-COV-2) (CORONAVIRUS DISEASE [COVID-19]), AMPLIFIED PROBE TECHNIQUE, MAKING USE OF HIGH THROUGHPUT TECHNOLOGIES AS DESCRIBED BY CMS-2020-01-R: HCPCS | Performed by: FAMILY MEDICINE

## 2021-09-16 RX ORDER — ANASTROZOLE 1 MG/1
1 TABLET ORAL DAILY
Status: ON HOLD | COMMUNITY
Start: 2020-10-13 | End: 2021-10-07

## 2021-09-16 RX ORDER — OXYCODONE HYDROCHLORIDE 10 MG/1
TABLET ORAL
COMMUNITY
End: 2021-09-16

## 2021-09-16 RX ORDER — OXYCODONE HYDROCHLORIDE 5 MG/1
5 TABLET ORAL
COMMUNITY
Start: 2021-07-01 | End: 2021-09-16

## 2021-09-16 RX ORDER — METOPROLOL SUCCINATE 25 MG/1
25 TABLET, EXTENDED RELEASE ORAL DAILY
Status: ON HOLD | COMMUNITY
End: 2021-10-07

## 2021-09-16 RX ORDER — BACLOFEN 20 MG/1
TABLET ORAL
Qty: 90 TABLET | Refills: 5 | Status: SHIPPED | OUTPATIENT
Start: 2021-09-16 | End: 2022-03-02

## 2021-09-16 ASSESSMENT — MIFFLIN-ST. JEOR: SCORE: 1894.05
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
## 2021-09-17 ENCOUNTER — TELEPHONE (OUTPATIENT)
Dept: FAMILY MEDICINE | Facility: CLINIC | Age: 56
End: 2021-09-17

## 2021-09-17 LAB — SARS-COV-2 RNA RESP QL NAA+PROBE: NEGATIVE

## 2021-09-17 NOTE — TELEPHONE ENCOUNTER
Called and relayed message to patient.    ----- Message from Amalia Kline MD sent at 9/17/2021  3:09 PM CDT -----  Please call patient and make sure he knows his COVID testing is normal (negative).

## 2021-09-19 ENCOUNTER — ANESTHESIA EVENT (OUTPATIENT)
Dept: SURGERY | Facility: CLINIC | Age: 56
End: 2021-09-19
Payer: MEDICARE
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
## 2021-09-20 ENCOUNTER — HOSPITAL ENCOUNTER (OUTPATIENT)
Facility: CLINIC | Age: 56
Discharge: HOME | End: 2021-09-21
Attending: UROLOGY | Admitting: UROLOGY
Payer: MEDICARE

## 2021-09-20 ENCOUNTER — ANESTHESIA (OUTPATIENT)
Dept: SURGERY | Facility: CLINIC | Age: 56
End: 2021-09-20
Payer: MEDICARE

## 2021-09-20 DIAGNOSIS — T83.410A BREAKDOWN (MECHANICAL) OF IMPLANTED PENILE PROSTHESIS, INITIAL ENCOUNTER (H): Primary | ICD-10-CM

## 2021-09-20 DIAGNOSIS — T83.410A BREAKDOWN (MECHANICAL) OF IMPLANTED PENILE PROSTHESIS, INITIAL ENCOUNTER (H): ICD-10-CM

## 2021-09-20 LAB — GLUCOSE BLDC GLUCOMTR-MCNC: 96 MG/DL (ref 70–99)

## 2021-09-20 PROCEDURE — 360N000076 HC SURGERY LEVEL 3, PER MIN: Performed by: UROLOGY

## 2021-09-20 PROCEDURE — 250N000011 HC RX IP 250 OP 636: Performed by: UROLOGY

## 2021-09-20 PROCEDURE — C1813 PROSTHESIS, PENILE, INFLATAB: HCPCS | Performed by: UROLOGY

## 2021-09-20 PROCEDURE — 999N000141 HC STATISTIC PRE-PROCEDURE NURSING ASSESSMENT: Performed by: UROLOGY

## 2021-09-20 PROCEDURE — 250N000009 HC RX 250: Performed by: UROLOGY

## 2021-09-20 PROCEDURE — 250N000009 HC RX 250: Performed by: NURSE ANESTHETIST, CERTIFIED REGISTERED

## 2021-09-20 PROCEDURE — 258N000003 HC RX IP 258 OP 636: Performed by: STUDENT IN AN ORGANIZED HEALTH CARE EDUCATION/TRAINING PROGRAM

## 2021-09-20 PROCEDURE — 54410 REMOVE/REPLACE PENIS PROSTH: CPT | Mod: GC | Performed by: UROLOGY

## 2021-09-20 PROCEDURE — 250N000011 HC RX IP 250 OP 636: Performed by: NURSE ANESTHETIST, CERTIFIED REGISTERED

## 2021-09-20 PROCEDURE — 250N000011 HC RX IP 250 OP 636: Performed by: STUDENT IN AN ORGANIZED HEALTH CARE EDUCATION/TRAINING PROGRAM

## 2021-09-20 PROCEDURE — 272N000001 HC OR GENERAL SUPPLY STERILE: Performed by: UROLOGY

## 2021-09-20 PROCEDURE — 88304 TISSUE EXAM BY PATHOLOGIST: CPT | Mod: TC | Performed by: UROLOGY

## 2021-09-20 PROCEDURE — 710N000010 HC RECOVERY PHASE 1, LEVEL 2, PER MIN: Performed by: UROLOGY

## 2021-09-20 PROCEDURE — 258N000003 HC RX IP 258 OP 636: Performed by: UROLOGY

## 2021-09-20 PROCEDURE — 250N000013 HC RX MED GY IP 250 OP 250 PS 637: Performed by: STUDENT IN AN ORGANIZED HEALTH CARE EDUCATION/TRAINING PROGRAM

## 2021-09-20 PROCEDURE — 250N000025 HC SEVOFLURANE, PER MIN: Performed by: UROLOGY

## 2021-09-20 PROCEDURE — 370N000017 HC ANESTHESIA TECHNICAL FEE, PER MIN: Performed by: UROLOGY

## 2021-09-20 PROCEDURE — 258N000003 HC RX IP 258 OP 636: Performed by: NURSE ANESTHETIST, CERTIFIED REGISTERED

## 2021-09-20 DEVICE — IMP PROSTHESIS PENILE TITAN STD ASSEMBLY KIT 91-9480SC
Type: IMPLANTABLE DEVICE | Site: PENIS | Status: NON-FUNCTIONAL
Removed: 2021-10-06

## 2021-09-20 DEVICE — RESERVOIR TITAN CL ER8125
Type: IMPLANTABLE DEVICE | Site: ABDOMEN | Status: NON-FUNCTIONAL
Removed: 2021-10-06

## 2021-09-20 RX ORDER — SIMVASTATIN 10 MG
20 TABLET ORAL AT BEDTIME
Status: DISCONTINUED | OUTPATIENT
Start: 2021-09-20 | End: 2021-09-21 | Stop reason: HOSPADM

## 2021-09-20 RX ORDER — ACETAMINOPHEN 325 MG/1
650 TABLET ORAL EVERY 6 HOURS
Status: DISCONTINUED | OUTPATIENT
Start: 2021-09-20 | End: 2021-09-21 | Stop reason: HOSPADM

## 2021-09-20 RX ORDER — VANCOMYCIN HYDROCHLORIDE 1 G/200ML
1000 INJECTION, SOLUTION INTRAVENOUS ONCE
Status: COMPLETED | OUTPATIENT
Start: 2021-09-20 | End: 2021-09-20

## 2021-09-20 RX ORDER — AMLODIPINE BESYLATE 5 MG/1
5 TABLET ORAL DAILY
Status: DISCONTINUED | OUTPATIENT
Start: 2021-09-21 | End: 2021-09-21 | Stop reason: HOSPADM

## 2021-09-20 RX ORDER — NALOXONE HYDROCHLORIDE 0.4 MG/ML
0.4 INJECTION, SOLUTION INTRAMUSCULAR; INTRAVENOUS; SUBCUTANEOUS
Status: DISCONTINUED | OUTPATIENT
Start: 2021-09-20 | End: 2021-09-21 | Stop reason: HOSPADM

## 2021-09-20 RX ORDER — SODIUM CHLORIDE 9 MG/ML
INJECTION, SOLUTION INTRAVENOUS CONTINUOUS
Status: DISCONTINUED | OUTPATIENT
Start: 2021-09-20 | End: 2021-09-21

## 2021-09-20 RX ORDER — HYDROMORPHONE HYDROCHLORIDE 1 MG/ML
0.4 INJECTION, SOLUTION INTRAMUSCULAR; INTRAVENOUS; SUBCUTANEOUS EVERY 5 MIN PRN
Status: DISCONTINUED | OUTPATIENT
Start: 2021-09-20 | End: 2021-09-20 | Stop reason: HOSPADM

## 2021-09-20 RX ORDER — OXYCODONE HYDROCHLORIDE 5 MG/1
5-10 TABLET ORAL
Qty: 30 TABLET | Refills: 0 | Status: SHIPPED | OUTPATIENT
Start: 2021-09-20 | End: 2021-10-27

## 2021-09-20 RX ORDER — MAGNESIUM HYDROXIDE 1200 MG/15ML
LIQUID ORAL PRN
Status: DISCONTINUED | OUTPATIENT
Start: 2021-09-20 | End: 2021-09-20 | Stop reason: HOSPADM

## 2021-09-20 RX ORDER — ONDANSETRON 4 MG/1
4 TABLET, ORALLY DISINTEGRATING ORAL EVERY 30 MIN PRN
Status: DISCONTINUED | OUTPATIENT
Start: 2021-09-20 | End: 2021-09-20 | Stop reason: HOSPADM

## 2021-09-20 RX ORDER — NALOXONE HYDROCHLORIDE 0.4 MG/ML
0.2 INJECTION, SOLUTION INTRAMUSCULAR; INTRAVENOUS; SUBCUTANEOUS
Status: DISCONTINUED | OUTPATIENT
Start: 2021-09-20 | End: 2021-09-21 | Stop reason: HOSPADM

## 2021-09-20 RX ORDER — BUPIVACAINE HYDROCHLORIDE AND EPINEPHRINE 5; 5 MG/ML; UG/ML
INJECTION, SOLUTION PERINEURAL PRN
Status: DISCONTINUED | OUTPATIENT
Start: 2021-09-20 | End: 2021-09-20 | Stop reason: HOSPADM

## 2021-09-20 RX ORDER — HYDROMORPHONE HYDROCHLORIDE 1 MG/ML
0.4 INJECTION, SOLUTION INTRAMUSCULAR; INTRAVENOUS; SUBCUTANEOUS
Status: DISCONTINUED | OUTPATIENT
Start: 2021-09-20 | End: 2021-09-21 | Stop reason: HOSPADM

## 2021-09-20 RX ORDER — DEXAMETHASONE SODIUM PHOSPHATE 4 MG/ML
INJECTION, SOLUTION INTRA-ARTICULAR; INTRALESIONAL; INTRAMUSCULAR; INTRAVENOUS; SOFT TISSUE PRN
Status: DISCONTINUED | OUTPATIENT
Start: 2021-09-20 | End: 2021-09-20

## 2021-09-20 RX ORDER — ONDANSETRON 2 MG/ML
4 INJECTION INTRAMUSCULAR; INTRAVENOUS EVERY 30 MIN PRN
Status: DISCONTINUED | OUTPATIENT
Start: 2021-09-20 | End: 2021-09-20 | Stop reason: HOSPADM

## 2021-09-20 RX ORDER — OXYBUTYNIN CHLORIDE 15 MG/1
15 TABLET, EXTENDED RELEASE ORAL DAILY
COMMUNITY
End: 2021-12-16

## 2021-09-20 RX ORDER — OXYCODONE HYDROCHLORIDE 10 MG/1
10 TABLET ORAL EVERY 4 HOURS PRN
Status: DISCONTINUED | OUTPATIENT
Start: 2021-09-20 | End: 2021-09-20 | Stop reason: HOSPADM

## 2021-09-20 RX ORDER — METOPROLOL SUCCINATE 25 MG/1
25 TABLET, EXTENDED RELEASE ORAL DAILY
Status: DISCONTINUED | OUTPATIENT
Start: 2021-09-21 | End: 2021-09-21 | Stop reason: HOSPADM

## 2021-09-20 RX ORDER — OXYCODONE HYDROCHLORIDE 5 MG/1
5 TABLET ORAL EVERY 4 HOURS PRN
Status: DISCONTINUED | OUTPATIENT
Start: 2021-09-20 | End: 2021-09-21 | Stop reason: HOSPADM

## 2021-09-20 RX ORDER — ONDANSETRON 2 MG/ML
4 INJECTION INTRAMUSCULAR; INTRAVENOUS EVERY 6 HOURS PRN
Status: DISCONTINUED | OUTPATIENT
Start: 2021-09-20 | End: 2021-09-20 | Stop reason: HOSPADM

## 2021-09-20 RX ORDER — OXYCODONE HYDROCHLORIDE 5 MG/1
5 TABLET ORAL EVERY 4 HOURS PRN
Status: DISCONTINUED | OUTPATIENT
Start: 2021-09-20 | End: 2021-09-20 | Stop reason: HOSPADM

## 2021-09-20 RX ORDER — BACLOFEN 20 MG/1
20 TABLET ORAL 3 TIMES DAILY PRN
Status: DISCONTINUED | OUTPATIENT
Start: 2021-09-20 | End: 2021-09-21 | Stop reason: HOSPADM

## 2021-09-20 RX ORDER — FENTANYL CITRATE 50 UG/ML
50 INJECTION, SOLUTION INTRAMUSCULAR; INTRAVENOUS EVERY 5 MIN PRN
Status: DISCONTINUED | OUTPATIENT
Start: 2021-09-20 | End: 2021-09-20 | Stop reason: HOSPADM

## 2021-09-20 RX ORDER — FESOTERODINE FUMARATE 8 MG/1
8 TABLET, FILM COATED, EXTENDED RELEASE ORAL DAILY
Status: ON HOLD | COMMUNITY
End: 2021-09-20

## 2021-09-20 RX ORDER — CEFTRIAXONE 1 G/1
1 INJECTION, POWDER, FOR SOLUTION INTRAMUSCULAR; INTRAVENOUS EVERY 24 HOURS
Status: DISCONTINUED | OUTPATIENT
Start: 2021-09-20 | End: 2021-09-21 | Stop reason: HOSPADM

## 2021-09-20 RX ORDER — SODIUM CHLORIDE, SODIUM LACTATE, POTASSIUM CHLORIDE, CALCIUM CHLORIDE 600; 310; 30; 20 MG/100ML; MG/100ML; MG/100ML; MG/100ML
INJECTION, SOLUTION INTRAVENOUS CONTINUOUS
Status: DISCONTINUED | OUTPATIENT
Start: 2021-09-20 | End: 2021-09-20 | Stop reason: HOSPADM

## 2021-09-20 RX ORDER — ONDANSETRON 4 MG/1
4 TABLET, ORALLY DISINTEGRATING ORAL EVERY 6 HOURS PRN
Status: DISCONTINUED | OUTPATIENT
Start: 2021-09-20 | End: 2021-09-20 | Stop reason: HOSPADM

## 2021-09-20 RX ORDER — LIDOCAINE 40 MG/G
CREAM TOPICAL
Status: DISCONTINUED | OUTPATIENT
Start: 2021-09-20 | End: 2021-09-20 | Stop reason: HOSPADM

## 2021-09-20 RX ORDER — PROPOFOL 10 MG/ML
INJECTION, EMULSION INTRAVENOUS PRN
Status: DISCONTINUED | OUTPATIENT
Start: 2021-09-20 | End: 2021-09-20

## 2021-09-20 RX ORDER — FENTANYL CITRATE 50 UG/ML
INJECTION, SOLUTION INTRAMUSCULAR; INTRAVENOUS PRN
Status: DISCONTINUED | OUTPATIENT
Start: 2021-09-20 | End: 2021-09-20

## 2021-09-20 RX ORDER — DOCUSATE SODIUM 100 MG/1
100 CAPSULE, LIQUID FILLED ORAL 2 TIMES DAILY
Status: DISCONTINUED | OUTPATIENT
Start: 2021-09-20 | End: 2021-09-21 | Stop reason: HOSPADM

## 2021-09-20 RX ORDER — LIDOCAINE HYDROCHLORIDE 20 MG/ML
INJECTION, SOLUTION INFILTRATION; PERINEURAL PRN
Status: DISCONTINUED | OUTPATIENT
Start: 2021-09-20 | End: 2021-09-20

## 2021-09-20 RX ORDER — HYDROMORPHONE HYDROCHLORIDE 1 MG/ML
0.2 INJECTION, SOLUTION INTRAMUSCULAR; INTRAVENOUS; SUBCUTANEOUS
Status: DISCONTINUED | OUTPATIENT
Start: 2021-09-20 | End: 2021-09-21 | Stop reason: HOSPADM

## 2021-09-20 RX ORDER — ONDANSETRON 2 MG/ML
INJECTION INTRAMUSCULAR; INTRAVENOUS PRN
Status: DISCONTINUED | OUTPATIENT
Start: 2021-09-20 | End: 2021-09-20

## 2021-09-20 RX ORDER — FENTANYL CITRATE-0.9 % NACL/PF 10 MCG/ML
PLASTIC BAG, INJECTION (ML) INTRAVENOUS PRN
Status: DISCONTINUED | OUTPATIENT
Start: 2021-09-20 | End: 2021-09-20

## 2021-09-20 RX ORDER — LIDOCAINE 40 MG/G
CREAM TOPICAL
Status: DISCONTINUED | OUTPATIENT
Start: 2021-09-20 | End: 2021-09-21 | Stop reason: HOSPADM

## 2021-09-20 RX ORDER — LABETALOL HYDROCHLORIDE 5 MG/ML
10 INJECTION, SOLUTION INTRAVENOUS
Status: DISCONTINUED | OUTPATIENT
Start: 2021-09-20 | End: 2021-09-20 | Stop reason: HOSPADM

## 2021-09-20 RX ADMIN — Medication 200 MCG: at 08:16

## 2021-09-20 RX ADMIN — FENTANYL CITRATE 50 MCG: 50 INJECTION, SOLUTION INTRAMUSCULAR; INTRAVENOUS at 11:02

## 2021-09-20 RX ADMIN — Medication 100 MCG: at 08:00

## 2021-09-20 RX ADMIN — HYDROMORPHONE HYDROCHLORIDE 0.4 MG: 1 INJECTION, SOLUTION INTRAMUSCULAR; INTRAVENOUS; SUBCUTANEOUS at 11:26

## 2021-09-20 RX ADMIN — PHENYLEPHRINE HYDROCHLORIDE 0.3 MCG/KG/MIN: 10 INJECTION INTRAVENOUS at 08:15

## 2021-09-20 RX ADMIN — SIMVASTATIN 20 MG: 10 TABLET, FILM COATED ORAL at 22:34

## 2021-09-20 RX ADMIN — ACETAMINOPHEN 650 MG: 325 TABLET, FILM COATED ORAL at 22:34

## 2021-09-20 RX ADMIN — FENTANYL CITRATE 75 MCG: 50 INJECTION, SOLUTION INTRAMUSCULAR; INTRAVENOUS at 11:12

## 2021-09-20 RX ADMIN — FENTANYL CITRATE 25 MCG: 50 INJECTION, SOLUTION INTRAMUSCULAR; INTRAVENOUS at 09:33

## 2021-09-20 RX ADMIN — HYDROMORPHONE HYDROCHLORIDE 0.25 MG: 1 INJECTION, SOLUTION INTRAMUSCULAR; INTRAVENOUS; SUBCUTANEOUS at 08:29

## 2021-09-20 RX ADMIN — FENTANYL CITRATE 25 MCG: 50 INJECTION, SOLUTION INTRAMUSCULAR; INTRAVENOUS at 10:37

## 2021-09-20 RX ADMIN — ACETAMINOPHEN 650 MG: 325 TABLET, FILM COATED ORAL at 16:54

## 2021-09-20 RX ADMIN — DOCUSATE SODIUM 100 MG: 100 CAPSULE, LIQUID FILLED ORAL at 20:28

## 2021-09-20 RX ADMIN — MIDAZOLAM 2 MG: 1 INJECTION INTRAMUSCULAR; INTRAVENOUS at 07:45

## 2021-09-20 RX ADMIN — FENTANYL CITRATE 25 MCG: 50 INJECTION, SOLUTION INTRAMUSCULAR; INTRAVENOUS at 07:50

## 2021-09-20 RX ADMIN — VANCOMYCIN HYDROCHLORIDE 1000 MG: 1 INJECTION, SOLUTION INTRAVENOUS at 20:30

## 2021-09-20 RX ADMIN — SODIUM CHLORIDE: 9 INJECTION, SOLUTION INTRAVENOUS at 22:34

## 2021-09-20 RX ADMIN — GENTAMICIN SULFATE 240 MG: 40 INJECTION, SOLUTION INTRAMUSCULAR; INTRAVENOUS at 08:00

## 2021-09-20 RX ADMIN — OXYCODONE HYDROCHLORIDE 5 MG: 5 TABLET ORAL at 12:30

## 2021-09-20 RX ADMIN — VANCOMYCIN HYDROCHLORIDE 1500 MG: 1 INJECTION, POWDER, LYOPHILIZED, FOR SOLUTION INTRAVENOUS at 07:38

## 2021-09-20 RX ADMIN — DEXAMETHASONE SODIUM PHOSPHATE 4 MG: 4 INJECTION, SOLUTION INTRAMUSCULAR; INTRAVENOUS at 08:27

## 2021-09-20 RX ADMIN — Medication 100 MCG: at 07:59

## 2021-09-20 RX ADMIN — HYDROMORPHONE HYDROCHLORIDE 0.4 MG: 1 INJECTION, SOLUTION INTRAMUSCULAR; INTRAVENOUS; SUBCUTANEOUS at 17:00

## 2021-09-20 RX ADMIN — OXYCODONE HYDROCHLORIDE 5 MG: 5 TABLET ORAL at 22:34

## 2021-09-20 RX ADMIN — HYDROMORPHONE HYDROCHLORIDE 0.4 MG: 1 INJECTION, SOLUTION INTRAMUSCULAR; INTRAVENOUS; SUBCUTANEOUS at 14:37

## 2021-09-20 RX ADMIN — HYDROMORPHONE HYDROCHLORIDE 0.4 MG: 1 INJECTION, SOLUTION INTRAMUSCULAR; INTRAVENOUS; SUBCUTANEOUS at 11:38

## 2021-09-20 RX ADMIN — Medication 50 MCG: at 07:57

## 2021-09-20 RX ADMIN — CEFTRIAXONE SODIUM 1 G: 1 INJECTION, POWDER, FOR SOLUTION INTRAMUSCULAR; INTRAVENOUS at 22:36

## 2021-09-20 RX ADMIN — ONDANSETRON 4 MG: 2 INJECTION INTRAMUSCULAR; INTRAVENOUS at 10:47

## 2021-09-20 RX ADMIN — LIDOCAINE HYDROCHLORIDE 100 MG: 20 INJECTION, SOLUTION INFILTRATION; PERINEURAL at 07:51

## 2021-09-20 RX ADMIN — HYDROMORPHONE HYDROCHLORIDE 0.25 MG: 1 INJECTION, SOLUTION INTRAMUSCULAR; INTRAVENOUS; SUBCUTANEOUS at 09:06

## 2021-09-20 RX ADMIN — OXYCODONE HYDROCHLORIDE 5 MG: 5 TABLET ORAL at 18:30

## 2021-09-20 RX ADMIN — Medication 200 MCG: at 08:13

## 2021-09-20 RX ADMIN — Medication 200 MCG: at 08:09

## 2021-09-20 RX ADMIN — SODIUM CHLORIDE, POTASSIUM CHLORIDE, SODIUM LACTATE AND CALCIUM CHLORIDE: 600; 310; 30; 20 INJECTION, SOLUTION INTRAVENOUS at 07:43

## 2021-09-20 RX ADMIN — PROPOFOL 200 MG: 10 INJECTION, EMULSION INTRAVENOUS at 07:51

## 2021-09-20 ASSESSMENT — MIFFLIN-ST. JEOR: SCORE: 1893.8

## 2021-09-20 NOTE — BRIEF OP NOTE
[facility]    Brief Operative Note    Pre-operative diagnosis: Breakdown (mechanical) of implanted penile prosthesis, initial encounter (H) [T83.410A]  Post-operative diagnosis Same as pre-operative diagnosis    Procedure: Procedure(s):  REMOVAL AND PLACEMENT OF, PENILE PROSTHESIS, INFLATABLE  Surgeon: Surgeon(s) and Role:     * Charles Lira MD - Primary     * Solange Rodriguez MD - Assisting     * Amaris Cortés MD - Resident - Assisting  Anesthesia: General   Estimated blood loss: 50 mL  Drains:  Left groin PARESH drain  Specimens:   ID Type Source Tests Collected by Time Destination   1 : penile implant that was explanted. Implant Penis SURGICAL PATHOLOGY EXAM Charles Lira MD 9/20/2021  9:08 AM      Findings:   Scarred/encapsulated three-piece IPP removed. A small piece of gortex left behind outside of the right corporal body. Unremarkable placement of 24 cm coloplast titan 3-piece IPP with reservoir in the left high submuscular location.   Complications: None.  Implants:   Implant Name Type Inv. Item Serial No.  Lot No. LRB No. Used Action   IMP PROSTHESIS PENILE TITAN STD ASSEMBLY KIT [id number] - [id number] Penile Implant IMP PROSTHESIS PENILE TITAN STD ASSEMBLY KIT [id number] COLOPLAST [id number] N/A 1 Implanted   CYLINDER PENILE PRECON 700 CX IP 21CM - [id number] - SNA Stent  [employer] [id number] N/A 1 Explanted   EXTENDER REAR TIP  1CM   [id number] - SNA Stent  [employer] [id number] N/A 1 Explanted   EXTENDER REAR TIP  2CM   [id number] - SNA Stent  [employer] [id number] N/A 1 Explanted   RESERVOIR CONCEAL FLAT 100ML IZ - [id number] - SNA Stent  NA [employer] [id number] N/A 1 Explanted   RESERVOIR TITAN CL [id number] - [id number] Pump RESERVOIR TITAN CL [id number]  COLOPLAST [id number] Left 1 Implanted   touch scrotal zero degree angle cylinder set with pump 22 cm     [id number] N/A 1 Implanted      Dispo:  - PACU then floor  - Patient left partially inflated  - IV Abx (Ceftriaxone/Vanc) while in the hospital, 1 week of Bactrim on discharge  - SP tube exchanged today  - Hold Plavix for 72 hours post-op  - Follow up on 10/6

## 2021-09-20 NOTE — OP NOTE
PREOPERATIVE DIAGNOSIS:  Mechanical breakdown of implanted penile prosthesis      POSTOPERATIVE DIAGNOSIS:  Same.      PROCEDURE:  Removal and replacement of inflatable penile prosthesis ([employer] Titan)    Surgeon: Charles Lira MD  Fellow: Solange Muse MD  Resident: Amaris Cortés MD  EBL: 50 mL  Anesthesia: General      INDICATIONS:  Josiah Crump is a 55 year old male with erectile dysfunction and neurogenic bladder 2/2 spinal cord injury. He doesn't have a right leg. He recently changed his bladder management to suprapubic tube. He has managed his erectile dysfunction with an IPP for several years. He has undergone two previous explants for device infection.  His current device was placed in 2016 however the device is no longer functioning. He has attempted to undergo removal/replacement x2, however the case has been aborted twice due to concern for UTI/urethral false passages.     He understands risks to include but not be limited to bleeding, infection, penile pain or numbness, scrotal pain or numbness, penile curvature, device infection, device erosion, urethral injury and need for additional procedures.        DESCRIPTION OF PROCEDURE:  After informed consent was obtained and preoperative antibiotics were given, the patient was taken to the operating room, placed supine on the operating table.  General anesthesia was induced and an airway was secured.  He was placed in the frog-legged position.  We prepped the abdomen and the 16 Fr suprapubic tube was replaced with another 16 Fr catheter with 10 mL in the balloon. This was hooked up to a drainage bag. We then shaved and re-prepped the penis and scrotum and lower abdomen in the usual sterile fashion.  Suprapubic tube was draped out of the field.      A horizontal incision was made just below the penoscrotal junction. The incision was carried down through the dartos fascia and the urethra was identified in the midline. The scrotal skin was  thickened and there was a large amount of scar tissue present from previous surgeries. We were able to identify the previous pump and dissect this out of the scrotum. There was a very well defined capsule around the pump which was excised. The tunica albuginea was cleared free of the overlying fascia on both sides of the corpora cavernosa. Next, we made our corporotomies, exposing his old cylinders which were removed using a right-angle clamp. The rear tip extenders were intact on both cylinders. The tubing was cut from the old cylinders and these were both removed. Next, we attempted to remove the pump, however the tubing was densely scarred into place. Using electrocautery, we were ultaimately able to free the tubing from the scrotal tissue. A small piece of gortex (< 5 mm) broke off from the tubing going to the right cylinder and could not be identified. Given noninfected field, we determined it was safest to leave it in place. This was left in the scrotal space outside of the corpora. The tubing going to the reservoir was clamped and cut and the pump was removed intact.    We then turned our attention to the reservoir which was located in the left high submuscular space. A 4 cm counter incision was made over the left groin and we dissected down through mayo's fascia, until we encountered striped tubing. Using bovie electrocautery we followed the tubing until the reservoir was encountered, which again was surrounded by a thick capsule. The reservoir was drained and removed. At this point the entire old device was removed. The wounds were copiously irrigated with antibiotic solution.     Given the absence of infection and the well defined left submuscular spacee, we opted to place the new reservoir in the same spot. A 125 mL reservoir was placed in this position and filled with 100 mL sterile saline.     We then proceeded with cylinder placement. Measurements were taken from both sides using the Katelyn measuring  device.  We measured on the left side, at 11 cm proximal and 13 cm distal and on the right side 12 cm proximal and 12 cm distal.  We placed two  2-0 PDS sutures in the edges of the marked sites on both sides of our corporotomies. The 22 cm prosthesis with 2 cm rear tip extenders was then placed into each corporal body in the standard fashion using the Katelyn introducer and the Denise needle. The corporotomies were then closed with the device deflated.  Connections were made in the standard fashion.  The device was then inflated again and again, we are happy with the outcome.      We then dissected a pocket in the dependent portion of the right scrotum for the pump, using bovie electrocautery. A purse string suture was placed using 3-0 monocryl to secure the pump in this position. The striped tubing was tunneled to our counter incision in the left groin and the connections were made in the standard fashion. A PARESH drain was placed into the scrotum, exiting through a stab incision in the left groin. The drain was sutured to the skin with ethibond. The wound was copiously irrigated. Anselmo's fascia and the overlying skin were closed with 3-0 monocryl suture in a running fashion. Dermabond was applied.      The scrotum was irrigated with antibiotics again.  We ensured that the device was deflated.  The dartos fascia was closed with a running 3-0 Monocryl suture.  The skin was closed with running horizontal mattress 3-0 monocryl suture.     Dermabond was placed over the incision, followed by fluffs and scrotal support.  The patient was awakened from anesthesia, transferred to [facility] and then to the postanesthesia care unit in stable condition.  There were no complications.      Staff was present for the entire procedure.    DISPO:    - PACU then floor  - IV antibiotics while in-patient, 1 week PO bactrim on discharge  - PARESH removal tomorrow AM  - Continue SPT  - Hold plavix for 72 hours    Amaris Cortés MD  PGY-4  Urology  Pager [phone]    .As attending surgeon, I, Charles Lira MD, was scrubbed and present for the entire procedure.

## 2021-09-20 NOTE — ANESTHESIA POSTPROCEDURE EVALUATION
Patient: Josiah Crump    Procedure(s):  REMOVAL AND PLACEMENT OF, PENILE PROSTHESIS, INFLATABLE    Diagnosis:Breakdown (mechanical) of implanted penile prosthesis, initial encounter (H) [T83.410A]  Diagnosis Additional Information: No value filed.    Anesthesia Type:  General    Note:  Disposition: Inpatient   Postop Pain Control: Uneventful            Sign Out: Well controlled pain   PONV: No   Neuro/Psych: Uneventful            Sign Out: Acceptable/Baseline neuro status   Airway/Respiratory: Uneventful            Sign Out: Acceptable/Baseline resp. status   CV/Hemodynamics: Uneventful            Sign Out: Acceptable CV status; No obvious hypovolemia; No obvious fluid overload   Other NRE: NONE   DID A NON-ROUTINE EVENT OCCUR? No           Last vitals:  Vitals Value Taken Time   /76 09/20/21 1215   Temp 36.5  C (97.7  F) 09/20/21 1111   Pulse 84 09/20/21 1222   Resp 27 09/20/21 1222   SpO2 98 % 09/20/21 1222   Vitals shown include unvalidated device data.    Electronically Signed By: Elmira Isaacs MD  September 20, 2021  12:23 PM

## 2021-09-20 NOTE — ANESTHESIA CARE TRANSFER NOTE
Patient: Josiah Crump    Procedure(s):  REMOVAL AND PLACEMENT OF, PENILE PROSTHESIS, INFLATABLE    Diagnosis: Breakdown (mechanical) of implanted penile prosthesis, initial encounter (H) [T83.410A]  Diagnosis Additional Information: No value filed.    Anesthesia Type:   General     Note:    Oropharynx: oropharynx clear of all foreign objects  Level of Consciousness: awake  Oxygen Supplementation: face mask  Level of Supplemental Oxygen (L/min / FiO2): 6  Independent Airway: airway patency satisfactory and stable  Dentition: dentition unchanged  Vital Signs Stable: post-procedure vital signs reviewed and stable  Report to RN Given: handoff report given  Patient transferred to: PACU    Handoff Report: Identifed the Patient, Identified the Reponsible Provider, Reviewed the pertinent medical history, Discussed the surgical course, Reviewed Intra-OP anesthesia mangement and issues during anesthesia, Set expectations for post-procedure period and Allowed opportunity for questions and acknowledgement of understanding      Vitals:  Vitals Value Taken Time   /82    Temp 36.5    Pulse 80    Resp 16    SpO2 100        Electronically Signed By: Rosemarie Meeks  September 20, 2021  11:25 AM
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
## 2021-09-20 NOTE — ANESTHESIA PROCEDURE NOTES
Airway       Patient location during procedure: OR       Procedure Start/Stop Times: 9/20/2021 7:54 AM  Staff -        Performed By: SRNA  Consent for Airway        Urgency: elective  Indications and Patient Condition       Indications for airway management: chasidy-procedural       Induction type:intravenous       Mask difficulty assessment: 0 - not attempted    Final Airway Details       Final airway type: supraglottic airway    Supraglottic Airway Details        Type: LMA       Brand: Ambu AuraGain       LMA size: 5    Post intubation assessment        Placement verified by: capnometry, equal breath sounds and chest rise        Number of attempts at approach: 1       Number of other approaches attempted: 0       Secured with: silk tape       Ease of procedure: easy       Dentition: Intact and Unchanged

## 2021-09-20 NOTE — PLAN OF CARE
Pt cont to have moderate pain, medicated with IV dilaudid, received order for po Oxycodone for next pain med.

## 2021-09-21 ENCOUNTER — PRE VISIT (OUTPATIENT)
Dept: UROLOGY | Facility: CLINIC | Age: 56
End: 2021-09-21

## 2021-09-21 VITALS
OXYGEN SATURATION: 98 % | WEIGHT: 210 LBS | TEMPERATURE: 98.3 F | SYSTOLIC BLOOD PRESSURE: 116 MMHG | DIASTOLIC BLOOD PRESSURE: 90 MMHG | HEART RATE: 76 BPM | BODY MASS INDEX: 25.57 KG/M2 | RESPIRATION RATE: 16 BRPM | HEIGHT: 76 IN

## 2021-09-21 LAB
ANION GAP SERPL CALCULATED.3IONS-SCNC: 5 MMOL/L (ref 3–14)
BUN SERPL-MCNC: 19 MG/DL (ref 7–30)
CALCIUM SERPL-MCNC: 7.8 MG/DL (ref 8.5–10.1)
CHLORIDE BLD-SCNC: 111 MMOL/L (ref 94–109)
CO2 SERPL-SCNC: 22 MMOL/L (ref 20–32)
CREAT SERPL-MCNC: 0.92 MG/DL (ref 0.66–1.25)
ERYTHROCYTE [DISTWIDTH] IN BLOOD BY AUTOMATED COUNT: 19.6 % (ref 10–15)
GFR SERPL CREATININE-BSD FRML MDRD: >90 ML/MIN/1.73M2
GLUCOSE BLD-MCNC: 131 MG/DL (ref 70–99)
HCT VFR BLD AUTO: 38.4 % (ref 40–53)
HGB BLD-MCNC: 11.1 G/DL (ref 13.3–17.7)
MCH RBC QN AUTO: 21.2 PG (ref 26.5–33)
MCHC RBC AUTO-ENTMCNC: 28.9 G/DL (ref 31.5–36.5)
MCV RBC AUTO: 73 FL (ref 78–100)
PLATELET # BLD AUTO: 213 10E3/UL (ref 150–450)
POTASSIUM BLD-SCNC: 4.6 MMOL/L (ref 3.4–5.3)
RBC # BLD AUTO: 5.24 10E6/UL (ref 4.4–5.9)
SODIUM SERPL-SCNC: 138 MMOL/L (ref 133–144)
WBC # BLD AUTO: 11.7 10E3/UL (ref 4–11)

## 2021-09-21 PROCEDURE — 85027 COMPLETE CBC AUTOMATED: CPT | Performed by: STUDENT IN AN ORGANIZED HEALTH CARE EDUCATION/TRAINING PROGRAM

## 2021-09-21 PROCEDURE — 250N000013 HC RX MED GY IP 250 OP 250 PS 637: Performed by: STUDENT IN AN ORGANIZED HEALTH CARE EDUCATION/TRAINING PROGRAM

## 2021-09-21 PROCEDURE — 36416 COLLJ CAPILLARY BLOOD SPEC: CPT | Performed by: STUDENT IN AN ORGANIZED HEALTH CARE EDUCATION/TRAINING PROGRAM

## 2021-09-21 PROCEDURE — 250N000011 HC RX IP 250 OP 636: Performed by: STUDENT IN AN ORGANIZED HEALTH CARE EDUCATION/TRAINING PROGRAM

## 2021-09-21 PROCEDURE — 84295 ASSAY OF SERUM SODIUM: CPT | Performed by: STUDENT IN AN ORGANIZED HEALTH CARE EDUCATION/TRAINING PROGRAM

## 2021-09-21 RX ORDER — ACETAMINOPHEN 325 MG/1
650 TABLET ORAL EVERY 6 HOURS PRN
Qty: 100 TABLET | Refills: 11 | Status: SHIPPED | OUTPATIENT
Start: 2021-09-21 | End: 2023-04-17

## 2021-09-21 RX ORDER — CLOPIDOGREL BISULFATE 75 MG/1
75 TABLET ORAL DAILY
Qty: 30 TABLET | Refills: 0 | COMMUNITY
Start: 2021-09-21 | End: 2022-02-18

## 2021-09-21 RX ORDER — AMOXICILLIN 250 MG
1 CAPSULE ORAL 2 TIMES DAILY
Qty: 45 TABLET | Refills: 0 | Status: SHIPPED | OUTPATIENT
Start: 2021-09-21 | End: 2022-01-03

## 2021-09-21 RX ORDER — SULFAMETHOXAZOLE/TRIMETHOPRIM 800-160 MG
1 TABLET ORAL 2 TIMES DAILY
Qty: 14 TABLET | Refills: 0 | Status: SHIPPED | OUTPATIENT
Start: 2021-09-21 | End: 2021-10-27

## 2021-09-21 RX ADMIN — DOCUSATE SODIUM 100 MG: 100 CAPSULE, LIQUID FILLED ORAL at 07:33

## 2021-09-21 RX ADMIN — ACETAMINOPHEN 650 MG: 325 TABLET, FILM COATED ORAL at 11:43

## 2021-09-21 RX ADMIN — OXYCODONE HYDROCHLORIDE 5 MG: 5 TABLET ORAL at 11:43

## 2021-09-21 RX ADMIN — ACETAMINOPHEN 650 MG: 325 TABLET, FILM COATED ORAL at 06:44

## 2021-09-21 RX ADMIN — METOPROLOL SUCCINATE 25 MG: 25 TABLET, EXTENDED RELEASE ORAL at 07:33

## 2021-09-21 RX ADMIN — OXYCODONE HYDROCHLORIDE 5 MG: 5 TABLET ORAL at 02:50

## 2021-09-21 RX ADMIN — AMLODIPINE BESYLATE 5 MG: 5 TABLET ORAL at 07:33

## 2021-09-21 RX ADMIN — HYDROMORPHONE HYDROCHLORIDE 0.4 MG: 1 INJECTION, SOLUTION INTRAMUSCULAR; INTRAVENOUS; SUBCUTANEOUS at 00:09

## 2021-09-21 RX ADMIN — OXYCODONE HYDROCHLORIDE 5 MG: 5 TABLET ORAL at 06:46
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
## 2021-09-21 NOTE — TELEPHONE ENCOUNTER
Reason for visit: Post op follow up     Relevant information: IPP removal    Records/imaging/labs/orders: in EPIC    Pt called: no    At Rooming: normal
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
## 2021-09-21 NOTE — PLAN OF CARE
"  VS: BP (!) 116/90   Pulse 76   Temp 98.3  F (36.8  C) (Oral)   Resp 16   Ht 1.93 m (6' 3.98\")   Wt 95.3 kg (210 lb)   SpO2 98%   BMI 25.57 kg/m       O2: RA   Output: Pt has suprapubic catheter, adequate amounts out. Pt changed to leg bag prior to discharge. Pt given extra leg bag and urinary collection device.    Last BM: 9/21 with bedpan   Activity: Pt can transfer to wheelchair independently  Pt is paraplegic, not oob throughout shift. Shifts weight indepdenently    Skin: Pt has small incision where PARESH was removed, covered in gauze/tape  Perineum incision RITIKA w minimal sanguinous drainage   Pain: Relieved with oxy 5mg prn, scheduled tylenol   CMS: A&O x4. CMS intact denies n/t.   Dressing: RITIKA incision   Diet: Reg diet, tolerating   LDA: PARESH removed today, muhammad   Equipment: IV pole, personal wheelchair   Plan: Discharge today. All paperwork given and went over with patient, all questions answered, discharge medications given. Transported pt to wheelchair and self transported to front, ride set up.    Additional Info:        "

## 2021-09-21 NOTE — PLAN OF CARE
"      VS: /73 (BP Location: Left arm)   Pulse 73   Temp 98.2  F (36.8  C) (Oral)   Resp 17   Ht 1.93 m (6' 3.98\")   Wt 95.3 kg (210 lb)   SpO2 99%   BMI 25.57 kg/m       Output: Voiding via suprapubic catheter. LBM 9/21, BS active x4.     Lungs: LS clear, on RA. Denies chest pain, SOB, cough.     Activity: Paraplegic - pt not OOB throughout shift. Repositions/shifts weight independently in bed.    Skin: L abd incision and perineum incision - RITIKA w/ minimal sanguinous drainage.    Pain:   Managed w/ prn IV dilaudid 0.2-0.4mg q2hr, prn oxy 5 mg q4hr, and scheduled tylenol.   Neuro/CMS:   A&Ox4, paraplegic and R AKA--absent sensation from knee down in LLE. Numbness present in L fingertips.     Dressing(s):   Incisions - RITIKA  Dressing to drain insertion site changed - CDI    Diet:   Regular diet, tolerating well   LDA:   R PIV infusing TKO  PARESH drain  Shah   Equipment:   IV pole, personal wheelchair   Plan:   Anticipated discharge today. Continue to monitor and follow plan of care.   Additional Info:          "

## 2021-09-22 ENCOUNTER — PATIENT OUTREACH (OUTPATIENT)
Dept: CARE COORDINATION | Facility: CLINIC | Age: 56
End: 2021-09-22

## 2021-09-22 NOTE — PROGRESS NOTES
Clinic Care Coordination Contact:    Reason: referral to Clinic Care Coordination Service  Referral by: Dr. Petit    Patient Outreach:    Patient was identified as a potential candidate and referred to Clinic Care Coordination Service. I call and spoke with patient today, 9- to discuss possible clinic care coordination enrollment. Have introduced myself to patient. Clinic care coordination service was described to the patient and immediate needs were discussed. The patient has declined participating into clinic care coordination service at this time. Patient confirmed that he has no urgent needs or concerns at this time. I will discontinue outreach to the patient at this time. I have notified the patient s physician by task. If the provider feels this patient is a good fit in the future I have asked them to resend to the CCC referral bucket. I have also provided the patient with my contact information should they change their mind.     Plan:   No further action need from [facility] service at this time.  Message route to update PCP as FYI.

## 2021-09-28 LAB
PATH REPORT.COMMENTS IMP SPEC: NORMAL
PATH REPORT.FINAL DX SPEC: NORMAL
PATH REPORT.GROSS SPEC: NORMAL
PATH REPORT.MICROSCOPIC SPEC OTHER STN: NORMAL
PATH REPORT.RELEVANT HX SPEC: NORMAL
PHOTO IMAGE: NORMAL

## 2021-09-28 PROCEDURE — 88304 TISSUE EXAM BY PATHOLOGIST: CPT | Mod: 26 | Performed by: PATHOLOGY

## 2021-10-06 ENCOUNTER — ANESTHESIA EVENT (OUTPATIENT)
Dept: SURGERY | Facility: CLINIC | Age: 56
DRG: 674 | End: 2021-10-06
Payer: MEDICARE

## 2021-10-06 ENCOUNTER — HOSPITAL ENCOUNTER (INPATIENT)
Facility: CLINIC | Age: 56
LOS: 3 days | Discharge: HOME | DRG: 674 | End: 2021-10-09
Attending: EMERGENCY MEDICINE | Admitting: UROLOGY
Payer: MEDICARE

## 2021-10-06 ENCOUNTER — APPOINTMENT (OUTPATIENT)
Dept: CT IMAGING | Facility: CLINIC | Age: 56
DRG: 674 | End: 2021-10-06
Attending: EMERGENCY MEDICINE
Payer: MEDICARE

## 2021-10-06 ENCOUNTER — ANESTHESIA (OUTPATIENT)
Dept: SURGERY | Facility: CLINIC | Age: 56
DRG: 674 | End: 2021-10-06
Payer: MEDICARE

## 2021-10-06 DIAGNOSIS — T83.410A BREAKDOWN (MECHANICAL) OF IMPLANTED PENILE PROSTHESIS, INITIAL ENCOUNTER (H): Primary | ICD-10-CM

## 2021-10-06 DIAGNOSIS — T83.9XXA: ICD-10-CM

## 2021-10-06 DIAGNOSIS — Z11.52 ENCOUNTER FOR SCREENING LABORATORY TESTING FOR SEVERE ACUTE RESPIRATORY SYNDROME CORONAVIRUS 2 (SARS-COV-2): ICD-10-CM

## 2021-10-06 LAB
ALBUMIN SERPL-MCNC: 2.4 G/DL (ref 3.4–5)
ALBUMIN UR-MCNC: 70 MG/DL
ALP SERPL-CCNC: 112 U/L (ref 40–150)
ALT SERPL W P-5'-P-CCNC: 24 U/L (ref 0–70)
ANION GAP SERPL CALCULATED.3IONS-SCNC: 8 MMOL/L (ref 3–14)
APPEARANCE UR: CLEAR
AST SERPL W P-5'-P-CCNC: 30 U/L (ref 0–45)
BASOPHILS # BLD AUTO: 0.1 10E3/UL (ref 0–0.2)
BASOPHILS NFR BLD AUTO: 0 %
BILIRUB SERPL-MCNC: 0.3 MG/DL (ref 0.2–1.3)
BILIRUB UR QL STRIP: NEGATIVE
BUN SERPL-MCNC: 15 MG/DL (ref 7–30)
CALCIUM SERPL-MCNC: 8.8 MG/DL (ref 8.5–10.1)
CHLORIDE BLD-SCNC: 106 MMOL/L (ref 94–109)
CO2 SERPL-SCNC: 21 MMOL/L (ref 20–32)
COLOR UR AUTO: ABNORMAL
CREAT SERPL-MCNC: 1.06 MG/DL (ref 0.66–1.25)
EOSINOPHIL # BLD AUTO: 0.2 10E3/UL (ref 0–0.7)
EOSINOPHIL NFR BLD AUTO: 1 %
ERYTHROCYTE [DISTWIDTH] IN BLOOD BY AUTOMATED COUNT: 20.3 % (ref 10–15)
GFR SERPL CREATININE-BSD FRML MDRD: 79 ML/MIN/1.73M2
GLUCOSE BLD-MCNC: 110 MG/DL (ref 70–99)
GLUCOSE UR STRIP-MCNC: NEGATIVE MG/DL
HCT VFR BLD AUTO: 41.9 % (ref 40–53)
HGB BLD-MCNC: 12.4 G/DL (ref 13.3–17.7)
HGB UR QL STRIP: ABNORMAL
IMM GRANULOCYTES # BLD: 0.2 10E3/UL
IMM GRANULOCYTES NFR BLD: 1 %
KETONES UR STRIP-MCNC: NEGATIVE MG/DL
LEUKOCYTE ESTERASE UR QL STRIP: NEGATIVE
LYMPHOCYTES # BLD AUTO: 1.1 10E3/UL (ref 0.8–5.3)
LYMPHOCYTES NFR BLD AUTO: 6 %
MCH RBC QN AUTO: 21.5 PG (ref 26.5–33)
MCHC RBC AUTO-ENTMCNC: 29.6 G/DL (ref 31.5–36.5)
MCV RBC AUTO: 73 FL (ref 78–100)
MONOCYTES # BLD AUTO: 1.6 10E3/UL (ref 0–1.3)
MONOCYTES NFR BLD AUTO: 8 %
MUCOUS THREADS #/AREA URNS LPF: PRESENT /LPF
NEUTROPHILS # BLD AUTO: 16.7 10E3/UL (ref 1.6–8.3)
NEUTROPHILS NFR BLD AUTO: 84 %
NITRATE UR QL: NEGATIVE
NRBC # BLD AUTO: 0 10E3/UL
NRBC BLD AUTO-RTO: 0 /100
PH UR STRIP: 5.5 [PH] (ref 5–7)
PLATELET # BLD AUTO: 319 10E3/UL (ref 150–450)
POTASSIUM BLD-SCNC: 3.6 MMOL/L (ref 3.4–5.3)
PROT SERPL-MCNC: 8.6 G/DL (ref 6.8–8.8)
RBC # BLD AUTO: 5.76 10E6/UL (ref 4.4–5.9)
RBC URINE: 8 /HPF
SARS-COV-2 RNA RESP QL NAA+PROBE: NEGATIVE
SODIUM SERPL-SCNC: 135 MMOL/L (ref 133–144)
SP GR UR STRIP: >1.05 (ref 1–1.03)
UROBILINOGEN UR STRIP-MCNC: NORMAL MG/DL
WBC # BLD AUTO: 19.8 10E3/UL (ref 4–11)
WBC URINE: 4 /HPF

## 2021-10-06 PROCEDURE — G1004 CDSM NDSC: HCPCS

## 2021-10-06 PROCEDURE — 999N000141 HC STATISTIC PRE-PROCEDURE NURSING ASSESSMENT: Performed by: STUDENT IN AN ORGANIZED HEALTH CARE EDUCATION/TRAINING PROGRAM

## 2021-10-06 PROCEDURE — 250N000009 HC RX 250: Performed by: FAMILY MEDICINE

## 2021-10-06 PROCEDURE — 85025 COMPLETE CBC W/AUTO DIFF WBC: CPT | Performed by: EMERGENCY MEDICINE

## 2021-10-06 PROCEDURE — 99285 EMERGENCY DEPT VISIT HI MDM: CPT | Performed by: EMERGENCY MEDICINE

## 2021-10-06 PROCEDURE — 370N000017 HC ANESTHESIA TECHNICAL FEE, PER MIN: Performed by: STUDENT IN AN ORGANIZED HEALTH CARE EDUCATION/TRAINING PROGRAM

## 2021-10-06 PROCEDURE — 250N000025 HC SEVOFLURANE, PER MIN: Performed by: STUDENT IN AN ORGANIZED HEALTH CARE EDUCATION/TRAINING PROGRAM

## 2021-10-06 PROCEDURE — C9803 HOPD COVID-19 SPEC COLLECT: HCPCS

## 2021-10-06 PROCEDURE — 250N000011 HC RX IP 250 OP 636: Performed by: ANESTHESIOLOGY

## 2021-10-06 PROCEDURE — 258N000003 HC RX IP 258 OP 636: Performed by: NURSE ANESTHETIST, CERTIFIED REGISTERED

## 2021-10-06 PROCEDURE — 250N000009 HC RX 250: Performed by: NURSE ANESTHETIST, CERTIFIED REGISTERED

## 2021-10-06 PROCEDURE — 360N000076 HC SURGERY LEVEL 3, PER MIN: Performed by: STUDENT IN AN ORGANIZED HEALTH CARE EDUCATION/TRAINING PROGRAM

## 2021-10-06 PROCEDURE — 250N000011 HC RX IP 250 OP 636: Performed by: STUDENT IN AN ORGANIZED HEALTH CARE EDUCATION/TRAINING PROGRAM

## 2021-10-06 PROCEDURE — 96366 THER/PROPH/DIAG IV INF ADDON: CPT

## 2021-10-06 PROCEDURE — 258N000003 HC RX IP 258 OP 636: Performed by: STUDENT IN AN ORGANIZED HEALTH CARE EDUCATION/TRAINING PROGRAM

## 2021-10-06 PROCEDURE — 36415 COLL VENOUS BLD VENIPUNCTURE: CPT | Performed by: EMERGENCY MEDICINE

## 2021-10-06 PROCEDURE — 82040 ASSAY OF SERUM ALBUMIN: CPT | Performed by: EMERGENCY MEDICINE

## 2021-10-06 PROCEDURE — 96365 THER/PROPH/DIAG IV INF INIT: CPT

## 2021-10-06 PROCEDURE — 258N000003 HC RX IP 258 OP 636: Performed by: FAMILY MEDICINE

## 2021-10-06 PROCEDURE — 250N000011 HC RX IP 250 OP 636: Performed by: FAMILY MEDICINE

## 2021-10-06 PROCEDURE — 250N000009 HC RX 250: Performed by: STUDENT IN AN ORGANIZED HEALTH CARE EDUCATION/TRAINING PROGRAM

## 2021-10-06 PROCEDURE — 120N000002 HC R&B MED SURG/OB UMMC

## 2021-10-06 PROCEDURE — U0003 INFECTIOUS AGENT DETECTION BY NUCLEIC ACID (DNA OR RNA); SEVERE ACUTE RESPIRATORY SYNDROME CORONAVIRUS 2 (SARS-COV-2) (CORONAVIRUS DISEASE [COVID-19]), AMPLIFIED PROBE TECHNIQUE, MAKING USE OF HIGH THROUGHPUT TECHNOLOGIES AS DESCRIBED BY CMS-2020-01-R: HCPCS | Performed by: FAMILY MEDICINE

## 2021-10-06 PROCEDURE — 250N000011 HC RX IP 250 OP 636: Performed by: NURSE ANESTHETIST, CERTIFIED REGISTERED

## 2021-10-06 PROCEDURE — 81001 URINALYSIS AUTO W/SCOPE: CPT | Performed by: EMERGENCY MEDICINE

## 2021-10-06 PROCEDURE — 0VPS0JZ REMOVAL OF SYNTHETIC SUBSTITUTE FROM PENIS, OPEN APPROACH: ICD-10-PCS | Performed by: UROLOGY

## 2021-10-06 PROCEDURE — 54406 REMOVE MUTI-COMP PENIS PROS: CPT | Mod: 78 | Performed by: UROLOGY

## 2021-10-06 PROCEDURE — 710N000010 HC RECOVERY PHASE 1, LEVEL 2, PER MIN: Performed by: STUDENT IN AN ORGANIZED HEALTH CARE EDUCATION/TRAINING PROGRAM

## 2021-10-06 PROCEDURE — 250N000013 HC RX MED GY IP 250 OP 250 PS 637: Performed by: EMERGENCY MEDICINE

## 2021-10-06 PROCEDURE — 87186 SC STD MICRODIL/AGAR DIL: CPT | Performed by: STUDENT IN AN ORGANIZED HEALTH CARE EDUCATION/TRAINING PROGRAM

## 2021-10-06 PROCEDURE — 99285 EMERGENCY DEPT VISIT HI MDM: CPT | Mod: 25

## 2021-10-06 PROCEDURE — 250N000011 HC RX IP 250 OP 636: Performed by: UROLOGY

## 2021-10-06 PROCEDURE — 87075 CULTR BACTERIA EXCEPT BLOOD: CPT | Performed by: STUDENT IN AN ORGANIZED HEALTH CARE EDUCATION/TRAINING PROGRAM

## 2021-10-06 PROCEDURE — 272N000001 HC OR GENERAL SUPPLY STERILE: Performed by: STUDENT IN AN ORGANIZED HEALTH CARE EDUCATION/TRAINING PROGRAM

## 2021-10-06 PROCEDURE — 250N000013 HC RX MED GY IP 250 OP 250 PS 637: Performed by: STUDENT IN AN ORGANIZED HEALTH CARE EDUCATION/TRAINING PROGRAM

## 2021-10-06 RX ORDER — VANCOMYCIN HYDROCHLORIDE 1 G/200ML
1000 INJECTION, SOLUTION INTRAVENOUS EVERY 12 HOURS
Status: DISCONTINUED | OUTPATIENT
Start: 2021-10-06 | End: 2021-10-09 | Stop reason: HOSPADM

## 2021-10-06 RX ORDER — NALOXONE HYDROCHLORIDE 0.4 MG/ML
0.2 INJECTION, SOLUTION INTRAMUSCULAR; INTRAVENOUS; SUBCUTANEOUS
Status: DISCONTINUED | OUTPATIENT
Start: 2021-10-06 | End: 2021-10-06 | Stop reason: HOSPADM

## 2021-10-06 RX ORDER — METOPROLOL TARTRATE 1 MG/ML
1-2 INJECTION, SOLUTION INTRAVENOUS EVERY 5 MIN PRN
Status: DISCONTINUED | OUTPATIENT
Start: 2021-10-06 | End: 2021-10-06

## 2021-10-06 RX ORDER — CEFAZOLIN SODIUM 1 G/50ML
1750 SOLUTION INTRAVENOUS ONCE
Status: COMPLETED | OUTPATIENT
Start: 2021-10-06 | End: 2021-10-06

## 2021-10-06 RX ORDER — LIDOCAINE HYDROCHLORIDE 20 MG/ML
INJECTION, SOLUTION INFILTRATION; PERINEURAL PRN
Status: DISCONTINUED | OUTPATIENT
Start: 2021-10-06 | End: 2021-10-06

## 2021-10-06 RX ORDER — SODIUM CHLORIDE, SODIUM LACTATE, POTASSIUM CHLORIDE, CALCIUM CHLORIDE 600; 310; 30; 20 MG/100ML; MG/100ML; MG/100ML; MG/100ML
INJECTION, SOLUTION INTRAVENOUS CONTINUOUS
Status: DISCONTINUED | OUTPATIENT
Start: 2021-10-06 | End: 2021-10-06 | Stop reason: HOSPADM

## 2021-10-06 RX ORDER — ONDANSETRON 2 MG/ML
4 INJECTION INTRAMUSCULAR; INTRAVENOUS EVERY 6 HOURS PRN
Status: DISCONTINUED | OUTPATIENT
Start: 2021-10-06 | End: 2021-10-09 | Stop reason: HOSPADM

## 2021-10-06 RX ORDER — AMOXICILLIN 250 MG
1 CAPSULE ORAL 2 TIMES DAILY
Status: DISCONTINUED | OUTPATIENT
Start: 2021-10-06 | End: 2021-10-09 | Stop reason: HOSPADM

## 2021-10-06 RX ORDER — SODIUM CHLORIDE 9 MG/ML
INJECTION, SOLUTION INTRAVENOUS CONTINUOUS
Status: DISCONTINUED | OUTPATIENT
Start: 2021-10-06 | End: 2021-10-07

## 2021-10-06 RX ORDER — HYDROMORPHONE HYDROCHLORIDE 1 MG/ML
0.2 INJECTION, SOLUTION INTRAMUSCULAR; INTRAVENOUS; SUBCUTANEOUS
Status: DISCONTINUED | OUTPATIENT
Start: 2021-10-06 | End: 2021-10-09 | Stop reason: HOSPADM

## 2021-10-06 RX ORDER — LIDOCAINE 40 MG/G
CREAM TOPICAL
Status: DISCONTINUED | OUTPATIENT
Start: 2021-10-06 | End: 2021-10-09 | Stop reason: HOSPADM

## 2021-10-06 RX ORDER — LIDOCAINE 40 MG/G
CREAM TOPICAL
Status: DISCONTINUED | OUTPATIENT
Start: 2021-10-06 | End: 2021-10-06 | Stop reason: HOSPADM

## 2021-10-06 RX ORDER — POLYETHYLENE GLYCOL 3350 17 G/17G
17 POWDER, FOR SOLUTION ORAL DAILY
Status: DISCONTINUED | OUTPATIENT
Start: 2021-10-07 | End: 2021-10-09 | Stop reason: HOSPADM

## 2021-10-06 RX ORDER — PIPERACILLIN SODIUM, TAZOBACTAM SODIUM 3; .375 G/15ML; G/15ML
3.38 INJECTION, POWDER, LYOPHILIZED, FOR SOLUTION INTRAVENOUS ONCE
Status: DISCONTINUED | OUTPATIENT
Start: 2021-10-06 | End: 2021-10-06

## 2021-10-06 RX ORDER — OXYCODONE HYDROCHLORIDE 5 MG/1
5 TABLET ORAL EVERY 4 HOURS PRN
Status: DISCONTINUED | OUTPATIENT
Start: 2021-10-06 | End: 2021-10-06 | Stop reason: HOSPADM

## 2021-10-06 RX ORDER — HYDROMORPHONE HYDROCHLORIDE 1 MG/ML
0.4 INJECTION, SOLUTION INTRAMUSCULAR; INTRAVENOUS; SUBCUTANEOUS
Status: DISCONTINUED | OUTPATIENT
Start: 2021-10-06 | End: 2021-10-09 | Stop reason: HOSPADM

## 2021-10-06 RX ORDER — NALOXONE HYDROCHLORIDE 0.4 MG/ML
0.4 INJECTION, SOLUTION INTRAMUSCULAR; INTRAVENOUS; SUBCUTANEOUS
Status: DISCONTINUED | OUTPATIENT
Start: 2021-10-06 | End: 2021-10-06 | Stop reason: HOSPADM

## 2021-10-06 RX ORDER — PIPERACILLIN SODIUM, TAZOBACTAM SODIUM 3; .375 G/15ML; G/15ML
3.38 INJECTION, POWDER, LYOPHILIZED, FOR SOLUTION INTRAVENOUS EVERY 6 HOURS
Status: DISCONTINUED | OUTPATIENT
Start: 2021-10-06 | End: 2021-10-09 | Stop reason: HOSPADM

## 2021-10-06 RX ORDER — ESMOLOL HYDROCHLORIDE 10 MG/ML
INJECTION INTRAVENOUS PRN
Status: DISCONTINUED | OUTPATIENT
Start: 2021-10-06 | End: 2021-10-06

## 2021-10-06 RX ORDER — OXYCODONE HYDROCHLORIDE 5 MG/1
5 TABLET ORAL ONCE
Status: COMPLETED | OUTPATIENT
Start: 2021-10-06 | End: 2021-10-06

## 2021-10-06 RX ORDER — OXYCODONE HYDROCHLORIDE 10 MG/1
10 TABLET ORAL EVERY 4 HOURS PRN
Status: DISCONTINUED | OUTPATIENT
Start: 2021-10-06 | End: 2021-10-09 | Stop reason: HOSPADM

## 2021-10-06 RX ORDER — HYDROMORPHONE HYDROCHLORIDE 1 MG/ML
0.2 INJECTION, SOLUTION INTRAMUSCULAR; INTRAVENOUS; SUBCUTANEOUS EVERY 5 MIN PRN
Status: DISCONTINUED | OUTPATIENT
Start: 2021-10-06 | End: 2021-10-06

## 2021-10-06 RX ORDER — VANCOMYCIN HYDROCHLORIDE 1 G/200ML
1000 INJECTION, SOLUTION INTRAVENOUS EVERY 12 HOURS
Status: DISCONTINUED | OUTPATIENT
Start: 2021-10-06 | End: 2021-10-06

## 2021-10-06 RX ORDER — ONDANSETRON 4 MG/1
4 TABLET, ORALLY DISINTEGRATING ORAL EVERY 6 HOURS PRN
Status: DISCONTINUED | OUTPATIENT
Start: 2021-10-06 | End: 2021-10-09 | Stop reason: HOSPADM

## 2021-10-06 RX ORDER — OXYCODONE HYDROCHLORIDE 5 MG/1
5 TABLET ORAL EVERY 4 HOURS PRN
Status: DISCONTINUED | OUTPATIENT
Start: 2021-10-06 | End: 2021-10-09 | Stop reason: HOSPADM

## 2021-10-06 RX ORDER — ONDANSETRON 4 MG/1
4 TABLET, ORALLY DISINTEGRATING ORAL EVERY 30 MIN PRN
Status: DISCONTINUED | OUTPATIENT
Start: 2021-10-06 | End: 2021-10-06

## 2021-10-06 RX ORDER — DOCUSATE SODIUM 100 MG/1
100 CAPSULE, LIQUID FILLED ORAL 2 TIMES DAILY
Status: DISCONTINUED | OUTPATIENT
Start: 2021-10-06 | End: 2021-10-06

## 2021-10-06 RX ORDER — ONDANSETRON 2 MG/ML
4 INJECTION INTRAMUSCULAR; INTRAVENOUS EVERY 30 MIN PRN
Status: DISCONTINUED | OUTPATIENT
Start: 2021-10-06 | End: 2021-10-06

## 2021-10-06 RX ORDER — CEFAZOLIN SODIUM 1 G/50ML
2000 SOLUTION INTRAVENOUS ONCE
Status: DISCONTINUED | OUTPATIENT
Start: 2021-10-06 | End: 2021-10-06

## 2021-10-06 RX ORDER — EPHEDRINE SULFATE 50 MG/ML
INJECTION, SOLUTION INTRAMUSCULAR; INTRAVENOUS; SUBCUTANEOUS PRN
Status: DISCONTINUED | OUTPATIENT
Start: 2021-10-06 | End: 2021-10-06

## 2021-10-06 RX ORDER — NALOXONE HYDROCHLORIDE 0.4 MG/ML
0.4 INJECTION, SOLUTION INTRAMUSCULAR; INTRAVENOUS; SUBCUTANEOUS
Status: DISCONTINUED | OUTPATIENT
Start: 2021-10-06 | End: 2021-10-09 | Stop reason: HOSPADM

## 2021-10-06 RX ORDER — PROCHLORPERAZINE MALEATE 10 MG
10 TABLET ORAL EVERY 6 HOURS PRN
Status: DISCONTINUED | OUTPATIENT
Start: 2021-10-06 | End: 2021-10-06

## 2021-10-06 RX ORDER — SODIUM CHLORIDE, SODIUM LACTATE, POTASSIUM CHLORIDE, CALCIUM CHLORIDE 600; 310; 30; 20 MG/100ML; MG/100ML; MG/100ML; MG/100ML
INJECTION, SOLUTION INTRAVENOUS CONTINUOUS PRN
Status: DISCONTINUED | OUTPATIENT
Start: 2021-10-06 | End: 2021-10-06

## 2021-10-06 RX ORDER — ONDANSETRON 2 MG/ML
INJECTION INTRAMUSCULAR; INTRAVENOUS PRN
Status: DISCONTINUED | OUTPATIENT
Start: 2021-10-06 | End: 2021-10-06

## 2021-10-06 RX ORDER — IOPAMIDOL 755 MG/ML
100 INJECTION, SOLUTION INTRAVASCULAR ONCE
Status: COMPLETED | OUTPATIENT
Start: 2021-10-06 | End: 2021-10-06

## 2021-10-06 RX ORDER — NALOXONE HYDROCHLORIDE 0.4 MG/ML
0.2 INJECTION, SOLUTION INTRAMUSCULAR; INTRAVENOUS; SUBCUTANEOUS
Status: DISCONTINUED | OUTPATIENT
Start: 2021-10-06 | End: 2021-10-09 | Stop reason: HOSPADM

## 2021-10-06 RX ORDER — FENTANYL CITRATE 50 UG/ML
25 INJECTION, SOLUTION INTRAMUSCULAR; INTRAVENOUS EVERY 5 MIN PRN
Status: DISCONTINUED | OUTPATIENT
Start: 2021-10-06 | End: 2021-10-06

## 2021-10-06 RX ORDER — SODIUM CHLORIDE, SODIUM LACTATE, POTASSIUM CHLORIDE, CALCIUM CHLORIDE 600; 310; 30; 20 MG/100ML; MG/100ML; MG/100ML; MG/100ML
INJECTION, SOLUTION INTRAVENOUS CONTINUOUS
Status: DISCONTINUED | OUTPATIENT
Start: 2021-10-06 | End: 2021-10-07

## 2021-10-06 RX ORDER — BISACODYL 10 MG
10 SUPPOSITORY, RECTAL RECTAL DAILY PRN
Status: DISCONTINUED | OUTPATIENT
Start: 2021-10-06 | End: 2021-10-09 | Stop reason: HOSPADM

## 2021-10-06 RX ORDER — FENTANYL CITRATE 50 UG/ML
50 INJECTION, SOLUTION INTRAMUSCULAR; INTRAVENOUS
Status: COMPLETED | OUTPATIENT
Start: 2021-10-06 | End: 2021-10-06

## 2021-10-06 RX ORDER — ACETAMINOPHEN 325 MG/1
650 TABLET ORAL EVERY 6 HOURS
Status: DISCONTINUED | OUTPATIENT
Start: 2021-10-06 | End: 2021-10-09 | Stop reason: HOSPADM

## 2021-10-06 RX ORDER — PROPOFOL 10 MG/ML
INJECTION, EMULSION INTRAVENOUS PRN
Status: DISCONTINUED | OUTPATIENT
Start: 2021-10-06 | End: 2021-10-06

## 2021-10-06 RX ORDER — FENTANYL CITRATE 50 UG/ML
INJECTION, SOLUTION INTRAMUSCULAR; INTRAVENOUS PRN
Status: DISCONTINUED | OUTPATIENT
Start: 2021-10-06 | End: 2021-10-06

## 2021-10-06 RX ADMIN — DOCUSATE SODIUM AND SENNOSIDES 1 TABLET: 8.6; 5 TABLET ORAL at 20:24

## 2021-10-06 RX ADMIN — LIDOCAINE HYDROCHLORIDE 100 MG: 20 INJECTION, SOLUTION INFILTRATION; PERINEURAL at 12:03

## 2021-10-06 RX ADMIN — HYDROMORPHONE HYDROCHLORIDE 0.2 MG: 1 INJECTION, SOLUTION INTRAMUSCULAR; INTRAVENOUS; SUBCUTANEOUS at 14:29

## 2021-10-06 RX ADMIN — PHENYLEPHRINE HYDROCHLORIDE 100 MCG: 10 INJECTION INTRAVENOUS at 12:15

## 2021-10-06 RX ADMIN — FENTANYL CITRATE 25 MCG: 50 INJECTION, SOLUTION INTRAMUSCULAR; INTRAVENOUS at 13:36

## 2021-10-06 RX ADMIN — PHENYLEPHRINE HYDROCHLORIDE 100 MCG: 10 INJECTION INTRAVENOUS at 12:18

## 2021-10-06 RX ADMIN — SODIUM CHLORIDE: 9 INJECTION, SOLUTION INTRAVENOUS at 18:47

## 2021-10-06 RX ADMIN — Medication 5 MG: at 12:09

## 2021-10-06 RX ADMIN — FENTANYL CITRATE 25 MCG: 50 INJECTION, SOLUTION INTRAMUSCULAR; INTRAVENOUS at 13:48

## 2021-10-06 RX ADMIN — SODIUM CHLORIDE, POTASSIUM CHLORIDE, SODIUM LACTATE AND CALCIUM CHLORIDE: 600; 310; 30; 20 INJECTION, SOLUTION INTRAVENOUS at 11:50

## 2021-10-06 RX ADMIN — PIPERACILLIN AND TAZOBACTAM 3.38 G: 3; .375 INJECTION, POWDER, FOR SOLUTION INTRAVENOUS at 23:53

## 2021-10-06 RX ADMIN — PHENYLEPHRINE HYDROCHLORIDE 100 MCG: 10 INJECTION INTRAVENOUS at 12:45

## 2021-10-06 RX ADMIN — PROPOFOL 120 MG: 10 INJECTION, EMULSION INTRAVENOUS at 12:03

## 2021-10-06 RX ADMIN — PIPERACILLIN AND TAZOBACTAM 3.38 G: 3; .375 INJECTION, POWDER, FOR SOLUTION INTRAVENOUS at 19:01

## 2021-10-06 RX ADMIN — Medication 5 MG: at 12:15

## 2021-10-06 RX ADMIN — FENTANYL CITRATE 25 MCG: 50 INJECTION, SOLUTION INTRAMUSCULAR; INTRAVENOUS at 13:04

## 2021-10-06 RX ADMIN — IOPAMIDOL 103 ML: 755 INJECTION, SOLUTION INTRAVENOUS at 07:58

## 2021-10-06 RX ADMIN — SODIUM CHLORIDE 66 ML: 9 INJECTION, SOLUTION INTRAVENOUS at 08:02

## 2021-10-06 RX ADMIN — Medication 5 MG: at 12:18

## 2021-10-06 RX ADMIN — OXYCODONE HYDROCHLORIDE 5 MG: 5 TABLET ORAL at 15:04

## 2021-10-06 RX ADMIN — PHENYLEPHRINE HYDROCHLORIDE 100 MCG: 10 INJECTION INTRAVENOUS at 12:09

## 2021-10-06 RX ADMIN — Medication 5 MG: at 12:45

## 2021-10-06 RX ADMIN — OXYCODONE HYDROCHLORIDE 5 MG: 5 TABLET ORAL at 23:54

## 2021-10-06 RX ADMIN — FENTANYL CITRATE 50 MCG: 50 INJECTION, SOLUTION INTRAMUSCULAR; INTRAVENOUS at 11:44

## 2021-10-06 RX ADMIN — Medication 5 MG: at 12:33

## 2021-10-06 RX ADMIN — OXYCODONE HYDROCHLORIDE 10 MG: 10 TABLET ORAL at 18:46

## 2021-10-06 RX ADMIN — PIPERACILLIN AND TAZOBACTAM 3.38 G: 3; .375 INJECTION, POWDER, FOR SOLUTION INTRAVENOUS at 12:07

## 2021-10-06 RX ADMIN — FENTANYL CITRATE 100 MCG: 50 INJECTION, SOLUTION INTRAMUSCULAR; INTRAVENOUS at 12:03

## 2021-10-06 RX ADMIN — VANCOMYCIN HYDROCHLORIDE 1750 MG: 1 INJECTION, POWDER, LYOPHILIZED, FOR SOLUTION INTRAVENOUS at 08:06

## 2021-10-06 RX ADMIN — PHENYLEPHRINE HYDROCHLORIDE 100 MCG: 10 INJECTION INTRAVENOUS at 12:33

## 2021-10-06 RX ADMIN — VANCOMYCIN HYDROCHLORIDE 1000 MG: 1 INJECTION, SOLUTION INTRAVENOUS at 20:25

## 2021-10-06 RX ADMIN — ESMOLOL HYDROCHLORIDE 50 MG: 10 INJECTION, SOLUTION INTRAVENOUS at 12:03

## 2021-10-06 RX ADMIN — ONDANSETRON 4 MG: 2 INJECTION INTRAMUSCULAR; INTRAVENOUS at 13:06

## 2021-10-06 RX ADMIN — OXYCODONE HYDROCHLORIDE 5 MG: 5 TABLET ORAL at 07:25

## 2021-10-06 RX ADMIN — ROCURONIUM BROMIDE 50 MG: 50 INJECTION, SOLUTION INTRAVENOUS at 12:03

## 2021-10-06 RX ADMIN — PHENYLEPHRINE HYDROCHLORIDE 0.2 MCG/KG/MIN: 10 INJECTION INTRAVENOUS at 12:31

## 2021-10-06 RX ADMIN — SUGAMMADEX 200 MG: 100 INJECTION, SOLUTION INTRAVENOUS at 13:06

## 2021-10-06 RX ADMIN — ACETAMINOPHEN 650 MG: 325 TABLET, FILM COATED ORAL at 23:54

## 2021-10-06 ASSESSMENT — ACTIVITIES OF DAILY LIVING (ADL)
DRESSING/BATHING_DIFFICULTY: NO
WEAR_GLASSES_OR_BLIND: NO
DIFFICULTY_EATING/SWALLOWING: NO
WALKING_OR_CLIMBING_STAIRS: AMBULATION DIFFICULTY, REQUIRES EQUIPMENT;STAIR CLIMBING DIFFICULTY, REQUIRES EQUIPMENT;TRANSFERRING DIFFICULTY, REQUIRES EQUIPMENT
CONCENTRATING,_REMEMBERING_OR_MAKING_DECISIONS_DIFFICULTY: NO
DOING_ERRANDS_INDEPENDENTLY_DIFFICULTY: NO
WALKING_OR_CLIMBING_STAIRS_DIFFICULTY: YES
EQUIPMENT_CURRENTLY_USED_AT_HOME: WHEELCHAIR, POWER
TOILETING_ISSUES: NO
HEARING_DIFFICULTY_OR_DEAF: NO
FALL_HISTORY_WITHIN_LAST_SIX_MONTHS: NO
PATIENT_/_FAMILY_COMMUNICATION_STYLE: SPOKEN LANGUAGE (ENGLISH OR BILINGUAL)
DIFFICULTY_COMMUNICATING: NO

## 2021-10-06 NOTE — ED NOTES
Sign out Provider: Shravan      Sign out Plan: Patient who presented with concern about possible complications from penile implant surgery.  Was evaluated, and is being treated for infection.  Further evaluation including CT and urology consult pending.      Reassessment: Patient seen by urology.  CT confirming the presence of fluid and gas tracking along the prosthesis catheter.  There is evidence of some edema and phlegmon which is somewhat more chronic extending towards the right hip.  Urology will take to the operating room this morning.  Patient appears hemodynamically stable.      Disposition: to OR for definitive management.       Jeison Markham MD  10/06/21 [id number]

## 2021-10-06 NOTE — ED TRIAGE NOTES
Arrived via EMS for w/ complaint of penis/scrotum pain. Pt reports he had penile implant procedure on 9/20/21 and has had swelling and pain since, pt reports small amount of bleeding from skin around penis and scrotum. Upon arrival penis appears significantly swollen, skin breakdown noted to penis and scrotum. Pt denies fever/chills/N/V/D.

## 2021-10-06 NOTE — ED NOTES
Pt states that he last ate and drank at 10-11 pm last night. He also states that he had MRSA 15 years ago and has not had it again since that time

## 2021-10-06 NOTE — PHARMACY-VANCOMYCIN DOSING SERVICE
"Pharmacy Vancomycin Initial Note  Date of Service 2021  Patient's  1965  55 year old, male    Indication: Skin and Soft Tissue Infection    Current estimated CrCl = Estimated Creatinine Clearance: 107.4 mL/min (based on SCr of 1.06 mg/dL).    Creatinine for last 3 days  10/6/2021:  6:36 AM Creatinine 1.06 mg/dL    Recent Vancomycin Level(s) for last 3 days  No results found for requested labs within last 72 hours.      Vancomycin IV Administrations (past 72 hours)                   vancomycin (VANCOCIN) 1,750 mg in sodium chloride 0.9 % 500 mL intermittent infusion (mg) 1,750 mg Given 10/06/21 0806                Nephrotoxins and other renal medications (From now, onward)    Start     Dose/Rate Route Frequency Ordered Stop    10/06/21 0800  vancomycin (VANCOCIN) 1,750 mg in sodium chloride 0.9 % 500 mL intermittent infusion      1,750 mg  over 2 Hours Intravenous ONCE 10/06/21 0722      10/06/21 0800  piperacillin-tazobactam (ZOSYN) 3.375 g vial to attach to  mL bag     Note to Pharmacy: For [facility], [facility] and [facility]: For Zosyn-naive patients, use the \"Zosyn initial dose + extended infusion\" order panel.    3.375 g  over 30 Minutes Intravenous ONCE 10/06/21 0759            Contrast Orders - past 72 hours (72h ago, onward)    Start     Dose/Rate Route Frequency Ordered Stop    10/06/21 0735  iopamidol (ISOVUE-370) solution 100 mL      100 mL Intravenous ONCE 10/06/21 0735 10/06/21 0758          Loading dose: 1750 mg at 08:00 10/06/2021.  Regimen: 1000 mg IV every 12 hours.  Start time: 08:13 on 10/06/2021  Exposure target: AUC24 (range)400-600 mg/L.hr   AUC24,ss: 481 mg/L.hr  Probability of AUC24 > 400: 69 %  Ctrough,ss: 15.6 mg/L  Probability of Ctrough,ss > 20: 29 %  Probability of nephrotoxicity (Lodise SERVANDO ): 11 %          Plan:  1. vancomycin  1750 mg IV x 1 was given and will start vancomycin 1000 mg IV q12h.   2. Vancomycin monitoring method: AUC  3. Vancomycin therapeutic monitoring goal: " 400-600 mg*h/L  4. Pharmacy will check vancomycin levels as appropriate in 1-3 Days.    5. Serum creatinine levels will be ordered a minimum of twice weekly.      Aurelio Delgadillo RPH
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
## 2021-10-06 NOTE — OP NOTE
PREOPERATIVE DIAGNOSIS:  Infected inflatable penile prosthesis      POSTOPERATIVE DIAGNOSIS:  Same.      PROCEDURE:  Removal of all components of a multi-component, inflatable penile prosthesis     Surgeon: Charles Lira MD  Fellow: Solange Muse MD  Resident: Amaris Cortés MD  EBL: 10 mL  Anesthesia: General      INDICATIONS:  Josiah Crump is a 55 year old male with erectile dysfunction and neurogenic bladder 2/2 spinal cord injury. He has managed his ED with an IPP for many years and has required removal and replacement x2 for infection. He recently underwent IPP replacement on 9/20/2021 as his previous device was not working. He presented to the ER today with purulent drainage from his scrotum, pain, and swelling consistent with device infection. The risks, benefits, and alternatives were explained to the patient and he was agreeable to the above procedure. He understands that future IPP replacement may or may not be possible.    DESCRIPTION OF PROCEDURE:  After informed consent was obtained and preoperative antibiotics were given, the patient was taken to the operating room, placed supine on the operating table.  General anesthesia was induced and an airway was secured.  He was placed in the frog-legged position.  We prepped the abdomen and the 16 Fr suprapubic tube was replaced with another 16 Fr catheter with 10 mL in the balloon. This was hooked up to a drainage bag. We then re-prepped the penis and scrotum and lower abdomen in the usual sterile fashion.  Suprapubic tube was draped out of the field.    On exam, copious purulent drainage was expressed from the scrotal incision. A sample of this fluid was sent for culture. A finger was inserted into the scrotum and we were able to remove the pump with blunt finger dissection through the wound. Next, we used a scalpel to open the counter incision in the left groin. Blunt dissection was used to find the tubing and track this to the reservoir. The  tubing was cut and the reservior emptied. We were then able to slide the reservoir from it's pocket. It did appear purulent material had tracked into the groin space up the tubing.  Once the reservoir was removing we proceeded with removal of the cylinders. Each PDS holding our coroporotomies was cut and the cylinders were exposed. We ensured these were emptied prior to removal. Using a tonsil, the cylinders were grasped. The rear tip extenders were removed from the patient. At this point the entire device had been removed. The scrotum, counter-incision, and corporal bodies were copiously irrigated with antibiotic solution. We inserted a 14 Fr red rubber catheter into each corporal body and irrigated through this. Once irrigation was complete, we proceeded with drain placement. Two PARESH drains were placed, one in the space of the reservoir and one in the scrotum. These exited the skin superiorly to our counter incision (medial = scrotum, lateral = reservoir space). The drains were secured to the skin with a nylon drain suture.     The two incisions were closed loosely with interrupted 3-0 monocryl suture. Fluffs and mesh panties were applied.   The patient was awakened from anesthesia, transferred to [facility] and then to the postanesthesia care unit in stable condition.  There were no complications.      Staff was present for the entire procedure.    DISPO:    - PACU then floor  - Vanc/Zosyn until cultures result  - Continue PARESH drains  - OK to restart Plavix on 10/7    Amaris Cortés MD  PGY-4 Urology  Pager [phone]    As attending surgeon, I, Charles Lira MD, was present for the entire procedure.
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
## 2021-10-06 NOTE — ANESTHESIA POSTPROCEDURE EVALUATION
Patient: Josiah Crump    Procedure: Procedure(s):  Removal  of penile prosthesis       Diagnosis:Infection and inflammatory reaction due to implanted penile prosthesis, initial encounter (H) [T83.61XA]  Diagnosis Additional Information: No value filed.    Anesthesia Type:  General    Note:  Disposition: Inpatient   Postop Pain Control: Uneventful            Sign Out: Well controlled pain   PONV: No   Neuro/Psych: Uneventful            Sign Out: Acceptable/Baseline neuro status   Airway/Respiratory: Uneventful            Sign Out: Acceptable/Baseline resp. status   CV/Hemodynamics: Uneventful            Sign Out: Acceptable CV status; No obvious hypovolemia; No obvious fluid overload   Other NRE: NONE   DID A NON-ROUTINE EVENT OCCUR? No    Event details/Postop Comments:  - Uneventful, ready to transfer to floor           Last vitals:  Vitals Value Taken Time   /61 10/06/21 1430   Temp 36.6  C (97.9  F) 10/06/21 1415   Pulse 100 10/06/21 1434   Resp 7 10/06/21 1434   SpO2 87 % 10/06/21 1434   Vitals shown include unvalidated device data.    Electronically Signed By: Juan Cortes MD  October 6, 2021  2:35 PM

## 2021-10-06 NOTE — ED NOTES
Bed: ED03  Expected date: 10/6/21  Expected time: 5:29 AM  Means of arrival:   Comments:  [facility]  Penile implant issue

## 2021-10-06 NOTE — ANESTHESIA PROCEDURE NOTES
Airway       Patient location during procedure: OR       Procedure Start/Stop Times: 10/6/2021 12:06 PM  Staff -        Anesthesiologist:  Asya Hurd MD       Resident/Fellow: Gume Man MD       Performed By: resident  Consent for Airway        Urgency: elective  Indications and Patient Condition       Indications for airway management: chasidy-procedural       Induction type:intravenous       Mask difficulty assessment: 2 - vent by mask + OA or adjuvant +/- NMBA    Final Airway Details       Final airway type: endotracheal airway       Successful airway: ETT - single and Oral  Endotracheal Airway Details        ETT size (mm): 8.0       Cuffed: yes       Successful intubation technique: video laryngoscopy       VL Blade Size: MAC 4       Grade View of Cords: 1       Adjucts: stylet       Measured from: lips       Secured at (cm): 24       Bite block used: None    Post intubation assessment        Placement verified by: capnometry, equal breath sounds and chest rise        Number of attempts at approach: 1       Number of other approaches attempted: 0       Secured with: pink tape       Ease of procedure: easy       Dentition: Intact and Unchanged
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
## 2021-10-06 NOTE — ANESTHESIA CARE TRANSFER NOTE
Patient: Josiah Crump    Procedure: Procedure(s):  Removal  of penile prosthesis       Diagnosis: Infection and inflammatory reaction due to implanted penile prosthesis, initial encounter (H) [T83.61XA]  Diagnosis Additional Information: No value filed.    Anesthesia Type:   General     Note:    Oropharynx: spontaneously breathing  Level of Consciousness: awake and drowsy  Oxygen Supplementation: face mask  Level of Supplemental Oxygen (L/min / FiO2): 8  Independent Airway: airway patency satisfactory and stable  Dentition: dentition unchanged  Vital Signs Stable: post-procedure vital signs reviewed and stable  Report to RN Given: handoff report given  Patient transferred to: PACU    Handoff Report: Identifed the Patient, Identified the Reponsible Provider, Reviewed the pertinent medical history, Discussed the surgical course, Reviewed Intra-OP anesthesia mangement and issues during anesthesia, Set expectations for post-procedure period and Allowed opportunity for questions and acknowledgement of understanding      Vitals:  Vitals Value Taken Time   /69 10/06/21 1330   Temp     Pulse 100 10/06/21 1337   Resp 16 10/06/21 1337   SpO2 100 % 10/06/21 1337   Vitals shown include unvalidated device data.    Electronically Signed By: Gume Man MD  October 6, 2021  1:38 PM

## 2021-10-07 LAB
ANION GAP SERPL CALCULATED.3IONS-SCNC: 2 MMOL/L (ref 3–14)
BUN SERPL-MCNC: 12 MG/DL (ref 7–30)
CALCIUM SERPL-MCNC: 7.8 MG/DL (ref 8.5–10.1)
CHLORIDE BLD-SCNC: 107 MMOL/L (ref 94–109)
CO2 SERPL-SCNC: 28 MMOL/L (ref 20–32)
CREAT SERPL-MCNC: 1.13 MG/DL (ref 0.66–1.25)
ERYTHROCYTE [DISTWIDTH] IN BLOOD BY AUTOMATED COUNT: 19.8 % (ref 10–15)
GFR SERPL CREATININE-BSD FRML MDRD: 73 ML/MIN/1.73M2
GLUCOSE BLD-MCNC: 108 MG/DL (ref 70–99)
HCT VFR BLD AUTO: 33.2 % (ref 40–53)
HGB BLD-MCNC: 9.8 G/DL (ref 13.3–17.7)
MCH RBC QN AUTO: 21.8 PG (ref 26.5–33)
MCHC RBC AUTO-ENTMCNC: 29.5 G/DL (ref 31.5–36.5)
MCV RBC AUTO: 74 FL (ref 78–100)
PLATELET # BLD AUTO: 264 10E3/UL (ref 150–450)
POTASSIUM BLD-SCNC: 3.8 MMOL/L (ref 3.4–5.3)
RBC # BLD AUTO: 4.5 10E6/UL (ref 4.4–5.9)
SODIUM SERPL-SCNC: 137 MMOL/L (ref 133–144)
WBC # BLD AUTO: 12.3 10E3/UL (ref 4–11)

## 2021-10-07 PROCEDURE — 250N000011 HC RX IP 250 OP 636: Performed by: STUDENT IN AN ORGANIZED HEALTH CARE EDUCATION/TRAINING PROGRAM

## 2021-10-07 PROCEDURE — 250N000013 HC RX MED GY IP 250 OP 250 PS 637: Performed by: STUDENT IN AN ORGANIZED HEALTH CARE EDUCATION/TRAINING PROGRAM

## 2021-10-07 PROCEDURE — 80048 BASIC METABOLIC PNL TOTAL CA: CPT | Performed by: STUDENT IN AN ORGANIZED HEALTH CARE EDUCATION/TRAINING PROGRAM

## 2021-10-07 PROCEDURE — G0463 HOSPITAL OUTPT CLINIC VISIT: HCPCS | Mod: 25

## 2021-10-07 PROCEDURE — 120N000002 HC R&B MED SURG/OB UMMC

## 2021-10-07 PROCEDURE — G0463 HOSPITAL OUTPT CLINIC VISIT: HCPCS

## 2021-10-07 PROCEDURE — 250N000011 HC RX IP 250 OP 636: Performed by: UROLOGY

## 2021-10-07 PROCEDURE — 36415 COLL VENOUS BLD VENIPUNCTURE: CPT | Performed by: STUDENT IN AN ORGANIZED HEALTH CARE EDUCATION/TRAINING PROGRAM

## 2021-10-07 PROCEDURE — 97602 WOUND(S) CARE NON-SELECTIVE: CPT

## 2021-10-07 PROCEDURE — 85027 COMPLETE CBC AUTOMATED: CPT | Performed by: STUDENT IN AN ORGANIZED HEALTH CARE EDUCATION/TRAINING PROGRAM

## 2021-10-07 PROCEDURE — 250N000013 HC RX MED GY IP 250 OP 250 PS 637: Performed by: PEDIATRICS

## 2021-10-07 RX ORDER — ANASTROZOLE 1 MG/1
1 TABLET ORAL DAILY
Status: DISCONTINUED | OUTPATIENT
Start: 2021-10-07 | End: 2021-10-07

## 2021-10-07 RX ORDER — BACLOFEN 20 MG/1
20 TABLET ORAL 3 TIMES DAILY PRN
Status: DISCONTINUED | OUTPATIENT
Start: 2021-10-07 | End: 2021-10-09 | Stop reason: HOSPADM

## 2021-10-07 RX ORDER — CLOPIDOGREL BISULFATE 75 MG/1
75 TABLET ORAL DAILY
Status: DISCONTINUED | OUTPATIENT
Start: 2021-10-08 | End: 2021-10-09 | Stop reason: HOSPADM

## 2021-10-07 RX ORDER — ASPIRIN 81 MG/1
81 TABLET, CHEWABLE ORAL DAILY
Status: DISCONTINUED | OUTPATIENT
Start: 2021-10-07 | End: 2021-10-09 | Stop reason: HOSPADM

## 2021-10-07 RX ORDER — SIMVASTATIN 10 MG
20 TABLET ORAL AT BEDTIME
Status: DISCONTINUED | OUTPATIENT
Start: 2021-10-07 | End: 2021-10-09 | Stop reason: HOSPADM

## 2021-10-07 RX ORDER — AMLODIPINE BESYLATE 5 MG/1
5 TABLET ORAL DAILY
Status: DISCONTINUED | OUTPATIENT
Start: 2021-10-07 | End: 2021-10-09 | Stop reason: HOSPADM

## 2021-10-07 RX ORDER — TRAZODONE HYDROCHLORIDE 50 MG/1
50 TABLET, FILM COATED ORAL
Status: DISCONTINUED | OUTPATIENT
Start: 2021-10-07 | End: 2021-10-09 | Stop reason: HOSPADM

## 2021-10-07 RX ORDER — CLOPIDOGREL BISULFATE 75 MG/1
75 TABLET ORAL DAILY
Status: DISCONTINUED | OUTPATIENT
Start: 2021-10-07 | End: 2021-10-07

## 2021-10-07 RX ADMIN — PIPERACILLIN AND TAZOBACTAM 3.38 G: 3; .375 INJECTION, POWDER, FOR SOLUTION INTRAVENOUS at 06:52

## 2021-10-07 RX ADMIN — PIPERACILLIN AND TAZOBACTAM 3.38 G: 3; .375 INJECTION, POWDER, FOR SOLUTION INTRAVENOUS at 22:15

## 2021-10-07 RX ADMIN — TRAZODONE HYDROCHLORIDE 50 MG: 50 TABLET ORAL at 03:50

## 2021-10-07 RX ADMIN — VANCOMYCIN HYDROCHLORIDE 1000 MG: 1 INJECTION, SOLUTION INTRAVENOUS at 08:53

## 2021-10-07 RX ADMIN — CLOPIDOGREL BISULFATE 75 MG: 75 TABLET, FILM COATED ORAL at 08:52

## 2021-10-07 RX ADMIN — SIMVASTATIN 20 MG: 10 TABLET, FILM COATED ORAL at 22:05

## 2021-10-07 RX ADMIN — ACETAMINOPHEN 650 MG: 325 TABLET, FILM COATED ORAL at 13:54

## 2021-10-07 RX ADMIN — OXYCODONE HYDROCHLORIDE 10 MG: 10 TABLET ORAL at 22:16

## 2021-10-07 RX ADMIN — OXYCODONE HYDROCHLORIDE 10 MG: 10 TABLET ORAL at 06:51

## 2021-10-07 RX ADMIN — OXYCODONE HYDROCHLORIDE 5 MG: 5 TABLET ORAL at 00:02

## 2021-10-07 RX ADMIN — OXYCODONE HYDROCHLORIDE 10 MG: 10 TABLET ORAL at 18:02

## 2021-10-07 RX ADMIN — OXYCODONE HYDROCHLORIDE 10 MG: 10 TABLET ORAL at 13:54

## 2021-10-07 RX ADMIN — DOCUSATE SODIUM AND SENNOSIDES 1 TABLET: 8.6; 5 TABLET ORAL at 08:51

## 2021-10-07 RX ADMIN — Medication 75 MG: at 22:16

## 2021-10-07 RX ADMIN — ACETAMINOPHEN 650 MG: 325 TABLET, FILM COATED ORAL at 06:51

## 2021-10-07 RX ADMIN — VANCOMYCIN HYDROCHLORIDE 1000 MG: 1 INJECTION, SOLUTION INTRAVENOUS at 22:55

## 2021-10-07 RX ADMIN — PIPERACILLIN AND TAZOBACTAM 3.38 G: 3; .375 INJECTION, POWDER, FOR SOLUTION INTRAVENOUS at 11:31

## 2021-10-07 RX ADMIN — ASPIRIN 81 MG: 81 TABLET, CHEWABLE ORAL at 08:51

## 2021-10-07 RX ADMIN — AMLODIPINE BESYLATE 5 MG: 5 TABLET ORAL at 11:30

## 2021-10-07 NOTE — PLAN OF CARE
VS: /63 (BP Location: Left arm)   Pulse 75   Temp 97.7  F (36.5  C) (Oral)   Resp 16   Wt 89.2 kg (196 lb 10.4 oz)   SpO2 98%   BMI 23.95 kg/m       Output: Suprapubic catheter -- adequate output, LBM 10/6/21. PARESH #1 with 5mL output, PARESH #2 with 20mL output.   Lungs: Lung sounds clear, room air, denies SOB, no cough.   Activity: Bedrest, able to adjust position in bed independently.   Skin: Surgical incisions on scrotum and left abdomen, right IT pressure injury.   Pain:   Pain in scrotum managed with oxycodone q4hrs prn.   Neuro/CMS:   A&Ox4, numbness and absence of movement in LLE.   Dressing(s):   Gauze and mesh dressing over penis/scrotum and drain insertion sites, mepilex over right IT pressure injury.   Diet:   Regular diet, tolerating well, good appetite.   LDA:   Suprapubic catheter, JPx2, left hand PIV with intermittent abx.   Equipment:   IV pole, pulse ox.   Plan:   Continue to manage pain, continue abx, continue POC.   Additional Info:   Call light within reach, pt able to make needs known.

## 2021-10-07 NOTE — CONSULTS
WOC Consult    S: WOC Consult to evaluate abdominal and buttocks wounds.     B: Per Dr Amaris Cortés on 10/7/2021: 55 year old y/o male POD#1 s/p IPP explant and washout for infection. Doing well.     A: Approached patient to assess areas of concern. Patient denies having any wounds. He showed me his abdomen with healed, pink scar tissue. Patient refused to turn to have WOC assess posterior skin.     P: Please document any open skin to the buttocks, sacrum or coccyx when patient is turned. Cover with Mepilex if open and reconsult WOC RN.    WOC will sign off today.     Anastasia Cruz RN, CWOCN    10/7/2021 1420: Patient agreed to turn for bedside RN and she noted a wound on the buttock. See full note for WOC assessment.

## 2021-10-07 NOTE — PLAN OF CARE
VS: /64 (BP Location: Left arm)   Pulse 84   Temp 96.8  F (36  C) (Oral)   Resp 16   Wt 89.2 kg (196 lb 10.4 oz)   SpO2 93%   BMI 23.95 kg/m     O2: 93% on RA, denies SOB or respiratory distress   Output: Urostomy in place and patent, draining clear, yellow urine    Last BM: 10/6   Activity: Bedrest, moves independently in bed    Up for meals? no   Skin: Abdominal and scrotum incision, right IT pressure injury    Pain: Oxycodone PRN given for scrotal pain with relief    CMS: Alert & oriented, paraplegia    Dressing: Drsg change to abdomen and scrotum    Diet: Regular, appetite 100%    LDA: Right arm PIV, SL   Equipment: Personal belongings    Plan: Will continue plan of  care    Additional Info:

## 2021-10-07 NOTE — CONSULTS
[facility] Nurse Inpatient Pressure Injury Assessment   Reason for consultation: Evaluate and treat buttock wound      ASSESSMENT  Pressure Injury: on right IT, present on admission    Pressure Injury is Stage 2   Contributing factor of the pressure injury: pressure and moisture  Status: initial assessment: patient does follow with Dr Blackmon at the [facility].     TREATMENT PLAN  Right IT wound: Every other day and as needed if soiled with urine and/or stool: Remove dressing and wash with wound cleanser and gauze. Paint chasidy-wound skin with [place] No Sting and allow to dry. Cover wound bed with Aquacel Ag (order #[id number]) cut to the size of the wound. Secure with Mepilex 4x4.   Orders Written  WO Nurse follow-up plan:weekly  Nursing to notify the Provider(s) and re-consult the WO Nurse if wound(s) deteriorates or new skin concern.    Patient History  According to provider note(s):  Per Dr Amaris Cortés on 10/7/2021: 55 year old y/o male POD#1 s/p IPP explant and washout for infection. Doing well.     Objective Data  Containment of urine/stool: Brief and Indwelling catheter    Current Diet/ Nutrition:  Orders Placed This Encounter      Regular Diet Adult      Output:   I/O last 3 completed shifts:  In: 1395 [P.O.:480; I.V.:915]  Out: 2221 [Urine:2125; Drains:96]    Risk Assessment:   Sensory Perception: 3-->slightly limited  Moisture: 4-->rarely moist  Activity: 1-->bedfast  Mobility: 3-->slightly limited  Nutrition: 3-->adequate  Friction and Shear: 2-->potential problem  Troy Score: 16      Labs:   Recent Labs   Lab 10/07/21  0721 10/06/21  0636 10/06/21  0636   ALBUMIN  --   --  2.4*   HGB 9.8*   < > 12.4*   WBC 12.3*   < > 19.8*    < > = values in this interval not displayed.       Physical Exam  Skin inspection: focused buttocks, sacrum, perineum, coccyx  Patient is high risk for pressure injury development secondary to paraplegia    Wound Location:  Right IT    10/7 Right IT    Date of last Photo 10/7/2021  Wound  History: Present on admission  Measurements (length x width x depth, in cm) 2 cm x 2.3 cm  x  0.2 cm   Wound Base:  100 % dermis  Tunneling N/A  Undermining N/A  Palpation of the wound bed: normal   Periwound skin: intact  Color: normal and consistent with surrounding tissue  Temperature: normal   Drainage: none  Description of drainage: none  Odor: none  Pain: absent     Patient has skin changes on his scrotum: stretch marks and striae from his scrotum swelling due to infection. The stretch marks are pale pink but are intact skin.     Patient has skin changes on his buttocks: healed pressure injuries with pink scar tissue, much lighter than the surrounding skin.      Interventions  Current support surface: Standard  Atmos Air mattress- will order a Pulsate mattress  Current off-loading measures: Pillows  Repositioning aid: Pillows  Visual inspection of wound(s) completed   Tube Securement: muhammad with Stat Lock  Wound Care: was done per plan of care.  Supplies: ordered: Aquacel Ag  Educated provided: importance of repositioning and plan of care  Education provided to: patient  and nurse  Discussed importance of:repositioning every 2 hours and off-loading pressure to wound  Discussed plan of care with Patient and Nurse    Anastasia Cruz RN, CWOCN

## 2021-10-07 NOTE — PLAN OF CARE
VS: /69   Pulse 85   Temp 97.8  F (36.6  C) (Oral)   Resp 18   Wt 89.2 kg (196 lb 10.4 oz)   SpO2 96%   BMI 23.95 kg/m       Output: Suprapubic catheter, adequate output, LBM 10/6/21, PARESH #1 with 15mL output, PARESH #2 with 30mL output.   Lungs: Lung sounds clear, room air, denies SOB.   Activity: Bedrest, pt able to adjust position in bed independently.   Skin: Scrotal incision, penile/scrotal edema, RLE amputee.   Pain:   Pain managed with oxycodone q4hrs prn.   Neuro/CMS:   A&Ox4, numbness and absence of movement in LLE below knee.   Dressing(s):   Gauze and mesh dressings over penis and drain insertion sites with minimal dried drainage.   Diet:   Regular diet, tolerating well, good appetite.   LDA:   Suprapubic catheter, PARESH x2, right PIV infusing NS at 50mL/hr.   Equipment:   IV pole, pulse ox, PCDs.   Plan:   Continue to manage pain, continue abx, continue POC.   Additional Info:   Call light within reach, pt able to make needs known.

## 2021-10-07 NOTE — PHARMACY-ADMISSION MEDICATION HISTORY
Admission Medication History Completed by Pharmacy    See [facility] Admission Navigator for allergy information, preferred outpatient pharmacy, prior to admission medications and immunization status.     Medication History Sources:     Patient: Josiah Shine      Changes made to PTA medication list (reason):    Added: None    Deleted: Anastrozole 1mg and Ferrous Sulfate 325mg as patient has not been taking these    Changed: None    Additional Information:    There is no other OTC products reported    Patient says he is out of the testosterone cypionate 200mg/mL injection and would like to refill that    Prior to Admission medications    Medication Sig Last Dose Taking? Auth Provider   acetaminophen (TYLENOL) 325 MG tablet Take 2 tablets (650 mg) by mouth every 6 hours as needed for pain Past Week at Unknown time Yes Cary Venegas PA   amLODIPine (NORVASC) 5 MG tablet TAKE 1 TABLET(5 MG) BY MOUTH DAILY 10/6/2021 at 0800 Yes Marvel Petit MD   aspirin (ASA) 81 MG chewable tablet Take 1 tablet by mouth daily  Past Week at Unknown time Yes Reported, Patient   baclofen (LIORESAL) 20 MG tablet 1 PO TID PRN SPACTICITY  Patient taking differently: Take 20 mg by mouth 3 times daily as needed for muscle spasms  10/5/2021 at 1000 Yes Marvel Petit MD   clopidogrel (PLAVIX) 75 MG tablet Take 1 tablet (75 mg) by mouth daily  Past Week at Unknown time Yes Cary Venegas PA   Fesoterodine Fumarate (TOVIAZ) 8 MG TB24 Take 8 mg by mouth daily  Past Week at Unknown time Yes Marvel Petit MD   oxybutynin ER (DITROPAN XL) 15 MG 24 hr tablet Take 15 mg by mouth daily Past Month at Unknown time Yes Unknown, Entered By History   oxyCODONE (ROXICODONE) 5 MG tablet Take 1-2 tablets (5-10 mg) by mouth every 3 hours as needed for pain (Moderate to Severe) 10/6/2021 at 0725 Yes Amaris Cortés MD   senna-docusate (SENOKOT-S/PERICOLACE) 8.6-50 MG tablet Take 1 tablet by mouth 2 times daily To prevent constipation Past  Month at Unknown time Yes Cary Venegas PA   simvastatin (ZOCOR) 20 MG tablet Take 1 tablet by mouth daily at bedtime Past Week at 2200 Yes Marvel Petit MD   sulfamethoxazole-trimethoprim (BACTRIM DS) 800-160 MG tablet Take 1 tablet by mouth 2 times daily Past Week at Unknown time Yes Cary Venegas PA   testosterone cypionate (DEPOTESTOSTERONE) 200 MG/ML injection Inject 0.5 mLs (100 mg) into the muscle every 14 days Past Month at Unknown time Yes Haley Martinez MD       Date completed: 10/07/21    Medication history completed by: Stephanie Pretty

## 2021-10-07 NOTE — PROGRESS NOTES
Urology  Progress Note  10/07/2021    - No acute events overnight  - Having a lot of scrotal pain  - Eating and drinking  - Afebrile, vitals normal    Exam  /57 (BP Location: Right arm)   Pulse 93   Temp 98.2  F (36.8  C) (Oral)   Resp 16   Wt 89.2 kg (196 lb 10.4 oz)   SpO2 97%   BMI 23.95 kg/m    No acute distress  Unlabored breathing  Abdomen soft, nontender, nondistended. SPT draining clear/yellow urine. Skin excoriations present on abdomen.   Left groin and scrotal incisions c/d/i. Unable to express purulence. Penis is swollen.  JPs serosanguinous    I/O's (last 24/since midnight):  UOP 2125/NR  Scrotal PARESH (medial) 31/NR  Abdominal wall PARESH (lateral) 65/NR    Labs (10/6)  WBC (19.8)  Hb (12.4)  Cr (1.06)    AM labs pending    Intraoperative cultures pending    Assessment/Plan  55 year old y/o male POD#1 s/p IPP explant and washout for infection. Doing well.     Neuro: Tylenol, prn oxy and dilaudid for pain control, PTA baclofen  CV: HDS, PTA amlodipine, statin. Will restart ASA/Plavix today.  Pulm: incentive spirometry while awake  FEN/GI: Regular diet, discontinue MIVF, miralax, senna-docusate for bowel regimen  Endo: No issues  : Continue chronic suprapubic catheter. Continue PARESH drains. Wounds are closed- OK to reinforce with gauze pads/Abd pads for drainage. Can put a rolled towel under scrotum to reduce swelling.   Heme/ID: Intraoperative cultures pending. On Vanc/Zosyn until cultures result. WOC consulted for multiple wounds/pressure sores.  Activity: Ad adriane  PPx: SCD  Dispo: Home in 2-3 days once cultures result.     Seen and examined. Will discuss with Dr. Lira.    Amaris Cortés MD  PGY-4 Urology  Pager [phone]     Contacting the Urology Team     Please use the following job codes to reach the Urology Team. Note that you must use an in house phone and that job codes cannot receive text pages.     On weekdays, dial [phone] (or star-star-star 777 on the new [place] telephones) then 0817 to reach the  Adult Urology resident or PA on call    On weekdays, dial 893 (or star-star-star 777 on the new [employer] telephones) then 0818 to reach the Pediatric Urology resident    On weeknights and weekends, dial 893 (or star-star-star 777 on the new [employer] telephones) then [phone] to reach the Urology resident on call (for both Adult and Pediatrics)

## 2021-10-07 NOTE — PLAN OF CARE
VS:   /57 (BP Location: Right arm)   Pulse 93   Temp 98.2  F (36.8  C) (Oral)   Resp 16   Wt 89.2 kg (196 lb 10.4 oz)   SpO2 97%   BMI 23.95 kg/m    RA    Output:   Suprapubic catheter patent   LBM 10/06/2021    Activity:   Bedrest. Turns independently in bed. Strong upper body strength.    Skin: RLE amputation. Scrotum/penile swelling/redness.      Pain:   PRN Oxy with schedule tylenol given    Neuro/CMS:   A&Ox4, paraplegia      Dressing(s):   Minimal drainage.    Diet:   Regular    LDA:   2 PARESH, Right PIV infusing.     Equipment:   IV pole, Call light   Plan:   Continue to monitor POC    Additional Info:   Patient calls appropriately

## 2021-10-08 LAB
ANION GAP SERPL CALCULATED.3IONS-SCNC: 6 MMOL/L (ref 3–14)
BACTERIA WND CULT: ABNORMAL
BUN SERPL-MCNC: 10 MG/DL (ref 7–30)
CALCIUM SERPL-MCNC: 7.8 MG/DL (ref 8.5–10.1)
CHLORIDE BLD-SCNC: 108 MMOL/L (ref 94–109)
CO2 SERPL-SCNC: 25 MMOL/L (ref 20–32)
CREAT SERPL-MCNC: 0.99 MG/DL (ref 0.66–1.25)
ERYTHROCYTE [DISTWIDTH] IN BLOOD BY AUTOMATED COUNT: 19.6 % (ref 10–15)
GFR SERPL CREATININE-BSD FRML MDRD: 86 ML/MIN/1.73M2
GLUCOSE BLD-MCNC: 105 MG/DL (ref 70–99)
HCT VFR BLD AUTO: 33.4 % (ref 40–53)
HGB BLD-MCNC: 9.8 G/DL (ref 13.3–17.7)
HOLD SPECIMEN: NORMAL
MCH RBC QN AUTO: 21.4 PG (ref 26.5–33)
MCHC RBC AUTO-ENTMCNC: 29.3 G/DL (ref 31.5–36.5)
MCV RBC AUTO: 73 FL (ref 78–100)
PLATELET # BLD AUTO: 286 10E3/UL (ref 150–450)
POTASSIUM BLD-SCNC: 4 MMOL/L (ref 3.4–5.3)
RBC # BLD AUTO: 4.57 10E6/UL (ref 4.4–5.9)
SODIUM SERPL-SCNC: 139 MMOL/L (ref 133–144)
VANCOMYCIN SERPL-MCNC: 17.7 MG/L
WBC # BLD AUTO: 7.6 10E3/UL (ref 4–11)

## 2021-10-08 PROCEDURE — 80202 ASSAY OF VANCOMYCIN: CPT | Performed by: UROLOGY

## 2021-10-08 PROCEDURE — 85027 COMPLETE CBC AUTOMATED: CPT | Performed by: STUDENT IN AN ORGANIZED HEALTH CARE EDUCATION/TRAINING PROGRAM

## 2021-10-08 PROCEDURE — 250N000011 HC RX IP 250 OP 636: Performed by: UROLOGY

## 2021-10-08 PROCEDURE — 120N000002 HC R&B MED SURG/OB UMMC

## 2021-10-08 PROCEDURE — 250N000013 HC RX MED GY IP 250 OP 250 PS 637: Performed by: PEDIATRICS

## 2021-10-08 PROCEDURE — 80048 BASIC METABOLIC PNL TOTAL CA: CPT | Performed by: STUDENT IN AN ORGANIZED HEALTH CARE EDUCATION/TRAINING PROGRAM

## 2021-10-08 PROCEDURE — 250N000013 HC RX MED GY IP 250 OP 250 PS 637: Performed by: STUDENT IN AN ORGANIZED HEALTH CARE EDUCATION/TRAINING PROGRAM

## 2021-10-08 PROCEDURE — 36415 COLL VENOUS BLD VENIPUNCTURE: CPT | Performed by: STUDENT IN AN ORGANIZED HEALTH CARE EDUCATION/TRAINING PROGRAM

## 2021-10-08 PROCEDURE — 250N000011 HC RX IP 250 OP 636: Performed by: STUDENT IN AN ORGANIZED HEALTH CARE EDUCATION/TRAINING PROGRAM

## 2021-10-08 RX ADMIN — OXYCODONE HYDROCHLORIDE 10 MG: 10 TABLET ORAL at 22:31

## 2021-10-08 RX ADMIN — Medication 75 MG: at 02:29

## 2021-10-08 RX ADMIN — BACLOFEN 20 MG: 20 TABLET ORAL at 22:31

## 2021-10-08 RX ADMIN — Medication 75 MG: at 22:31

## 2021-10-08 RX ADMIN — OXYCODONE HYDROCHLORIDE 10 MG: 10 TABLET ORAL at 17:03

## 2021-10-08 RX ADMIN — VANCOMYCIN HYDROCHLORIDE 1000 MG: 1 INJECTION, SOLUTION INTRAVENOUS at 11:06

## 2021-10-08 RX ADMIN — SIMVASTATIN 20 MG: 10 TABLET, FILM COATED ORAL at 21:37

## 2021-10-08 RX ADMIN — DOCUSATE SODIUM AND SENNOSIDES 1 TABLET: 8.6; 5 TABLET ORAL at 08:45

## 2021-10-08 RX ADMIN — PIPERACILLIN AND TAZOBACTAM 3.38 G: 3; .375 INJECTION, POWDER, FOR SOLUTION INTRAVENOUS at 16:27

## 2021-10-08 RX ADMIN — CLOPIDOGREL BISULFATE 75 MG: 75 TABLET, FILM COATED ORAL at 08:47

## 2021-10-08 RX ADMIN — PIPERACILLIN AND TAZOBACTAM 3.38 G: 3; .375 INJECTION, POWDER, FOR SOLUTION INTRAVENOUS at 21:37

## 2021-10-08 RX ADMIN — PIPERACILLIN AND TAZOBACTAM 3.38 G: 3; .375 INJECTION, POWDER, FOR SOLUTION INTRAVENOUS at 10:06

## 2021-10-08 RX ADMIN — ASPIRIN 81 MG: 81 TABLET, CHEWABLE ORAL at 08:44

## 2021-10-08 RX ADMIN — ACETAMINOPHEN 650 MG: 325 TABLET, FILM COATED ORAL at 19:48

## 2021-10-08 RX ADMIN — OXYCODONE HYDROCHLORIDE 10 MG: 10 TABLET ORAL at 08:47

## 2021-10-08 RX ADMIN — ACETAMINOPHEN 650 MG: 325 TABLET, FILM COATED ORAL at 08:45

## 2021-10-08 RX ADMIN — VANCOMYCIN HYDROCHLORIDE 1000 MG: 1 INJECTION, SOLUTION INTRAVENOUS at 22:35

## 2021-10-08 RX ADMIN — ACETAMINOPHEN 650 MG: 325 TABLET, FILM COATED ORAL at 01:50

## 2021-10-08 RX ADMIN — BACLOFEN 20 MG: 20 TABLET ORAL at 11:06

## 2021-10-08 RX ADMIN — AMLODIPINE BESYLATE 5 MG: 5 TABLET ORAL at 08:46

## 2021-10-08 RX ADMIN — POLYETHYLENE GLYCOL 3350 17 G: 17 POWDER, FOR SOLUTION ORAL at 08:48

## 2021-10-08 RX ADMIN — BACLOFEN 20 MG: 20 TABLET ORAL at 17:03

## 2021-10-08 RX ADMIN — OXYCODONE HYDROCHLORIDE 10 MG: 10 TABLET ORAL at 01:50

## 2021-10-08 RX ADMIN — ACETAMINOPHEN 650 MG: 325 TABLET, FILM COATED ORAL at 12:53

## 2021-10-08 RX ADMIN — PIPERACILLIN AND TAZOBACTAM 3.38 G: 3; .375 INJECTION, POWDER, FOR SOLUTION INTRAVENOUS at 04:34

## 2021-10-08 ASSESSMENT — ACTIVITIES OF DAILY LIVING (ADL): DEPENDENT_IADLS:: CLEANING;COOKING;LAUNDRY;SHOPPING;MEAL PREPARATION;TRANSPORTATION

## 2021-10-08 NOTE — PLAN OF CARE
VS:   /71 (BP Location: Left arm)   Pulse 84   Temp 97.9  F (36.6  C) (Oral)   Resp 18   Wt 89.2 kg (196 lb 10.4 oz)   SpO2 97%   BMI 23.95 kg/m       Output: Closed drain 1: none  Closed drain 2: 30 ml  Suprapubic catheter: 3,150 ml   Lungs: Breath sounds clear and equal bilaterally   Activity: Not OOB. Patient repositions independently   Skin: Scar on midline back from previous injury. Scare on midline abdomen (pink).    Pain:   Pt. Rated pain as an 8/10. PRN oxycodone was given along with scheduled tylenol. Pain went down to a 6/10.   Neuro/CMS:   Denies numbness or tingling.  A&O x4   Dressing(s):   Had a dressing on ischial tuberosity pressure injury. The dressing came off when the writer gave a bed bath. Pt. Did not want the dressings to be replaced.   Diet:   Regular diet.   LDA:   PIV right arm - saline locked.  2 bulb suction drains  Suprapubic catheter   Equipment:   IV pole, IV pump. Personal belongings at bedside   Plan:   Continue with POC. Patient is able to let needs known. Uses call light appropriately.   Additional Info:   Pt. Did a bed bath today. Dressing on Ischial tuberosity pressure injury came off and patient did not want it replaced.

## 2021-10-08 NOTE — PROGRESS NOTES
Urology  Progress Note  10/08/2021    - No acute events overnight  - Difficulty sleeping due to pain and unfamiliar place  - Afebrile    Exam  /66 (BP Location: Left arm)   Pulse 85   Temp 98.3  F (36.8  C) (Oral)   Resp 16   Wt 89.2 kg (196 lb 10.4 oz)   SpO2 96%   BMI 23.95 kg/m    No acute distress  Unlabored breathing  Abdomen soft, nontender, nondistended. SPT draining clear/yellow urine. Skin excoriations present on abdomen.   Left groin and scrotal incisions c/d/i. Small hematoma under groin incision. Unable to express purulence from scrotum. Penis is swollen but improved. Scrotum and penis with dry, cracked skin,.   JPs serosanguinous    I/O's (last 24/since midnight):  UOP 4050/1900  Scrotal PARESH (medial) 30/0  Abdominal wall PARESH (lateral) 75/30    Labs 10/8 (10/7)  WBC 7.6 (12.3)  Hb 9.8 (9.8)  Cr 0.99 (1.13)    Intraoperative cultures: 3+ Staph aureus (susceptibilities pending).    Assessment/Plan  55 year old y/o male POD#2 s/p IPP explant and washout for infection. Doing well. Cultures growing Staph aureus.     Neuro: Tylenol, prn oxy and dilaudid for pain control, PTA baclofen  CV: HDS, PTA amlodipine, statin. ASA/Plavix restarted 10/7.  Pulm: incentive spirometry while awake  FEN/GI: Regular diet, no MIVF, miralax, senna-docusate for bowel regimen  Endo: No issues  : Continue chronic suprapubic catheter. Continue PARESH drains. Wounds are closed- OK to reinforce with gauze pads/Abd pads for drainage. Can put a rolled towel under scrotum to reduce swelling. Recommend patient take a good shower today, Vaseline to cracked scrotum.  Heme/ID: Intraoperative cultures with staph aureus (susceptibilities pending). On Vanc/Zosyn until cultures result. WOC consulted for multiple wounds/pressure sores.  Activity: Ad adriane  PPx: SCD  Dispo: Home in 1-2 days once susceptibilities result.     Seen and examined. Will discuss with Dr. Lira.    Amaris Cortsé MD  PGY-4 Urology  Pager [phone]     Contacting the  Urology Team     Please use the following job codes to reach the Urology Team. Note that you must use an in house phone and that job codes cannot receive text pages.     On weekdays, dial [phone] (or star-star-star 777 on the new [employer] telephones) then 0817 to reach the Adult Urology resident or PA on call    On weekdays, dial 893 (or star-star-star 777 on the new [employer] telephones) then 0818 to reach the Pediatric Urology resident    On weeknights and weekends, dial 893 (or star-star-star 777 on the new [employer] telephones) then 0039 to reach the Urology resident on call (for both Adult and Pediatrics)

## 2021-10-08 NOTE — PLAN OF CARE
"/66 (BP Location: Left arm)   Pulse 85   Temp 98.3  F (36.8  C) (Oral)   Resp 16   Wt 89.2 kg (196 lb 10.4 oz)   SpO2 96%   BMI 23.95 kg/m    RA. A&Ox4. Paraplegia. Right leg amputated.  Patient was not able to sleep and wanted something more to help him sleep. Provider paged at 0200 and 1 time dose ordered for trazodone. Pain relieved with Tylenol and oxycodone. 2 PARESH drain. Slightly irritable. Per shift patient refused lung sound assessment, skin assessment, drain assessment, and incision assessments. Patient stated,  \"It's nothing personal, I just want to try to sleep.\" Patient was encouraged to reposition every 2 hours. RN educated patient on pressure sores.      0430 Patient allowed RN to assess patient skin after patient self digital stimulation of rectal. Penile edema. Scrotal edema, pink, red, moist. New ABD pads placed.     Continue to monitor POC and encourage patient to reposition [id number].   "

## 2021-10-08 NOTE — CONSULTS
Care Management Initial Consult    General Information  Assessment completed with: Patient,    Type of CM/SW Visit: Offer D/C Planning    Primary Care Provider verified and updated as needed: Yes   Readmission within the last 30 days: previous discharge plan unsuccessful   Return Category: Exacerbation of disease  Reason for Consult: discharge planning  Advance Care Planning: Advance Care Planning Reviewed: no concerns identified          Communication Assessment  Patient's communication style: spoken language (English or Bilingual)    Hearing Difficulty or Deaf: no   Wear Glasses or Blind: no    Cognitive  Cognitive/Neuro/Behavioral: WDL  Level of Consciousness: alert  Arousal Level: opens eyes spontaneously  Orientation: oriented x 4             Living Environment:   People in home: alone     Current living Arrangements: apartment      Able to return to prior arrangements: yes       Family/Social Support:  Care provided by: self  Provides care for: no one  Marital Status: Single             Description of Support System: Supportive    Support Assessment: Adequate family and caregiver support, Adequate social supports    Current Resources:   Patient receiving home care services: No     Community Resources: PCA, Transportation Services, [facility] Programs  Equipment currently used at home: wheelchair, power  Supplies currently used at home: Incontinence Supplies, Ch[employer]    Employment/Financial:  Employment Status:          Financial Concerns: insurance, none, No concerns identified           Lifestyle & Psychosocial Needs:  Social Determinants of Health     Tobacco Use: Low Risk      Smoking Tobacco Use: Never Smoker     Smokeless Tobacco Use: Never Used   Alcohol Use:      Frequency of Alcohol Consumption:      Average Number of Drinks:      Frequency of Binge Drinking:    Financial Resource Strain:      Difficulty of Paying Living Expenses:    Food Insecurity:      Worried About Running Out of Food in the Last Year:       Ran Out of Food in the Last Year:    Transportation Needs:      Lack of Transportation (Medical):      Lack of Transportation (Non-Medical):    Physical Activity:      Days of Exercise per Week:      Minutes of Exercise per Session:    Stress:      Feeling of Stress :    Social Connections:      Frequency of Communication with Friends and Family:      Frequency of Social Gatherings with Friends and Family:      Attends [employer] Services:      Active Member of Clubs or Organizations:      Attends Club or Organization Meetings:      Marital Status:    Intimate Partner Violence:      Fear of Current or Ex-Partner:      Emotionally Abused:      Physically Abused:      Sexually Abused:    Depression: Not at risk     PHQ-2 Score: 0   Housing Stability:      Unable to Pay for Housing in the Last Year:      Number of Places Lived in the Last Year:      Unstable Housing in the Last Year:        Functional Status:  Prior to admission patient needed assistance:   Dependent ADLs:: Bathing, Dressing, Incontinence, Wheelchair-independent  Dependent IADLs:: Cleaning, Cooking, Laundry, Shopping, Meal Preparation, Transportation       Mental Health Status:  Mental Health Status: No Current Concerns       Chemical Dependency Status:                Values/Beliefs:  Spiritual, Cultural Beliefs, Anglican Practices, Values that affect care: no               Additional Information:  D: Plan of care discussed with Medical Team at Interdisciplinary Rounds, plan for patient to discharge tomorrow.   I/A: Chart reviewed; met with patient at bedside to confirm home support, living arrangements and transportation, no RNCC discharge needs identified. Josiah states he is independent in cares. He does not anticipate any other needs at discharge.   P: Care Coordinator will remain available for discharge needs that may arise.    Patient requested for our transportation company to take him home at discharge. Will need to arrange once discharge  is confirmed.     [employer] transportation:  [phone]    Nannette Shoemaker, RN  Float RN Care Coordinator  Pager [phone] (unit RNCC pager)     For Weekend & Holiday on call RN Care Coordinator:  (Home discharge with needs including home care, Assisted living facility returns, Durable medical equip, IV antibiotics)   [employer]    Pager [phone]  [facility]   Pager [phone]    For weekend social work needs, contact information below:  (Transitional care unit, Long-term care unit, Hospice, Counseling, Domestic violence,Vulnerable adult, Health Care Directive)  4A, 4C, 4E, 5A, 5B                   Pager [phone]  6A, 6B, 6C, 6D                         Pager [phone]  7A, 7B, 7C, 7D, 5C                  Pager [phone]  [facility]   Pager [phone]    After hours for all units- (only  is available -[id number]/everyday)  Pager [phone]

## 2021-10-08 NOTE — PHARMACY-VANCOMYCIN DOSING SERVICE
"Pharmacy Vancomycin Note  Date of Service 2021  Patient's  1965   55 year old, male    Indication: Skin and Soft Tissue Infection  Day of Therapy: started on 10/6/21  Current vancomycin regimen:  1000 mg IV q12h  Current vancomycin monitoring method: AUC  Current vancomycin therapeutic monitoring goal: 400-600 mg*h/L    Current estimated CrCl = Estimated Creatinine Clearance: 107.6 mL/min (based on SCr of 0.99 mg/dL).    Creatinine for last 3 days  10/6/2021:  6:36 AM Creatinine 1.06 mg/dL  10/7/2021:  7:21 AM Creatinine 1.13 mg/dL  10/8/2021:  5:54 AM Creatinine 0.99 mg/dL    Recent Vancomycin Levels (past 3 days)  10/8/2021:  5:54 AM Vancomycin 17.7 mg/L    Vancomycin IV Administrations (past 72 hours)                   vancomycin (VANCOCIN) 1000 mg in dextrose 5% 200 mL PREMIX (mg) 1,000 mg New Bag 10/07/21 2255     1,000 mg New Bag  0853     1,000 mg New Bag 10/06/21 2025    vancomycin (VANCOCIN) 1,750 mg in sodium chloride 0.9 % 500 mL intermittent infusion (mg) 1,750 mg Given 10/06/21 0806                Nephrotoxins and other renal medications (From now, onward)    Start     Dose/Rate Route Frequency Ordered Stop    10/06/21 2000  vancomycin (VANCOCIN) 1000 mg in dextrose 5% 200 mL PREMIX      1,000 mg  200 mL/hr over 1 Hours Intravenous EVERY 12 HOURS 10/06/21 1655      10/06/21 1800  piperacillin-tazobactam (ZOSYN) 3.375 g vial to attach to  mL bag     Note to Pharmacy: For [facility], [facility] and [facility]: For Zosyn-naive patients, use the \"Zosyn initial dose + extended infusion\" order panel.    3.375 g  over 30 Minutes Intravenous EVERY 6 HOURS 10/06/21 1607               Contrast Orders - past 72 hours (72h ago, onward)    Start     Dose/Rate Route Frequency Ordered Stop    10/06/21 0735  iopamidol (ISOVUE-370) solution 100 mL      100 mL Intravenous ONCE 10/06/21 0735 10/06/21 0758          Interpretation of levels and current regimen:  Vancomycin level is reflective of -600    Has serum " creatinine changed greater than 50% in last 72 hours: No    Urine output:  unable to determine    Renal Function: Stable    Current Regimen:  Loading dose: 1750 mg at 08:00 10/06/2021.  Regimen: 1000 mg IV every 12 hours.  Start time: 10:55 on 10/08/2021  Exposure target: AUC24 (range)400-600 mg/L.hr   AUC24,ss: 462 mg/L.hr  Probability of AUC24 > 400: 65 %  Ctrough,ss: 14.8 mg/L  Probability of Ctrough,ss > 20: 26 %  Probability of nephrotoxicity (Lodise SERVANDO 2009): 10 %    Plan:  1. No fit performed yet on [employer] so will continue current dose.  2. Vancomycin monitoring method: AUC  3. Vancomycin therapeutic monitoring goal: 400-600 mg*h/L  4. Pharmacy will check vancomycin levels as appropriate in 1-3 Days.  5. Serum creatinine levels will be ordered daily for the first week of therapy and at least twice weekly for subsequent weeks.    Nain Escobar, [facility]

## 2021-10-09 VITALS
WEIGHT: 196.65 LBS | SYSTOLIC BLOOD PRESSURE: 136 MMHG | OXYGEN SATURATION: 99 % | TEMPERATURE: 96.7 F | BODY MASS INDEX: 23.95 KG/M2 | RESPIRATION RATE: 16 BRPM | DIASTOLIC BLOOD PRESSURE: 80 MMHG | HEART RATE: 72 BPM

## 2021-10-09 LAB
ANION GAP SERPL CALCULATED.3IONS-SCNC: 5 MMOL/L (ref 3–14)
BACTERIA WND CULT: ABNORMAL
BUN SERPL-MCNC: 8 MG/DL (ref 7–30)
CALCIUM SERPL-MCNC: 7.8 MG/DL (ref 8.5–10.1)
CHLORIDE BLD-SCNC: 108 MMOL/L (ref 94–109)
CO2 SERPL-SCNC: 27 MMOL/L (ref 20–32)
CREAT SERPL-MCNC: 0.93 MG/DL (ref 0.66–1.25)
ERYTHROCYTE [DISTWIDTH] IN BLOOD BY AUTOMATED COUNT: 19.2 % (ref 10–15)
GFR SERPL CREATININE-BSD FRML MDRD: >90 ML/MIN/1.73M2
GLUCOSE BLD-MCNC: 100 MG/DL (ref 70–99)
HCT VFR BLD AUTO: 34.6 % (ref 40–53)
HGB BLD-MCNC: 9.9 G/DL (ref 13.3–17.7)
HOLD SPECIMEN: NORMAL
MCH RBC QN AUTO: 20.9 PG (ref 26.5–33)
MCHC RBC AUTO-ENTMCNC: 28.6 G/DL (ref 31.5–36.5)
MCV RBC AUTO: 73 FL (ref 78–100)
PLATELET # BLD AUTO: 324 10E3/UL (ref 150–450)
POTASSIUM BLD-SCNC: 4.2 MMOL/L (ref 3.4–5.3)
RBC # BLD AUTO: 4.73 10E6/UL (ref 4.4–5.9)
SODIUM SERPL-SCNC: 140 MMOL/L (ref 133–144)
VANCOMYCIN SERPL-MCNC: 17.3 MG/L
WBC # BLD AUTO: 7.5 10E3/UL (ref 4–11)

## 2021-10-09 PROCEDURE — 85027 COMPLETE CBC AUTOMATED: CPT | Performed by: STUDENT IN AN ORGANIZED HEALTH CARE EDUCATION/TRAINING PROGRAM

## 2021-10-09 PROCEDURE — 80048 BASIC METABOLIC PNL TOTAL CA: CPT | Performed by: STUDENT IN AN ORGANIZED HEALTH CARE EDUCATION/TRAINING PROGRAM

## 2021-10-09 PROCEDURE — 250N000011 HC RX IP 250 OP 636: Performed by: UROLOGY

## 2021-10-09 PROCEDURE — 80202 ASSAY OF VANCOMYCIN: CPT | Performed by: UROLOGY

## 2021-10-09 PROCEDURE — 250N000011 HC RX IP 250 OP 636: Performed by: STUDENT IN AN ORGANIZED HEALTH CARE EDUCATION/TRAINING PROGRAM

## 2021-10-09 PROCEDURE — 250N000013 HC RX MED GY IP 250 OP 250 PS 637: Performed by: STUDENT IN AN ORGANIZED HEALTH CARE EDUCATION/TRAINING PROGRAM

## 2021-10-09 PROCEDURE — 36415 COLL VENOUS BLD VENIPUNCTURE: CPT | Performed by: STUDENT IN AN ORGANIZED HEALTH CARE EDUCATION/TRAINING PROGRAM

## 2021-10-09 RX ORDER — OXYCODONE HYDROCHLORIDE 5 MG/1
5 TABLET ORAL EVERY 6 HOURS PRN
Qty: 20 TABLET | Refills: 0 | Status: SHIPPED | OUTPATIENT
Start: 2021-10-09 | End: 2021-10-18

## 2021-10-09 RX ORDER — DOXYCYCLINE 100 MG/1
100 CAPSULE ORAL 2 TIMES DAILY
Qty: 28 CAPSULE | Refills: 0 | Status: SHIPPED | OUTPATIENT
Start: 2021-10-09 | End: 2021-10-27

## 2021-10-09 RX ADMIN — ASPIRIN 81 MG: 81 TABLET, CHEWABLE ORAL at 07:45

## 2021-10-09 RX ADMIN — VANCOMYCIN HYDROCHLORIDE 1000 MG: 1 INJECTION, SOLUTION INTRAVENOUS at 11:51

## 2021-10-09 RX ADMIN — PIPERACILLIN AND TAZOBACTAM 3.38 G: 3; .375 INJECTION, POWDER, FOR SOLUTION INTRAVENOUS at 04:49

## 2021-10-09 RX ADMIN — CLOPIDOGREL BISULFATE 75 MG: 75 TABLET, FILM COATED ORAL at 07:45

## 2021-10-09 RX ADMIN — DOCUSATE SODIUM AND SENNOSIDES 1 TABLET: 8.6; 5 TABLET ORAL at 07:45

## 2021-10-09 RX ADMIN — PIPERACILLIN AND TAZOBACTAM 3.38 G: 3; .375 INJECTION, POWDER, FOR SOLUTION INTRAVENOUS at 15:57

## 2021-10-09 RX ADMIN — ACETAMINOPHEN 650 MG: 325 TABLET, FILM COATED ORAL at 14:22

## 2021-10-09 RX ADMIN — ACETAMINOPHEN 650 MG: 325 TABLET, FILM COATED ORAL at 02:05

## 2021-10-09 RX ADMIN — OXYCODONE HYDROCHLORIDE 10 MG: 10 TABLET ORAL at 06:35

## 2021-10-09 RX ADMIN — ACETAMINOPHEN 650 MG: 325 TABLET, FILM COATED ORAL at 07:45

## 2021-10-09 RX ADMIN — AMLODIPINE BESYLATE 5 MG: 5 TABLET ORAL at 07:45

## 2021-10-09 NOTE — PROGRESS NOTES
Urology  Progress Note  10/09/2021    - No acute events overnight  - Difficulty sleeping due to pain and unfamiliar place  - Afebrile  - feeling well, wants to go home    Exam  /63 (BP Location: Left arm)   Pulse 85   Temp 97.9  F (36.6  C) (Oral)   Resp 18   Wt 89.2 kg (196 lb 10.4 oz)   SpO2 98%   BMI 23.95 kg/m    No acute distress  Unlabored breathing  Abdomen soft, nontender, nondistended. SPT draining clear/yellow urine. Skin excoriations present on abdomen.   Left groin and scrotal incisions c/d/i. Small hematoma under groin incision. Unable to express purulence from scrotum. Penis is swollen but improved. Scrotum and penis with dry, cracked skin,.   JPs serosanguinous    I/O's (last 24/since midnight):  UOP 7250/1850  Scrotal PARESH (medial) 3/NR  Abdominal wall PARESH (lateral) 75/NR    Labs 10/8 (10/7)  WBC 7.6 (12.3)  Hb 9.8 (9.8)  Cr 0.99 (1.13)    Intraoperative cultures: 3+ Staph aureus - susceptible to clinda, tetracycline, linezolid    Assessment/Plan  55 year old y/o male POD#3 s/p IPP explant and washout for infection. Doing well. Cultures growing Staph aureus.     Neuro: Tylenol, prn oxy and dilaudid for pain control, PTA baclofen  CV: HDS, PTA amlodipine, statin. ASA/Plavix restarted 10/7.  Pulm: incentive spirometry while awake  FEN/GI: Regular diet, no MIVF, miralax, senna-docusate for bowel regimen  Endo: No issues  : Continue chronic suprapubic catheter. Continue PARESH drains. Wounds are closed- OK to reinforce with gauze pads/Abd pads for drainage. Can put a rolled towel under scrotum to reduce swelling. Recommend patient take a good shower today, Vaseline to cracked scrotum.  Heme/ID: Intraoperative cultures with staph aureus (susceptibilities pending). On Vanc/Zosyn until cultures result. WOC consulted for multiple wounds/pressure sores.  Activity: Ad adriane  PPx: SCD  Dispo: Home today. Drain plan pending discussion with staff.    Seen and examined with chief resident and   Tera Adhikari MD  PGY3 Urology     Contacting the Urology Team     Please use the following job codes to reach the Urology Team. Note that you must use an in house phone and that job codes cannot receive text pages.     On weekdays, dial [phone] (or star-star-star 777 on the new [employer] telephones) then 0817 to reach the Adult Urology resident or PA on call    On weekdays, dial 893 (or star-star-star 777 on the new [employer] telephones) then 0818 to reach the Pediatric Urology resident    On weeknights and weekends, dial 893 (or star-star-star 777 on the new [place] telephones) then 0039 to reach the Urology resident on call (for both Adult and Pediatrics)

## 2021-10-09 NOTE — DISCHARGE SUMMARY
[facility] Note 10/7/2021 (ASIA Cruz) Reads:  Pressure Injury: on right IT, present on admission    Pressure Injury is Stage 2.                                                       Discharge Summary     Josiah Crump MRN# [id number]   YOB: 1965 Age: 55 year old     Date of Admission:  10/6/2021  Date of Discharge::  10/9/2021  6:00 PM  Admitting Physician:  Charles Lira MD  Discharge Physician:  Solange Adhikari MD  Primary Care Physician:         Marvel Petit          Admission Diagnoses:   Complication of implanted penile prosthesis, initial encounter (H) [T83.9XXA]            Discharge Diagnosis:   Same as above         Procedures:    Procedure(s):  Removal  of penile prosthesis        Non-operative procedures:   None performed          Consultations:   PHARMACY TO DOSE VANCO  PHARMACY TO DOSE VANCO  WOUND OSTOMY CONTINENCE NURSE  IP CONSULT  WOUND OSTOMY CONTINENCE NURSE  IP CONSULT  CARE MANAGEMENT / SOCIAL WORK IP CONSULT         Imaging Studies:     Results for orders placed or performed during the hospital encounter of 10/06/21   CT Abdomen Pelvis w Contrast    Narrative    CT ABDOMEN AND PELVIS WITH CONTRAST 10/6/2021 8:09 AM    CLINICAL HISTORY: Severe scrotal swelling and purulence. Extension to  the mid thigh. CT abdomen and pelvis drainage from inferior scrotum.  Severe pain.    TECHNIQUE: CT scan of the abdomen and pelvis was performed following  injection of IV contrast. Multiplanar reformats were obtained. Dose  reduction techniques were used.  CONTRAST: 103 mL of isovue 370    COMPARISON: CT abdomen and pelvis 4/18/2021.    FINDINGS:   LOWER CHEST: Bochdalek fat-containing hernia at the left posterior  lung base.    HEPATOBILIARY: Cholelithiasis and bladder wall calcifications.  Heterogeneous gallbladder. No focal or acute liver abnormality.    PANCREAS: Normal.    SPLEEN: Stable hypodensity at the splenic hilar region measuring 2.3  cm, series 4 image 99.    ADRENAL  GLANDS: Normal.    KIDNEYS/BLADDER: Suprapubic catheter decompresses the bladder. Diffuse  bladder wall thickening may just relate to decompression. Previously  noted right-sided hydronephrosis has improved. No significant  hydronephrosis. Lobulated configuration of each kidney consistent with  bilateral regions of renal scarring. A few incidental small cysts  noted not requiring specific follow-up imaging.    BOWEL: No bowel obstruction. Normal appendix. No acute inflammation of  the bowel. Rectal distention with stool.    PELVIC ORGANS: Penile prosthesis is again noted. There are bubbles of  gas along the course of a catheter leading to the prosthesis  reservoir, along the anterior midline low pelvis subcutaneous fat,  series 4 image 423 and adjacent images. There is small fluid  surrounding this region as well. Bubbles of gas and complex small  fluid along the course of this catheter extends inferiorly to the  prosthesis device at the left scrotal region on series 4 image 579.  The bubbles of gas are newly identified and surrounds the shaft of the  penis, image 566. Interval increase of severe diffuse scrotal edema  along with adjacent subcutaneous edema extending to the perineum, low  anterior pelvic wall, a diffusely involving the right thigh and hip  regions. In addition to the subcutaneous gas, the subcutaneous edema  also appears increased involving the anterior low pelvis, perineum,  and soft tissues surrounding the amputated right thigh. There are  multiple new enlarged lymph nodes in the bilateral pelvis and inguinal  locations. Subcutaneous increased adenopathy along the right proximal  thigh also noted. An example lymph node at the left inguinal position  is 1.3 cm, previously 1 cm, series 4 image 468. There are numerous  additional examples. Skin irregularity suggesting ulcer along the  right posterolateral buttocks and hip joint region noted.    ADDITIONAL FINDINGS: None.    MUSCULOSKELETAL:  Amputation changes along the right leg. Destructive  irregular and mixed sclerotic and lucent bony process at the right hip  joint appears similar in configuration as on the prior exam.  Hypertrophic changes of the bilateral pelvis appear stable. Stable  appearance of dystrophic calcifications consistent with old left femur  fracture. The previously noted focal fluid lateral to the right hip  joint level is not currently seen.      Impression    IMPRESSION:   1.  New finding of multiple subcutaneous bubbles of gas along the  course of the penile prosthesis catheter extending from the anterior  midline pelvis to the scrotum and surrounding the penis. This is  worrisome for necrotizing gas-forming fasciitis. Severe scrotal  edema/phlegmon again noted, along with progression of subcutaneous  edema within the inferior pelvis, perineum, and extending to the right  proximal thigh and hip joint. Stable chronic changes at the left hip  and diffusely within the pelvis.  2.  Heterogeneous appearance of the gallbladder with gallbladder wall  calcifications appears stable. Underlying gallbladder malignancy not  excluded in the setting of porcelain gallbladder.  3.  Previously noted right hydronephrosis has improved.  4.  Scarring of the bilateral kidneys again noted.    I communicated results to Dr. Markham from emergency room at 10/6/2021  at 0840 hours.    WINSOME GARCIA MD         SYSTEM ID:  [id number]            Medications Prior to Admission:     Medications Prior to Admission   Medication Sig Dispense Refill Last Dose     acetaminophen (TYLENOL) 325 MG tablet Take 2 tablets (650 mg) by mouth every 6 hours as needed for pain 100 tablet 11 Past Week at Unknown time     amLODIPine (NORVASC) 5 MG tablet TAKE 1 TABLET(5 MG) BY MOUTH DAILY 90 tablet 1 10/6/2021 at 0800     aspirin (ASA) 81 MG chewable tablet Take 81 mg by mouth daily    Past Week at Unknown time     baclofen (LIORESAL) 20 MG tablet 1 PO TID PRN SPACTICITY (Patient  taking differently: Take 20 mg by mouth 3 times daily as needed for muscle spasms ) 90 tablet 5 10/5/2021 at 1000     clopidogrel (PLAVIX) 75 MG tablet Take 1 tablet (75 mg) by mouth daily (HOLD NOW.  RESTART PLAVIX Thursday 9/23/21) 30 tablet 0 Past Week at Unknown time     Fesoterodine Fumarate (TOVIAZ) 8 MG TB24 Take 8 mg by mouth daily    Past Week at Unknown time     oxybutynin ER (DITROPAN XL) 15 MG 24 hr tablet Take 15 mg by mouth daily   Past Month at Unknown time     oxyCODONE (ROXICODONE) 5 MG tablet Take 1-2 tablets (5-10 mg) by mouth every 3 hours as needed for pain (Moderate to Severe) 30 tablet 0 10/6/2021 at 0725     senna-docusate (SENOKOT-S/PERICOLACE) 8.6-50 MG tablet Take 1 tablet by mouth 2 times daily To prevent constipation 45 tablet 0 Past Month at Unknown time     simvastatin (ZOCOR) 20 MG tablet 1 po qd (Patient taking differently: Take 20 mg by mouth At Bedtime ) 90 tablet 1 Past Week at 2200     sulfamethoxazole-trimethoprim (BACTRIM DS) 800-160 MG tablet Take 1 tablet by mouth 2 times daily 14 tablet 0 Past Week at Unknown time     testosterone cypionate (DEPOTESTOSTERONE) 200 MG/ML injection Inject 0.5 mLs (100 mg) into the muscle every 14 days 3 mL 5 Past Month at Unknown time            Discharge Medications:     Current Discharge Medication List        START taking these medications    Details   doxycycline hyclate (VIBRAMYCIN) 100 MG capsule Take 1 capsule (100 mg) by mouth 2 times daily for 14 days  Qty: 28 capsule, Refills: 0    Associated Diagnoses: Breakdown (mechanical) of implanted penile prosthesis, initial encounter (H)      !! oxyCODONE (ROXICODONE) 5 MG tablet Take 1 tablet (5 mg) by mouth every 6 hours as needed for pain  Qty: 20 tablet, Refills: 0    Associated Diagnoses: Complication of implanted penile prosthesis, initial encounter (H)       !! - Potential duplicate medications found. Please discuss with provider.        CONTINUE these medications which have NOT CHANGED     Details   acetaminophen (TYLENOL) 325 MG tablet Take 2 tablets (650 mg) by mouth every 6 hours as needed for pain  Qty: 100 tablet, Refills: 11    Associated Diagnoses: Breakdown (mechanical) of implanted penile prosthesis, initial encounter (H)      amLODIPine (NORVASC) 5 MG tablet TAKE 1 TABLET(5 MG) BY MOUTH DAILY  Qty: 90 tablet, Refills: 1    Associated Diagnoses: Benign essential hypertension      aspirin (ASA) 81 MG chewable tablet Take 81 mg by mouth daily       baclofen (LIORESAL) 20 MG tablet 1 PO TID PRN SPACTICITY  Qty: 90 tablet, Refills: 5    Associated Diagnoses: Paraplegia (H)      clopidogrel (PLAVIX) 75 MG tablet Take 1 tablet (75 mg) by mouth daily (HOLD NOW.  RESTART PLAVIX Thursday 9/23/21)  Qty: 30 tablet, Refills: 0      Fesoterodine Fumarate (TOVIAZ) 8 MG TB24 Take 8 mg by mouth daily     Associated Diagnoses: Neurogenic bladder      oxybutynin ER (DITROPAN XL) 15 MG 24 hr tablet Take 15 mg by mouth daily      !! oxyCODONE (ROXICODONE) 5 MG tablet Take 1-2 tablets (5-10 mg) by mouth every 3 hours as needed for pain (Moderate to Severe)  Qty: 30 tablet, Refills: 0    Associated Diagnoses: Breakdown (mechanical) of implanted penile prosthesis, initial encounter (H)      senna-docusate (SENOKOT-S/PERICOLACE) 8.6-50 MG tablet Take 1 tablet by mouth 2 times daily To prevent constipation  Qty: 45 tablet, Refills: 0    Associated Diagnoses: Breakdown (mechanical) of implanted penile prosthesis, initial encounter (H)      simvastatin (ZOCOR) 20 MG tablet 1 po qd  Qty: 90 tablet, Refills: 1    Associated Diagnoses: History of CVA (cerebrovascular accident)      sulfamethoxazole-trimethoprim (BACTRIM DS) 800-160 MG tablet Take 1 tablet by mouth 2 times daily  Qty: 14 tablet, Refills: 0    Associated Diagnoses: Breakdown (mechanical) of implanted penile prosthesis, initial encounter (H)      testosterone cypionate (DEPOTESTOSTERONE) 200 MG/ML injection Inject 0.5 mLs (100 mg) into the muscle every 14  days  Qty: 3 mL, Refills: 5    Associated Diagnoses: Hypogonadism male       !! - Potential duplicate medications found. Please discuss with provider.              Brief History of Illness:   Reason for admission requiring a surgical or invasive procedure:   Infection and inflammatory reaction due to implanted penile prosthesis, initial encounter (H) [T83.61XA]   The patient underwent the following procedure(s):   See above   There were no immediate complications during this procedure.    Please refer to the full operative summary for details.           Hospital Course:   The patient's hospital course was unremarkable.  Josiah Crump recovered as anticipated and experienced no post-operative complications.     On POD#3 patient was ambulating without assitance, tolerating the discharge diet, had pain controlled with PO medications (including BID doxycycline per his operative cultures) to go home with, and requiring no IV medications or fluids. Patient was discharged home with appropriate contact information, follow-up and instructions as seen below in the discharge paperwork.       Final Pathology Result:   Pending at time of discharge         Discharge Instructions and Follow-Up:     Discharge Procedure Orders   Reason for your hospital stay   Order Comments: You were admitted to have your penile implant removed     Activity   Order Comments: Your activity upon discharge: as tolerated     Order Specific Question Answer Comments   Is discharge order? Yes      Wound care and dressings   Order Comments: Activity  - No strenuous exercise for 6 weeks.  - No lifting, pushing, pulling more than 10 pounds for 6 weeks.   - Do not strain with bowel movements.  - Do not drive until you can press the brake pedal quickly and fully without pain.   - Do not operate a motor vehicle while taking narcotic pain medications.     Drain  - You have a drain in place that will be removed at your follow up on Wednesday. Empty this as  "needed and take care it does not get caught or snagged on anything.    Incisions  - You may shower and get incisions wet starting 48 hrs after surgery.  - Do not scrub incisions or submerge wounds for 2 weeks or until seen in follow-up.   - Put vaseline on your cracked scrotal skin    Medications  - Take doxycycline antibiotic twice daily for two weeks  - Transition from narcotic pain medications to tylenol (acetaminophen) as you are able.  Wean yourself off all pain medications as you are able.  - Some pain medications contain both tylenol (acetaminophen) and a narcotic (Norco, vicodin, percocet), do not take more than 4,000mg of Tylenol (acetaminophen) from all sources in any 24 hour period.  - Narcotics can make you constipated.  Take over the counter fiber (metamucil or benefiber) and stool softeners (miralax, docusate or senna) while taking narcotic pain medications, but stop if you develop diarrhea.    Follow-Up:  - Follow-up with your surgeon on Wednesday. We will contact you to schedule this. Please call our clinic at the number below if you have not heard from us by Tuesday.  - Call or return sooner than your regularly scheduled visit if you develop any of the following: fever (greater than 101.5), uncontrolled pain, uncontrolled nausea or vomiting, as well as increased redness, swelling, or drainage from your wound.     Phone numbers:   - Monday through Friday 8am to 4:30pm: Call [phone] with questions or to schedule or confirm appointment.    - Nights or weekends: call the after hours emergency pager - [phone] and tell the  \"I would like to page the Urology Resident on call.\"  - For emergencies, call [phone]     Diet   Order Comments: Follow this diet upon discharge: Orders Placed This Encounter      Regular Diet Adult     Order Specific Question Answer Comments   Is discharge order? Yes             Discharge Disposition:     Discharged to Home      Condition at discharge: " Good    --    Solange Adhikari MD  Urology Resident    11:06 AM, 10/9/2021

## 2021-10-09 NOTE — PLAN OF CARE
VS: Blood pressure 136/80, pulse 72, temperature (!) 96.7  F (35.9  C), temperature source Oral, resp. rate 16, weight 89.2 kg (196 lb 10.4 oz), SpO2 99 %.  On RA. Denied chest pain and SOB.   Output: Superpubic muhammad patent, LBM 10/8 on bedpan. PARESH x 2, 0 ml for each PARESH.    Lungs: Clear.    Activity: Paraplegia, IND turn in bed. Wheelchair baseline.    Skin: P IT wound, scrum skin cracked.    Pain:   C/o scrum pain, managed with prn Oxy.   Neuro/CMS:   A & Ox 4, denied N/T.   Dressing(s):   R IT wound dressing changed this shift.    Diet:   R/T,good appetite.    LDA:   None.    Equipment:   Wheelchair.    Plan:   Discharge home today.   Additional Info:

## 2021-10-09 NOTE — PLAN OF CARE
VS:   Temp: 97.9  F (36.6  C) Temp src: Oral BP: 115/63 Pulse: 85   Resp: 18 SpO2: 98 % O2 Device: None (Room air)    Patient denies chest pain and SOB.   Output:   Suprapubic catheter. Patient had formed hard brown/red BM this evening with self performed dig stim. Continue to monitor stool texture and color.    Activity:   Repositions self well in bed. Able to roll independently. Reminded to reposition Q2.    Skin: R IT pressure wound. Scrotal edema. Some pink scar tissue on R buttock.   Patient on regular mattress - will pass along to day shift to consider pulsate mattress if patient does not discharge in the AM.    Pain: Groin pain managed with PRN oxycodone and scheduled tylenol.   Neuro/CMS:   Patient A&O X4. Strong upper extremities. Pulses +2. Paraplegia. R AKA.    Dressing(s): Mepilex over R IT. Some pink scar tissue on R buttock RITIKA.    Diet: Regular diet thin liquids. Patient denies nausea.    LDA: Suprapubic catheter. PARESH drain X2.    Equipment: IV pole.    Plan: Continue with plan of care. Potentially discharge today?   Additional Info:   Patient has call light within reach and is able to make needs known.

## 2021-10-09 NOTE — PHARMACY-VANCOMYCIN DOSING SERVICE
"Pharmacy Vancomycin Note  Date of Service 2021  Patient's  1965   55 year old, male    Indication: Skin and Soft Tissue Infection  Day of Therapy: started on 10/6/21  Current vancomycin regimen:  1000 mg IV q12h  Current vancomycin monitoring method: AUC  Current vancomycin therapeutic monitoring goal: 400-600 mg*h/L    Current estimated CrCl = Estimated Creatinine Clearance: 114.6 mL/min (based on SCr of 0.93 mg/dL).    Creatinine for last 3 days  10/7/2021:  7:21 AM Creatinine 1.13 mg/dL  10/8/2021:  5:54 AM Creatinine 0.99 mg/dL  10/9/2021:  5:54 AM Creatinine 0.93 mg/dL    Recent Vancomycin Levels (past 3 days)  10/8/2021:  5:54 AM Vancomycin 17.7 mg/L  10/9/2021:  5:54 AM Vancomycin 17.3 mg/L    Vancomycin IV Administrations (past 72 hours)                   vancomycin (VANCOCIN) 1000 mg in dextrose 5% 200 mL PREMIX (mg) 1,000 mg New Bag 10/08/21 2235     1,000 mg New Bag  1106     1,000 mg New Bag 10/07/21 2255     1,000 mg New Bag  0853     1,000 mg New Bag 10/06/21 2025                Nephrotoxins and other renal medications (From now, onward)    Start     Dose/Rate Route Frequency Ordered Stop    10/06/21 2000  vancomycin (VANCOCIN) 1000 mg in dextrose 5% 200 mL PREMIX      1,000 mg  200 mL/hr over 1 Hours Intravenous EVERY 12 HOURS 10/06/21 1655      10/06/21 1800  piperacillin-tazobactam (ZOSYN) 3.375 g vial to attach to  mL bag     Note to Pharmacy: For [facility], [facility] and [facility]: For Zosyn-naive patients, use the \"Zosyn initial dose + extended infusion\" order panel.    3.375 g  over 30 Minutes Intravenous EVERY 6 HOURS 10/06/21 1607               Contrast Orders - past 72 hours (72h ago, onward)    None          Interpretation of levels and current regimen:  Vancomycin level is reflective of -600    Has serum creatinine changed greater than 50% in last 72 hours: No    Urine output:  unable to determine    Renal Function: Stable    Current Regimen:  Regimen: 1000 mg IV every 12 " hours.  Start time: 11:02 on 10/09/2021  Exposure target: AUC24 (range)400-600 mg/L.hr   AUC24,ss: 434 mg/L.hr  Probability of AUC24 > 400: 71 %  Ctrough,ss: 13.5 mg/L  Probability of Ctrough,ss > 20: 4 %  Probability of nephrotoxicity (Lodise SERVANDO 2009): 9 %    Plan:  1. Continue Current Dose  2. Vancomycin monitoring method: AUC  3. Vancomycin therapeutic monitoring goal: 400-600 mg*h/L  4. Pharmacy will check vancomycin levels as appropriate in 3-5 Days.  5. Serum creatinine levels will be ordered daily for the first week of therapy and at least twice weekly for subsequent weeks.    Nain Escobar, [facility]

## 2021-10-09 NOTE — PLAN OF CARE
VS: BP (!) 143/72 (BP Location: Left arm)   Pulse 84   Temp 97.5  F (36.4  C) (Oral)   Resp 18   Wt 89.2 kg (196 lb 10.4 oz)   SpO2 98%   BMI 23.95 kg/m     O2: RA, denies SOB   Output: Suprapubic catheter, large outputs, pt drinking lots of fluid  Drain 1 3ml on shift  Drain 2 15ml out on shift   Last BM: 10/8, pt preforms own dig stimulation   Activity: Pt not oob, pt has good upper arm strength   Skin: Scars on R lower back/hip, abdomen. Mepilex covering area on R IT.   Pain: Pain rated 8/10 in scrotum/penile area, relieved with oxycodone to a 6/10   CMS: A&O x4, denies n/t   Dressing: Mepilex intact on R IT.   Diet: Reg diet, tolerating   LDA: PIV R hand TKO, redressed IV on shift   Equipment: IV pole, personal belongings   Plan: Continue POC, uses call light appropriately    Additional Info: Pt encouraged to reposition q2 hours.

## 2021-10-12 ENCOUNTER — PRE VISIT (OUTPATIENT)
Dept: UROLOGY | Facility: CLINIC | Age: 56
End: 2021-10-12

## 2021-10-12 NOTE — TELEPHONE ENCOUNTER
Reason for visit: Post op follow up     Relevant information: wound check    Records/imaging/labs/orders: in EPIC    Pt called: no    At Rooming: normal

## 2021-10-13 LAB
BACTERIA WND CULT: NORMAL
BACTERIA WND CULT: NORMAL

## 2021-10-18 ENCOUNTER — TELEPHONE (OUTPATIENT)
Dept: UROLOGY | Facility: CLINIC | Age: 56
End: 2021-10-18

## 2021-10-18 ENCOUNTER — PRE VISIT (OUTPATIENT)
Dept: UROLOGY | Facility: CLINIC | Age: 56
End: 2021-10-18

## 2021-10-18 ENCOUNTER — NURSE TRIAGE (OUTPATIENT)
Dept: NURSING | Facility: CLINIC | Age: 56
End: 2021-10-18

## 2021-10-18 DIAGNOSIS — T83.9XXA: ICD-10-CM

## 2021-10-18 RX ORDER — OXYCODONE HYDROCHLORIDE 5 MG/1
5 TABLET ORAL EVERY 6 HOURS PRN
Qty: 28 TABLET | Refills: 0 | Status: SHIPPED | OUTPATIENT
Start: 2021-10-18 | End: 2021-10-27

## 2021-10-18 NOTE — TELEPHONE ENCOUNTER
10/6/2021 Removal of penile prosthesis    Pt is in a lot of pain from the surgery.   Pt is completely out of pain medication.   Was out all weekend with no pain medication.    Pt is in severe pain and would like another order of pain medication sent to the pharmacy for Pt care.     oxyCODONE (ROXICODONE) 5 MG tablet    [facility] [place], [place] on [address] in [place], [place]    Eleni Gardner RN  Central Triage Red Flags/Med Refills      Reason for Disposition    SEVERE post-op pain (e.g., excruciating, pain scale 8-10) that is not controlled with pain medications    Additional Information    Negative: Sounds like a life-threatening emergency to the triager    Negative: Chest pain    Negative: Difficulty breathing    Negative: Acting confused (e.g., disoriented, slurred speech) or excessively sleepy    Negative: Surgical incision symptoms and questions    Negative: Discomfort (pain, burning or stinging) when passing urine and male    Negative: Discomfort (pain, burning or stinging) when passing urine and female    Negative: Constipation    Negative: New or worsening leg (calf, thigh) pain    Negative: New or worsening leg swelling    Negative: Dizziness is severe, or persists > 24 hours after surgery    Negative: Symptoms arising from use of a urinary catheter (Shah or Coude)    Negative: Cast problems or questions    Negative: Medication question    Negative: Bright red, wide-spread, sunburn-like rash    Negative: SEVERE headache and after spinal (epidural) anesthesia    Negative: Vomiting and persists > 4 hours    Negative: Vomiting and abdomen looks much more swollen than usual    Negative: Drinking very little and dehydration suspected (e.g., no urine > 12 hours, very dry mouth, very lightheaded)    Negative: Patient sounds very sick or weak to the triager    Negative: Sounds like a serious complication to the triager    Negative: Fever > 100.4 F (38.0 C)    Negative: Caller has  URGENT question and triager unable to answer question    Protocols used: POST-OP SYMPTOMS AND [id number]

## 2021-10-18 NOTE — TELEPHONE ENCOUNTER
Reason for visit: Follow up     Relevant information: wound check    Records/imaging/labs/orders: in EPIC    Pt called: no    At Rooming: normal

## 2021-10-18 NOTE — TELEPHONE ENCOUNTER
2nd request ---- pain level #8 patient is having so much pain swelling and pain  he wants more medication oxycodone  he is seeing you on Wednesday Gladis Pérez LPN Staff Nurse

## 2021-10-18 NOTE — TELEPHONE ENCOUNTER
[facility] Call Center    Phone Message    May a detailed message be left on voicemail: yes     Reason for Call: Medication Refill Request    Has the patient contacted the pharmacy for the refill? Yes   Name of medication being requested: Oxycodone 5mg  Provider who prescribed the medication: Dr. Lira  Pharmacy: [facility] on [address]  Date medication is needed: 10/18/2021         Action Taken: Message routed to:  [facility] Uro    Travel Screening: Not Applicable

## 2021-10-18 NOTE — TELEPHONE ENCOUNTER
[facility] Call Center    Phone Message    May a detailed message be left on voicemail: yes     Reason for Call: Medication Refill Request    Has the patient contacted the pharmacy for the refill? Yes   Name of medication being requested: Oxycodone 5mg  Provider who prescribed the medication: Dr. Lira  Pharmacy: [facility] on [address]  Date medication is needed: 10/18/2021        Action Taken: Message routed to:  [facility] Urology    Travel Screening: Not Applicable

## 2021-10-22 ENCOUNTER — TELEPHONE (OUTPATIENT)
Dept: UROLOGY | Facility: CLINIC | Age: 56
End: 2021-10-22

## 2021-10-22 ENCOUNTER — PATIENT OUTREACH (OUTPATIENT)
Dept: UROLOGY | Facility: CLINIC | Age: 56
End: 2021-10-22
Payer: MEDICARE

## 2021-10-22 NOTE — TELEPHONE ENCOUNTER
Called this patient and left message that we have rescheduled this appointment many times and he needs to touch base with the MD  He will have to do this by phone next Wednesday at 1pm with eyad Pérez LPN Staff Nurse

## 2021-10-22 NOTE — TELEPHONE ENCOUNTER
[facility] Call [facility]    Phone Message    May a detailed message be left on voicemail: yes     Reason for Call: Other: Josiah called regarding his post op appt that was cancelled due to his  testing positive for covid. He is hoping to get back on Dr Courtney schedule asap for his post op appt to be checked out. Please call pt to discuss scheduing options. Thanks     Action Taken: Message routed to:  [facility] uro    Travel Screening: Not Applicable

## 2021-10-26 ENCOUNTER — PRE VISIT (OUTPATIENT)
Dept: UROLOGY | Facility: CLINIC | Age: 56
End: 2021-10-26

## 2021-10-27 ENCOUNTER — OFFICE VISIT (OUTPATIENT)
Dept: UROLOGY | Facility: CLINIC | Age: 56
End: 2021-10-27
Payer: MEDICARE

## 2021-10-27 VITALS
DIASTOLIC BLOOD PRESSURE: 91 MMHG | SYSTOLIC BLOOD PRESSURE: 148 MMHG | HEART RATE: 75 BPM | BODY MASS INDEX: 27.35 KG/M2 | HEIGHT: 75 IN | WEIGHT: 220 LBS

## 2021-10-27 DIAGNOSIS — T83.410A BREAKDOWN (MECHANICAL) OF IMPLANTED PENILE PROSTHESIS, INITIAL ENCOUNTER (H): ICD-10-CM

## 2021-10-27 DIAGNOSIS — N31.9 NEUROGENIC BLADDER: Primary | ICD-10-CM

## 2021-10-27 PROCEDURE — 99024 POSTOP FOLLOW-UP VISIT: CPT | Performed by: UROLOGY

## 2021-10-27 RX ORDER — PNV NO.95/FERROUS FUM/FOLIC AC 28MG-0.8MG
TABLET ORAL
COMMUNITY
Start: 2021-10-14 | End: 2021-11-28

## 2021-10-27 RX ORDER — OXYCODONE HYDROCHLORIDE 5 MG/1
5 TABLET ORAL
Qty: 30 TABLET | Refills: 0 | Status: SHIPPED | OUTPATIENT
Start: 2021-10-27 | End: 2021-11-05

## 2021-10-27 RX ORDER — DOXYCYCLINE 100 MG/1
100 CAPSULE ORAL 2 TIMES DAILY
Qty: 28 CAPSULE | Refills: 0 | Status: SHIPPED | OUTPATIENT
Start: 2021-10-27 | End: 2021-11-28

## 2021-10-27 ASSESSMENT — PAIN SCALES - GENERAL: PAINLEVEL: WORST PAIN (10)

## 2021-10-27 ASSESSMENT — MIFFLIN-ST. JEOR: SCORE: 1923.54

## 2021-10-27 NOTE — PATIENT INSTRUCTIONS
Please schedule a follow-up in-person at 8:45 on November 24th. (One Month Follow-up) Also schedule follow up with the wound clinic at [facility].

## 2021-10-27 NOTE — PROGRESS NOTES
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 He manages his bladder with an SPT (converted from an indwelling urethral muhammad in July 2021). He underwent removal and replacement of IPP 9/20 which was complicated by device infection requiring removal and washout on POD#16. His wound cultures ultimately grew MRSA resistant to bactrim. He was discharged on a 2 week course of doxycycline with drains in place (one in the reservoir space, one in the scrotum).     He has had significant difficulty with transportation to his appointments and this is his first post-hospital follow-up. He finished his antibiotic course 7 days ago- feels like his pain has worsened slightly since then. His drains fell out on their own about a week ago.     He reports he feels terrible. Has significant scrotal and penile pain. A portion of his scrotal wound has opened up and drains serous fluid.     Objective:  There were no vitals taken for this visit.  Gen: In NAD, conversant.  Resp: Breathing non-labored on room air.  CV: Warm and well perfused  Abd: Soft, non-distended, non-tender. Left lower quadrant counter-incision well healed. SPT in place draining clear/yellow urine.   : Penis soft, no crepitus, mild swelling. There is a 2 cm opening along the vertical scrotal incision- approximately 2 cm deep. Well granulated. No purulent material. Scrotum is soft, no crepitus. No signs of residual infection. There are new shallow ulcers along his sacrum, perineum, and over his ischeal tuberosity.   Ext: Missing right leg. No LLE edema.     Current Meds:   ASA/Plavix  Finished Doxycycline course 1 week ago     Labs/Imaging:  None recent.    Assessment & Plan: Josiah Crump is a 55 year old male with a history of SCI with resultant ED  and NGB managed with a chronic SPT. He underwent IPP replacement on 9/20 but the device became infected and he underwent explant on 10/6. Wound cultures with Bactrim-resistant MRSA completed a 2 week course of Doxy on 10/20. The drains fell out approximately 1 week ago. He has persistent pain and new superficial perineal wounds.     His 16 Fr SPT was last replaced 10/6.    PROCEDURE: The patient was prepped and draped in the usual sterile fashion. His old 16 Fr suprapubic tube was removed and a new 16 Fr suprapubic tube was placed without difficulty. The balloon was inflated with 10 mL sterile water and hooked up to a drainage bag.     PLAN:  - SPT replaced today   - Will give 14 days additional doxycycline  - Patient given a limited supply of oxycodone. He was informed this will be his last refill.  - Instructed patient on how to pack his midline scrotal wound daily with gauze  - Will ask patient to get back in touch with [facility] to assist with new perineal/sacral wound care.   - RTC in 1 month    Physician Attestation   I, Charles Lira MD, saw this patient and agree with the findings and plan of care as documented in the note.      Charles Lira MD  Reconstructive Urology

## 2021-10-27 NOTE — NURSING NOTE
"Chief Complaint   Patient presents with     Follow Up     Post op follow up       Height 1.905 m (6' 3\"), weight 99.8 kg (220 lb). Body mass index is 27.5 kg/m .    Patient Active Problem List   Diagnosis     Benign essential hypertension     Chronic pain     General symptom     Gynecomastia     Hx of BKA, right (H)     Hydrocele     Infected penile implant (H)     Insomnia     Lateral epicondylitis     Neurogenic bladder     Other tenosynovitis of hand and wrist     Pain in limb     Paraplegia (H)     Right shoulder strain     Vitamin D deficiency     Status post hip surgery     Heterotopic ossification of bone     Physical deconditioning     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, initial encounter (H)     Spasticity     History of disarticulation of right hip     Injury of thoracic spinal cord, sequela (H)     Urinary incontinence     Breakdown (mechanical) of implanted penile prosthesis, initial encounter (H)     Gluteal cleft wound, right, sequela     Complication of implanted penile prosthesis, initial encounter (H)       No Known Allergies    Current Outpatient Medications   Medication Sig Dispense Refill     acetaminophen (TYLENOL) 325 MG tablet Take 2 tablets (650 mg) by mouth every 6 hours as needed for pain 100 tablet 11     amLODIPine (NORVASC) 5 MG tablet TAKE 1 TABLET(5 MG) BY MOUTH DAILY 90 tablet 1     aspirin (ASA) 81 MG chewable tablet Take 81 mg by mouth daily        baclofen (LIORESAL) 20 MG tablet 1 PO TID PRN SPACTICITY (Patient taking differently: Take 20 mg by mouth 3 times daily as needed for muscle spasms ) 90 tablet 5     clopidogrel (PLAVIX) 75 MG tablet Take 1 tablet (75 mg) by mouth daily (HOLD NOW.  RESTART PLAVIX Thursday 9/23/21) 30 tablet 0     Fesoterodine Fumarate (TOVIAZ) 8 MG TB24 Take 8 mg by mouth daily        oxyCODONE (ROXICODONE) 5 MG tablet Take 1 tablet (5 mg) by mouth every 6 hours as needed for pain 28 tablet 0     oxyCODONE (ROXICODONE) 5 MG tablet " Take 1-2 tablets (5-10 mg) by mouth every 3 hours as needed for pain (Moderate to Severe) 30 tablet 0     senna-docusate (SENOKOT-S/PERICOLACE) 8.6-50 MG tablet Take 1 tablet by mouth 2 times daily To prevent constipation 45 tablet 0     simvastatin (ZOCOR) 20 MG tablet 1 po qd (Patient taking differently: Take 20 mg by mouth At Bedtime ) 90 tablet 1     testosterone cypionate (DEPOTESTOSTERONE) 200 MG/ML injection Inject 0.5 mLs (100 mg) into the muscle every 14 days 3 mL 5     Ferrous Sulfate (IRON) 325 (65 Fe) MG tablet        oxybutynin ER (DITROPAN XL) 15 MG 24 hr tablet Take 15 mg by mouth daily (Patient not taking: Reported on 10/27/2021)       sulfamethoxazole-trimethoprim (BACTRIM DS) 800-160 MG tablet Take 1 tablet by mouth 2 times daily (Patient not taking: Reported on 10/27/2021) 14 tablet 0       Social History     Tobacco Use     Smoking status: Never Smoker     Smokeless tobacco: Never Used   Substance Use Topics     Alcohol use: No     Drug use: No       Nate West EMT  10/27/2021  9:49 AM

## 2021-10-27 NOTE — LETTER
10/27/2021       RE: Josiah Crump  [address]  [place] [address]     Dear Colleague,    Thank you for referring your patient, Josiah Crump, to the [facility] [place] [facility]. Please see a copy of my visit note below.    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 He manages his bladder with an SPT (converted from an indwelling urethral muhammad in July 2021). He underwent removal and replacement of IPP 9/20 which was complicated by device infection requiring removal and washout on POD#16. His wound cultures ultimately grew MRSA resistant to bactrim. He was discharged on a 2 week course of doxycycline with drains in place (one in the reservoir space, one in the scrotum).     He has had significant difficulty with transportation to his appointments and this is his first post-hospital follow-up. He finished his antibiotic course 7 days ago- feels like his pain has worsened slightly since then. His drains fell out on their own about a week ago.     He reports he feels terrible. Has significant scrotal and penile pain. A portion of his scrotal wound has opened up and drains serous fluid.     Objective:  There were no vitals taken for this visit.  Gen: In NAD, conversant.  Resp: Breathing non-labored on room air.  CV: Warm and well perfused  Abd: Soft, non-distended, non-tender. Left lower quadrant counter-incision well healed. SPT in place draining clear/yellow urine.   : Penis soft, no crepitus, mild swelling. There is a 2 cm opening along the vertical scrotal incision- approximately 2 cm deep. Well granulated. No purulent material. Scrotum is soft, no crepitus. No signs of residual infection. There  are new shallow ulcers along his sacrum, perineum, and over his ischeal tuberosity.   Ext: Missing right leg. No LLE edema.     Current Meds:   ASA/Plavix  Finished Doxycycline course 1 week ago     Labs/Imaging:  None recent.    Assessment & Plan: Josiah Crump is a 55 year old male with a history of SCI with resultant ED and NGB managed with a chronic SPT. He underwent IPP replacement on 9/20 but the device became infected and he underwent explant on 10/6. Wound cultures with Bactrim-resistant MRSA completed a 2 week course of Doxy on 10/20. The drains fell out approximately 1 week ago. He has persistent pain and new superficial perineal wounds.     His 16 Fr SPT was last replaced 10/6.    PROCEDURE: The patient was prepped and draped in the usual sterile fashion. His old 16 Fr suprapubic tube was removed and a new 16 Fr suprapubic tube was placed without difficulty. The balloon was inflated with 10 mL sterile water and hooked up to a drainage bag.     PLAN:  - SPT replaced today   - Will give 14 days additional doxycycline  - Patient given a limited supply of oxycodone. He was informed this will be his last refill.  - Instructed patient on how to pack his midline scrotal wound daily with gauze  - Will ask patient to get back in touch with [facility] to assist with new perineal/sacral wound care.   - RTC in 1 month    Physician Attestation   I, Charles Lira MD, saw this patient and agree with the findings and plan of care as documented in the note.      Charles Lira MD  Reconstructive Urology

## 2021-10-29 ENCOUNTER — TELEPHONE (OUTPATIENT)
Dept: FAMILY MEDICINE | Facility: CLINIC | Age: 56
End: 2021-10-29

## 2021-10-29 NOTE — TELEPHONE ENCOUNTER
Reason for Call:  Other   Detailed comments: pt would like to come  a copy of his last covid   Negative results  Phone Number Patient can be reached at: Home number on file [phone] (home)    Best Time:he would like to stop by today  Can we leave a detailed message on this number? YES    Call taken on 10/29/2021 at 8:25 AM by Mary Lindsey

## 2021-10-29 NOTE — TELEPHONE ENCOUNTER
Pt called stating he's W/C bound and is at the front of the clinic. He asked for  to bring ltr out to him. TC called over to clinic and spoke to Isa who will bring to pt.

## 2021-10-30 ENCOUNTER — HEALTH MAINTENANCE LETTER (OUTPATIENT)
Age: 56
End: 2021-10-30

## 2021-11-02 ENCOUNTER — TELEPHONE (OUTPATIENT)
Dept: WOUND CARE | Facility: CLINIC | Age: 56
End: 2021-11-02

## 2021-11-02 NOTE — TELEPHONE ENCOUNTER
Josiah called because his wound is worse and he is in a lot of pain and wants to see asap.   Call him at .

## 2021-11-03 ENCOUNTER — PRE VISIT (OUTPATIENT)
Dept: UROLOGY | Facility: CLINIC | Age: 56
End: 2021-11-03

## 2021-11-03 NOTE — TELEPHONE ENCOUNTER
Reason for visit: Post op follow up     Relevant information: s/p removal of IPP; 1 month follow up    Records/imaging/labs/orders: in EPIC    Pt called: no    At Rooming: normal

## 2021-11-05 DIAGNOSIS — T83.410A BREAKDOWN (MECHANICAL) OF IMPLANTED PENILE PROSTHESIS, INITIAL ENCOUNTER (H): ICD-10-CM

## 2021-11-05 NOTE — TELEPHONE ENCOUNTER
oxyCODONE (ROXICODONE) 5 MG tablet      Last Written Prescription Date:  10/27/21  Last Fill Quantity: 30,   # refills: 0  Last Office Visit : 10/27/21  Future Office visit:  11/24/21    Routing refill request to provider for review/approval because:  Drug controlled substance

## 2021-11-05 NOTE — TELEPHONE ENCOUNTER
[facility] Call Center    Phone Message    May a detailed message be left on voicemail: yes     Reason for Call: Medication Refill Request    Has the patient contacted the pharmacy for the refill? Yes   Name of medication being requested: Oxycodone  Provider who prescribed the medication: Dr. Lira  Pharmacy: [facility]  Date medication is needed: 11/05/2021   Pt is out of pain meds and needs a refill asap      Action Taken: Message routed to:  [facility]    Travel Screening: Not Applicable

## 2021-11-05 NOTE — TELEPHONE ENCOUNTER
[facility] Call Center     Phone Message     May a detailed message be left on voicemail: yes      Reason for Call: Medication Refill Request    Has the patient contacted the pharmacy for the refill? Yes   Name of medication being requested: Oxycodone  Provider who prescribed the medication: Dr. Lira  Pharmacy: [facility]  Date medication is needed: 11/05/2021              Pt calling back as he has not received notifiation about this refill.   Pt is out of pain meds and needs a refill asap.         Action Taken: Message routed to:  [facility] Uro     Travel Screening: Not Applicable

## 2021-11-10 ENCOUNTER — TELEPHONE (OUTPATIENT)
Dept: FAMILY MEDICINE | Facility: CLINIC | Age: 56
End: 2021-11-10
Payer: MEDICARE

## 2021-11-10 NOTE — TELEPHONE ENCOUNTER
seating eval  for his wheelchair for [place] [phone]  Fax [phone]   Needs urgent please the seat has cracked

## 2021-11-10 NOTE — TELEPHONE ENCOUNTER
I dictated a letter.    Please print off the letter which is the order and fax it to the appropriate place

## 2021-11-10 NOTE — LETTER
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

## 2021-11-10 NOTE — TELEPHONE ENCOUNTER
Pt is calling again, stating he is in a lot of pain, and would like a refill of Oxycodone sent to [facility] [address]  [place] [address], he doesn't have an appt with the wound clinic until 12/7 and has been taking ibuprofen with no luck, please call diane asap thank you

## 2021-11-10 NOTE — TELEPHONE ENCOUNTER
Reason for Call:  Other RX needed     Detailed comments: seating eval  for his wheelchair for [place] [facility] [phone]  Fax [phone]   Needs urgent please the seat has cracked     Phone Number Patient can be reached at: Home number on file [phone] (home)    Best Time: asap please   Can we leave a detailed message on this number? YES    Call taken on 11/10/2021 at 2:52 PM by Mary Lindsey

## 2021-11-11 ENCOUNTER — TELEPHONE (OUTPATIENT)
Dept: FAMILY MEDICINE | Facility: CLINIC | Age: 56
End: 2021-11-11
Payer: MEDICARE

## 2021-11-11 DIAGNOSIS — N39.0 URINARY TRACT INFECTION WITHOUT HEMATURIA, SITE UNSPECIFIED: Primary | ICD-10-CM

## 2021-11-11 RX ORDER — CIPROFLOXACIN 500 MG/1
500 TABLET, FILM COATED ORAL 2 TIMES DAILY
Qty: 20 TABLET | Refills: 0 | Status: SHIPPED | OUTPATIENT
Start: 2021-11-11 | End: 2021-11-28

## 2021-11-11 NOTE — TELEPHONE ENCOUNTER
Reason for call:  Patient reporting a symptom    Symptom or request: dark cloudy urine a little bit of burning   Has frequent UTI s  he believes this is what he has he would like a antibiotic called into [facility]. Please     Duration (how long have symptoms been present): today    Have you been treated for this before? Yes    Additional comments: no    Phone Number patient can be reached at:  Home number on file [phone] (home)    Best Time:  anytime    Can we leave a detailed message on this number:  YES    Call taken on 11/11/2021 at 12:00 PM by Mary Lindsey

## 2021-11-11 NOTE — TELEPHONE ENCOUNTER
I sent a prescription to his pharmacy for Cipro 500 mg 1 tablet twice a day for 10 days.  If not better he should be seen

## 2021-11-15 RX ORDER — OXYCODONE HYDROCHLORIDE 5 MG/1
5 TABLET ORAL
Qty: 30 TABLET | Refills: 0 | Status: SHIPPED | OUTPATIENT
Start: 2021-11-15 | End: 2021-12-15

## 2021-11-17 ENCOUNTER — MEDICAL CORRESPONDENCE (OUTPATIENT)
Dept: HEALTH INFORMATION MANAGEMENT | Facility: CLINIC | Age: 56
End: 2021-11-17
Payer: MEDICARE

## 2021-11-23 ENCOUNTER — MEDICAL CORRESPONDENCE (OUTPATIENT)
Dept: HEALTH INFORMATION MANAGEMENT | Facility: CLINIC | Age: 56
End: 2021-11-23
Payer: MEDICARE

## 2021-11-24 ENCOUNTER — OFFICE VISIT (OUTPATIENT)
Dept: FAMILY MEDICINE | Facility: CLINIC | Age: 56
End: 2021-11-24
Payer: MEDICARE

## 2021-11-24 VITALS — TEMPERATURE: 98.2 F | SYSTOLIC BLOOD PRESSURE: 152 MMHG | HEART RATE: 61 BPM | DIASTOLIC BLOOD PRESSURE: 83 MMHG

## 2021-11-24 DIAGNOSIS — T83.61XS: Primary | ICD-10-CM

## 2021-11-24 DIAGNOSIS — Z71.85 IMMUNIZATION COUNSELING: ICD-10-CM

## 2021-11-24 DIAGNOSIS — R73.9 HYPERGLYCEMIA: ICD-10-CM

## 2021-11-24 DIAGNOSIS — N31.9 NEUROGENIC BLADDER: ICD-10-CM

## 2021-11-24 DIAGNOSIS — D64.9 ANEMIA, UNSPECIFIED TYPE: ICD-10-CM

## 2021-11-24 DIAGNOSIS — G82.20 PARAPLEGIA (H): ICD-10-CM

## 2021-11-24 LAB
ERYTHROCYTE [DISTWIDTH] IN BLOOD BY AUTOMATED COUNT: 19.5 % (ref 10–15)
FERRITIN SERPL-MCNC: 91 NG/ML (ref 27–300)
GLUCOSE BLD-MCNC: 101 MG/DL (ref 60–99)
HBA1C MFR BLD: 5 % (ref 0–5.6)
HCT VFR BLD AUTO: 42.8 % (ref 40–53)
HGB BLD-MCNC: 12.5 G/DL (ref 13.3–17.7)
IRON SERPL-MCNC: 33 UG/DL (ref 42–175)
MCH RBC QN AUTO: 21.6 PG (ref 26.5–33)
MCHC RBC AUTO-ENTMCNC: 29.2 G/DL (ref 31.5–36.5)
MCV RBC AUTO: 74 FL (ref 78–100)
PLATELET # BLD AUTO: 228 10E3/UL (ref 150–450)
RBC # BLD AUTO: 5.8 10E6/UL (ref 4.4–5.9)
WBC # BLD AUTO: 5.8 10E3/UL (ref 4–11)

## 2021-11-24 PROCEDURE — 0004A PR COVID VAC PFIZER DIL RECON 30 MCG/0.3 ML IM: CPT | Performed by: FAMILY MEDICINE

## 2021-11-24 PROCEDURE — 82728 ASSAY OF FERRITIN: CPT | Performed by: FAMILY MEDICINE

## 2021-11-24 PROCEDURE — 83036 HEMOGLOBIN GLYCOSYLATED A1C: CPT | Performed by: FAMILY MEDICINE

## 2021-11-24 PROCEDURE — 99214 OFFICE O/P EST MOD 30 MIN: CPT | Mod: 25 | Performed by: FAMILY MEDICINE

## 2021-11-24 PROCEDURE — 91300 PR COVID VAC PFIZER DIL RECON 30 MCG/0.3 ML IM: CPT | Performed by: FAMILY MEDICINE

## 2021-11-24 PROCEDURE — 82947 ASSAY GLUCOSE BLOOD QUANT: CPT | Performed by: FAMILY MEDICINE

## 2021-11-24 PROCEDURE — 85027 COMPLETE CBC AUTOMATED: CPT | Performed by: FAMILY MEDICINE

## 2021-11-24 PROCEDURE — 90682 RIV4 VACC RECOMBINANT DNA IM: CPT | Performed by: FAMILY MEDICINE

## 2021-11-24 PROCEDURE — 83540 ASSAY OF IRON: CPT | Performed by: FAMILY MEDICINE

## 2021-11-24 PROCEDURE — G0008 ADMIN INFLUENZA VIRUS VAC: HCPCS | Performed by: FAMILY MEDICINE

## 2021-11-24 PROCEDURE — 36415 COLL VENOUS BLD VENIPUNCTURE: CPT | Performed by: FAMILY MEDICINE

## 2021-11-24 NOTE — LETTER
November 29, 2021      Josiah ADAME Theron  [address]   [place] [address]        Dear ,    We are writing to inform you of your test results.    Your test results fall within the expected range(s) or remain unchanged from previous results.  Please continue with current treatment plan.    Resulted Orders   Glucose, whole blood   Result Value Ref Range    Glucose Whole Blood 101 (H) 60 - 99 mg/dL   Iron   Result Value Ref Range    Iron 33 (L) 42 - 175 ug/dL   Ferritin   Result Value Ref Range    Ferritin 91 27 - 300 ng/mL   CBC with platelets   Result Value Ref Range    WBC Count 5.8 4.0 - 11.0 10e3/uL    RBC Count 5.80 4.40 - 5.90 10e6/uL    Hemoglobin 12.5 (L) 13.3 - 17.7 g/dL    Hematocrit 42.8 40.0 - 53.0 %    MCV 74 (L) 78 - 100 fL    MCH 21.6 (L) 26.5 - 33.0 pg    MCHC 29.2 (L) 31.5 - 36.5 g/dL    RDW 19.5 (H) 10.0 - 15.0 %    Platelet Count 228 150 - 450 10e3/uL   Hemoglobin A1c   Result Value Ref Range    Hemoglobin A1C 5.0 0.0 - 5.6 %      Comment:      Normal <5.7%   Prediabetes 5.7-6.4%    Diabetes 6.5% or higher     Note: Adopted from ADA consensus guidelines.       If you have any questions or concerns, please call the clinic at the number listed above.       Sincerely,      Marvel Petit MD

## 2021-11-24 NOTE — PROGRESS NOTES
Assessment & Plan        ICD-10-CM    1. Infection of penile implant, sequela  T83.61XS    2. Neurogenic bladder  N31.9    3. Paraplegia (H)  G82.20    4. Hyperglycemia  R73.9 Glucose, whole blood     Hemoglobin A1c   5. Anemia, unspecified type  D64.9 Iron     Ferritin     CBC with platelets   6. Immunization counseling  Z71.85         History of infected penile implant, implants been removed infection is gone    Suprapubic catheter for neurogenic bladder.    Underlying paraplegia    Hyperglycemia we will check A1c and blood sugar.    Anemia last hemoglobin 9.9 following surgery, will recheck CBC as well as iron and ferritin.  He is not on any iron supplementation.    Immunization counseling patient received a flu shot in the COVID-19 booster, Pfizer.    Patient be contacted with results and discuss follow-up      Return in about 6 months (around 5/24/2022) for Follow up. for recheck.        Subjective:    This 55 year old male was seen today for follow-up.    Patient follows with a urologist Dr. Lira.  He had his implantable penile prosthetic removed because of infection.  He is now done with antibiotics he is on vancomycin.  He had infection in through the penis and scrotum.    Last hemoglobin was 9.9.  He said number of blood sugars in the low 100s is not a diabetic.  I did recheck CBC blood sugar A1c as well as iron and ferritin.    Patient needed a COVID-19 booster, Pfizer as well as flu shot these were both given today.    Otherwise denies additional issues or problems    He has a suprapubic catheter now, he does not need to follow with urology for that as well    Review of Systems    10 point review of systems positive as outlined above otherwise negative    Current Outpatient Medications   Medication     acetaminophen (TYLENOL) 325 MG tablet     amLODIPine (NORVASC) 5 MG tablet     aspirin (ASA) 81 MG chewable tablet     baclofen (LIORESAL) 20 MG tablet     ciprofloxacin (CIPRO) 500 MG tablet      clopidogrel (PLAVIX) 75 MG tablet     doxycycline hyclate (VIBRAMYCIN) 100 MG capsule     Ferrous Sulfate (IRON) 325 (65 Fe) MG tablet     Fesoterodine Fumarate (TOVIAZ) 8 MG TB24     oxybutynin ER (DITROPAN XL) 15 MG 24 hr tablet     oxyCODONE (ROXICODONE) 5 MG tablet     senna-docusate (SENOKOT-S/PERICOLACE) 8.6-50 MG tablet     simvastatin (ZOCOR) 20 MG tablet     testosterone cypionate (DEPOTESTOSTERONE) 200 MG/ML injection     No current facility-administered medications for this visit.       Objective    Vitals: BP (!) 152/83 (BP Location: Left arm, Patient Position: Sitting, Cuff Size: Adult Large)   Pulse 61   Temp 98.2  F (36.8  C) (Temporal)   BMI= There is no height or weight on file to calculate BMI.     Physical Exam    General appearance no acute distress    His blood pressure was elevated today it sounds like he did not take his amlodipine he will be back on that    HEENT unremarkable no nasal drainage no sore throat oropharynx clear    Lungs clear no rales or rhonchi    Abdomen soft nontender he has a suprapubic catheter.    Patient has a paraplegia as before.    Labs CBC blood sugar A1c iron and ferritin pending        Marvel Petit MD

## 2021-11-28 DIAGNOSIS — D64.9 ANEMIA, UNSPECIFIED TYPE: Primary | ICD-10-CM

## 2021-11-28 RX ORDER — PNV NO.95/FERROUS FUM/FOLIC AC 28MG-0.8MG
TABLET ORAL
Qty: 90 TABLET | Refills: 1 | Status: SHIPPED | OUTPATIENT
Start: 2021-11-28 | End: 2022-01-03

## 2021-11-29 ENCOUNTER — PRE VISIT (OUTPATIENT)
Dept: UROLOGY | Facility: CLINIC | Age: 56
End: 2021-11-29
Payer: MEDICARE

## 2021-11-29 ENCOUNTER — TELEPHONE (OUTPATIENT)
Dept: FAMILY MEDICINE | Facility: CLINIC | Age: 56
End: 2021-11-29
Payer: MEDICARE

## 2021-11-29 NOTE — TELEPHONE ENCOUNTER
Reason for visit: Post op follow up     Relevant information: sp cath change    Records/imaging/labs/orders: in EPIC    Pt called: no    At Rooming: cath change supplies; irrigation kit

## 2021-11-29 NOTE — TELEPHONE ENCOUNTER
----- Message from Marvel Petit MD sent at 11/28/2021 11:37 AM CST -----  Please contact this patient, let him know that the iron level was a little bit low.  His hemoglobin which was previously 9.9 is up to 12.5 but is still a little low.  He should go on iron tablets 1 a day I sent a prescription to his pharmacy.Let him know that there was no evidence of diabetes from the blood test.He should follow-up in 3 months for recheck

## 2021-12-01 ENCOUNTER — TELEPHONE (OUTPATIENT)
Dept: UROLOGY | Facility: CLINIC | Age: 56
End: 2021-12-01

## 2021-12-01 NOTE — TELEPHONE ENCOUNTER
Health Call Center    Phone Message    May a detailed message be left on voicemail: yes     Reason for Call: Patient had an appointment today with Dr. Lira for post op cath change.  Patient got called in for orientation on new job.  Writer tried to reschedule but was not able to find anything until February 2022.  Please reach out to patient to reschedule.    Action Taken: Message routed to:  [facility]ology    Travel Screening: Not Applicable

## 2021-12-02 DIAGNOSIS — T83.410A BREAKDOWN (MECHANICAL) OF IMPLANTED PENILE PROSTHESIS, INITIAL ENCOUNTER (H): ICD-10-CM

## 2021-12-02 RX ORDER — OXYCODONE HYDROCHLORIDE 5 MG/1
5 TABLET ORAL
Qty: 30 TABLET | Refills: 0 | OUTPATIENT
Start: 2021-12-02

## 2021-12-02 NOTE — TELEPHONE ENCOUNTER
oxyCODONE (ROXICODONE) 5 MG tablet      Last Written Prescription Date:  11-15-21  Last Fill Quantity: 30,   # refills: 0  Last Office Visit : 10-27-21  Future Office visit:  12-15-21    Routing refill request to provider for review/approval because:  Drug not on the [facility], [facility] or [facility] refill protocol or controlled substance

## 2021-12-02 NOTE — TELEPHONE ENCOUNTER
[facility] Call Center    Phone Message    May a detailed message be left on voicemail: yes     Reason for Call: Medication Refill Request    Has the patient contacted the pharmacy for the refill? Yes   Name of medication being requested: oxyCODONE (ROXICODONE) 5 MG tablet  Provider who prescribed the medication:   Pharmacy: [id number] [address]  Date medication is needed: asap- pt is out       Pt stated this is the last refill he will need from , he has an appt with pain management on dec12th      Action Taken: Message routed to:  [facility] med refills    Travel Screening: Not Applicable

## 2021-12-06 ENCOUNTER — MEDICAL CORRESPONDENCE (OUTPATIENT)
Dept: HEALTH INFORMATION MANAGEMENT | Facility: CLINIC | Age: 56
End: 2021-12-06
Payer: MEDICARE

## 2021-12-06 ENCOUNTER — PRE VISIT (OUTPATIENT)
Dept: UROLOGY | Facility: CLINIC | Age: 56
End: 2021-12-06
Payer: MEDICARE

## 2021-12-06 DIAGNOSIS — E29.1 HYPOGONADISM MALE: ICD-10-CM

## 2021-12-06 NOTE — TELEPHONE ENCOUNTER
Reason for visit: Post op follow up     Relevant information: catheter change    Records/imaging/labs/orders: in EPIC    Pt called: no    At Rooming: catheter change supplies; irrigation supplies

## 2021-12-06 NOTE — TELEPHONE ENCOUNTER
Health Call Center    Phone Message    May a detailed message be left on voicemail: yes     Reason for Call: Medication Refill Request    Has the patient contacted the pharmacy for the refill? Yes   Name of medication being requested: oxyCODONE (ROXICODONE) 5 MG tablet  Provider who prescribed the medication: Dr. Lira  Pharmacy: [id number] [address]  Date medication is needed: ASAP - Pt works a heavy occupation and has been in pain over the weekend.  Please confirm once pain meds have been sent to the pharmacy.  Pt does have an appointment with the pain clinic and a follow up with Dr. Lira on 12/15/21.  Pt is reporting significant pain.  Please call to discuss.       Action Taken: Message routed to:  [facility] uro    Travel Screening: Not Applicable

## 2021-12-07 ENCOUNTER — MEDICAL CORRESPONDENCE (OUTPATIENT)
Dept: HEALTH INFORMATION MANAGEMENT | Facility: CLINIC | Age: 56
End: 2021-12-07
Payer: MEDICARE

## 2021-12-07 RX ORDER — TESTOSTERONE CYPIONATE 200 MG/ML
0.5 INJECTION, SOLUTION INTRAMUSCULAR
Qty: 3 ML | Refills: 5 | Status: SHIPPED | OUTPATIENT
Start: 2021-12-07 | End: 2022-06-13

## 2021-12-07 NOTE — TELEPHONE ENCOUNTER
TESTOSTERONE CYP 200MG/ML SDV 1ML  Last Written Prescription Date:  4/12/2021  Last Fill Quantity: 3,   # refills: 5  Last Office Visit : 4/12/2021  Future Office visit:  None    Routing refill request to provider for review/approval because:  Drug not on the [facility], [facility] or [facility] refill protocol or controlled substance      Eleni Gardner RN  Central Triage Red Flags/Med Refills

## 2021-12-09 ENCOUNTER — NURSE TRIAGE (OUTPATIENT)
Dept: NURSING | Facility: CLINIC | Age: 56
End: 2021-12-09
Payer: MEDICARE

## 2021-12-09 NOTE — TELEPHONE ENCOUNTER
Patient stated he has a super pubic catheder and it fell out.  He has a appointment in the morning.  He would like to know if he can reinsert the super pubic.  I explained he cannot reinsert it.  It can cause a infection if he reinserts it.  He doesn't want to go to ED or UC.  He has some straight caths at home.  He is going to try to straight cath himself through the penis and wait til the morning appointment for the super pubic to be reinserted.  I suggested he go in to get it fixed.  The patient said he was at [facility] and will go home first and try to straight cath himself through the penis.  If it doesnt work he will go to ED or UC.  Went over symptoms to watch for and tried to encourage patient to go in to get replaced.    Darya Pierre RN   12/09/21 6:10 PM  [facility] Nurse Advisor    Reason for Disposition    Catheter was accidentally pulled-out    Additional Information    Negative: Shock suspected (e.g., cold/pale/clammy skin, too weak to stand, low BP, rapid pulse)    Negative: Sounds like a life-threatening emergency to the triager    Negative: [1] Catheter was accidentally pulled-out AND [2] bright red continuous bleeding    Negative: SEVERE abdominal pain    Negative: Fever > 100.4 F (38.0 C)    Negative: [1] Drinking very little AND [2] dehydration suspected (e.g., no urine > 12 hours, very dry mouth, very lightheaded)    Negative: Patient sounds very sick or weak to the triager    Protocols used: URINARY CATHETER SYMPTOMS AND [id number]

## 2021-12-10 ENCOUNTER — APPOINTMENT (OUTPATIENT)
Dept: INTERVENTIONAL RADIOLOGY/VASCULAR | Facility: CLINIC | Age: 56
End: 2021-12-10
Attending: NURSE PRACTITIONER
Payer: MEDICARE

## 2021-12-10 ENCOUNTER — HOSPITAL ENCOUNTER (EMERGENCY)
Facility: CLINIC | Age: 56
Discharge: HOME | End: 2021-12-10
Attending: EMERGENCY MEDICINE | Admitting: EMERGENCY MEDICINE
Payer: MEDICARE

## 2021-12-10 VITALS
BODY MASS INDEX: 27.5 KG/M2 | SYSTOLIC BLOOD PRESSURE: 140 MMHG | OXYGEN SATURATION: 98 % | HEART RATE: 82 BPM | DIASTOLIC BLOOD PRESSURE: 82 MMHG | TEMPERATURE: 98 F | WEIGHT: 220 LBS | RESPIRATION RATE: 20 BRPM

## 2021-12-10 DIAGNOSIS — T83.010A SUPRAPUBIC CATHETER DYSFUNCTION, INITIAL ENCOUNTER (H): ICD-10-CM

## 2021-12-10 LAB
HGB BLD-MCNC: 11.5 G/DL (ref 13.3–17.7)
INR PPP: 1.2 (ref 0.85–1.15)
PLATELET # BLD AUTO: 283 10E3/UL (ref 150–450)

## 2021-12-10 PROCEDURE — 250N000011 HC RX IP 250 OP 636: Performed by: EMERGENCY MEDICINE

## 2021-12-10 PROCEDURE — 258N000003 HC RX IP 258 OP 636: Performed by: NURSE PRACTITIONER

## 2021-12-10 PROCEDURE — 99285 EMERGENCY DEPT VISIT HI MDM: CPT | Mod: 25

## 2021-12-10 PROCEDURE — 272N000116 HC CATH CR1

## 2021-12-10 PROCEDURE — 96375 TX/PRO/DX INJ NEW DRUG ADDON: CPT | Mod: 59

## 2021-12-10 PROCEDURE — 272N000310

## 2021-12-10 PROCEDURE — 85018 HEMOGLOBIN: CPT | Performed by: NURSE PRACTITIONER

## 2021-12-10 PROCEDURE — 51705 CHANGE OF BLADDER TUBE: CPT

## 2021-12-10 PROCEDURE — 250N000011 HC RX IP 250 OP 636: Performed by: NURSE PRACTITIONER

## 2021-12-10 PROCEDURE — 36415 COLL VENOUS BLD VENIPUNCTURE: CPT | Performed by: NURSE PRACTITIONER

## 2021-12-10 PROCEDURE — 96365 THER/PROPH/DIAG IV INF INIT: CPT | Mod: 59

## 2021-12-10 PROCEDURE — C1729 CATH, DRAINAGE: HCPCS

## 2021-12-10 PROCEDURE — 250N000013 HC RX MED GY IP 250 OP 250 PS 637: Performed by: EMERGENCY MEDICINE

## 2021-12-10 PROCEDURE — 272N000569 HC SHEATH CR6

## 2021-12-10 PROCEDURE — 250N000009 HC RX 250: Performed by: RADIOLOGY

## 2021-12-10 PROCEDURE — 85049 AUTOMATED PLATELET COUNT: CPT | Performed by: NURSE PRACTITIONER

## 2021-12-10 PROCEDURE — 85610 PROTHROMBIN TIME: CPT | Performed by: NURSE PRACTITIONER

## 2021-12-10 PROCEDURE — C1769 GUIDE WIRE: HCPCS

## 2021-12-10 PROCEDURE — 255N000002 HC RX 255 OP 636: Performed by: EMERGENCY MEDICINE

## 2021-12-10 RX ORDER — OXYCODONE HYDROCHLORIDE 5 MG/1
5 TABLET ORAL ONCE
Status: COMPLETED | OUTPATIENT
Start: 2021-12-10 | End: 2021-12-10

## 2021-12-10 RX ORDER — LIDOCAINE 40 MG/G
CREAM TOPICAL
Status: DISCONTINUED | OUTPATIENT
Start: 2021-12-10 | End: 2021-12-10 | Stop reason: HOSPADM

## 2021-12-10 RX ORDER — OXYCODONE HYDROCHLORIDE 5 MG/1
5 TABLET ORAL EVERY 6 HOURS PRN
Qty: 12 TABLET | Refills: 0 | Status: SHIPPED | OUTPATIENT
Start: 2021-12-10 | End: 2021-12-15

## 2021-12-10 RX ORDER — MORPHINE SULFATE 4 MG/ML
2 INJECTION, SOLUTION INTRAMUSCULAR; INTRAVENOUS ONCE
Status: COMPLETED | OUTPATIENT
Start: 2021-12-10 | End: 2021-12-10

## 2021-12-10 RX ORDER — CEFTRIAXONE 1 G/1
1 INJECTION, POWDER, FOR SOLUTION INTRAMUSCULAR; INTRAVENOUS
Status: COMPLETED | OUTPATIENT
Start: 2021-12-10 | End: 2021-12-10

## 2021-12-10 RX ORDER — SODIUM CHLORIDE 9 MG/ML
INJECTION, SOLUTION INTRAVENOUS CONTINUOUS
Status: DISCONTINUED | OUTPATIENT
Start: 2021-12-10 | End: 2021-12-10 | Stop reason: HOSPADM

## 2021-12-10 RX ADMIN — SODIUM CHLORIDE 500 ML: 9 INJECTION, SOLUTION INTRAVENOUS at 13:29

## 2021-12-10 RX ADMIN — SODIUM CHLORIDE: 9 INJECTION, SOLUTION INTRAVENOUS at 12:52

## 2021-12-10 RX ADMIN — CEFTRIAXONE 1 G: 1 INJECTION, POWDER, FOR SOLUTION INTRAMUSCULAR; INTRAVENOUS at 12:46

## 2021-12-10 RX ADMIN — LIDOCAINE HYDROCHLORIDE 10 ML: 10 INJECTION, SOLUTION EPIDURAL; INFILTRATION; INTRACAUDAL; PERINEURAL at 13:28

## 2021-12-10 RX ADMIN — IOHEXOL 10 ML: 350 INJECTION, SOLUTION INTRAVENOUS at 13:36

## 2021-12-10 RX ADMIN — MORPHINE SULFATE 2 MG: 4 INJECTION INTRAVENOUS at 12:50

## 2021-12-10 RX ADMIN — OXYCODONE HYDROCHLORIDE 5 MG: 5 TABLET ORAL at 10:50
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
## 2021-12-10 NOTE — PROCEDURES
[facility]    Procedure: Superpubic catheter replacement.     Date/Time: 12/10/2021 1:49 PM  Performed by: Darya Crump DO  Authorized by: Darya Crump DO       UNIVERSAL PROTOCOL   Site Marked: Yes  Prior Images Obtained and Reviewed:  Yes  Required items: Required blood products, implants, devices and special equipment available    Patient identity confirmed:  Verbally with patient, arm band, provided demographic data and hospital-assigned identification number  Patient was reevaluated immediately before administering moderate or deep sedation or anesthesia  Confirmation Checklist:  Patient's identity using two indicators, relevant allergies, procedure was appropriate and matched the consent or emergent situation and correct equipment/implants were available  Time out: Immediately prior to the procedure a time out was called    Universal Protocol: the Joint Commission Universal Protocol was followed    Preparation: Patient was prepped and draped in usual sterile fashion       ANESTHESIA    Anesthesia: Local infiltration  Local Anesthetic:  Lidocaine 1% without epinephrine      SEDATION    Patient Sedated: No    See dictated procedure note for full details.  Findings: 16 [place] suprapubic catheter replacement.     Specimens: none    Complications: None    Condition: Stable    Plan: Ok to discharge       PROCEDURE    Patient Tolerance:  Patient tolerated the procedure well with no immediate complications  Length of time physician/provider present for 1:1 monitoring during sedation: 0

## 2021-12-10 NOTE — PROGRESS NOTES
Patient Name: Josiah Crump  Medical Record Number: [id number]  Today's Date: 12/10/2021    Procedure: Image Guided Suprapubic catheter placement or change  Proceduralist: Dr. Darya Crump  Pathology present: n/a    Procedure Start: 1323  Procedure end: 1334  Sedation medications administered: n/a, pt presented with no     Report given to: PROMISE Lanier ED  : n/a    Other Notes: Pt arrived to IR room #1 from Emergency Dept. Consent reviewed. Pt denies any questions or concerns regarding procedure. Pt positioned supine and monitored per protocol. 16 fr [place] [employer] Suprapubic Tube Lot #[id number].  Pt tolerated procedure without any noted complications. Pt transferred back to Emergency Dept.

## 2021-12-10 NOTE — PROGRESS NOTES
"  Interventional Radiology - Pre Procedure Chart Review  12/10/2021      SPT exchange procedure requested by Dryjanski APRN-CNP.    Patient with PMH of HTN, CVA, thoracic spinal cord injury with subsequent paraplegia and neurogenic bladder.  Urinary system managed by chronic SPT (previous chronic muhammad until 7/2021).      Followed by Dr. Charles Lira (urology).  Last clinic visit 10/27/2021.  SPT exchanged at that point in time.     Presented to [facility] ER 12/10/2021 2/2 concerns that SPT was dislodged after transferring from car to wheelchair.  CIC x1 to drain bladder, has not been able to successfully CIC since 2/2 hx of urethral false passages and failed penile prosthesis.  CC in ER included abd pain, distention, low back pain.    ER doc reports that they have not attempted to place a muhammad or replace the SPT, citing that there is no clear tract appreciated.    They have not placed a muhammad catheter.  RN reports that his incontinent pad is \"very wet\", but is unsure if urine is coming out from urethra or previous SPT site.  Patient is uncomfortable, requiring opioids.     NPO status reviewed: [place]      Clinical notes reviewed.    Pertinent medications reviewed  - Plavix 75mg PO every day  - Aspirin 81 mg PO every day  - No abx    No Known Allergies      LABS:  INR (no units)   Date Value   04/13/2020 1.03   05/23/2019 1.27 (H)       Lab Results   Component Value Date    WBC 5.8 11/24/2021    HGB 12.5 (L) 11/24/2021     11/24/2021       No results found for: CREATININE    No components found for: K      PLAN:  Planning for suprapubic tube exchange in IR. Radiologist to further review procedure with patient.    - Order entered for SPT exchange by this writer  - [facility] called to add patient to schedule this afternoon  - Spoke on phone to ER RN for update, confirmed plan of care  - Spoke on phone to ER MD for update    - No IV in place- ordered for IV placement by ER RN  - Labs ordered (Hgb, INR, plt), assuming " that this will likely require a wire/puncture  - Abx per MAR- Rocephin ordered, assuming that this will likely require a wire/puncture  - Radiologist to consent patient prior to procedure  - Continue to follow with Dr. Lira for ongoing urological assessment and management.         SPRING LAI NP  Interventional Radiology  [phone]

## 2021-12-10 NOTE — ED TRIAGE NOTES
Pt who voids through a supra pubic catheter is here with abdominal pain, back pain and states his kidneys hurt. Tried self cathing at home but it didn't work.

## 2021-12-15 ENCOUNTER — OFFICE VISIT (OUTPATIENT)
Dept: UROLOGY | Facility: CLINIC | Age: 56
End: 2021-12-15
Payer: MEDICARE

## 2021-12-15 VITALS — DIASTOLIC BLOOD PRESSURE: 82 MMHG | HEART RATE: 93 BPM | SYSTOLIC BLOOD PRESSURE: 146 MMHG

## 2021-12-15 DIAGNOSIS — G89.18 POSTOPERATIVE PAIN: Primary | ICD-10-CM

## 2021-12-15 PROCEDURE — 99024 POSTOP FOLLOW-UP VISIT: CPT | Performed by: UROLOGY

## 2021-12-15 RX ORDER — OXYCODONE HYDROCHLORIDE 5 MG/1
5 TABLET ORAL EVERY 6 HOURS PRN
Qty: 60 TABLET | Refills: 0 | Status: SHIPPED | OUTPATIENT
Start: 2021-12-15 | End: 2022-01-11

## 2021-12-15 ASSESSMENT — PAIN SCALES - GENERAL: PAINLEVEL: EXTREME PAIN (8)

## 2021-12-15 NOTE — LETTER
12/15/2021       RE: Josiah Crump  [address]  [place] [address]     Dear Colleague,    Thank you for referring your patient, Josiah Crump, to the [facility] UROLOGY [facility] [place] [facility]. Please see a copy of my visit note below.    Urology Followup Note      Josiah Crump is a 56 year old male with ED.  He has paraplegia, chronic sacral wounds  He underwent an IPP removal and replacement on 9/20/21  Unfortunately, this was complicated by device infection and the penile implant was explanted 10/6/21  He also has an SPT - which fell out, but it was replaced by OSH ER this past week  He has some urinary leakage from his penis.  He has an apt at the wound clinic Jan 2022.  He is doing cap/uncap of his SPT.      Vital signs reviewed    Exam:  Incision clean, dry, intact  No tenderness or evidence of cellulitis  No hematoma  SPT in place  Visibly incontinent from penis.    A/P   Incontinence despite suprapubic tube    I discussed he should really keep his SPT to drainage. This will minimize incontinence. Leg and drainage bags were given to him today    I discussed he just had SPT replaced a few days ago. So I would rather defer changing it for a month.    I discussed he should continue Toviaz.    He requested pain medications. I discussed I am not a chronic pain prescribing MD. He does have followup with his wound clinic and PCP. I did refill him to get him to January 2022.    Followup in 1 month for SPT exchange.    Charles Lira MD

## 2021-12-15 NOTE — PROGRESS NOTES
Urology Followup Note      Josiah Crmup is a 56 year old male with ED.  He has paraplegia, chronic sacral wounds  He underwent an IPP removal and replacement on 9/20/21  Unfortunately, this was complicated by device infection and the penile implant was explanted 10/6/21  He also has an SPT - which fell out, but it was replaced by OSH ER this past week  He has some urinary leakage from his penis.  He has an apt at the wound clinic Jan 2022.  He is doing cap/uncap of his SPT.      Vital signs reviewed    Exam:  Incision clean, dry, intact  No tenderness or evidence of cellulitis  No hematoma  SPT in place  Visibly incontinent from penis.    A/P   Incontinence despite suprapubic tube    I discussed he should really keep his SPT to drainage. This will minimize incontinence. Leg and drainage bags were given to him today    I discussed he just had SPT replaced a few days ago. So I would rather defer changing it for a month.    I discussed he should continue Toviaz.    He requested pain medications. I discussed I am not a chronic pain prescribing MD. He does have followup with his wound clinic and PCP. I did refill him to get him to January 2022.    Followup in 1 month for SPT exchange.    Charles Lira MD

## 2021-12-16 ENCOUNTER — MEDICAL CORRESPONDENCE (OUTPATIENT)
Dept: HEALTH INFORMATION MANAGEMENT | Facility: CLINIC | Age: 56
End: 2021-12-16
Payer: MEDICARE

## 2022-01-03 ENCOUNTER — HOSPITAL ENCOUNTER (INPATIENT)
Facility: CLINIC | Age: 57
LOS: 1 days | Discharge: LEFT BEFORE DISCHARGE | DRG: 372 | End: 2022-01-07
Attending: EMERGENCY MEDICINE | Admitting: HOSPITALIST
Payer: MEDICARE

## 2022-01-03 ENCOUNTER — APPOINTMENT (OUTPATIENT)
Dept: CT IMAGING | Facility: CLINIC | Age: 57
DRG: 372 | End: 2022-01-03
Attending: EMERGENCY MEDICINE
Payer: MEDICARE

## 2022-01-03 DIAGNOSIS — L08.9 INFECTED DECUBITUS ULCER, UNSPECIFIED ULCER STAGE: ICD-10-CM

## 2022-01-03 DIAGNOSIS — L89.90 INFECTED DECUBITUS ULCER, UNSPECIFIED ULCER STAGE: ICD-10-CM

## 2022-01-03 DIAGNOSIS — K62.5 RECTAL BLEEDING: ICD-10-CM

## 2022-01-03 DIAGNOSIS — Z79.01 ANTICOAGULATED: ICD-10-CM

## 2022-01-03 DIAGNOSIS — K65.1 PELVIC ABSCESS IN MALE (H): ICD-10-CM

## 2022-01-03 DIAGNOSIS — S36.60XA: ICD-10-CM

## 2022-01-03 LAB
ABO/RH(D): NORMAL
ANION GAP SERPL CALCULATED.3IONS-SCNC: 13 MMOL/L (ref 5–18)
ANTIBODY SCREEN: NEGATIVE
BASOPHILS # BLD AUTO: 0.1 10E3/UL (ref 0–0.2)
BASOPHILS NFR BLD AUTO: 1 %
BUN SERPL-MCNC: 18 MG/DL (ref 8–22)
CALCIUM SERPL-MCNC: 9.5 MG/DL (ref 8.5–10.5)
CHLORIDE BLD-SCNC: 102 MMOL/L (ref 98–107)
CO2 SERPL-SCNC: 21 MMOL/L (ref 22–31)
CREAT SERPL-MCNC: 0.81 MG/DL (ref 0.7–1.3)
EOSINOPHIL # BLD AUTO: 0.8 10E3/UL (ref 0–0.7)
EOSINOPHIL NFR BLD AUTO: 8 %
ERYTHROCYTE [DISTWIDTH] IN BLOOD BY AUTOMATED COUNT: 18.9 % (ref 10–15)
GFR SERPL CREATININE-BSD FRML MDRD: >90 ML/MIN/1.73M2
GLUCOSE BLD-MCNC: 81 MG/DL (ref 70–125)
HCT VFR BLD AUTO: 41.8 % (ref 40–53)
HGB BLD-MCNC: 11.5 G/DL (ref 13.3–17.7)
HGB BLD-MCNC: 12.2 G/DL (ref 13.3–17.7)
IMM GRANULOCYTES # BLD: 0 10E3/UL
IMM GRANULOCYTES NFR BLD: 0 %
INR PPP: 1.2 (ref 0.85–1.15)
LACTATE SERPL-SCNC: 1.7 MMOL/L (ref 0.7–2)
LYMPHOCYTES # BLD AUTO: 1.8 10E3/UL (ref 0.8–5.3)
LYMPHOCYTES NFR BLD AUTO: 18 %
MAGNESIUM SERPL-MCNC: 2.1 MG/DL (ref 1.8–2.6)
MCH RBC QN AUTO: 21.5 PG (ref 26.5–33)
MCHC RBC AUTO-ENTMCNC: 29.2 G/DL (ref 31.5–36.5)
MCV RBC AUTO: 74 FL (ref 78–100)
MONOCYTES # BLD AUTO: 0.8 10E3/UL (ref 0–1.3)
MONOCYTES NFR BLD AUTO: 8 %
NEUTROPHILS # BLD AUTO: 6.7 10E3/UL (ref 1.6–8.3)
NEUTROPHILS NFR BLD AUTO: 65 %
NRBC # BLD AUTO: 0 10E3/UL
NRBC BLD AUTO-RTO: 0 /100
PLATELET # BLD AUTO: 358 10E3/UL (ref 150–450)
POTASSIUM BLD-SCNC: 3.8 MMOL/L (ref 3.5–5)
RBC # BLD AUTO: 5.68 10E6/UL (ref 4.4–5.9)
SARS-COV-2 RNA RESP QL NAA+PROBE: NEGATIVE
SODIUM SERPL-SCNC: 136 MMOL/L (ref 136–145)
SPECIMEN EXPIRATION DATE: NORMAL
TROPONIN I SERPL-MCNC: <0.01 NG/ML (ref 0–0.29)
WBC # BLD AUTO: 10.1 10E3/UL (ref 4–11)

## 2022-01-03 PROCEDURE — 83735 ASSAY OF MAGNESIUM: CPT | Performed by: EMERGENCY MEDICINE

## 2022-01-03 PROCEDURE — 99285 EMERGENCY DEPT VISIT HI MDM: CPT | Mod: 25

## 2022-01-03 PROCEDURE — 85610 PROTHROMBIN TIME: CPT | Performed by: EMERGENCY MEDICINE

## 2022-01-03 PROCEDURE — 85018 HEMOGLOBIN: CPT | Performed by: EMERGENCY MEDICINE

## 2022-01-03 PROCEDURE — 250N000011 HC RX IP 250 OP 636: Performed by: EMERGENCY MEDICINE

## 2022-01-03 PROCEDURE — 93005 ELECTROCARDIOGRAM TRACING: CPT | Performed by: EMERGENCY MEDICINE

## 2022-01-03 PROCEDURE — C9803 HOPD COVID-19 SPEC COLLECT: HCPCS

## 2022-01-03 PROCEDURE — 86901 BLOOD TYPING SEROLOGIC RH(D): CPT | Performed by: EMERGENCY MEDICINE

## 2022-01-03 PROCEDURE — 96365 THER/PROPH/DIAG IV INF INIT: CPT | Mod: 59

## 2022-01-03 PROCEDURE — 85025 COMPLETE CBC W/AUTO DIFF WBC: CPT | Performed by: EMERGENCY MEDICINE

## 2022-01-03 PROCEDURE — 36415 COLL VENOUS BLD VENIPUNCTURE: CPT | Performed by: EMERGENCY MEDICINE

## 2022-01-03 PROCEDURE — 51040 INCISE & DRAIN BLADDER: CPT

## 2022-01-03 PROCEDURE — 80048 BASIC METABOLIC PNL TOTAL CA: CPT | Performed by: EMERGENCY MEDICINE

## 2022-01-03 PROCEDURE — 74174 CTA ABD&PLVS W/CONTRAST: CPT

## 2022-01-03 PROCEDURE — 84484 ASSAY OF TROPONIN QUANT: CPT | Performed by: EMERGENCY MEDICINE

## 2022-01-03 PROCEDURE — 258N000003 HC RX IP 258 OP 636: Performed by: EMERGENCY MEDICINE

## 2022-01-03 PROCEDURE — 87635 SARS-COV-2 COVID-19 AMP PRB: CPT | Performed by: EMERGENCY MEDICINE

## 2022-01-03 PROCEDURE — 83605 ASSAY OF LACTIC ACID: CPT | Performed by: EMERGENCY MEDICINE

## 2022-01-03 RX ORDER — SODIUM CHLORIDE 9 MG/ML
INJECTION, SOLUTION INTRAVENOUS CONTINUOUS
Status: DISCONTINUED | OUTPATIENT
Start: 2022-01-03 | End: 2022-01-08 | Stop reason: HOSPADM

## 2022-01-03 RX ORDER — HYDROMORPHONE HYDROCHLORIDE 1 MG/ML
0.5 INJECTION, SOLUTION INTRAMUSCULAR; INTRAVENOUS; SUBCUTANEOUS ONCE
Status: COMPLETED | OUTPATIENT
Start: 2022-01-03 | End: 2022-01-03

## 2022-01-03 RX ORDER — PIPERACILLIN SODIUM, TAZOBACTAM SODIUM 3; .375 G/15ML; G/15ML
3.38 INJECTION, POWDER, LYOPHILIZED, FOR SOLUTION INTRAVENOUS ONCE
Status: COMPLETED | OUTPATIENT
Start: 2022-01-03 | End: 2022-01-03

## 2022-01-03 RX ORDER — HYDROMORPHONE HYDROCHLORIDE 1 MG/ML
1 INJECTION, SOLUTION INTRAMUSCULAR; INTRAVENOUS; SUBCUTANEOUS ONCE
Status: COMPLETED | OUTPATIENT
Start: 2022-01-03 | End: 2022-01-03

## 2022-01-03 RX ORDER — OXYCODONE AND ACETAMINOPHEN 5; 325 MG/1; MG/1
1-2 TABLET ORAL EVERY 4 HOURS PRN
Qty: 12 TABLET | Refills: 0 | Status: SHIPPED | OUTPATIENT
Start: 2022-01-03 | End: 2022-01-11

## 2022-01-03 RX ORDER — IOPAMIDOL 755 MG/ML
100 INJECTION, SOLUTION INTRAVASCULAR ONCE
Status: COMPLETED | OUTPATIENT
Start: 2022-01-03 | End: 2022-01-03

## 2022-01-03 RX ADMIN — IOPAMIDOL 100 ML: 755 INJECTION, SOLUTION INTRAVENOUS at 16:58

## 2022-01-03 RX ADMIN — SODIUM CHLORIDE: 9 INJECTION, SOLUTION INTRAVENOUS at 18:48

## 2022-01-03 RX ADMIN — VANCOMYCIN HYDROCHLORIDE 1750 MG: 5 INJECTION, POWDER, LYOPHILIZED, FOR SOLUTION INTRAVENOUS at 20:07

## 2022-01-03 RX ADMIN — PIPERACILLIN AND TAZOBACTAM 3.38 G: 3; .375 INJECTION, POWDER, LYOPHILIZED, FOR SOLUTION INTRAVENOUS at 19:25

## 2022-01-03 RX ADMIN — HYDROMORPHONE HYDROCHLORIDE 0.5 MG: 1 INJECTION, SOLUTION INTRAMUSCULAR; INTRAVENOUS; SUBCUTANEOUS at 23:29

## 2022-01-03 RX ADMIN — SODIUM CHLORIDE 500 ML: 9 INJECTION, SOLUTION INTRAVENOUS at 16:03

## 2022-01-03 RX ADMIN — HYDROMORPHONE HYDROCHLORIDE 1 MG: 1 INJECTION, SOLUTION INTRAMUSCULAR; INTRAVENOUS; SUBCUTANEOUS at 20:02

## 2022-01-03 ASSESSMENT — ENCOUNTER SYMPTOMS
LIGHT-HEADEDNESS: 1
BLOOD IN STOOL: 1
ABDOMINAL PAIN: 1
FATIGUE: 1
FEVER: 0
CARDIOVASCULAR NEGATIVE: 1
RESPIRATORY NEGATIVE: 1

## 2022-01-03 NOTE — ED TRIAGE NOTES
Patient has rectal bleeding for last few hours, rectal pain, and abdominal pain as well. Reports soaking through his pants and onto seat after using rectal stimulator this morning. Feels weak today.

## 2022-01-03 NOTE — PHARMACY-ADMISSION MEDICATION HISTORY
Pharmacy Note - Admission Medication History    Pertinent Provider Information:     ______________________________________________________________________    Prior To Admission (PTA) med list completed and updated in EMR.       PTA Med List   Medication Sig Last Dose    acetaminophen (TYLENOL) 325 MG tablet Take 2 tablets (650 mg) by mouth every 6 hours as needed for pain 1/3/2022 at 11am    amLODIPine (NORVASC) 5 MG tablet TAKE 1 TABLET(5 MG) BY MOUTH DAILY 1/3/2022 at 9am    baclofen (LIORESAL) 20 MG tablet 1 PO TID PRN SPACTICITY (Patient taking differently: Take 20 mg by mouth 3 times daily as needed for muscle spasms 1 PO TID PRN SPACTICITY) 1/3/2022 at 9am    clopidogrel (PLAVIX) 75 MG tablet Take 75 mg by mouth daily  1/3/2022 at 9am    Fesoterodine Fumarate (TOVIAZ) 8 MG TB24 Take 8 mg by mouth daily  1/3/2022 at 9am    oxyCODONE (ROXICODONE) 5 MG tablet Take 1 tablet (5 mg) by mouth every 6 hours as needed for pain Unknown at Unknown time    simvastatin (ZOCOR) 20 MG tablet 1 po qd (Patient taking differently: Take 20 mg by mouth daily 1 po qd) 1/3/2022 at 9am    testosterone cypionate (DEPOTESTOSTERONE) 200 MG/ML injection Inject 0.5 mLs (100 mg) into the muscle every 14 days 1/2/2022 at 6pm       Information source(s): Patient and CareEverywhere/[facility]  Method of interview communication: in-person    Summary of Changes to PTA Med List  New: None  Discontinued: Aspirin 81mg chewable tablet, Ferrous sulfate 325mg tablet, and senna-s 8.6-50mg tablet.  Changed: None    Patient was asked about OTC/herbal products specifically.  PTA med list reflects this.    In the past week, patient estimated taking medication this percent of the time:  greater than 90%.    Allergies were reviewed, assessed, and updated with the patient.      Patient does not use any multi-dose medications prior to admission.    The information provided in this note is only as accurate as the sources available at the time of the  update(s).    Thank you for the opportunity to participate in the care of this patient.    Lamin Rodrigues  1/3/2022 5:17 PM
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
## 2022-01-04 ENCOUNTER — APPOINTMENT (OUTPATIENT)
Dept: CT IMAGING | Facility: CLINIC | Age: 57
DRG: 372 | End: 2022-01-04
Attending: EMERGENCY MEDICINE
Payer: MEDICARE

## 2022-01-04 LAB
ANION GAP SERPL CALCULATED.3IONS-SCNC: 9 MMOL/L (ref 5–18)
BUN SERPL-MCNC: 9 MG/DL (ref 8–22)
CALCIUM SERPL-MCNC: 8.3 MG/DL (ref 8.5–10.5)
CHLORIDE BLD-SCNC: 110 MMOL/L (ref 98–107)
CO2 SERPL-SCNC: 21 MMOL/L (ref 22–31)
CREAT SERPL-MCNC: 0.74 MG/DL (ref 0.7–1.3)
ERYTHROCYTE [DISTWIDTH] IN BLOOD BY AUTOMATED COUNT: 18.2 % (ref 10–15)
ERYTHROCYTE [DISTWIDTH] IN BLOOD BY AUTOMATED COUNT: 18.4 % (ref 10–15)
GFR SERPL CREATININE-BSD FRML MDRD: >90 ML/MIN/1.73M2
GLUCOSE BLD-MCNC: 97 MG/DL (ref 70–125)
HCT VFR BLD AUTO: 35 % (ref 40–53)
HCT VFR BLD AUTO: 35.8 % (ref 40–53)
HGB BLD-MCNC: 10 G/DL (ref 13.3–17.7)
HGB BLD-MCNC: 10.1 G/DL (ref 13.3–17.7)
HGB BLD-MCNC: 10.3 G/DL (ref 13.3–17.7)
HGB BLD-MCNC: 10.5 G/DL (ref 13.3–17.7)
LACTATE SERPL-SCNC: 0.6 MMOL/L (ref 0.7–2)
MAGNESIUM SERPL-MCNC: 2.1 MG/DL (ref 1.8–2.6)
MCH RBC QN AUTO: 21.4 PG (ref 26.5–33)
MCH RBC QN AUTO: 21.6 PG (ref 26.5–33)
MCHC RBC AUTO-ENTMCNC: 28.8 G/DL (ref 31.5–36.5)
MCHC RBC AUTO-ENTMCNC: 28.9 G/DL (ref 31.5–36.5)
MCV RBC AUTO: 74 FL (ref 78–100)
MCV RBC AUTO: 75 FL (ref 78–100)
PLATELET # BLD AUTO: 291 10E3/UL (ref 150–450)
PLATELET # BLD AUTO: 300 10E3/UL (ref 150–450)
POTASSIUM BLD-SCNC: 3.9 MMOL/L (ref 3.5–5)
RBC # BLD AUTO: 4.68 10E6/UL (ref 4.4–5.9)
RBC # BLD AUTO: 4.81 10E6/UL (ref 4.4–5.9)
SODIUM SERPL-SCNC: 140 MMOL/L (ref 136–145)
WBC # BLD AUTO: 6.3 10E3/UL (ref 4–11)
WBC # BLD AUTO: 7.5 10E3/UL (ref 4–11)

## 2022-01-04 PROCEDURE — 83605 ASSAY OF LACTIC ACID: CPT | Performed by: EMERGENCY MEDICINE

## 2022-01-04 PROCEDURE — 72192 CT PELVIS W/O DYE: CPT

## 2022-01-04 PROCEDURE — 83735 ASSAY OF MAGNESIUM: CPT | Performed by: EMERGENCY MEDICINE

## 2022-01-04 PROCEDURE — 80048 BASIC METABOLIC PNL TOTAL CA: CPT | Performed by: EMERGENCY MEDICINE

## 2022-01-04 PROCEDURE — 250N000013 HC RX MED GY IP 250 OP 250 PS 637: Performed by: EMERGENCY MEDICINE

## 2022-01-04 PROCEDURE — 250N000011 HC RX IP 250 OP 636: Performed by: EMERGENCY MEDICINE

## 2022-01-04 PROCEDURE — 96366 THER/PROPH/DIAG IV INF ADDON: CPT

## 2022-01-04 PROCEDURE — 85018 HEMOGLOBIN: CPT | Performed by: EMERGENCY MEDICINE

## 2022-01-04 PROCEDURE — 85027 COMPLETE CBC AUTOMATED: CPT | Performed by: EMERGENCY MEDICINE

## 2022-01-04 PROCEDURE — 36415 COLL VENOUS BLD VENIPUNCTURE: CPT | Performed by: EMERGENCY MEDICINE

## 2022-01-04 PROCEDURE — 258N000003 HC RX IP 258 OP 636: Performed by: EMERGENCY MEDICINE

## 2022-01-04 PROCEDURE — 96365 THER/PROPH/DIAG IV INF INIT: CPT | Mod: 59

## 2022-01-04 RX ORDER — FENTANYL CITRATE 50 UG/ML
75 INJECTION, SOLUTION INTRAMUSCULAR; INTRAVENOUS ONCE
Status: COMPLETED | OUTPATIENT
Start: 2022-01-04 | End: 2022-01-04

## 2022-01-04 RX ORDER — PIPERACILLIN SODIUM, TAZOBACTAM SODIUM 3; .375 G/15ML; G/15ML
3.38 INJECTION, POWDER, LYOPHILIZED, FOR SOLUTION INTRAVENOUS EVERY 6 HOURS
Status: DISCONTINUED | OUTPATIENT
Start: 2022-01-04 | End: 2022-01-05

## 2022-01-04 RX ORDER — CLOPIDOGREL BISULFATE 75 MG/1
75 TABLET ORAL DAILY
Status: DISCONTINUED | OUTPATIENT
Start: 2022-01-04 | End: 2022-01-05

## 2022-01-04 RX ORDER — HYDROMORPHONE HYDROCHLORIDE 1 MG/ML
0.5 INJECTION, SOLUTION INTRAMUSCULAR; INTRAVENOUS; SUBCUTANEOUS ONCE
Status: COMPLETED | OUTPATIENT
Start: 2022-01-04 | End: 2022-01-04

## 2022-01-04 RX ORDER — HYDROMORPHONE HYDROCHLORIDE 1 MG/ML
0.5 INJECTION, SOLUTION INTRAMUSCULAR; INTRAVENOUS; SUBCUTANEOUS EVERY 4 HOURS PRN
Status: DISCONTINUED | OUTPATIENT
Start: 2022-01-04 | End: 2022-01-08 | Stop reason: HOSPADM

## 2022-01-04 RX ORDER — SIMVASTATIN 20 MG
20 TABLET ORAL DAILY
Status: DISCONTINUED | OUTPATIENT
Start: 2022-01-04 | End: 2022-01-08 | Stop reason: HOSPADM

## 2022-01-04 RX ORDER — TOLTERODINE 2 MG/1
4 CAPSULE, EXTENDED RELEASE ORAL DAILY
Status: DISCONTINUED | OUTPATIENT
Start: 2022-01-05 | End: 2022-01-08 | Stop reason: HOSPADM

## 2022-01-04 RX ORDER — AMLODIPINE BESYLATE 5 MG/1
5 TABLET ORAL DAILY
Status: DISCONTINUED | OUTPATIENT
Start: 2022-01-05 | End: 2022-01-07

## 2022-01-04 RX ORDER — BACLOFEN 10 MG/1
20 TABLET ORAL 3 TIMES DAILY PRN
Status: DISCONTINUED | OUTPATIENT
Start: 2022-01-04 | End: 2022-01-08 | Stop reason: HOSPADM

## 2022-01-04 RX ADMIN — BACLOFEN 20 MG: 10 TABLET ORAL at 22:57

## 2022-01-04 RX ADMIN — SODIUM CHLORIDE: 9 INJECTION, SOLUTION INTRAVENOUS at 22:15

## 2022-01-04 RX ADMIN — SODIUM CHLORIDE: 9 INJECTION, SOLUTION INTRAVENOUS at 13:54

## 2022-01-04 RX ADMIN — PIPERACILLIN AND TAZOBACTAM 3.38 G: 3; .375 INJECTION, POWDER, LYOPHILIZED, FOR SOLUTION INTRAVENOUS at 07:57

## 2022-01-04 RX ADMIN — HYDROMORPHONE HYDROCHLORIDE 0.5 MG: 1 INJECTION, SOLUTION INTRAMUSCULAR; INTRAVENOUS; SUBCUTANEOUS at 11:46

## 2022-01-04 RX ADMIN — HYDROMORPHONE HYDROCHLORIDE 0.5 MG: 1 INJECTION, SOLUTION INTRAMUSCULAR; INTRAVENOUS; SUBCUTANEOUS at 15:33

## 2022-01-04 RX ADMIN — HYDROMORPHONE HYDROCHLORIDE 0.5 MG: 1 INJECTION, SOLUTION INTRAMUSCULAR; INTRAVENOUS; SUBCUTANEOUS at 10:27

## 2022-01-04 RX ADMIN — VANCOMYCIN HYDROCHLORIDE 1250 MG: 5 INJECTION, POWDER, LYOPHILIZED, FOR SOLUTION INTRAVENOUS at 11:58

## 2022-01-04 RX ADMIN — SODIUM CHLORIDE: 9 INJECTION, SOLUTION INTRAVENOUS at 05:29

## 2022-01-04 RX ADMIN — HYDROMORPHONE HYDROCHLORIDE 0.5 MG: 1 INJECTION, SOLUTION INTRAMUSCULAR; INTRAVENOUS; SUBCUTANEOUS at 20:53

## 2022-01-04 RX ADMIN — PIPERACILLIN AND TAZOBACTAM 3.38 G: 3; .375 INJECTION, POWDER, LYOPHILIZED, FOR SOLUTION INTRAVENOUS at 20:20

## 2022-01-04 RX ADMIN — PIPERACILLIN AND TAZOBACTAM 3.38 G: 3; .375 INJECTION, POWDER, LYOPHILIZED, FOR SOLUTION INTRAVENOUS at 13:51

## 2022-01-04 RX ADMIN — FENTANYL CITRATE 75 MCG: 50 INJECTION, SOLUTION INTRAMUSCULAR; INTRAVENOUS at 22:56
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
## 2022-01-04 NOTE — DISCHARGE INSTRUCTIONS
As discussed your CT scan was abnormal and will need close follow-up your case was discussed with colorectal surgery at the [facility].  Please take antibiotics as prescribed.  Pain medication as prescribed.  Monitor for escalation of your symptoms and return to your nearest emergency department if you have escalation to your symptoms.

## 2022-01-04 NOTE — ED PROVIDER NOTES
EMERGENCY DEPARTMENT SIGN OUT NOTE        ED COURSE AND MEDICAL DECISION MAKING  Patient was signed out to me by Dr Luís Chu at 11:00AM    In brief, Josiah Crump is a 56 year old male who initially presented with rectal bleeding after digital rectal trauma and attempt to stimulate his colon to stool. Initial work-up notable for a fluid collection adjacent to the rectum with associated colitis. Ongoing mild to moderate bleeding in the emergency department. Serial hemoglobins drifting slowly downward at the time of his plan for admission yesterday evening. Unfortunately due to the pandemic and the critical state of health care in the community there were no beds available for this patient at this facility or at other [facility] or academic facilities. Discussions were had with our general surgery team who felt that the patient required colorectal involvement discussion was held with colorectal from the [facility] as the patient is followed there from other specialty services related his multiple medical issues. The overall plan of care from yesterday evening was for serial hemoglobins continued antibiotics with worked up and continue to place the patient. Attempts since I was last involved in the patient's care for ED to ED transfer were declined beds continued to be unavailable due to the pandemic and state of health care currently in the community. Patient's hemoglobin has appeared to stabilized since I last saw him his initial hemoglobin was 12.2 then it went to 11.5 then to 10.3 at 2 AM and most recently was 10.1 another hemoglobin has been ordered. I did reassess the patient at the time of taking signout again at 11 AM and overall he was appearing well no significant discomfort at this time. We are going to repeat his CT scan to see if there is interval change in the fluid collection in his colitis. This may help us decide on his ultimate disposition given the current hospital bed congestion  issues. He has got no fever his vital signs are normal at this time I discussed with radiology who was concerned with the patient receiving a second intravenous contrast bolus and requested a CT scan be placed without contrast and so was changed to a CT scan pelvis without contrast per their direction and we will await those CT scan results and then update if any change in plan of care at this time.    11:40 AM  PHYSICAL EXAM    VITAL SIGNS: /64   Pulse 68   Temp 98.5  F (36.9  C) (Oral)   Resp 18   Wt 99.8 kg (220 lb)   SpO2 98%   BMI 27.50 kg/m    Constitutional:  Well developed, well nourished, does not appear to be in acute distress  EYES: Conjunctivae clear, no discharge  HENT: Atraumatic, normocephalic, bilateral external ears normal.  Oropharynx moist. Nose normal.   Neck: Normal ROM , Supple   Respiratory:  No respiratory distress, normal nonlabored respirations.   Abdomen: No significant tenderness reproducible on clinical examination although the patient does complain of pain in his rectal region.  Cardiovascular:  Distal perfusion appears intact  Musculoskeletal:  No edema appreciated, Good range of motion in all major joints.   Integument: Decubitus ulceration  Neurologic:  Alert and oriented. No focal deficits noted.  Ambulatory  Psychiatric:  Affect normal, Judgment normal, Mood normal.    At time of sign out, disposition was boarding pending admission.    1:46 PM  Verified with pharmacy that patient's Zosyn and vancomycin are scheduled he is receiving those medication patient's hemoglobin is stabilized now 10.5.  Still with significant discomfort in his rectal and perineal region.  Has decubitus ulcer appreciated yesterday.  Vital signs have been stable.  Holding on his antihypertensive medication as he is not hypertensive here in the emergency department.  Receiving antibiotics and periodic pain management.  Continue to have issues given the healthcare crisis and the pandemic with getting  this patient placed.  As above opted to repeat patient CT scan to assess for interval change which demonstrates worsening findings with a large fluid collection increased inflammation.  I have repaged colorectal surgery at [facility] for discussion on his care.  Continue to work to find placement, as noted several times previously we do not have colorectal surgery at this facility and general surgery here has declined feeling that this particular situation requires colorectal specialty services.  Attempts today have been made for ED to ED transfer to [facility] which was declined in ED to ED transfer to [facility] so that the patient could be in a facility where patient could be evaluated by colorectal specialty services.  Both those transfers were declined by respective facilities due to critical ED boarding status with critically ill patients.  Patient is stable.    2:05 PM  Again discussed with accepting hospital awaiting placement at [facility] patient is excepted by colorectal surgery Dr. Spencer.  CT scan has trended negatively based on the size of the fluid collection.  Repeating some of patient's labs.  Continuing to see for antibiotics.  Vital signs stable.  Continue to monitor closely and will continue to monitor clinical status and continue to update accepting hospital if any additional changes.  Still awaiting placement.    6:05 PM  Unfortunately we have been unable to secure a bed due to the current healthcare crisis patient is continue to board pending placement at the [facility].  Continuing the above plan of care.  Likely patient will be boarding tonight in the emergency department we will continue to work on placement.  Patient will be signed out to oncoming MD for continued management pending placement.    FINAL IMPRESSION    1. Rectal bleeding    2. Traumatic injury of rectum    3. Anticoagulated        ED MEDS  Medications   0.9% sodium chloride BOLUS  (0 mLs Intravenous Stopped 1/3/22 1630)     Followed by   sodium chloride 0.9% infusion ( Intravenous Rate/Dose Verify 1/4/22 1023)   piperacillin-tazobactam (ZOSYN) 3.375 g vial to attach to  mL bag (3.375 g Intravenous Given 1/4/22 0757)   iopamidol (ISOVUE-370) solution 100 mL (100 mLs Intravenous Given 1/3/22 1658)   piperacillin-tazobactam (ZOSYN) 3.375 g vial to attach to  mL bag (0 g Intravenous Stopped 1/3/22 2009)   vancomycin 1750 mg in 0.9% NaCl 500 ml intermittent infusion 1,750 mg (1,750 mg Intravenous Given 1/3/22 2007)   HYDROmorphone (PF) (DILAUDID) injection 1 mg (1 mg Intravenous Given 1/3/22 2002)   HYDROmorphone (PF) (DILAUDID) injection 0.5 mg (0.5 mg Intravenous Given 1/3/22 2329)   HYDROmorphone (PF) (DILAUDID) injection 0.5 mg (0.5 mg Intravenous Given 1/4/22 1027)       LAB  Labs Ordered and Resulted from Time of ED Arrival to Time of ED Departure   INR - Abnormal       Result Value    INR 1.20 (*)    BASIC METABOLIC PANEL - Abnormal    Sodium 136      Potassium 3.8      Chloride 102      Carbon Dioxide (CO2) 21 (*)     Anion Gap 13      Urea Nitrogen 18      Creatinine 0.81      Calcium 9.5      Glucose 81      GFR Estimate >90     CBC WITH PLATELETS AND DIFFERENTIAL - Abnormal    WBC Count 10.1      RBC Count 5.68      Hemoglobin 12.2 (*)     Hematocrit 41.8      MCV 74 (*)     MCH 21.5 (*)     MCHC 29.2 (*)     RDW 18.9 (*)     Platelet Count 358      % Neutrophils 65      % Lymphocytes 18      % Monocytes 8      % Eosinophils 8      % Basophils 1      % Immature Granulocytes 0      NRBCs per 100 WBC 0      Absolute Neutrophils 6.7      Absolute Lymphocytes 1.8      Absolute Monocytes 0.8      Absolute Eosinophils 0.8 (*)     Absolute Basophils 0.1      Absolute Immature Granulocytes 0.0      Absolute NRBCs 0.0     HEMOGLOBIN - Abnormal    Hemoglobin 11.5 (*)    CBC WITH PLATELETS - Abnormal    WBC Count 7.5      RBC Count 4.81      Hemoglobin 10.3 (*)     Hematocrit 35.8 (*)      MCV 74 (*)     MCH 21.4 (*)     MCHC 28.8 (*)     RDW 18.2 (*)     Platelet Count 300     HEMOGLOBIN - Abnormal    Hemoglobin 10.1 (*)    TROPONIN I - Normal    Troponin I <0.01     MAGNESIUM - Normal    Magnesium 2.1     COVID-19 VIRUS (CORONAVIRUS) BY PCR - Normal    SARS CoV2 PCR Negative     LACTIC ACID WHOLE BLOOD - Normal    Lactic Acid 1.7     HEMOGLOBIN   HEMOGLOBIN   TYPE AND SCREEN, ADULT    ABO/RH(D) O POS      Antibody Screen Negative      SPECIMEN EXPIRATION DATE [id number]     ABO/RH TYPE AND SCREEN       RADIOLOGY    CTA Abdomen Pelvis with Contrast   Final Result   IMPRESSION:   1.  New distal colitis. No evidence for active gastrointestinal bleeding.   2.  New presacral and perirectal fluid collection suspicious for abscess. Main pocket of fluid measures approximately 6.6 x 1.8 x 3.0 cm, with short channel-like extensions superiorly and inferiorly along the left pelvis as described.      CT Pelvis Soft Tissue wo Contrast    (Results Pending)       DISCHARGE MEDS  New Prescriptions    AMOXICILLIN-CLAVULANATE (AUGMENTIN) 875-125 MG TABLET    Take 1 tablet by mouth 2 times daily for 7 days    OXYCODONE-ACETAMINOPHEN (PERCOCET) 5-325 MG TABLET    Take 1-2 tablets by mouth every 4 hours as needed for pain         Dariel Fields MD  Emergency Medicine  [facility] EMERGENCY ROOM  [address] [address] [phone]       Dariel Fields MD  01/04/22 4182

## 2022-01-04 NOTE — PHARMACY-VANCOMYCIN DOSING SERVICE
"Pharmacy Vancomycin Note  Date of Service 2022  Patient's  1965   56 year old, male      [employer] Prediction of Current Vancomycin Regimen      Current estimated CrCl = Estimated Creatinine Clearance: 145.5 mL/min (based on SCr of 0.81 mg/dL).    Creatinine for last 3 days  1/3/2022:  3:51 PM Creatinine 0.81 mg/dL    Recent Vancomycin Levels (past 3 days)  No results found for requested labs within last 72 hours.    Vancomycin IV Administrations (past 72 hours)                   vancomycin 1750 mg in 0.9% NaCl 500 ml intermittent infusion 1,750 mg (mg) 1,750 mg Given 22                Nephrotoxins and other renal medications (From now, onward)    Start     Dose/Rate Route Frequency Ordered Stop    22 0700  piperacillin-tazobactam (ZOSYN) 3.375 g vial to attach to  mL bag        Note to Pharmacy: For [facility], [facility] and [facility]: For Zosyn-naive patients, use the \"Zosyn initial dose + extended infusion\" order panel.    3.375 g  over 240 Minutes Intravenous EVERY 6 HOURS 22 0650      22 0000  amoxicillin-clavulanate (AUGMENTIN) 875-125 MG tablet         1 tablet Oral 2 TIMES DAILY 22 2226 01/10/22 2359             Contrast Orders - past 72 hours (72h ago, onward)            Start     Dose/Rate Route Frequency Ordered Stop    22 1700  iopamidol (ISOVUE-370) solution 100 mL         100 mL Intravenous ONCE 22 1658 22 1658          Interpretation of levels and current regimen:  Loading dose: 1750mg 1/3 pm  Regimen: 1250 mg IV every 12 hours.  Start time: 11:38 on 2022  Exposure target: AUC24 (range)400-600 mg/L.hr   AUC24,ss: 471 mg/L.hr  Probability of AUC24 > 400: 67 %  Ctrough,ss: 14.5 mg/L  Probability of Ctrough,ss > 20: 25 %  Probability of nephrotoxicity (Lodise SERVANDO ): 10 %        Plan:  1. Continue 1250mg iv q12hr   2. Vancomycin monitoring method: AUC  3. Vancomycin therapeutic monitoring goal: 400-600 mg*h/L  4. Pharmacy will check " vancomycin levels as appropriate in 1-3 Days.  5. Serum creatinine levels will be ordered a minimum of twice weekly.    Chu Oh RPH

## 2022-01-04 NOTE — PHARMACY-ADMISSION MEDICATION HISTORY
Pharmacy Vancomycin Initial Note  Date of Service January 3, 2022  Patient's  1965  56 year old, male    Indication: Intra-abdominal infection    Current estimated CrCl = Estimated Creatinine Clearance: 145.5 mL/min (based on SCr of 0.81 mg/dL).    Creatinine for last 3 days  1/3/2022:  3:51 PM Creatinine 0.81 mg/dL    Recent Vancomycin Level(s) for last 3 days  No results found for requested labs within last 72 hours.      Vancomycin IV Administrations (past 72 hours)      No vancomycin orders with administrations in past 72 hours.                Nephrotoxins and other renal medications (From now, onward)    Start     Dose/Rate Route Frequency Ordered Stop    22 1930  vancomycin 1750 mg in 0.9% NaCl 500 ml intermittent infusion 1,750 mg         1,750 mg  over 2 Hours Intravenous ONCE 22 1916      22 1900  piperacillin-tazobactam (ZOSYN) 3.375 g vial to attach to  mL bag         3.375 g  over 30 Minutes Intravenous ONCE 22 1848            Contrast Orders - past 72 hours (72h ago, onward)            Start     Dose/Rate Route Frequency Ordered Stop    22 1700  iopamidol (ISOVUE-370) solution 100 mL         100 mL Intravenous ONCE 22 1658 22 1658          Plan:  1. Vancomycin  1750 mg IV x 1.   2. Consult Pharmacy if further dosing needed.    Stephany Molina [facility]

## 2022-01-05 LAB
HGB BLD-MCNC: 10 G/DL (ref 13.3–17.7)
HGB BLD-MCNC: 10.1 G/DL (ref 13.3–17.7)
HGB BLD-MCNC: 10.3 G/DL (ref 13.3–17.7)
HGB BLD-MCNC: 9.5 G/DL (ref 13.3–17.7)
HGB BLD-MCNC: 9.6 G/DL (ref 13.3–17.7)
HGB BLD-MCNC: 9.9 G/DL (ref 13.3–17.7)
VANCOMYCIN SERPL-MCNC: 20.9 MG/L

## 2022-01-05 PROCEDURE — 250N000011 HC RX IP 250 OP 636: Performed by: EMERGENCY MEDICINE

## 2022-01-05 PROCEDURE — 258N000003 HC RX IP 258 OP 636: Performed by: EMERGENCY MEDICINE

## 2022-01-05 PROCEDURE — 36415 COLL VENOUS BLD VENIPUNCTURE: CPT | Performed by: EMERGENCY MEDICINE

## 2022-01-05 PROCEDURE — 250N000013 HC RX MED GY IP 250 OP 250 PS 637: Performed by: EMERGENCY MEDICINE

## 2022-01-05 PROCEDURE — 85018 HEMOGLOBIN: CPT | Performed by: EMERGENCY MEDICINE

## 2022-01-05 PROCEDURE — 80202 ASSAY OF VANCOMYCIN: CPT | Performed by: EMERGENCY MEDICINE

## 2022-01-05 RX ORDER — PIPERACILLIN SODIUM, TAZOBACTAM SODIUM 3; .375 G/15ML; G/15ML
3.38 INJECTION, POWDER, LYOPHILIZED, FOR SOLUTION INTRAVENOUS EVERY 8 HOURS
Status: DISCONTINUED | OUTPATIENT
Start: 2022-01-05 | End: 2022-01-05

## 2022-01-05 RX ORDER — PIPERACILLIN SODIUM, TAZOBACTAM SODIUM 3; .375 G/15ML; G/15ML
3.38 INJECTION, POWDER, LYOPHILIZED, FOR SOLUTION INTRAVENOUS EVERY 8 HOURS
Status: DISCONTINUED | OUTPATIENT
Start: 2022-01-05 | End: 2022-01-08 | Stop reason: HOSPADM

## 2022-01-05 RX ADMIN — HYDROMORPHONE HYDROCHLORIDE 0.5 MG: 1 INJECTION, SOLUTION INTRAMUSCULAR; INTRAVENOUS; SUBCUTANEOUS at 21:55

## 2022-01-05 RX ADMIN — PIPERACILLIN AND TAZOBACTAM 3.38 G: 3; .375 INJECTION, POWDER, LYOPHILIZED, FOR SOLUTION INTRAVENOUS at 02:56

## 2022-01-05 RX ADMIN — HYDROMORPHONE HYDROCHLORIDE 0.5 MG: 1 INJECTION, SOLUTION INTRAMUSCULAR; INTRAVENOUS; SUBCUTANEOUS at 17:53

## 2022-01-05 RX ADMIN — PIPERACILLIN AND TAZOBACTAM 3.38 G: 3; .375 INJECTION, POWDER, LYOPHILIZED, FOR SOLUTION INTRAVENOUS at 08:27

## 2022-01-05 RX ADMIN — SIMVASTATIN 20 MG: 20 TABLET, FILM COATED ORAL at 08:35

## 2022-01-05 RX ADMIN — VANCOMYCIN HYDROCHLORIDE 1250 MG: 5 INJECTION, POWDER, LYOPHILIZED, FOR SOLUTION INTRAVENOUS at 13:35

## 2022-01-05 RX ADMIN — SODIUM CHLORIDE: 9 INJECTION, SOLUTION INTRAVENOUS at 17:05

## 2022-01-05 RX ADMIN — HYDROMORPHONE HYDROCHLORIDE 0.5 MG: 1 INJECTION, SOLUTION INTRAMUSCULAR; INTRAVENOUS; SUBCUTANEOUS at 13:09

## 2022-01-05 RX ADMIN — HYDROMORPHONE HYDROCHLORIDE 0.5 MG: 1 INJECTION, SOLUTION INTRAMUSCULAR; INTRAVENOUS; SUBCUTANEOUS at 00:58

## 2022-01-05 RX ADMIN — PIPERACILLIN AND TAZOBACTAM 3.38 G: 3; .375 INJECTION, POWDER, LYOPHILIZED, FOR SOLUTION INTRAVENOUS at 15:30

## 2022-01-05 RX ADMIN — SODIUM CHLORIDE: 9 INJECTION, SOLUTION INTRAVENOUS at 08:29

## 2022-01-05 RX ADMIN — SIMVASTATIN 20 MG: 20 TABLET, FILM COATED ORAL at 00:50

## 2022-01-05 RX ADMIN — AMLODIPINE BESYLATE 5 MG: 5 TABLET ORAL at 08:35

## 2022-01-05 RX ADMIN — HYDROMORPHONE HYDROCHLORIDE 0.5 MG: 1 INJECTION, SOLUTION INTRAMUSCULAR; INTRAVENOUS; SUBCUTANEOUS at 09:04

## 2022-01-05 RX ADMIN — VANCOMYCIN HYDROCHLORIDE 1250 MG: 5 INJECTION, POWDER, LYOPHILIZED, FOR SOLUTION INTRAVENOUS at 00:57

## 2022-01-05 RX ADMIN — TOLTERODINE 4 MG: 2 CAPSULE, EXTENDED RELEASE ORAL at 08:35

## 2022-01-05 RX ADMIN — BACLOFEN 20 MG: 10 TABLET ORAL at 13:09

## 2022-01-05 RX ADMIN — PIPERACILLIN AND TAZOBACTAM 3.38 G: 3; .375 INJECTION, POWDER, LYOPHILIZED, FOR SOLUTION INTRAVENOUS at 23:04

## 2022-01-05 NOTE — ED PROVIDER NOTES
EMERGENCY DEPARTMENT SIGN OUT NOTE        ED COURSE AND MEDICAL DECISION MAKING  Patient was signed out to me by Dr Dariel Fields at 7:35 PM    In brief, Josiah Crump is a 56 year old male who initially presented rectal bleeding, work-up significant for fluid collection adjacent to the rectum and associated colitis.    At time of signout no true diagnostics were pending.  Patient was continuing to board in the emergency department until appropriate inpatient transfer could occur.  Patient has been accepted for admission to the [facility] by the colorectal service.  Patient is currently getting IV antibiotics including Zosyn and Vanco as well as pain medications.  Hemoglobin has been stable.  Throughout my shift hemoglobins have been stable.  On my exam patient appears comfortable but is endorsing some discomfort, we did supplement his scheduled Dilaudid with one-time dose of fentanyl.  We are still awaiting bed availability at [facility] [place]. Patient signed out to the overnight physician.     10:39 PM patient appears comfortable, vitals within normal limits, he is endorsing breakthrough pain and we will supplement as needed Dilaudid with one-time dose of IV fentanyl.        FINAL IMPRESSION    1. Rectal bleeding    2. Traumatic injury of rectum    3. Anticoagulated        ED MEDS  Medications   0.9% sodium chloride BOLUS (0 mLs Intravenous Stopped 1/3/22 1630)     Followed by   sodium chloride 0.9% infusion ( Intravenous New Bag 1/4/22 1354)   piperacillin-tazobactam (ZOSYN) 3.375 g vial to attach to  mL bag (3.375 g Intravenous Given 1/4/22 1351)   vancomycin 1250 mg in 0.9% NaCl 250 mL intermittent infusion 1,250 mg (0 mg Intravenous Stopped 1/4/22 1328)   HYDROmorphone (PF) (DILAUDID) injection 0.5 mg (0.5 mg Intravenous Given 1/4/22 1533)   iopamidol (ISOVUE-370) solution 100 mL (100 mLs Intravenous Given 1/3/22 1658)   piperacillin-tazobactam (ZOSYN) 3.375 g vial to attach to   mL bag (0 g Intravenous Stopped 1/3/22 2009)   vancomycin 1750 mg in 0.9% NaCl 500 ml intermittent infusion 1,750 mg (1,750 mg Intravenous Given 1/3/22 2007)   HYDROmorphone (PF) (DILAUDID) injection 1 mg (1 mg Intravenous Given 1/3/22 2002)   HYDROmorphone (PF) (DILAUDID) injection 0.5 mg (0.5 mg Intravenous Given 1/3/22 2329)   HYDROmorphone (PF) (DILAUDID) injection 0.5 mg (0.5 mg Intravenous Given 1/4/22 1027)   HYDROmorphone (PF) (DILAUDID) injection 0.5 mg (0.5 mg Intravenous Given 1/4/22 1146)       LAB  Labs Ordered and Resulted from Time of ED Arrival to Time of ED Departure   INR - Abnormal       Result Value    INR 1.20 (*)    BASIC METABOLIC PANEL - Abnormal    Sodium 136      Potassium 3.8      Chloride 102      Carbon Dioxide (CO2) 21 (*)     Anion Gap 13      Urea Nitrogen 18      Creatinine 0.81      Calcium 9.5      Glucose 81      GFR Estimate >90     CBC WITH PLATELETS AND DIFFERENTIAL - Abnormal    WBC Count 10.1      RBC Count 5.68      Hemoglobin 12.2 (*)     Hematocrit 41.8      MCV 74 (*)     MCH 21.5 (*)     MCHC 29.2 (*)     RDW 18.9 (*)     Platelet Count 358      % Neutrophils 65      % Lymphocytes 18      % Monocytes 8      % Eosinophils 8      % Basophils 1      % Immature Granulocytes 0      NRBCs per 100 WBC 0      Absolute Neutrophils 6.7      Absolute Lymphocytes 1.8      Absolute Monocytes 0.8      Absolute Eosinophils 0.8 (*)     Absolute Basophils 0.1      Absolute Immature Granulocytes 0.0      Absolute NRBCs 0.0     HEMOGLOBIN - Abnormal    Hemoglobin 11.5 (*)    CBC WITH PLATELETS - Abnormal    WBC Count 7.5      RBC Count 4.81      Hemoglobin 10.3 (*)     Hematocrit 35.8 (*)     MCV 74 (*)     MCH 21.4 (*)     MCHC 28.8 (*)     RDW 18.2 (*)     Platelet Count 300     HEMOGLOBIN - Abnormal    Hemoglobin 10.1 (*)    HEMOGLOBIN - Abnormal    Hemoglobin 10.5 (*)    HEMOGLOBIN - Abnormal    Hemoglobin 10.1 (*)    CBC WITH PLATELETS - Abnormal    WBC Count 6.3      RBC Count 4.68       Hemoglobin 10.1 (*)     Hematocrit 35.0 (*)     MCV 75 (*)     MCH 21.6 (*)     MCHC 28.9 (*)     RDW 18.4 (*)     Platelet Count 291     LACTIC ACID WHOLE BLOOD - Abnormal    Lactic Acid 0.6 (*)    BASIC METABOLIC PANEL - Abnormal    Sodium 140      Potassium 3.9      Chloride 110 (*)     Carbon Dioxide (CO2) 21 (*)     Anion Gap 9      Urea Nitrogen 9      Creatinine 0.74      Calcium 8.3 (*)     Glucose 97      GFR Estimate >90     TROPONIN I - Normal    Troponin I <0.01     MAGNESIUM - Normal    Magnesium 2.1     COVID-19 VIRUS (CORONAVIRUS) BY PCR - Normal    SARS CoV2 PCR Negative     LACTIC ACID WHOLE BLOOD - Normal    Lactic Acid 1.7     MAGNESIUM - Normal    Magnesium 2.1     HEMOGLOBIN   TYPE AND SCREEN, ADULT    ABO/RH(D) O POS      Antibody Screen Negative      SPECIMEN EXPIRATION DATE [id number]     ABO/RH TYPE AND SCREEN       EKG      RADIOLOGY    CT Pelvis Soft Tissue wo Contrast   Final Result   IMPRESSION:   1.  Bilateral hydroceles, marked scrotal edema and marked penile edema unchanged.   2.  Inflammatory change from the rectosigmoid junction to the anal canal with posterior abscesses and left sinus tract as detailed above unchanged.   3.  Extensive decubitus ulceration posterior to the right acetabulum and right hemipelvic bones within which is an ill-defined thick-walled fluid collection 6 x 4 x 4 cm that has increased in size since 1/3/2022.         CTA Abdomen Pelvis with Contrast   Final Result   IMPRESSION:   1.  New distal colitis. No evidence for active gastrointestinal bleeding.   2.  New presacral and perirectal fluid collection suspicious for abscess. Main pocket of fluid measures approximately 6.6 x 1.8 x 3.0 cm, with short channel-like extensions superiorly and inferiorly along the left pelvis as described.          DISCHARGE MEDS  New Prescriptions    AMOXICILLIN-CLAVULANATE (AUGMENTIN) 875-125 MG TABLET    Take 1 tablet by mouth 2 times daily for 7 days     OXYCODONE-ACETAMINOPHEN (PERCOCET) 5-325 MG TABLET    Take 1-2 tablets by mouth every 4 hours as needed for pain         FRANKY MICHAELS MD  Emergency Medicine  [facility] EMERGENCY ROOM  [address] [address] [phone]       Franky Michaels MD  01/05/22 3044

## 2022-01-05 NOTE — ED PROVIDER NOTES
"TRANSFER OF CARE NOTE ED SIGNOUT      HPI    Patient seen by Dr. Loretta Ashford  For rectal bleeding and signed out care at 6:30 AM.     Pending studies include none. Pending transfer to colorectal capable facility.     Per Dr. Dariel Fields' note: \"Josiah Crump is a 56 year old male with a history of hypertension, CVA, paraplegia, neurogenic bladder, injury of thoracic spinal cord, spasticity, s/p complete right lower extremity amputation, and s/p suprapubic catheter who presents to the ED  for evaluation of rectal bleeding.  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  He inserted a finger into his rectum today in an attempt to stool and had immediate onset of pain followed by blood.  He is chronically anticoagulated on pelvics.  The bleeding continued into this afternoon.  He feels lightheaded and weak.  No chest pain no shortness of breath.  He has never had this happen before.  Multiple medical issues.  Currently patient reports abdominal pain and rectal pain and continued bleeding.  Denies additional symptoms.\"    PHYSICAL EXAM      VITAL SIGNS: /79   Pulse 80   Temp 98.1  F (36.7  C) (Oral)   Resp 28   Wt 99.8 kg (220 lb)   SpO2 98%   BMI 27.50 kg/m    Repeat exam reveals     I met with patient.  He is awake alert.  There is rectal bleeding but there is no oozing.  No soaking of the diaper. Patient stopped Plavix as soon as he noticed the bleeding.  He was ordered for patient Plavix.  This was discontinue until no longer having active bleeding.  We will continue Zosyn and vancomycin.      LABS  Pertinent lab results reviewed in chart.  Results for orders placed or performed during the hospital encounter of 01/03/22   CTA Abdomen Pelvis with Contrast    Impression    IMPRESSION:  1.  New distal colitis. No evidence for active gastrointestinal " bleeding.  2.  New presacral and perirectal fluid collection suspicious for abscess. Main pocket of fluid measures approximately 6.6 x 1.8 x 3.0 cm, with short channel-like extensions superiorly and inferiorly along the left pelvis as described.   CT Pelvis Soft Tissue wo Contrast    Impression    IMPRESSION:  1.  Bilateral hydroceles, marked scrotal edema and marked penile edema unchanged.  2.  Inflammatory change from the rectosigmoid junction to the anal canal with posterior abscesses and left sinus tract as detailed above unchanged.  3.  Extensive decubitus ulceration posterior to the right acetabulum and right hemipelvic bones within which is an ill-defined thick-walled fluid collection 6 x 4 x 4 cm that has increased in size since 1/3/2022.     Result Value Ref Range    INR 1.20 (H) 0.85 - 1.15   Basic metabolic panel   Result Value Ref Range    Sodium 136 136 - 145 mmol/L    Potassium 3.8 3.5 - 5.0 mmol/L    Chloride 102 98 - 107 mmol/L    Carbon Dioxide (CO2) 21 (L) 22 - 31 mmol/L    Anion Gap 13 5 - 18 mmol/L    Urea Nitrogen 18 8 - 22 mg/dL    Creatinine 0.81 0.70 - 1.30 mg/dL    Calcium 9.5 8.5 - 10.5 mg/dL    Glucose 81 70 - 125 mg/dL    GFR Estimate >90 >60 mL/min/1.73m2   Result Value Ref Range    Troponin I <0.01 0.00 - 0.29 ng/mL   Result Value Ref Range    Magnesium 2.1 1.8 - 2.6 mg/dL   Asymptomatic COVID-19 Virus (Coronavirus) by PCR Nasopharyngeal    Specimen: Nasopharyngeal; Swab   Result Value Ref Range    SARS CoV2 PCR Negative Negative   CBC with platelets and differential   Result Value Ref Range    WBC Count 10.1 4.0 - 11.0 10e3/uL    RBC Count 5.68 4.40 - 5.90 10e6/uL    Hemoglobin 12.2 (L) 13.3 - 17.7 g/dL    Hematocrit 41.8 40.0 - 53.0 %    MCV 74 (L) 78 - 100 fL    MCH 21.5 (L) 26.5 - 33.0 pg    MCHC 29.2 (L) 31.5 - 36.5 g/dL    RDW 18.9 (H) 10.0 - 15.0 %    Platelet Count 358 150 - 450 10e3/uL    % Neutrophils 65 %    % Lymphocytes 18 %    % Monocytes 8 %    % Eosinophils 8 %    %  Basophils 1 %    % Immature Granulocytes 0 %    NRBCs per 100 WBC 0 <1 /100    Absolute Neutrophils 6.7 1.6 - 8.3 10e3/uL    Absolute Lymphocytes 1.8 0.8 - 5.3 10e3/uL    Absolute Monocytes 0.8 0.0 - 1.3 10e3/uL    Absolute Eosinophils 0.8 (H) 0.0 - 0.7 10e3/uL    Absolute Basophils 0.1 0.0 - 0.2 10e3/uL    Absolute Immature Granulocytes 0.0 <=0.4 10e3/uL    Absolute NRBCs 0.0 10e3/uL   Result Value Ref Range    Hemoglobin 11.5 (L) 13.3 - 17.7 g/dL   Lactic acid whole blood   Result Value Ref Range    Lactic Acid 1.7 0.7 - 2.0 mmol/L   CBC (+ platelets, no diff)   Result Value Ref Range    WBC Count 7.5 4.0 - 11.0 10e3/uL    RBC Count 4.81 4.40 - 5.90 10e6/uL    Hemoglobin 10.3 (L) 13.3 - 17.7 g/dL    Hematocrit 35.8 (L) 40.0 - 53.0 %    MCV 74 (L) 78 - 100 fL    MCH 21.4 (L) 26.5 - 33.0 pg    MCHC 28.8 (L) 31.5 - 36.5 g/dL    RDW 18.2 (H) 10.0 - 15.0 %    Platelet Count 300 150 - 450 10e3/uL   Result Value Ref Range    Hemoglobin 10.1 (L) 13.3 - 17.7 g/dL   Result Value Ref Range    Hemoglobin 10.5 (L) 13.3 - 17.7 g/dL   Result Value Ref Range    Hemoglobin 10.1 (L) 13.3 - 17.7 g/dL   CBC (+ platelets, no diff)   Result Value Ref Range    WBC Count 6.3 4.0 - 11.0 10e3/uL    RBC Count 4.68 4.40 - 5.90 10e6/uL    Hemoglobin 10.1 (L) 13.3 - 17.7 g/dL    Hematocrit 35.0 (L) 40.0 - 53.0 %    MCV 75 (L) 78 - 100 fL    MCH 21.6 (L) 26.5 - 33.0 pg    MCHC 28.9 (L) 31.5 - 36.5 g/dL    RDW 18.4 (H) 10.0 - 15.0 %    Platelet Count 291 150 - 450 10e3/uL   Lactic acid whole blood   Result Value Ref Range    Lactic Acid 0.6 (L) 0.7 - 2.0 mmol/L   Basic metabolic panel   Result Value Ref Range    Sodium 140 136 - 145 mmol/L    Potassium 3.9 3.5 - 5.0 mmol/L    Chloride 110 (H) 98 - 107 mmol/L    Carbon Dioxide (CO2) 21 (L) 22 - 31 mmol/L    Anion Gap 9 5 - 18 mmol/L    Urea Nitrogen 9 8 - 22 mg/dL    Creatinine 0.74 0.70 - 1.30 mg/dL    Calcium 8.3 (L) 8.5 - 10.5 mg/dL    Glucose 97 70 - 125 mg/dL    GFR Estimate >90 >60  mL/min/1.73m2   Result Value Ref Range    Magnesium 2.1 1.8 - 2.6 mg/dL   Result Value Ref Range    Hemoglobin 10.0 (L) 13.3 - 17.7 g/dL   Result Value Ref Range    Hemoglobin 9.5 (L) 13.3 - 17.7 g/dL   Result Value Ref Range    Hemoglobin 9.6 (L) 13.3 - 17.7 g/dL   Result Value Ref Range    Vancomycin 20.9   mg/L   Result Value Ref Range    Hemoglobin 10.3 (L) 13.3 - 17.7 g/dL   Adult Type and Screen   Result Value Ref Range    ABO/RH(D) O POS     Antibody Screen Negative Negative    SPECIMEN EXPIRATION DATE [id number]          RADIOLOGY  CT Pelvis Soft Tissue wo Contrast   Final Result   IMPRESSION:   1.  Bilateral hydroceles, marked scrotal edema and marked penile edema unchanged.   2.  Inflammatory change from the rectosigmoid junction to the anal canal with posterior abscesses and left sinus tract as detailed above unchanged.   3.  Extensive decubitus ulceration posterior to the right acetabulum and right hemipelvic bones within which is an ill-defined thick-walled fluid collection 6 x 4 x 4 cm that has increased in size since 1/3/2022.         CTA Abdomen Pelvis with Contrast   Final Result   IMPRESSION:   1.  New distal colitis. No evidence for active gastrointestinal bleeding.   2.  New presacral and perirectal fluid collection suspicious for abscess. Main pocket of fluid measures approximately 6.6 x 1.8 x 3.0 cm, with short channel-like extensions superiorly and inferiorly along the left pelvis as described.           ED COURSE & MEDICAL DECISION MAKING    6:30 AM Patient was signed out to me by Dr. Loretta Ashford.    8:38 AM I checked in on the patient.   Bleeding noticed no soaking of the diaper.  Will discontinue temporarily his anticoagulation medication Plavix.  Pharmacy consult requested to review his medications.  We will asked charge nurse to see about bed placement again.    8:45 AM Per nursing, the [facility] reports they currently have no available colorectal  beds.    10:00 AM repeat hemoglobin  10.3     11:01 AM repeat hemoglobin 10.3. This had remained stable. Patient continue to board until a hospital bed is available in a pleasant has colorectal surgery    1:26 PM at the time of this dictation no beds are available for patient.  Most recent hemoglobin 10.3.   Pharmacy has already reviewed patient's vancomycin level and this Zosyn is already ordered as scheduled.  Patient's Zosyn and vancomycin as needed for the fluid collection.    Patient  will be signed out to the oncoming physician at 1430.      At the conclusion of the encounter I discussed  the results of all of the tests and the disposition.   All questions were answered.  The patent acknowledged understanding and was agreeable with the care plan.      IMPRESSION    56-year-old male here with rectal bleeding after digital manipulation for constipation.  Anticoagulated.  Decrease in hemoglobin from 12-9.  Found to have a fluid collection uninfected with this ulcer.  Started on Zosyn.  Awaiting transfer with colorectal surgery is available.      FINAL DIAGNOSIS:   1. Rectal bleeding    2. Traumatic injury of rectum    3. Anticoagulated    4. Infected decubitus ulcer, unspecified ulcer stage          I, Preston Morgan, am serving as a scribe to document services personally performed by Anette Liriano MD based on my observations and the provider's statements to me.  I, Anette Liriano MD, attest that Preston Morgan is acting in a scribe capacity, has observed my performance of the services and has documented them in accordance with my direction.        Anette Liriano MD  01/05/22 4903

## 2022-01-05 NOTE — ED NOTES
Patient bed changed, red stool smear only. Vitals complete, room cleaned up, and fresh water. Belongings in bag.   Patient is still eating food

## 2022-01-05 NOTE — PHARMACY-VANCOMYCIN DOSING SERVICE
"Pharmacy Vancomycin Note  Date of Service 2022  Patient's  1965   56 year old, male        Current estimated CrCl = Estimated Creatinine Clearance: 159.2 mL/min (based on SCr of 0.74 mg/dL).    Creatinine for last 3 days  1/3/2022:  3:51 PM Creatinine 0.81 mg/dL  2022:  3:37 PM Creatinine 0.74 mg/dL    Recent Vancomycin Levels (past 3 days)  2022:  6:17 AM Vancomycin 20.9 mg/L    Vancomycin IV Administrations (past 72 hours)                   vancomycin 1250 mg in 0.9% NaCl 250 mL intermittent infusion 1,250 mg (mg) 1,250 mg New Bag 22 0057     1,250 mg New Bag 22 1158    vancomycin 1750 mg in 0.9% NaCl 500 ml intermittent infusion 1,750 mg (mg) 1,750 mg Given 22                Nephrotoxins and other renal medications (From now, onward)    Start     Dose/Rate Route Frequency Ordered Stop    22 1200  vancomycin 1250 mg in 0.9% NaCl 250 mL intermittent infusion 1,250 mg         1,250 mg  over 90 Minutes Intravenous EVERY 12 HOURS 22 1141      22 0700  piperacillin-tazobactam (ZOSYN) 3.375 g vial to attach to  mL bag        Note to Pharmacy: For [facility], [facility] and [facility]: For Zosyn-naive patients, use the \"Zosyn initial dose + extended infusion\" order panel.    3.375 g  over 240 Minutes Intravenous EVERY 6 HOURS 22 0650      22 0000  amoxicillin-clavulanate (AUGMENTIN) 875-125 MG tablet         1 tablet Oral 2 TIMES DAILY 22 2226 01/10/22 2359             Contrast Orders - past 72 hours (72h ago, onward)            Start     Dose/Rate Route Frequency Ordered Stop    22 1700  iopamidol (ISOVUE-370) solution 100 mL         100 mL Intravenous ONCE 22 1658 22 1658          Interpretation of levels and current regimen:  1250mg iv q12hr    Regimen: 1250 mg IV every 12 hours.  Start time: 12:57 on 2022  Exposure target: AUC24 (range)400-600 mg/L.hr   AUC24,ss: 525 mg/L.hr  Probability of AUC24 > 400: 92 %  Ctrough,ss: 16 " mg/L  Probability of Ctrough,ss > 20: 23 %  Probability of nephrotoxicity (Lodise SERVANDO 2009): 11 %    Plan:  1. Continue Current Dose  2. Vancomycin monitoring method: AUC  3. Vancomycin therapeutic monitoring goal: 400-600 mg*h/L  4. Pharmacy will check vancomycin levels as appropriate in 1-3 Days.  5. Serum creatinine levels will be ordered every 48 hours.    Chu Oh RP

## 2022-01-05 NOTE — ED PROVIDER NOTES
EMERGENCY DEPARTMENT SIGN OUT NOTE        ED COURSE AND MEDICAL DECISION MAKING  Patient was signed out to me by Dr Franky Michaels at 3:33 AM.   Pateint was stable through the night.    In brief, Josiah Crump is a 56 year old male who initially presented for evaluation of rectal bleeding, work-up significant for fluid collection adjacent to the rectum and associated colitis.     At time of sign out, disposition was pending transfer to facility with colorectal surgery capabilities.    FINAL IMPRESSION    1. Rectal bleeding    2. Traumatic injury of rectum    3. Anticoagulated        ED MEDS  Medications   0.9% sodium chloride BOLUS (0 mLs Intravenous Stopped 1/3/22 1630)     Followed by   sodium chloride 0.9% infusion ( Intravenous Rate/Dose Verify 1/5/22 0103)   piperacillin-tazobactam (ZOSYN) 3.375 g vial to attach to  mL bag (3.375 g Intravenous Given 1/5/22 0256)   vancomycin 1250 mg in 0.9% NaCl 250 mL intermittent infusion 1,250 mg (0 mg Intravenous Stopped 1/5/22 0245)   HYDROmorphone (PF) (DILAUDID) injection 0.5 mg (0.5 mg Intravenous Given 1/5/22 0058)   amLODIPine (NORVASC) tablet 5 mg (has no administration in time range)   baclofen (LIORESAL) tablet 20 mg (20 mg Oral Given 1/4/22 2257)   clopidogrel (PLAVIX) tablet 75 mg (0 mg Oral Hold 1/4/22 2102)   tolterodine ER (DETROL LA) 24 hr capsule 4 mg (has no administration in time range)   simvastatin (ZOCOR) tablet 20 mg (20 mg Oral Given 1/5/22 0050)   iopamidol (ISOVUE-370) solution 100 mL (100 mLs Intravenous Given 1/3/22 1658)   piperacillin-tazobactam (ZOSYN) 3.375 g vial to attach to  mL bag (0 g Intravenous Stopped 1/3/22 2009)   vancomycin 1750 mg in 0.9% NaCl 500 ml intermittent infusion 1,750 mg (1,750 mg Intravenous Given 1/3/22 2007)   HYDROmorphone (PF) (DILAUDID) injection 1 mg (1 mg Intravenous Given 1/3/22 2002)   HYDROmorphone (PF) (DILAUDID) injection 0.5 mg (0.5 mg Intravenous Given 1/3/22 5534)   HYDROmorphone (PF)  (DILAUDID) injection 0.5 mg (0.5 mg Intravenous Given 1/4/22 1027)   HYDROmorphone (PF) (DILAUDID) injection 0.5 mg (0.5 mg Intravenous Given 1/4/22 1146)   fentaNYL (PF) (SUBLIMAZE) injection 75 mcg (75 mcg Intravenous Given 1/4/22 2256)       LAB  Labs Ordered and Resulted from Time of ED Arrival to Time of ED Departure   INR - Abnormal       Result Value    INR 1.20 (*)    BASIC METABOLIC PANEL - Abnormal    Sodium 136      Potassium 3.8      Chloride 102      Carbon Dioxide (CO2) 21 (*)     Anion Gap 13      Urea Nitrogen 18      Creatinine 0.81      Calcium 9.5      Glucose 81      GFR Estimate >90     CBC WITH PLATELETS AND DIFFERENTIAL - Abnormal    WBC Count 10.1      RBC Count 5.68      Hemoglobin 12.2 (*)     Hematocrit 41.8      MCV 74 (*)     MCH 21.5 (*)     MCHC 29.2 (*)     RDW 18.9 (*)     Platelet Count 358      % Neutrophils 65      % Lymphocytes 18      % Monocytes 8      % Eosinophils 8      % Basophils 1      % Immature Granulocytes 0      NRBCs per 100 WBC 0      Absolute Neutrophils 6.7      Absolute Lymphocytes 1.8      Absolute Monocytes 0.8      Absolute Eosinophils 0.8 (*)     Absolute Basophils 0.1      Absolute Immature Granulocytes 0.0      Absolute NRBCs 0.0     HEMOGLOBIN - Abnormal    Hemoglobin 11.5 (*)    CBC WITH PLATELETS - Abnormal    WBC Count 7.5      RBC Count 4.81      Hemoglobin 10.3 (*)     Hematocrit 35.8 (*)     MCV 74 (*)     MCH 21.4 (*)     MCHC 28.8 (*)     RDW 18.2 (*)     Platelet Count 300     HEMOGLOBIN - Abnormal    Hemoglobin 10.1 (*)    HEMOGLOBIN - Abnormal    Hemoglobin 10.5 (*)    HEMOGLOBIN - Abnormal    Hemoglobin 10.1 (*)    CBC WITH PLATELETS - Abnormal    WBC Count 6.3      RBC Count 4.68      Hemoglobin 10.1 (*)     Hematocrit 35.0 (*)     MCV 75 (*)     MCH 21.6 (*)     MCHC 28.9 (*)     RDW 18.4 (*)     Platelet Count 291     LACTIC ACID WHOLE BLOOD - Abnormal    Lactic Acid 0.6 (*)    BASIC METABOLIC PANEL - Abnormal    Sodium 140      Potassium  3.9      Chloride 110 (*)     Carbon Dioxide (CO2) 21 (*)     Anion Gap 9      Urea Nitrogen 9      Creatinine 0.74      Calcium 8.3 (*)     Glucose 97      GFR Estimate >90     HEMOGLOBIN - Abnormal    Hemoglobin 10.0 (*)    HEMOGLOBIN - Abnormal    Hemoglobin 9.5 (*)    TROPONIN I - Normal    Troponin I <0.01     MAGNESIUM - Normal    Magnesium 2.1     COVID-19 VIRUS (CORONAVIRUS) BY PCR - Normal    SARS CoV2 PCR Negative     LACTIC ACID WHOLE BLOOD - Normal    Lactic Acid 1.7     MAGNESIUM - Normal    Magnesium 2.1     HEMOGLOBIN   VANCOMYCIN LEVEL   TYPE AND SCREEN, ADULT    ABO/RH(D) O POS      Antibody Screen Negative      SPECIMEN EXPIRATION DATE [id number]     ABO/RH TYPE AND SCREEN       RADIOLOGY    CT Pelvis Soft Tissue wo Contrast   Final Result   IMPRESSION:   1.  Bilateral hydroceles, marked scrotal edema and marked penile edema unchanged.   2.  Inflammatory change from the rectosigmoid junction to the anal canal with posterior abscesses and left sinus tract as detailed above unchanged.   3.  Extensive decubitus ulceration posterior to the right acetabulum and right hemipelvic bones within which is an ill-defined thick-walled fluid collection 6 x 4 x 4 cm that has increased in size since 1/3/2022.         CTA Abdomen Pelvis with Contrast   Final Result   IMPRESSION:   1.  New distal colitis. No evidence for active gastrointestinal bleeding.   2.  New presacral and perirectal fluid collection suspicious for abscess. Main pocket of fluid measures approximately 6.6 x 1.8 x 3.0 cm, with short channel-like extensions superiorly and inferiorly along the left pelvis as described.          DISCHARGE MEDS  New Prescriptions    AMOXICILLIN-CLAVULANATE (AUGMENTIN) 875-125 MG TABLET    Take 1 tablet by mouth 2 times daily for 7 days    OXYCODONE-ACETAMINOPHEN (PERCOCET) 5-325 MG TABLET    Take 1-2 tablets by mouth every 4 hours as needed for pain       Loretta Ashford MD  Emergency Medicine  [facility]  [facility] EMERGENCY ROOM  [address] [place] [address]  [phone]     Loretta Ashford MD  01/05/22 0656

## 2022-01-05 NOTE — ED PROVIDER NOTES
EMERGENCY DEPARTMENT SIGN OUT NOTE        ED COURSE AND MEDICAL DECISION MAKING  2:15 PM Patient was signed out to me by Dr Anette Liriano.  6 PM.  Patient's hemoglobin stabilizing.  Continues to have low-grade bleeding.  Still no beds available at any outlying facilities which can provide appropriate colorectal coverage.  We will continue to monitor hemoglobins and bleeding.  8:55 PM.  Given patient's stable vital signs and minimal bleeding patient approved to have minimal ice chips.    Patient signed out to  at end of shift.    In brief, Josiah Crump is a 56 year old male who initially presented for evaluation of rectal bleeding.  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  He inserted a finger into his rectum today in an attempt to stool and had immediate onset of pain followed by blood.  He is chronically anticoagulated on pelvics.  The bleeding continued into this afternoon.  He feels lightheaded and weak.  No chest pain no shortness of breath.  He has never had this happen before.  Multiple medical issues.  Currently patient reports abdominal pain and rectal pain and continued bleeding.    At time of sign out, disposition was pending transfer if colorectal surgery is available.     FINAL IMPRESSION    1. Rectal bleeding    2. Traumatic injury of rectum    3. Anticoagulated    4. Infected decubitus ulcer, unspecified ulcer stage        ED MEDS  Medications   0.9% sodium chloride BOLUS (0 mLs Intravenous Stopped 1/3/22 1630)     Followed by   sodium chloride 0.9% infusion ( Intravenous New Bag 1/5/22 1508)   piperacillin-tazobactam (ZOSYN) 3.375 g vial to attach to  mL bag (3.375 g Intravenous Given 1/5/22 4153)   vancomycin 1250 mg in 0.9% NaCl 250 mL intermittent infusion 1,250 mg (1,250 mg Intravenous New Bag 1/5/22 1335)   HYDROmorphone (PF)  (DILAUDID) injection 0.5 mg (0.5 mg Intravenous Given 1/5/22 1309)   amLODIPine (NORVASC) tablet 5 mg (5 mg Oral Given 1/5/22 0835)   baclofen (LIORESAL) tablet 20 mg (20 mg Oral Given 1/5/22 1309)   tolterodine ER (DETROL LA) 24 hr capsule 4 mg (4 mg Oral Given 1/5/22 0835)   simvastatin (ZOCOR) tablet 20 mg (20 mg Oral Given 1/5/22 0835)   iopamidol (ISOVUE-370) solution 100 mL (100 mLs Intravenous Given 1/3/22 1658)   piperacillin-tazobactam (ZOSYN) 3.375 g vial to attach to  mL bag (0 g Intravenous Stopped 1/3/22 2009)   vancomycin 1750 mg in 0.9% NaCl 500 ml intermittent infusion 1,750 mg (1,750 mg Intravenous Given 1/3/22 2007)   HYDROmorphone (PF) (DILAUDID) injection 1 mg (1 mg Intravenous Given 1/3/22 2002)   HYDROmorphone (PF) (DILAUDID) injection 0.5 mg (0.5 mg Intravenous Given 1/3/22 2329)   HYDROmorphone (PF) (DILAUDID) injection 0.5 mg (0.5 mg Intravenous Given 1/4/22 1027)   HYDROmorphone (PF) (DILAUDID) injection 0.5 mg (0.5 mg Intravenous Given 1/4/22 1146)   fentaNYL (PF) (SUBLIMAZE) injection 75 mcg (75 mcg Intravenous Given 1/4/22 2256)       LAB  Labs Ordered and Resulted from Time of ED Arrival to Time of ED Departure   INR - Abnormal       Result Value    INR 1.20 (*)    BASIC METABOLIC PANEL - Abnormal    Sodium 136      Potassium 3.8      Chloride 102      Carbon Dioxide (CO2) 21 (*)     Anion Gap 13      Urea Nitrogen 18      Creatinine 0.81      Calcium 9.5      Glucose 81      GFR Estimate >90     CBC WITH PLATELETS AND DIFFERENTIAL - Abnormal    WBC Count 10.1      RBC Count 5.68      Hemoglobin 12.2 (*)     Hematocrit 41.8      MCV 74 (*)     MCH 21.5 (*)     MCHC 29.2 (*)     RDW 18.9 (*)     Platelet Count 358      % Neutrophils 65      % Lymphocytes 18      % Monocytes 8      % Eosinophils 8      % Basophils 1      % Immature Granulocytes 0      NRBCs per 100 WBC 0      Absolute Neutrophils 6.7      Absolute Lymphocytes 1.8      Absolute Monocytes 0.8      Absolute Eosinophils  0.8 (*)     Absolute Basophils 0.1      Absolute Immature Granulocytes 0.0      Absolute NRBCs 0.0     HEMOGLOBIN - Abnormal    Hemoglobin 11.5 (*)    CBC WITH PLATELETS - Abnormal    WBC Count 7.5      RBC Count 4.81      Hemoglobin 10.3 (*)     Hematocrit 35.8 (*)     MCV 74 (*)     MCH 21.4 (*)     MCHC 28.8 (*)     RDW 18.2 (*)     Platelet Count 300     HEMOGLOBIN - Abnormal    Hemoglobin 10.1 (*)    HEMOGLOBIN - Abnormal    Hemoglobin 10.5 (*)    HEMOGLOBIN - Abnormal    Hemoglobin 10.1 (*)    CBC WITH PLATELETS - Abnormal    WBC Count 6.3      RBC Count 4.68      Hemoglobin 10.1 (*)     Hematocrit 35.0 (*)     MCV 75 (*)     MCH 21.6 (*)     MCHC 28.9 (*)     RDW 18.4 (*)     Platelet Count 291     LACTIC ACID WHOLE BLOOD - Abnormal    Lactic Acid 0.6 (*)    BASIC METABOLIC PANEL - Abnormal    Sodium 140      Potassium 3.9      Chloride 110 (*)     Carbon Dioxide (CO2) 21 (*)     Anion Gap 9      Urea Nitrogen 9      Creatinine 0.74      Calcium 8.3 (*)     Glucose 97      GFR Estimate >90     HEMOGLOBIN - Abnormal    Hemoglobin 10.0 (*)    HEMOGLOBIN - Abnormal    Hemoglobin 9.5 (*)    HEMOGLOBIN - Abnormal    Hemoglobin 9.6 (*)    HEMOGLOBIN - Abnormal    Hemoglobin 10.3 (*)    TROPONIN I - Normal    Troponin I <0.01     MAGNESIUM - Normal    Magnesium 2.1     COVID-19 VIRUS (CORONAVIRUS) BY PCR - Normal    SARS CoV2 PCR Negative     LACTIC ACID WHOLE BLOOD - Normal    Lactic Acid 1.7     MAGNESIUM - Normal    Magnesium 2.1     VANCOMYCIN LEVEL - Normal    Vancomycin 20.9     HEMOGLOBIN   HEMOGLOBIN   TYPE AND SCREEN, ADULT    ABO/RH(D) O POS      Antibody Screen Negative      SPECIMEN EXPIRATION DATE [id number]     ABO/RH TYPE AND SCREEN       EKG      RADIOLOGY    CT Pelvis Soft Tissue wo Contrast   Final Result   IMPRESSION:   1.  Bilateral hydroceles, marked scrotal edema and marked penile edema unchanged.   2.  Inflammatory change from the rectosigmoid junction to the anal canal with posterior  abscesses and left sinus tract as detailed above unchanged.   3.  Extensive decubitus ulceration posterior to the right acetabulum and right hemipelvic bones within which is an ill-defined thick-walled fluid collection 6 x 4 x 4 cm that has increased in size since 1/3/2022.         CTA Abdomen Pelvis with Contrast   Final Result   IMPRESSION:   1.  New distal colitis. No evidence for active gastrointestinal bleeding.   2.  New presacral and perirectal fluid collection suspicious for abscess. Main pocket of fluid measures approximately 6.6 x 1.8 x 3.0 cm, with short channel-like extensions superiorly and inferiorly along the left pelvis as described.          DISCHARGE MEDS  New Prescriptions    AMOXICILLIN-CLAVULANATE (AUGMENTIN) 875-125 MG TABLET    Take 1 tablet by mouth 2 times daily for 7 days    OXYCODONE-ACETAMINOPHEN (PERCOCET) 5-325 MG TABLET    Take 1-2 tablets by mouth every 4 hours as needed for pain         John Arellano MD  Emergency Medicine  [facility] EMERGENCY ROOM  [address] [place] [id number] [phone]      John Arellano MD  01/08/22 0716

## 2022-01-06 LAB
CREAT SERPL-MCNC: 0.76 MG/DL (ref 0.7–1.3)
GFR SERPL CREATININE-BSD FRML MDRD: >90 ML/MIN/1.73M2
HGB BLD-MCNC: 10.2 G/DL (ref 13.3–17.7)
HGB BLD-MCNC: 9.5 G/DL (ref 13.3–17.7)

## 2022-01-06 PROCEDURE — 85018 HEMOGLOBIN: CPT | Performed by: EMERGENCY MEDICINE

## 2022-01-06 PROCEDURE — 250N000011 HC RX IP 250 OP 636: Performed by: EMERGENCY MEDICINE

## 2022-01-06 PROCEDURE — 99223 1ST HOSP IP/OBS HIGH 75: CPT | Mod: AI | Performed by: HOSPITALIST

## 2022-01-06 PROCEDURE — 250N000013 HC RX MED GY IP 250 OP 250 PS 637: Performed by: EMERGENCY MEDICINE

## 2022-01-06 PROCEDURE — 36415 COLL VENOUS BLD VENIPUNCTURE: CPT | Performed by: EMERGENCY MEDICINE

## 2022-01-06 PROCEDURE — 258N000003 HC RX IP 258 OP 636: Performed by: EMERGENCY MEDICINE

## 2022-01-06 PROCEDURE — 99207 PR CDG-CORRECTLY CODED, REVIEWED AND AGREE: CPT | Performed by: HOSPITALIST

## 2022-01-06 PROCEDURE — 82565 ASSAY OF CREATININE: CPT | Performed by: EMERGENCY MEDICINE

## 2022-01-06 RX ORDER — LORAZEPAM 2 MG/ML
1 INJECTION INTRAMUSCULAR EVERY 4 HOURS PRN
Status: DISCONTINUED | OUTPATIENT
Start: 2022-01-06 | End: 2022-01-08 | Stop reason: HOSPADM

## 2022-01-06 RX ADMIN — PIPERACILLIN AND TAZOBACTAM 3.38 G: 3; .375 INJECTION, POWDER, LYOPHILIZED, FOR SOLUTION INTRAVENOUS at 20:14

## 2022-01-06 RX ADMIN — PIPERACILLIN AND TAZOBACTAM 3.38 G: 3; .375 INJECTION, POWDER, LYOPHILIZED, FOR SOLUTION INTRAVENOUS at 10:56

## 2022-01-06 RX ADMIN — HYDROMORPHONE HYDROCHLORIDE 0.5 MG: 1 INJECTION, SOLUTION INTRAMUSCULAR; INTRAVENOUS; SUBCUTANEOUS at 03:04

## 2022-01-06 RX ADMIN — LORAZEPAM 1 MG: 2 INJECTION INTRAMUSCULAR; INTRAVENOUS at 11:31

## 2022-01-06 RX ADMIN — HYDROMORPHONE HYDROCHLORIDE 0.5 MG: 1 INJECTION, SOLUTION INTRAMUSCULAR; INTRAVENOUS; SUBCUTANEOUS at 16:44

## 2022-01-06 RX ADMIN — SODIUM CHLORIDE: 9 INJECTION, SOLUTION INTRAVENOUS at 11:01

## 2022-01-06 RX ADMIN — VANCOMYCIN HYDROCHLORIDE 1250 MG: 5 INJECTION, POWDER, LYOPHILIZED, FOR SOLUTION INTRAVENOUS at 13:38

## 2022-01-06 RX ADMIN — VANCOMYCIN HYDROCHLORIDE 1250 MG: 5 INJECTION, POWDER, LYOPHILIZED, FOR SOLUTION INTRAVENOUS at 03:28

## 2022-01-06 RX ADMIN — SODIUM CHLORIDE: 9 INJECTION, SOLUTION INTRAVENOUS at 01:24

## 2022-01-06 RX ADMIN — LORAZEPAM 1 MG: 2 INJECTION INTRAMUSCULAR; INTRAVENOUS at 20:09

## 2022-01-06 RX ADMIN — AMLODIPINE BESYLATE 5 MG: 5 TABLET ORAL at 10:56

## 2022-01-06 RX ADMIN — TOLTERODINE 4 MG: 2 CAPSULE, EXTENDED RELEASE ORAL at 11:02

## 2022-01-06 RX ADMIN — HYDROMORPHONE HYDROCHLORIDE 0.5 MG: 1 INJECTION, SOLUTION INTRAMUSCULAR; INTRAVENOUS; SUBCUTANEOUS at 22:21

## 2022-01-06 RX ADMIN — BACLOFEN 20 MG: 10 TABLET ORAL at 22:22

## 2022-01-06 RX ADMIN — SIMVASTATIN 20 MG: 20 TABLET, FILM COATED ORAL at 11:01

## 2022-01-06 ASSESSMENT — ACTIVITIES OF DAILY LIVING (ADL): DEPENDENT_IADLS:: CLEANING;COOKING;LAUNDRY;SHOPPING;MEAL PREPARATION;TRANSPORTATION

## 2022-01-06 NOTE — ED PROVIDER NOTES
ED SIGNOUT  Date/Time:1/5/2022 10:33 PM    Patient signed out to me by my colleague, Dr. Arellano.  Please see their note for complete history and physical. Plan is for transfer to hospital with colorectal surgery capability.    ED Course/MDM:  10:34 PM Signout accepted from Dr. Arellano.  Prior records were reviewed.  Diagnostics from this visit are reviewed.  0630-Signed out at change of shift with transfer for colorectal surgery pending.      Vitals:  BP (!) 160/80   Pulse 88   Temp 98.1  F (36.7  C) (Oral)   Resp (!) 33   Wt 99.8 kg (220 lb)   SpO2 99%   BMI 27.50 kg/m        Pertinent labs results reviewed   Results for orders placed or performed during the hospital encounter of 01/03/22   CTA Abdomen Pelvis with Contrast    Impression    IMPRESSION:  1.  New distal colitis. No evidence for active gastrointestinal bleeding.  2.  New presacral and perirectal fluid collection suspicious for abscess. Main pocket of fluid measures approximately 6.6 x 1.8 x 3.0 cm, with short channel-like extensions superiorly and inferiorly along the left pelvis as described.   CT Pelvis Soft Tissue wo Contrast    Impression    IMPRESSION:  1.  Bilateral hydroceles, marked scrotal edema and marked penile edema unchanged.  2.  Inflammatory change from the rectosigmoid junction to the anal canal with posterior abscesses and left sinus tract as detailed above unchanged.  3.  Extensive decubitus ulceration posterior to the right acetabulum and right hemipelvic bones within which is an ill-defined thick-walled fluid collection 6 x 4 x 4 cm that has increased in size since 1/3/2022.     Result Value Ref Range    INR 1.20 (H) 0.85 - 1.15   Basic metabolic panel   Result Value Ref Range    Sodium 136 136 - 145 mmol/L    Potassium 3.8 3.5 - 5.0 mmol/L    Chloride 102 98 - 107 mmol/L    Carbon Dioxide (CO2) 21 (L) 22 - 31 mmol/L    Anion Gap 13 5 - 18 mmol/L    Urea Nitrogen 18 8 - 22 mg/dL    Creatinine 0.81 0.70 - 1.30 mg/dL    Calcium  9.5 8.5 - 10.5 mg/dL    Glucose 81 70 - 125 mg/dL    GFR Estimate >90 >60 mL/min/1.73m2   Result Value Ref Range    Troponin I <0.01 0.00 - 0.29 ng/mL   Result Value Ref Range    Magnesium 2.1 1.8 - 2.6 mg/dL   Asymptomatic COVID-19 Virus (Coronavirus) by PCR Nasopharyngeal    Specimen: Nasopharyngeal; Swab   Result Value Ref Range    SARS CoV2 PCR Negative Negative   CBC with platelets and differential   Result Value Ref Range    WBC Count 10.1 4.0 - 11.0 10e3/uL    RBC Count 5.68 4.40 - 5.90 10e6/uL    Hemoglobin 12.2 (L) 13.3 - 17.7 g/dL    Hematocrit 41.8 40.0 - 53.0 %    MCV 74 (L) 78 - 100 fL    MCH 21.5 (L) 26.5 - 33.0 pg    MCHC 29.2 (L) 31.5 - 36.5 g/dL    RDW 18.9 (H) 10.0 - 15.0 %    Platelet Count 358 150 - 450 10e3/uL    % Neutrophils 65 %    % Lymphocytes 18 %    % Monocytes 8 %    % Eosinophils 8 %    % Basophils 1 %    % Immature Granulocytes 0 %    NRBCs per 100 WBC 0 <1 /100    Absolute Neutrophils 6.7 1.6 - 8.3 10e3/uL    Absolute Lymphocytes 1.8 0.8 - 5.3 10e3/uL    Absolute Monocytes 0.8 0.0 - 1.3 10e3/uL    Absolute Eosinophils 0.8 (H) 0.0 - 0.7 10e3/uL    Absolute Basophils 0.1 0.0 - 0.2 10e3/uL    Absolute Immature Granulocytes 0.0 <=0.4 10e3/uL    Absolute NRBCs 0.0 10e3/uL   Result Value Ref Range    Hemoglobin 11.5 (L) 13.3 - 17.7 g/dL   Lactic acid whole blood   Result Value Ref Range    Lactic Acid 1.7 0.7 - 2.0 mmol/L   CBC (+ platelets, no diff)   Result Value Ref Range    WBC Count 7.5 4.0 - 11.0 10e3/uL    RBC Count 4.81 4.40 - 5.90 10e6/uL    Hemoglobin 10.3 (L) 13.3 - 17.7 g/dL    Hematocrit 35.8 (L) 40.0 - 53.0 %    MCV 74 (L) 78 - 100 fL    MCH 21.4 (L) 26.5 - 33.0 pg    MCHC 28.8 (L) 31.5 - 36.5 g/dL    RDW 18.2 (H) 10.0 - 15.0 %    Platelet Count 300 150 - 450 10e3/uL   Result Value Ref Range    Hemoglobin 10.1 (L) 13.3 - 17.7 g/dL   Result Value Ref Range    Hemoglobin 10.5 (L) 13.3 - 17.7 g/dL   Result Value Ref Range    Hemoglobin 10.1 (L) 13.3 - 17.7 g/dL   CBC (+  platelets, no diff)   Result Value Ref Range    WBC Count 6.3 4.0 - 11.0 10e3/uL    RBC Count 4.68 4.40 - 5.90 10e6/uL    Hemoglobin 10.1 (L) 13.3 - 17.7 g/dL    Hematocrit 35.0 (L) 40.0 - 53.0 %    MCV 75 (L) 78 - 100 fL    MCH 21.6 (L) 26.5 - 33.0 pg    MCHC 28.9 (L) 31.5 - 36.5 g/dL    RDW 18.4 (H) 10.0 - 15.0 %    Platelet Count 291 150 - 450 10e3/uL   Lactic acid whole blood   Result Value Ref Range    Lactic Acid 0.6 (L) 0.7 - 2.0 mmol/L   Basic metabolic panel   Result Value Ref Range    Sodium 140 136 - 145 mmol/L    Potassium 3.9 3.5 - 5.0 mmol/L    Chloride 110 (H) 98 - 107 mmol/L    Carbon Dioxide (CO2) 21 (L) 22 - 31 mmol/L    Anion Gap 9 5 - 18 mmol/L    Urea Nitrogen 9 8 - 22 mg/dL    Creatinine 0.74 0.70 - 1.30 mg/dL    Calcium 8.3 (L) 8.5 - 10.5 mg/dL    Glucose 97 70 - 125 mg/dL    GFR Estimate >90 >60 mL/min/1.73m2   Result Value Ref Range    Magnesium 2.1 1.8 - 2.6 mg/dL   Result Value Ref Range    Hemoglobin 10.0 (L) 13.3 - 17.7 g/dL   Result Value Ref Range    Hemoglobin 9.5 (L) 13.3 - 17.7 g/dL   Result Value Ref Range    Hemoglobin 9.6 (L) 13.3 - 17.7 g/dL   Result Value Ref Range    Vancomycin 20.9   mg/L   Result Value Ref Range    Hemoglobin 10.3 (L) 13.3 - 17.7 g/dL   Result Value Ref Range    Hemoglobin 9.9 (L) 13.3 - 17.7 g/dL   Result Value Ref Range    Hemoglobin 10.1 (L) 13.3 - 17.7 g/dL   Result Value Ref Range    Hemoglobin 10.0 (L) 13.3 - 17.7 g/dL   Adult Type and Screen   Result Value Ref Range    ABO/RH(D) O POS     Antibody Screen Negative Negative    SPECIMEN EXPIRATION DATE [id number]        Pertinent imaging reviewed   Please see official radiology report.  CT Pelvis Soft Tissue wo Contrast   Final Result   IMPRESSION:   1.  Bilateral hydroceles, marked scrotal edema and marked penile edema unchanged.   2.  Inflammatory change from the rectosigmoid junction to the anal canal with posterior abscesses and left sinus tract as detailed above unchanged.   3.  Extensive  decubitus ulceration posterior to the right acetabulum and right hemipelvic bones within which is an ill-defined thick-walled fluid collection 6 x 4 x 4 cm that has increased in size since 1/3/2022.         CTA Abdomen Pelvis with Contrast   Final Result   IMPRESSION:   1.  New distal colitis. No evidence for active gastrointestinal bleeding.   2.  New presacral and perirectal fluid collection suspicious for abscess. Main pocket of fluid measures approximately 6.6 x 1.8 x 3.0 cm, with short channel-like extensions superiorly and inferiorly along the left pelvis as described.           Interventions  Medications   0.9% sodium chloride BOLUS (0 mLs Intravenous Stopped 1/3/22 1630)     Followed by   sodium chloride 0.9% infusion ( Intravenous New Bag 1/5/22 1705)   vancomycin 1250 mg in 0.9% NaCl 250 mL intermittent infusion 1,250 mg (0 mg Intravenous Stopped 1/5/22 1535)   HYDROmorphone (PF) (DILAUDID) injection 0.5 mg (0.5 mg Intravenous Given 1/5/22 2155)   amLODIPine (NORVASC) tablet 5 mg (5 mg Oral Given 1/5/22 0835)   baclofen (LIORESAL) tablet 20 mg (20 mg Oral Given 1/5/22 1309)   tolterodine ER (DETROL LA) 24 hr capsule 4 mg (4 mg Oral Given 1/5/22 0835)   simvastatin (ZOCOR) tablet 20 mg (20 mg Oral Given 1/5/22 0835)   piperacillin-tazobactam (ZOSYN) 3.375 g vial to attach to  mL bag (3.375 g Intravenous Given 1/5/22 1530)   iopamidol (ISOVUE-370) solution 100 mL (100 mLs Intravenous Given 1/3/22 1658)   piperacillin-tazobactam (ZOSYN) 3.375 g vial to attach to  mL bag (0 g Intravenous Stopped 1/3/22 2009)   vancomycin 1750 mg in 0.9% NaCl 500 ml intermittent infusion 1,750 mg (1,750 mg Intravenous Given 1/3/22 2007)   HYDROmorphone (PF) (DILAUDID) injection 1 mg (1 mg Intravenous Given 1/3/22 2002)   HYDROmorphone (PF) (DILAUDID) injection 0.5 mg (0.5 mg Intravenous Given 1/3/22 9288)   HYDROmorphone (PF) (DILAUDID) injection 0.5 mg (0.5 mg Intravenous Given 1/4/22 1027)   HYDROmorphone (PF)  (DILAUDID) injection 0.5 mg (0.5 mg Intravenous Given 1/4/22 1146)   fentaNYL (PF) (SUBLIMAZE) injection 75 mcg (75 mcg Intravenous Given 1/4/22 9996)            ED Course as of 01/05/22 2233   Mon Jan 03, 2022   2153 Sodium: 136   Tue Jan 04, 2022   0627 Has ? Rectal abscess per radiology. Has 2 gram drop in 14 hours. General surgery refused. Needs colorectal surgery.        DX-    1. Rectal bleeding    2. Traumatic injury of rectum    3. Anticoagulated    4. Infected decubitus ulcer, unspecified ulcer stage      The creation of this record is based on the scribe s observations of the work being performed  and the provider s statements to them. It was created on their behalf by Gianna liang trained medical scribe. This document has been checked and approved by the attending provider.      Loretta Ashford MD  Emergency Medicine  [facility] EMERGENCY ROOM  [facility] [facility] [id number] [phone]  Dept: [phone]       Loretta Ashford MD  01/06/22 0614

## 2022-01-06 NOTE — CONSULTS
Care Management Initial Consult    General Information  Assessment completed with: Patient, Met with patient  Type of CM/SW Visit: Initial Assessment    Primary Care Provider verified and updated as needed: Yes   Readmission within the last 30 days: no previous admission in last 30 days         Advance Care Planning: Advance Care Planning Reviewed: no concerns identified          Communication Assessment  Patient's communication style: spoken language (English or Bilingual)    Hearing Difficulty or Deaf: no   Wear Glasses or Blind: no    Cognitive  Cognitive/Neuro/Behavioral: WDL                      Living Environment:   People in home: alone     Current living Arrangements: apartment      Able to return to prior arrangements: yes (If able.)  Living Arrangement Comments:  (PCA services through [facility] for 8 hours daily. )    Family/Social Support:  Care provided by: other (see comments) (PCA services.)  Provides care for: no one     PCA          Description of Support System: Supportive,Involved         Current Resources:   Patient receiving home care services: No     Community Resources: PCA (8 hours daily through [facility])  Equipment currently used at home: wheelchair, power  Supplies currently used at home: Incontinence Supplies    Employment/Financial:  Employment Status:          Financial Concerns:             Lifestyle & Psychosocial Needs:  Social Determinants of Health     Tobacco Use: Low Risk      Smoking Tobacco Use: Never Smoker     Smokeless Tobacco Use: Never Used   Alcohol Use: Not on file   Financial Resource Strain: Not on file   Food Insecurity: Not on file   Transportation Needs: Not on file   Physical Activity: Not on file   Stress: Not on file   Social Connections: Not on file   Intimate Partner Violence: Not on file   Depression: Not at risk     PHQ-2 Score: 0   Housing Stability: Not on file       Functional Status:  Prior to admission patient needed assistance:   Dependent ADLs::  Bathing,Dressing,Grooming,Incontinence  Dependent IADLs:: Cleaning,Cooking,Laundry,Shopping,Meal Preparation,Transportation       Mental Health Status:          Chemical Dependency Status:                Values/Beliefs:  Spiritual, Cultural Beliefs, Adventist Practices, Values that affect care:                 Additional Information:  Patient lives alone and has PCA servies 8 hours daily through [facility].  He has notified them of hospitalization and he will contact them at discharge.  His goal is to return home and he will notify his PCA and he also states he has his car in the parking lot.  May need more assistance at discharge.  Continue to assess progression and needs at discharge.      GAIL AMAYA RN

## 2022-01-06 NOTE — ED PROVIDER NOTES
EMERGENCY DEPARTMENT SIGN OUT NOTE        ED COURSE AND MEDICAL DECISION MAKING  Patient was signed out to me by Dr Nate Castro at 2:40 PM   9:20 PM I spoke with Dr. Wynn, colorectal surgery.   9:42 PM I evaluated and updated patient.    In brief, Josiah Crump is a 56 year old male who initially presented for evaluation of rectal bleeding. 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 He inserted a finger into his rectum in an attempt to stool and had immediate onset of pain followed by blood.  He is chronically anticoagulated on Plavix.  No history of similar symptoms.  Here in the ED the patient was found to have a perirectal abscess on CT scan.  Patient's case was discussed with the colorectal surgeon.  Patient is currently awaiting transfer to the [facility] for treatment of the perirectal abscess.  Reportedly, the hold-up in transferring the patient to the [facility] is his history of MRSA.  Because of his history of MRSA he needs a private room and currently there are no private rooms available.  Of note, the patient was positive for MRSA in October of last year on a wound culture.    The patient was evaluated and informed of the need for a private room, which is likely the cause of his transfer delay.  The patient was under the belief that he had not had MRSA for over 10 years.  He was informed of the positive MRSA culture from a few months earlier.  At the time reevaluation the patient was again frustrated but he did not appear to be in any obvious distress or discomfort and he was not ill-appearing.  The patient was also informed that his hemoglobin has remained stable for the last few days.    The patient's case was discussed with the on-call interventional radiologist to determine if the patient could have the abscess drained here at  [facility] tomorrow.  The interventional radiologist was going to review the films and call back.  The call back was still pending at the end of my shift.  The patient's care was turned over to Dr. Payan.         FINAL IMPRESSION    1. Rectal bleeding    2. Traumatic injury of rectum    3. Anticoagulated    4. Infected decubitus ulcer, unspecified ulcer stage        ED MEDS  Medications   0.9% sodium chloride BOLUS (0 mLs Intravenous Stopped 1/3/22 1630)     Followed by   sodium chloride 0.9% infusion ( Intravenous Rate/Dose Verify 1/6/22 1903)   vancomycin 1250 mg in 0.9% NaCl 250 mL intermittent infusion 1,250 mg (0 mg Intravenous Stopped 1/6/22 1530)   HYDROmorphone (PF) (DILAUDID) injection 0.5 mg (0.5 mg Intravenous Given 1/6/22 1644)   amLODIPine (NORVASC) tablet 5 mg (5 mg Oral Given 1/6/22 1056)   baclofen (LIORESAL) tablet 20 mg (20 mg Oral Given 1/5/22 1309)   tolterodine ER (DETROL LA) 24 hr capsule 4 mg (4 mg Oral Given 1/6/22 1102)   simvastatin (ZOCOR) tablet 20 mg (20 mg Oral Given 1/6/22 1101)   piperacillin-tazobactam (ZOSYN) 3.375 g vial to attach to  mL bag (3.375 g Intravenous Given 1/6/22 2014)   LORazepam (ATIVAN) injection 1 mg (1 mg Intravenous Given 1/6/22 2009)   iopamidol (ISOVUE-370) solution 100 mL (100 mLs Intravenous Given 1/3/22 1658)   piperacillin-tazobactam (ZOSYN) 3.375 g vial to attach to  mL bag (0 g Intravenous Stopped 1/3/22 2009)   vancomycin 1750 mg in 0.9% NaCl 500 ml intermittent infusion 1,750 mg (1,750 mg Intravenous Given 1/3/22 2007)   HYDROmorphone (PF) (DILAUDID) injection 1 mg (1 mg Intravenous Given 1/3/22 2002)   HYDROmorphone (PF) (DILAUDID) injection 0.5 mg (0.5 mg Intravenous Given 1/3/22 2329)   HYDROmorphone (PF) (DILAUDID) injection 0.5 mg (0.5 mg Intravenous Given 1/4/22 1027)   HYDROmorphone (PF) (DILAUDID) injection 0.5 mg (0.5 mg Intravenous Given 1/4/22 1146)   fentaNYL (PF) (SUBLIMAZE) injection 75 mcg (75 mcg Intravenous Given  1/4/22 9151)       LAB  Labs Ordered and Resulted from Time of ED Arrival to Time of ED Departure   INR - Abnormal       Result Value    INR 1.20 (*)    BASIC METABOLIC PANEL - Abnormal    Sodium 136      Potassium 3.8      Chloride 102      Carbon Dioxide (CO2) 21 (*)     Anion Gap 13      Urea Nitrogen 18      Creatinine 0.81      Calcium 9.5      Glucose 81      GFR Estimate >90     CBC WITH PLATELETS AND DIFFERENTIAL - Abnormal    WBC Count 10.1      RBC Count 5.68      Hemoglobin 12.2 (*)     Hematocrit 41.8      MCV 74 (*)     MCH 21.5 (*)     MCHC 29.2 (*)     RDW 18.9 (*)     Platelet Count 358      % Neutrophils 65      % Lymphocytes 18      % Monocytes 8      % Eosinophils 8      % Basophils 1      % Immature Granulocytes 0      NRBCs per 100 WBC 0      Absolute Neutrophils 6.7      Absolute Lymphocytes 1.8      Absolute Monocytes 0.8      Absolute Eosinophils 0.8 (*)     Absolute Basophils 0.1      Absolute Immature Granulocytes 0.0      Absolute NRBCs 0.0     HEMOGLOBIN - Abnormal    Hemoglobin 11.5 (*)    CBC WITH PLATELETS - Abnormal    WBC Count 7.5      RBC Count 4.81      Hemoglobin 10.3 (*)     Hematocrit 35.8 (*)     MCV 74 (*)     MCH 21.4 (*)     MCHC 28.8 (*)     RDW 18.2 (*)     Platelet Count 300     HEMOGLOBIN - Abnormal    Hemoglobin 10.1 (*)    HEMOGLOBIN - Abnormal    Hemoglobin 10.5 (*)    HEMOGLOBIN - Abnormal    Hemoglobin 10.1 (*)    CBC WITH PLATELETS - Abnormal    WBC Count 6.3      RBC Count 4.68      Hemoglobin 10.1 (*)     Hematocrit 35.0 (*)     MCV 75 (*)     MCH 21.6 (*)     MCHC 28.9 (*)     RDW 18.4 (*)     Platelet Count 291     LACTIC ACID WHOLE BLOOD - Abnormal    Lactic Acid 0.6 (*)    BASIC METABOLIC PANEL - Abnormal    Sodium 140      Potassium 3.9      Chloride 110 (*)     Carbon Dioxide (CO2) 21 (*)     Anion Gap 9      Urea Nitrogen 9      Creatinine 0.74      Calcium 8.3 (*)     Glucose 97      GFR Estimate >90     HEMOGLOBIN - Abnormal    Hemoglobin 10.0 (*)     HEMOGLOBIN - Abnormal    Hemoglobin 9.5 (*)    HEMOGLOBIN - Abnormal    Hemoglobin 9.6 (*)    HEMOGLOBIN - Abnormal    Hemoglobin 10.3 (*)    HEMOGLOBIN - Abnormal    Hemoglobin 9.9 (*)    HEMOGLOBIN - Abnormal    Hemoglobin 10.1 (*)    HEMOGLOBIN - Abnormal    Hemoglobin 10.0 (*)    HEMOGLOBIN - Abnormal    Hemoglobin 9.5 (*)    HEMOGLOBIN - Abnormal    Hemoglobin 10.2 (*)    TROPONIN I - Normal    Troponin I <0.01     MAGNESIUM - Normal    Magnesium 2.1     COVID-19 VIRUS (CORONAVIRUS) BY PCR - Normal    SARS CoV2 PCR Negative     LACTIC ACID WHOLE BLOOD - Normal    Lactic Acid 1.7     MAGNESIUM - Normal    Magnesium 2.1     VANCOMYCIN LEVEL - Normal    Vancomycin 20.9     CREATININE - Normal    Creatinine 0.76      GFR Estimate >90     HEMOGLOBIN   HEMOGLOBIN   HEMOGLOBIN   HEMOGLOBIN   HEMOGLOBIN   VANCOMYCIN LEVEL   TYPE AND SCREEN, ADULT    ABO/RH(D) O POS      Antibody Screen Negative      SPECIMEN EXPIRATION DATE [id number]     ABO/RH TYPE AND SCREEN         RADIOLOGY    CT Pelvis Soft Tissue wo Contrast   Final Result   IMPRESSION:   1.  Bilateral hydroceles, marked scrotal edema and marked penile edema unchanged.   2.  Inflammatory change from the rectosigmoid junction to the anal canal with posterior abscesses and left sinus tract as detailed above unchanged.   3.  Extensive decubitus ulceration posterior to the right acetabulum and right hemipelvic bones within which is an ill-defined thick-walled fluid collection 6 x 4 x 4 cm that has increased in size since 1/3/2022.         CTA Abdomen Pelvis with Contrast   Final Result   IMPRESSION:   1.  New distal colitis. No evidence for active gastrointestinal bleeding.   2.  New presacral and perirectal fluid collection suspicious for abscess. Main pocket of fluid measures approximately 6.6 x 1.8 x 3.0 cm, with short channel-like extensions superiorly and inferiorly along the left pelvis as described.          DISCHARGE MEDS  New Prescriptions     AMOXICILLIN-CLAVULANATE (AUGMENTIN) 875-125 MG TABLET    Take 1 tablet by mouth 2 times daily for 7 days    OXYCODONE-ACETAMINOPHEN (PERCOCET) 5-325 MG TABLET    Take 1-2 tablets by mouth every 4 hours as needed for pain       Trudy Griffith DO  Emergency Medicine  [facility] EMERGENCY ROOM  [address] [facility] [address] [phone]       Trudy Griffith DO  01/06/22 0284

## 2022-01-07 VITALS
TEMPERATURE: 98.3 F | HEART RATE: 78 BPM | DIASTOLIC BLOOD PRESSURE: 78 MMHG | SYSTOLIC BLOOD PRESSURE: 146 MMHG | BODY MASS INDEX: 27.5 KG/M2 | RESPIRATION RATE: 16 BRPM | WEIGHT: 220 LBS | OXYGEN SATURATION: 96 %

## 2022-01-07 PROBLEM — L89.90 INFECTED DECUBITUS ULCER, UNSPECIFIED ULCER STAGE: Status: ACTIVE | Noted: 2022-01-07

## 2022-01-07 PROBLEM — L08.9 INFECTED DECUBITUS ULCER, UNSPECIFIED ULCER STAGE: Status: ACTIVE | Noted: 2022-01-07

## 2022-01-07 PROBLEM — K65.1 PELVIC ABSCESS IN MALE (H): Status: ACTIVE | Noted: 2022-01-07

## 2022-01-07 PROBLEM — Z79.01 ANTICOAGULATED: Status: ACTIVE | Noted: 2022-01-07

## 2022-01-07 PROBLEM — S36.60XA: Status: ACTIVE | Noted: 2022-01-07

## 2022-01-07 LAB
APTT PPP: 32 SECONDS (ref 22–38)
HGB BLD-MCNC: 10 G/DL (ref 13.3–17.7)
HGB BLD-MCNC: 9.8 G/DL (ref 13.3–17.7)
INR PPP: 1.19 (ref 0.85–1.15)
VANCOMYCIN SERPL-MCNC: 24.9 MG/L

## 2022-01-07 PROCEDURE — 250N000011 HC RX IP 250 OP 636: Performed by: EMERGENCY MEDICINE

## 2022-01-07 PROCEDURE — 258N000003 HC RX IP 258 OP 636: Performed by: EMERGENCY MEDICINE

## 2022-01-07 PROCEDURE — 250N000013 HC RX MED GY IP 250 OP 250 PS 637: Performed by: EMERGENCY MEDICINE

## 2022-01-07 PROCEDURE — 999N000127 HC STATISTIC PERIPHERAL IV START W US GUIDANCE

## 2022-01-07 PROCEDURE — 85730 THROMBOPLASTIN TIME PARTIAL: CPT | Performed by: HOSPITALIST

## 2022-01-07 PROCEDURE — 36415 COLL VENOUS BLD VENIPUNCTURE: CPT | Performed by: HOSPITALIST

## 2022-01-07 PROCEDURE — 99222 1ST HOSP IP/OBS MODERATE 55: CPT | Performed by: INTERNAL MEDICINE

## 2022-01-07 PROCEDURE — 99207 PR CDG-CORRECTLY CODED, REVIEWED AND AGREE: CPT | Performed by: FAMILY MEDICINE

## 2022-01-07 PROCEDURE — 120N000004 HC R&B MS OVERFLOW

## 2022-01-07 PROCEDURE — 999N000203 HC STATISTICAL VASC ACCESS NURSE TIME, 16-31 MINUTES

## 2022-01-07 PROCEDURE — 99239 HOSP IP/OBS DSCHRG MGMT >30: CPT | Performed by: FAMILY MEDICINE

## 2022-01-07 PROCEDURE — 85018 HEMOGLOBIN: CPT | Performed by: EMERGENCY MEDICINE

## 2022-01-07 PROCEDURE — 80202 ASSAY OF VANCOMYCIN: CPT | Performed by: EMERGENCY MEDICINE

## 2022-01-07 PROCEDURE — 85610 PROTHROMBIN TIME: CPT | Performed by: HOSPITALIST

## 2022-01-07 RX ORDER — AMLODIPINE BESYLATE 5 MG/1
5 TABLET ORAL DAILY
Status: DISCONTINUED | OUTPATIENT
Start: 2022-01-08 | End: 2022-01-08 | Stop reason: HOSPADM

## 2022-01-07 RX ORDER — HYDROMORPHONE HYDROCHLORIDE 1 MG/ML
0.5 INJECTION, SOLUTION INTRAMUSCULAR; INTRAVENOUS; SUBCUTANEOUS EVERY 4 HOURS PRN
Status: CANCELLED | OUTPATIENT
Start: 2022-01-07

## 2022-01-07 RX ORDER — BACLOFEN 10 MG/1
20 TABLET ORAL 3 TIMES DAILY PRN
Status: CANCELLED | OUTPATIENT
Start: 2022-01-07

## 2022-01-07 RX ORDER — TOLTERODINE 2 MG/1
4 CAPSULE, EXTENDED RELEASE ORAL DAILY
Status: CANCELLED | OUTPATIENT
Start: 2022-01-07

## 2022-01-07 RX ORDER — FENTANYL CITRATE 50 UG/ML
50 INJECTION, SOLUTION INTRAMUSCULAR; INTRAVENOUS ONCE
Status: COMPLETED | OUTPATIENT
Start: 2022-01-07 | End: 2022-01-07

## 2022-01-07 RX ORDER — SODIUM CHLORIDE 9 MG/ML
INJECTION, SOLUTION INTRAVENOUS CONTINUOUS
Status: CANCELLED | OUTPATIENT
Start: 2022-01-07

## 2022-01-07 RX ORDER — SIMVASTATIN 20 MG
20 TABLET ORAL DAILY
Status: CANCELLED | OUTPATIENT
Start: 2022-01-07

## 2022-01-07 RX ORDER — LORAZEPAM 2 MG/ML
1 INJECTION INTRAMUSCULAR EVERY 4 HOURS PRN
Status: CANCELLED | OUTPATIENT
Start: 2022-01-07

## 2022-01-07 RX ORDER — PIPERACILLIN SODIUM, TAZOBACTAM SODIUM 3; .375 G/15ML; G/15ML
3.38 INJECTION, POWDER, LYOPHILIZED, FOR SOLUTION INTRAVENOUS EVERY 8 HOURS
Status: CANCELLED | OUTPATIENT
Start: 2022-01-07

## 2022-01-07 RX ORDER — AMLODIPINE BESYLATE 5 MG/1
5 TABLET ORAL DAILY
Status: CANCELLED | OUTPATIENT
Start: 2022-01-08

## 2022-01-07 RX ORDER — LIDOCAINE 40 MG/G
CREAM TOPICAL
Status: CANCELLED | OUTPATIENT
Start: 2022-01-07

## 2022-01-07 RX ADMIN — FENTANYL CITRATE 50 MCG: 50 INJECTION INTRAMUSCULAR; INTRAVENOUS at 01:21

## 2022-01-07 RX ADMIN — HYDROMORPHONE HYDROCHLORIDE 0.5 MG: 1 INJECTION, SOLUTION INTRAMUSCULAR; INTRAVENOUS; SUBCUTANEOUS at 06:41

## 2022-01-07 RX ADMIN — AMLODIPINE BESYLATE 5 MG: 5 TABLET ORAL at 07:27

## 2022-01-07 RX ADMIN — HYDROMORPHONE HYDROCHLORIDE 0.5 MG: 1 INJECTION, SOLUTION INTRAMUSCULAR; INTRAVENOUS; SUBCUTANEOUS at 14:18

## 2022-01-07 RX ADMIN — PIPERACILLIN AND TAZOBACTAM 3.38 G: 3; .375 INJECTION, POWDER, LYOPHILIZED, FOR SOLUTION INTRAVENOUS at 12:04

## 2022-01-07 RX ADMIN — TOLTERODINE 4 MG: 2 CAPSULE, EXTENDED RELEASE ORAL at 07:28

## 2022-01-07 RX ADMIN — HYDROMORPHONE HYDROCHLORIDE 0.5 MG: 1 INJECTION, SOLUTION INTRAMUSCULAR; INTRAVENOUS; SUBCUTANEOUS at 02:23

## 2022-01-07 RX ADMIN — VANCOMYCIN HYDROCHLORIDE 1250 MG: 5 INJECTION, POWDER, LYOPHILIZED, FOR SOLUTION INTRAVENOUS at 01:21

## 2022-01-07 RX ADMIN — VANCOMYCIN HYDROCHLORIDE 1250 MG: 5 INJECTION, POWDER, LYOPHILIZED, FOR SOLUTION INTRAVENOUS at 12:40

## 2022-01-07 RX ADMIN — LORAZEPAM 1 MG: 2 INJECTION INTRAMUSCULAR; INTRAVENOUS at 12:00

## 2022-01-07 RX ADMIN — PIPERACILLIN AND TAZOBACTAM 3.38 G: 3; .375 INJECTION, POWDER, LYOPHILIZED, FOR SOLUTION INTRAVENOUS at 03:37

## 2022-01-07 RX ADMIN — SIMVASTATIN 20 MG: 20 TABLET, FILM COATED ORAL at 07:28

## 2022-01-07 ASSESSMENT — ACTIVITIES OF DAILY LIVING (ADL)
ADLS_ACUITY_SCORE: 24
ADLS_ACUITY_SCORE: 24
WALKING_OR_CLIMBING_STAIRS: AMBULATION DIFFICULTY, REQUIRES EQUIPMENT;OTHER (SEE COMMENTS)
TOILETING_ISSUES: YES
ADLS_ACUITY_SCORE: 24
ADLS_ACUITY_SCORE: 20
ADLS_ACUITY_SCORE: 24
ADLS_ACUITY_SCORE: 20
ADLS_ACUITY_SCORE: 24
TOILETING_ASSISTANCE: TOILETING DIFFICULTY, DEPENDENT
ADLS_ACUITY_SCORE: 24
WHICH_OF_THE_ABOVE_FUNCTIONAL_RISKS_HAD_A_RECENT_ONSET_OR_CHANGE?: TOILETING
ADLS_ACUITY_SCORE: 24
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
## 2022-01-07 NOTE — PHARMACY-VANCOMYCIN DOSING SERVICE
"Pharmacy Vancomycin Note  Date of Service 2022  Patient's  1965   56 year old, male    Indication: Intra-abdominal infection  Day of Therapy: 4  Current vancomycin regimen:  1250 mg IV q12h  Current vancomycin monitoring method: AUC  Current vancomycin therapeutic monitoring goal: 400-600 mg*h/L    InsightRX Prediction of Current Vancomycin Regimen  Regimen: 1250 mg IV every 12 hours.  Start time: 13:21 on 2022  Exposure target: AUC24 (range)400-600 mg/L.hr   AUC24,ss: 533 mg/L.hr  Probability of AUC24 > 400: 97 %  Ctrough,ss: 16.3 mg/L  Probability of Ctrough,ss > 20: 21 %  Probability of nephrotoxicity (Lodise SERVANDO ): 12 %      Current estimated CrCl = Estimated Creatinine Clearance: 155 mL/min (based on SCr of 0.76 mg/dL).    Creatinine for last 3 days  2022:  3:37 PM Creatinine 0.74 mg/dL  2022:  6:00 AM Creatinine 0.76 mg/dL    Recent Vancomycin Levels (past 3 days)  2022:  6:17 AM Vancomycin 20.9 mg/L  2022:  6:08 AM Vancomycin 24.9 mg/L    Vancomycin IV Administrations (past 72 hours)                   vancomycin 1250 mg in 0.9% NaCl 250 mL intermittent infusion 1,250 mg (mg) 1,250 mg New Bag 22 0121     1,250 mg New Bag 22 1338     1,250 mg New Bag  0328     1,250 mg New Bag 22 1335     1,250 mg New Bag  0057     1,250 mg New Bag 22 1158                Nephrotoxins and other renal medications (From now, onward)    Start     Dose/Rate Route Frequency Ordered Stop    22 1500  piperacillin-tazobactam (ZOSYN) 3.375 g vial to attach to  mL bag        Note to Pharmacy: For [facility], [facility] and [facility]: For Zosyn-naive patients, use the \"Zosyn initial dose + extended infusion\" order panel.    3.375 g  over 240 Minutes Intravenous EVERY 8 HOURS 22 1432      22 1200  vancomycin 1250 mg in 0.9% NaCl 250 mL intermittent infusion 1,250 mg         1,250 mg  over 90 Minutes Intravenous EVERY 12 HOURS 22 1141      22 0000  " amoxicillin-clavulanate (AUGMENTIN) 875-125 MG tablet         1 tablet Oral 2 TIMES DAILY 01/03/22 2226 01/10/22 2030             Contrast Orders - past 72 hours (72h ago, onward)            None          Interpretation of levels and current regimen:  Vancomycin level is reflective of -600    Has serum creatinine changed greater than 50% in last 72 hours: No    Urine output:  good urine output    Renal Function: Stable      Plan:  1. Continue Current Dose  2. Vancomycin monitoring method: AUC  3. Vancomycin therapeutic monitoring goal: 400-600 mg*h/L  4. Pharmacy will check vancomycin levels as appropriate in 1-3 Days.  5. Serum creatinine levels will be ordered daily for the first week of therapy and at least twice weekly for subsequent weeks.    Tiffany Roberts RPH

## 2022-01-07 NOTE — PROGRESS NOTES
I examined this patient today.  There is tremendous skin breakdown and soft tissue swelling in the perineal area but I cannot feel any evidence of fluctuance or purulent drainage.  There is nothing that I can surgically drained here.    I think given the fact that this is a complex case and that there is some type of loculated fluid collection of the pelvis, I think it is best to reconsult interventional radiology and have them do CT-guided drainage of the fluid collection.      Tutu Turpin M.D.  January 7, 2022  Pager [phone]  Cell: [phone]
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
## 2022-01-07 NOTE — ED NOTES
Patient very agitated that transport did no get him earlier. While getting changed his IV on the right AC was dislodged. Currently patient refusing to have another IV placed he states he will not allow anybody to touch him until he gets to [place].  Levi Neal RN  1/7/2022  4:24 PM
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
## 2022-01-07 NOTE — ED NOTES
"Attempted to draw hemoglobin and vacomycin labs from IV but was unsuccessful. Very minimal blood return despite troubleshooting. Informed pt and he is refusing any peripheral lab draw. \"I've had this IV for four days and now it doesn't work, I don't understand, this is ridiculous.\" Unresponsive to education or reassurance. Dr. Payan updated.     Also offered to switch pt's current mattress to a more comfortable hospital mattress. Pt declined, saying, \"For what, so I can lay here for another five days? It's not going to change anything.\" Offered repositioning with pillow support but pt declined. Able to microshift and pull himself up using bedrails.   "

## 2022-01-07 NOTE — ED NOTES
Entered pt's room after shift change report and introduced myself. Pt was pleasant but states that he is frustrated and concerned with still being here and is also feeling anxious. Specifically concerned that his bleeding isn't being corrected appropriately and is worried he's going bleed out. Allowed him space to express his feelings and frustrations. Provided support and reassurance and pt expressed appreciation.     Pt turned onto his left side so this writer could assess the status of any rectal bleeding. When pt turned he began to have two large formed bowel movements. BMs were medium brown with very scarce small red clots (3 to 4 of them). Informed pt of this and he expresses happiness with this news. Informed him that we would continue to monitor this closely and to inform staff if he feels any changes in his condition. Assisted pt with extensive chasidy care and changed the karen pads underneath him. Barrier cream applied to anus area. Pt requested lotion to the rest of his buttocks and scrotum appears very moist and is starting to macerate, including an open/split area on the bottom of his scrotum. Provided education on how adding more moisture to this areas would increase his risk for skin break down; he relayed understanding. Helped pt reposition with pillow support behind his head. Offered to place a pillow under one of both hip areas to help offload pressure on his buttocks and coccyx but pt declined despite education. Using bed controlled independently and using call light appropriately. Instructed him to call with any questions, concerns, or requests.
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
## 2022-01-07 NOTE — ED NOTES
Successful IV inserted via ultrasound guidance. Will draw labs from this line.    Requesting pain medication. No PRNs within timeframe to administer. Dr. Payan updated in department. Orders for fentanyl 50 mcg IV.

## 2022-01-08 NOTE — SIGNIFICANT EVENT
Writer was called to the room to speak to patient. In the room found patient had dressed himself. Patient sitting in the wheelchair very agitated he states he is leaving against medical advice. Writer asked patient to wait and speak to MD. Patient agreed to wait. Writer walked back to the desk to kecia PEDERSON. Talked to DR Arthur per MD patient should wait to transportation to take him to [place]. Upon returning to the room RN found patient had pulled his IV out and collected his belongs  and left the hospital. Dr Arthur notified via text page.  Levi Neal RN  1/7/2022  7:25 PM

## 2022-01-10 ENCOUNTER — HOSPITAL ENCOUNTER (OUTPATIENT)
Facility: CLINIC | Age: 57
Discharge: HOME | End: 2022-01-11
Attending: EMERGENCY MEDICINE | Admitting: PHYSICIAN ASSISTANT
Payer: MEDICARE

## 2022-01-10 ENCOUNTER — APPOINTMENT (OUTPATIENT)
Dept: CT IMAGING | Facility: CLINIC | Age: 57
End: 2022-01-10
Payer: MEDICARE

## 2022-01-10 ENCOUNTER — APPOINTMENT (OUTPATIENT)
Dept: ULTRASOUND IMAGING | Facility: CLINIC | Age: 57
End: 2022-01-10
Attending: STUDENT IN AN ORGANIZED HEALTH CARE EDUCATION/TRAINING PROGRAM
Payer: MEDICARE

## 2022-01-10 DIAGNOSIS — L08.9 INFECTED DECUBITUS ULCER, UNSPECIFIED ULCER STAGE: Primary | ICD-10-CM

## 2022-01-10 DIAGNOSIS — L89.90 INFECTED DECUBITUS ULCER, UNSPECIFIED ULCER STAGE: Primary | ICD-10-CM

## 2022-01-10 DIAGNOSIS — K62.5 RECTAL BLEEDING: ICD-10-CM

## 2022-01-10 DIAGNOSIS — K65.1 PELVIC ABSCESS IN MALE (H): ICD-10-CM

## 2022-01-10 DIAGNOSIS — Z11.52 ENCOUNTER FOR SCREENING LABORATORY TESTING FOR SEVERE ACUTE RESPIRATORY SYNDROME CORONAVIRUS 2 (SARS-COV-2): ICD-10-CM

## 2022-01-10 DIAGNOSIS — L89.309 PRESSURE INJURY OF SKIN OF BUTTOCK, UNSPECIFIED INJURY STAGE, UNSPECIFIED LATERALITY: ICD-10-CM

## 2022-01-10 DIAGNOSIS — S31.819S: ICD-10-CM

## 2022-01-10 DIAGNOSIS — G89.18 POSTOPERATIVE PAIN: ICD-10-CM

## 2022-01-10 LAB
ABO/RH(D): NORMAL
ALBUMIN SERPL-MCNC: 2.6 G/DL (ref 3.4–5)
ALP SERPL-CCNC: 92 U/L (ref 40–150)
ALT SERPL W P-5'-P-CCNC: 18 U/L (ref 0–70)
ANION GAP SERPL CALCULATED.3IONS-SCNC: 4 MMOL/L (ref 3–14)
ANTIBODY SCREEN: NEGATIVE
AST SERPL W P-5'-P-CCNC: 15 U/L (ref 0–45)
BASOPHILS # BLD AUTO: 0.1 10E3/UL (ref 0–0.2)
BASOPHILS NFR BLD AUTO: 1 %
BILIRUB SERPL-MCNC: 0.2 MG/DL (ref 0.2–1.3)
BUN SERPL-MCNC: 20 MG/DL (ref 7–30)
CALCIUM SERPL-MCNC: 8.7 MG/DL (ref 8.5–10.1)
CHLORIDE BLD-SCNC: 111 MMOL/L (ref 94–109)
CO2 SERPL-SCNC: 26 MMOL/L (ref 20–32)
CREAT SERPL-MCNC: 0.81 MG/DL (ref 0.66–1.25)
CRP SERPL-MCNC: 28 MG/L (ref 0–8)
EOSINOPHIL # BLD AUTO: 0.5 10E3/UL (ref 0–0.7)
EOSINOPHIL NFR BLD AUTO: 5 %
ERYTHROCYTE [DISTWIDTH] IN BLOOD BY AUTOMATED COUNT: 19.1 % (ref 10–15)
ERYTHROCYTE [SEDIMENTATION RATE] IN BLOOD BY WESTERGREN METHOD: 79 MM/HR (ref 0–20)
GFR SERPL CREATININE-BSD FRML MDRD: >90 ML/MIN/1.73M2
GLUCOSE BLD-MCNC: 100 MG/DL (ref 70–99)
HCT VFR BLD AUTO: 37.4 % (ref 40–53)
HGB BLD-MCNC: 11.1 G/DL (ref 13.3–17.7)
IMM GRANULOCYTES # BLD: 0 10E3/UL
IMM GRANULOCYTES NFR BLD: 0 %
INR PPP: 1.06 (ref 0.86–1.14)
LYMPHOCYTES # BLD AUTO: 2 10E3/UL (ref 0.8–5.3)
LYMPHOCYTES NFR BLD AUTO: 21 %
MCH RBC QN AUTO: 21.9 PG (ref 26.5–33)
MCHC RBC AUTO-ENTMCNC: 29.7 G/DL (ref 31.5–36.5)
MCV RBC AUTO: 74 FL (ref 78–100)
MONOCYTES # BLD AUTO: 0.8 10E3/UL (ref 0–1.3)
MONOCYTES NFR BLD AUTO: 9 %
NEUTROPHILS # BLD AUTO: 6 10E3/UL (ref 1.6–8.3)
NEUTROPHILS NFR BLD AUTO: 64 %
NRBC # BLD AUTO: 0 10E3/UL
NRBC BLD AUTO-RTO: 0 /100
PLATELET # BLD AUTO: 309 10E3/UL (ref 150–450)
POTASSIUM BLD-SCNC: 3.1 MMOL/L (ref 3.4–5.3)
POTASSIUM BLD-SCNC: 3.4 MMOL/L (ref 3.4–5.3)
PROT SERPL-MCNC: 8 G/DL (ref 6.8–8.8)
RBC # BLD AUTO: 5.06 10E6/UL (ref 4.4–5.9)
SARS-COV-2 RNA RESP QL NAA+PROBE: NEGATIVE
SODIUM SERPL-SCNC: 141 MMOL/L (ref 133–144)
SPECIMEN EXPIRATION DATE: NORMAL
WBC # BLD AUTO: 9.3 10E3/UL (ref 4–11)

## 2022-01-10 PROCEDURE — 80053 COMPREHEN METABOLIC PANEL: CPT | Performed by: EMERGENCY MEDICINE

## 2022-01-10 PROCEDURE — 76882 US LMTD JT/FCL EVL NVASC XTR: CPT | Mod: 26 | Performed by: RADIOLOGY

## 2022-01-10 PROCEDURE — 250N000011 HC RX IP 250 OP 636: Performed by: EMERGENCY MEDICINE

## 2022-01-10 PROCEDURE — 74177 CT ABD & PELVIS W/CONTRAST: CPT | Mod: 26 | Performed by: RADIOLOGY

## 2022-01-10 PROCEDURE — 85652 RBC SED RATE AUTOMATED: CPT | Performed by: INTERNAL MEDICINE

## 2022-01-10 PROCEDURE — 96375 TX/PRO/DX INJ NEW DRUG ADDON: CPT | Mod: 59

## 2022-01-10 PROCEDURE — 99214 OFFICE O/P EST MOD 30 MIN: CPT | Performed by: PHYSICIAN ASSISTANT

## 2022-01-10 PROCEDURE — 250N000009 HC RX 250: Performed by: STUDENT IN AN ORGANIZED HEALTH CARE EDUCATION/TRAINING PROGRAM

## 2022-01-10 PROCEDURE — 36415 COLL VENOUS BLD VENIPUNCTURE: CPT | Performed by: INTERNAL MEDICINE

## 2022-01-10 PROCEDURE — 96367 TX/PROPH/DG ADDL SEQ IV INF: CPT

## 2022-01-10 PROCEDURE — 96366 THER/PROPH/DIAG IV INF ADDON: CPT

## 2022-01-10 PROCEDURE — 250N000013 HC RX MED GY IP 250 OP 250 PS 637

## 2022-01-10 PROCEDURE — 258N000003 HC RX IP 258 OP 636: Performed by: STUDENT IN AN ORGANIZED HEALTH CARE EDUCATION/TRAINING PROGRAM

## 2022-01-10 PROCEDURE — 250N000011 HC RX IP 250 OP 636: Performed by: STUDENT IN AN ORGANIZED HEALTH CARE EDUCATION/TRAINING PROGRAM

## 2022-01-10 PROCEDURE — 85610 PROTHROMBIN TIME: CPT | Performed by: EMERGENCY MEDICINE

## 2022-01-10 PROCEDURE — 86901 BLOOD TYPING SEROLOGIC RH(D): CPT | Performed by: EMERGENCY MEDICINE

## 2022-01-10 PROCEDURE — 250N000011 HC RX IP 250 OP 636: Performed by: INTERNAL MEDICINE

## 2022-01-10 PROCEDURE — 258N000003 HC RX IP 258 OP 636: Performed by: INTERNAL MEDICINE

## 2022-01-10 PROCEDURE — 86140 C-REACTIVE PROTEIN: CPT | Performed by: INTERNAL MEDICINE

## 2022-01-10 PROCEDURE — 36415 COLL VENOUS BLD VENIPUNCTURE: CPT | Performed by: EMERGENCY MEDICINE

## 2022-01-10 PROCEDURE — 84132 ASSAY OF SERUM POTASSIUM: CPT | Mod: 91 | Performed by: STUDENT IN AN ORGANIZED HEALTH CARE EDUCATION/TRAINING PROGRAM

## 2022-01-10 PROCEDURE — 74177 CT ABD & PELVIS W/CONTRAST: CPT | Mod: MG

## 2022-01-10 PROCEDURE — 250N000011 HC RX IP 250 OP 636

## 2022-01-10 PROCEDURE — 82040 ASSAY OF SERUM ALBUMIN: CPT | Performed by: EMERGENCY MEDICINE

## 2022-01-10 PROCEDURE — 99285 EMERGENCY DEPT VISIT HI MDM: CPT | Performed by: EMERGENCY MEDICINE

## 2022-01-10 PROCEDURE — 258N000003 HC RX IP 258 OP 636: Performed by: EMERGENCY MEDICINE

## 2022-01-10 PROCEDURE — 96365 THER/PROPH/DIAG IV INF INIT: CPT | Mod: 59

## 2022-01-10 PROCEDURE — 96368 THER/DIAG CONCURRENT INF: CPT | Mod: 59

## 2022-01-10 PROCEDURE — U0003 INFECTIOUS AGENT DETECTION BY NUCLEIC ACID (DNA OR RNA); SEVERE ACUTE RESPIRATORY SYNDROME CORONAVIRUS 2 (SARS-COV-2) (CORONAVIRUS DISEASE [COVID-19]), AMPLIFIED PROBE TECHNIQUE, MAKING USE OF HIGH THROUGHPUT TECHNOLOGIES AS DESCRIBED BY CMS-2020-01-R: HCPCS | Performed by: EMERGENCY MEDICINE

## 2022-01-10 PROCEDURE — 51705 CHANGE OF BLADDER TUBE: CPT

## 2022-01-10 PROCEDURE — 76882 US LMTD JT/FCL EVL NVASC XTR: CPT | Mod: XS

## 2022-01-10 PROCEDURE — 99223 1ST HOSP IP/OBS HIGH 75: CPT | Mod: AI | Performed by: STUDENT IN AN ORGANIZED HEALTH CARE EDUCATION/TRAINING PROGRAM

## 2022-01-10 PROCEDURE — 99285 EMERGENCY DEPT VISIT HI MDM: CPT | Mod: 25

## 2022-01-10 PROCEDURE — 99213 OFFICE O/P EST LOW 20 MIN: CPT | Performed by: PHYSICIAN ASSISTANT

## 2022-01-10 PROCEDURE — 85004 AUTOMATED DIFF WBC COUNT: CPT | Performed by: EMERGENCY MEDICINE

## 2022-01-10 PROCEDURE — 96361 HYDRATE IV INFUSION ADD-ON: CPT | Mod: 59

## 2022-01-10 PROCEDURE — 96376 TX/PRO/DX INJ SAME DRUG ADON: CPT | Mod: 59

## 2022-01-10 PROCEDURE — C9803 HOPD COVID-19 SPEC COLLECT: HCPCS

## 2022-01-10 RX ORDER — MORPHINE SULFATE 4 MG/ML
4 INJECTION, SOLUTION INTRAMUSCULAR; INTRAVENOUS ONCE
Status: COMPLETED | OUTPATIENT
Start: 2022-01-10 | End: 2022-01-10

## 2022-01-10 RX ORDER — OXYCODONE HYDROCHLORIDE 5 MG/1
5 TABLET ORAL EVERY 6 HOURS PRN
Status: DISCONTINUED | OUTPATIENT
Start: 2022-01-10 | End: 2022-01-11 | Stop reason: HOSPADM

## 2022-01-10 RX ORDER — HYDROMORPHONE HYDROCHLORIDE 1 MG/ML
0.5 INJECTION, SOLUTION INTRAMUSCULAR; INTRAVENOUS; SUBCUTANEOUS ONCE
Status: COMPLETED | OUTPATIENT
Start: 2022-01-10 | End: 2022-01-10

## 2022-01-10 RX ORDER — BACLOFEN 20 MG/1
20 TABLET ORAL 3 TIMES DAILY PRN
Status: DISCONTINUED | OUTPATIENT
Start: 2022-01-10 | End: 2022-01-11 | Stop reason: HOSPADM

## 2022-01-10 RX ORDER — CLOPIDOGREL BISULFATE 75 MG/1
75 TABLET ORAL DAILY
Status: DISCONTINUED | OUTPATIENT
Start: 2022-01-10 | End: 2022-01-11 | Stop reason: HOSPADM

## 2022-01-10 RX ORDER — SODIUM CHLORIDE 9 MG/ML
INJECTION, SOLUTION INTRAVENOUS CONTINUOUS
Status: DISCONTINUED | OUTPATIENT
Start: 2022-01-10 | End: 2022-01-11 | Stop reason: HOSPADM

## 2022-01-10 RX ORDER — IOPAMIDOL 755 MG/ML
110 INJECTION, SOLUTION INTRAVASCULAR ONCE
Status: COMPLETED | OUTPATIENT
Start: 2022-01-10 | End: 2022-01-10

## 2022-01-10 RX ORDER — AMLODIPINE BESYLATE 5 MG/1
5 TABLET ORAL DAILY
Status: DISCONTINUED | OUTPATIENT
Start: 2022-01-10 | End: 2022-01-11 | Stop reason: HOSPADM

## 2022-01-10 RX ORDER — SODIUM CHLORIDE, SODIUM LACTATE, POTASSIUM CHLORIDE, CALCIUM CHLORIDE 600; 310; 30; 20 MG/100ML; MG/100ML; MG/100ML; MG/100ML
INJECTION, SOLUTION INTRAVENOUS CONTINUOUS
Status: DISCONTINUED | OUTPATIENT
Start: 2022-01-10 | End: 2022-01-11 | Stop reason: HOSPADM

## 2022-01-10 RX ORDER — LIDOCAINE 40 MG/G
CREAM TOPICAL
Status: DISCONTINUED | OUTPATIENT
Start: 2022-01-10 | End: 2022-01-11 | Stop reason: HOSPADM

## 2022-01-10 RX ORDER — CEFTRIAXONE 1 G/1
1 INJECTION, POWDER, FOR SOLUTION INTRAMUSCULAR; INTRAVENOUS EVERY 24 HOURS
Status: DISCONTINUED | OUTPATIENT
Start: 2022-01-10 | End: 2022-01-11 | Stop reason: HOSPADM

## 2022-01-10 RX ORDER — SIMVASTATIN 20 MG
20 TABLET ORAL EVERY EVENING
Status: DISCONTINUED | OUTPATIENT
Start: 2022-01-10 | End: 2022-01-11 | Stop reason: HOSPADM

## 2022-01-10 RX ORDER — ONDANSETRON 4 MG/1
4 TABLET, ORALLY DISINTEGRATING ORAL EVERY 6 HOURS PRN
Status: DISCONTINUED | OUTPATIENT
Start: 2022-01-10 | End: 2022-01-11 | Stop reason: HOSPADM

## 2022-01-10 RX ORDER — PIPERACILLIN SODIUM, TAZOBACTAM SODIUM 3; .375 G/15ML; G/15ML
3.38 INJECTION, POWDER, LYOPHILIZED, FOR SOLUTION INTRAVENOUS EVERY 8 HOURS
Status: DISCONTINUED | OUTPATIENT
Start: 2022-01-10 | End: 2022-01-10

## 2022-01-10 RX ORDER — ACETAMINOPHEN 325 MG/1
650 TABLET ORAL EVERY 6 HOURS PRN
Status: DISCONTINUED | OUTPATIENT
Start: 2022-01-10 | End: 2022-01-11 | Stop reason: HOSPADM

## 2022-01-10 RX ORDER — ONDANSETRON 2 MG/ML
4 INJECTION INTRAMUSCULAR; INTRAVENOUS EVERY 6 HOURS PRN
Status: DISCONTINUED | OUTPATIENT
Start: 2022-01-10 | End: 2022-01-11 | Stop reason: HOSPADM

## 2022-01-10 RX ADMIN — SODIUM CHLORIDE, PRESERVATIVE FREE 75 ML: 5 INJECTION INTRAVENOUS at 16:46

## 2022-01-10 RX ADMIN — PIPERACILLIN AND TAZOBACTAM 3.38 G: 3; .375 INJECTION, POWDER, LYOPHILIZED, FOR SOLUTION INTRAVENOUS at 12:16

## 2022-01-10 RX ADMIN — PIPERACILLIN AND TAZOBACTAM 3.38 G: 3; .375 INJECTION, POWDER, LYOPHILIZED, FOR SOLUTION INTRAVENOUS at 04:49

## 2022-01-10 RX ADMIN — HYDROMORPHONE HYDROCHLORIDE 0.5 MG: 1 INJECTION, SOLUTION INTRAMUSCULAR; INTRAVENOUS; SUBCUTANEOUS at 08:02

## 2022-01-10 RX ADMIN — SODIUM CHLORIDE, POTASSIUM CHLORIDE, SODIUM LACTATE AND CALCIUM CHLORIDE: 600; 310; 30; 20 INJECTION, SOLUTION INTRAVENOUS at 14:19

## 2022-01-10 RX ADMIN — AMLODIPINE BESYLATE 5 MG: 5 TABLET ORAL at 17:09

## 2022-01-10 RX ADMIN — CEFTRIAXONE SODIUM 1 G: 1 INJECTION, POWDER, FOR SOLUTION INTRAMUSCULAR; INTRAVENOUS at 17:09

## 2022-01-10 RX ADMIN — Medication 1250 MG: at 22:06

## 2022-01-10 RX ADMIN — SIMVASTATIN 20 MG: 20 TABLET, FILM COATED ORAL at 22:06

## 2022-01-10 RX ADMIN — SODIUM CHLORIDE: 9 INJECTION, SOLUTION INTRAVENOUS at 12:16

## 2022-01-10 RX ADMIN — HYDROMORPHONE HYDROCHLORIDE 0.5 MG: 1 INJECTION, SOLUTION INTRAMUSCULAR; INTRAVENOUS; SUBCUTANEOUS at 12:35

## 2022-01-10 RX ADMIN — VANCOMYCIN HYDROCHLORIDE 1500 MG: 10 INJECTION, POWDER, LYOPHILIZED, FOR SOLUTION INTRAVENOUS at 10:32

## 2022-01-10 RX ADMIN — SODIUM CHLORIDE, POTASSIUM CHLORIDE, SODIUM LACTATE AND CALCIUM CHLORIDE: 600; 310; 30; 20 INJECTION, SOLUTION INTRAVENOUS at 22:11

## 2022-01-10 RX ADMIN — METRONIDAZOLE 500 MG: 500 INJECTION, SOLUTION INTRAVENOUS at 18:28

## 2022-01-10 RX ADMIN — SODIUM CHLORIDE 1000 ML: 9 INJECTION, SOLUTION INTRAVENOUS at 04:49

## 2022-01-10 RX ADMIN — IOPAMIDOL 110 ML: 755 INJECTION, SOLUTION INTRAVENOUS at 16:45

## 2022-01-10 RX ADMIN — OXYCODONE HYDROCHLORIDE 5 MG: 5 TABLET ORAL at 18:28

## 2022-01-10 RX ADMIN — MORPHINE SULFATE 4 MG: 4 INJECTION INTRAVENOUS at 04:49

## 2022-01-10 RX ADMIN — SODIUM CHLORIDE: 9 INJECTION, SOLUTION INTRAVENOUS at 06:21

## 2022-01-10 ASSESSMENT — ACTIVITIES OF DAILY LIVING (ADL)
ADLS_ACUITY_SCORE: 8
ADLS_ACUITY_SCORE: 12
ADLS_ACUITY_SCORE: 12
ADLS_ACUITY_SCORE: 8
ADLS_ACUITY_SCORE: 12
ADLS_ACUITY_SCORE: 8
ADLS_ACUITY_SCORE: 8
ADLS_ACUITY_SCORE: 12

## 2022-01-10 NOTE — ED NOTES
Emergency Department Patient Sign-out       Brief HPI:  This is a 56 year old male signed out to me by Dr. Mohan .  See initial ED Provider note for details of the presentation.            Significant Events prior to my assuming care: labs CT at [facility], labs and zosyn vanco started.      Exam:   Patient Vitals for the past 24 hrs:   BP Temp Temp src Pulse Resp SpO2 Weight   01/10/22 0800 (!) 147/69 97.4  F (36.3  C) Oral 75 15 97 % --   01/10/22 0515 106/78 -- -- -- -- -- --   01/10/22 0344 107/68 97.6  F (36.4  C) Oral 89 18 100 % 80.4 kg (177 lb 3.2 oz)           ED RESULTS:   Results for orders placed or performed during the hospital encounter of 01/10/22 (from the past 24 hour(s))   ABO/Rh type and screen     Status: None    Collection Time: 01/10/22  4:31 AM    Narrative    The following orders were created for panel order ABO/Rh type and screen.  Procedure                               Abnormality         Status                     ---------                               -----------         ------                     Adult Type and Screen[[id number]]                            Final result                 Please view results for these tests on the individual orders.   CBC with platelets differential     Status: Abnormal    Collection Time: 01/10/22  4:31 AM    Narrative    The following orders were created for panel order CBC with platelets differential.  Procedure                               Abnormality         Status                     ---------                               -----------         ------                     CBC with platelets and d...[[id number]]  Abnormal            Final result                 Please view results for these tests on the individual orders.   INR     Status: Normal    Collection Time: 01/10/22  4:31 AM   Result Value Ref Range    INR 1.06 0.86 - 1.14   Comprehensive metabolic panel     Status: Abnormal    Collection Time: 01/10/22  4:31 AM   Result Value Ref Range     Sodium 141 133 - 144 mmol/L    Potassium 3.1 (L) 3.4 - 5.3 mmol/L    Chloride 111 (H) 94 - 109 mmol/L    Carbon Dioxide (CO2) 26 20 - 32 mmol/L    Anion Gap 4 3 - 14 mmol/L    Urea Nitrogen 20 7 - 30 mg/dL    Creatinine 0.81 0.66 - 1.25 mg/dL    Calcium 8.7 8.5 - 10.1 mg/dL    Glucose 100 (H) 70 - 99 mg/dL    Alkaline Phosphatase 92 40 - 150 U/L    AST 15 0 - 45 U/L    ALT 18 0 - 70 U/L    Protein Total 8.0 6.8 - 8.8 g/dL    Albumin 2.6 (L) 3.4 - 5.0 g/dL    Bilirubin Total 0.2 0.2 - 1.3 mg/dL    GFR Estimate >90 >60 mL/min/1.73m2   Adult Type and Screen     Status: None    Collection Time: 01/10/22  4:31 AM   Result Value Ref Range    ABO/RH(D) O POS     Antibody Screen Negative Negative    SPECIMEN EXPIRATION DATE [id number]    CBC with platelets and differential     Status: Abnormal    Collection Time: 01/10/22  4:31 AM   Result Value Ref Range    WBC Count 9.3 4.0 - 11.0 10e3/uL    RBC Count 5.06 4.40 - 5.90 10e6/uL    Hemoglobin 11.1 (L) 13.3 - 17.7 g/dL    Hematocrit 37.4 (L) 40.0 - 53.0 %    MCV 74 (L) 78 - 100 fL    MCH 21.9 (L) 26.5 - 33.0 pg    MCHC 29.7 (L) 31.5 - 36.5 g/dL    RDW 19.1 (H) 10.0 - 15.0 %    Platelet Count 309 150 - 450 10e3/uL    % Neutrophils 64 %    % Lymphocytes 21 %    % Monocytes 9 %    % Eosinophils 5 %    % Basophils 1 %    % Immature Granulocytes 0 %    NRBCs per 100 WBC 0 <1 /100    Absolute Neutrophils 6.0 1.6 - 8.3 10e3/uL    Absolute Lymphocytes 2.0 0.8 - 5.3 10e3/uL    Absolute Monocytes 0.8 0.0 - 1.3 10e3/uL    Absolute Eosinophils 0.5 0.0 - 0.7 10e3/uL    Absolute Basophils 0.1 0.0 - 0.2 10e3/uL    Absolute Immature Granulocytes 0.0 <=0.4 10e3/uL    Absolute NRBCs 0.0 10e3/uL   Asymptomatic COVID-19 Virus (Coronavirus) by PCR Midturbinate     Status: Normal    Collection Time: 01/10/22  6:21 AM    Specimen: Midturbinate; Swab   Result Value Ref Range    SARS CoV2 PCR Negative Negative, Testing sent to reference lab. Results will be returned via unsolicited result     Narrative    Testing was performed using the Xpert Xpress SARS-CoV-2 Assay on the  [employer] GeneQuant the [employer] Instrument Systems. Additional information about  this Emergency Use Authorization (EUA) assay can be found via the Lab  Guide. This test should be ordered for the detection of SARS-CoV-2 in  individuals who meet SARS-CoV-2 clinical and/or epidemiological  criteria. Test performance is unknown in asymptomatic patients. This  test is for in vitro diagnostic use under the FDA EUA for  laboratories certified under CLIA to perform high complexity testing.  This test has not been FDA cleared or approved. A negative result  does not rule out the presence of PCR inhibitors in the specimen or  target RNA in concentration below the limit of detection for the  assay. The possibility of a false negative should be considered if  the patient's recent exposure or clinical presentation suggests  COVID-19. This test was validated by the [facility]. This laboratory is certified under  the Clinical Laboratory Improvement Amendments of 1988 (CLIA-88) as  qualified to perform high complexity laboratory testing.         ED MEDICATIONS:   Medications   piperacillin-tazobactam (ZOSYN) 3.375 g vial to attach to  mL bag (0 g Intravenous Stopped 1/10/22 1419)   0.9% sodium chloride BOLUS (0 mLs Intravenous Stopped 1/10/22 0621)     Followed by   sodium chloride 0.9% infusion (0 mLs Intravenous Stopped 1/10/22 1419)   lactated ringers infusion ( Intravenous New Bag 1/10/22 1419)   vancomycin (VANCOCIN) 1,500 mg in sodium chloride 0.9 % 250 mL intermittent infusion (0 mg Intravenous Stopped 1/10/22 1216)   morphine (PF) injection 4 mg (4 mg Intravenous Given 1/10/22 0449)   HYDROmorphone (PF) (DILAUDID) injection 0.5 mg (0.5 mg Intravenous Given 1/10/22 0802)   HYDROmorphone (PF) (DILAUDID) injection 0.5 mg (0.5 mg Intravenous Given 1/10/22 1235)         Impression:    ICD-10-CM    1.  Pelvic abscess in male (H)  K65.1    2. Pressure injury of skin of buttock, unspecified injury stage, unspecified laterality  L89.309    3. Rectal bleeding  K62.5        Plan:    Pending studies include awaiting Colorectal Surgery consult-felt no intervention at this time and recommended Urology consult, Urology consult-flet no intervention at this time, IR consult for the fluid collection-asked us to keep his NPO, D/W Medicine-admit.        Yessy Barajas MD, Yessy Gross MD  01/10/22 5322

## 2022-01-10 NOTE — PHARMACY-VANCOMYCIN DOSING SERVICE
Pharmacy Vancomycin Initial Note  Date of Service January 10, 2022  Patient's  1965  56 year old, male    Indication: Abscess    Current estimated CrCl = Estimated Creatinine Clearance: 117.2 mL/min (based on SCr of 0.81 mg/dL).    Creatinine for last 3 days  1/10/2022:  4:31 AM Creatinine 0.81 mg/dL    Recent Vancomycin Level(s) for last 3 days  No results found for requested labs within last 72 hours.      Vancomycin IV Administrations (past 72 hours)                   vancomycin (VANCOCIN) 1,500 mg in sodium chloride 0.9 % 250 mL intermittent infusion (mg) 1,500 mg New Bag 01/10/22 1032                Nephrotoxins and other renal medications (From now, onward)    Start     Dose/Rate Route Frequency Ordered Stop    01/10/22 2230  vancomycin 1250 mg in 0.9% NaCl 250 mL intermittent infusion 1,250 mg         1,250 mg  over 90 Minutes Intravenous EVERY 12 HOURS 01/10/22 1620            Contrast Orders - past 72 hours (72h ago, onward)            None          Insight[employer] Prediction of Planned Initial Vancomycin Regimen  Loading dose: 1500 mg at 04:00 01/10/2022.  Regimen: 1250 mg IV every 12 hours.  Start time: 04:06 on 01/10/2022  Exposure target: AUC24 (range)400-600 mg/L.hr   AUC24,ss: 533 mg/L.hr  Probability of AUC24 > 400: 78 %  Ctrough,ss: 16.1 mg/L  Probability of Ctrough,ss > 20: 33 %  Probability of nephrotoxicity (Lodise SERVANDO ): 11 %        Plan:  1. Vancomycin 1500mg IV x1 given at OSH ED today at 1032, continue vancomycin 1250mg IV q12 hours  2. Vancomycin monitoring method: AUC  3. Vancomycin therapeutic monitoring goal: 400-600 mg*h/L  4. Pharmacy will check vancomycin levels as appropriate in 1-3 Days.    5. Serum creatinine levels will be ordered daily for the first week of therapy and at least twice weekly for subsequent weeks.      Fabian Bishop, PharmD, BCPS

## 2022-01-10 NOTE — ED NOTES
Bed: ED28  Expected date:   Expected time:   Means of arrival:   Comments:  [facility] Surgical Consult.

## 2022-01-10 NOTE — CONSULTS
IR consulted for right hemipelvis fluid collection drainage    Spoke with ED and primary team. Please complete a dx US to evaluate for drainable fluid. IR will follow-up after completion of US.    Vani Velasquez DNP, APRN  Interventional Radiology   IR on-call pager: [phone]
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
## 2022-01-10 NOTE — ED TRIAGE NOTES
Patient presents to ED via EMS with complaint of GI bleed, was recently seen at [facility] in ED stated he boarded there for several days waiting for a bed to be seen by GI and eventually was DC'd. Reports no nausea or vomiting, but has had blood in his stool with diarrhea frequently.
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
## 2022-01-10 NOTE — PHARMACY-VANCOMYCIN DOSING SERVICE
"Pharmacy Vancomycin Initial Note  Date of Service January 10, 2022  Patient's  1965  56 year old, male    Indication: Abscess    Current estimated CrCl = Estimated Creatinine Clearance: 124.9 mL/min (based on SCr of 0.76 mg/dL).    Creatinine for last 3 days  No results found for requested labs within last 72 hours.    Recent Vancomycin Level(s) for last 3 days  2022:  6:08 AM Vancomycin 24.9 mg/L      Vancomycin IV Administrations (past 72 hours)                   vancomycin 1250 mg in 0.9% NaCl 250 mL intermittent infusion 1,250 mg (mg) 1,250 mg New Bag 22 1240                Nephrotoxins and other renal medications (From now, onward)    Start     Dose/Rate Route Frequency Ordered Stop    01/10/22 0400  vancomycin (VANCOCIN) 1,500 mg in sodium chloride 0.9 % 250 mL intermittent infusion         1,500 mg  over 90 Minutes Intravenous ONCE 01/10/22 0351      01/10/22 0345  piperacillin-tazobactam (ZOSYN) 3.375 g vial to attach to  mL bag        Note to Pharmacy: For [facility], [facility] and [facility]: For Zosyn-naive patients, use the \"Zosyn initial dose + extended infusion\" order panel.    3.375 g  over 30 Minutes Intravenous EVERY 8 HOURS 01/10/22 0343            Contrast Orders - past 72 hours (72h ago, onward)            None          Loading dose: 1500 mg at 04:00 01/10/2022.  Regimen: 1250 mg IV every 12 hours.  Start time: 04:06 on 01/10/2022  Exposure target: AUC24 (range)400-600 mg/L.hr   AUC24,ss: 533 mg/L.hr  Probability of AUC24 > 400: 78 %  Ctrough,ss: 16.1 mg/L  Probability of Ctrough,ss > 20: 33 %  Probability of nephrotoxicity (Lodise SERVANDO ): 11 %    Plan:  1. Start vancomycin  1500 mg IV x1, followed by vancomycin 1250 mg IV q12h.   2. Vancomycin monitoring method: AUC  3. Vancomycin therapeutic monitoring goal: 400-600 mg*h/L  4. Pharmacy will check vancomycin levels as appropriate in 1-3 Days.    5. Serum creatinine levels will be ordered daily for the first week of therapy and at least " twice weekly for subsequent weeks.      Richmond Francis RP

## 2022-01-10 NOTE — ED NOTES
Bed: ED02  Expected date: 1/10/22  Expected time: 3:17 AM  Means of arrival: Ambulance  Comments:  [place]  56M  Possible GI bleed
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
## 2022-01-11 VITALS
SYSTOLIC BLOOD PRESSURE: 144 MMHG | RESPIRATION RATE: 16 BRPM | BODY MASS INDEX: 22.15 KG/M2 | HEART RATE: 84 BPM | TEMPERATURE: 97.6 F | DIASTOLIC BLOOD PRESSURE: 66 MMHG | WEIGHT: 177.2 LBS | OXYGEN SATURATION: 99 %

## 2022-01-11 PROBLEM — S31.819S: Status: ACTIVE | Noted: 2021-08-17

## 2022-01-11 PROBLEM — G89.18 POSTOPERATIVE PAIN: Status: ACTIVE | Noted: 2022-01-11

## 2022-01-11 LAB
ANION GAP SERPL CALCULATED.3IONS-SCNC: 6 MMOL/L (ref 3–14)
BUN SERPL-MCNC: 15 MG/DL (ref 7–30)
CALCIUM SERPL-MCNC: 8.6 MG/DL (ref 8.5–10.1)
CHLORIDE BLD-SCNC: 109 MMOL/L (ref 94–109)
CO2 SERPL-SCNC: 25 MMOL/L (ref 20–32)
CREAT SERPL-MCNC: 0.86 MG/DL (ref 0.66–1.25)
ERYTHROCYTE [DISTWIDTH] IN BLOOD BY AUTOMATED COUNT: 19.3 % (ref 10–15)
GFR SERPL CREATININE-BSD FRML MDRD: >90 ML/MIN/1.73M2
GLUCOSE BLD-MCNC: 124 MG/DL (ref 70–99)
HCT VFR BLD AUTO: 37.5 % (ref 40–53)
HGB BLD-MCNC: 10.7 G/DL (ref 13.3–17.7)
MCH RBC QN AUTO: 22 PG (ref 26.5–33)
MCHC RBC AUTO-ENTMCNC: 28.5 G/DL (ref 31.5–36.5)
MCV RBC AUTO: 77 FL (ref 78–100)
PLATELET # BLD AUTO: 260 10E3/UL (ref 150–450)
POTASSIUM BLD-SCNC: 3.3 MMOL/L (ref 3.4–5.3)
RBC # BLD AUTO: 4.86 10E6/UL (ref 4.4–5.9)
SODIUM SERPL-SCNC: 140 MMOL/L (ref 133–144)
WBC # BLD AUTO: 6.8 10E3/UL (ref 4–11)

## 2022-01-11 PROCEDURE — G0378 HOSPITAL OBSERVATION PER HR: HCPCS

## 2022-01-11 PROCEDURE — 99239 HOSP IP/OBS DSCHRG MGMT >30: CPT | Mod: GC | Performed by: STUDENT IN AN ORGANIZED HEALTH CARE EDUCATION/TRAINING PROGRAM

## 2022-01-11 PROCEDURE — 250N000011 HC RX IP 250 OP 636: Performed by: STUDENT IN AN ORGANIZED HEALTH CARE EDUCATION/TRAINING PROGRAM

## 2022-01-11 PROCEDURE — 99212 OFFICE O/P EST SF 10 MIN: CPT | Mod: 25 | Performed by: PHYSICIAN ASSISTANT

## 2022-01-11 PROCEDURE — 258N000003 HC RX IP 258 OP 636: Performed by: INTERNAL MEDICINE

## 2022-01-11 PROCEDURE — 85027 COMPLETE CBC AUTOMATED: CPT | Performed by: STUDENT IN AN ORGANIZED HEALTH CARE EDUCATION/TRAINING PROGRAM

## 2022-01-11 PROCEDURE — 80048 BASIC METABOLIC PNL TOTAL CA: CPT | Performed by: STUDENT IN AN ORGANIZED HEALTH CARE EDUCATION/TRAINING PROGRAM

## 2022-01-11 PROCEDURE — 96376 TX/PRO/DX INJ SAME DRUG ADON: CPT | Mod: 59

## 2022-01-11 PROCEDURE — 250N000011 HC RX IP 250 OP 636

## 2022-01-11 PROCEDURE — 250N000013 HC RX MED GY IP 250 OP 250 PS 637

## 2022-01-11 PROCEDURE — 96367 TX/PROPH/DG ADDL SEQ IV INF: CPT

## 2022-01-11 PROCEDURE — 51702 INSERT TEMP BLADDER CATH: CPT

## 2022-01-11 PROCEDURE — 36415 COLL VENOUS BLD VENIPUNCTURE: CPT | Performed by: STUDENT IN AN ORGANIZED HEALTH CARE EDUCATION/TRAINING PROGRAM

## 2022-01-11 PROCEDURE — 51705 CHANGE OF BLADDER TUBE: CPT | Performed by: PHYSICIAN ASSISTANT

## 2022-01-11 RX ORDER — OXYCODONE HYDROCHLORIDE 5 MG/1
5 TABLET ORAL EVERY 6 HOURS PRN
Qty: 14 TABLET | Refills: 0 | Status: SHIPPED | OUTPATIENT
Start: 2022-01-11 | End: 2022-02-16

## 2022-01-11 RX ORDER — HYDROMORPHONE HYDROCHLORIDE 1 MG/ML
0.5 INJECTION, SOLUTION INTRAMUSCULAR; INTRAVENOUS; SUBCUTANEOUS ONCE
Status: COMPLETED | OUTPATIENT
Start: 2022-01-11 | End: 2022-01-11

## 2022-01-11 RX ORDER — METRONIDAZOLE 500 MG/1
500 TABLET ORAL 2 TIMES DAILY
Qty: 28 TABLET | Refills: 0 | Status: SHIPPED | OUTPATIENT
Start: 2022-01-11 | End: 2022-01-25

## 2022-01-11 RX ORDER — SULFAMETHOXAZOLE/TRIMETHOPRIM 800-160 MG
1 TABLET ORAL 2 TIMES DAILY
Qty: 28 TABLET | Refills: 0 | Status: SHIPPED | OUTPATIENT
Start: 2022-01-11 | End: 2022-01-25

## 2022-01-11 RX ADMIN — Medication 1250 MG: at 11:00

## 2022-01-11 RX ADMIN — METRONIDAZOLE 500 MG: 500 INJECTION, SOLUTION INTRAVENOUS at 08:08

## 2022-01-11 RX ADMIN — OXYCODONE HYDROCHLORIDE 5 MG: 5 TABLET ORAL at 00:40

## 2022-01-11 RX ADMIN — OXYCODONE HYDROCHLORIDE 5 MG: 5 TABLET ORAL at 08:06

## 2022-01-11 RX ADMIN — HYDROMORPHONE HYDROCHLORIDE 0.5 MG: 1 INJECTION, SOLUTION INTRAMUSCULAR; INTRAVENOUS; SUBCUTANEOUS at 04:18

## 2022-01-11 RX ADMIN — AMLODIPINE BESYLATE 5 MG: 5 TABLET ORAL at 08:06

## 2022-01-11 RX ADMIN — SODIUM CHLORIDE, POTASSIUM CHLORIDE, SODIUM LACTATE AND CALCIUM CHLORIDE: 600; 310; 30; 20 INJECTION, SOLUTION INTRAVENOUS at 06:48

## 2022-01-11 RX ADMIN — Medication 1 MG: at 00:40

## 2022-01-11 ASSESSMENT — ACTIVITIES OF DAILY LIVING (ADL)
ADLS_ACUITY_SCORE: 12

## 2022-01-11 NOTE — PLAN OF CARE
Patient discharged home. Discharge education provided. PIV out. Will  medications from home pharmacy. All belongings sent with pt. Rides arranged by care coordinator.
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
## 2022-01-13 ENCOUNTER — PATIENT OUTREACH (OUTPATIENT)
Dept: SURGERY | Facility: CLINIC | Age: 57
End: 2022-01-13
Payer: MEDICARE

## 2022-01-13 NOTE — UTILIZATION REVIEW
Admission Status; Secondary Review Determination    No action to be taken. Please contact me on my Email : [email or web address], [email or web address] if you have any questions.    As part of the [facility] Utilization review plan, a self-audit is done on Medicare inpatient admission with less than 2 midnights stay. The 2014 IPPS Final Rule allows outpatient billing in the event that a hospital determines that an inpatient admission was not medically necessary under utilization review process.     (x) Outpatient status would be Appropriate- Short Stay- Post discharge review.    RATIONALE FOR DETERMINATION  Recent admission and workup at [facility] (1/3-1/7) - CT scan on 1/4/22 revealed presacral and perirectal fluid collections suspicious for abscesse. Ill-defined 3x2x2 cm abscess posterior  Patient was on Vanc/Zosyn and evaluated by colorectal surgery for drainage of abscesses - ultimately transferred to [facility] on 1/7 but left AMA while in the ED before re-presenting here 1/10 with rectal bleeding in the setting of digital trauma while on plavix. IR and Colorectal reviewed imaging taken this admission and felt there was no drainable pocket. Pt discharging with:    - bactrim DS bid and flagyl 500 mg bid for 14 days    - oxycodone 5 mg q6h prn x14 doses prescribed    - follow up with PCP 7 days for oxycodone refills as needed, and to follow up hospitalization and address referrals    Patient was admitted and discharge after one night stay. Record was sent by  for a PA review. Based on the  severity of illness, intensity of service provided, expected LOS and risk for adverse outcome make the care appropriate for further outpatient/observation; however, doesn't meet criteria for hospital inpatient admission.       The information on this document is developed by the utilization review team in order for the business office to ensure compliance.  This only denotes the appropriateness of proper admission status and does not reflect the  quality of care rendered.       The definitions of Inpatient Status and Observation Status used in making the determination above are those provided in the CMS Coverage Manual, Chapter 1 and Chapter 6, section 70.4.     Please cont me if you want to discuss further about this admission episode.      Anuradha Schneider MD, FACP, SILVIA  Medical Director - Utilization management  Staff Hospitalist  [facility]    Pager: [phone]

## 2022-01-13 NOTE — PROGRESS NOTES
Called patient to check in on how he was doing after his ED visits for rectal bleeding, pelvic abscess. He denies need for follow up appointment, gave him my number and told him to call if he changes his mind.    Myra Goodman RN BSN  RNCC Colon Rectal Surgery  [phone]

## 2022-01-24 ENCOUNTER — MEDICAL CORRESPONDENCE (OUTPATIENT)
Dept: HEALTH INFORMATION MANAGEMENT | Facility: CLINIC | Age: 57
End: 2022-01-24
Payer: MEDICARE

## 2022-02-03 ENCOUNTER — HOSPITAL ENCOUNTER (EMERGENCY)
Facility: CLINIC | Age: 57
Discharge: HOME | End: 2022-02-03
Attending: EMERGENCY MEDICINE | Admitting: EMERGENCY MEDICINE
Payer: MEDICARE

## 2022-02-03 VITALS
HEIGHT: 76 IN | SYSTOLIC BLOOD PRESSURE: 140 MMHG | BODY MASS INDEX: 21.92 KG/M2 | TEMPERATURE: 98.7 F | HEART RATE: 98 BPM | OXYGEN SATURATION: 99 % | DIASTOLIC BLOOD PRESSURE: 81 MMHG | RESPIRATION RATE: 16 BRPM | WEIGHT: 180 LBS

## 2022-02-03 DIAGNOSIS — T18.5XXA FOREIGN BODY OF RECTUM, INITIAL ENCOUNTER: ICD-10-CM

## 2022-02-03 PROCEDURE — 10120 INC&RMVL FB SUBQ TISS SMPL: CPT

## 2022-02-03 PROCEDURE — 99283 EMERGENCY DEPT VISIT LOW MDM: CPT | Mod: 25

## 2022-02-03 ASSESSMENT — ENCOUNTER SYMPTOMS
RECTAL PAIN: 0
CONSTIPATION: 1

## 2022-02-03 ASSESSMENT — MIFFLIN-ST. JEOR: SCORE: 1752.97

## 2022-02-03 NOTE — DISCHARGE INSTRUCTIONS
Continue to monitor for any worsening rectal bleeding or pain.  If you develop any worsening pain or rectal bleeding return back to ED for evaluation.

## 2022-02-03 NOTE — ED TRIAGE NOTES
Pt here after placing a carrot in his rectum to help get his stool out.  Pt is requesting help to remove the carrot.
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
## 2022-02-03 NOTE — Clinical Note
Josiah Crump was seen and treated in our emergency department on 2/3/2022.  He may return to work on 02/04/2022.       If you have any questions or concerns, please don't hesitate to call.      Trudy Griffith, DO

## 2022-02-15 ENCOUNTER — OFFICE VISIT (OUTPATIENT)
Dept: UROLOGY | Facility: CLINIC | Age: 57
End: 2022-02-15
Payer: MEDICARE

## 2022-02-15 DIAGNOSIS — N31.9 NEUROGENIC BLADDER: Primary | ICD-10-CM

## 2022-02-15 PROCEDURE — 51705 CHANGE OF BLADDER TUBE: CPT

## 2022-02-15 RX ORDER — CIPROFLOXACIN 500 MG/1
500 TABLET, FILM COATED ORAL ONCE
Status: COMPLETED | OUTPATIENT
Start: 2022-02-15 | End: 2022-02-15

## 2022-02-15 RX ADMIN — CIPROFLOXACIN 500 MG: 500 TABLET, FILM COATED ORAL at 14:09

## 2022-02-15 NOTE — PROGRESS NOTES
Chief Complaint   Patient presents with     Allied Health Visit     SP tube change       Patient Active Problem List   Diagnosis     Benign essential hypertension     Chronic pain     General symptom     Gynecomastia     Hx of BKA, right (H)     Hydrocele     Infected penile implant (H)     Insomnia     Lateral epicondylitis     Neurogenic bladder     Other tenosynovitis of hand and wrist     Pain in limb     Paraplegia (H)     Right shoulder strain     Vitamin D deficiency     Status post hip surgery     Heterotopic ossification of bone     Physical deconditioning     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, initial encounter (H)     Spasticity     History of disarticulation of right hip     Injury of thoracic spinal cord, sequela (H)     Urinary incontinence     Breakdown (mechanical) of implanted penile prosthesis, initial encounter (H)     Gluteal cleft wound, right, sequela     Complication of implanted penile prosthesis, initial encounter (H)     Anticoagulated     Pelvic abscess in male (H)     Infected decubitus ulcer, unspecified ulcer stage     Traumatic injury of rectum     Rectal bleeding     Pressure injury of skin of buttock, unspecified injury stage, unspecified laterality     Postoperative pain       No Known Allergies    Current Outpatient Medications   Medication Sig Dispense Refill     acetaminophen (TYLENOL) 325 MG tablet Take 2 tablets (650 mg) by mouth every 6 hours as needed for pain 100 tablet 11     amLODIPine (NORVASC) 5 MG tablet TAKE 1 TABLET(5 MG) BY MOUTH DAILY 90 tablet 1     baclofen (LIORESAL) 20 MG tablet 1 PO TID PRN SPACTICITY (Patient taking differently: Take 20 mg by mouth 3 times daily as needed for muscle spasms 1 PO TID PRN SPACTICITY) 90 tablet 5     clopidogrel (PLAVIX) 75 MG tablet Take 75 mg by mouth daily  30 tablet 0     Fesoterodine Fumarate (TOVIAZ) 8 MG TB24 Take 8 mg by mouth daily        oxyCODONE (ROXICODONE) 5 MG tablet Take 1 tablet (5 mg) by  mouth every 6 hours as needed for pain 14 tablet 0     simvastatin (ZOCOR) 20 MG tablet 1 po qd (Patient taking differently: Take 20 mg by mouth daily 1 po qd) 90 tablet 1     testosterone cypionate (DEPOTESTOSTERONE) 200 MG/ML injection Inject 0.5 mLs (100 mg) into the muscle every 14 days 3 mL 5       Social History     Tobacco Use     Smoking status: Never Smoker     Smokeless tobacco: Never Used   Substance Use Topics     Alcohol use: No     Drug use: No       Josiah Crump comes into clinic today at the request of Dr. Charles Lira for SP catheter change.    Patient diagnosis: Neurogenic bladder    This service provided today was under the direct supervision of Dr. Tavares, who was available if needed.    Josiah Crump presents to clinic for scheduled [Yes] catheter exchange.  Order has been verified: Yes.    Removal:  16 Fr straight tipped latex muhammad catheter removed from suprapubic meatus without difficulty.    Insertion:  16 Fr straight tipped latex muhammad catheter inserted into suprapubic meatus in the usual sterile fashion without difficulty.  Balloon filled with 10 mL sterile H2O.  Received 15 ml yellow urine output. Irrigated with 30 ccs normal saline to confirm placement  Catheter secured in place with leg strap: Yes.     One Cipro 500 mg given per protocol: Yes.   The following medication was given:     MEDICATION:  Ciprofloxacin  ROUTE: PO  SITE: Medication was given orally   DOSE: 500 mg  LOT #: [id number]  : [employer]  EXPIRATION DATE: 10/23  NDC#: 7588-4283-68   Was there drug waste? No    Prior to medication administration, verified patient identity using patient's name and date of birth.  Due to medication administration, patient instructed to remain in clinic for 15 minutes  afterwards, and to report any adverse reaction to me immediately.    Drug Amount Wasted:  None.  Single dose tablet    Patient did tolerate procedure well.    Patient instructed as to where to call or go  for pain, fever, leakage, or decreased urine flow.     Nate West EMT  2/15/2022  2:05 PM

## 2022-02-16 ENCOUNTER — TELEPHONE (OUTPATIENT)
Dept: SURGERY | Facility: CLINIC | Age: 57
End: 2022-02-16

## 2022-02-16 ENCOUNTER — VIRTUAL VISIT (OUTPATIENT)
Dept: UROLOGY | Facility: CLINIC | Age: 57
End: 2022-02-16
Payer: MEDICARE

## 2022-02-16 DIAGNOSIS — K62.5 RECTAL BLEEDING: Primary | ICD-10-CM

## 2022-02-16 DIAGNOSIS — G89.18 POSTOPERATIVE PAIN: ICD-10-CM

## 2022-02-16 PROCEDURE — 99213 OFFICE O/P EST LOW 20 MIN: CPT | Mod: 95 | Performed by: UROLOGY

## 2022-02-16 RX ORDER — OXYCODONE HYDROCHLORIDE 5 MG/1
5 TABLET ORAL EVERY 6 HOURS PRN
Qty: 24 TABLET | Refills: 0 | Status: ON HOLD | OUTPATIENT
Start: 2022-02-16 | End: 2022-03-12

## 2022-02-16 RX ORDER — FERROUS SULFATE 325(65) MG
325 TABLET ORAL DAILY
COMMUNITY
Start: 2022-01-07 | End: 2022-12-05

## 2022-02-16 NOTE — TELEPHONE ENCOUNTER
Pt was hospitalization under [facility] surgery service last month. We called the patient to schedule a follow up appt but he refused at that time. Recommendations at that time were to complete a colonoscopy. I called patient to see if he would like to get that done before May and he agrees with this. Sent message to endoscopy to schedule colonoscopy then we will see him in May.

## 2022-02-16 NOTE — TELEPHONE ENCOUNTER
[facility] Call Center    Phone Message    May a detailed message be left on voicemail: yes     Reason for Call: Appointment Intake     Referring Provider Name: [place] Lira [place] [facility]    Diagnosis and/or Symptoms: Rectal bleeding, constipation noted as well     -scheduled for first available on 5/6 + on wait list. Sending for review of records. Thank you!    Action Taken: Message routed to:  [facility] ([facility] Colorectal     Travel Screening: Not Applicable

## 2022-02-16 NOTE — LETTER
2/16/2022       RE: Josiah Crump  [address]  [place] [address]     Dear Colleague,    Thank you for referring your patient, Josiah Crump, to the [facility] [place] at [facility]. Please see a copy of my visit note below.    Josiah is a 56 year old who is being evaluated via a billable video visit.      Video Visit Technology for this patient: AmWell not working, patient has smart device, please try Doximity Video with patient    How would you like to obtain your AVS? Mail a copy  If the video visit is dropped, the invitation should be resent by: Send to e-mail at: [email or web address]  Will anyone else be joining your video visit? No      Video Start Time: 2:30p  Video-Visit Details    Type of service:  Video Visit    Video End Time: 2:45p    Originating Location (pt. Location): Home    Distant Location (provider location):  [facility] [place]     Platform used for Video Visit: Doximity    He has paraplegia, chronic sacral wounds  He underwent an IPP removal and replacement on 9/20/21  Unfortunately, this was complicated by device infection and the penile implant was explanted 10/6/21  He has some urinary leakage from his penis.  He is now managed with suprapubic tube.  He continues to have erectile dysfunction  He also has chronic constipation and sacral wounds. Sacral wounds were previously managed by Dr. Blackmon  He did use Trimix before which worked well.  His most recent ER visit was for a carrot stuck in his     A/P:  Rectal bleeding, chronic perineal wounds, constipation  Chronic pain  Erectile dysfunction s/p IPP which got infected and was removed.    Rx for some pain meds given, though coun  Trimix Rx through [facility]  followup in 1 month with nursing SPT  Colorectal referral given per request. I discussed he might be best served with fecal diversion  Pain  clinic referral given.      Charles Lira MD

## 2022-02-16 NOTE — NURSING NOTE
Chief Complaint   Patient presents with     Follow Up     post op questions       There were no vitals taken for this visit. There is no height or weight on file to calculate BMI.    Patient Active Problem List   Diagnosis     Benign essential hypertension     Chronic pain     General symptom     Gynecomastia     Hx of BKA, right (H)     Hydrocele     Infected penile implant (H)     Insomnia     Lateral epicondylitis     Neurogenic bladder     Other tenosynovitis of hand and wrist     Pain in limb     Paraplegia (H)     Right shoulder strain     Vitamin D deficiency     Status post hip surgery     Heterotopic ossification of bone     Physical deconditioning     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, initial encounter (H)     Spasticity     History of disarticulation of right hip     Injury of thoracic spinal cord, sequela (H)     Urinary incontinence     Breakdown (mechanical) of implanted penile prosthesis, initial encounter (H)     Gluteal cleft wound, right, sequela     Complication of implanted penile prosthesis, initial encounter (H)     Anticoagulated     Pelvic abscess in male (H)     Infected decubitus ulcer, unspecified ulcer stage     Traumatic injury of rectum     Rectal bleeding     Pressure injury of skin of buttock, unspecified injury stage, unspecified laterality     Postoperative pain       No Known Allergies    Current Outpatient Medications   Medication Sig Dispense Refill     acetaminophen (TYLENOL) 325 MG tablet Take 2 tablets (650 mg) by mouth every 6 hours as needed for pain 100 tablet 11     amLODIPine (NORVASC) 5 MG tablet TAKE 1 TABLET(5 MG) BY MOUTH DAILY 90 tablet 1     baclofen (LIORESAL) 20 MG tablet 1 PO TID PRN SPACTICITY (Patient taking differently: Take 20 mg by mouth 3 times daily as needed for muscle spasms 1 PO TID PRN SPACTICITY) 90 tablet 5     clopidogrel (PLAVIX) 75 MG tablet Take 75 mg by mouth daily  30 tablet 0     FEROSUL 325 (65 Fe) MG tablet Take 325  mg by mouth daily       Fesoterodine Fumarate (TOVIAZ) 8 MG TB24 Take 8 mg by mouth daily        oxyCODONE (ROXICODONE) 5 MG tablet Take 1 tablet (5 mg) by mouth every 6 hours as needed for pain 14 tablet 0     simvastatin (ZOCOR) 20 MG tablet 1 po qd (Patient taking differently: Take 20 mg by mouth daily 1 po qd) 90 tablet 1     testosterone cypionate (DEPOTESTOSTERONE) 200 MG/ML injection Inject 0.5 mLs (100 mg) into the muscle every 14 days 3 mL 5       Social History     Tobacco Use     Smoking status: Never Smoker     Smokeless tobacco: Never Used   Substance Use Topics     Alcohol use: No     Drug use: No       Nate West EMT  2/16/2022  2:04 PM

## 2022-02-16 NOTE — PROGRESS NOTES
Josiah is a 56 year old who is being evaluated via a billable video visit.      Video Visit Technology for this patient: AmWell not working, patient has smart device, please try Doximity Video with patient    How would you like to obtain your AVS? Mail a copy  If the video visit is dropped, the invitation should be resent by: Send to e-mail at: [email or web address]  Will anyone else be joining your video visit? No      Video Start Time: 2:30p  Video-Visit Details    Type of service:  Video Visit    Video End Time: 2:45p    Originating Location (pt. Location): Home    Distant Location (provider location):  [facility] [place]     Platform used for Video Visit: Doximity    He has paraplegia, chronic sacral wounds  He underwent an IPP removal and replacement on 9/20/21  Unfortunately, this was complicated by device infection and the penile implant was explanted 10/6/21  He has some urinary leakage from his penis.  He is now managed with suprapubic tube.  He continues to have erectile dysfunction  He also has chronic constipation and sacral wounds. Sacral wounds were previously managed by Dr. Blackmon  He did use Trimix before which worked well.  His most recent ER visit was for a carrot stuck in his     A/P:  Rectal bleeding, chronic perineal wounds, constipation  Chronic pain  Erectile dysfunction s/p IPP which got infected and was removed.    Rx for some pain meds given, though coun  Trimix Rx through [facility]  followup in 1 month with nursing SPT  Colorectal referral given per request. I discussed he might be best served with fecal diversion  Pain clinic referral given.      Charles Lira MD

## 2022-02-17 ENCOUNTER — TELEPHONE (OUTPATIENT)
Dept: GASTROENTEROLOGY | Facility: CLINIC | Age: 57
End: 2022-02-17
Payer: MEDICARE

## 2022-02-17 DIAGNOSIS — I10 BENIGN ESSENTIAL HYPERTENSION: ICD-10-CM

## 2022-02-17 DIAGNOSIS — Z76.0 ENCOUNTER FOR MEDICATION REFILL: Primary | ICD-10-CM

## 2022-02-17 NOTE — TELEPHONE ENCOUNTER
Screening Questions  Blue=prep questions Red=location Green=sedation   1. Are you active on mychart? y    2. What insurance is in the chart? Medicaid/medicare    3.  Ordering/Referring Provider:Christiano    4. BMI 26.2, If greater than 40 review exclusion criteria also will need EXTENDED PREP    5.  Respiratory Screening (If yes to any of the following HOSPITAL setting only):     Do you use daily home oxygen? n  Do you have mod to severe Obstructive Sleep Apnea? n (can be seen at [facility] or hospital setting)    Do you have Pulmonary Hypertension? n   Do you have UNCONTROLLED asthma? n    6. Have you had a heart or lung transplant? n  (If yes, please review exclusion criteria)    7. Are you currently on dialysis?n  (If yes, schedule in HOSPITAL setting only)(If yes, please send Golytely prep)    8. Do you have chronic kidney disease? n (If yes, please send Golytely prep)    9. Have you had a stroke or Transient ischemic attack (TIA) within 6 months? n (If yes, do not schedule at [facility])    10. In the past 6 months, have you had any heart related issues including cardiomyopathy or heart attack? n (If yes, please review exclusion criteria)           If yes, did it require cardiac stenting or other implantable device?n  (If yes, please review exclusion criteria)      11. Do you have any implantable devices in your body (pacemaker, defib, LVAD)? n (If yes, schedule at [facility])    12. Do you take nitroglycerin? If yes, how often? n (if yes, schedule at HOSPITAL setting)    13. Are you currently taking any blood thinners?seradin (If yes- inform patient to follow up with PCP or provider for follow up instructions)     14. Are you a diabetic? n (If yes, please send Golytely prep)    15. (Females) Are you currently pregnant?   If yes, how many weeks?      16. Are you taking any prescription pain medications on a routine schedule? n If yes, MAC sedation and patient will need EXTENDED PREP.    17. Do you have any chemical dependencies such as  alcohol, street drugs, or methadone? n If yes, MAC sedation     18. Do you have any history of post-traumatic stress syndrome, severe anxiety or history of psychosis? n  If yes, MAC sedation.     19. Do you transfer independently? y    20.  Do you have any issues with constipation? n   If yes, pt will need EXTENDED PREP     21. Preferred Pharmacy for Pre Prescription [id number] [address]    Scheduling Details    Which Colonoscopy Prep was Sent?: Miralax   Type of Procedure Scheduled: Colonoscopy  Surgeon: Gabi  Date of Procedure: 2/28  Location: [facility]  Caller (Please ask for phone number if not scheduled by patient): Josiah Crump        Sedation Type: MAC  Conscious Sedation- Needs  for 6 hours after the procedure  MAC/General-Needs  for 24 hours after procedure    Pre-op Required at [facility] [place], [place] and OR for MAC sedation: n  (if yes advise patient they will need a pre-op prior to procedure)      Informed patient they will need an adult  y  Cannot take any type of public or medical transportation alone    Pre-Procedure Covid test to be completed at [facility] or Externally: y    Confirmed Nurse will call to complete assessment y    Additional comments:  ([facility] PATIENTS NEED EXTENDED PREP)

## 2022-02-17 NOTE — PATIENT INSTRUCTIONS
Please follow up in 1 month with a nurse visit for SP tube change.    It was a pleasure meeting with you today.  Thank you for allowing me and my team the privilege of caring for you today.  YOU are the reason we are here, and I truly hope we provided you with the excellent service you deserve.  Please let us know if there is anything else we can do for you so that we can be sure you are leaving completely satisfied with your care experience.

## 2022-02-17 NOTE — TELEPHONE ENCOUNTER
Diagnosis, Referred by & from: Rectal Bleeding   Appt date: 5/6/2022   NOTES STATUS DETAILS   OFFICE NOTE from referring provider Internal [facility]:  3/23/22, 2/16/22 - URO OV with Dr. Lira   OFFICE NOTE from other specialist N/A    DISCHARGE SUMMARY from hospital Internal [facility]:  3/10/22 - Admission with Dr. Kimble   DISCHARGE REPORT from the ER Internal [facility]:  2/3/22 - ED OV with Dr. Griffith  1/3/22 - ED OV with Dr. Hampton  7/4/21 - ED OV with Dr. Garcia    [facility]:  1/10/22 - ED OV with Dr. Barajas   OPERATIVE REPORT Care Everywhere / Internal [facility]:  6/30/21 - OP Note for FLEXIBLE CYSTOSCOPY, REMOVAL AND PLACEMENT OF SANTIAGO CATHETER with Dr. Byers    [facility]:  8/20/21 - OP Note for CYSTOSCOPY, WITH SUPRAPUBIC CATHETER INSERTION with Dr. Lira  2/22/21 - OP Note for FLEXIBLE CYSTOSCOPY, SANTIAGO CATHETER PLACEMENT with Dr. Byers   MEDICATION LIST Internal    LABS     BIOPSIES/PATHOLOGY RELATED TO DIAGNOSIS Internal [facility]:  3/11/22 - Colon Biopsy (Case: [id number])   DIAGNOSTIC PROCEDURES     COLONOSCOPY [facility]:  3/11/22 - Colonoscopy  2/28/22 - Colonoscopy (Cancelled)   IMAGING (DISC & REPORT)      CT [facility]:  1/10/22 - CT Abd/Pelvis  1/4/22 - CT Pelvis  1/3/22 - CTA Abd/Pelvis  10/6/21 - CT Abd/Pelvis  4/18/21 - CT Abd/Pelvis  6/13/19 - CT Abd/Pelvis  3/6/18- CTA Abdomen  3/6/18 - CT Pelvis   XRAY Internal [facility]:  10/31/17 - XR Pelvis   ULTRASOUND  (ENDOANAL/ENDORECTAL) [facility]:  12/7/20 - US Renal

## 2022-02-17 NOTE — TELEPHONE ENCOUNTER
Reason for Call:  Medication or medication refill:    Do you use a [facility]?  Name of the pharmacy and phone number for the current request:  Natalya on file    Name of the medication requested: amLODIPine (NORVASC) 5 MG tablet, clopidogrel (PLAVIX) 75 MG tablet    Other request: Patient called to request a refill of these medications. Please send refills to pharmacy on file.    Can we leave a detailed message on this number? YES    Phone number patient can be reached at: Home number on file [phone] (home)    Best Time: any    Call taken on 2/17/2022 at 11:53 AM by Tahir Brown

## 2022-02-18 ENCOUNTER — TELEPHONE (OUTPATIENT)
Dept: FAMILY MEDICINE | Facility: CLINIC | Age: 57
End: 2022-02-18
Payer: MEDICARE

## 2022-02-18 NOTE — TELEPHONE ENCOUNTER
"Routing refill request to provider for review/approval because:  Labs out of range:  HGB  BP not in range.  A break in medication    Last Written Prescription Date:  8/18/21  Last Fill Quantity: 90,  # refills: 1   Last office visit provider:  11/24/21    Last Written Prescription Date:  9/21/21  Last Fill Quantity: 30,  # refills: 0   Last office visit provider:  11/24/21     Requested Prescriptions   Pending Prescriptions Disp Refills     amLODIPine (NORVASC) 5 MG tablet 90 tablet 3     Sig: TAKE 1 TABLET(5 MG) BY MOUTH DAILY       Calcium Channel Blockers Protocol  Failed - 2/18/2022 11:53 AM        Failed - Blood pressure under 140/90 in past 12 months     BP Readings from Last 3 Encounters:   02/03/22 (!) 140/81   01/11/22 (!) 144/66   01/07/22 (!) 146/78                 Passed - Recent (12 mo) or future (30 days) visit within the authorizing provider's specialty     Patient has had an office visit with the authorizing provider or a provider within the authorizing providers department within the previous 12 mos or has a future within next 30 days. See \"Patient Info\" tab in inbasket, or \"Choose Columns\" in Meds & Orders section of the refill encounter.              Passed - Medication is active on med list        Passed - Patient is age 18 or older        Passed - Normal serum creatinine on file in past 12 months     Recent Labs   Lab Test 01/11/22  0754   CR 0.86       Ok to refill medication if creatinine is low             clopidogrel (PLAVIX) 75 MG tablet 30 tablet 11     Sig: Take 1 tablet (75 mg) by mouth daily       Plavix Failed - 2/18/2022 11:53 AM        Failed - Normal HGB on file in past 12 months     Recent Labs   Lab Test 01/11/22  0754   HGB 10.7*               Passed - No active PPI on record unless is Protonix        Passed - Normal Platelets on file in past 12 months     Recent Labs   Lab Test 01/11/22  0754                  Passed - Recent (12 mo) or future (30 days) visit within the " "authorizing provider's specialty     Patient has had an office visit with the authorizing provider or a provider within the authorizing providers department within the previous 12 mos or has a future within next 30 days. See \"Patient Info\" tab in inbasket, or \"Choose Columns\" in Meds & Orders section of the refill encounter.              Passed - Medication is active on med list        Passed - Patient is age 18 or older             Nain Nunez RN 02/18/22 11:54 AM  "

## 2022-02-18 NOTE — TELEPHONE ENCOUNTER
Patient called to check on the status os this refill request. He is out of medications. Please send refills to pharmacy.    **PCP out of office, routing to DOD**

## 2022-02-19 RX ORDER — AMLODIPINE BESYLATE 5 MG/1
TABLET ORAL
Qty: 90 TABLET | Refills: 1 | Status: SHIPPED | OUTPATIENT
Start: 2022-02-19 | End: 2022-07-19

## 2022-02-19 RX ORDER — CLOPIDOGREL BISULFATE 75 MG/1
75 TABLET ORAL DAILY
Qty: 30 TABLET | Refills: 5 | Status: SHIPPED | OUTPATIENT
Start: 2022-02-19 | End: 2022-02-21

## 2022-02-21 ENCOUNTER — TELEPHONE (OUTPATIENT)
Dept: GASTROENTEROLOGY | Facility: CLINIC | Age: 57
End: 2022-02-21
Payer: MEDICARE

## 2022-02-21 DIAGNOSIS — Z76.0 ENCOUNTER FOR MEDICATION REFILL: ICD-10-CM

## 2022-02-21 DIAGNOSIS — Z12.11 SPECIAL SCREENING FOR MALIGNANT NEOPLASMS, COLON: Primary | ICD-10-CM

## 2022-02-21 DIAGNOSIS — Z01.812 PRE-PROCEDURE LAB EXAM: ICD-10-CM

## 2022-02-21 RX ORDER — CLOPIDOGREL BISULFATE 75 MG/1
75 TABLET ORAL DAILY
Qty: 90 TABLET | Refills: 1 | Status: SHIPPED | OUTPATIENT
Start: 2022-02-21 | End: 2022-08-18

## 2022-02-21 NOTE — TELEPHONE ENCOUNTER
"Per Dr. Marvel Petit \"I agree with these recommendations, regarding holding the Plavix for 5 days prior to the procedure\"    Sandy Watts RN    "

## 2022-02-21 NOTE — TELEPHONE ENCOUNTER
Attempted to contact patient regarding upcoming colonoscopy procedure on 2.28.2022 for pre assessment questions. No answer.     Left message to return call to [phone] #2; MyChart message sent.    Covid test?    Arrival time: 1430    Facility location: [facility]    Sedation type: MAC    Indication for procedure: rectal bleeding, screening    Anticoagulants: Plavix. Holding interval of 5 days; TE to pt's PCP.    Bowel prep recommendation: verify bowel habits; Is pt taking oxycodone regularly?    Verify that pt can transfer independently or assist of 1.    Sandy Watts RN

## 2022-02-21 NOTE — TELEPHONE ENCOUNTER
I agree with these recommendations, regarding holding the Plavix for 5 days prior to the procedure

## 2022-02-23 ENCOUNTER — OFFICE VISIT (OUTPATIENT)
Dept: FAMILY MEDICINE | Facility: CLINIC | Age: 57
End: 2022-02-23
Payer: MEDICARE

## 2022-02-23 VITALS — HEART RATE: 79 BPM | RESPIRATION RATE: 24 BRPM | SYSTOLIC BLOOD PRESSURE: 138 MMHG | DIASTOLIC BLOOD PRESSURE: 81 MMHG

## 2022-02-23 DIAGNOSIS — R20.0 BILATERAL HAND NUMBNESS: ICD-10-CM

## 2022-02-23 DIAGNOSIS — Z01.818 PREOPERATIVE EXAMINATION: ICD-10-CM

## 2022-02-23 DIAGNOSIS — R73.9 HYPERGLYCEMIA: ICD-10-CM

## 2022-02-23 DIAGNOSIS — N52.1 ERECTILE DYSFUNCTION DUE TO DISEASES CLASSIFIED ELSEWHERE: ICD-10-CM

## 2022-02-23 DIAGNOSIS — N39.0 URINARY TRACT INFECTION WITHOUT HEMATURIA, SITE UNSPECIFIED: Primary | ICD-10-CM

## 2022-02-23 DIAGNOSIS — I10 BENIGN ESSENTIAL HYPERTENSION: ICD-10-CM

## 2022-02-23 DIAGNOSIS — N52.9 MALE ERECTILE DISORDER: Primary | ICD-10-CM

## 2022-02-23 DIAGNOSIS — G82.20 PARAPLEGIA (H): ICD-10-CM

## 2022-02-23 LAB
TSH SERPL DL<=0.005 MIU/L-ACNC: 1.06 UIU/ML (ref 0.3–5)
VIT B12 SERPL-MCNC: 630 PG/ML (ref 213–816)

## 2022-02-23 PROCEDURE — U0005 INFEC AGEN DETEC AMPLI PROBE: HCPCS | Performed by: FAMILY MEDICINE

## 2022-02-23 PROCEDURE — 82607 VITAMIN B-12: CPT | Performed by: FAMILY MEDICINE

## 2022-02-23 PROCEDURE — U0003 INFECTIOUS AGENT DETECTION BY NUCLEIC ACID (DNA OR RNA); SEVERE ACUTE RESPIRATORY SYNDROME CORONAVIRUS 2 (SARS-COV-2) (CORONAVIRUS DISEASE [COVID-19]), AMPLIFIED PROBE TECHNIQUE, MAKING USE OF HIGH THROUGHPUT TECHNOLOGIES AS DESCRIBED BY CMS-2020-01-R: HCPCS | Performed by: FAMILY MEDICINE

## 2022-02-23 PROCEDURE — 86780 TREPONEMA PALLIDUM: CPT | Performed by: FAMILY MEDICINE

## 2022-02-23 PROCEDURE — 99214 OFFICE O/P EST MOD 30 MIN: CPT | Performed by: FAMILY MEDICINE

## 2022-02-23 PROCEDURE — 84443 ASSAY THYROID STIM HORMONE: CPT | Performed by: FAMILY MEDICINE

## 2022-02-23 PROCEDURE — 86618 LYME DISEASE ANTIBODY: CPT | Performed by: FAMILY MEDICINE

## 2022-02-23 PROCEDURE — 36415 COLL VENOUS BLD VENIPUNCTURE: CPT | Performed by: FAMILY MEDICINE

## 2022-02-23 RX ORDER — CIPROFLOXACIN 500 MG/1
500 TABLET, FILM COATED ORAL 2 TIMES DAILY
Qty: 14 TABLET | Refills: 0 | Status: ON HOLD | OUTPATIENT
Start: 2022-02-23 | End: 2022-03-12

## 2022-02-23 NOTE — PROGRESS NOTES
Assessment & Plan        ICD-10-CM    1. Urinary tract infection without hematuria, site unspecified  N39.0 ciprofloxacin (CIPRO) 500 MG tablet   2. Benign essential hypertension  I10    3. Paraplegia (H)  G82.20    4. Hyperglycemia  R73.9    5. Bilateral hand numbness  R20.0 TSH     Vitamin B12     Lyme Disease [place] with reflex to WB Serum     Treponema Abs w Reflex to RPR and Titer   6. Preoperative examination  Z01.818 Asymptomatic COVID-19 Virus (Coronavirus) by PCR Nose        UTI treat with Cipro    Hypertension controlled    Hyperglycemia stable    Bilateral hand numbness, more likely from ulnar and carpal tunnel issues.  Seeing the hand surgeon tomorrow.  We will check labs as outlined.    Also needs a COVID-19 PCR test today for the upcoming colonoscopy          Return in about 3 months (around 5/23/2022) for Follow up. for recheck.        Subjective:    This 56 year old male was seen today for follow-up on a few things.    He is also had some recent symptoms of cloudy urine and burning again and wonders if he could go on an antibiotic.    I did treat him with with Cipro for 7 days 500 mg twice daily.    Patient has an upcoming colonoscopy he will be off Plavix for 5 days.  He needed a Covid test and this was done today he is asymptomatic.  He is up-to-date on Covid booster.    Blood pressures controlled    He has had hyperglycemia last A1c was 5.0 in November so really does not have any evidence of diabetes.    He has had some burning in his hands worse on I believe the left than the right.  He has pain on the left medial epicondyle.  He sees [place] orthopedist tomorrow.  I think he may have some carpal tunnel as well as the medial epicondylitis.  I discussed that they may do EMG as part of the work-up.    For the burning and tingling in the hands I did check some labs today with TSH B12 Lyme's and syphilis.    No additional concern or issue    Review of Systems    10 point review of systems positive as  outlined above otherwise negative    Current Outpatient Medications   Medication     acetaminophen (TYLENOL) 325 MG tablet     amLODIPine (NORVASC) 5 MG tablet     baclofen (LIORESAL) 20 MG tablet     ciprofloxacin (CIPRO) 500 MG tablet     clopidogrel (PLAVIX) 75 MG tablet     FEROSUL 325 (65 Fe) MG tablet     Fesoterodine Fumarate (TOVIAZ) 8 MG TB24     oxyCODONE (ROXICODONE) 5 MG tablet     simvastatin (ZOCOR) 20 MG tablet     testosterone cypionate (DEPOTESTOSTERONE) 200 MG/ML injection     No current facility-administered medications for this visit.       Objective    Vitals: /81 (BP Location: Left arm, Patient Position: Sitting, Cuff Size: Adult Large)   Pulse 79   Resp 24   BMI= There is no height or weight on file to calculate BMI.     Physical Exam    General appearance no acute distress.    HEENT no nasal drainage no sore throat    Left elbow with pain over the medial epicondyle insertion    He complains of numbness in both the ulnar and median distribution..    Lungs nonlabored breathing no wheezing    Heart: No palpitations or rapid heart rate    Lab work today TSH B12 Lyme's and syphilis.    Also COVID-19 PCR            Marvel Petit MD

## 2022-02-24 ENCOUNTER — TELEPHONE (OUTPATIENT)
Dept: UROLOGY | Facility: CLINIC | Age: 57
End: 2022-02-24
Payer: MEDICARE

## 2022-02-24 DIAGNOSIS — G89.18 POSTOPERATIVE PAIN: ICD-10-CM

## 2022-02-24 LAB
B BURGDOR IGG+IGM SER QL: 0.49
SARS-COV-2 RNA RESP QL NAA+PROBE: NEGATIVE
T PALLIDUM AB SER QL: NONREACTIVE

## 2022-02-24 RX ORDER — BISACODYL 5 MG
TABLET, DELAYED RELEASE (ENTERIC COATED) ORAL
Qty: 2 TABLET | Refills: 0 | Status: SHIPPED | OUTPATIENT
Start: 2022-02-24 | End: 2023-04-17

## 2022-02-24 NOTE — TELEPHONE ENCOUNTER
[facility] Call Center    Phone Message    May a detailed message be left on voicemail: yes     Reason for Call: Medication Refill Request    Has the patient contacted the pharmacy for the refill? Yes   Name of medication being requested: Compound medication/injection for ED  Provider who prescribed the medication: Dr. Lira    Date medication is needed: ASAP    Per patient, he called pharmacy and they do not have any orders for him.    Action Taken: Message routed to:  [facility]    Travel Screening: Not Applicable

## 2022-02-24 NOTE — TELEPHONE ENCOUNTER
"Pre assessment questions completed for upcoming colonoscopy procedure scheduled on 2.28.2022    COVID test: Pt took his COVID test on 2.23.2022; pt was informed that he needs to have the COVID test within 4 days of procedure.  Pt transferred to endo scheduling to set up appt.     Reviewed procedural arrival time 1430 and facility location [facility].    Designated  policy reviewed. Instructed to have someone stay 24 hours post procedure.     Anticoagulation/blood thinners? Plavix; hold x 5 day    Electronic implanted devices? No    RN confirmed with pt that he is able to transfer independently from his wheelchair to a bed.  Pt stated \"yes.\"    Pt takes oxycodone regularly.    Extended prep d/t chronic narcotic use.      Reviewed extended prep instructions with patient. No fiber/iron supplements or foods that contain nuts/seeds prior to procedure.     Patient verbalized understanding and had no questions or concerns at this time.    Sandy Watts RN    "

## 2022-02-24 NOTE — TELEPHONE ENCOUNTER
oxyCODONE (ROXICODONE) 5 MG tablet      Last Written Prescription Date:2/16/22  Last Fill Quantity: 24,   # refills: 0  Last Office Visit : 2/16/22  Future Office visit:  3/15/22 nurse    Routing refill request to provider for review/approval because:  request per pt call. Controlled.

## 2022-02-24 NOTE — TELEPHONE ENCOUNTER
[facility] Call Center    Phone Message    May a detailed message be left on voicemail: yes     Reason for Call: Medication Refill Request    Has the patient contacted the pharmacy for the refill? Yes   Name of medication being requested: oxyCODONE (ROXICODONE) 5 MG tablet [[id number]] (Order [id number])  Provider who prescribed the medication: Charles Lira MD  Pharmacy: [facility] [address] [place] [address]  Date medication is needed: ASAP    Per patient, he can't get into the pain clinic until 4/7/22 and he needs meds to help him.    Action Taken: Message routed to:  [facility] Urology    Travel Screening: Not Applicable

## 2022-02-25 ENCOUNTER — TELEPHONE (OUTPATIENT)
Dept: UROLOGY | Facility: CLINIC | Age: 57
End: 2022-02-25

## 2022-02-25 ENCOUNTER — LAB (OUTPATIENT)
Dept: LAB | Facility: CLINIC | Age: 57
End: 2022-02-25
Attending: COLON & RECTAL SURGERY
Payer: MEDICARE

## 2022-02-25 DIAGNOSIS — Z01.812 PRE-PROCEDURE LAB EXAM: ICD-10-CM

## 2022-02-25 PROCEDURE — U0005 INFEC AGEN DETEC AMPLI PROBE: HCPCS | Performed by: FAMILY MEDICINE

## 2022-02-25 PROCEDURE — U0003 INFECTIOUS AGENT DETECTION BY NUCLEIC ACID (DNA OR RNA); SEVERE ACUTE RESPIRATORY SYNDROME CORONAVIRUS 2 (SARS-COV-2) (CORONAVIRUS DISEASE [COVID-19]), AMPLIFIED PROBE TECHNIQUE, MAKING USE OF HIGH THROUGHPUT TECHNOLOGIES AS DESCRIBED BY CMS-2020-01-R: HCPCS | Performed by: FAMILY MEDICINE

## 2022-02-25 NOTE — TELEPHONE ENCOUNTER
[facility] Call Center    Phone Message    May a detailed message be left on voicemail: yes     Reason for Call: Patient calling back again for that oxyCODONE prescription. Was upset no one was prescribing or calling fast enough. Call patient back. Thank you    Action Taken: Message routed to:  [facility] Uro    Travel Screening: Not Applicable

## 2022-02-25 NOTE — TELEPHONE ENCOUNTER
Spoke to pt. Pt has ongoing scrotal pain and pain from pressure ulcers on bottom. Pt reports that he is not able to get into the wound clinic until 4/7/22 and has colon surgery scheduled in May. Pt has ran out of oxycodone and requests for this to be refilled by Dr. Lira as soon as possible.   Pt also asking if order for trimix was sent to pharmacy. Reassured pt that this order was sent to the compounding pharmacy and they will mail it out. Pt confirmed he has pharmacy phone number to contact with further questions on that.     Kylie Chaudhary RN MSN

## 2022-02-26 LAB — SARS-COV-2 RNA RESP QL NAA+PROBE: NEGATIVE

## 2022-02-28 ENCOUNTER — HOSPITAL ENCOUNTER (OUTPATIENT)
Facility: CLINIC | Age: 57
Discharge: HOME | End: 2022-02-28
Attending: COLON & RECTAL SURGERY | Admitting: COLON & RECTAL SURGERY
Payer: MEDICARE

## 2022-02-28 VITALS
DIASTOLIC BLOOD PRESSURE: 81 MMHG | HEIGHT: 76 IN | HEART RATE: 57 BPM | SYSTOLIC BLOOD PRESSURE: 102 MMHG | RESPIRATION RATE: 16 BRPM | TEMPERATURE: 98 F | WEIGHT: 180 LBS | BODY MASS INDEX: 21.92 KG/M2 | OXYGEN SATURATION: 100 %

## 2022-02-28 DIAGNOSIS — K62.5 RECTAL BLEEDING: Primary | ICD-10-CM

## 2022-02-28 LAB — COLONOSCOPY: NORMAL

## 2022-02-28 PROCEDURE — 45378 DIAGNOSTIC COLONOSCOPY: CPT | Performed by: COLON & RECTAL SURGERY

## 2022-02-28 PROCEDURE — 999N000099 HC STATISTIC MODERATE SEDATION < 10 MIN: Performed by: COLON & RECTAL SURGERY

## 2022-02-28 PROCEDURE — 250N000011 HC RX IP 250 OP 636: Performed by: COLON & RECTAL SURGERY

## 2022-02-28 RX ORDER — FENTANYL CITRATE 50 UG/ML
INJECTION, SOLUTION INTRAMUSCULAR; INTRAVENOUS PRN
Status: COMPLETED | OUTPATIENT
Start: 2022-02-28 | End: 2022-02-28

## 2022-02-28 RX ORDER — FLUMAZENIL 0.1 MG/ML
0.2 INJECTION, SOLUTION INTRAVENOUS
Status: CANCELLED | OUTPATIENT
Start: 2022-02-28 | End: 2022-03-01

## 2022-02-28 RX ORDER — ONDANSETRON 4 MG/1
4 TABLET, ORALLY DISINTEGRATING ORAL EVERY 6 HOURS PRN
Status: CANCELLED | OUTPATIENT
Start: 2022-02-28

## 2022-02-28 RX ORDER — NALOXONE HYDROCHLORIDE 0.4 MG/ML
0.2 INJECTION, SOLUTION INTRAMUSCULAR; INTRAVENOUS; SUBCUTANEOUS
Status: CANCELLED | OUTPATIENT
Start: 2022-02-28

## 2022-02-28 RX ORDER — NALOXONE HYDROCHLORIDE 0.4 MG/ML
0.4 INJECTION, SOLUTION INTRAMUSCULAR; INTRAVENOUS; SUBCUTANEOUS
Status: CANCELLED | OUTPATIENT
Start: 2022-02-28

## 2022-02-28 RX ORDER — PROCHLORPERAZINE MALEATE 10 MG
10 TABLET ORAL EVERY 6 HOURS PRN
Status: CANCELLED | OUTPATIENT
Start: 2022-02-28

## 2022-02-28 RX ORDER — ONDANSETRON 2 MG/ML
4 INJECTION INTRAMUSCULAR; INTRAVENOUS EVERY 6 HOURS PRN
Status: CANCELLED | OUTPATIENT
Start: 2022-02-28

## 2022-02-28 RX ADMIN — FENTANYL CITRATE 100 MCG: 50 INJECTION, SOLUTION INTRAMUSCULAR; INTRAVENOUS at 15:46

## 2022-02-28 RX ADMIN — MIDAZOLAM 1 MG: 1 INJECTION INTRAMUSCULAR; INTRAVENOUS at 15:51

## 2022-02-28 RX ADMIN — MIDAZOLAM 2 MG: 1 INJECTION INTRAMUSCULAR; INTRAVENOUS at 15:46

## 2022-02-28 NOTE — TELEPHONE ENCOUNTER
Call center called with pt on the line. Provider is in OR today and will be unable to fill the Rx today. An RN cannot signed and prescribe this medication as it is controlled. Writer advised reading out to Dr Ashley or colo-rectal as some pain is in regards to this department and pt is having a colonoscopy today in which they wanted this medication prior.

## 2022-02-28 NOTE — OR NURSING
Colonoscopy cancelled due to poor prep, 3 mg versed and 100 mcg fentanyl given IV, pt tolerated well. Plan to schedule for another colonoscopy.

## 2022-03-01 DIAGNOSIS — G82.20 PARAPLEGIA (H): ICD-10-CM

## 2022-03-01 NOTE — TELEPHONE ENCOUNTER
Pt called back stating he called the compound pharmacy and they stated they dont have any prescriptions from Dr. Lira for the pain medication. Pt would like a call back. Thanks!

## 2022-03-01 NOTE — TELEPHONE ENCOUNTER
Charles Lira MD Bratsch, Klyie CHRISTENSEN RN  Cc: Sonya Mccann RN  Caller: Unspecified (4 days ago, 10:07 AM)  I've represcribed pain meds multiple times to this patient and discussed with him that the one I just prescribed was the last refill on oxycodone.   He was supposed to see a pain specialist.     Spoke to pt. Informed pt that Dr. Lira will no longer provide refills for pain medications and advised pt to seek this from primary provider. Pt verbalized understanding.     Kylie Chaudhary, PROMISE MSN

## 2022-03-02 ENCOUNTER — TELEPHONE (OUTPATIENT)
Dept: FAMILY MEDICINE | Facility: CLINIC | Age: 57
End: 2022-03-02
Payer: MEDICARE

## 2022-03-02 DIAGNOSIS — G82.20 PARAPLEGIA (H): ICD-10-CM

## 2022-03-02 RX ORDER — BACLOFEN 20 MG/1
TABLET ORAL
Qty: 90 TABLET | Refills: 5 | OUTPATIENT
Start: 2022-03-02

## 2022-03-02 RX ORDER — BACLOFEN 20 MG/1
TABLET ORAL
Qty: 90 TABLET | Refills: 5 | Status: SHIPPED | OUTPATIENT
Start: 2022-03-02 | End: 2022-07-25

## 2022-03-02 RX ORDER — GABAPENTIN 100 MG/1
CAPSULE ORAL
Qty: 120 CAPSULE | Refills: 5 | Status: SHIPPED | OUTPATIENT
Start: 2022-03-02 | End: 2023-04-17

## 2022-03-02 NOTE — TELEPHONE ENCOUNTER
Since he has not been on gabapentin for quite a while I am going to start out at 100 mg tablets to take 2 tablets twice a day.    Also I refilled his baclofen.    I sent both of those to the pharmacy

## 2022-03-02 NOTE — TELEPHONE ENCOUNTER
See message below from Dr Petit    Patient agrees with plan and will take medication as directed.    No further action needed.    Mame Bishop RN  [facility]

## 2022-03-02 NOTE — TELEPHONE ENCOUNTER
"Pt calling back he said he talked to  on his last visit about his gabapentin he is wanting to start it up again its been many years ago that he was on it.Not sure what the dose was ,he said  was the DrHarmony Who gave it to him .  He is also asking  About getting his \"Baclofen 20mg  Refilled he is out of that   "

## 2022-03-02 NOTE — TELEPHONE ENCOUNTER
Reason for Call:  Medication or medication refill:    Do you use a [facility]?  Name of the pharmacy and phone number for the current request:  [facility] on [address]    Name of the medication requested: gabapentin    Other request:     Can we leave a detailed message on this number? Not Applicable    Phone number patient can be reached at: Home number on file [phone] (home)    Best Time: asap     Call taken on 3/2/2022 at 10:33 AM by Blaire Coffman

## 2022-03-02 NOTE — TELEPHONE ENCOUNTER
I do not see that on his active med list.    What is the dose he is taking and how many per day.?    Who was the last doctor who refill this?    Check with the pharmacy

## 2022-03-02 NOTE — TELEPHONE ENCOUNTER
RN attempted to contact patient, but no answer. Left message on patient's voice mail to call clinic back.  Need information below.    I do not see that on his active med list.    What is the dose he is taking and how many per day.?    Who was the last doctor who refill this?    Check with the pharmacy    Contacted [facility] and [facility].  [facility]; nothing filled in 18 months and no gabapentin  [facility]; notheing filled since 2/2015 and no gabapentin    Will await return call from patient.    Mame Bishop RN  [facility]

## 2022-03-04 RX ORDER — OXYCODONE HYDROCHLORIDE 5 MG/1
5 TABLET ORAL EVERY 6 HOURS PRN
Qty: 24 TABLET | Refills: 0 | OUTPATIENT
Start: 2022-03-04

## 2022-03-08 ENCOUNTER — PATIENT OUTREACH (OUTPATIENT)
Dept: CARE COORDINATION | Facility: CLINIC | Age: 57
End: 2022-03-08
Payer: MEDICARE

## 2022-03-09 NOTE — PROGRESS NOTES
Social Work Intervention  [facility]    Data/Intervention:    Patient Name:  Josiah Crump  /Age:  1965 (56 year old)    Visit Type: telephone  Referral Source: Vidhi Kwon RN  Reason for Referral:  Failed colonoscopy prep    Collaborated With:    -Josiah  -Vidhi Kwon RN    Psychosocial Information/Concerns:  Pt with the assistance of his PCA were not able to manage completing the colonoscopy prep resulting in an aborted procedure part way through. Vidhi seeking input re other options for the prep as the procedure is medically necessary.   Pt is WC dependent and has no lower extremities.     Intervention/Education/Resources Provided:  Contacted the Pt. He did not want to engage in any discussion about trying the prep again at home. He indicated that he cannot do it on his own even with the help of his PCA.   He is on a CADI waiver for services at home.     Assessment/Plan:  Pt would need to have a PCA or caregiver help him for 24 hours prior to procedure and PCA services are not set up to provide that intensive service at home. I do not see any alternatives in this situation where it is medically necessary that he has this procedure soon. Suggested an admit to Obs to accomplish this procedure.     Provided patient/family with contact information and availability.    STEVEN Kennedy, [facility]    [facility]  [phone]

## 2022-03-10 ENCOUNTER — TELEPHONE (OUTPATIENT)
Dept: SURGERY | Facility: CLINIC | Age: 57
End: 2022-03-10

## 2022-03-10 ENCOUNTER — HOSPITAL ENCOUNTER (INPATIENT)
Facility: CLINIC | Age: 57
LOS: 2 days | Discharge: HOME | DRG: 393 | End: 2022-03-12
Attending: EMERGENCY MEDICINE | Admitting: FAMILY MEDICINE
Payer: MEDICARE

## 2022-03-10 DIAGNOSIS — R10.84 ABDOMINAL PAIN, GENERALIZED: ICD-10-CM

## 2022-03-10 DIAGNOSIS — N39.0 ACUTE UTI: ICD-10-CM

## 2022-03-10 DIAGNOSIS — K92.2 GASTROINTESTINAL HEMORRHAGE, UNSPECIFIED GASTROINTESTINAL HEMORRHAGE TYPE: ICD-10-CM

## 2022-03-10 DIAGNOSIS — E87.1 HYPOSMOLALITY SYNDROME: ICD-10-CM

## 2022-03-10 DIAGNOSIS — L08.9 INFECTED DECUBITUS ULCER, UNSPECIFIED ULCER STAGE: ICD-10-CM

## 2022-03-10 DIAGNOSIS — L89.90 INFECTED DECUBITUS ULCER, UNSPECIFIED ULCER STAGE: ICD-10-CM

## 2022-03-10 DIAGNOSIS — L89.309 PRESSURE INJURY OF SKIN OF BUTTOCK, UNSPECIFIED INJURY STAGE, UNSPECIFIED LATERALITY: ICD-10-CM

## 2022-03-10 DIAGNOSIS — Z11.52 ENCOUNTER FOR SCREENING LABORATORY TESTING FOR SEVERE ACUTE RESPIRATORY SYNDROME CORONAVIRUS 2 (SARS-COV-2): ICD-10-CM

## 2022-03-10 DIAGNOSIS — E87.1 HYPONATREMIA: ICD-10-CM

## 2022-03-10 DIAGNOSIS — G89.29 OTHER CHRONIC PAIN: Primary | ICD-10-CM

## 2022-03-10 PROBLEM — E66.01 MORBID OBESITY (H): Status: ACTIVE | Noted: 2022-03-10

## 2022-03-10 PROBLEM — K82.8 PORCELAIN GALLBLADDER: Status: ACTIVE | Noted: 2022-03-10

## 2022-03-10 PROBLEM — Z86.73 HISTORY OF CVA (CEREBROVASCULAR ACCIDENT): Status: ACTIVE | Noted: 2020-06-16

## 2022-03-10 PROBLEM — R20.2 PARESTHESIA: Status: ACTIVE | Noted: 2020-04-13

## 2022-03-10 LAB
ALBUMIN SERPL-MCNC: 2.6 G/DL (ref 3.4–5)
ALBUMIN UR-MCNC: 100 MG/DL
ALP SERPL-CCNC: 93 U/L (ref 40–150)
ALT SERPL W P-5'-P-CCNC: 19 U/L (ref 0–70)
ANION GAP SERPL CALCULATED.3IONS-SCNC: 5 MMOL/L (ref 3–14)
APPEARANCE UR: CLEAR
AST SERPL W P-5'-P-CCNC: 33 U/L (ref 0–45)
BASOPHILS # BLD AUTO: 0.1 10E3/UL (ref 0–0.2)
BASOPHILS NFR BLD AUTO: 1 %
BILIRUB SERPL-MCNC: 0.4 MG/DL (ref 0.2–1.3)
BILIRUB UR QL STRIP: NEGATIVE
BUN SERPL-MCNC: 26 MG/DL (ref 7–30)
BURR CELLS BLD QL SMEAR: SLIGHT
CALCIUM SERPL-MCNC: 8.9 MG/DL (ref 8.5–10.1)
CHLORIDE BLD-SCNC: 106 MMOL/L (ref 94–109)
CO2 SERPL-SCNC: 19 MMOL/L (ref 20–32)
COLOR UR AUTO: ABNORMAL
CREAT SERPL-MCNC: 0.9 MG/DL (ref 0.66–1.25)
ELLIPTOCYTES BLD QL SMEAR: SLIGHT
EOSINOPHIL # BLD AUTO: 0.1 10E3/UL (ref 0–0.7)
EOSINOPHIL NFR BLD AUTO: 1 %
ERYTHROCYTE [DISTWIDTH] IN BLOOD BY AUTOMATED COUNT: 20.3 % (ref 10–15)
GFR SERPL CREATININE-BSD FRML MDRD: >90 ML/MIN/1.73M2
GLUCOSE BLD-MCNC: 94 MG/DL (ref 70–99)
GLUCOSE UR STRIP-MCNC: NEGATIVE MG/DL
HCT VFR BLD AUTO: 43.8 % (ref 40–53)
HGB BLD-MCNC: 13.1 G/DL (ref 13.3–17.7)
HGB UR QL STRIP: ABNORMAL
HOLD SPECIMEN: NORMAL
IMM GRANULOCYTES # BLD: 0 10E3/UL
IMM GRANULOCYTES NFR BLD: 0 %
INR PPP: 1.11 (ref 0.85–1.15)
KETONES UR STRIP-MCNC: NEGATIVE MG/DL
LACTATE SERPL-SCNC: 1 MMOL/L (ref 0.7–2)
LEUKOCYTE ESTERASE UR QL STRIP: ABNORMAL
LIPASE SERPL-CCNC: 144 U/L (ref 73–393)
LYMPHOCYTES # BLD AUTO: 0.8 10E3/UL (ref 0.8–5.3)
LYMPHOCYTES NFR BLD AUTO: 11 %
MCH RBC QN AUTO: 21.8 PG (ref 26.5–33)
MCHC RBC AUTO-ENTMCNC: 29.9 G/DL (ref 31.5–36.5)
MCV RBC AUTO: 73 FL (ref 78–100)
MONOCYTES # BLD AUTO: 0.7 10E3/UL (ref 0–1.3)
MONOCYTES NFR BLD AUTO: 10 %
NEUTROPHILS # BLD AUTO: 5.5 10E3/UL (ref 1.6–8.3)
NEUTROPHILS NFR BLD AUTO: 77 %
NITRATE UR QL: POSITIVE
NRBC # BLD AUTO: 0 10E3/UL
NRBC BLD AUTO-RTO: 0 /100
PH UR STRIP: 5.5 [PH] (ref 5–7)
PLAT MORPH BLD: ABNORMAL
PLATELET # BLD AUTO: 207 10E3/UL (ref 150–450)
POLYCHROMASIA BLD QL SMEAR: SLIGHT
POTASSIUM BLD-SCNC: 4.5 MMOL/L (ref 3.4–5.3)
PROT SERPL-MCNC: 8.4 G/DL (ref 6.8–8.8)
RBC # BLD AUTO: 6.01 10E6/UL (ref 4.4–5.9)
RBC MORPH BLD: ABNORMAL
RBC URINE: 3 /HPF
SARS-COV-2 RNA RESP QL NAA+PROBE: NEGATIVE
SODIUM SERPL-SCNC: 130 MMOL/L (ref 133–144)
SP GR UR STRIP: 1.01 (ref 1–1.03)
SQUAMOUS EPITHELIAL: <1 /HPF
UROBILINOGEN UR STRIP-MCNC: NORMAL MG/DL
WBC # BLD AUTO: 7.2 10E3/UL (ref 4–11)
WBC URINE: 25 /HPF

## 2022-03-10 PROCEDURE — 120N000002 HC R&B MED SURG/OB UMMC

## 2022-03-10 PROCEDURE — 81001 URINALYSIS AUTO W/SCOPE: CPT | Performed by: NURSE PRACTITIONER

## 2022-03-10 PROCEDURE — 36415 COLL VENOUS BLD VENIPUNCTURE: CPT | Performed by: NURSE PRACTITIONER

## 2022-03-10 PROCEDURE — 99285 EMERGENCY DEPT VISIT HI MDM: CPT | Performed by: EMERGENCY MEDICINE

## 2022-03-10 PROCEDURE — 87086 URINE CULTURE/COLONY COUNT: CPT | Performed by: NURSE PRACTITIONER

## 2022-03-10 PROCEDURE — 83605 ASSAY OF LACTIC ACID: CPT | Performed by: NURSE PRACTITIONER

## 2022-03-10 PROCEDURE — U0003 INFECTIOUS AGENT DETECTION BY NUCLEIC ACID (DNA OR RNA); SEVERE ACUTE RESPIRATORY SYNDROME CORONAVIRUS 2 (SARS-COV-2) (CORONAVIRUS DISEASE [COVID-19]), AMPLIFIED PROBE TECHNIQUE, MAKING USE OF HIGH THROUGHPUT TECHNOLOGIES AS DESCRIBED BY CMS-2020-01-R: HCPCS | Performed by: STUDENT IN AN ORGANIZED HEALTH CARE EDUCATION/TRAINING PROGRAM

## 2022-03-10 PROCEDURE — 250N000009 HC RX 250: Performed by: EMERGENCY MEDICINE

## 2022-03-10 PROCEDURE — 0DBF8ZX EXCISION OF RIGHT LARGE INTESTINE, VIA NATURAL OR ARTIFICIAL OPENING ENDOSCOPIC, DIAGNOSTIC: ICD-10-PCS | Performed by: INTERNAL MEDICINE

## 2022-03-10 PROCEDURE — 80053 COMPREHEN METABOLIC PANEL: CPT | Performed by: NURSE PRACTITIONER

## 2022-03-10 PROCEDURE — 250N000013 HC RX MED GY IP 250 OP 250 PS 637: Performed by: STUDENT IN AN ORGANIZED HEALTH CARE EDUCATION/TRAINING PROGRAM

## 2022-03-10 PROCEDURE — 83690 ASSAY OF LIPASE: CPT | Performed by: NURSE PRACTITIONER

## 2022-03-10 PROCEDURE — 99285 EMERGENCY DEPT VISIT HI MDM: CPT | Mod: 25 | Performed by: EMERGENCY MEDICINE

## 2022-03-10 PROCEDURE — 0DBG8ZX EXCISION OF LEFT LARGE INTESTINE, VIA NATURAL OR ARTIFICIAL OPENING ENDOSCOPIC, DIAGNOSTIC: ICD-10-PCS | Performed by: INTERNAL MEDICINE

## 2022-03-10 PROCEDURE — 85025 COMPLETE CBC W/AUTO DIFF WBC: CPT | Performed by: EMERGENCY MEDICINE

## 2022-03-10 PROCEDURE — 96361 HYDRATE IV INFUSION ADD-ON: CPT | Performed by: EMERGENCY MEDICINE

## 2022-03-10 PROCEDURE — C9803 HOPD COVID-19 SPEC COLLECT: HCPCS | Performed by: EMERGENCY MEDICINE

## 2022-03-10 PROCEDURE — 258N000003 HC RX IP 258 OP 636: Performed by: EMERGENCY MEDICINE

## 2022-03-10 PROCEDURE — 82040 ASSAY OF SERUM ALBUMIN: CPT | Performed by: NURSE PRACTITIONER

## 2022-03-10 PROCEDURE — 258N000003 HC RX IP 258 OP 636: Performed by: STUDENT IN AN ORGANIZED HEALTH CARE EDUCATION/TRAINING PROGRAM

## 2022-03-10 PROCEDURE — 96374 THER/PROPH/DIAG INJ IV PUSH: CPT | Performed by: EMERGENCY MEDICINE

## 2022-03-10 PROCEDURE — 250N000013 HC RX MED GY IP 250 OP 250 PS 637: Performed by: EMERGENCY MEDICINE

## 2022-03-10 PROCEDURE — 85610 PROTHROMBIN TIME: CPT | Performed by: NURSE PRACTITIONER

## 2022-03-10 PROCEDURE — 99221 1ST HOSP IP/OBS SF/LOW 40: CPT | Mod: AI | Performed by: FAMILY MEDICINE

## 2022-03-10 RX ORDER — BACLOFEN 20 MG/1
20 TABLET ORAL 3 TIMES DAILY PRN
Status: DISCONTINUED | OUTPATIENT
Start: 2022-03-10 | End: 2022-03-12 | Stop reason: HOSPADM

## 2022-03-10 RX ORDER — ONDANSETRON 2 MG/ML
4 INJECTION INTRAMUSCULAR; INTRAVENOUS EVERY 6 HOURS PRN
Status: DISCONTINUED | OUTPATIENT
Start: 2022-03-10 | End: 2022-03-12 | Stop reason: HOSPADM

## 2022-03-10 RX ORDER — OXYCODONE HYDROCHLORIDE 5 MG/1
5 TABLET ORAL ONCE
Status: COMPLETED | OUTPATIENT
Start: 2022-03-10 | End: 2022-03-10

## 2022-03-10 RX ORDER — OXYCODONE HYDROCHLORIDE 5 MG/1
5 TABLET ORAL EVERY 4 HOURS PRN
Status: DISCONTINUED | OUTPATIENT
Start: 2022-03-10 | End: 2022-03-12 | Stop reason: HOSPADM

## 2022-03-10 RX ORDER — OXYCODONE HYDROCHLORIDE 5 MG/1
5 TABLET ORAL EVERY 6 HOURS PRN
Status: DISCONTINUED | OUTPATIENT
Start: 2022-03-10 | End: 2022-03-10

## 2022-03-10 RX ORDER — GABAPENTIN 100 MG/1
200 CAPSULE ORAL 2 TIMES DAILY
Status: DISCONTINUED | OUTPATIENT
Start: 2022-03-10 | End: 2022-03-12 | Stop reason: HOSPADM

## 2022-03-10 RX ORDER — SODIUM CHLORIDE 9 MG/ML
INJECTION, SOLUTION INTRAVENOUS CONTINUOUS
Status: DISCONTINUED | OUTPATIENT
Start: 2022-03-10 | End: 2022-03-11

## 2022-03-10 RX ORDER — AMLODIPINE BESYLATE 5 MG/1
5 TABLET ORAL DAILY
Status: DISCONTINUED | OUTPATIENT
Start: 2022-03-11 | End: 2022-03-12 | Stop reason: HOSPADM

## 2022-03-10 RX ORDER — TOLTERODINE 4 MG/1
4 CAPSULE, EXTENDED RELEASE ORAL DAILY
Status: DISCONTINUED | OUTPATIENT
Start: 2022-03-11 | End: 2022-03-12 | Stop reason: HOSPADM

## 2022-03-10 RX ORDER — ACETAMINOPHEN 500 MG
1000 TABLET ORAL ONCE
Status: DISCONTINUED | OUTPATIENT
Start: 2022-03-10 | End: 2022-03-12 | Stop reason: HOSPADM

## 2022-03-10 RX ORDER — ONDANSETRON 4 MG/1
4 TABLET, ORALLY DISINTEGRATING ORAL EVERY 6 HOURS PRN
Status: DISCONTINUED | OUTPATIENT
Start: 2022-03-10 | End: 2022-03-12 | Stop reason: HOSPADM

## 2022-03-10 RX ADMIN — SODIUM CHLORIDE 1000 ML: 9 INJECTION, SOLUTION INTRAVENOUS at 15:13

## 2022-03-10 RX ADMIN — SODIUM CHLORIDE: 9 INJECTION, SOLUTION INTRAVENOUS at 22:32

## 2022-03-10 RX ADMIN — FAMOTIDINE 20 MG: 10 INJECTION, SOLUTION INTRAVENOUS at 20:01

## 2022-03-10 RX ADMIN — GABAPENTIN 200 MG: 100 CAPSULE ORAL at 22:29

## 2022-03-10 RX ADMIN — POLYETHYLENE GLYCOL 3350, SODIUM SULFATE ANHYDROUS, SODIUM BICARBONATE, SODIUM CHLORIDE, POTASSIUM CHLORIDE 4000 ML: 236; 22.74; 6.74; 5.86; 2.97 POWDER, FOR SOLUTION ORAL at 16:03

## 2022-03-10 RX ADMIN — OXYCODONE HYDROCHLORIDE 5 MG: 5 TABLET ORAL at 15:15

## 2022-03-10 RX ADMIN — OXYCODONE HYDROCHLORIDE 5 MG: 5 TABLET ORAL at 21:43

## 2022-03-10 ASSESSMENT — ACTIVITIES OF DAILY LIVING (ADL)
ADLS_ACUITY_SCORE: 8

## 2022-03-10 NOTE — ED TRIAGE NOTES
Pt presents to triage from home experiencing diarrhea with red blood in it and R sided abdominal pain. Pt attributes blood to ulcers on buttox. Pt states that his symptoms started yesterday. Pt had a colonoscopy last week but was unable to prep his colon out prior. Pt has suprapubic Shah but needs a new leg bag as his current one fell off.    Significant hx includes spinal cord injury causing paraplegia.    Brandie Joseph RN on 3/10/2022 at 12:09 PM
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
## 2022-03-10 NOTE — ED NOTES
ED Triage Provider Note  [facility]  Encounter Date: Mar 10, 2022    History:  Chief Complaint   Patient presents with     Abdominal Pain     Josiah Crump is a 56 year old male with a past medical history of hypertension, neurogenic bladder, spinal cord injury at T8, wheelchair-bound, right BKA anemia, decubitus ulcer, chronic pain, stroke, indwelling suprapubic catheter, who presents to the ED with concerns over continued diarrhea, blood in the stool, recent concern over UTI.    Patient reports 1 month of blood in his stool, as well as noticing blood on toilet paper, and recently reports being unable to complete colonoscopy due to incomplete cleanout following prep.Patient reports being frustrated, as he has expressed to his colorectal physicians that he would be unable to complete his colonoscopy prep at home due to being wheelchair-bound and difficulty with mobility. \    Patient also reports last few days of noticing increased cloudiness and sediment in his urine from his suprapubic catheter, he was recently on Cipro on February 23 for 7-day course, which he reports did improve his symptoms.  He does report continued diarrhea with bloody stool, generalized abdominal pain, and concern over dehydration due to the diarrhea.    Review of Systems:  Skin: positive for wounds on buttock  Ears/Nose/Throat: Negative for sore throat  Respiratory: No shortness of breath, dyspnea on exertion, or cough  Cardiovascular: negative for and chest pain  Gastrointestinal: positive for hematochezia and diarrhea  Genitourinary: positive for cloudy and sedimentary urine  Neurologic: negative for and headaches  Psychiatric: positive for agitation  Hematologic/Lymphatic/Immunologic: negative for, chills and fever      Exam:  /75   Temp 98.8  F (37.1  C) (Oral)   Resp 20   SpO2 98%   General: Wheelchair bound, able to use wheelchair independently    Physical  Exam  Constitutional:       Appearance: He is well-developed.   HENT:      Head: Normocephalic and atraumatic.   Eyes:      Extraocular Movements: Extraocular movements intact.   Cardiovascular:      Rate and Rhythm: Normal rate and regular rhythm.      Heart sounds: Normal heart sounds. No murmur heard.  Pulmonary:      Effort: Pulmonary effort is normal. No respiratory distress.      Breath sounds: Normal breath sounds.   Abdominal:      General: Bowel sounds are normal. There is no distension.      Palpations: Abdomen is soft.      Tenderness: There is generalized abdominal tenderness. There is right CVA tenderness and left CVA tenderness.   Skin:     General: Skin is warm and dry.   Neurological:      Mental Status: He is alert.           Medical Decision Making:  Patient arriving to the ED with problem as above. A medical screening exam was performed. Initial labs and UA orders initiated from Triage. Nursing staff did attempt IV placement twice without success, though some blood tubes were able to be drawn and sent to lab. Patient is upset he us unable to be roomed at this time. Did offer assistance to change out of soiled clothing which he denied.  Colorectal resident did come down and speak to me.  They have previously attempted to complete the patient's outpatient colonoscopy, they have also inquired with patient's insurance about an observation stay in the hospital to assist him with his colonoscopy.  Unfortunately his insurance not pay for that.  Was some thought from colorectal that patient could possibly be admitted to medicine with a GI consult for the colonoscopy.  Also broached the subject of CTA of the abdomen to look for source of bleeding, colorectal resident stated they would speak to their staff and notify notify me if they would like to scan completed .The patient is appropriate to wait in triage.      RUSLAN Kat CNP on 3/10/2022 at 12:29 PM        Darius Cuevas APRN CNP  03/10/22  1248       Darius Cuevas, APRN CNP  03/10/22 1919

## 2022-03-10 NOTE — TELEPHONE ENCOUNTER
[facility] Call Center    Phone Message    May a detailed message be left on voicemail: yes     Reason for Call: Symptoms or Concerns     If patient has red-flag symptoms, warm transfer to triage line    Current symptom or concern:  Severe abdominal pain -  Bleeding - Severe Diahrreah     Pt states he has been sitting in the waiting room for the [facility] ER for over an hour now.     Symptoms have been present for:  many day(s)    Has patient previously been seen for this? No    By : NA    Date: NA    Are there any new or worsening symptoms? Yes: New and worsening.       Action Taken: Message routed to:  [facility] CRS    Travel Screening: Not Applicable

## 2022-03-10 NOTE — TELEPHONE ENCOUNTER
Our inpatient team is aware that patient is in the ER and has been updated. No further action at this time.

## 2022-03-11 PROBLEM — E87.1 HYPOSMOLALITY SYNDROME: Status: ACTIVE | Noted: 2022-03-11

## 2022-03-11 PROBLEM — K92.2 GASTROINTESTINAL HEMORRHAGE, UNSPECIFIED GASTROINTESTINAL HEMORRHAGE TYPE: Status: ACTIVE | Noted: 2022-03-11

## 2022-03-11 PROBLEM — Z11.52 ENCOUNTER FOR SCREENING LABORATORY TESTING FOR SEVERE ACUTE RESPIRATORY SYNDROME CORONAVIRUS 2 (SARS-COV-2): Status: ACTIVE | Noted: 2022-03-11

## 2022-03-11 LAB
ANION GAP SERPL CALCULATED.3IONS-SCNC: 5 MMOL/L (ref 3–14)
BUN SERPL-MCNC: 14 MG/DL (ref 7–30)
CALCIUM SERPL-MCNC: 8.7 MG/DL (ref 8.5–10.1)
CHLORIDE BLD-SCNC: 108 MMOL/L (ref 94–109)
CO2 SERPL-SCNC: 26 MMOL/L (ref 20–32)
COLONOSCOPY: NORMAL
CREAT SERPL-MCNC: 0.86 MG/DL (ref 0.66–1.25)
ERYTHROCYTE [DISTWIDTH] IN BLOOD BY AUTOMATED COUNT: 19.1 % (ref 10–15)
GFR SERPL CREATININE-BSD FRML MDRD: >90 ML/MIN/1.73M2
GLUCOSE BLD-MCNC: 86 MG/DL (ref 70–99)
HCT VFR BLD AUTO: 37.4 % (ref 40–53)
HGB BLD-MCNC: 11.2 G/DL (ref 13.3–17.7)
MCH RBC QN AUTO: 22.1 PG (ref 26.5–33)
MCHC RBC AUTO-ENTMCNC: 29.9 G/DL (ref 31.5–36.5)
MCV RBC AUTO: 74 FL (ref 78–100)
PLATELET # BLD AUTO: 168 10E3/UL (ref 150–450)
POTASSIUM BLD-SCNC: 3.9 MMOL/L (ref 3.4–5.3)
RBC # BLD AUTO: 5.07 10E6/UL (ref 4.4–5.9)
SODIUM SERPL-SCNC: 139 MMOL/L (ref 133–144)
WBC # BLD AUTO: 5.2 10E3/UL (ref 4–11)

## 2022-03-11 PROCEDURE — 250N000011 HC RX IP 250 OP 636: Performed by: FAMILY MEDICINE

## 2022-03-11 PROCEDURE — 250N000013 HC RX MED GY IP 250 OP 250 PS 637: Performed by: STUDENT IN AN ORGANIZED HEALTH CARE EDUCATION/TRAINING PROGRAM

## 2022-03-11 PROCEDURE — 36415 COLL VENOUS BLD VENIPUNCTURE: CPT | Performed by: STUDENT IN AN ORGANIZED HEALTH CARE EDUCATION/TRAINING PROGRAM

## 2022-03-11 PROCEDURE — 99232 SBSQ HOSP IP/OBS MODERATE 35: CPT | Mod: GC | Performed by: FAMILY MEDICINE

## 2022-03-11 PROCEDURE — G0378 HOSPITAL OBSERVATION PER HR: HCPCS

## 2022-03-11 PROCEDURE — G0463 HOSPITAL OUTPT CLINIC VISIT: HCPCS

## 2022-03-11 PROCEDURE — 45380 COLONOSCOPY AND BIOPSY: CPT | Performed by: INTERNAL MEDICINE

## 2022-03-11 PROCEDURE — 80048 BASIC METABOLIC PNL TOTAL CA: CPT | Performed by: STUDENT IN AN ORGANIZED HEALTH CARE EDUCATION/TRAINING PROGRAM

## 2022-03-11 PROCEDURE — 99153 MOD SED SAME PHYS/QHP EA: CPT | Performed by: INTERNAL MEDICINE

## 2022-03-11 PROCEDURE — 88305 TISSUE EXAM BY PATHOLOGIST: CPT | Mod: TC | Performed by: INTERNAL MEDICINE

## 2022-03-11 PROCEDURE — 99222 1ST HOSP IP/OBS MODERATE 55: CPT | Performed by: PHYSICIAN ASSISTANT

## 2022-03-11 PROCEDURE — 85014 HEMATOCRIT: CPT | Performed by: STUDENT IN AN ORGANIZED HEALTH CARE EDUCATION/TRAINING PROGRAM

## 2022-03-11 PROCEDURE — 120N000002 HC R&B MED SURG/OB UMMC

## 2022-03-11 PROCEDURE — 250N000011 HC RX IP 250 OP 636: Performed by: INTERNAL MEDICINE

## 2022-03-11 PROCEDURE — 258N000003 HC RX IP 258 OP 636: Performed by: STUDENT IN AN ORGANIZED HEALTH CARE EDUCATION/TRAINING PROGRAM

## 2022-03-11 PROCEDURE — 250N000013 HC RX MED GY IP 250 OP 250 PS 637: Performed by: FAMILY MEDICINE

## 2022-03-11 PROCEDURE — 258N000003 HC RX IP 258 OP 636: Performed by: FAMILY MEDICINE

## 2022-03-11 PROCEDURE — G0500 MOD SEDAT ENDO SERVICE >5YRS: HCPCS | Performed by: INTERNAL MEDICINE

## 2022-03-11 RX ORDER — TOLTERODINE 4 MG/1
4 CAPSULE, EXTENDED RELEASE ORAL DAILY
Status: DISCONTINUED | OUTPATIENT
Start: 2022-03-11 | End: 2022-03-11

## 2022-03-11 RX ORDER — NALOXONE HYDROCHLORIDE 0.4 MG/ML
0.2 INJECTION, SOLUTION INTRAMUSCULAR; INTRAVENOUS; SUBCUTANEOUS
Status: DISCONTINUED | OUTPATIENT
Start: 2022-03-11 | End: 2022-03-12 | Stop reason: HOSPADM

## 2022-03-11 RX ORDER — SIMVASTATIN 20 MG
20 TABLET ORAL DAILY
Status: DISCONTINUED | OUTPATIENT
Start: 2022-03-11 | End: 2022-03-11

## 2022-03-11 RX ORDER — SIMETHICONE 80 MG
80 TABLET,CHEWABLE ORAL 4 TIMES DAILY
Status: DISCONTINUED | OUTPATIENT
Start: 2022-03-11 | End: 2022-03-12 | Stop reason: HOSPADM

## 2022-03-11 RX ORDER — NALOXONE HYDROCHLORIDE 0.4 MG/ML
0.4 INJECTION, SOLUTION INTRAMUSCULAR; INTRAVENOUS; SUBCUTANEOUS
Status: DISCONTINUED | OUTPATIENT
Start: 2022-03-11 | End: 2022-03-12 | Stop reason: HOSPADM

## 2022-03-11 RX ORDER — FENTANYL CITRATE 50 UG/ML
INJECTION, SOLUTION INTRAMUSCULAR; INTRAVENOUS PRN
Status: COMPLETED | OUTPATIENT
Start: 2022-03-11 | End: 2022-03-11

## 2022-03-11 RX ORDER — ONDANSETRON 4 MG/1
4 TABLET, ORALLY DISINTEGRATING ORAL EVERY 6 HOURS PRN
Status: CANCELLED | OUTPATIENT
Start: 2022-03-11

## 2022-03-11 RX ORDER — BACLOFEN 20 MG/1
20 TABLET ORAL 3 TIMES DAILY PRN
Status: DISCONTINUED | OUTPATIENT
Start: 2022-03-11 | End: 2022-03-11

## 2022-03-11 RX ORDER — LORAZEPAM 2 MG/ML
1 INJECTION INTRAMUSCULAR EVERY 4 HOURS PRN
Status: DISCONTINUED | OUTPATIENT
Start: 2022-03-11 | End: 2022-03-12 | Stop reason: HOSPADM

## 2022-03-11 RX ORDER — SODIUM CHLORIDE, SODIUM LACTATE, POTASSIUM CHLORIDE, CALCIUM CHLORIDE 600; 310; 30; 20 MG/100ML; MG/100ML; MG/100ML; MG/100ML
INJECTION, SOLUTION INTRAVENOUS CONTINUOUS
Status: DISCONTINUED | OUTPATIENT
Start: 2022-03-11 | End: 2022-03-11

## 2022-03-11 RX ORDER — AMLODIPINE BESYLATE 5 MG/1
5 TABLET ORAL DAILY
Status: DISCONTINUED | OUTPATIENT
Start: 2022-03-11 | End: 2022-03-11

## 2022-03-11 RX ORDER — FERROUS SULFATE 325(65) MG
325 TABLET ORAL DAILY
Status: DISCONTINUED | OUTPATIENT
Start: 2022-03-11 | End: 2022-03-12 | Stop reason: HOSPADM

## 2022-03-11 RX ORDER — HYDROMORPHONE HYDROCHLORIDE 1 MG/ML
0.5 INJECTION, SOLUTION INTRAMUSCULAR; INTRAVENOUS; SUBCUTANEOUS EVERY 4 HOURS PRN
Status: DISCONTINUED | OUTPATIENT
Start: 2022-03-11 | End: 2022-03-12

## 2022-03-11 RX ORDER — PIPERACILLIN SODIUM, TAZOBACTAM SODIUM 3; .375 G/15ML; G/15ML
3.38 INJECTION, POWDER, LYOPHILIZED, FOR SOLUTION INTRAVENOUS EVERY 6 HOURS
Status: DISCONTINUED | OUTPATIENT
Start: 2022-03-11 | End: 2022-03-12 | Stop reason: HOSPADM

## 2022-03-11 RX ORDER — SIMVASTATIN 20 MG
20 TABLET ORAL DAILY
Status: DISCONTINUED | OUTPATIENT
Start: 2022-03-11 | End: 2022-03-12 | Stop reason: HOSPADM

## 2022-03-11 RX ORDER — SODIUM CHLORIDE 9 MG/ML
INJECTION, SOLUTION INTRAVENOUS CONTINUOUS
Status: DISCONTINUED | OUTPATIENT
Start: 2022-03-11 | End: 2022-03-11

## 2022-03-11 RX ORDER — ONDANSETRON 2 MG/ML
4 INJECTION INTRAMUSCULAR; INTRAVENOUS EVERY 6 HOURS PRN
Status: CANCELLED | OUTPATIENT
Start: 2022-03-11

## 2022-03-11 RX ORDER — CLOPIDOGREL BISULFATE 75 MG/1
75 TABLET ORAL DAILY
Status: DISCONTINUED | OUTPATIENT
Start: 2022-03-11 | End: 2022-03-12 | Stop reason: HOSPADM

## 2022-03-11 RX ADMIN — MIDAZOLAM 1 MG: 1 INJECTION INTRAMUSCULAR; INTRAVENOUS at 15:38

## 2022-03-11 RX ADMIN — FENTANYL CITRATE 50 MCG: 50 INJECTION, SOLUTION INTRAMUSCULAR; INTRAVENOUS at 15:56

## 2022-03-11 RX ADMIN — SIMVASTATIN 20 MG: 20 TABLET, FILM COATED ORAL at 19:36

## 2022-03-11 RX ADMIN — MIDAZOLAM 2 MG: 1 INJECTION INTRAMUSCULAR; INTRAVENOUS at 15:35

## 2022-03-11 RX ADMIN — POLYETHYLENE GLYCOL 3350, SODIUM SULFATE ANHYDROUS, SODIUM BICARBONATE, SODIUM CHLORIDE, POTASSIUM CHLORIDE 2000 ML: 236; 22.74; 6.74; 5.86; 2.97 POWDER, FOR SOLUTION ORAL at 05:22

## 2022-03-11 RX ADMIN — CLOPIDOGREL BISULFATE 75 MG: 75 TABLET ORAL at 18:12

## 2022-03-11 RX ADMIN — GABAPENTIN 200 MG: 100 CAPSULE ORAL at 07:55

## 2022-03-11 RX ADMIN — POLYETHYLENE GLYCOL 3350, SODIUM SULFATE ANHYDROUS, SODIUM BICARBONATE, SODIUM CHLORIDE, POTASSIUM CHLORIDE 2000 ML: 236; 22.74; 6.74; 5.86; 2.97 POWDER, FOR SOLUTION ORAL at 12:10

## 2022-03-11 RX ADMIN — GABAPENTIN 200 MG: 100 CAPSULE ORAL at 19:35

## 2022-03-11 RX ADMIN — SODIUM CHLORIDE, POTASSIUM CHLORIDE, SODIUM LACTATE AND CALCIUM CHLORIDE: 600; 310; 30; 20 INJECTION, SOLUTION INTRAVENOUS at 12:11

## 2022-03-11 RX ADMIN — BACLOFEN 20 MG: 20 TABLET ORAL at 17:43

## 2022-03-11 RX ADMIN — VANCOMYCIN HYDROCHLORIDE 1000 MG: 10 INJECTION, POWDER, LYOPHILIZED, FOR SOLUTION INTRAVENOUS at 19:35

## 2022-03-11 RX ADMIN — FENTANYL CITRATE 50 MCG: 50 INJECTION, SOLUTION INTRAMUSCULAR; INTRAVENOUS at 15:46

## 2022-03-11 RX ADMIN — MIDAZOLAM 1 MG: 1 INJECTION INTRAMUSCULAR; INTRAVENOUS at 15:57

## 2022-03-11 RX ADMIN — SIMETHICONE 80 MG: 80 TABLET, CHEWABLE ORAL at 19:35

## 2022-03-11 RX ADMIN — OXYCODONE HYDROCHLORIDE 5 MG: 5 TABLET ORAL at 17:16

## 2022-03-11 RX ADMIN — TOLTERODINE 4 MG: 4 CAPSULE, EXTENDED RELEASE ORAL at 07:56

## 2022-03-11 RX ADMIN — SODIUM CHLORIDE, POTASSIUM CHLORIDE, SODIUM LACTATE AND CALCIUM CHLORIDE: 600; 310; 30; 20 INJECTION, SOLUTION INTRAVENOUS at 01:09

## 2022-03-11 RX ADMIN — FENTANYL CITRATE 100 MCG: 50 INJECTION, SOLUTION INTRAMUSCULAR; INTRAVENOUS at 15:36

## 2022-03-11 RX ADMIN — PIPERACILLIN AND TAZOBACTAM 3.38 G: 3; .375 INJECTION, POWDER, LYOPHILIZED, FOR SOLUTION INTRAVENOUS at 17:43

## 2022-03-11 RX ADMIN — OXYCODONE HYDROCHLORIDE 5 MG: 5 TABLET ORAL at 21:18

## 2022-03-11 RX ADMIN — SIMETHICONE 80 MG: 80 TABLET, CHEWABLE ORAL at 07:55

## 2022-03-11 RX ADMIN — SIMETHICONE 80 MG: 80 TABLET, CHEWABLE ORAL at 12:10

## 2022-03-11 RX ADMIN — OXYCODONE HYDROCHLORIDE 5 MG: 5 TABLET ORAL at 08:16

## 2022-03-11 RX ADMIN — FERROUS SULFATE TAB 325 MG (65 MG ELEMENTAL FE) 325 MG: 325 (65 FE) TAB at 18:12

## 2022-03-11 ASSESSMENT — ACTIVITIES OF DAILY LIVING (ADL)
ADLS_ACUITY_SCORE: 8
WEAR_GLASSES_OR_BLIND: NO
ADLS_ACUITY_SCORE: 8
ADLS_ACUITY_SCORE: 10
ADLS_ACUITY_SCORE: 8
TRANSFERRING: 0-->ASSISTANCE NEEDED (DEVELOPMETNALLY APPROPRIATE)
ADLS_ACUITY_SCORE: 8
ADLS_ACUITY_SCORE: 8
EQUIPMENT_CURRENTLY_USED_AT_HOME: WHEELCHAIR, MANUAL
ADLS_ACUITY_SCORE: 8
ADLS_ACUITY_SCORE: 8
TOILETING: 1-->ASSISTANCE (EQUIPMENT/PERSON) NEEDED
CHANGE_IN_FUNCTIONAL_STATUS_SINCE_ONSET_OF_CURRENT_ILLNESS/INJURY: NO
TOILETING_ISSUES: YES;OTHER (SEE COMMENTS)
ADLS_ACUITY_SCORE: 8
DRESSING/BATHING_DIFFICULTY: NO
ADLS_ACUITY_SCORE: 9
ADLS_ACUITY_SCORE: 8
DOING_ERRANDS_INDEPENDENTLY_DIFFICULTY: NO
ADLS_ACUITY_SCORE: 8
DIFFICULTY_EATING/SWALLOWING: NO
ADLS_ACUITY_SCORE: 8
WALKING_OR_CLIMBING_STAIRS_DIFFICULTY: YES
ADLS_ACUITY_SCORE: 8
WALKING_OR_CLIMBING_STAIRS: STAIR CLIMBING DIFFICULTY, REQUIRES EQUIPMENT
ADLS_ACUITY_SCORE: 8
CONCENTRATING,_REMEMBERING_OR_MAKING_DECISIONS_DIFFICULTY: NO
ADLS_ACUITY_SCORE: 9
TRANSFERRING: 1-->ASSISTANCE (EQUIPMENT/PERSON) NEEDED
ADLS_ACUITY_SCORE: 10
FALL_HISTORY_WITHIN_LAST_SIX_MONTHS: NO
TOILETING_ASSISTANCE: TOILETING DIFFICULTY, REQUIRES EQUIPMENT
ADLS_ACUITY_SCORE: 8
TOILETING: 1-->ASSISTANCE (EQUIPMENT/PERSON) NEEDED (NOT DEVELOPMENTALLY APPROPRIATE)

## 2022-03-11 NOTE — PLAN OF CARE
Goal Outcome Evaluation:        Pt report received from PACU/Endo at 1610. Pt VSS . A & O X 4. Supra pubic muhammad patent. Pain medication X 1. With relief. Makes needs known. New orders from team. Will continue to monitor.
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
## 2022-03-11 NOTE — OR NURSING
Procedure: Colonoscopy   Sedation: Conscious sedation (150 mcg fentanyl, 3 mg versed)  O2: 2-5 LPM NC during procedure  Tolerated: VS stable during and post procedure. Not c/o abdominal or chest pain.  Report: Given to   Mitzi Ta RN for endoscopy     Registered Nurse             Mame Tee, RN
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
## 2022-03-11 NOTE — PROGRESS NOTES
Shift: 9653-4475    VS: BP (!) 112/91   Pulse 77   Temp 98.4  F (36.9  C) (Oral)   Resp 20   Wt 81.6 kg (179 lb 14.3 oz)   SpO2 100%   BMI 21.90 kg/m      Pain: Denies  Neuro: A & O x 4   Cardio: WNL  Respiratory: Room air  Diet: NPO except meds, go lytely overnight and this am.   GI/: rectal tube and suprapubic catheter  LDA; Right forearm LR infusing   Skin:  Activity: bed rest/ para  Tests:  Labs:  Plan: colonoscopy

## 2022-03-11 NOTE — PHARMACY-VANCOMYCIN DOSING SERVICE
"Pharmacy Vancomycin Initial Note  Date of Service 2022  Patient's  1965  56 year old, male    Indication: Intra-abdominal infection    Current estimated CrCl = Estimated Creatinine Clearance: 112 mL/min (based on SCr of 0.86 mg/dL).    Creatinine for last 3 days  3/10/2022: 12:37 PM Creatinine 0.90 mg/dL  3/11/2022:  6:10 AM Creatinine 0.86 mg/dL    Recent Vancomycin Level(s) for last 3 days  No results found for requested labs within last 72 hours.      Vancomycin IV Administrations (past 72 hours)      No vancomycin orders with administrations in past 72 hours.                Nephrotoxins and other renal medications (From now, onward)    Start     Dose/Rate Route Frequency Ordered Stop    22 1800  piperacillin-tazobactam (ZOSYN) 3.375 g vial to attach to  mL bag        Note to Pharmacy: For [facility], [facility] and [facility]: For Zosyn-naive patients, use the \"Zosyn initial dose + extended infusion\" order panel.    3.375 g  over 30 Minutes Intravenous EVERY 6 HOURS 22 1656      22 1800  vancomycin 1000 mg in 0.9% NaCl 250 mL intermittent infusion 1,000 mg         1,000 mg  over 60 Minutes Intravenous EVERY 12 HOURS 22 1656            Contrast Orders - past 72 hours (72h ago, onward)            None          [employer] Prediction of Planned Initial Vancomycin Regimen  Loading dose: N/A  Regimen: 1000 mg IV every 12 hours.  Start time: 17:27 on 2022  Exposure target: AUC24 (range)400-600 mg/L.hr   AUC24,ss: 458 mg/L.hr  Probability of AUC24 > 400: 65 %  Ctrough,ss: 14.1 mg/L  Probability of Ctrough,ss > 20: 21 %  Probability of nephrotoxicity (Lodise SERVANDO ): 9 %          Plan:  1. Start vancomycin  1000 mg IV q12h.   2. Vancomycin monitoring method: AUC  3. Vancomycin therapeutic monitoring goal: 400-600 mg*h/L  4. Pharmacy will check vancomycin levels as appropriate in 1-3 Days.    5. Serum creatinine levels will be ordered daily for the first week of therapy and at least twice " weekly for subsequent weeks.      Chase Nicholson RPH

## 2022-03-11 NOTE — PROGRESS NOTES
Report given to PROMISE Canela on OBS unit. Pt currently in Endoscopy, will transfer to OBS when completed.

## 2022-03-12 VITALS
TEMPERATURE: 98.3 F | BODY MASS INDEX: 21.9 KG/M2 | OXYGEN SATURATION: 99 % | RESPIRATION RATE: 20 BRPM | WEIGHT: 179.9 LBS | DIASTOLIC BLOOD PRESSURE: 70 MMHG | HEART RATE: 65 BPM | SYSTOLIC BLOOD PRESSURE: 117 MMHG

## 2022-03-12 LAB
ANION GAP SERPL CALCULATED.3IONS-SCNC: 7 MMOL/L (ref 3–14)
BUN SERPL-MCNC: 15 MG/DL (ref 7–30)
CALCIUM SERPL-MCNC: 8.5 MG/DL (ref 8.5–10.1)
CHLORIDE BLD-SCNC: 107 MMOL/L (ref 94–109)
CO2 SERPL-SCNC: 26 MMOL/L (ref 20–32)
CREAT SERPL-MCNC: 0.95 MG/DL (ref 0.66–1.25)
ERYTHROCYTE [DISTWIDTH] IN BLOOD BY AUTOMATED COUNT: 18.7 % (ref 10–15)
GFR SERPL CREATININE-BSD FRML MDRD: >90 ML/MIN/1.73M2
GLUCOSE BLD-MCNC: 110 MG/DL (ref 70–99)
HCT VFR BLD AUTO: 36.9 % (ref 40–53)
HGB BLD-MCNC: 10.7 G/DL (ref 13.3–17.7)
MCH RBC QN AUTO: 21.7 PG (ref 26.5–33)
MCHC RBC AUTO-ENTMCNC: 29 G/DL (ref 31.5–36.5)
MCV RBC AUTO: 75 FL (ref 78–100)
PLATELET # BLD AUTO: 170 10E3/UL (ref 150–450)
POTASSIUM BLD-SCNC: 4 MMOL/L (ref 3.4–5.3)
RBC # BLD AUTO: 4.94 10E6/UL (ref 4.4–5.9)
SODIUM SERPL-SCNC: 140 MMOL/L (ref 133–144)
WBC # BLD AUTO: 4.5 10E3/UL (ref 4–11)

## 2022-03-12 PROCEDURE — 250N000011 HC RX IP 250 OP 636: Performed by: FAMILY MEDICINE

## 2022-03-12 PROCEDURE — 250N000013 HC RX MED GY IP 250 OP 250 PS 637: Performed by: STUDENT IN AN ORGANIZED HEALTH CARE EDUCATION/TRAINING PROGRAM

## 2022-03-12 PROCEDURE — 250N000013 HC RX MED GY IP 250 OP 250 PS 637: Performed by: FAMILY MEDICINE

## 2022-03-12 PROCEDURE — 999N000202 HC STATISTICAL VASC ACCESS NURSE TIME, 1-15 MINUTES

## 2022-03-12 PROCEDURE — 99239 HOSP IP/OBS DSCHRG MGMT >30: CPT | Mod: GC | Performed by: FAMILY MEDICINE

## 2022-03-12 PROCEDURE — 85027 COMPLETE CBC AUTOMATED: CPT | Performed by: STUDENT IN AN ORGANIZED HEALTH CARE EDUCATION/TRAINING PROGRAM

## 2022-03-12 PROCEDURE — 82310 ASSAY OF CALCIUM: CPT | Performed by: STUDENT IN AN ORGANIZED HEALTH CARE EDUCATION/TRAINING PROGRAM

## 2022-03-12 PROCEDURE — 36415 COLL VENOUS BLD VENIPUNCTURE: CPT | Performed by: STUDENT IN AN ORGANIZED HEALTH CARE EDUCATION/TRAINING PROGRAM

## 2022-03-12 RX ORDER — SULFAMETHOXAZOLE/TRIMETHOPRIM 800-160 MG
1 TABLET ORAL 2 TIMES DAILY
Qty: 14 TABLET | Refills: 0 | Status: CANCELLED | OUTPATIENT
Start: 2022-03-12 | End: 2022-03-19

## 2022-03-12 RX ORDER — CLINDAMYCIN HCL 150 MG
150 CAPSULE ORAL 4 TIMES DAILY
Qty: 28 CAPSULE | Refills: 0 | Status: CANCELLED | OUTPATIENT
Start: 2022-03-12 | End: 2022-03-19

## 2022-03-12 RX ORDER — OXYCODONE HYDROCHLORIDE 5 MG/1
5 TABLET ORAL EVERY 8 HOURS PRN
Qty: 12 TABLET | Refills: 0 | Status: SHIPPED | OUTPATIENT
Start: 2022-03-12 | End: 2022-03-15

## 2022-03-12 RX ORDER — SULFAMETHOXAZOLE/TRIMETHOPRIM 800-160 MG
1 TABLET ORAL 2 TIMES DAILY
Qty: 14 TABLET | Refills: 0 | Status: SHIPPED | OUTPATIENT
Start: 2022-03-12 | End: 2022-03-19

## 2022-03-12 RX ORDER — LANOLIN ALCOHOL/MO/W.PET/CERES
3 CREAM (GRAM) TOPICAL
Status: DISCONTINUED | OUTPATIENT
Start: 2022-03-12 | End: 2022-03-12 | Stop reason: HOSPADM

## 2022-03-12 RX ORDER — OXYCODONE HYDROCHLORIDE 5 MG/1
5 TABLET ORAL EVERY 4 HOURS PRN
Qty: 12 TABLET | Refills: 0 | Status: CANCELLED | OUTPATIENT
Start: 2022-03-12

## 2022-03-12 RX ORDER — SIMETHICONE 80 MG
80 TABLET,CHEWABLE ORAL 4 TIMES DAILY
Qty: 30 TABLET | Refills: 0 | Status: SHIPPED | OUTPATIENT
Start: 2022-03-12 | End: 2023-04-17

## 2022-03-12 RX ADMIN — PIPERACILLIN AND TAZOBACTAM 3.38 G: 3; .375 INJECTION, POWDER, LYOPHILIZED, FOR SOLUTION INTRAVENOUS at 00:28

## 2022-03-12 RX ADMIN — BACLOFEN 20 MG: 20 TABLET ORAL at 04:24

## 2022-03-12 RX ADMIN — GABAPENTIN 200 MG: 100 CAPSULE ORAL at 09:14

## 2022-03-12 RX ADMIN — CLOPIDOGREL BISULFATE 75 MG: 75 TABLET ORAL at 09:13

## 2022-03-12 RX ADMIN — SIMVASTATIN 20 MG: 20 TABLET, FILM COATED ORAL at 09:14

## 2022-03-12 RX ADMIN — OXYCODONE HYDROCHLORIDE 5 MG: 5 TABLET ORAL at 06:57

## 2022-03-12 RX ADMIN — AMLODIPINE BESYLATE 5 MG: 5 TABLET ORAL at 09:12

## 2022-03-12 RX ADMIN — PIPERACILLIN AND TAZOBACTAM 3.38 G: 3; .375 INJECTION, POWDER, LYOPHILIZED, FOR SOLUTION INTRAVENOUS at 06:29

## 2022-03-12 RX ADMIN — Medication 3 MG: at 00:53

## 2022-03-12 RX ADMIN — TOLTERODINE 4 MG: 4 CAPSULE, EXTENDED RELEASE ORAL at 09:14

## 2022-03-12 RX ADMIN — FERROUS SULFATE TAB 325 MG (65 MG ELEMENTAL FE) 325 MG: 325 (65 FE) TAB at 09:13

## 2022-03-12 RX ADMIN — OXYCODONE HYDROCHLORIDE 5 MG: 5 TABLET ORAL at 02:26

## 2022-03-12 ASSESSMENT — ACTIVITIES OF DAILY LIVING (ADL)
ADLS_ACUITY_SCORE: 9

## 2022-03-12 NOTE — PROVIDER NOTIFICATION
Alana team paged:Pt is requesting melatonin for sleep. He has 1 mg PRN ,but declines to try  because he thinks  this will not work. He is requesting 3mg.

## 2022-03-12 NOTE — PLAN OF CARE
Goal Outcome Evaluation:    Plan of Care Reviewed With: patient     Overall Patient Progress: no change       Pt taking oral Oxycodone with good relief.  See care plan note.

## 2022-03-12 NOTE — PLAN OF CARE
Pt Isolation for MRSA in wound. VSS RA 97% . Pt paraplegic. Pt repositioned self with minimal assist. Move well in bed. Colonoscopy this afternoon. Biopsies done. Pt has buttock and scrotal ulcers with WOC orders for treatment and cream per provider.  NO BM this shift.  Oxycodone for pain X 1. Suprapubic muhammad putting out good urine output. Good PO intake.  IV antibiotics. Possible home tomorrow. Makes needs known. Will continue to monitor.    .Problem: Plan of Care - These are the overarching goals to be used throughout the patient stay.    Goal: Absence of Hospital-Acquired Illness or Injury  Intervention: Identify and Manage Fall Risk  Recent Flowsheet Documentation  Taken 3/11/2022 1659 by Lisa Benavidez RN  Safety Promotion/Fall Prevention: activity supervised  Intervention: Prevent Skin Injury  Recent Flowsheet Documentation  Taken 3/11/2022 1659 by Lisa Benavidez, RN  Body Position: position changed independently  Intervention: Prevent and Manage VTE (Venous Thromboembolism) Risk  Recent Flowsheet Documentation  Taken 3/11/2022 1659 by Lisa Benavidez RN  VTE Prevention/Management: patient refused intervention  Activity Management: activity adjusted per tolerance     Problem: Plan of Care - These are the overarching goals to be used throughout the patient stay.    Goal: Optimal Comfort and Wellbeing  Intervention: Monitor Pain and Promote Comfort  Recent Flowsheet Documentation  Taken 3/11/2022 1659 by Lisa Benavidez RN  Pain Management Interventions:    medication (see MAR)    Provider notified (comment)   Goal Outcome Evaluation:

## 2022-03-12 NOTE — PLAN OF CARE
Goal Outcome Evaluation:    Plan of Care Reviewed With: patient      Outcome Evaluation: Pt goals met and d/c  SW gave Pt Voucher to get his vehicle from the Pt visitor parking Ramp.  Medication scripts sent to [facility]  Pt discharge, PIV removed

## 2022-03-12 NOTE — PROVIDER NOTIFICATION
[facility] Team Paged:Pt is requesting oxycodone before discharge as he has run out of home pain medication . Pt says he is in a lot of pain sitting long hours at work . he will see he pain doctor in 7 days.

## 2022-03-12 NOTE — PROGRESS NOTES
Pt has order to replace SP Cath. Pt refused to have sp cath changed , he states that he has an appointment with his urology on Tuesday at which time it will be changed.

## 2022-03-12 NOTE — DISCHARGE SUMMARY
[facility]  Discharge Summary - Medicine & Pediatrics       Date of Admission:  3/10/2022  Date of Discharge:  3/12/2022  Discharging Provider: Abi Kimble MD  Discharge Service: [facility]    Discharge Diagnoses      # Diarrhea  # Failed outpatient bowel prep  # Rectal bleeding  # Presacral abscess  # S/p colonoscopy  # Chronic pain  # UTI vs colonization  # Neurogenic bladder  # Chronic Suprapubic catheter  # Chronic sacral wound  # Hyponatremia    Follow-ups Needed After Discharge     Follow up with GI:  - follow up on pending colonoscopy biopsy results    Follow up with urology  - recurrent UTIs  - consider placing new suprapubic catheter    Follow up with pain clinic  - assess for additional pain management options for chronic pain    Follow up with PCP  - ensure patient is connecting with outpatient wound care  - discus plavix with patient. He was been taking very intermittently.    Unresulted Labs Ordered in the Past 30 Days of this Admission     Date and Time Order Name Status Description    3/11/2022  4:07 PM Surgical Pathology Exam In process     3/10/2022  3:54 PM Urine Culture Preliminary       These results will be followed up by GI, PCP    Discharge Disposition   Discharged to home  Condition at discharge: Stable    Hospital Course   Josiah Crump was admitted on 3/10/2022 for bowel prep and colonoscopy after failed outpatient bowel prep. The following problems were addressed during his hospitalization:    # Diarrhea  # Failed outpatient bowel prep  # Rectal bleeding  # Presacral abscess  #S/p colonoscopy  Patient recently admitted for rectal bleeding found to have presacral abscess that was not drainable per IR and colorectal, treated with vanc/zosyn and discharged bactrim/flagyl x 14 days. ED provider during that admission discussed with colorectal team who advised no repeat CT A/P at this time. At that time, it was  recommended he follow up with an outpatient colonoscopy. Unable to complete his bowel prep outpatient in February, and has been having diarrhea at home with continued blood in BM. Patient started on golytely while in the ED and had been incontinent of liquid stool. Given known sacral wounds and concern for worsening skin breakdown or causing an infection, patient received rectal tube. Underwent colonoscopy with no bleeding noted; several biopsies for microcytic colitis and rectal lesion obtained.  - GI to follow up on biopsy results from colonoscopy and give further recommendations     # Pain management  # Chronic pain  Patient previous had his oxycodone filled by this urologist; notes in chart note that urologist will no longer be refilling his pain medication as of 3/1/2022 and has referred him to see a pain specialist. On review of  last fill was 2/16/2022 of 24 tablets. Previous prescribers has been consistent with urologist filling and hospital/ED filling. Patient has been using oxycodone for pain management for several months, likely has develop some degree of physiological dependency. Patient reported that he had been taking his oxycodone TID, but ran out 4-5 days prior to admission.  He received his PTA pain medications.   - continue PTA gabapentin 200 mg BID  - continue PTA baclofen 20 TID PRN  - continue Simethicone 80 QID   - continueTylenol 1000 TID   - heat/ice prn  - Oxycodone 5 mg q6h PRN. 12 tabs   - follow up already scheduled with pain specialty clinic     # UTI vs colonization  # Neurogenic bladder  # Chronic Suprapubic catheter  Patient has a history of UTI with proteus and klebsiella (7/28/2021), klebsiella 6/8/2021, e coli 4/18/2021. He recently completed a 7 day ciprofloxacin course (2/23-3/1) with improvement to urine color. However, his urine has seen become dark and cloudy, making patient concerned for a UTI. Denies any fever or other systemic symptoms. UA with nitrites, moderate  leukocyte esterase, and 25 WBC. WBC wnl 7.2. No fever. Patient may have UTI vs suprapubic catheter colonization. Catheter last switched 2/15/2022. Urine culture positive for Enterobacter cloacae complex, Staphylococcus aureus. Patient started on vancomycin and zosyn Dr. Mo Hampton, who is not on primary team. Patient received 1 dose of vancomycin and 3 doses of zosyn. Enterobacter susceptible to bactrim. Staph aureus susceptibilities have not yet returned, so local antibiogram reviewed and susceptibilities to bactrim are MSSA 96% and MRSA 97%. Patient declined replacing suprapubic catheter.   - start bactrim 800-160 mg BID 7 days  - Monitor staph aureus for susceptibility to bactrim  - follow up already scheduled with urology for 3/15  - Consider replacing suprapubic catheter     # Chronic sacral wound  Rectal tube placed and removed discussed above. Patient is known to have chronic sacral wounds, given several days of bowel incontinence overlayed on known wounds, WOC consulted for assistance on proper wound care.     # Hyponatremia - resolved  Likely secondary to ongoing diarrhea/GI losses due to failed outpatient colonoscopy prep. Resolved during admission with IV and po fluids.    Chronic Stable:  # Microcytic Anemia, chronic  BL 10-11. Improved from baseline on admission of 13.1. On iron supplementation. 11/24/21 iron low at 33 and ferritin normal at 91. Likely iron deficiency anemia, though there may also be a component of anemia of chronic disease with skin wounds  -  continue PTA iron     # CVA, right lacunar thalamic (2020)  States he has been holding his plavix for the past 4-5 days for the colonoscopy. Reports that he typically takes very infrequently - about 1-4x a month. Clopidogrel restarted following colonoscopy.  - restart PTA clopidogrel     Consultations This Hospital Stay   GI LUMINAL ADULT IP CONSULT  WOUND OSTOMY CONTINENCE NURSE  IP CONSULT  PHARMACY TO DOSE VANCO  VASCULAR ACCESS CARE  ADULT IP CONSULT      Code Status   Full Code       The patient was discussed with Dr. Kimble.    MD Alana [facility] [address] [facility]  [address] [place] [id number]  Phone: [phone]  Fax: [phone]  ______________________________________________________________________    Physical Exam   Vital Signs: Temp: 98.3  F (36.8  C) Temp src: Oral BP: 117/70 Pulse: 65   Resp: 20 SpO2: 99 % O2 Device: None (Room air)    Weight: 179 lbs 14.33 oz  Physical Exam  Constitutional:       Appearance: Normal appearance.   HENT:      Head: Normocephalic.   Eyes:      Conjunctiva/sclera: Conjunctivae normal.   Cardiovascular:      Rate and Rhythm: Normal rate and regular rhythm.      Heart sounds: Normal heart sounds.   Pulmonary:      Effort: Pulmonary effort is normal.      Breath sounds: Normal breath sounds.   Abdominal:      General: There is distension.   Genitourinary:     Comments: Sacral pressure ulcer without evidence of infection  Musculoskeletal:         General: No swelling.      Comments: Right leg disarticulation amputation   Skin:     General: Skin is warm and dry.   Neurological:      Mental Status: He is alert.   Psychiatric:         Mood and Affect: Mood normal.         Behavior: Behavior normal.         Thought Content: Thought content normal.         Judgment: Judgment normal.           Primary Care Physician   Marvel Petit    Discharge Orders      Activity    Your activity upon discharge: activity as tolerated     Reason for your hospital stay    You came to the hospital for bowel prep and colonoscopy. You also had cloudy urine and bacteria grew in your urine. We started you on IV antibiotics.     Follow Up and recommended labs and tests    Follow up with your urologist - you already have an appointment made. Follow up at the pain clinic - you already have an appointment. GI will follow up with you on the results of the biopsy from the  colonoscopy.     Diet    Follow this diet upon discharge: Orders Placed This Encounter      Regular Diet Adult       Significant Results and Procedures   Results for orders placed or performed during the hospital encounter of 01/10/22   US Extremity Non Vascular Bilateral    Narrative    EXAMINATION: US EXTREMITY NON VASCULAR, 1/10/2022 7:44 PM     COMPARISON: Same-day CT.    HISTORY: ill-defined 3 x 2 x 2 cm abscess posterior to the  rectosigmoid junction, the 6 x 4 x 3 cm presacral abscess    FINDINGS:   Targeted ultrasound evaluation of the presacral region demonstrates  avascular heterogeneous hypoechoic inflammation in the right presacral  region measuring approximately 4.3 x 6.5 x 9.0 cm, there is a small  pocket of anechoic fluid within this region.     Additional targeted ultrasound evaluation of the soft tissues  overlying the right hip demonstrates significant soft tissue  thickening with an anechoic collection measuring approximately 2.5 x  0.7 cm and extending inferiorly and along the right posterior thigh.        Impression    IMPRESSION:  1.  Ultrasound evaluation of the right presacral region demonstrates  heterogeneous inflammatory changes with a small pocket of anechoic  fluid similar to that visualized on the abdominal CT. No significant  drainable fluid collection is identified by ultrasound.  2.  Ultrasound evaluation of the soft tissues overlying the right hip  demonstrates irregular soft tissue thickening with an anechoic fluid  collection measuring approximately 2.5 x 0.7 cm which extends  inferiorly along the posterior thigh.    I have personally reviewed the examination and initial interpretation  and I agree with the findings.    ALANNA RIVERA MD         SYSTEM ID:  [id number]   CT Abdomen Pelvis w Contrast    Narrative    EXAMINATION: CT ABDOMEN PELVIS W CONTRAST  1/10/2022 4:54 PM      CLINICAL HISTORY: Abdominal abscess/infection suspected    COMPARISON: Pelvis CT 1/4/2022, CTA of the  abdomen and pelvis 1/3/2022    PROCEDURE COMMENTS: CT of the abdomen was performed with  intravenous  contrast. Coronal and sagittal reformatted images were obtained.    FINDINGS:  Lower thorax:   Ground glass opacities within the left lower lobe and posterior left  upper lobe. 4 mm solid nodule within the right lower lobe (series 3,  image 43). This is present on exam 4/18/2021. Heart size is not  enlarged. No pericardial effusion.     Abdomen and pelvis:  A few calcified granulomas are present within the liver. No other  focal liver lesions. Calcifications of the gallbladder wall similar to  prior exams. Heterogenous material within the gallbladder. Pancreas is  within normal limits. Spleen is unremarkable. Adrenal glands are  within normal limits.    Focal areas of parenchymal thinning involving the right superior renal  cortex. Slight renal parenchymal thinning. Small hypoattenuating  subcentimeter lesions within both kidneys. No hydronephrosis or  hydroureter.    Suprapubic bladder catheter is in place. Bladder fundus decompressed  and has some foci of air within it. There is questionable bilateral  thickening of the bladder fundus although nonspecific as the bladder  is decompressed.    Prominent wall thickening thickening of the distal sigmoid colon and  rectum with surrounding fat stranding and scattered prominent lymph  nodes (such as a 8mm in short axis lymph node series 3, image 388).  Within the presacral soft tissues there is a fluid collection which  measures 4.5 x 2.4 cm which is decreased in size compared to exam  1/4/2022 where this measured approximately 5.4 x 2.6 cm. No  significant change in the fluid collection which measures 3.5 x 2.3 cm  located posterior to the rectosigmoid junction (series 3, image 398).    Prominently improved partially visualized edema of the perineum and  penis.    Osseous structures:   Chronic left hip dislocation. Prominent heterotopic ossification about  both hips,  right greater than left. Similar postoperative changes from  right lower extremity amputation.     Large decubitus ulcer with soft tissue thickening extending along the  gluteal fold to the rectum. Triangular-shaped fluid collection  associated with this decubitus ulcer is significantly decreased in  size currently measuring 4.3 x 2.7 cm, previously approximately 5 x 4  cm.      Impression    IMPRESSION:    1. Improved partially visualized soft tissue edema throughout the  penis and perineum.  2. The presacral abscess is slightly decreased in size when compared  to prior exam. The abscess located posterior to the rectosigmoid  junction is not significantly changed in size. Similar reactive  lymphadenopathy throughout the abdomen and pelvis.  3. Large decubitus ulcer located at the gluteal fold which extends to  the rectum.   4. Additional decubitus ulcer within the soft tissues on the right  extending to the right inferior pubic ramus. Abscess/fluid collection  associated with this ulcer is decreased in size in comparison  1/4/2022.  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  6. Increased ground glass opacities in the dependent lingula and the  lower lobe. Findings are nonspecific but could potentially represent  infection.  7. 4 mm pulmonary nodule within the right lower lobe, not  significantly changed from exam 4/18/2021.    I have personally reviewed the examination and initial interpretation  and I agree with the findings.    ALANNA RIVERA MD         SYSTEM ID:  [id number]       Discharge Medications   Current Discharge Medication List      START taking these medications    Details   simethicone (MYLICON) 80 MG chewable tablet Take 1 tablet (80 mg) by mouth 4 times daily  Qty: 30 tablet, Refills: 0    Associated Diagnoses: Other chronic pain      sulfamethoxazole-trimethoprim (BACTRIM DS) 800-160 MG tablet Take 1 tablet by mouth 2 times daily for 7 days  Qty: 14 tablet, Refills: 0    Associated Diagnoses:  Acute UTI         CONTINUE these medications which have NOT CHANGED    Details   acetaminophen (TYLENOL) 325 MG tablet Take 2 tablets (650 mg) by mouth every 6 hours as needed for pain  Qty: 100 tablet, Refills: 11    Associated Diagnoses: Breakdown (mechanical) of implanted penile prosthesis, initial encounter (H)      amLODIPine (NORVASC) 5 MG tablet TAKE 1 TABLET(5 MG) BY MOUTH DAILY  Qty: 90 tablet, Refills: 1    Associated Diagnoses: Benign essential hypertension; Encounter for medication refill      baclofen (LIORESAL) 20 MG tablet 1 PO TID PRN SPACTICITY  Qty: 90 tablet, Refills: 5    Associated Diagnoses: Paraplegia (H)      bisacodyl (DULCOLAX) 5 MG EC tablet Take 2 tablets by mouth at 10am the day before procedure.  Qty: 2 tablet, Refills: 0    Associated Diagnoses: Special screening for malignant neoplasms, colon      clopidogrel (PLAVIX) 75 MG tablet Take 1 tablet (75 mg) by mouth daily  Qty: 90 tablet, Refills: 1    Associated Diagnoses: Encounter for medication refill      FEROSUL 325 (65 Fe) MG tablet Take 325 mg by mouth daily      Fesoterodine Fumarate (TOVIAZ) 8 MG TB24 Take 8 mg by mouth daily     Associated Diagnoses: Neurogenic bladder      gabapentin (NEURONTIN) 100 MG capsule 2 capsules p.o. twice daily  Qty: 120 capsule, Refills: 5    Associated Diagnoses: Paraplegia (H)      magnesium citrate solution Drink entire bottle at 4pm two days prior to procedure.  Qty: 296 mL, Refills: 0    Associated Diagnoses: Special screening for malignant neoplasms, colon      NEW MED TB syringe with needle attached for administration of .5ml 20 min prior to sexual activity.   May be used 3 times per week.  Qty: 2.5 mL, Refills: 11    Comments: [facility] - Tri mix #3   Papaverine 24.6 mg/ml  Phentolamine 600 mcg  Alprostadil 45 mcg/ml  Associated Diagnoses: Male erectile disorder; Erectile dysfunction due to diseases classified elsewhere      simvastatin (ZOCOR) 20 MG tablet 1 po qd  Qty: 90 tablet, Refills: 1     Associated Diagnoses: History of CVA (cerebrovascular accident)      testosterone cypionate (DEPOTESTOSTERONE) 200 MG/ML injection Inject 0.5 mLs (100 mg) into the muscle every 14 days  Qty: 3 mL, Refills: 5    Associated Diagnoses: Hypogonadism male         STOP taking these medications       ciprofloxacin (CIPRO) 500 MG tablet Comments:   Reason for Stopping:         oxyCODONE (ROXICODONE) 5 MG tablet Comments:   Reason for Stopping:         polyethylene glycol (GOLYTELY) 236 g suspension Comments:   Reason for Stopping:             Allergies   No Known Allergies

## 2022-03-12 NOTE — PLAN OF CARE
Goal Outcome Evaluation:    Plan of Care Reviewed With: patient     Overall Patient Progress: no change     See Plan of care note.

## 2022-03-12 NOTE — PLAN OF CARE
SHIFT [id number]  Reason for admission: Bowel prep for colonoscopy   Vitals: Blood pressure 102/63, pulse 70, temperature 98.4  F (36.9  C), temperature source Oral, resp. rate 20, weight 81.6 kg (179 lb 14.3 oz), SpO2 100 %.  Activity: Bedfast  Pain: c/o pain PRN oxycodone 5 mg administered.  Neuro:  Alert and orientedx 4  Cardiac: regular rate  Respiratory: RA  GI/: SPC;output 1700cc  Diet: regular   Lines:   PIV right AC;no blood return but return but pt refused IV insertion

## 2022-03-13 LAB
BACTERIA UR CULT: ABNORMAL
BACTERIA UR CULT: ABNORMAL
PATH REPORT.COMMENTS IMP SPEC: NORMAL
PATH REPORT.COMMENTS IMP SPEC: NORMAL
PATH REPORT.FINAL DX SPEC: NORMAL
PATH REPORT.GROSS SPEC: NORMAL
PATH REPORT.MICROSCOPIC SPEC OTHER STN: NORMAL
PATH REPORT.RELEVANT HX SPEC: NORMAL
PHOTO IMAGE: NORMAL

## 2022-03-13 PROCEDURE — 88305 TISSUE EXAM BY PATHOLOGIST: CPT | Mod: 26 | Performed by: PATHOLOGY

## 2022-03-15 ENCOUNTER — OFFICE VISIT (OUTPATIENT)
Dept: UROLOGY | Facility: CLINIC | Age: 57
End: 2022-03-15
Payer: MEDICARE

## 2022-03-15 DIAGNOSIS — Z53.9 ERRONEOUS ENCOUNTER--DISREGARD: Primary | ICD-10-CM

## 2022-03-15 PROCEDURE — 99207 PR NO CHARGE NURSE ONLY: CPT

## 2022-03-15 NOTE — PROGRESS NOTES
Patient presented today to see Dr. Lira. I explained to him that Dr. Lira was not in clinic today and that he was due to get his catheter changed today. The patient declined getting a catheter change today and requested to be scheduled to see Dr. Lira as soon as possible for a follow up appointment.    Tremaine West EMT  03/15/22  3:09 PM

## 2022-03-21 ENCOUNTER — PRE VISIT (OUTPATIENT)
Dept: UROLOGY | Facility: CLINIC | Age: 57
End: 2022-03-21
Payer: MEDICARE

## 2022-03-21 NOTE — TELEPHONE ENCOUNTER
Reason for visit: Follow up     Relevant information: discuss care    Records/imaging/labs/orders: in EPIC    Pt called: no    At Rooming: normal

## 2022-03-23 ENCOUNTER — OFFICE VISIT (OUTPATIENT)
Dept: UROLOGY | Facility: CLINIC | Age: 57
End: 2022-03-23
Payer: MEDICARE

## 2022-03-23 VITALS — DIASTOLIC BLOOD PRESSURE: 79 MMHG | HEART RATE: 80 BPM | SYSTOLIC BLOOD PRESSURE: 143 MMHG

## 2022-03-23 DIAGNOSIS — N31.9 NEUROGENIC BLADDER: Primary | ICD-10-CM

## 2022-03-23 DIAGNOSIS — T83.61XS INFECTION AND INFLAMMATORY REACTION DUE TO IMPLANTED PENILE PROSTHESIS, SEQUELA: ICD-10-CM

## 2022-03-23 PROCEDURE — 51705 CHANGE OF BLADDER TUBE: CPT | Performed by: UROLOGY

## 2022-03-23 PROCEDURE — 99214 OFFICE O/P EST MOD 30 MIN: CPT | Mod: 25 | Performed by: UROLOGY

## 2022-03-23 RX ORDER — OXYCODONE HYDROCHLORIDE 5 MG/1
5 TABLET ORAL EVERY 6 HOURS PRN
Qty: 30 TABLET | Refills: 0 | Status: SHIPPED | OUTPATIENT
Start: 2022-03-23 | End: 2022-07-25

## 2022-03-23 RX ORDER — CIPROFLOXACIN 500 MG/1
500 TABLET, FILM COATED ORAL ONCE
Status: CANCELLED | OUTPATIENT
Start: 2022-03-23 | End: 2022-03-23

## 2022-03-23 ASSESSMENT — PAIN SCALES - GENERAL: PAINLEVEL: NO PAIN (0)

## 2022-03-23 NOTE — PROGRESS NOTES
Josiah Crump is a 56 year old male with neurogenic bladder, amputation, chronic wounds.  Manages bladder with SPT  Also had multiple attempts at IPP insertion. Most recently, it got infected and needed to be removed.  He is not a great candidate for repeat IPP insertion.  He has chronic pain and wants to go to pain clinic. I placed prior referral and has apt in 2 weeks.  His scrotum is edematous and also excoriated but no open wounds currently.  He has trimix which he used in the past and wants to try it again.    Vital signs reviewed    Exam:  Incision clean, dry, intact  No tenderness or evidence of cellulitis  No hematoma  Scrotum with edema with excoriated skin but no wound.  Spt draining clear    A/P   Neurogenic bladder  Chronic pelvic pain  Erectile dysfunction with recent infected IPP    SPT was exchanged today. Old 16 Fr removed and new 16 Fr catheter inserted with 10cc in balloon  Already has pain clinic appointment in 2 weeks.  I gave him an Rx for 2 oxycodone/day until his appointment. We discussed this should be last oxycodone Rx I give him since he's going to dedicated pain clinic and I am a surgeon.  I recommend he use trimix which he has prescribed and knows how to use.  No plans for future surgery for him  RTC for nursing urology visits for monthly SPT changes    Charles Lira MD

## 2022-03-23 NOTE — LETTER
3/23/2022       RE: Josiah Crump  [address]  [place] [address]     Dear Colleague,    Thank you for referring your patient, Josiah Crump, to the [facility] [place] [facility]. Please see a copy of my visit note below.    Josiah Crump is a 56 year old male with neurogenic bladder, amputation, chronic wounds.  Manages bladder with SPT  Also had multiple attempts at IPP insertion. Most recently, it got infected and needed to be removed.  He is not a great candidate for repeat IPP insertion.  He has chronic pain and wants to go to pain clinic. I placed prior referral and has apt in 2 weeks.  His scrotum is edematous and also excoriated but no open wounds currently.  He has trimix which he used in the past and wants to try it again.    Vital signs reviewed    Exam:  Incision clean, dry, intact  No tenderness or evidence of cellulitis  No hematoma  Scrotum with edema with excoriated skin but no wound.  Spt draining clear    A/P   Neurogenic bladder  Chronic pelvic pain  Erectile dysfunction with recent infected IPP    SPT was exchanged today. Old 16 Fr removed and new 16 Fr catheter inserted with 10cc in balloon  Already has pain clinic appointment in 2 weeks.  I gave him an Rx for 2 oxycodone/day until his appointment. We discussed this should be last oxycodone Rx I give him since he's going to dedicated pain clinic and I am a surgeon.  I recommend he use trimix which he has prescribed and knows how to use.  No plans for future surgery for him  RTC for nursing urology visits for monthly SPT changes    Charles Lira MD

## 2022-04-11 ENCOUNTER — MEDICAL CORRESPONDENCE (OUTPATIENT)
Dept: HEALTH INFORMATION MANAGEMENT | Facility: CLINIC | Age: 57
End: 2022-04-11
Payer: MEDICARE

## 2022-04-15 ENCOUNTER — TRANSFERRED RECORDS (OUTPATIENT)
Dept: HEALTH INFORMATION MANAGEMENT | Facility: CLINIC | Age: 57
End: 2022-04-15
Payer: MEDICARE

## 2022-04-16 ENCOUNTER — HEALTH MAINTENANCE LETTER (OUTPATIENT)
Age: 57
End: 2022-04-16

## 2022-05-02 ENCOUNTER — TELEPHONE (OUTPATIENT)
Dept: ENDOCRINOLOGY | Facility: CLINIC | Age: 57
End: 2022-05-02
Payer: MEDICARE

## 2022-05-03 NOTE — TELEPHONE ENCOUNTER
PA Initiation    Medication: Testosterone cypionate (DEPOTESTOSTERONE) 200 MG/ML injection  Insurance Company: [employer] Part D - Phone [phone] Fax [phone]  Pharmacy Filling the Rx: [id number] [address]  Filling Pharmacy Phone: [phone]  Filling Pharmacy Fax: [phone]  Start Date: 5/3/2022

## 2022-05-04 NOTE — TELEPHONE ENCOUNTER
Prior Authorization Approval    Authorization Effective Date: 1/1/2022  Authorization Expiration Date: 5/3/2023  Medication: Testosterone cypionate (DEPOTESTOSTERONE) 200 MG/ML injection--APPROVED  Approved Dose/Quantity:   Reference #:     Insurance Company: [employer] Part D - Phone [phone] Fax [phone]  Expected CoPay:       CoPay Card Available:      Foundation Assistance Needed:    Which Pharmacy is filling the prescription (Not needed for infusion/clinic administered): [facility] #[id number] [address]  Pharmacy Notified: Yes  Patient Notified: Yes **Instructed pharmacy to notify patient when script is ready to /ship.**

## 2022-05-05 ENCOUNTER — HOSPITAL ENCOUNTER (OUTPATIENT)
Facility: CLINIC | Age: 57
Setting detail: OBSERVATION
Discharge: HOME | End: 2022-05-07
Attending: EMERGENCY MEDICINE | Admitting: PHYSICIAN ASSISTANT
Payer: MEDICARE

## 2022-05-05 DIAGNOSIS — Z76.89 ENCOUNTER FOR SOCIAL WORK INTERVENTION: ICD-10-CM

## 2022-05-05 DIAGNOSIS — K92.2 GASTROINTESTINAL HEMORRHAGE, UNSPECIFIED GASTROINTESTINAL HEMORRHAGE TYPE: ICD-10-CM

## 2022-05-05 DIAGNOSIS — G89.29 OTHER CHRONIC PAIN: ICD-10-CM

## 2022-05-05 DIAGNOSIS — Z20.822 COVID-19 RULED OUT BY LABORATORY TESTING: ICD-10-CM

## 2022-05-05 DIAGNOSIS — K62.5 RECTAL BLEEDING: ICD-10-CM

## 2022-05-05 DIAGNOSIS — G89.4 CHRONIC PAIN SYNDROME: ICD-10-CM

## 2022-05-05 DIAGNOSIS — M89.8X9 HETEROTOPIC OSSIFICATION OF BONE: ICD-10-CM

## 2022-05-05 DIAGNOSIS — T83.61XA: Primary | ICD-10-CM

## 2022-05-05 LAB
ABO/RH(D): NORMAL
ANTIBODY SCREEN: NEGATIVE
BASOPHILS # BLD AUTO: 0.1 10E3/UL (ref 0–0.2)
BASOPHILS NFR BLD AUTO: 1 %
EOSINOPHIL # BLD AUTO: 0.6 10E3/UL (ref 0–0.7)
EOSINOPHIL NFR BLD AUTO: 6 %
ERYTHROCYTE [DISTWIDTH] IN BLOOD BY AUTOMATED COUNT: 18.6 % (ref 10–15)
HCT VFR BLD AUTO: 42.3 % (ref 40–53)
HGB BLD-MCNC: 12.3 G/DL (ref 13.3–17.7)
IMM GRANULOCYTES # BLD: 0 10E3/UL
IMM GRANULOCYTES NFR BLD: 1 %
LYMPHOCYTES # BLD AUTO: 2 10E3/UL (ref 0.8–5.3)
LYMPHOCYTES NFR BLD AUTO: 22 %
MCH RBC QN AUTO: 21.8 PG (ref 26.5–33)
MCHC RBC AUTO-ENTMCNC: 29.1 G/DL (ref 31.5–36.5)
MCV RBC AUTO: 75 FL (ref 78–100)
MONOCYTES # BLD AUTO: 0.9 10E3/UL (ref 0–1.3)
MONOCYTES NFR BLD AUTO: 10 %
NEUTROPHILS # BLD AUTO: 5.3 10E3/UL (ref 1.6–8.3)
NEUTROPHILS NFR BLD AUTO: 60 %
NRBC # BLD AUTO: 0 10E3/UL
NRBC BLD AUTO-RTO: 0 /100
PLATELET # BLD AUTO: 297 10E3/UL (ref 150–450)
RBC # BLD AUTO: 5.63 10E6/UL (ref 4.4–5.9)
SPECIMEN EXPIRATION DATE: NORMAL
WBC # BLD AUTO: 8.8 10E3/UL (ref 4–11)

## 2022-05-05 PROCEDURE — 86901 BLOOD TYPING SEROLOGIC RH(D): CPT | Performed by: EMERGENCY MEDICINE

## 2022-05-05 PROCEDURE — 99285 EMERGENCY DEPT VISIT HI MDM: CPT | Mod: 25 | Performed by: EMERGENCY MEDICINE

## 2022-05-05 PROCEDURE — 85610 PROTHROMBIN TIME: CPT | Performed by: EMERGENCY MEDICINE

## 2022-05-05 PROCEDURE — 85048 AUTOMATED LEUKOCYTE COUNT: CPT | Performed by: EMERGENCY MEDICINE

## 2022-05-05 PROCEDURE — 36415 COLL VENOUS BLD VENIPUNCTURE: CPT | Performed by: EMERGENCY MEDICINE

## 2022-05-05 PROCEDURE — 86850 RBC ANTIBODY SCREEN: CPT | Performed by: EMERGENCY MEDICINE

## 2022-05-05 PROCEDURE — 80053 COMPREHEN METABOLIC PANEL: CPT | Performed by: EMERGENCY MEDICINE

## 2022-05-05 PROCEDURE — 99285 EMERGENCY DEPT VISIT HI MDM: CPT | Performed by: EMERGENCY MEDICINE

## 2022-05-05 NOTE — TELEPHONE ENCOUNTER
On July 3, 2020 Pt had  Second stage urethroplasty, bladder botox injection, insertion of suprapubic tube, cystoscopy    Pt reporting, since he had the procedure having a significant amount of urine leaking out of the penis area even though Pt has a super pubic catheter.      Pt is not able to control the urine that is coming out of the penis area.         Please call the Pt back for further assistance.  Pt a little upset that it was happening.      Please call Pt @ [phone] for further assistance.     Thank you     Eleni Gardner RN  Central Triage Red Flags/Med Refills      Additional Information    Negative: Shock suspected (e.g., cold/pale/clammy skin, too weak to stand, low BP, rapid pulse)    Negative: Sounds like a life-threatening emergency to the triager    Negative: Catheter was accidentally pulled-out and bright red continuous bleeding    Negative: SEVERE abdominal pain    Negative: Fever > 100.5 F (38.1 C)    Negative: Drinking very little and dehydration suspected (e.g., no urine > 12 hours, very dry mouth, very lightheaded)    Negative: Patient sounds very sick or weak to the triager    Negative: Catheter was accidentally pulled-out    Negative: Bleeding around catheter (e.g., from penis or female urethra)    Negative: Lower abdominal pain or distention    Negative: Tea-colored or slightly red urine lasts > 24 hours that is not cleared by increasing fluid intake, and no recent prostate or bladder surgery    Negative: Cloudy urine lasts > 24 hours and not cleared by increasing fluid intake    Negative: Bloody or red-colored urine and no recent prostate or bladder surgery (Exception: brief episode and urine now clear)    Negative: Bloody or red-colored urine and prostate or bladder surgery > 3 days (72 hours) ago    Negative: No urine in bag for > 4 hours and catheter is not kinked    Negative: Urine smells bad    Leakage of urine around catheter    Protocols used: URINARY CATHETER SYMPTOMS AND  This is a historical note converted from [employer]. Formatting and pictures may have been removed.  Please reference [employer] for original formatting and attached multimedia. Chief Complaint  Abd Myomectomy  History of Present Illness  25 y/o 7 weeks s/p abdominal myomectomy with removal of numerous fibroids, patient with leiomyomatosis.?Today, patients only complaint is of continued vaginal discharge.?Pain has resolved and she?is ambulating, voiding, having bowel movement, tolerating a PO diet. No longer using pain meds. She was treated for UTI after her last visit and Urine culture at that time grew out E. coli. Today, she states that the vaginal dischareg she has has a foul odor but denies vaginal itching or vaginal bleeding.  ?  ?  Review of Systems  Review of Systems  Constitutional: No fever, No chills.  Respiratory: No shortness of breath.  Cardiovascular: No chest pain, No syncope.  Gastrointestinal: No nausea, No vomiting, No diarrhea, No constipation.  Genitourinary: No dysuria, No hematuria, No abnormal? or bleeding.  Neurologic: Alert and oriented X4.  Physical Exam  General: Alert and oriented, No acute distress.  Gastrointestinal: Soft, Non-tender. Incision c/d/i  Gynecology:  Labia: Bilateral, Majora, Minora, Within normal limits.  Vagina: Within normal limits. clear discharge present. Discharge appears physiologic.  Cervix: No cervical motion tenderness, No lesions.  Uterus: Mobile,?mildly?tender.  Ovaries: Not tender, No mass.  No trigonal tenderness.  Integumentary: Warm, Dry.  Neurologic: Alert, Oriented.  Psychiatric: Cooperative, Appropriate mood & affect.  Assessment/Plan  4.?Post-operative state  ? - doing well. Ok to return to return to work and normal activities. RTC again in 1 year.  ?  5.?Vaginal discharge  ? - Will empirically treat with Flagyl and send a wet prep, ua and repeat culture. Will call her with any abnormalities.  ?   Problem List/Past Medical History  Ongoing  Chronic female  [id number]       pelvic pain  Fibroids  Obesity  Historical  Endometriosis  Obesity  Tobacco user  Procedure/Surgical History  Excision of Uterus, Open Approach (08/24/2017)  Myomectomy (None) (08/24/2017)  Myomectomy, excision of fibroid tumor(s) of uterus, 5 or more intramural myomas and/or intramural myomas with total weight greater than 250 g, abdominal approach (08/24/2017)  Tonsillectomy (1999)  Medications  Inpatient  Ibuprofen 400 mg tablet, 800 mg= 2 tab(s), Oral, Once-NOW  Vasopressin in Saline 20 Units/30mL  Home  Flagyl 500 mg oral tablet, 500 mg= 1 tab(s), Oral, BID  Sprintec 0.25 mg-35 mcg oral tablet, 1 tab(s), Oral, Daily, 11 refills  Allergies  No Known Allergies  Social History  Alcohol - 09/22/2017  Current, Wine, 1-2 times per year  Employment/School - 09/22/2017  Employed  Home/Environment - 09/22/2017  Lives with Children, Significant other.  Nutrition/Health - 09/22/2017  Regular  Sexual - 09/22/2017  Sexually active: Yes.  Substance Abuse - 09/22/2017  Never  Tobacco - 09/22/2017  Current every day smoker, Cigarettes, 10 per day.  Family History  Diabetes mellitus type 2: Father.  Hypertension.: Father.      Reviewed the patients medical history, residents findings on physical exam, and the diagnosis with treatment plan. Care provided was reasonable and necessary.

## 2022-05-06 ENCOUNTER — PRE VISIT (OUTPATIENT)
Dept: SURGERY | Facility: CLINIC | Age: 57
End: 2022-05-06

## 2022-05-06 PROBLEM — Z76.89 ENCOUNTER FOR SOCIAL WORK INTERVENTION: Status: ACTIVE | Noted: 2022-05-06

## 2022-05-06 LAB
ALBUMIN SERPL-MCNC: 2.9 G/DL (ref 3.4–5)
ALP SERPL-CCNC: 97 U/L (ref 40–150)
ALT SERPL W P-5'-P-CCNC: 24 U/L (ref 0–70)
ANION GAP SERPL CALCULATED.3IONS-SCNC: 8 MMOL/L (ref 3–14)
AST SERPL W P-5'-P-CCNC: 22 U/L (ref 0–45)
BILIRUB SERPL-MCNC: 0.2 MG/DL (ref 0.2–1.3)
BUN SERPL-MCNC: 18 MG/DL (ref 7–30)
CALCIUM SERPL-MCNC: 8.5 MG/DL (ref 8.5–10.1)
CHLORIDE BLD-SCNC: 111 MMOL/L (ref 94–109)
CO2 SERPL-SCNC: 23 MMOL/L (ref 20–32)
CREAT SERPL-MCNC: 0.86 MG/DL (ref 0.66–1.25)
ERYTHROCYTE [DISTWIDTH] IN BLOOD BY AUTOMATED COUNT: 19.4 % (ref 10–15)
GFR SERPL CREATININE-BSD FRML MDRD: >90 ML/MIN/1.73M2
GLUCOSE BLD-MCNC: 94 MG/DL (ref 70–99)
HCT VFR BLD AUTO: 41.3 % (ref 40–53)
HGB BLD-MCNC: 11.8 G/DL (ref 13.3–17.7)
HGB BLD-MCNC: 11.9 G/DL (ref 13.3–17.7)
HGB BLD-MCNC: 12.1 G/DL (ref 13.3–17.7)
HOLD SPECIMEN: NORMAL
INR PPP: 1.08 (ref 0.85–1.15)
MCH RBC QN AUTO: 21.8 PG (ref 26.5–33)
MCHC RBC AUTO-ENTMCNC: 28.8 G/DL (ref 31.5–36.5)
MCV RBC AUTO: 76 FL (ref 78–100)
PLATELET # BLD AUTO: 276 10E3/UL (ref 150–450)
POTASSIUM BLD-SCNC: 3.8 MMOL/L (ref 3.4–5.3)
PROT SERPL-MCNC: 7.9 G/DL (ref 6.8–8.8)
RBC # BLD AUTO: 5.46 10E6/UL (ref 4.4–5.9)
SARS-COV-2 RNA RESP QL NAA+PROBE: NEGATIVE
SODIUM SERPL-SCNC: 142 MMOL/L (ref 133–144)
WBC # BLD AUTO: 7 10E3/UL (ref 4–11)

## 2022-05-06 PROCEDURE — 85027 COMPLETE CBC AUTOMATED: CPT | Performed by: PHYSICIAN ASSISTANT

## 2022-05-06 PROCEDURE — 99213 OFFICE O/P EST LOW 20 MIN: CPT | Performed by: PHYSICIAN ASSISTANT

## 2022-05-06 PROCEDURE — 85018 HEMOGLOBIN: CPT

## 2022-05-06 PROCEDURE — 36415 COLL VENOUS BLD VENIPUNCTURE: CPT | Performed by: PHYSICIAN ASSISTANT

## 2022-05-06 PROCEDURE — G0378 HOSPITAL OBSERVATION PER HR: HCPCS

## 2022-05-06 PROCEDURE — 250N000013 HC RX MED GY IP 250 OP 250 PS 637: Performed by: NURSE PRACTITIONER

## 2022-05-06 PROCEDURE — C9803 HOPD COVID-19 SPEC COLLECT: HCPCS | Performed by: EMERGENCY MEDICINE

## 2022-05-06 PROCEDURE — 250N000013 HC RX MED GY IP 250 OP 250 PS 637: Performed by: PHYSICIAN ASSISTANT

## 2022-05-06 PROCEDURE — 250N000013 HC RX MED GY IP 250 OP 250 PS 637

## 2022-05-06 PROCEDURE — U0003 INFECTIOUS AGENT DETECTION BY NUCLEIC ACID (DNA OR RNA); SEVERE ACUTE RESPIRATORY SYNDROME CORONAVIRUS 2 (SARS-COV-2) (CORONAVIRUS DISEASE [COVID-19]), AMPLIFIED PROBE TECHNIQUE, MAKING USE OF HIGH THROUGHPUT TECHNOLOGIES AS DESCRIBED BY CMS-2020-01-R: HCPCS | Performed by: EMERGENCY MEDICINE

## 2022-05-06 PROCEDURE — 99219 PR INITIAL OBSERVATION CARE,LEVEL II: CPT | Performed by: PHYSICIAN ASSISTANT

## 2022-05-06 PROCEDURE — 36415 COLL VENOUS BLD VENIPUNCTURE: CPT

## 2022-05-06 RX ORDER — AMLODIPINE BESYLATE 5 MG/1
5 TABLET ORAL DAILY
Status: DISCONTINUED | OUTPATIENT
Start: 2022-05-06 | End: 2022-05-08 | Stop reason: HOSPADM

## 2022-05-06 RX ORDER — GABAPENTIN 100 MG/1
200 CAPSULE ORAL 2 TIMES DAILY
Status: DISCONTINUED | OUTPATIENT
Start: 2022-05-06 | End: 2022-05-08 | Stop reason: HOSPADM

## 2022-05-06 RX ORDER — FERROUS SULFATE 325(65) MG
325 TABLET ORAL DAILY
Status: CANCELLED | OUTPATIENT
Start: 2022-05-06

## 2022-05-06 RX ORDER — NALOXONE HYDROCHLORIDE 0.4 MG/ML
0.4 INJECTION, SOLUTION INTRAMUSCULAR; INTRAVENOUS; SUBCUTANEOUS
Status: DISCONTINUED | OUTPATIENT
Start: 2022-05-06 | End: 2022-05-08 | Stop reason: HOSPADM

## 2022-05-06 RX ORDER — SIMETHICONE 80 MG
80 TABLET,CHEWABLE ORAL 4 TIMES DAILY
Status: DISCONTINUED | OUTPATIENT
Start: 2022-05-06 | End: 2022-05-08 | Stop reason: HOSPADM

## 2022-05-06 RX ORDER — SIMVASTATIN 10 MG
20 TABLET ORAL AT BEDTIME
Status: DISCONTINUED | OUTPATIENT
Start: 2022-05-06 | End: 2022-05-08 | Stop reason: HOSPADM

## 2022-05-06 RX ORDER — BACLOFEN 20 MG/1
20 TABLET ORAL 3 TIMES DAILY
Status: DISCONTINUED | OUTPATIENT
Start: 2022-05-06 | End: 2022-05-08 | Stop reason: HOSPADM

## 2022-05-06 RX ORDER — PANTOPRAZOLE SODIUM 40 MG/1
40 TABLET, DELAYED RELEASE ORAL
Status: DISCONTINUED | OUTPATIENT
Start: 2022-05-06 | End: 2022-05-08 | Stop reason: HOSPADM

## 2022-05-06 RX ORDER — ONDANSETRON 4 MG/1
4 TABLET, ORALLY DISINTEGRATING ORAL EVERY 6 HOURS PRN
Status: DISCONTINUED | OUTPATIENT
Start: 2022-05-06 | End: 2022-05-08 | Stop reason: HOSPADM

## 2022-05-06 RX ORDER — ACETAMINOPHEN 325 MG/1
650 TABLET ORAL EVERY 6 HOURS PRN
Status: DISCONTINUED | OUTPATIENT
Start: 2022-05-06 | End: 2022-05-08 | Stop reason: HOSPADM

## 2022-05-06 RX ORDER — OXYCODONE HYDROCHLORIDE 5 MG/1
5 TABLET ORAL EVERY 6 HOURS PRN
Status: DISCONTINUED | OUTPATIENT
Start: 2022-05-06 | End: 2022-05-08 | Stop reason: HOSPADM

## 2022-05-06 RX ORDER — ONDANSETRON 2 MG/ML
4 INJECTION INTRAMUSCULAR; INTRAVENOUS EVERY 6 HOURS PRN
Status: DISCONTINUED | OUTPATIENT
Start: 2022-05-06 | End: 2022-05-08 | Stop reason: HOSPADM

## 2022-05-06 RX ORDER — NALOXONE HYDROCHLORIDE 0.4 MG/ML
0.2 INJECTION, SOLUTION INTRAMUSCULAR; INTRAVENOUS; SUBCUTANEOUS
Status: DISCONTINUED | OUTPATIENT
Start: 2022-05-06 | End: 2022-05-08 | Stop reason: HOSPADM

## 2022-05-06 RX ORDER — CLOPIDOGREL BISULFATE 75 MG/1
75 TABLET ORAL DAILY
Status: CANCELLED | OUTPATIENT
Start: 2022-05-06

## 2022-05-06 RX ADMIN — PANTOPRAZOLE SODIUM 40 MG: 40 TABLET, DELAYED RELEASE ORAL at 07:16

## 2022-05-06 RX ADMIN — SIMETHICONE 80 MG: 80 TABLET, CHEWABLE ORAL at 12:12

## 2022-05-06 RX ADMIN — SIMETHICONE 80 MG: 80 TABLET, CHEWABLE ORAL at 08:01

## 2022-05-06 RX ADMIN — GABAPENTIN 200 MG: 100 CAPSULE ORAL at 19:46

## 2022-05-06 RX ADMIN — AMLODIPINE BESYLATE 5 MG: 5 TABLET ORAL at 08:01

## 2022-05-06 RX ADMIN — Medication 1 CAPSULE: at 12:13

## 2022-05-06 RX ADMIN — ACETAMINOPHEN 650 MG: 325 TABLET ORAL at 17:45

## 2022-05-06 RX ADMIN — BACLOFEN 20 MG: 20 TABLET ORAL at 08:01

## 2022-05-06 RX ADMIN — BACLOFEN 20 MG: 20 TABLET ORAL at 19:47

## 2022-05-06 RX ADMIN — OXYCODONE HYDROCHLORIDE 5 MG: 5 TABLET ORAL at 12:12

## 2022-05-06 RX ADMIN — BACLOFEN 20 MG: 20 TABLET ORAL at 15:21

## 2022-05-06 RX ADMIN — SIMETHICONE 80 MG: 80 TABLET, CHEWABLE ORAL at 16:24

## 2022-05-06 RX ADMIN — OXYCODONE HYDROCHLORIDE 5 MG: 5 TABLET ORAL at 17:45

## 2022-05-06 RX ADMIN — GABAPENTIN 200 MG: 100 CAPSULE ORAL at 08:01

## 2022-05-06 RX ADMIN — OXYCODONE HYDROCHLORIDE 5 MG: 5 TABLET ORAL at 06:04

## 2022-05-06 RX ADMIN — SIMETHICONE 80 MG: 80 TABLET, CHEWABLE ORAL at 19:47

## 2022-05-06 RX ADMIN — SIMVASTATIN 20 MG: 10 TABLET, FILM COATED ORAL at 21:48

## 2022-05-06 RX ADMIN — Medication: at 21:49

## 2022-05-06 ASSESSMENT — ENCOUNTER SYMPTOMS
BLOOD IN STOOL: 1
COLOR CHANGE: 0
VOMITING: 0
FEVER: 0
FATIGUE: 0
PALPITATIONS: 0
ABDOMINAL PAIN: 1
ARTHRALGIAS: 0
DIFFICULTY URINATING: 0
CHILLS: 0
COUGH: 0
SHORTNESS OF BREATH: 0
NECK PAIN: 0
DYSURIA: 0
MYALGIAS: 0
NAUSEA: 0
CHEST TIGHTNESS: 0
CONFUSION: 0
EYE PAIN: 0
BACK PAIN: 0
FREQUENCY: 0
RECTAL PAIN: 1
CONSTIPATION: 0
DIARRHEA: 1
WEAKNESS: 0
DIZZINESS: 0
SORE THROAT: 0
ABDOMINAL DISTENTION: 0
HEADACHES: 0

## 2022-05-06 ASSESSMENT — ACTIVITIES OF DAILY LIVING (ADL): DEPENDENT_IADLS:: CLEANING;COOKING;LAUNDRY;SHOPPING;MEAL PREPARATION;TRANSPORTATION

## 2022-05-06 NOTE — PLAN OF CARE
Observation goals:       -diagnostic tests and consults completed and resulted:  Met   -vital signs normal or at patient baseline: Met      /85 (BP Location: Right arm)   Pulse 81   Temp 98.2  F (36.8  C) (Oral)   Resp 16   SpO2 100%   -adequate pain control on oral analgesics: Met   -returns to baseline functional status. Not met   -safe disposition plan has been identified: Not met

## 2022-05-06 NOTE — PROGRESS NOTES
[facility]    Medicine Progress Note - Hospitalist Service    Date of Admission:  5/5/2022    Assessment & Plan          Josiah Crump is a 56 year old male with a past medical history significant for CVA (2020) on Plavix, paraplegia, neurogenic bladder, and hypertension who presents for evaluation of rectal bleeding and abdominal pain.  Patient reports ongoing black stools and diarrhea since his last colonoscopy in March 2022.      #Abdominal pain  #Rectal bleed  Reports ongoing abdominal pain and bloody diarrhea since his last colonoscopy in 3/11. Reports [facility] chart review patient was hospitalized at [facility] from 3/10/2022 to 3/12/2022 for bowel prep and colonoscopy after failed outpatient bowel prep. He underwent colonoscopy on 3/11/22 with biopsies. Prior to this patiently was recently admitted for rectal bleeding and to have presacral abscess was not drainable, treated with vancomycin and Zosyn and was subsequently discharged on Bactrim/Flagyl for 14 days. Patient denies nausea or vomiting. Denies cough, chest pain, SOB, fever or chills. Patient reports he has a colorectal surgery follow up appointment tomorrow at 10am. In the ED, he is afebrile. /76. Not hypoxic or tachycardic. LAB: CMP unremarkable except for chloride of 111. CBC shows no leukocytosis. HGB actually improved from 10.7 on 3/12 to 12.3 today MCV 75 MCH 21.8 .  INR 1.08. Covid19 negative. Patient is being admitted for continued monitoring of hgb, colorectal surgery consult, and for disposition issues. Patient was seen by colorectal surgery who recommended a wound care consult for ongoing wound under scrotum, metamucil daily with increase to BID if not having regular BM's after 3-5 days, okay for regular diet, no further testing/intervention from GI perspective. CRS will schedule follow in 6-8 weeks. No referral needed. Spoke with CRS regarding resuming Plavix and iron supplement.  Recommend restarting and monitoring hemoglobin while admitted.   - Tend hemoglobin q 6  - Protonix daily  - Resume Plavix   - Vitals signs q 4 hours   - Monitor intake and output  - Cdiff, Ova/parasite, Giardia and EPASS  - Contact and enteric precautions  - Resume home Iron supplements  - Metamucil daily (increase to BID if not having regular stools after 3-5 days)   - Advance Diet as Tolerated     #Disposition  Patient repots that his apartment is being remodeled for wheelchair accessibility. He reports he has been living in his car from 8am to 5pm for the past 2 weeks. Today, they told him he could not return to the apartment until construction is completed. He thinks this will not be for another 2 weeks or so. He is s/p LLE amputation and wheelchair bound. He has PCA service and says they help him with his bowel program with digital stimulation every other day. Patient reports his CADI  is aware if his situation. He hopes to talk to SW to discuss options for safe discharge. I discussed with the patient we will like not keep in the ed observation unit for 2 weeks and discharge to shelter might be an option. He did not like the idea of going to shelter.   -SW consult for safe disposition    ##chronic 3-4cm tear at the skin fold between perineum and base of scrotum.   Patient has a chronic tear at the skin fold between perineum and base of scrotum. Clean. No drainage.  -Wound care consult.      #Nuerogenic bladder  SPT was last exchanged in 3/23. Denies urinary symptoms.      #Neurogenic bowel  Has to have digital stimulation every other day. Since has been having diarrhea, has not been following bowel program.      # Pain management  # Chronic pain  - Continue PTA gabapentin 200 mg BID  - Continue PTA baclofen 20 TID PRN  - Continue Simethicone 80 QID   - ContinueTylenol 1000 TID   - Oxycodone 5 mg q6h PRN.    - Heat/ice prn     # Microcytic Anemia, chronic  BL 10-11. Improved from baseline on  admission of 12.3. On iron supplementation. 11/24/21 iron low at 33 and ferritin normal at 91. Likely iron deficiency anemia. Hemoglobin 11.9 this AM.  - Monitor HGB q6  - Resume PTA iron     # CVA, right lacunar thalamic (2020)  - Resume PTA plavix     # HTN  - Continue with PTA amlodipine 5 mg daily     # HLD  - Continue with PTA atorvastatin     # Parapelgia  - PTA baclofen 20 TID for muscle spasms          Diet: Advance Diet as Tolerated  DVT Prophylaxis: PTA plavix   Shah Catheter: Not present  Central Lines: None  Cardiac Monitoring: None  Code Status: Full Code      Disposition Plan   Expected Discharge: Pending colorectal surgery consult and safe disposition   Anticipated discharge location:  Awaiting care coordination huddle  Delays: Pending colorectal surgery consult and safe disposition        The patient's care was discussed with the Attending Physician, Dr. Markham, Bedside Nurse, Care Coordinator/, Patient and CRS Team.    RUSLAN [facility]  Hospitalist Service  [facility]  Securely message with the Vocera Web Console (learn more here)  Text page via [employer] Paging/Directory         Clinically Significant Risk Factors Present on Admission             # Hypoalbuminemia: Albumin = 2.9 g/dL (Ref range: 3.4 - 5.0 g/dL) on admission, will monitor as appropriate    # Platelet Defect: home medication list includes an antiplatelet medication       ______________________________________________________________________    Interval History   No events overnight. Patient was seen by coloresctal surgery. They are not concerned for bleed. Recommend metamucil daily with potential to increase to BID if no regular BM's after 3-5 days. Okay to have regular diet and discontinue from CRS perspective. They will schedule follow-up. SW consulted to coordinate safe disposition. Wound care consulted for perineal wound assessment/recommendations.     Data  reviewed today: I reviewed all medications, new labs and imaging results over the last 24 hours.     Physical Exam   Vital Signs: Temp: 98  F (36.7  C) Temp src: Oral BP: 127/84 Pulse: 68   Resp: 18 SpO2: 100 % O2 Device: None (Room air)    Weight: 0 lbs 0 oz    Constitutional: Pt is oriented to person, place, and time.Pt appears well-developed and well-nourished.   HENT:   Head: Normocephalic and atraumatic.   Eyes: Conjunctivae are normal. Pupils are equal, round, and reactive to light.   Neck: Normal range of motion. Neck supple.   Cardiovascular: Normal rate, regular rhythm, normal heart sounds and intact distal pulses.    Pulmonary/Chest: Effort normal and breath sounds normal. No respiratory distress. Pt has no wheezes. Pt has no rales  Abdominal: Soft. Bowel sounds are normal. Pt exhibits no distension and no mass. Tenderness in all four quadrants upon palpation. Pt has no rebound and no guarding.   Urology: scrotum is edematous and also excoriated but no open wounds currently.  Musculoskeletal: S/p LLE amputation   Neurological: Pt is alert and oriented to person, place, and time. Normal reflexes.   Skin: chronic 3-4cm tear at the skin fold between perineum and base of scrotum. Clean with no drainage.   Psychiatric: Pt has a normal mood and affect. Behavior is normal. Judgment and thought content normal.     Data   Recent Labs   Lab 05/06/22  0653 05/05/22  2326   WBC 7.0 8.8   HGB 11.9* 12.3*   MCV 76* 75*    297   INR  --  1.08   NA  --  142   POTASSIUM  --  3.8   CHLORIDE  --  111*   CO2  --  23   BUN  --  18   CR  --  0.86   ANIONGAP  --  8   PAT  --  8.5   GLC  --  94   ALBUMIN  --  2.9*   PROTTOTAL  --  7.9   BILITOTAL  --  0.2   ALKPHOS  --  97   ALT  --  24   AST  --  22     No results found for this or any previous visit (from the past 24 hour(s)).  Medications       amLODIPine  5 mg Oral Daily     baclofen  20 mg Oral TID     gabapentin  200 mg Oral BID     pantoprazole  40 mg Oral QAM AC      simethicone  80 mg Oral 4x Daily     simvastatin  20 mg Oral At Bedtime
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
## 2022-05-06 NOTE — CONSULTS
Care Management Initial Consult    General Information  Assessment completed with: Patient,    Type of CM/SW Visit: Initial Assessment    Primary Care Provider verified and updated as needed: Yes   Readmission within the last 30 days: no previous admission in last 30 days      Reason for Consult: discharge planning, housing concerns/homeless  Advance Care Planning: Advance Care Planning Reviewed: no concerns identified          Communication Assessment  Patient's communication style: spoken language (English or Bilingual)    Hearing Difficulty or Deaf: no        Cognitive  Cognitive/Neuro/Behavioral: WDL  Level of Consciousness: other (see comments)        Mood/Behavior: cooperative, calm  Best Language: 0 - No aphasia  Speech: rambling    Living Environment:   People in home: alone     Current living Arrangements: apartment      Able to return to prior arrangements: no  Living Arrangement Comments: Apartment is being renovated and will take about 2-3 weeks. Pt is not able to use apartment at this time. Pt is requested to leave apt for 8 hours a day awhile construction is being completed. Pt stays in his car in the meantime.    Family/Social Support:  Care provided by: self, homecare agency  Provides care for: no one  Marital Status: Single  Significant Other, PCA          Description of Support System: Involved         Current Resources:   Patient receiving home care services: No     Community Resources: [facility], [facility] Worker  Equipment currently used at home: wheelchair, manual  Supplies currently used at home: Incontinence Supplies    Employment/Financial:  Employment Status: disabled        Financial Concerns:             Lifestyle & Psychosocial Needs:  Social Determinants of Health     Tobacco Use: Low Risk      Smoking Tobacco Use: Never Smoker     Smokeless Tobacco Use: Never Used   Alcohol Use: Not on file   Financial Resource Strain: Not on file   Food Insecurity: Not on file   Transportation Needs: Not on  file   Physical Activity: Not on file   Stress: Not on file   Social Connections: Not on file   Intimate Partner Violence: Not on file   Depression: Not at risk     PHQ-2 Score: 0   Housing Stability: Not on file       Functional Status:  Prior to admission patient needed assistance:   Dependent ADLs:: Bathing, Dressing, Grooming, Incontinence  Dependent IADLs:: Cleaning, Cooking, Laundry, Shopping, Meal Preparation, Transportation       Mental Health Status:  Mental Health Status: Current Concern (Current apartment renovation no place to stay.)       Chemical Dependency Status:  Chemical Dependency Status: No Current Concerns             Values/Beliefs:  Spiritual, Cultural Beliefs, Mandaen Practices, Values that affect care: yes (Jehovah's witness)               Additional Information:    Chart reviewed. Case discussed with bedside RN and medical provider.    0947  WESTON met with pt at bedside to complete initial assessment. Pt share his apartment is currently under construction and in order for the  to complete the project they will need him out of his apartment for 8 full hours per day for the next 2 weeks. Pt has been staying in his car for the 8 hours and only going back to apartment to sleep. Pt has no family members or friends whom he can stay with at this time. His family lives out of state. Pt explain he was to stay with his girlfriend in [place] for a few weeks when this renovation project was assigned, but due to it starting late his girlfriend no longer has PTO hours to take off work to accommodate him going to [place].     Pt has a [employer] worker: Renate [phone] and confirmed the apartment renovation is funded through the [employer] program to make his apartment handicap accessible, not a project from his landlord. Pt gave permission for SW to contact his [employer] worker to further discuss resources.     Pt also has 10 hours per day of PCA services. PCA staff is his step daughter. Pt express he has not  had any issues with PCA staffs and PCA agency in the past several years. Pt has been taking sponge bath only due to his apartment bathroom not big enough for him to get in and out of the bathtub safely.  Pt share he has a car and drives, he also uses bus card to get around town as needed. [employer] worker is helping him get a lift chair and a power chair. Pt is independent with all medication management.     Pt share if he has no other options then to discharge back to his apartment he is fine going back and will live in his car as needed for the next two weeks. Pt has spoken with the  working on his apartment and they will need at least 2 weeks to complete the renovations. SW provided emotional support via active and reflective listening and responding to feelings and provided  positive feedback. SW expressed and encouraged ongoing self-care and continued attention to self-care. SW will have to make a VA report in this case if pt has no other place to discharge too, but to his apartment with no working water. Pt express he doesn't want SW to make any VA report, he is not trying to get anyone in trouble. WESTON explained a VA report does not get anyone in trouble. It just explains why the medical team has to discharge pt back home due to no other options and resources to meet pt's needs and pt is ok to return to his apartment at his own decision. Pt acknowledge understanding.     Phone call to [employer] : Renate [phone] who confirmed the apartment renovation is being funded through [employer] program and pt originally had a plan in place to be out of his apartment, but plans changed. WESTON informed [employer] worker pt maybe able to discharge home tomorrow. [employer] worker is not sure if there is any other resources for pt, but she will check with her supervisor and will get back to SW and pt with any updates.     WESTON spoke with Nurse PractitionerJennifer explained pt is medically ready to be discharge. Pt will have  wound care consult today at the [facility] pt maybe here until tomorrow. NP shared that due to pt's current diarrhea situation probably not best to send him back to apartment without running water. If it comes down to pt discharging home to his apartment then a VA report should be made. SW agreed and did explain with pt a VA report will have to be complete if pt returns home to his apartment with no working water for safety purpose.     Social work will continue to follow and support discharge planning as needed.        Care Management Follow Up    Length of Stay (days): 0    Expected Discharge Date:  TBD     Concerns to be Addressed: Gracia document  Patient plan of care discussed at interdisciplinary rounds: Yes    Anticipated Discharge Disposition:  TBD     Anticipated Discharge Services:  TBD  Anticipated Discharge DME:  TBD    Patient/family educated on Medicare website which has current facility and service quality ratings:  N/A  Education Provided on the Discharge Plan:  Yes  Patient/Family in Agreement with the Plan:  N/A    Referrals Placed by CM/SW:  None at this time  Private pay costs discussed: insurance costs out of pocket expenses, co-pays and deductibles.     Additional Information:    SW introduced self, discussed role and met with patient at bedside to discuss and complete GRACIA paperwork. SW answered any of patient's questions regarding insurance coverage. SW encouraged patient to follow up with their insurance plan directly to receive the most accurate information. Patient signed GRACIA form. SW fax Moon form to [facility] and placed form in the patient's chart.    _______________________    ALLYSSA Castillo, LSW  ED/OBS   [facility]  Phone: [phone]  Pager: [phone]  Fax: [phone]     On-call pager, [phone], 4:00 pm to midnight

## 2022-05-06 NOTE — PLAN OF CARE
Observation goals:       -diagnostic tests and consults completed and resulted: Not met   -vital signs normal or at patient baseline:   /84 (BP Location: Right arm)   Pulse 68   Temp 98  F (36.7  C) (Oral)   Resp 18   SpO2 100%      -adequate pain control on oral analgesics: Patient complained lower abdominal  and buttocks pain 8/10 and requested strong pain med ,PRN  Oxycodone  5 mg was given at 0605 am and pain level went down to 4/10  -returns to baseline functional status. Not met   -safe disposition plan has been identified: Not met
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
## 2022-05-06 NOTE — PLAN OF CARE
Goal Outcome Evaluation:  diagnostic tests and consults completed and resulted.No, patient refused lab draw.  -vital signs normal or at patient baseline.Yes.   -adequate pain control on oral analgesics.yes.  -returns to baseline functional status.Yes.  -safe disposition plan has been identified.No.

## 2022-05-07 ENCOUNTER — APPOINTMENT (OUTPATIENT)
Dept: PHYSICAL THERAPY | Facility: CLINIC | Age: 57
End: 2022-05-07
Attending: PHYSICIAN ASSISTANT
Payer: MEDICARE

## 2022-05-07 VITALS
RESPIRATION RATE: 20 BRPM | DIASTOLIC BLOOD PRESSURE: 79 MMHG | OXYGEN SATURATION: 97 % | SYSTOLIC BLOOD PRESSURE: 132 MMHG | HEART RATE: 77 BPM | TEMPERATURE: 98.8 F

## 2022-05-07 LAB
ANION GAP SERPL CALCULATED.3IONS-SCNC: 6 MMOL/L (ref 3–14)
BUN SERPL-MCNC: 19 MG/DL (ref 7–30)
C COLI+JEJUNI+LARI FUSA STL QL NAA+PROBE: NOT DETECTED
C DIFF TOX B STL QL: NEGATIVE
CALCIUM SERPL-MCNC: 9 MG/DL (ref 8.5–10.1)
CHLORIDE BLD-SCNC: 111 MMOL/L (ref 94–109)
CO2 SERPL-SCNC: 25 MMOL/L (ref 20–32)
CREAT SERPL-MCNC: 0.96 MG/DL (ref 0.66–1.25)
EC STX1 GENE STL QL NAA+PROBE: NOT DETECTED
EC STX2 GENE STL QL NAA+PROBE: NOT DETECTED
ERYTHROCYTE [DISTWIDTH] IN BLOOD BY AUTOMATED COUNT: 19.3 % (ref 10–15)
GFR SERPL CREATININE-BSD FRML MDRD: >90 ML/MIN/1.73M2
GLUCOSE BLD-MCNC: 101 MG/DL (ref 70–99)
HCT VFR BLD AUTO: 41.8 % (ref 40–53)
HGB BLD-MCNC: 12 G/DL (ref 13.3–17.7)
MCH RBC QN AUTO: 21.9 PG (ref 26.5–33)
MCHC RBC AUTO-ENTMCNC: 28.7 G/DL (ref 31.5–36.5)
MCV RBC AUTO: 76 FL (ref 78–100)
NOROV GI+II ORF1-ORF2 JNC STL QL NAA+PR: NOT DETECTED
PLATELET # BLD AUTO: 241 10E3/UL (ref 150–450)
POTASSIUM BLD-SCNC: 4.4 MMOL/L (ref 3.4–5.3)
RBC # BLD AUTO: 5.48 10E6/UL (ref 4.4–5.9)
RVA NSP5 STL QL NAA+PROBE: NOT DETECTED
SALMONELLA SP RPOD STL QL NAA+PROBE: NOT DETECTED
SHIGELLA SP+EIEC IPAH STL QL NAA+PROBE: NOT DETECTED
SODIUM SERPL-SCNC: 142 MMOL/L (ref 133–144)
V CHOL+PARA RFBL+TRKH+TNAA STL QL NAA+PR: NOT DETECTED
WBC # BLD AUTO: 6 10E3/UL (ref 4–11)
Y ENTERO RECN STL QL NAA+PROBE: NOT DETECTED

## 2022-05-07 PROCEDURE — 80048 BASIC METABOLIC PNL TOTAL CA: CPT | Performed by: PHYSICIAN ASSISTANT

## 2022-05-07 PROCEDURE — 87493 C DIFF AMPLIFIED PROBE: CPT | Mod: XU | Performed by: PHYSICIAN ASSISTANT

## 2022-05-07 PROCEDURE — 85014 HEMATOCRIT: CPT | Performed by: NURSE PRACTITIONER

## 2022-05-07 PROCEDURE — 250N000013 HC RX MED GY IP 250 OP 250 PS 637: Performed by: PHYSICIAN ASSISTANT

## 2022-05-07 PROCEDURE — 87506 IADNA-DNA/RNA PROBE TQ 6-11: CPT | Performed by: PHYSICIAN ASSISTANT

## 2022-05-07 PROCEDURE — 97161 PT EVAL LOW COMPLEX 20 MIN: CPT | Mod: GP

## 2022-05-07 PROCEDURE — 36415 COLL VENOUS BLD VENIPUNCTURE: CPT | Performed by: NURSE PRACTITIONER

## 2022-05-07 PROCEDURE — G0378 HOSPITAL OBSERVATION PER HR: HCPCS

## 2022-05-07 PROCEDURE — 250N000013 HC RX MED GY IP 250 OP 250 PS 637

## 2022-05-07 PROCEDURE — 99217 PR OBSERVATION CARE DISCHARGE: CPT | Performed by: INTERNAL MEDICINE

## 2022-05-07 RX ORDER — GABAPENTIN 100 MG/1
100 CAPSULE ORAL 2 TIMES DAILY
Qty: 6 CAPSULE | Refills: 0 | Status: SHIPPED | OUTPATIENT
Start: 2022-05-07 | End: 2023-04-17

## 2022-05-07 RX ORDER — PANTOPRAZOLE SODIUM 40 MG/1
40 TABLET, DELAYED RELEASE ORAL
Qty: 14 TABLET | Refills: 0 | Status: SHIPPED | OUTPATIENT
Start: 2022-05-08 | End: 2023-04-17

## 2022-05-07 RX ORDER — PANTOPRAZOLE SODIUM 40 MG/1
40 TABLET, DELAYED RELEASE ORAL
Qty: 14 TABLET | Refills: 0 | Status: SHIPPED | OUTPATIENT
Start: 2022-05-08 | End: 2022-05-07

## 2022-05-07 RX ORDER — GABAPENTIN 100 MG/1
100 CAPSULE ORAL 2 TIMES DAILY
Qty: 6 CAPSULE | Refills: 0 | Status: SHIPPED | OUTPATIENT
Start: 2022-05-07 | End: 2022-05-07

## 2022-05-07 RX ORDER — OXYCODONE HYDROCHLORIDE 5 MG/1
5 TABLET ORAL EVERY 6 HOURS PRN
Qty: 12 TABLET | Refills: 0 | Status: SHIPPED | OUTPATIENT
Start: 2022-05-07 | End: 2022-05-07

## 2022-05-07 RX ORDER — OXYCODONE HYDROCHLORIDE 5 MG/1
5 TABLET ORAL EVERY 6 HOURS PRN
Qty: 12 TABLET | Refills: 0 | Status: SHIPPED | OUTPATIENT
Start: 2022-05-07 | End: 2022-11-08

## 2022-05-07 RX ADMIN — BACLOFEN 20 MG: 20 TABLET ORAL at 19:52

## 2022-05-07 RX ADMIN — GABAPENTIN 200 MG: 100 CAPSULE ORAL at 19:52

## 2022-05-07 RX ADMIN — Medication 1 CAPSULE: at 09:39

## 2022-05-07 RX ADMIN — AMLODIPINE BESYLATE 5 MG: 5 TABLET ORAL at 09:39

## 2022-05-07 RX ADMIN — SIMETHICONE 80 MG: 80 TABLET, CHEWABLE ORAL at 13:25

## 2022-05-07 RX ADMIN — SIMETHICONE 80 MG: 80 TABLET, CHEWABLE ORAL at 09:39

## 2022-05-07 RX ADMIN — SIMETHICONE 80 MG: 80 TABLET, CHEWABLE ORAL at 19:52

## 2022-05-07 RX ADMIN — OXYCODONE HYDROCHLORIDE 5 MG: 5 TABLET ORAL at 09:40

## 2022-05-07 RX ADMIN — ACETAMINOPHEN 650 MG: 325 TABLET ORAL at 15:25

## 2022-05-07 RX ADMIN — ACETAMINOPHEN 650 MG: 325 TABLET ORAL at 09:37

## 2022-05-07 RX ADMIN — GABAPENTIN 200 MG: 100 CAPSULE ORAL at 09:37

## 2022-05-07 RX ADMIN — PANTOPRAZOLE SODIUM 40 MG: 40 TABLET, DELAYED RELEASE ORAL at 09:39

## 2022-05-07 RX ADMIN — OXYCODONE HYDROCHLORIDE 5 MG: 5 TABLET ORAL at 15:25

## 2022-05-07 RX ADMIN — SIMETHICONE 80 MG: 80 TABLET, CHEWABLE ORAL at 15:26

## 2022-05-07 RX ADMIN — BACLOFEN 20 MG: 20 TABLET ORAL at 13:25

## 2022-05-07 RX ADMIN — BACLOFEN 20 MG: 20 TABLET ORAL at 09:39

## 2022-05-07 ASSESSMENT — ENCOUNTER SYMPTOMS
PAIN SEVERITY NOW: MILD
SUBJECTIVE PATIENT PAIN CONTROL: WELL CONTROLLED
SUBJECTIVE PAIN PROGRESSION: IMPROVING
ACTIVITY IMPAIRMENT: NORMAL
DIETARY ISSUES: ADEQUATE INTAKE

## 2022-05-07 NOTE — PLAN OF CARE
Goal Outcome Evaluation: patient reported right hand pain, requested for topical pain medications. Message relayed to the ED-Obs provider who said she will look in to it.
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
## 2022-05-07 NOTE — PLAN OF CARE
/79 (BP Location: Right arm)   Pulse 77   Temp 98.8  F (37.1  C) (Oral)   Resp 20   SpO2 97%     Neuro: A&Ox4. Paraplegic   Cardiac: AVSS.   Respiratory: On RA sating 97%  GI/: Large volume of urine via SP catheter. BM X1. C diff sample sent to lab  Diet/appetite: Tolerating diet. Eating well.  Activity: Turns well from side to side by himself   Pain: Abdomen and buttocks pain managed with oxycodone and tylenol   Skin: Wound under scrotum dressing changed done 2 times  LDA's: PIV SL    Plan to discharge home tonight at 8:45pm via wheelchair ride with [facility] per      Plan: Continue with POC. Notify primary team with changes.

## 2022-05-07 NOTE — PLAN OF CARE
Observation goals:    -diagnostic tests and consults completed and resulted: yes  -vital signs normal or at patient baseline: yes  -adequate pain control on oral analgesics: yes   -returns to baseline functional status: yes   -safe disposition plan has been identified: no
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
## 2022-05-07 NOTE — PROGRESS NOTES
"Care Management Discharge Note    Discharge Date: 05/07/2022       Discharge Disposition:  (None)    Discharge Services: PCA    Discharge DME: Wheelchair    Discharge Transportation: agency    Private pay costs discussed: Not applicable    PAS Confirmation Code:  (N/A)  Patient/family educated on Medicare website which has current facility and service quality ratings:  Yes    Education Provided on the Discharge Plan:  Yes  Persons Notified of Discharge Plans: Joseluis, Bedside RN, Pt  Patient/Family in Agreement with the Plan: yes    Handoff Referral Completed: Yes    Additional Information:  Chart reviewed. Case discussed with bedside RN and medical provider.     Writer met with pt. Pt expressed that he feels frustrated that because the [employer] benefits are paying for the renovations, they should be able to \"put me up in a hotel or something.\" Writer explained to pt his [employer] worker may be able to arrange for respite benefits, but writer is unable to confirm due to the weekend. Writer provided emotional support - supportive listening, validation, and encouragement. 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     Discussed potential consideration of TCU placement while renovations are completed, which pt firmly declined initially. Pt reported how his mother is in a SNF, and how he does not want to go. Writer explained what writer would be looking at is a SNF that is focusing on short term rehab and having pt work with a PT if PT recommends it. Pt was open to further discussion of TCU placement if PT recommends it. Pt reports he does not want to go to a SNF that is like the one his mother is at--referencing that she is in LTC. 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 Discussed private room fees for TCUs broadly, which pt reports he would not be able to pay. Writer explained that writer can try to look into SNFs that waive their private room fee.     0260 - Writer received " update from Joseluis, that PT is not recommending TCU for pt and that pt is capable of independently transferring and lacks a rehable need for TCU placement. Writer and NP met with pt to discuss discharge options for pt. Pt reports he is unable to return home, however, during writer's earlier conversation with pt this morning, pt was able to be in his home during weekend and evening/night hours during the week. Pt reports he is unable to return home, contrarily to what he informed Vamshi yesterday. Writer attempted to brainstorms options with pt, however, pt declined to engage. Pt declines interest in shelters. Pt reports he has no friends and/or family whom he can stay with. Pt reports he cannot stay with his partner in [place]. Pt states he wants to stay until Monday, to see what his [employer] CM will work out for him. Writer and NP explained that he is medically ready to go and there are no anticipated resolutions that [employer] CM would be able to put in place if they have not yet implemented them already. Pt assents to returning to his apartment. Writer will arrange transportation.    Writer confirmed pt's address listed on the facesheet as correct. Writer scheduled a 2045 w/c p/u via [employer] (P: [phone]). Writer asked [employer] to move up the time if possible. Writer updated bedside RN and NP on discharge plan. Medical team agreeable.    [facility] Report filed d/t pt returning home. [facility] Report #: [id number]  ________________    STEVEN Bowers, [facility]  ED/Observation   [facility]  Phone: [phone]  Pager: [phone]  Fax: [phone]    On-call pager, [phone], 4:00pm to midnight

## 2022-05-07 NOTE — PLAN OF CARE
Observation Goals Prior to Discharge:   -diagnostic tests and consults completed and resulted: Not Met   -vital signs normal or at patient baseline: Not Met   -adequate pain control on oral analgesics: Not Met   -returns to baseline functional status: Not Met   -safe disposition plan has been identified: Not Met  Nurse to notify provider when observation goals have been met and patient is ready for discharge.  BP (!) 131/94 (BP Location: Right arm)   Pulse 87   Temp 97.8  F (36.6  C) (Oral)   Resp 18   SpO2 99%

## 2022-05-07 NOTE — PLAN OF CARE
Observation Goals Prior to Discharge:   -diagnostic tests and consults completed and resulted: Not Met   -vital signs normal or at patient baseline: Not Met   -adequate pain control on oral analgesics: Not Met   -returns to baseline functional status: Not Met   -safe disposition plan has been identified: Not Met  Nurse to notify provider when observation goals have been met and patient is ready for discharge.  /84 (BP Location: Right arm, Patient Position: Supine, Cuff Size: Adult Regular)   Pulse 70   Temp 98.6  F (37  C) (Oral)   Resp 18   SpO2 98%

## 2022-05-07 NOTE — DISCHARGE SUMMARY
Discharge Summary    Josiah Crump MRN# [id number]   YOB: 1965 Age: 56 year old     Date of Admission:  5/5/2022  Date of Discharge:  No discharge date for patient encounter.  Admitting Physician:  Beth Segovia PA-C  Discharge Physician:  Dr. Barajas  Discharging Service:  Internal Medicine     Primary Provider: Marvel Petit          Discharge Diagnosis:     Rectal bleeding    Encounter for social work intervention    * No resolved hospital problems. *               Discharge Disposition:   Discharged to home           Condition on Discharge:   Discharge condition: Stable   Code status on discharge: Full Code           Procedures:   No procedures performed during this admission          Discharge Medications:     Current Discharge Medication List      START taking these medications    Details   Laquita Protect (EUCERIN) external cream Apply topically 2 times daily Cleanse the posterior scrotum region with this, twice daily gently with a soft cloth in addition to other wound care measures  Qty: 57 g, Refills: 0    Associated Diagnoses: Infection associated with implanted penile prosthesis, initial encounter (H)      !! gabapentin (NEURONTIN) 100 MG capsule Take 1 capsule (100 mg) by mouth 2 times daily for 3 days  Qty: 6 capsule, Refills: 0    Associated Diagnoses: Other chronic pain      menthol, Topical Analgesic, 2.5% (BENGAY VANISHING SCENT) 2.5 % GEL topical gel Apply topically every 6 hours as needed (pain, muscle aches)      Miconazole Nitrate 2 % ointment Apply topically 2 times daily To perineal area with dressing change  Qty: 28.7 g, Refills: 0    Associated Diagnoses: Rectal bleeding      !! oxyCODONE (ROXICODONE) 5 MG tablet Take 1 tablet (5 mg) by mouth every 6 hours as needed for severe pain or pain  Qty: 12 tablet, Refills: 0    Associated Diagnoses: Other chronic pain      pantoprazole (PROTONIX) 40 MG EC tablet Take 1 tablet (40 mg) by mouth every morning (before  breakfast) for 14 days  Qty: 14 tablet, Refills: 0    Associated Diagnoses: Rectal bleeding      psyllium (METAMUCIL/KONSYL) capsule Take 1 capsule by mouth 2 times daily  Qty: 60 capsule, Refills: 0    Comments: Pharmacy to dispense capsule size that is available.  Associated Diagnoses: Rectal bleeding      Skin Oils (BABY OIL) OIL Apply small amount as needed to perineal area to help remove critic aid paste as necessary if paste becomes soiled  Qty: 591 mL, Refills: 0    Associated Diagnoses: Infection associated with implanted penile prosthesis, initial encounter (H)       !! - Potential duplicate medications found. Please discuss with provider.      CONTINUE these medications which have NOT CHANGED    Details   acetaminophen (TYLENOL) 325 MG tablet Take 2 tablets (650 mg) by mouth every 6 hours as needed for pain  Qty: 100 tablet, Refills: 11    Associated Diagnoses: Breakdown (mechanical) of implanted penile prosthesis, initial encounter (H)      amLODIPine (NORVASC) 5 MG tablet TAKE 1 TABLET(5 MG) BY MOUTH DAILY  Qty: 90 tablet, Refills: 1    Associated Diagnoses: Benign essential hypertension; Encounter for medication refill      baclofen (LIORESAL) 20 MG tablet 1 PO TID PRN SPACTICITY  Qty: 90 tablet, Refills: 5    Associated Diagnoses: Paraplegia (H)      bisacodyl (DULCOLAX) 5 MG EC tablet Take 2 tablets by mouth at 10am the day before procedure.  Qty: 2 tablet, Refills: 0    Associated Diagnoses: Special screening for malignant neoplasms, colon      clopidogrel (PLAVIX) 75 MG tablet Take 1 tablet (75 mg) by mouth daily  Qty: 90 tablet, Refills: 1    Associated Diagnoses: Encounter for medication refill      FEROSUL 325 (65 Fe) MG tablet Take 325 mg by mouth daily      Fesoterodine Fumarate (TOVIAZ) 8 MG TB24 Take 8 mg by mouth daily     Associated Diagnoses: Neurogenic bladder      !! gabapentin (NEURONTIN) 100 MG capsule 2 capsules p.o. twice daily  Qty: 120 capsule, Refills: 5    Associated Diagnoses:  Paraplegia (H)      NEW MED TB syringe with needle attached for administration of .5ml 20 min prior to sexual activity.   May be used 3 times per week.  Qty: 2.5 mL, Refills: 11    Comments: [facility] - Tri mix #3   Papaverine 24.6 mg/ml  Phentolamine 600 mcg  Alprostadil 45 mcg/ml  Associated Diagnoses: Male erectile disorder; Erectile dysfunction due to diseases classified elsewhere      !! oxyCODONE (ROXICODONE) 5 MG tablet Take 1 tablet (5 mg) by mouth every 6 hours as needed for pain  Qty: 30 tablet, Refills: 0    Associated Diagnoses: Neurogenic bladder; Infection and inflammatory reaction due to implanted penile prosthesis, sequela      simethicone (MYLICON) 80 MG chewable tablet Take 1 tablet (80 mg) by mouth 4 times daily  Qty: 30 tablet, Refills: 0    Associated Diagnoses: Other chronic pain      simvastatin (ZOCOR) 20 MG tablet 1 po qd  Qty: 90 tablet, Refills: 1    Associated Diagnoses: History of CVA (cerebrovascular accident)      testosterone cypionate (DEPOTESTOSTERONE) 200 MG/ML injection Inject 0.5 mLs (100 mg) into the muscle every 14 days  Qty: 3 mL, Refills: 5    Associated Diagnoses: Hypogonadism male       !! - Potential duplicate medications found. Please discuss with provider.      STOP taking these medications       magnesium citrate solution Comments:   Reason for Stopping:                     Consultations:   Consultation during this admission received from colorectal surgery, wound care             Brief History of Illness:     Josiah Crump is a 56 year old male with a past medical history significant for CVA (2020) on Plavix, paraplegia, neurogenic bladder, and hypertension who presents for evaluation of rectal bleeding and abdominal pain.  Patient reports ongoing black stools and diarrhea since his last colonoscopy in March 2022.  States that for the past 3 days his diarrhea has had bright red blood.  Patient endorses ongoing rectal and abdominal pain. Patient states that his bathroom  and apartment is being renovated and has to leave every day while they are working on it and states he has been unable to use his bathroom and shower.  He notes that he feels very dehydrated.  Patient denies fever, chills, cold or flu symptoms.  No lightheadedness or dizziness.  No urinary symptoms.  No nausea or vomiting.  No chest pain or shortness of breath.  Patient states he uses a wheelchair at home.            Hospital Course:   #Abdominal pain  #Rectal bleed  Reports ongoing abdominal pain and bloody diarrhea since his last colonoscopy in 3/11. Reports [facility] chart review patient was hospitalized at [facility] from 3/10/2022 to 3/12/2022 for bowel prep and colonoscopy after failed outpatient bowel prep. He underwent colonoscopy on 3/11/22 with biopsies. Prior to this patiently was recently admitted for rectal bleeding and to have presacral abscess was not drainable, treated with vancomycin and Zosyn and was subsequently discharged on Bactrim/Flagyl for 14 days. Patient denies nausea or vomiting. Denies cough, chest pain, SOB, fever or chills.In the ED, he is afebrile. /76. Not hypoxic or tachycardic. LAB: CMP unremarkable except for chloride of 111. CBC shows no leukocytosis. HGB actually improved from 10.7 on 3/12 to 12.3 today MCV 75 MCH 21.8 .  INR 1.08. Covid19 negative. Patient is being admitted for continued monitoring of hgb, colorectal surgery consult, and for disposition issues.   -Hemoglobin stable throughout admission, 12.0 on discharge similar to presenting hemoglobin 12.3  - Protonix IV BID, switched to p.o. on discharge  - Per colorectal surgery Plavix can be resumed  - Vitals stable during patient's hospital stay  - Cdiff negative, pending enteric bacteria and viral stool studies on discharge  -Per colorectal surgery patient safe to resume iron  - Colorectal surgery consult 5/6/2022       -- ok to eat from CRS perspective       - recommend metamucil BID with lots of oral hydration to  help bulk up stools.  Pt is concerned        this will cause constipation, therefore can start with metamucil once daily for 3 days, and then      add second dose if still having stool smears.        - re-discussed elective colostomy - pt is overwhelmed with this and declines        - will help pt schedule follow up appt in CRS clinic        - Ok to resume home meds including plavix from CRS perspective       - We will sign off at this time.  Call with questions/concerns.      #Disposition  Patient repots that his apartment is being remodeled for wheelchair accessibility. He reports he has been living in his car from 8am to 5pm for th past 2 weeks.  He was told on Friday he could not return to the apartment until construction is completed. He thinks this will not be for another 2 weeks or so. He is s/p LLE amputation and wheelchair bound. He has PCA service and says they help him with his bowel program with digital stimulation every other day. Patient reports his CADI  is aware if his situation. He hopes to talk to SW to discuss options for safe discharge. I discussed with the patient we will like not keep in the ed observation unit for 2 weeks and discharge to shelter might be an option. He did not like the idea of going to shelter.   -SW consult for safe disposition  -Per social work recommendations, patient can contact the CADI on Monday for further assistance, he is safe to discharge from their perspective.  A shelter was also offered to the patient, he declines this.  He states he has no one that he can stay with currently.  -TCU also considered, PT evaluated patient today and discharged 5/7/2022, deemed this was not required for his care outpatient as he is independent with transfers, personal care  -Patient will discharge back to his apartment this evening  -Patient to follow-up with colorectal outpatient, PCP outpatient, referrals placed     #Nuerogenic bladder  SPT was last exchanged in 3/23.  Denies urinary symptoms.      #Neurogenic bowel  Has to have digital stimulation every other day. Since has been having diarrhea, has not been following bowel program.   -5/7/2022 patient denies any black or bloody stools on discharge     # Pain management  # Chronic pain  - Continue PTA gabapentin 200 mg BID  - Continue PTA baclofen 20 TID PRN  - Continue Simethicone 80 QID   - ContinueTylenol 1000 TID   - Oxycodone 5 mg q6h PRN.    - Heat/ice prn  -Patient request pain medicine on discharge, was given 3-day supply of oxycodone and gabapentin after checking PDMP.  Patient has a history of chronic pain.     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  No findings for infectious process on exam, although with patient's colorectal difficulties there does pose risk for infection  -Wound team consulted, evaluated patient yesterday 5/6/2022  -Per the recommendations patient to perform wound care with basal cleanse, ostomy powder, critic aid paste.  They recommend weekly wound nurse follow-up.    -Outpatient referral placed, prescriptions for wound supplies sent to patient's preferred pharmacy on discharge    # Microcytic Anemia, chronic  BL 10-11. On iron supplementation. 11/24/21 iron low at 33 and ferritin normal at 91. Likely iron deficiency anemia  - Monitor HGB  -Iron can be continued per colorectal  -Hemoglobin 12.0 on discharge today 5/7/2022     # CVA, right lacunar thalamic (2020)  - Patient okay to resume Plavix per colorectal     # HTN  - Continue with PTA amlodipine 5 mg daily  -Blood pressures stable while inpatient     # HLD  - Continue with PTA atorvastatin     # Parapelgia  - PTA baclofen 20 TID for muscle spasms            Final Day of Progress before Discharge:       Physical Exam:  Blood pressure 132/79, pulse 77, temperature 98.8  F (37.1  C), temperature source Oral, resp. rate 20, SpO2 97 %.    EXAM:    GENERAL APPEARANCE: The patient  is well developed, well appearing, and in no acute distress.  HEAD:  Normocephalic and atraumatic.   EENT: Voice normal.  NECK: Trachea is midline.  LUNGS: Breath sounds are equal and clear bilaterally. No wheezes, rhonchi, or rales.  HEART: Regular rate and normal rhythm.  Radial pulses 2+ bilaterally.  ABDOMEN: Soft, flat, and benign. No mass, tenderness, guarding, or rebound.Bowel sounds are present.  EXTREMITIES: No cyanosis, clubbing, or edema.  NEUROLOGIC: No focal sensory or motor deficits are noted.  PSYCHIATRIC: The patient is awake, alert.  Appropriate mood and affect.  SKIN: Warm, dry, and well perfused. Good turgor.           Data:  All laboratory data reviewed             Significant Results:   None  Lab Results   Component Value Date    WBC 6.0 05/07/2022    HGB 12.0 (L) 05/07/2022    HCT 41.8 05/07/2022     05/07/2022     05/07/2022    POTASSIUM 4.4 05/07/2022    CHLORIDE 111 (H) 05/07/2022    CO2 25 05/07/2022    BUN 19 05/07/2022    CR 0.96 05/07/2022     (H) 05/07/2022    SED 79 (H) 01/10/2022    NTBNP 54 04/02/2019    AST 22 05/05/2022    ALT 24 05/05/2022    ALKPHOS 97 05/05/2022    BILITOTAL 0.2 05/05/2022    INR 1.08 05/05/2022      No results found for this or any previous visit (from the past 48 hour(s)).             Pending Results:   Unresulted Labs Ordered in the Past 30 Days of this Admission     Date and Time Order Name Status Description    5/6/2022  5:12 AM Enteric Bacteria and Virus Panel by WILLIAMS Stool In process                   Discharge Instructions and Follow-Up:     Discharge Procedure Orders   Colorectal Surgery Referral   Standing Status: Future   Referral Priority: Priority: 1-2 Weeks Referral Type: Consultation   Requested Specialty: Colon and Rectal Surgery   Number of Visits Requested: 1     WOUND CARE REFERRAL ([place])   Standing Status: Future   Referral Priority: Priority: 1-2 Weeks Referral Type: Consultation   Number of Visits Requested: 1      Primary Care Referral   Standing Status: Future   Referral Priority: Routine: Next available opening Referral Type: Consultation   Number of Visits Requested: 1     Discharge Instructions   Order Comments: Here in the emergency department, your rectal bleeding was evaluated by colorectal.  Your hemoglobins have remained stable, and you did not have any blood on their examination fortunately.  They want you to follow-up with them.  They should be calling you, otherwise their phone numbers listed in the documentation here in the discharge.  We discussed the importance of starting Metamucil with a tablets twice daily.  Prescriptions for these were sent to the first floor pharmacy..  Additionally, pantoprazole medicine, baby oil, Laquita protect, miconazole also sent over.  Your C. difficile stool test returned negative.  There was another stool test ordered which is pending at this time, and your primary care provider will be discussing these results with you.    In regards of your wound, the wound care team recommends:    The wound team also wants to follow-up with you weekly.  We placed a referral, their phone number is also listed in the discharge information.    We discussed the importance of getting in contact with your CADI Monday morning.  In addition we discussed going to a local shelter as another option in regards to your living situation.  Physical therapy saw you here in the observation unit, and has determined that you would not qualify for a TCU.    If you have any other new or worsening symptoms, recommend return right away to urgent care or ER for further evaluation.     Wound care and dressings   Order Comments: Cleanse the area with Laquita cleanse and protect, very gently with soft cloth.  Apply ostomy powder to open wound.   Apply thin layer of critic aid paste on top of it.  With repeat application, do not scrub the paste, only remove soiled paste and reapply.  If complete removal of paste is  necessary use baby oil/mineral oil and soft wash cloth.  Use only one Covidien pad in between mattress and pt. No brief while in bed.     Activity   Order Comments: Your activity upon discharge: activity as tolerated     Order Specific Question Answer Comments   Is discharge order? Yes      Reason for your hospital stay   Order Comments: History of rectal bleeding     Activity   Order Comments: Your activity upon discharge: activity as tolerated     Order Specific Question Answer Comments   Is discharge order? Yes      Adult [facility] Follow-up and recommended labs and tests   Order Comments: Follow up with primary care provider, Marvel Petit, within 7 days for hospital follow- up.  No follow up labs or test are needed.      Appointments on [facility] and/or [facility] (with [facility] or [facility] provider or service). Call [phone] if you haven't heard regarding these appointments within 7 days of discharge.     Full Code     Order Specific Question Answer Comments   Code status determined by: Discussion with patient/ legal decision maker      Diet   Order Comments: Follow this diet upon discharge: Orders Placed This Encounter      Regular Diet Adult     Order Specific Question Answer Comments   Is discharge order? Yes      Diet   Order Comments: Follow this diet upon discharge: Orders Placed This Encounter      Regular Diet Adult      Diet     Order Specific Question Answer Comments   Is discharge order? Yes           Attestation:  Emeli Burrows PA-C.    Time spent on patient: 45 minutes total including face to face and coordinating care time reviewing current illness, any medication changes, and the care plan for today.

## 2022-05-07 NOTE — PLAN OF CARE
Observation goals:    -diagnostic tests and consults completed and resulted: no  -vital signs normal or at patient baseline: yes   -adequate pain control on oral analgesics: yes  -returns to baseline functional status: no  -safe disposition plan has been identified: no

## 2022-05-08 NOTE — DISCHARGE SUMMARY
Pt discharged to home.  Instructions reviewed. Patient discharged with belongings and 3 medications from the pharmacy: pantaprazole, gabapentin, and oxycodone.

## 2022-05-09 ENCOUNTER — OFFICE VISIT (OUTPATIENT)
Dept: UROLOGY | Facility: CLINIC | Age: 57
End: 2022-05-09
Payer: MEDICARE

## 2022-05-09 ENCOUNTER — PATIENT OUTREACH (OUTPATIENT)
Dept: CARE COORDINATION | Facility: CLINIC | Age: 57
End: 2022-05-09
Payer: MEDICARE

## 2022-05-09 DIAGNOSIS — N31.9 NEUROGENIC BLADDER: Primary | ICD-10-CM

## 2022-05-09 PROCEDURE — 51705 CHANGE OF BLADDER TUBE: CPT

## 2022-05-09 RX ORDER — CIPROFLOXACIN 500 MG/1
500 TABLET, FILM COATED ORAL ONCE
Status: COMPLETED | OUTPATIENT
Start: 2022-05-09 | End: 2022-05-09

## 2022-05-09 RX ADMIN — CIPROFLOXACIN 500 MG: 500 TABLET, FILM COATED ORAL at 14:42

## 2022-05-09 NOTE — PROGRESS NOTES
Chief Complaint   Patient presents with     Allied Health Visit     SP Catheter Change       Patient Active Problem List   Diagnosis     Benign essential hypertension     Chronic pain     General symptom     Gynecomastia     Hx of BKA, right (H)     Hydrocele     Infection associated with implanted penile prosthesis, initial encounter (H)     Insomnia     Lateral epicondylitis     Neurogenic bladder     Other tenosynovitis of hand and wrist     Pain in limb     Paraplegia (H)     Right shoulder strain     Vitamin D deficiency     Status post hip surgery     Heterotopic ossification of bone     Physical deconditioning     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, initial encounter (H)     Spasticity     History of disarticulation of right hip     Injury of thoracic spinal cord, sequela (H)     Urinary incontinence     Breakdown (mechanical) of implanted penile prosthesis, initial encounter (H)     Gluteal cleft wound, right, sequela     Complication of implanted penile prosthesis, initial encounter (H)     Anticoagulated     Pelvic abscess in male (H)     Infected decubitus ulcer, unspecified ulcer stage     Traumatic injury of rectum     Rectal bleeding     Pressure injury of skin of buttock, unspecified injury stage, unspecified laterality     Postoperative pain     Abdominal pain, generalized     Hyponatremia     Acute UTI     Porcelain gallbladder     Impotence     History of CVA (cerebrovascular accident)     Morbid obesity (H)     Paresthesia     Hyposmolality syndrome     Gastrointestinal hemorrhage, unspecified gastrointestinal hemorrhage type     Encounter for screening laboratory testing for severe acute respiratory syndrome coronavirus 2 (SARS-CoV-2)     Encounter for social work intervention       No Known Allergies    Current Outpatient Medications   Medication Sig Dispense Refill     acetaminophen (TYLENOL) 325 MG tablet Take 2 tablets (650 mg) by mouth every 6 hours as needed for pain  100 tablet 11     amLODIPine (NORVASC) 5 MG tablet TAKE 1 TABLET(5 MG) BY MOUTH DAILY 90 tablet 1     baclofen (LIORESAL) 20 MG tablet 1 PO TID PRN SPACTICITY 90 tablet 5     Laquita Protect (EUCERIN) external cream Apply topically 2 times daily Cleanse the posterior scrotum region with this, twice daily gently with a soft cloth in addition to other wound care measures 57 g 0     bisacodyl (DULCOLAX) 5 MG EC tablet Take 2 tablets by mouth at 10am the day before procedure. 2 tablet 0     clopidogrel (PLAVIX) 75 MG tablet Take 1 tablet (75 mg) by mouth daily 90 tablet 1     FEROSUL 325 (65 Fe) MG tablet Take 325 mg by mouth daily       Fesoterodine Fumarate (TOVIAZ) 8 MG TB24 Take 8 mg by mouth daily        gabapentin (NEURONTIN) 100 MG capsule Take 1 capsule (100 mg) by mouth 2 times daily 6 capsule 0     gabapentin (NEURONTIN) 100 MG capsule 2 capsules p.o. twice daily 120 capsule 5     menthol, Topical Analgesic, 2.5% (BENGAY VANISHING SCENT) 2.5 % GEL topical gel Apply topically every 6 hours as needed (pain, muscle aches) 57 g 0     Miconazole Nitrate 2 % ointment Apply topically 2 times daily To perineal area with dressing change 28.7 g 0     [employer] TB syringe with needle attached for administration of .5ml 20 min prior to sexual activity.   May be used 3 times per week. 2.5 mL 11     oxyCODONE (ROXICODONE) 5 MG tablet Take 1 tablet (5 mg) by mouth every 6 hours as needed for severe pain or pain 12 tablet 0     oxyCODONE (ROXICODONE) 5 MG tablet Take 1 tablet (5 mg) by mouth every 6 hours as needed for pain 30 tablet 0     pantoprazole (PROTONIX) 40 MG EC tablet Take 1 tablet (40 mg) by mouth every morning (before breakfast) 14 tablet 0     psyllium (METAMUCIL/KONSYL) capsule Take 1 capsule by mouth 2 times daily 60 capsule 0     simethicone (MYLICON) 80 MG chewable tablet Take 1 tablet (80 mg) by mouth 4 times daily 30 tablet 0     simvastatin (ZOCOR) 20 MG tablet 1 po qd (Patient taking differently: Take 20 mg  by mouth daily 1 po qd) 90 tablet 1     Skin Oils (BABY OIL) OIL Apply small amount as needed to perineal area to help remove critic aid paste as necessary if paste becomes soiled 591 mL 0     testosterone cypionate (DEPOTESTOSTERONE) 200 MG/ML injection Inject 0.5 mLs (100 mg) into the muscle every 14 days 3 mL 5       Social History     Tobacco Use     Smoking status: Never Smoker     Smokeless tobacco: Never Used   Vaping Use     Vaping Use: Never used   Substance Use Topics     Alcohol use: No     Drug use: No       Josiah Crump comes into clinic today at the request of Dr. Lira for suprapubic catheter change.    Patient diagnosis: Neurogenic bladder    This service provided today was under the direct supervision of Dr. Goncalves, who was available if needed.    Josiah Crump presents to clinic for scheduled [Yes] catheter exchange.  Order has been verified: Yes.    Removal:  16 Fr straight tipped latex muhmamad catheter removed from suprapubic meatus without difficulty.    Insertion:  16 Fr straight tipped latex muhammad catheter inserted into suprapubic meatus in the usual sterile fashion without difficulty.  Balloon filled with 10 mL sterile H2O.  Received 20 ml yellow urine output.   Catheter secured in place with leg strap: Yes.     One Cipro 500 mg given per protocol: Yes.   The following medication was given:     MEDICATION:  Ciprofloxacin  ROUTE: PO  SITE: Medication was given orally   DOSE: 500 mg  LOT #: [id number]  : [facility]  EXPIRATION DATE: 10/23  NDC#: 4921-8717-85   Was there drug waste? No    Prior to medication administration, verified patient identity using patient's name and date of birth.  Due to medication administration, patient instructed to remain in clinic for 15 minutes  afterwards, and to report any adverse reaction to me immediately.    Drug Amount Wasted:  None.  Single dose tablet    Patient did tolerate procedure well.    Patient instructed as to where to call or  go for pain, fever, leakage, or decreased urine flow.       Nate West EMT  5/9/2022  2:40 PM

## 2022-05-09 NOTE — PROGRESS NOTES
Clinic Care Coordination Contact  [facility]/Voicemail       Clinical Data: Care Coordinator Outreach  Outreach attempted x 1.  Left message on patient's voicemail with call back information and requested return call.  Plan:  Care Coordinator will try to reach patient again in 1-2 business days.

## 2022-05-11 ENCOUNTER — PATIENT OUTREACH (OUTPATIENT)
Dept: CARE COORDINATION | Facility: CLINIC | Age: 57
End: 2022-05-11
Payer: MEDICARE

## 2022-05-11 DIAGNOSIS — Z76.89 ENCOUNTER FOR SOCIAL WORK INTERVENTION: Primary | ICD-10-CM

## 2022-05-11 NOTE — PROGRESS NOTES
Clinic Care Coordination Contact    Clinic Care Coordination Contact  OUTREACH    Referral Information: Pt was recently discharged from hospital      RN CC outreach and introduced CC supports  Pt notes that he would like to get an update fitting and repair and consideration for wheelchair    RN CC scheduled visit with PCP to support review of DME order consideration  Pt notes that he would like to get some support from counseling supports  RN CC created order for Behavioral Health Provider referral and schedule visit with SW CC to discuss counseling options  RN CC will complete assessment following PCP visit to support plan of care        Chief Complaint   Patient presents with     Clinic Care Coordination - Initial     Clinic Care Coordination - Post Hospital        Cavour Utilization:   Clinic Utilization  Compliance Concerns: No  No-Show Concerns: No  No PCP office visit in Past Year: No  Utilization    Hospital Admissions  5             ED Visits  7             No Show Count (past year)  12                Current as of: 5/10/2022 11:35 PM              Clinical Concerns:  Current Medical Concerns:   Patient Active Problem List   Diagnosis     Benign essential hypertension     Chronic pain     General symptom     Gynecomastia     Hx of BKA, right (H)     Hydrocele     Infection associated with implanted penile prosthesis, initial encounter (H)     Insomnia     Lateral epicondylitis     Neurogenic bladder     Other tenosynovitis of hand and wrist     Pain in limb     Paraplegia (H)     Right shoulder strain     Vitamin D deficiency     Status post hip surgery     Heterotopic ossification of bone     Physical deconditioning     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, initial encounter (H)     Spasticity     History of disarticulation of right hip     Injury of thoracic spinal cord, sequela (H)     Urinary incontinence     Breakdown (mechanical) of implanted penile prosthesis, initial  encounter (H)     Gluteal cleft wound, right, sequela     Complication of implanted penile prosthesis, initial encounter (H)     Anticoagulated     Pelvic abscess in male (H)     Infected decubitus ulcer, unspecified ulcer stage     Traumatic injury of rectum     Rectal bleeding     Pressure injury of skin of buttock, unspecified injury stage, unspecified laterality     Postoperative pain     Abdominal pain, generalized     Hyponatremia     Acute UTI     Porcelain gallbladder     Impotence     History of CVA (cerebrovascular accident)     Morbid obesity (H)     Paresthesia     Hyposmolality syndrome     Gastrointestinal hemorrhage, unspecified gastrointestinal hemorrhage type     Encounter for screening laboratory testing for severe acute respiratory syndrome coronavirus 2 (SARS-CoV-2)     Encounter for social work intervention        Medication Management:  Medication review status: Medications reviewed and no changes reported per patient.              Functional Status:  Mobility Status: Independent w/Device    Living Situation:  Current living arrangement:: I live alone    Lifestyle & Psychosocial Needs:    Social Determinants of Health     Tobacco Use: Low Risk      Smoking Tobacco Use: Never Smoker     Smokeless Tobacco Use: Never Used   Alcohol Use: Not on file   Financial Resource Strain: Not on file   Food Insecurity: Not on file   Transportation Needs: Not on file   Physical Activity: Not on file   Stress: Not on file   Social Connections: Not on file   Intimate Partner Violence: Not on file   Depression: Not at risk     PHQ-2 Score: 0   Housing Stability: Not on file              Anabaptism or spiritual beliefs that impact treatment:: No  Mental health DX:: No  Mental health management concern (GOAL):: No          Resources and Interventions:  Current Resources:                              Referrals Placed: Behavioral Health Providers      Goals:    Goals        General     Mental Health Management  (pt-stated)      Notes - Note created  5/11/2022  3:05 PM by Patty Dowd RN     Goal Statement: I will have a better understanding and plan of care for BHP within 3-4 months  Date Goal set: 05/11/22    Barriers: language, knowledge deficit   Strengths: family support  Date to Achieve By:  Patient expressed understanding of goal: yes  Action steps to achieve this goal:  1. I will schedule and attend visit with Behavioral Health Provider    2. I will update Care coordination team during next outreach  3. I will report to my Community Health Worker if any additional resources or support needed.           Transportation (pt-stated)      Notes - Note created  5/11/2022  7:39 PM by Patty Dowd RN      Goal Statement: I would like to get an updated order for Wheelchair repair and consideration for power wheelchair with cadi waiver within 2-3 months   Date Goal set: 05/11/22  Barriers: access  Strengths: pt self advocate  Date to Achieve By: July  Patient expressed understanding of goal: yes  Action steps to achieve this goal:  1. I will meet with PCP to consider and review DME order  2. I will update Care coordination team during next outreach                 Patient/Caregiver understanding: Pt reports understanding and denies any additional questions or concerns at this times.  CC engaged in AIDET communication during encounter.    Outreach Frequency: monthly  Future Appointments              Tomorrow SPRS CCC SW [facility] SPRS    In 1 week Marvel Petit MD [facility] SPRS    In 2 weeks SPRS CCC RN [facility] SPRS    In 3 weeks Nurse, [facility] Urology Clinic [facility]          Plan:   Patient will schedule and attend recommended follow up visits with speciality providers and primary care provider.    RN CC will outreach in 2 weeks to support ongoing recommendations and  plan of care will be available sooner if needed.

## 2022-05-11 NOTE — LETTER
[facility] CARE COORDINATION  [facility]   [address] [place] [address]    May 11, 2022    Josiah Crump  [address]   [place] [address]      Dear Josiah,    I am a clinic care coordinator who works with Marvel Petit MD at [facility]. I wanted to thank you for spending the time to talk with me.  Below is a description of clinic care coordination and how I can further assist you.      The clinic care coordination team is made up of a registered nurse,  and community health worker who understand the health care system. The goal of clinic care coordination is to help you manage your health and improve access to the health care system in the most efficient manner. The team can assist you in meeting your health care goals by providing education, coordinating services, strengthening the communication among your providers and supporting you with any resource needs.    Please feel free to contact the Community Health Worker at [phone] with any questions or concerns. We are focused on providing you with the highest-quality healthcare experience possible and that all starts with you.     Sincerely,     Patty Dowd RN      Enclosed: I have enclosed a copy of the Patient Centered Plan of Care. This has helpful information and goals that we have talked about. Please keep this in an easy to access place to use as needed.

## 2022-05-11 NOTE — LETTER
[facility]  Patient Centered Plan of Care  About Me:        Patient Name:  Josiah Crump    YOB: 1965  Age:         56 year old   Coco MRN:    [id number] Telephone Information:  Home Phone [phone]   Mobile [phone]       Address:  [address] [place] [address] Email address:  [email or web address]      Emergency Contact(s)    Name Relationship Lgl Grd Work Phone Home Phone Mobile Phone   1. JILLIAN BOSWELL* Other    [phone]   2. NIMOC, MARLITA Significant ot*    [phone]           Primary language:  English     needed? No   [employer]:  [phone] op. 1  Other communication barriers:No data recorded  Preferred Method of Communication:  Mail  Current living arrangement: I live alone    Mobility Status/ Medical Equipment: Independent w/Device        Health Maintenance  Health Maintenance Reviewed: Up to date      My Access Plan  Medical Emergency 911   Primary Clinic Line   -     24 Hour Appointment Line [phone] or  [phone]) (toll-free)   24 Hour Nurse Line [phone] (toll-free)   Preferred Urgent Care [facility], [phone]     [facility]  [phone]     [id number] - [place], [place] - [address]     Behavioral Health Crisis Line The National Suicide Prevention Lifeline at 1-869.317.6332 or 911             My Care Team Members  Patient Care Team       Relationship Specialty Notifications Start End    Marvel Petit MD PCP - General Family Practice  1/18/17     Phone: [phone] Fax: [phone]         [address] [facility] [address]    Richard Byers MD MD Urology  2/6/20     Phone: [phone] Fax: [phone]         [facility] UROLOGY [address] [facility] [address] [facility] [address]    Osmani Goncalves MD MD Urology   2/6/20     Phone: [phone] Fax: [phone]         [address] [place] [address] [address] [facility] [address]    Alberta Durand RN Specialty Care Coordinator Urology  2/6/20     Phone: [phone]         Marvel Petit MD Referring Physician Family Practice  2/10/20     Phone: [phone] Fax: [phone]         [address] [place] [address]    Haley Martinez MD MD INTERNAL MEDICINE - ENDOCRINOLOGY, DIABETES & METABOLISM  5/18/20     Phone: [phone] Fax: [phone]         [address] [place] [address]    Haley Martinez MD Assigned Endocrinology Provider   10/23/20     Phone: [phone] Fax: [phone]         [address] [place] [address]    Marvel Petit MD Assigned PCP   6/16/21     Phone: [phone] Fax: [phone]         [address] [place] [address]    Sonya Mccann RN Specialty Care Coordinator Urology  7/28/21     Charles Lira MD MD Urology  7/28/21     Referred to Colorectal     Phone: [phone] Fax: [phone]         [address] [facility] [address]    Felicita Vega PA-C Physician Assistant Endocrinology, Diabetes, and Metabolism  10/12/21     Phone: [phone] Fax: [phone]         [address] [place] [address] [address] [facility] [address]    Charles Lira MD Assigned Surgical Provider   10/31/21     Phone: [phone] Fax: [phone]         [address] [facility] [address]    Solange Ross APRN CNP Nurse Practitioner Colon & Rectal  2/16/22     Phone: [phone] Fax: [phone]         [address] [place] [address] [address] [facility] [address]    Patty Dowd RN Lead Care Coordinator Primary Care - CC  5/9/22     Bairon Carrasquillo Community Health Worker Primary Care - CC  5/11/22     Phone: [phone] Fax: [phone]         [address] [place] [address]            My Care Plans  Self Management and Treatment Plan  Goals and (Comments)   Goals        General     Mental Health Management (pt-stated)      Notes - Note created  5/11/2022  3:05  PM by Patty Dowd, RN     Goal Statement: I will have a better understanding and plan of care for BHP within 3-4 months  Date Goal set: 05/11/22    Barriers: language, knowledge deficit   Strengths: family support  Date to Achieve By:  Patient expressed understanding of goal: yes  Action steps to achieve this goal:  1. I will schedule and attend visit with Behavioral Health Provider    2. I will update Care coordination team during next outreach  3. I will report to my Community Health Worker if any additional resources or support needed.           Transportation (pt-stated)      Notes - Note created  5/11/2022  7:39 PM by Patty Dowd, RN      Goal Statement: I would like to get an updated order for Wheelchair repair and consideration for power wheelchair with cadi waiver within 2-3 months   Date Goal set: 05/11/22  Barriers: access  Strengths: pt self advocate  Date to Achieve By: July  Patient expressed understanding of goal: yes  Action steps to achieve this goal:  1. I will meet with PCP to consider and review DME order  2. I will update Care coordination team during next outreach                  Action Plans on File:                       Advance Care Plans/Directives Type:   No data recorded    My Medical and Care Information  Problem List   Patient Active Problem List   Diagnosis     Benign essential hypertension     Chronic pain     General symptom     Gynecomastia     Hx of BKA, right (H)     Hydrocele     Infection associated with implanted penile prosthesis, initial encounter (H)     Insomnia     Lateral epicondylitis     Neurogenic bladder     Other tenosynovitis of hand and wrist     Pain in limb     Paraplegia (H)     Right shoulder strain     Vitamin D deficiency     Status post hip surgery     Heterotopic ossification of bone     Physical deconditioning     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, initial encounter (H)     Spasticity     History of disarticulation of  right hip     Injury of thoracic spinal cord, sequela (H)     Urinary incontinence     Breakdown (mechanical) of implanted penile prosthesis, initial encounter (H)     Gluteal cleft wound, right, sequela     Complication of implanted penile prosthesis, initial encounter (H)     Anticoagulated     Pelvic abscess in male (H)     Infected decubitus ulcer, unspecified ulcer stage     Traumatic injury of rectum     Rectal bleeding     Pressure injury of skin of buttock, unspecified injury stage, unspecified laterality     Postoperative pain     Abdominal pain, generalized     Hyponatremia     Acute UTI     Porcelain gallbladder     Impotence     History of CVA (cerebrovascular accident)     Morbid obesity (H)     Paresthesia     Hyposmolality syndrome     Gastrointestinal hemorrhage, unspecified gastrointestinal hemorrhage type     Encounter for screening laboratory testing for severe acute respiratory syndrome coronavirus 2 (SARS-CoV-2)     Encounter for social work intervention      Current Medications and Allergies:    Current Outpatient Medications   Medication     acetaminophen (TYLENOL) 325 MG tablet     amLODIPine (NORVASC) 5 MG tablet     baclofen (LIORESAL) 20 MG tablet     Laquita Protect (EUCERIN) external cream     bisacodyl (DULCOLAX) 5 MG EC tablet     clopidogrel (PLAVIX) 75 MG tablet     FEROSUL 325 (65 Fe) MG tablet     Fesoterodine Fumarate (TOVIAZ) 8 MG TB24     gabapentin (NEURONTIN) 100 MG capsule     gabapentin (NEURONTIN) 100 MG capsule     menthol, Topical Analgesic, 2.5% (BENGAY VANISHING SCENT) 2.5 % GEL topical gel     Miconazole Nitrate 2 % ointment     NEW MED     oxyCODONE (ROXICODONE) 5 MG tablet     oxyCODONE (ROXICODONE) 5 MG tablet     pantoprazole (PROTONIX) 40 MG EC tablet     psyllium (METAMUCIL/KONSYL) capsule     simethicone (MYLICON) 80 MG chewable tablet     simvastatin (ZOCOR) 20 MG tablet     Skin Oils (BABY OIL) OIL     testosterone cypionate (DEPOTESTOSTERONE) 200 MG/ML injection      No current facility-administered medications for this visit.         Care Coordination Start Date: 5/11/2022   Frequency of Care Coordination: monthly     Form Last Updated: 05/11/2022

## 2022-05-12 ENCOUNTER — PATIENT OUTREACH (OUTPATIENT)
Dept: NURSING | Facility: CLINIC | Age: 57
End: 2022-05-12
Payer: MEDICARE

## 2022-05-12 NOTE — PROGRESS NOTES
Clinic Care Coordination Contact    Follow Up Progress Note      Assessment: CCC SW contacted Josiah by phone to follow up on mental health resources. He reported he would like to connect with a therapist to make sure he is having healthy relationships. He has an appointment scheduled on 6/15. SW offered immediate resources such as Walk In Counseling, he prefers to wait so he can have a consistent therapist.     Care Gaps:    Health Maintenance Due   Topic Date Due     MICROALBUMIN  Never done     URINE DRUG SCREEN  Never done     ADVANCE CARE PLANNING  Never done     EYE EXAM  Never done     HIV SCREENING  Never done     HEPATITIS B IMMUNIZATION (1 of 3 - Risk 3-dose series) Never done     MEDICARE ANNUAL WELLNESS VISIT  03/06/2018     LIPID  06/22/2021     A1C  02/24/2022     COVID-19 Vaccine (4 - Booster for Pfizer series) 03/24/2022     ZOSTER IMMUNIZATION (2 of 2) 08/03/2021           Goals addressed this encounter:    Goals Addressed                    This Visit's Progress       Mental Health Management (pt-stated)         Goal Statement: I will have a better understanding and plan of care for BHP within 3-4 months  Date Goal set: 05/11/22    Barriers: language, knowledge deficit   Strengths: family support  Date to Achieve By:  Patient expressed understanding of goal: yes  Action steps to achieve this goal:  1. I will attend my appointment on 6/15/2022  2. I will update CCC team                Intervention/Education provided during outreach: See above              Plan:   Standard outreach

## 2022-05-16 ENCOUNTER — TELEPHONE (OUTPATIENT)
Dept: FAMILY MEDICINE | Facility: CLINIC | Age: 57
End: 2022-05-16
Payer: MEDICARE

## 2022-05-16 NOTE — TELEPHONE ENCOUNTER
Reason for Call:  Other prescription    Detailed comments: PATIENT NEEDS A POWERWHEEL PRESCRIPTION FAXED TO [facility] FRANKI DOHERTY(FAX: [phone]). IF MD CORBY NEEDS TO CONTACT THE MANAGEMENT TEAM BY PHONE THE NUMBER [phone].    Phone Number Patient can be reached at: Home number on file [phone] (home)    Best Time: ANYTIME    Can we leave a detailed message on this number? YES    Call taken on 5/16/2022 at 8:42 AM by Lucio Patel

## 2022-05-18 DIAGNOSIS — Z76.0 ENCOUNTER FOR MEDICATION REFILL: ICD-10-CM

## 2022-05-18 NOTE — TELEPHONE ENCOUNTER
He will need a face-to-face office visit for this, or they will not pay for it.    Please schedule a face-to-face office visit with me, my next available opening

## 2022-05-18 NOTE — TELEPHONE ENCOUNTER
Patient called checking the status of this request, review and advise.  Sonya Rai   [facility]  Central Scheduler

## 2022-05-19 DIAGNOSIS — G89.29 OTHER CHRONIC PAIN: ICD-10-CM

## 2022-05-19 RX ORDER — CLOPIDOGREL BISULFATE 75 MG/1
TABLET ORAL
Qty: 90 TABLET | Refills: 1 | OUTPATIENT
Start: 2022-05-19

## 2022-05-20 RX ORDER — GABAPENTIN 100 MG/1
100 CAPSULE ORAL 2 TIMES DAILY
Qty: 6 CAPSULE | Refills: 0 | OUTPATIENT
Start: 2022-05-20

## 2022-05-20 NOTE — TELEPHONE ENCOUNTER
Routing refill request to provider for review/approval because:  Drug not on the G refill protocol     Last Written Prescription Date:  5/7/22  Last Fill Quantity: 6,  # refills: 0   Last office visit provider:  2/23/22     Requested Prescriptions   Pending Prescriptions Disp Refills     gabapentin (NEURONTIN) 100 MG capsule 6 capsule 0     Sig: Take 1 capsule (100 mg) by mouth 2 times daily       There is no refill protocol information for this order          Felicita Ferreira RN 05/19/22 7:56 PM

## 2022-05-31 ENCOUNTER — PATIENT OUTREACH (OUTPATIENT)
Dept: NURSING | Facility: CLINIC | Age: 57
End: 2022-05-31
Payer: MEDICARE

## 2022-05-31 DIAGNOSIS — K92.2 GASTROINTESTINAL HEMORRHAGE, UNSPECIFIED GASTROINTESTINAL HEMORRHAGE TYPE: ICD-10-CM

## 2022-05-31 NOTE — PROGRESS NOTES
Clinic Care Coordination Contact  [facility]/Voicemail       Clinical Data: Care Coordinator Outreach  Chart review  - PCP visit reschedule to accommodate need for face to face visit to review Pt DME request  RN CC will reach out following visit to support review of status and CCC supports       Outreach attempted x 1.  Left message on patient's voicemail with call back information and requested return call.  Plan: Community Health Worker to outreach per standard work and updated on goal progression

## 2022-06-12 DIAGNOSIS — E29.1 HYPOGONADISM MALE: ICD-10-CM

## 2022-06-13 RX ORDER — TESTOSTERONE CYPIONATE 200 MG/ML
100 INJECTION, SOLUTION INTRAMUSCULAR
Qty: 3 ML | Refills: 5 | Status: SHIPPED | OUTPATIENT
Start: 2022-06-13 | End: 2023-02-28

## 2022-06-13 NOTE — TELEPHONE ENCOUNTER
TESTOSTERONE CYP 200MG/ML SDV 1ML  Last Written Prescription Date:   12/7/2021  Last Fill Quantity: 3,   # refills: 5  Last Office Visit :  4/12/2021  Future Office visit:  None    Routing refill request to provider for review/approval because:  Drug not on the [facility], [facility] or [facility] refill protocol or controlled substance      Eleni Gardner RN  Central Triage Red Flags/Med Refills

## 2022-06-14 ENCOUNTER — OFFICE VISIT (OUTPATIENT)
Dept: UROLOGY | Facility: CLINIC | Age: 57
End: 2022-06-14
Payer: MEDICARE

## 2022-06-14 DIAGNOSIS — N31.9 NEUROGENIC BLADDER: Primary | ICD-10-CM

## 2022-06-14 PROCEDURE — 51702 INSERT TEMP BLADDER CATH: CPT

## 2022-06-14 RX ORDER — CIPROFLOXACIN 500 MG/1
500 TABLET, FILM COATED ORAL ONCE
Status: COMPLETED | OUTPATIENT
Start: 2022-06-14 | End: 2022-06-14

## 2022-06-14 RX ADMIN — CIPROFLOXACIN 500 MG: 500 TABLET, FILM COATED ORAL at 13:51

## 2022-06-14 NOTE — PROGRESS NOTES
Chief Complaint   Patient presents with     Allied Health Visit     Catheter change       Patient Active Problem List   Diagnosis     Benign essential hypertension     Chronic pain     General symptom     Gynecomastia     Hx of BKA, right (H)     Hydrocele     Infection associated with implanted penile prosthesis, initial encounter (H)     Insomnia     Lateral epicondylitis     Neurogenic bladder     Other tenosynovitis of hand and wrist     Pain in limb     Paraplegia (H)     Right shoulder strain     Vitamin D deficiency     Status post hip surgery     Heterotopic ossification of bone     Physical deconditioning     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, initial encounter (H)     Spasticity     History of disarticulation of right hip     Injury of thoracic spinal cord, sequela (H)     Urinary incontinence     Breakdown (mechanical) of implanted penile prosthesis, initial encounter (H)     Gluteal cleft wound, right, sequela     Complication of implanted penile prosthesis, initial encounter (H)     Anticoagulated     Pelvic abscess in male (H)     Infected decubitus ulcer, unspecified ulcer stage     Traumatic injury of rectum     Rectal bleeding     Pressure injury of skin of buttock, unspecified injury stage, unspecified laterality     Postoperative pain     Abdominal pain, generalized     Hyponatremia     Acute UTI     Porcelain gallbladder     Impotence     History of CVA (cerebrovascular accident)     Morbid obesity (H)     Paresthesia     Hyposmolality syndrome     Gastrointestinal hemorrhage, unspecified gastrointestinal hemorrhage type     Encounter for screening laboratory testing for severe acute respiratory syndrome coronavirus 2 (SARS-CoV-2)     Encounter for social work intervention       No Known Allergies    Current Outpatient Medications   Medication Sig Dispense Refill     acetaminophen (TYLENOL) 325 MG tablet Take 2 tablets (650 mg) by mouth every 6 hours as needed for pain  100 tablet 11     amLODIPine (NORVASC) 5 MG tablet TAKE 1 TABLET(5 MG) BY MOUTH DAILY 90 tablet 1     baclofen (LIORESAL) 20 MG tablet 1 PO TID PRN SPACTICITY 90 tablet 5     Laquita Protect (EUCERIN) external cream Apply topically 2 times daily Cleanse the posterior scrotum region with this, twice daily gently with a soft cloth in addition to other wound care measures 57 g 0     bisacodyl (DULCOLAX) 5 MG EC tablet Take 2 tablets by mouth at 10am the day before procedure. 2 tablet 0     clopidogrel (PLAVIX) 75 MG tablet Take 1 tablet (75 mg) by mouth daily 90 tablet 1     FEROSUL 325 (65 Fe) MG tablet Take 325 mg by mouth daily       Fesoterodine Fumarate (TOVIAZ) 8 MG TB24 Take 8 mg by mouth daily        gabapentin (NEURONTIN) 100 MG capsule Take 1 capsule (100 mg) by mouth 2 times daily 6 capsule 0     gabapentin (NEURONTIN) 100 MG capsule 2 capsules p.o. twice daily 120 capsule 5     menthol, Topical Analgesic, 2.5% (BENGAY VANISHING SCENT) 2.5 % GEL topical gel Apply topically every 6 hours as needed (pain, muscle aches) 57 g 0     Miconazole Nitrate 2 % ointment Apply topically 2 times daily To perineal area with dressing change 28.7 g 0     [place] Wi Max TB syringe with needle attached for administration of .5ml 20 min prior to sexual activity.   May be used 3 times per week. 2.5 mL 11     oxyCODONE (ROXICODONE) 5 MG tablet Take 1 tablet (5 mg) by mouth every 6 hours as needed for severe pain or pain 12 tablet 0     oxyCODONE (ROXICODONE) 5 MG tablet Take 1 tablet (5 mg) by mouth every 6 hours as needed for pain 30 tablet 0     pantoprazole (PROTONIX) 40 MG EC tablet Take 1 tablet (40 mg) by mouth every morning (before breakfast) 14 tablet 0     psyllium (METAMUCIL/KONSYL) capsule Take 1 capsule by mouth 2 times daily 60 capsule 0     simethicone (MYLICON) 80 MG chewable tablet Take 1 tablet (80 mg) by mouth 4 times daily 30 tablet 0     simvastatin (ZOCOR) 20 MG tablet TAKE 1 TABLET(20 MG) BY MOUTH DAILY 90 tablet 0      Skin Oils (BABY OIL) OIL Apply small amount as needed to perineal area to help remove critic aid paste as necessary if paste becomes soiled 591 mL 0     testosterone cypionate (DEPOTESTOSTERONE) 200 MG/ML injection Inject 0.5 mLs (100 mg) into the muscle every 14 days 3 mL 5       Social History     Tobacco Use     Smoking status: Never Smoker     Smokeless tobacco: Never Used   Vaping Use     Vaping Use: Never used   Substance Use Topics     Alcohol use: No     Drug use: No       Josiah Crump comes into clinic today at the request of Dr. Lira for urethral catheter change.    Patient diagnosis: Neurogenic bladder    This service provided today was under the direct supervision of Dr. Haley, who was available if needed.    Josiah Crump presents to clinic for scheduled [Yes] catheter exchange.  Order has been verified: Yes.    Removal:  16 Fr straight tipped latex muhammad catheter removed from urethral meatus without difficulty.    Insertion:  16 Fr straight tipped latex muhammad catheter inserted into urethral meatus in the usual sterile fashion without difficulty.  Balloon filled with 10 mL sterile H2O.  Received ~200 ml yellow urine output.   Catheter secured in place with leg strap: Yes.     One Cipro 500 mg given per protocol: Yes.   The following medication was given:     MEDICATION:  Ciprofloxacin  ROUTE: PO  SITE: Medication was given orally   DOSE: 500 mg  LOT #: [id number]  : [employer]  EXPIRATION DATE: 10/23  NDC#: 9292-8403-44   Was there drug waste? No    Prior to medication administration, verified patient identity using patient's name and date of birth.  Due to medication administration, patient instructed to remain in clinic for 15 minutes  afterwards, and to report any adverse reaction to me immediately.    Drug Amount Wasted:  None.  Single dose tablet    Patient did tolerate procedure well.    Patient instructed as to where to call or go for pain, fever, leakage, or  decreased urine flow.       Nate West EMT  6/14/2022  1:47 PM

## 2022-06-14 NOTE — PATIENT INSTRUCTIONS
Please schedule an appointment with Dr. Lira on Wednesday, June 29th at 9am to discuss Trimix doseage.    It was a pleasure meeting with you today.  Thank you for allowing me and my team the privilege of caring for you today.  YOU are the reason we are here, and I truly hope we provided you with the excellent service you deserve.  Please let us know if there is anything else we can do for you so that we can be sure you are leaving completely satisfied with your care experience.

## 2022-06-29 ENCOUNTER — TELEPHONE (OUTPATIENT)
Dept: UROLOGY | Facility: CLINIC | Age: 57
End: 2022-06-29

## 2022-06-29 NOTE — TELEPHONE ENCOUNTER
[facility] Call Center    Phone Message    May a detailed message be left on voicemail: yes     Reason for Call: Patient has an appt today and would like to reschedule as he is in a wheel chair and is having transportation issues. Writer tried to reschedule appt, first available is in 9/2022.  Patient not happy.  Writer tried backline to no avail.  Please reach out to patient to reschedule appt.    Action Taken: Message routed to:  [facility] ([facility]): Urology    Travel Screening: Not Applicable

## 2022-06-29 NOTE — TELEPHONE ENCOUNTER
Spoke with patient and told him the next date and time that was available. He really wants to be seen sooner. I told him that he can do a video visit but he wants in person.

## 2022-07-05 ENCOUNTER — PATIENT OUTREACH (OUTPATIENT)
Dept: NURSING | Facility: CLINIC | Age: 57
End: 2022-07-05

## 2022-07-05 ENCOUNTER — PRE VISIT (OUTPATIENT)
Dept: UROLOGY | Facility: CLINIC | Age: 57
End: 2022-07-05

## 2022-07-05 NOTE — PROGRESS NOTES
Clinic Care Coordination Contact    Follow Up Progress Note      Assessment:  RN CC outreach and spoke with pateint  Patient no show visit with PCP in June, PCP retired and RN CC support schedule visit with new provider  To discuss power wheelchair request and establish care    Reviewed future visit details   ul 11, 2022  2:20 PM  (Arrive by 2:05 PM)  Return Visit with  [facility] Nurse  [facility] ([facility] ) [phone]   Jul 25, 2022  2:40 PM  (Arrive by 2:20 PM)  Provider Visit with Bala Benjamin MD  [facility] ([facility] ) [phone]   Jul 27, 2022  8:15 AM  (Arrive by 8:00 AM)  Return Visit with Charles Lira MD  [facility] ([facility] ) [phone]   Aug 01, 2022 11:00 AM  Phone Visit with [facility] RN  [facility] ([facility] ) [phone]          Care Gaps:    Health Maintenance Due   Topic Date Due     MICROALBUMIN  Never done     URINE DRUG SCREEN  Never done     ADVANCE CARE PLANNING  Never done     EYE EXAM  Never done     HIV SCREENING  Never done     HEPATITIS B IMMUNIZATION (1 of 3 - Risk 3-dose series) Never done     MEDICARE ANNUAL WELLNESS VISIT  03/06/2018     LIPID  06/22/2021     A1C  02/24/2022     COVID-19 Vaccine (4 - Booster for Pfizer series) 03/24/2022     ZOSTER IMMUNIZATION (2 of 2) 08/03/2021       Scheduled visit to establish care with new provider, legacy provider retired      Goals addressed this encounter:    Goals Addressed                    This Visit's Progress       Patient Stated       Transportation (pt-stated)   40%       Goal Statement: I would like to get an updated order for Wheelchair repair and consideration for power wheelchair with cadi waiver within 2-3 months   Date Goal set: 05/11/22  Barriers:  access  Strengths: pt self advocate  Date to Achieve By: July  Patient expressed understanding of goal: yes  Action steps to achieve this goal:  1. I will meet with PCP to consider and review DME order  2. I will update Care coordination team during next outreach                 Intervention/Education provided during outreach: schedule visit with provider          Plan: Patient will schedule and attend recommended follow up visits with speciality providers and primary care provider.    RN CC will outreach in 4-6 weeks to support ongoing recommendations and plan of care will be available sooner if needed.

## 2022-07-05 NOTE — TELEPHONE ENCOUNTER
Reason for visit: Follow up     Relevant information: discuss trimix dose    Records/imaging/labs/orders: in EPIC    Pt called: no    At Rooming: normal

## 2022-07-06 ENCOUNTER — MEDICAL CORRESPONDENCE (OUTPATIENT)
Dept: HEALTH INFORMATION MANAGEMENT | Facility: CLINIC | Age: 57
End: 2022-07-06

## 2022-07-13 ENCOUNTER — HOSPITAL ENCOUNTER (EMERGENCY)
Facility: CLINIC | Age: 57
Discharge: HOME | End: 2022-07-14
Attending: EMERGENCY MEDICINE | Admitting: EMERGENCY MEDICINE
Payer: MEDICARE

## 2022-07-13 DIAGNOSIS — N30.01 ACUTE CYSTITIS WITH HEMATURIA: ICD-10-CM

## 2022-07-13 DIAGNOSIS — T83.010A SUPRAPUBIC CATHETER DYSFUNCTION, INITIAL ENCOUNTER (H): ICD-10-CM

## 2022-07-13 PROCEDURE — 250N000013 HC RX MED GY IP 250 OP 250 PS 637: Performed by: EMERGENCY MEDICINE

## 2022-07-13 PROCEDURE — 99285 EMERGENCY DEPT VISIT HI MDM: CPT | Mod: 25

## 2022-07-13 RX ORDER — OXYCODONE HYDROCHLORIDE 5 MG/1
5 TABLET ORAL ONCE
Status: COMPLETED | OUTPATIENT
Start: 2022-07-13 | End: 2022-07-13

## 2022-07-13 RX ADMIN — OXYCODONE HYDROCHLORIDE 5 MG: 5 TABLET ORAL at 23:39

## 2022-07-13 ASSESSMENT — ENCOUNTER SYMPTOMS
CHILLS: 0
NAUSEA: 0
ABDOMINAL PAIN: 1
FEVER: 0
VOMITING: 0

## 2022-07-14 ENCOUNTER — APPOINTMENT (OUTPATIENT)
Dept: INTERVENTIONAL RADIOLOGY/VASCULAR | Facility: CLINIC | Age: 57
End: 2022-07-14
Attending: RADIOLOGY
Payer: MEDICARE

## 2022-07-14 VITALS
HEART RATE: 82 BPM | OXYGEN SATURATION: 97 % | TEMPERATURE: 99.1 F | DIASTOLIC BLOOD PRESSURE: 87 MMHG | SYSTOLIC BLOOD PRESSURE: 150 MMHG | RESPIRATION RATE: 16 BRPM

## 2022-07-14 LAB
ALBUMIN UR-MCNC: 300 MG/DL
APPEARANCE UR: ABNORMAL
BACTERIA #/AREA URNS HPF: ABNORMAL /HPF
BILIRUB UR QL STRIP: NEGATIVE
COLOR UR AUTO: YELLOW
GLUCOSE UR STRIP-MCNC: NEGATIVE MG/DL
HGB UR QL STRIP: ABNORMAL
KETONES UR STRIP-MCNC: NEGATIVE MG/DL
LEUKOCYTE ESTERASE UR QL STRIP: ABNORMAL
MUCOUS THREADS #/AREA URNS LPF: PRESENT /LPF
NITRATE UR QL: NEGATIVE
PH UR STRIP: 6.5 [PH] (ref 5–7)
RBC URINE: >182 /HPF
SP GR UR STRIP: 1.04 (ref 1–1.03)
SQUAMOUS EPITHELIAL: 3 /HPF
UROBILINOGEN UR STRIP-MCNC: <2 MG/DL
WBC CLUMPS #/AREA URNS HPF: PRESENT /HPF
WBC URINE: >182 /HPF

## 2022-07-14 PROCEDURE — C1729 CATH, DRAINAGE: HCPCS

## 2022-07-14 PROCEDURE — 250N000009 HC RX 250: Performed by: RADIOLOGY

## 2022-07-14 PROCEDURE — 87088 URINE BACTERIA CULTURE: CPT | Performed by: EMERGENCY MEDICINE

## 2022-07-14 PROCEDURE — C1769 GUIDE WIRE: HCPCS

## 2022-07-14 PROCEDURE — 272N000119 HC CATH CR4

## 2022-07-14 PROCEDURE — 75984 XRAY CONTROL CATHETER CHANGE: CPT

## 2022-07-14 PROCEDURE — 81001 URINALYSIS AUTO W/SCOPE: CPT | Performed by: EMERGENCY MEDICINE

## 2022-07-14 PROCEDURE — 255N000002 HC RX 255 OP 636: Performed by: EMERGENCY MEDICINE

## 2022-07-14 RX ORDER — FENTANYL CITRATE 50 UG/ML
25-50 INJECTION, SOLUTION INTRAMUSCULAR; INTRAVENOUS EVERY 5 MIN PRN
Status: DISCONTINUED | OUTPATIENT
Start: 2022-07-14 | End: 2022-07-14 | Stop reason: HOSPADM

## 2022-07-14 RX ORDER — HEPARIN SODIUM 200 [USP'U]/100ML
1 INJECTION, SOLUTION INTRAVENOUS CONTINUOUS PRN
Status: DISCONTINUED | OUTPATIENT
Start: 2022-07-14 | End: 2022-07-14 | Stop reason: HOSPADM

## 2022-07-14 RX ORDER — OXYCODONE HYDROCHLORIDE 5 MG/1
5 TABLET ORAL EVERY 6 HOURS PRN
Qty: 6 TABLET | Refills: 0 | Status: SHIPPED | OUTPATIENT
Start: 2022-07-14 | End: 2022-07-17

## 2022-07-14 RX ORDER — NALOXONE HYDROCHLORIDE 0.4 MG/ML
0.4 INJECTION, SOLUTION INTRAMUSCULAR; INTRAVENOUS; SUBCUTANEOUS
Status: DISCONTINUED | OUTPATIENT
Start: 2022-07-14 | End: 2022-07-14 | Stop reason: HOSPADM

## 2022-07-14 RX ORDER — CEFDINIR 300 MG/1
300 CAPSULE ORAL 2 TIMES DAILY
Qty: 14 CAPSULE | Refills: 0 | Status: SHIPPED | OUTPATIENT
Start: 2022-07-14 | End: 2022-07-21

## 2022-07-14 RX ORDER — FLUMAZENIL 0.1 MG/ML
0.2 INJECTION, SOLUTION INTRAVENOUS
Status: DISCONTINUED | OUTPATIENT
Start: 2022-07-14 | End: 2022-07-14 | Stop reason: HOSPADM

## 2022-07-14 RX ORDER — NALOXONE HYDROCHLORIDE 0.4 MG/ML
0.2 INJECTION, SOLUTION INTRAMUSCULAR; INTRAVENOUS; SUBCUTANEOUS
Status: DISCONTINUED | OUTPATIENT
Start: 2022-07-14 | End: 2022-07-14 | Stop reason: HOSPADM

## 2022-07-14 RX ADMIN — LIDOCAINE HYDROCHLORIDE 5 ML: 10 INJECTION, SOLUTION EPIDURAL; INFILTRATION; INTRACAUDAL; PERINEURAL at 01:18

## 2022-07-14 RX ADMIN — IOHEXOL 15 ML: 350 INJECTION, SOLUTION INTRAVENOUS at 01:24

## 2022-07-14 NOTE — ED TRIAGE NOTES
Pt arrives to the ED with c/o catheter problem. Pt states that his suprapubic catheter came out roughly 12 hours ago. Pt reporting 10/10 pain. Bladder scan showed 349cc.      Triage Assessment     Row Name 07/13/22 3454       Triage Assessment (Adult)    Airway WDL WDL       Respiratory WDL    Respiratory WDL WDL       Skin Circulation/Temperature WDL    Skin Circulation/Temperature WDL WDL       Cardiac WDL    Cardiac WDL WDL       Peripheral/Neurovascular WDL    Peripheral Neurovascular WDL WDL       Cognitive/Neuro/Behavioral WDL    Cognitive/Neuro/Behavioral WDL WDL       [place] Coma Scale    Best Eye Response 4-->(E4) spontaneous    Best Motor Response 6-->(M6) obeys commands    Best Verbal Response 5-->(V5) oriented    [place] Coma Scale Score 15
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
## 2022-07-14 NOTE — PROGRESS NOTES
Patient Name: Josiah Crump  Medical Record Number: [id number]  Today's Date: 7/14/2022    Procedure: suprapubic catheter replacement- 16 Serbian  Proceduralist: Dr. FAVIO rGeen    Patient in room: 0105  Procedure Start: 0117  Procedure end: 0124  Patient out of room:0130    Report given to: ED RN Duane    Other Notes: Pt arrived to IR room 1 from ED-06, Patient's suprapubic tube had fallen out and needed replacement. Consent reviewed. Pt denies any questions or concerns regarding procedure.  Patient preference for local only and no IV, discussed with Dr. FAVIO Green and agreed to proceed. Pt positioned supine and monitored per protocol. Pt tolerated procedure without any noted complications. Pt transferred back to ED, bedside handoff done with ED RN's.    300cc drained from suprapubic catheter once inserted by Dr. Green    16 Serbian tube used for replacement REF [id number], LOT [id number] 5cc ribbed balloon [place] red latex catheter.

## 2022-07-14 NOTE — PROCEDURES
[facility]    Procedure: IR Procedure Note    Date/Time: 7/14/2022 1:22 AM  Performed by: Jake Geren MD  Authorized by: Jake Green MD       UNIVERSAL PROTOCOL   Site Marked: NA  Prior Images Obtained and Reviewed:  Yes  Required items: Required blood products, implants, devices and special equipment available    Patient identity confirmed:  Verbally with patient, arm band, provided demographic data and hospital-assigned identification number  Patient was reevaluated immediately before administering moderate or deep sedation or anesthesia  Confirmation Checklist:  Patient's identity using two indicators, relevant allergies, procedure was appropriate and matched the consent or emergent situation and correct equipment/implants were available  Time out: Immediately prior to the procedure a time out was called    Universal Protocol: the Joint Commission Universal Protocol was followed    Preparation: Patient was prepped and draped in usual sterile fashion       ANESTHESIA    Anesthesia: Local infiltration  Local Anesthetic:  Lidocaine 1% without epinephrine      SEDATION    Patient Sedated: No    See dictated procedure note for full details.  Findings: 16 Fr Kaw tip suprapubic bladder catheter replaced    Specimens: none    Complications: None    Condition: Stable    Plan: Gravity drainage      PROCEDURE  Describe Procedure: 16 Fr Kaw tip suprapubic bladder catheter replaced  Patient Tolerance:  Patient tolerated the procedure well with no immediate complications  Length of time physician/provider present for 1:1 monitoring during sedation: 0

## 2022-07-18 LAB
BACTERIA UR CULT: ABNORMAL
BACTERIA UR CULT: ABNORMAL

## 2022-07-19 DIAGNOSIS — I10 BENIGN ESSENTIAL HYPERTENSION: ICD-10-CM

## 2022-07-19 DIAGNOSIS — Z76.0 ENCOUNTER FOR MEDICATION REFILL: ICD-10-CM

## 2022-07-19 RX ORDER — AMLODIPINE BESYLATE 5 MG/1
TABLET ORAL
Qty: 90 TABLET | Refills: 0 | Status: SHIPPED | OUTPATIENT
Start: 2022-07-19 | End: 2022-07-25

## 2022-07-19 NOTE — TELEPHONE ENCOUNTER
"Former patient of Marisabel  & has not established care with another provider.  Please assign refill request to covering provider per clinic standard process.    Routing refill request to provider for review/approval because:  BP not in range.  Early refill requested.  No PCP    Last Written Prescription Date:  2/19/22  Last Fill Quantity: 90,  # refills: 1   Last office visit provider:  2/23/22     Requested Prescriptions   Pending Prescriptions Disp Refills     amLODIPine (NORVASC) 5 MG tablet [Pharmacy Med Name: AMLODIPINE BESYLATE 5MG TABLETS] 90 tablet 1     Sig: TAKE 1 TABLET(5 MG) BY MOUTH DAILY       Calcium Channel Blockers Protocol  Failed - 7/19/2022  1:44 PM        Failed - Blood pressure under 140/90 in past 12 months     BP Readings from Last 3 Encounters:   07/14/22 (!) 150/87   05/07/22 132/79   03/23/22 (!) 143/79                 Passed - Recent (12 mo) or future (30 days) visit within the authorizing provider's specialty     Patient has had an office visit with the authorizing provider or a provider within the authorizing providers department within the previous 12 mos or has a future within next 30 days. See \"Patient Info\" tab in inbasket, or \"Choose Columns\" in Meds & Orders section of the refill encounter.              Passed - Medication is active on med list        Passed - Patient is age 18 or older        Passed - Normal serum creatinine on file in past 12 months     Recent Labs   Lab Test 05/07/22  0712   CR 0.96       Ok to refill medication if creatinine is low               Nain Nunez RN 07/19/22 1:45 PM  "

## 2022-07-25 ENCOUNTER — OFFICE VISIT (OUTPATIENT)
Dept: FAMILY MEDICINE | Facility: CLINIC | Age: 57
End: 2022-07-25
Payer: MEDICARE

## 2022-07-25 VITALS — DIASTOLIC BLOOD PRESSURE: 82 MMHG | HEART RATE: 72 BPM | TEMPERATURE: 98.6 F | SYSTOLIC BLOOD PRESSURE: 166 MMHG

## 2022-07-25 DIAGNOSIS — Z79.899 ENCOUNTER FOR LONG-TERM CURRENT USE OF MEDICATION: ICD-10-CM

## 2022-07-25 DIAGNOSIS — Z11.4 SCREENING FOR HIV (HUMAN IMMUNODEFICIENCY VIRUS): ICD-10-CM

## 2022-07-25 DIAGNOSIS — Z89.511 HX OF BKA, RIGHT (H): ICD-10-CM

## 2022-07-25 DIAGNOSIS — G82.20 PARAPLEGIA (H): ICD-10-CM

## 2022-07-25 DIAGNOSIS — Z00.00 ENCOUNTER FOR MEDICARE ANNUAL WELLNESS EXAM: Primary | ICD-10-CM

## 2022-07-25 DIAGNOSIS — Z79.899 ENCOUNTER FOR LONG-TERM (CURRENT) USE OF MEDICATIONS: ICD-10-CM

## 2022-07-25 DIAGNOSIS — Z79.899 OTHER LONG TERM (CURRENT) DRUG THERAPY: ICD-10-CM

## 2022-07-25 DIAGNOSIS — Z76.0 ENCOUNTER FOR MEDICATION REFILL: ICD-10-CM

## 2022-07-25 DIAGNOSIS — N31.9 NEUROGENIC BLADDER: ICD-10-CM

## 2022-07-25 DIAGNOSIS — G89.4 CHRONIC PAIN SYNDROME: ICD-10-CM

## 2022-07-25 DIAGNOSIS — E66.01 MORBID OBESITY (H): ICD-10-CM

## 2022-07-25 DIAGNOSIS — T83.61XS INFECTION AND INFLAMMATORY REACTION DUE TO IMPLANTED PENILE PROSTHESIS, SEQUELA: ICD-10-CM

## 2022-07-25 DIAGNOSIS — Z13.220 SCREENING FOR HYPERLIPIDEMIA: ICD-10-CM

## 2022-07-25 DIAGNOSIS — R79.9 ABNORMAL FINDING OF BLOOD CHEMISTRY, UNSPECIFIED: ICD-10-CM

## 2022-07-25 DIAGNOSIS — I10 BENIGN ESSENTIAL HYPERTENSION: ICD-10-CM

## 2022-07-25 LAB
ALBUMIN SERPL BCG-MCNC: 3.6 G/DL (ref 3.5–5.2)
ALP SERPL-CCNC: 88 U/L (ref 40–129)
ALT SERPL W P-5'-P-CCNC: 14 U/L (ref 10–50)
ANION GAP SERPL CALCULATED.3IONS-SCNC: 13 MMOL/L (ref 7–15)
AST SERPL W P-5'-P-CCNC: 31 U/L (ref 10–50)
BILIRUB SERPL-MCNC: 0.3 MG/DL
BUN SERPL-MCNC: 14.4 MG/DL (ref 6–20)
CALCIUM SERPL-MCNC: 9.4 MG/DL (ref 8.6–10)
CANNABINOIDS UR QL SCN: NORMAL
CHLORIDE SERPL-SCNC: 105 MMOL/L (ref 98–107)
CHOLEST SERPL-MCNC: 128 MG/DL
CREAT SERPL-MCNC: 1.02 MG/DL (ref 0.67–1.17)
CREAT UR-MCNC: 52.1 MG/DL
CREAT UR-MCNC: 54 MG/DL
DEPRECATED HCO3 PLAS-SCNC: 22 MMOL/L (ref 22–29)
GFR SERPL CREATININE-BSD FRML MDRD: 87 ML/MIN/1.73M2
GLUCOSE SERPL-MCNC: 84 MG/DL (ref 70–99)
HBA1C MFR BLD: 5.6 % (ref 0–5.6)
HDLC SERPL-MCNC: 42 MG/DL
LDLC SERPL CALC-MCNC: 73 MG/DL
MICROALBUMIN UR-MCNC: 4324 MG/L
MICROALBUMIN/CREAT UR: 8299.42 MG/G CR (ref 0–17)
NONHDLC SERPL-MCNC: 86 MG/DL
POTASSIUM SERPL-SCNC: 3.6 MMOL/L (ref 3.4–5.3)
PROT SERPL-MCNC: 8 G/DL (ref 6.4–8.3)
SODIUM SERPL-SCNC: 140 MMOL/L (ref 136–145)
TRIGL SERPL-MCNC: 63 MG/DL

## 2022-07-25 PROCEDURE — 82043 UR ALBUMIN QUANTITATIVE: CPT | Performed by: FAMILY MEDICINE

## 2022-07-25 PROCEDURE — 80061 LIPID PANEL: CPT | Performed by: FAMILY MEDICINE

## 2022-07-25 PROCEDURE — 99396 PREV VISIT EST AGE 40-64: CPT | Performed by: FAMILY MEDICINE

## 2022-07-25 PROCEDURE — 87389 HIV-1 AG W/HIV-1&-2 AB AG IA: CPT | Performed by: FAMILY MEDICINE

## 2022-07-25 PROCEDURE — G0480 DRUG TEST DEF 1-7 CLASSES: HCPCS | Mod: 59 | Performed by: FAMILY MEDICINE

## 2022-07-25 PROCEDURE — 83036 HEMOGLOBIN GLYCOSYLATED A1C: CPT | Performed by: FAMILY MEDICINE

## 2022-07-25 PROCEDURE — 80053 COMPREHEN METABOLIC PANEL: CPT | Performed by: FAMILY MEDICINE

## 2022-07-25 PROCEDURE — 36415 COLL VENOUS BLD VENIPUNCTURE: CPT | Performed by: FAMILY MEDICINE

## 2022-07-25 PROCEDURE — G0480 DRUG TEST DEF 1-7 CLASSES: HCPCS | Performed by: FAMILY MEDICINE

## 2022-07-25 RX ORDER — OXYCODONE HYDROCHLORIDE 5 MG/1
5 TABLET ORAL EVERY 12 HOURS PRN
Qty: 30 TABLET | Refills: 0 | Status: SHIPPED | OUTPATIENT
Start: 2022-07-25 | End: 2022-08-02

## 2022-07-25 RX ORDER — MELOXICAM 15 MG/1
15 TABLET ORAL DAILY
COMMUNITY
Start: 2022-05-18 | End: 2023-04-17

## 2022-07-25 RX ORDER — AMLODIPINE BESYLATE 5 MG/1
TABLET ORAL
Qty: 90 TABLET | Refills: 0 | Status: SHIPPED | OUTPATIENT
Start: 2022-07-25 | End: 2022-10-19

## 2022-07-25 RX ORDER — BACLOFEN 20 MG/1
TABLET ORAL
Qty: 90 TABLET | Refills: 5 | Status: SHIPPED | OUTPATIENT
Start: 2022-07-25 | End: 2023-01-18

## 2022-07-25 NOTE — LETTER
Opioid / Opioid Plus Controlled Substance Agreement    This is an agreement between you and your provider about the safe and appropriate use of controlled substance/opioids prescribed by your care team. Controlled substances are medicines that can cause physical and mental dependence (abuse).    There are strict laws about having and using these medicines. We here at [facility] are committing to working with you in your efforts to get better. To support you in this work, we ll help you schedule regular office appointments for medicine refills. If we must cancel or change your appointment for any reason, we ll make sure you have enough medicine to last until your next appointment.     As a Provider, I will:    Listen carefully to your concerns and treat you with respect.     Recommend a treatment plan that I believe is in your best interest. This plan may involve therapies other than opioid pain medication.     Talk with you often about the possible benefits, and the risk of harm of any medicine that we prescribe for you.     Provide a plan on how to taper (discontinue or go off) using this medicine if the decision is made to stop its use.    As a Patient, I understand that opioid(s):     Are a controlled substance prescribed by my care team to help me function or work and manage my condition(s).     Are strong medicines and can cause serious side effects such as:    Drowsiness, which can seriously affect my driving ability    A lower breathing rate, enough to cause death    Harm to my thinking ability     Depression     Abuse of and addiction to this medicine    Need to be taken exactly as prescribed. Combining opioids with certain medicines or chemicals (such as illegal drugs, sedatives, sleeping pills, and benzodiazepines) can be dangerous or even fatal. If I stop opioids suddenly, I may have severe withdrawal symptoms.    Do not work for all types of pain nor for all patients. If they re not helpful, I may  be asked to stop them.        The risks, benefits and side effects of these medicine(s) were explained to me. I agree that:  1. I will take part in other treatments as advised by my care team. This may be psychiatry or counseling, physical therapy, behavioral therapy, group treatment or a referral to a specialist.     2. I will keep all my appointments. I understand that this is part of the monitoring of opioids. My care team may require an office visit for EVERY opioid/controlled substance refill. If I miss appointments or don t follow instructions, my care team may stop my medicine.    3. I will take my medicines as prescribed. I will not change the dose or schedule unless my care team tells me to. There will be no refills if I run out early.     4. I may be asked to come to the clinic and complete a urine drug test or complete a pill count at any time. If I don t give a urine sample or participate in a pill count, the care team may stop my medicine.    5. I will only receive prescriptions from this clinic for chronic pain. If I am treated by another provider for acute pain issues, I will tell them that I am taking opioid pain medication for chronic pain and that I have a treatment agreement with this provider. I will inform my [facility] care team within one business day if I am given a prescription for any pain medication by another healthcare provider. My [facility] care team can contact other providers and pharmacists about my use of any medicines.    6. It is up to me to make sure that I don t run out of my medicines on weekends or holidays. If my care team is willing to refill my opioid prescription without a visit, I must request refills only during office hours. Refills may take up to 3 business days to process. I will use one pharmacy to fill all my opioid and other controlled substance prescriptions. I will notify the clinic about any changes to my insurance or medication  availability.    7. I am responsible for my prescriptions. If the medicine/prescription is lost, stolen or destroyed, it will not be replaced. I also agree not to share controlled substance medicines with anyone.    8. I am aware I should not use any illegal or recreational drugs. I agree not to drink alcohol unless my care team says I can.       9. If I enroll in the [place] Medical Cannabis program, I will tell my care team prior to my next refill.     10. I will tell my care team right away if I become pregnant, have a new medical problem treated outside of my regular clinic, or have a change in my medications.    11. I understand that this medicine can affect my thinking, judgment and reaction time. Alcohol and drugs affect the brain and body, which can affect the safety of my driving. Being under the influence of alcohol or drugs can affect my decision-making, behaviors, personal safety, and the safety of others. Driving while impaired (DWI) can occur if a person is driving, operating, or in physical control of a car, motorcycle, boat, snowmobile, ATV, motorbike, off-road vehicle, or any other motor vehicle ([place] Statute 169A.20). I understand the risk if I choose to drive or operate any vehicle or machinery.    I understand that if I do not follow any of the conditions above, my prescriptions or treatment may be stopped or changed.          Opioids  What You Need to Know    What are opioids?   Opioids are pain medicines that must be prescribed by a doctor. They are also known as narcotics.     Examples are:   1. morphine (MS Contin, Mirta)  2. oxycodone (Oxycontin)  3. oxycodone and acetaminophen (Percocet)  4. hydrocodone and acetaminophen (Vicodin, Norco)   5. fentanyl patch (Duragesic)   6. hydromorphone (Dilaudid)   7. methadone  8. codeine (Tylenol #3)     What do opioids do well?   Opioids are best for severe short-term pain such as after a surgery or injury. They may work well for cancer pain. They may  help some people with long-lasting (chronic) pain.     What do opioids NOT do well?   Opioids never get rid of pain entirely, and they don t work well for most patients with chronic pain. Opioids don t reduce swelling, one of the causes of pain.                                    Other ways to manage chronic pain and improve function include:       Treat the health problem that may be causing pain    Anti-inflammation medicines, which reduce swelling and tenderness, such as ibuprofen (Advil, Motrin) or naproxen (Aleve)    Acetaminophen (Tylenol)    Antidepressants and anti-seizure medicines, especially for nerve pain    Topical treatments such as patches or creams    Injections or nerve blocks    Chiropractic or osteopathic treatment    Acupuncture, massage, deep breathing, meditation, visual imagery, aromatherapy    Use heat or ice at the pain site    Physical therapy     Exercise    Stop smoking    Take part in therapy       Risks and side effects     Talk to your doctor before you start or decide to keep taking opioids. Possible side effects include:      Lowering your breathing rate enough to cause death    Overdose, including death, especially if taking higher than prescribed doses    Worse depression symptoms; less pleasure in things you usually enjoy    Feeling tired or sluggish    Slower thoughts or cloudy thinking    Being more sensitive to pain over time; pain is harder to control    Trouble sleeping or restless sleep    Changes in hormone levels (for example, less testosterone)    Changes in sex drive or ability to have sex    Constipation    Unsafe driving    Itching and sweating    Dizziness    Nausea, throwing up and dry mouth    What else should I know about opioids?    Opioids may lead to dependence, tolerance, or addiction.      Dependence means that if you stop or reduce the medicine too quickly, you will have withdrawal symptoms. These include loose poop (diarrhea), jitters, flu-like symptoms,  nervousness and tremors. Dependence is not the same as addiction.                       Tolerance means needing higher doses over time to get the same effect. This may increase the chance of serious side effects.      Addiction is when people improperly use a substance that harms their body, their mind or their relations with others. Use of opiates can cause a relapse of addiction if you have a history of drug or alcohol abuse.      People who have used opioids for a long time may have a lower quality of life, worse depression, higher levels of pain and more visits to doctors.    You can overdose on opioids. Take these steps to lower your risk of overdose:    1. Recognize the signs:  Signs of overdose include decrease or loss of consciousness (blackout), slowed breathing, trouble waking up and blue lips. If someone is worried about overdose, they should call 911.    2. Talk to your doctor about Narcan (naloxone).   If you are at risk for overdose, you may be given a prescription for Narcan. This medicine very quickly reverses the effects of opioids.   If you overdose, a friend or family member can give you Narcan while waiting for the ambulance. They need to know the signs of overdose and how to give Narcan.     3. Don't use alcohol or street drugs.   Taking them with opioids can cause death.    4. Do not take any of these medicines unless your doctor says it s OK. Taking these with opioids can cause death:    Benzodiazepines, such as lorazepam (Ativan), alprazolam (Xanax) or diazepam (Valium)    Muscle relaxers, such as cyclobenzaprine (Flexeril)    Sleeping pills like zolpidem (Ambien)     Other opioids      How to keep you and other people safe while taking opioids:    1. Never share your opioids with others.  Opioid medicines are regulated by the Drug Enforcement Agency (JOAQUIM). Selling or sharing medications is a criminal act.    2. Be sure to store opioids in a secure place, locked up if possible. Young children  can easily swallow them and overdose.    3. When you are traveling with your medicines, keep them in the original bottles. If you use a pill box, be sure you also carry a copy of your medicine list from your clinic or pharmacy.    4. Safe disposal of opioids    Most pharmacies have places to get rid of medicine, called disposal kiosks. Medicine disposal options are also available in every [facility]. Search your county and  medication disposal  to find more options. You can find more details at:  [email or web address]     I agree that my provider, clinic care team, and pharmacy may work with any city, state or federal law enforcement agency that investigates the misuse, sale, or other diversion of my controlled medicine. I will allow my provider to discuss my care with, or share a copy of, this agreement with any other treating provider, pharmacy or emergency room where I receive care.    I have read this agreement and have asked questions about anything I did not understand.    _______________________________________________________  Patient Signature - Josiah Crump _____________________                   Date     _______________________________________________________  Provider Signature - Bala Benjamin MD   _____________________                   Date     _______________________________________________________  Witness Signature (required if provider not present while patient signing)   _____________________                   Date

## 2022-07-25 NOTE — PATIENT INSTRUCTIONS
Patient Education   Personalized Prevention Plan  You are due for the preventive services outlined below.  Your care team is available to assist you in scheduling these services.  If you have already completed any of these items, please share that information with your care team to update in your medical record.  Health Maintenance Due   Topic Date Due    Kidney Microalbumin Urine Test  Never done    URINE DRUG SCREEN  Never done    ANNUAL REVIEW OF HM ORDERS  Never done    Discuss Advance Care Planning  Never done    Eye Exam  Never done    HIV Screening  Never done    Hepatitis B Vaccine (1 of 3 - Risk 3-dose series) Never done    Annual Wellness Visit  03/06/2018    Cholesterol Lab  06/22/2021    A1C Lab  02/24/2022    COVID-19 Vaccine (4 - Booster for Pfizer series) 03/24/2022    Zoster (Shingles) Vaccine (2 of 2) 08/03/2021          Narcotics are not always helpful  Chronic narcotics, such as those which you have been on, are very often not a good option.  You appear to have developed tolerance and have found that your current doses of narcotics do not control your pain.  Current medication is only masking the source of your pain, rather than decreasing inflammation, nerve hyperactivity, and muscle spasm -  We need to get at the root cause of your pain to be successful.  Ultimately, the goal should be to slowly get off these medications.   Our care team will work together with you to make this happen.
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
## 2022-07-26 ENCOUNTER — TELEPHONE (OUTPATIENT)
Dept: FAMILY MEDICINE | Facility: CLINIC | Age: 57
End: 2022-07-26

## 2022-07-26 LAB — HIV 1+2 AB+HIV1 P24 AG SERPL QL IA: NONREACTIVE

## 2022-07-26 NOTE — TELEPHONE ENCOUNTER
Reason for Call: Request for an order or referral:    Order or referral being requested: seat and backrest for wheelchair     Date needed: as soon as possible    Has the patient been seen by the PCP for this problem? Not Applicable    Additional comments: Pt called stated that his wheelchair is falling apart. He need a new seat and backrest for his wheelchair. Pt is requesting if provider can fax an order to [facility] at fax#[phone]. Please look into request and faxed order if applicable, if unable to faxed order please follow back up with pt. Thank you.       Phone number Patient can be reached at:  Home number on file [phone] (home)    Best Time:  Anytime     Can we leave a detailed message on this number?  YES    Call taken on 7/26/2022 at 11:12 AM by Lorenzo Coon

## 2022-07-26 NOTE — TELEPHONE ENCOUNTER
Electric power order was signed and placed  in my out box.  Please also fax the patient [employer] worker Sonja fax #[phone], phone #[phone]

## 2022-07-27 DIAGNOSIS — G82.20 PARAPLEGIA (H): Primary | ICD-10-CM

## 2022-07-27 LAB
BZE UR CFM-MCNC: 428 NG/ML
BZE/CREAT UR: 793 NG/MG {CREAT}

## 2022-07-29 NOTE — PROGRESS NOTES
Writer heard back from Nannette VIRGEN Liaison with [facility]. She will be coming out on Monday June 10th at 9 am to assess the pt for potential increase in services. SW will continue to follow and assist as needed.    AIDA Epstein  [facility]   P: [phone]  Pgr: [phone]     H Plasty Text: Given the location of the defect, shape of the defect and the proximity to free margins a H-plasty was deemed most appropriate for repair.  Using a sterile surgical marker, the appropriate advancement arms of the H-plasty were drawn incorporating the defect and placing the expected incisions within the relaxed skin tension lines where possible. The area thus outlined was incised deep to adipose tissue with a #15 scalpel blade. The skin margins were undermined to an appropriate distance in all directions utilizing iris scissors.  The opposing advancement arms were then advanced into place in opposite direction and anchored with interrupted buried subcutaneous sutures.

## 2022-08-01 ENCOUNTER — PATIENT OUTREACH (OUTPATIENT)
Dept: NURSING | Facility: CLINIC | Age: 57
End: 2022-08-01

## 2022-08-01 NOTE — PROGRESS NOTES
Clinic Care Coordination Contact    Follow Up Progress Note      Assessment:    Pt reports that oxycodone script has a quantity of 30 , dosing instruction PRN 12 hours   Pt reports that he take 2 tabs daily and will be out in 15 days - which will be this week.  Pt is planning on heading out of town next week and would like a refill before as he will be out of town for three weeks.       Care Gaps:    Health Maintenance Due   Topic Date Due     EYE EXAM  Never done     HEPATITIS B IMMUNIZATION (1 of 3 - Risk 3-dose series) Never done     COVID-19 Vaccine (4 - Booster for Pfizer series) 03/24/2022     ZOSTER IMMUNIZATION (2 of 2) 08/03/2021       Goals addressed this encounter:    Goals Addressed                    This Visit's Progress       Patient Stated       Medication 1 (pt-stated)         Goal Statement: I would like to get medication therapy for pain dosing within 1-2 weeks   Date Goal set: 08/01/22  Barriers: access  Strengths: engagement  Date to Achieve By: Sept   Patient expressed understanding of goal: yes  Action steps to achieve this goal:  1. I will work with PCP to determine plan and dosing schedule  2. I will follow up as needed   3. I will update Care coordination team during next outreach               Mental Health Management (pt-stated)   40%      Goal Statement: I will have a better understanding and plan of care for BHP within 3-4 months  Date Goal set: 05/11/22    Barriers: language, knowledge deficit   Strengths: family support  Date to Achieve By:  Patient expressed understanding of goal: yes  Action steps to achieve this goal:  1. I will attend my appointment on 6/15/2022  2. I will update CCC team    08/01/22  RN CC shared scheduling phone number to reschedule missed visit            Transportation (pt-stated)   60%       Goal Statement: I would like to get an updated order for Wheelchair repair and consideration for power wheelchair with cadi waiver within 2-3 months   Date Goal set:  05/11/22  Barriers: access  Strengths: pt self advocate  Date to Achieve By: July  Patient expressed understanding of goal: yes  Action steps to achieve this goal:  1. I will meet with PCP to consider and review DME order  2. I will update Care coordination team during next outreach    08/01/22  Has visit for fitting in September                 Intervention/Education provided during outreach: send message to provider about refill request         Plan:   Patient will schedule and attend recommended follow up visits with speciality providers and primary care provider.    RN CC will outreach in 4-6 weeks to support ongoing recommendations and plan of care will be available sooner if needed.

## 2022-08-02 ENCOUNTER — NURSE TRIAGE (OUTPATIENT)
Dept: NURSING | Facility: CLINIC | Age: 57
End: 2022-08-02

## 2022-08-02 RX ORDER — OXYCODONE HYDROCHLORIDE 5 MG/1
5 TABLET ORAL EVERY 12 HOURS PRN
Qty: 30 TABLET | Refills: 0 | Status: SHIPPED | OUTPATIENT
Start: 2022-08-02 | End: 2022-11-08

## 2022-08-02 NOTE — TELEPHONE ENCOUNTER
I have to go out of town in the morning @ 6am. My mother is ill and in the hospital with COVID.  Natalya does not have prescription Oxycodone. Last picked up 15 day supply 7/25/2022.Too soon to refill--next refill not due until 8/9. Patient states he needs a vacation override. Patient states it was sent @ 4:02pm today, but pharmacy states they did not receive the order.     Message will be forwarded to provider, requesting order to be resent. If it is not rec'd by the time the patient has to leave town, advised him to call back with information of pharmacy in [place] where he is traveling to.     Pam Kennedy RN Triage Nurse Advisor 5:59 PM 8/2/2022    Reason for Disposition    Caller requesting a CONTROLLED substance prescription refill (e.g., narcotics, ADHD medicines)    Additional Information    Negative: Drug overdose and triager unable to answer question    Negative: Caller requesting information unrelated to medicine    Negative: Caller requesting a prescription for Strep throat and has a positive culture result    Negative: Rash while taking a medication or within 3 days of stopping it    Negative: Immunization reaction suspected    Negative: [1] Asthma and [2] having symptoms of asthma (cough, wheezing, etc.)    Negative: [1] Influenza symptoms AND [2] anti-viral med prescription request, such as Tamiflu    Negative: [1] Symptom of illness (e.g., headache, abdominal pain, earache, vomiting) AND [2] more than mild    Negative: MORE THAN A DOUBLE DOSE of a prescription or over-the-counter (OTC) drug    Negative: [1] DOUBLE DOSE (an extra dose or lesser amount) of over-the-counter (OTC) drug AND [2] any symptoms (e.g., dizziness, nausea, pain, sleepiness)    Negative: [1] DOUBLE DOSE (an extra dose or lesser amount) of prescription drug AND [2] any symptoms (e.g., dizziness, nausea, pain, sleepiness)    Negative: Took another person's prescription drug    Negative: [1] DOUBLE DOSE (an extra dose or  "lesser amount) of prescription drug AND [2] NO symptoms (Exception: a double dose of antibiotics)    Negative: Diabetes drug error or overdose (e.g., took wrong type of insulin or took extra dose)    Negative: [1] Request for URGENT new prescription or refill of \"essential\" medication (i.e., likelihood of harm to patient if not taken) AND [2] triager unable to fill per unit policy    Negative: [1] Prescription not at pharmacy AND [2] was prescribed by PCP recently    Negative: [1] Pharmacy calling with prescription questions AND [2] triager unable to answer question    Negative: [1] Caller has URGENT medication question about med that PCP or specialist prescribed AND [2] triager unable to answer question    Negative: [1] Caller has NON-URGENT medication question about med that PCP prescribed AND [2] triager unable to answer question    Negative: [1] Caller requesting a NON-URGENT new prescription or refill AND [2] triager unable to refill per unit policy    Negative: [1] Caller has medication question about med not prescribed by PCP AND [2] triager unable to answer question (e.g., compatibility with other med, storage)    Protocols used: MEDICATION QUESTION CALL-A-AH      "

## 2022-08-02 NOTE — TELEPHONE ENCOUNTER
Pt called back to follow up on oxycodone refill as indicated in notes below. Please look into request and order med if applicable. If unable to order med please follow back up with pt to advise. Thank you.       Clinic Care Coordination Contact     Follow Up Progress Note      Assessment:    Pt reports that oxycodone script has a quantity of 30 , dosing instruction PRN 12 hours   Pt reports that he take 2 tabs daily and will be out in 15 days - which will be this week.  Pt is planning on heading out of town next week and would like a refill before as he will be out of town for three weeks.

## 2022-08-03 NOTE — TELEPHONE ENCOUNTER
Prescription was sent to [facility] in [place] yesterday.  Please check with the pharmacy if they did receive it or not.  Thank you

## 2022-08-03 NOTE — TELEPHONE ENCOUNTER
RN attempted to contact patient, but no answer. Left message on patient's voice mail that Rx below is ready to be picked up at [facility].    No further action needed.    [facility] #[id number] [address] AT [facility]  oxyCODONE (ROXICODONE) 5 MG tablet 30 tablet 0 8/2/2022     Mame Bishop RN  [facility]

## 2022-08-10 ENCOUNTER — MEDICAL CORRESPONDENCE (OUTPATIENT)
Dept: ADMINISTRATIVE | Facility: CLINIC | Age: 57
End: 2022-08-10

## 2022-08-17 DIAGNOSIS — Z76.0 ENCOUNTER FOR MEDICATION REFILL: ICD-10-CM

## 2022-08-18 RX ORDER — CLOPIDOGREL BISULFATE 75 MG/1
TABLET ORAL
Qty: 90 TABLET | Refills: 1 | Status: SHIPPED | OUTPATIENT
Start: 2022-08-18 | End: 2023-02-27

## 2022-08-18 NOTE — TELEPHONE ENCOUNTER
"Routing refill request to provider for review/approval because:  Labs out of range:  hgb    Last Written Prescription Date:  2/21/22  Last Fill Quantity: 90,  # refills: 1   Last office visit provider:  7/25/22     Requested Prescriptions   Pending Prescriptions Disp Refills     clopidogrel (PLAVIX) 75 MG tablet [Pharmacy Med Name: CLOPIDOGREL 75MG TABLETS] 90 tablet 1     Sig: TAKE 1 TABLET(75 MG) BY MOUTH DAILY       Plavix Failed - 8/17/2022  3:22 PM        Failed - Normal HGB on file in past 12 months     Recent Labs   Lab Test 05/07/22  0712   HGB 12.0*               Passed - No active PPI on record unless is Protonix        Passed - Normal Platelets on file in past 12 months     Recent Labs   Lab Test 05/07/22  0712                  Passed - Recent (12 mo) or future (30 days) visit within the authorizing provider's specialty     Patient has had an office visit with the authorizing provider or a provider within the authorizing providers department within the previous 12 mos or has a future within next 30 days. See \"Patient Info\" tab in inbasket, or \"Choose Columns\" in Meds & Orders section of the refill encounter.              Passed - Medication is active on med list        Passed - Patient is age 18 or older             Felicita Ferreira RN 08/17/22 8:56 PM  "

## 2022-08-22 ENCOUNTER — HOSPITAL ENCOUNTER (EMERGENCY)
Facility: CLINIC | Age: 57
Discharge: HOME | End: 2022-08-22
Attending: EMERGENCY MEDICINE | Admitting: EMERGENCY MEDICINE
Payer: MEDICARE

## 2022-08-22 ENCOUNTER — APPOINTMENT (OUTPATIENT)
Dept: CT IMAGING | Facility: CLINIC | Age: 57
End: 2022-08-22
Attending: EMERGENCY MEDICINE
Payer: MEDICARE

## 2022-08-22 VITALS
RESPIRATION RATE: 16 BRPM | OXYGEN SATURATION: 99 % | HEART RATE: 75 BPM | TEMPERATURE: 97.6 F | DIASTOLIC BLOOD PRESSURE: 84 MMHG | SYSTOLIC BLOOD PRESSURE: 145 MMHG | HEIGHT: 76 IN | BODY MASS INDEX: 27.46 KG/M2 | WEIGHT: 225.5 LBS

## 2022-08-22 DIAGNOSIS — K62.89 PROCTITIS: ICD-10-CM

## 2022-08-22 LAB
ALBUMIN SERPL BCG-MCNC: 3.5 G/DL (ref 3.5–5.2)
ALBUMIN UR-MCNC: 300 MG/DL
ALP SERPL-CCNC: 91 U/L (ref 40–129)
ALT SERPL W P-5'-P-CCNC: 15 U/L (ref 10–50)
ANION GAP SERPL CALCULATED.3IONS-SCNC: 7 MMOL/L (ref 7–15)
APPEARANCE UR: CLEAR
AST SERPL W P-5'-P-CCNC: 24 U/L (ref 10–50)
BACTERIA #/AREA URNS HPF: ABNORMAL /HPF
BASOPHILS # BLD AUTO: 0.1 10E3/UL (ref 0–0.2)
BASOPHILS NFR BLD AUTO: 1 %
BILIRUB SERPL-MCNC: 0.2 MG/DL
BILIRUB UR QL STRIP: NEGATIVE
BUN SERPL-MCNC: 22.5 MG/DL (ref 6–20)
CALCIUM SERPL-MCNC: 9.1 MG/DL (ref 8.6–10)
CHLORIDE SERPL-SCNC: 106 MMOL/L (ref 98–107)
COLOR UR AUTO: ABNORMAL
CREAT SERPL-MCNC: 1.03 MG/DL (ref 0.67–1.17)
DEPRECATED HCO3 PLAS-SCNC: 27 MMOL/L (ref 22–29)
EOSINOPHIL # BLD AUTO: 0.4 10E3/UL (ref 0–0.7)
EOSINOPHIL NFR BLD AUTO: 4 %
ERYTHROCYTE [DISTWIDTH] IN BLOOD BY AUTOMATED COUNT: 17.9 % (ref 10–15)
GFR SERPL CREATININE-BSD FRML MDRD: 86 ML/MIN/1.73M2
GLUCOSE SERPL-MCNC: 109 MG/DL (ref 70–99)
GLUCOSE UR STRIP-MCNC: NEGATIVE MG/DL
HCT VFR BLD AUTO: 41.4 % (ref 40–53)
HGB BLD-MCNC: 11.9 G/DL (ref 13.3–17.7)
HGB UR QL STRIP: ABNORMAL
IMM GRANULOCYTES # BLD: 0 10E3/UL
IMM GRANULOCYTES NFR BLD: 0 %
KETONES UR STRIP-MCNC: NEGATIVE MG/DL
LEUKOCYTE ESTERASE UR QL STRIP: ABNORMAL
LIPASE SERPL-CCNC: 20 U/L (ref 13–60)
LYMPHOCYTES # BLD AUTO: 1.6 10E3/UL (ref 0.8–5.3)
LYMPHOCYTES NFR BLD AUTO: 14 %
MCH RBC QN AUTO: 21.8 PG (ref 26.5–33)
MCHC RBC AUTO-ENTMCNC: 28.7 G/DL (ref 31.5–36.5)
MCV RBC AUTO: 76 FL (ref 78–100)
MONOCYTES # BLD AUTO: 1 10E3/UL (ref 0–1.3)
MONOCYTES NFR BLD AUTO: 8 %
NEUTROPHILS # BLD AUTO: 8.4 10E3/UL (ref 1.6–8.3)
NEUTROPHILS NFR BLD AUTO: 73 %
NITRATE UR QL: POSITIVE
NRBC # BLD AUTO: 0 10E3/UL
NRBC BLD AUTO-RTO: 0 /100
PH UR STRIP: 6.5 [PH] (ref 5–7)
PLATELET # BLD AUTO: 283 10E3/UL (ref 150–450)
POTASSIUM SERPL-SCNC: 4.2 MMOL/L (ref 3.4–5.3)
PROT SERPL-MCNC: 7.5 G/DL (ref 6.4–8.3)
RBC # BLD AUTO: 5.45 10E6/UL (ref 4.4–5.9)
RBC URINE: 5 /HPF
SODIUM SERPL-SCNC: 140 MMOL/L (ref 136–145)
SP GR UR STRIP: 1.01 (ref 1–1.03)
UROBILINOGEN UR STRIP-MCNC: NORMAL MG/DL
WBC # BLD AUTO: 11.5 10E3/UL (ref 4–11)
WBC URINE: 48 /HPF

## 2022-08-22 PROCEDURE — 258N000003 HC RX IP 258 OP 636: Performed by: EMERGENCY MEDICINE

## 2022-08-22 PROCEDURE — 250N000011 HC RX IP 250 OP 636: Performed by: EMERGENCY MEDICINE

## 2022-08-22 PROCEDURE — 85025 COMPLETE CBC W/AUTO DIFF WBC: CPT | Performed by: EMERGENCY MEDICINE

## 2022-08-22 PROCEDURE — 83690 ASSAY OF LIPASE: CPT | Performed by: EMERGENCY MEDICINE

## 2022-08-22 PROCEDURE — 96374 THER/PROPH/DIAG INJ IV PUSH: CPT | Mod: 59 | Performed by: EMERGENCY MEDICINE

## 2022-08-22 PROCEDURE — G1010 CDSM STANSON: HCPCS | Mod: GC | Performed by: STUDENT IN AN ORGANIZED HEALTH CARE EDUCATION/TRAINING PROGRAM

## 2022-08-22 PROCEDURE — 74177 CT ABD & PELVIS W/CONTRAST: CPT | Mod: 26 | Performed by: STUDENT IN AN ORGANIZED HEALTH CARE EDUCATION/TRAINING PROGRAM

## 2022-08-22 PROCEDURE — 99285 EMERGENCY DEPT VISIT HI MDM: CPT | Performed by: EMERGENCY MEDICINE

## 2022-08-22 PROCEDURE — 250N000013 HC RX MED GY IP 250 OP 250 PS 637: Performed by: EMERGENCY MEDICINE

## 2022-08-22 PROCEDURE — 80053 COMPREHEN METABOLIC PANEL: CPT | Performed by: EMERGENCY MEDICINE

## 2022-08-22 PROCEDURE — G1010 CDSM STANSON: HCPCS

## 2022-08-22 PROCEDURE — 99285 EMERGENCY DEPT VISIT HI MDM: CPT | Mod: 25 | Performed by: EMERGENCY MEDICINE

## 2022-08-22 PROCEDURE — 36415 COLL VENOUS BLD VENIPUNCTURE: CPT | Performed by: EMERGENCY MEDICINE

## 2022-08-22 PROCEDURE — 81003 URINALYSIS AUTO W/O SCOPE: CPT | Performed by: EMERGENCY MEDICINE

## 2022-08-22 PROCEDURE — 96361 HYDRATE IV INFUSION ADD-ON: CPT | Performed by: EMERGENCY MEDICINE

## 2022-08-22 PROCEDURE — 87086 URINE CULTURE/COLONY COUNT: CPT | Performed by: EMERGENCY MEDICINE

## 2022-08-22 RX ORDER — LEVOFLOXACIN 500 MG/1
500 TABLET, FILM COATED ORAL ONCE
Status: COMPLETED | OUTPATIENT
Start: 2022-08-22 | End: 2022-08-22

## 2022-08-22 RX ORDER — IOPAMIDOL 755 MG/ML
85 INJECTION, SOLUTION INTRAVASCULAR ONCE
Status: DISCONTINUED | OUTPATIENT
Start: 2022-08-22 | End: 2022-08-22

## 2022-08-22 RX ORDER — OXYCODONE HYDROCHLORIDE 5 MG/1
5 TABLET ORAL EVERY 4 HOURS PRN
Qty: 18 TABLET | Refills: 0 | Status: SHIPPED | OUTPATIENT
Start: 2022-08-22 | End: 2022-08-25

## 2022-08-22 RX ORDER — ONDANSETRON 2 MG/ML
4 INJECTION INTRAMUSCULAR; INTRAVENOUS EVERY 30 MIN PRN
Status: DISCONTINUED | OUTPATIENT
Start: 2022-08-22 | End: 2022-08-22 | Stop reason: HOSPADM

## 2022-08-22 RX ORDER — IOPAMIDOL 755 MG/ML
135 INJECTION, SOLUTION INTRAVASCULAR ONCE
Status: COMPLETED | OUTPATIENT
Start: 2022-08-22 | End: 2022-08-22

## 2022-08-22 RX ORDER — METRONIDAZOLE 500 MG/1
500 TABLET ORAL 3 TIMES DAILY
Qty: 30 TABLET | Refills: 0 | Status: SHIPPED | OUTPATIENT
Start: 2022-08-22 | End: 2022-09-01

## 2022-08-22 RX ORDER — LEVOFLOXACIN 500 MG/1
500 TABLET, FILM COATED ORAL DAILY
Qty: 10 TABLET | Refills: 0 | Status: SHIPPED | OUTPATIENT
Start: 2022-08-22 | End: 2022-09-01

## 2022-08-22 RX ORDER — OXYCODONE HYDROCHLORIDE 5 MG/1
5 TABLET ORAL ONCE
Status: COMPLETED | OUTPATIENT
Start: 2022-08-22 | End: 2022-08-22

## 2022-08-22 RX ORDER — HYDROMORPHONE HYDROCHLORIDE 1 MG/ML
0.5 INJECTION, SOLUTION INTRAMUSCULAR; INTRAVENOUS; SUBCUTANEOUS ONCE
Status: COMPLETED | OUTPATIENT
Start: 2022-08-22 | End: 2022-08-22

## 2022-08-22 RX ORDER — METRONIDAZOLE 500 MG/1
500 TABLET ORAL ONCE
Status: COMPLETED | OUTPATIENT
Start: 2022-08-22 | End: 2022-08-22

## 2022-08-22 RX ADMIN — IOPAMIDOL 135 ML: 755 INJECTION, SOLUTION INTRAVENOUS at 08:03

## 2022-08-22 RX ADMIN — SODIUM CHLORIDE 1000 ML: 9 INJECTION, SOLUTION INTRAVENOUS at 06:54

## 2022-08-22 RX ADMIN — HYDROMORPHONE HYDROCHLORIDE 0.5 MG: 1 INJECTION, SOLUTION INTRAMUSCULAR; INTRAVENOUS; SUBCUTANEOUS at 06:53

## 2022-08-22 RX ADMIN — METRONIDAZOLE 500 MG: 500 TABLET ORAL at 10:59

## 2022-08-22 RX ADMIN — OXYCODONE HYDROCHLORIDE 5 MG: 5 TABLET ORAL at 10:59

## 2022-08-22 RX ADMIN — LEVOFLOXACIN 500 MG: 500 TABLET, FILM COATED ORAL at 10:59

## 2022-08-22 ASSESSMENT — ACTIVITIES OF DAILY LIVING (ADL)
ADLS_ACUITY_SCORE: 35
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
## 2022-08-22 NOTE — ED TRIAGE NOTES
Pt BIBA, called EMS for mental brakdown d/t stress in life at the moment, also c/o lower abd pain, suprapubic catheter dislodging mulitple times over the last few days-came out again while being moved from EMS stretcher to cart, scheduled appt 8/23 to be changed, VSS,      Triage Assessment     Row Name 08/22/22 0541       Triage Assessment (Adult)    Airway WDL WDL       Respiratory WDL    Respiratory WDL WDL       Skin Circulation/Temperature WDL    Skin Circulation/Temperature WDL WDL       Cardiac WDL    Cardiac WDL WDL       Peripheral/Neurovascular WDL    Peripheral Neurovascular WDL WDL       Cognitive/Neuro/Behavioral WDL    Cognitive/Neuro/Behavioral WDL WDL

## 2022-08-22 NOTE — PROGRESS NOTES
Brief Social Work Note    Expected Discharge Date:  8/22/2022     Concerns to be Addressed:    discharge transportation    Additional Information:    [address] spoke with ED RN Bessie who asked SW to arrange discharge transportation for pt.    [address] contacted [facility] (Ph: [phone]), spoke with Solomon and secured Wheelchair transportation for pt pickup at ASAP    1127 [address] updated ED RN with ride information.    SW will continue to follow as needed.    STEVEN Figueroa, LGSW  ED/OBS   [facility]  Phone: [phone]  Pager: [phone]  Fax: [phone]     On-call pager, [phone], 4:00 pm to midnight

## 2022-08-22 NOTE — DISCHARGE INSTRUCTIONS
Follow-up with your primary care provider.  Return to the emergency department as needed for any new or worsening symptoms.

## 2022-08-22 NOTE — ED TRIAGE NOTES
Pt BIBA, called EMS for mental brakdown d/t stress in life at the moment, also c/o lower abd pain, suprapubic catheter dislodging mulitple times over the last few days-came out again while being moved from EMS stretcher to cart, scheduled appt 8/23 to be changed, VSS,

## 2022-08-23 LAB — BACTERIA UR CULT: NORMAL

## 2022-09-06 ENCOUNTER — MEDICAL CORRESPONDENCE (OUTPATIENT)
Dept: HEALTH INFORMATION MANAGEMENT | Facility: CLINIC | Age: 57
End: 2022-09-06

## 2022-09-06 DIAGNOSIS — T83.61XS INFECTION AND INFLAMMATORY REACTION DUE TO IMPLANTED PENILE PROSTHESIS, SEQUELA: ICD-10-CM

## 2022-09-06 DIAGNOSIS — N31.9 NEUROGENIC BLADDER: ICD-10-CM

## 2022-09-06 RX ORDER — OXYCODONE HYDROCHLORIDE 5 MG/1
5 TABLET ORAL EVERY 12 HOURS PRN
Qty: 30 TABLET | Refills: 0 | OUTPATIENT
Start: 2022-09-06

## 2022-09-06 NOTE — TELEPHONE ENCOUNTER
Medication Question or Refill        What medication are you calling about (include dose and sig)?:   oxyCODONE (ROXICODONE) 5 MG tablet    Controlled Substance Agreement on file:   CSA -- Patient Level:    Controlled Substance Agreement - Opioid - Scan on 8/1/2017  Controlled Substance Agreement - Opioid - Scan on 6/6/2016       Who prescribed the medication?: Dr. clemons    Do you need a refill? Yes:     Patient called to request provider to send in refill to pharmacy. He can not get in to see a provider at the pain clinic until next month. Please send refill to pharmacy if appropriate.     When did you use the medication last? n/a    Patient offered an appointment? No    Do you have any questions or concerns?  No    Preferred Pharmacy:       [facility] #[id number] [place] [address] & [address] [address]  Phone: [phone] Fax: [phone]      Could we send this information to you in [facility] or would you prefer to receive a phone call?:   No preference   Okay to leave a detailed message?: Yes at Home number on file [phone] (home)

## 2022-09-07 NOTE — TELEPHONE ENCOUNTER
Please have him schedule appointment with a different pain clinic or addiction clinic, I can give him some phone number if he needs, will not refill medication as we discussed last time.

## 2022-09-07 NOTE — TELEPHONE ENCOUNTER
"7/25/22 \"Urine drug screen positive for metabolite of cocaine, discussed with the patient over the phone, he told  me that his has  been getting dental work with lidocaine etc. possibly explaining the abnormal urine drug screen,I  told him that I  care about his safety, I will give him 2 weeks of oxycodone but with no plan to continue, advised him to follow-up with new provider at the pain addiction clinic.  Declined referral at this time.\"     Above message was read to patient and message from yesterday Cassidy Lopes given to patient.    Patient calling today to see if Dr. Benjamin will prescribe more pain medication but after reading him the providers message he declined to see another provider.    Angella Barlow RN  [facility] and Home Nurse Advisor        "

## 2022-09-08 ENCOUNTER — PATIENT OUTREACH (OUTPATIENT)
Dept: CARE COORDINATION | Facility: CLINIC | Age: 57
End: 2022-09-08

## 2022-09-08 NOTE — PROGRESS NOTES
Clinic Care Coordination - Chart Review Only    Situation/Background: Patient chart reviewed by care coordinator related to [employer] conversion.    Assessment: Patient continues to be followed by Clinic Care Coordination.    Plan: Patient's chart updated to align with [employer] program for ongoing patient management.    Patty Dowd RN

## 2022-09-09 ENCOUNTER — OFFICE VISIT (OUTPATIENT)
Dept: UROLOGY | Facility: CLINIC | Age: 57
End: 2022-09-09
Payer: MEDICARE

## 2022-09-09 DIAGNOSIS — N31.9 NEUROGENIC BLADDER: Primary | ICD-10-CM

## 2022-09-09 PROCEDURE — 51705 CHANGE OF BLADDER TUBE: CPT

## 2022-09-09 RX ORDER — CIPROFLOXACIN 500 MG/1
500 TABLET, FILM COATED ORAL ONCE
Status: COMPLETED | OUTPATIENT
Start: 2022-09-09 | End: 2022-09-09

## 2022-09-09 RX ADMIN — CIPROFLOXACIN 500 MG: 500 TABLET, FILM COATED ORAL at 13:29

## 2022-09-09 NOTE — PROGRESS NOTES
Josiah Crump comes into clinic today at the request of Dr. Lira with the diagnosis of NGB for a catheter change.    Order has been verified: Yes.    The following medication was given:     MEDICATION:  Ciprofloxacin  ROUTE: PO  SITE: Medication was given orally   DOSE: 500 mg   LOT #: [id number]  : [employer]  EXPIRATION DATE: 10/2023  NDC#: 9079-5982-81   Was there drug waste? No    Prior to administration, verified patient identity using patient's name and date of birth.    Drug Amount Wasted:  None.  Vial/Syringe: Single dose      No Known Allergies    Removal:  16 Fr straight tipped latex muhammad catheter removed from suprapubic meatus without difficulty after removing 6 mL of fluid from the balloon.    Insertion:  16 Fr straight tipped latex muhammad catheter inserted into suprapubic meatus in the usual sterile fashion without difficulty.  Received > 20 ml yellow positive urine return.   Balloon filled with 8 mL sterile H2O after positive urine return.  Catheter secured in place with leg strap: No.     Patient did tolerate procedure well.     Patient instructed as to where to call or go for pain, fever, leakage, or decreased urine flow.     This service provided today was under the direct supervision of Dr. Nova, who was available if needed.    Slime Garza MA  9/9/2022  1:05 PM

## 2022-09-15 ENCOUNTER — PATIENT OUTREACH (OUTPATIENT)
Dept: NURSING | Facility: CLINIC | Age: 57
End: 2022-09-15

## 2022-09-15 NOTE — PROGRESS NOTES
Clinic Care Coordination Contact    Follow Up Progress Note      Assessment: RN CC outreach and spoke with patient   Patient attended establish care visit with provider  Pt ended call as he requested call back  -he was not in a place to discuss goals   Care Gaps:    Health Maintenance Due   Topic Date Due     EYE EXAM  Never done     HEPATITIS B IMMUNIZATION (1 of 3 - Risk 3-dose series) Never done     ZOSTER IMMUNIZATION (2 of 2) 08/03/2021     COVID-19 Vaccine (4 - Booster for Pfizer series) 03/24/2022     INFLUENZA VACCINE (1) 09/01/2022     DIABETIC FOOT EXAM  09/16/2022       Care Plans  Care Plan: General     Problem: HP GENERAL PROBLEM     Goal: I would like to get medication therapy for pain     Start Date: 8/1/2022    Note:           Barriers: access  Strengths: engagement     Patient expressed understanding of goal: yes  Action steps to achieve this goal:  1. I will work with PCP to determine plan and dosing schedule  2. I will follow up as needed   3. I will update Care coordination team during next outreach              Goal: I will have a better understanding and plan of care for BHP within 3-4 months     Note:          Barriers: language, knowledge deficit   Strengths: family support  Date to Achieve By:  Patient expressed understanding of goal: yes  Action steps to achieve this goal:     2. I will update CCC team    08/01/22  RN CC shared scheduling phone number to reschedule missed visit             Goal: I would like to get an updated order for Wheelchair repair and consideration for power wheelchair with cadi waiver within 2-3 months     Start Date: 5/11/2022    Note:           Strengths: pt self advocate  Date to Achieve By: July  Patient expressed understanding of goal: yes  Action steps to achieve this goal:  1. I will meet with PCP to consider and review DME order  2. I will update Care coordination team during next outreach    08/01/22  Has visit for fitting in September                          Plan:  RN CC to follow up with patient within one week available sooner if outreach

## 2022-09-17 DIAGNOSIS — Z86.73 HISTORY OF CVA (CEREBROVASCULAR ACCIDENT): ICD-10-CM

## 2022-09-19 RX ORDER — SIMVASTATIN 20 MG
TABLET ORAL
Qty: 90 TABLET | Refills: 3 | Status: SHIPPED | OUTPATIENT
Start: 2022-09-19

## 2022-09-19 NOTE — TELEPHONE ENCOUNTER
"Last Written Prescription Date:  6/12/22  Last Fill Quantity: 90,  # refills: 0   Last office visit provider:  7/25/22     Requested Prescriptions   Pending Prescriptions Disp Refills     simvastatin (ZOCOR) 20 MG tablet [Pharmacy Med Name: SIMVASTATIN 20MG TABLETS] 90 tablet 0     Sig: TAKE 1 TABLET(20 MG) BY MOUTH DAILY       Statins Protocol Passed - 9/19/2022 10:27 AM        Passed - LDL on file in past 12 months     Recent Labs   Lab Test 07/25/22  1543   LDL 73             Passed - No abnormal creatine kinase in past 12 months     No lab results found.             Passed - Recent (12 mo) or future (30 days) visit within the authorizing provider's specialty     Patient has had an office visit with the authorizing provider or a provider within the authorizing providers department within the previous 12 mos or has a future within next 30 days. See \"Patient Info\" tab in inbasket, or \"Choose Columns\" in Meds & Orders section of the refill encounter.              Passed - Medication is active on med list        Passed - Patient is age 18 or older             Nain Nunez RN 09/19/22 10:27 AM  "

## 2022-09-21 ENCOUNTER — TELEPHONE (OUTPATIENT)
Dept: SURGERY | Facility: CLINIC | Age: 57
End: 2022-09-21

## 2022-09-21 NOTE — TELEPHONE ENCOUNTER
Pt has a hx of a rectal ulcer with diarrhea and bleeding in March 2022. He called in with a couple days of rectal pain and bleeding. He states he feels the pain internally and it comes and goes. He does digital stimulation and suppositories for his bowel regimen as he is in a wheelchair (been doing this for 30 years). Has not gone to the bathroom in 4 days. Recent travel in [facility] a couple weeks ago. Offered appt tomorrow with Solange Meraz NP but he cannot do this d/t setting up transportation. Scheduled with  in [place] next week.

## 2022-09-21 NOTE — TELEPHONE ENCOUNTER
[facility] Call [facility]    Phone Message    May a detailed message be left on voicemail: yes     Reason for Call: Other: Per pt is stating he is having rectal bleeding and it hurting real bad. Per pt would like to be seen asap. please call pt back asp! thank you!     Action Taken: Message routed to:  [facility]    Travel Screening: Not Applicable

## 2022-09-26 ENCOUNTER — PATIENT OUTREACH (OUTPATIENT)
Dept: CARE COORDINATION | Facility: CLINIC | Age: 57
End: 2022-09-26

## 2022-09-26 NOTE — PROGRESS NOTES
[facility] 1902    Clinic Care Coordination Contact    Follow Up Progress Note      Assessment: RN CC outreach and spoke with patient  Primary concern was with wheel chair disrepair.  Patient does have fitting for new wheelchair in November, but notes that current seat is in need of repair and he is unclear about steps needed    Determine that patient has worked with team at [facility] - RN CC left message with [facility] [place] repair department and requested call to patient to support next steps    Care Gaps:    Health Maintenance Due   Topic Date Due     EYE EXAM  Never done     HEPATITIS B IMMUNIZATION (1 of 3 - Risk 3-dose series) Never done     ZOSTER IMMUNIZATION (2 of 2) 08/03/2021     COVID-19 Vaccine (4 - Booster for Pfizer series) 01/19/2022     INFLUENZA VACCINE (1) 09/01/2022     DIABETIC FOOT EXAM  09/16/2022       Care Plans  Care Plan: General     Problem: HP GENERAL PROBLEM     Goal: I would like to get medication therapy for pain     Start Date: 8/1/2022    Note:           Barriers: access  Strengths: engagement     Patient expressed understanding of goal: yes  Action steps to achieve this goal:  1. I will work with PCP to determine plan and dosing schedule  2. I will follow up as needed   3. I will update Care coordination team during next outreach              Goal: I will have a better understanding and plan of care for BHP within 3-4 months     Note:          Barriers: language, knowledge deficit   Strengths: family support  Date to Achieve By:  Patient expressed understanding of goal: yes  Action steps to achieve this goal:     2. I will update CCC team    08/01/22  RN CC shared scheduling phone number to reschedule missed visit             Goal: I would like to get an updated order for Wheelchair repair and consideration for power wheelchair with cadi waiver within 2-3 months     Start Date: 5/11/2022    Note:           Strengths: pt self advocate  Date to Achieve By:  July  Patient expressed understanding of goal: yes  Action steps to achieve this goal:  1. I will meet with PCP to consider and review DME order  2. I will update Care coordination team during next outreach    08/01/22  Has visit for fitting in September               Goal: I would like to get manual wheelchair seat repair     Note:     Barriers: access, transportation  Strengths: engagement  Patient expressed understanding of goal: yes  Action steps to achieve this goal:  1. I will follow up with [facility] provider at [facility] to determine next steps   2. I will update Care coordination team during next outreach                            Plan: Patient will schedule and attend recommended follow up visits with speciality providers and primary care provider.  RN CC will follow up within one month to support goal prgression

## 2022-09-27 ENCOUNTER — PRE VISIT (OUTPATIENT)
Dept: SURGERY | Facility: CLINIC | Age: 57
End: 2022-09-27

## 2022-09-27 ENCOUNTER — HOSPITAL ENCOUNTER (OUTPATIENT)
Dept: WOUND CARE | Facility: CLINIC | Age: 57
Discharge: HOME | End: 2022-09-27
Attending: PHYSICIAN ASSISTANT | Admitting: PHYSICIAN ASSISTANT
Payer: MEDICARE

## 2022-09-27 VITALS
DIASTOLIC BLOOD PRESSURE: 66 MMHG | RESPIRATION RATE: 16 BRPM | HEART RATE: 92 BPM | TEMPERATURE: 97.8 F | SYSTOLIC BLOOD PRESSURE: 135 MMHG

## 2022-09-27 DIAGNOSIS — L97.122 SKIN ULCER OF LEFT THIGH WITH FAT LAYER EXPOSED (H): ICD-10-CM

## 2022-09-27 PROCEDURE — G0463 HOSPITAL OUTPT CLINIC VISIT: HCPCS | Mod: 25

## 2022-09-27 PROCEDURE — 97602 WOUND(S) CARE NON-SELECTIVE: CPT

## 2022-09-27 RX ORDER — GINSENG 100 MG
CAPSULE ORAL
COMMUNITY
End: 2023-04-17

## 2022-09-27 NOTE — PROGRESS NOTES
[facility]    ASSESSMENT:   1. Central obesity  2. Full thickness L posterior thigh laceration    PLAN/DISCUSSION:    1. No charge visit as patient was expecting to see Dr. Blackmon not me and upset by this  2. We again discussed Dr. Blackmon does not do any kind of cosmetic surgery on abdomen, directed him to Dr. Zhao. I also again directed him to medical weight loss management, he would benefit from comprehensive program to lose weight  3. Can use Mepilex on thigh     HISTORY OF PRESENT ILLNESS:   Josiah Crump is a 56 year old paraplegic male who is well known to our clinic. There is some confusion with his visit today, he was expecting to see Dr. Blackmon. His main concern is excessive abdominal tissue and girth. He wants to discuss surgery to improve this. I had last talked to him about this a year ago and directed him to medical weight loss. He did not pursue this and is not interested in this. States he eats healthy and exercises frequently. He does also have a small laceration on his left posterior thigh.     VITALS: /66 (BP Location: Right arm, Patient Position: Sitting)   Pulse 92   Temp 97.8  F (36.6  C) (Temporal)   Resp 16      PHYSICAL EXAM:  GENERAL: Patient is alert and oriented and in no acute distress  INTEGUMENTARY:  Wound (used by OP [facility] only) 09/27/22 0852 Left posterior thigh laceration (Active)   Thickness/Stage full thickness 09/27/22 0800   Base red 09/27/22 0800   Periwound intact 09/27/22 0800   Periwound Temperature warm 09/27/22 0800   Periwound Skin Turgor soft 09/27/22 0800   Edges open 09/27/22 0800   Length (cm) 3.5 09/27/22 0800   Width (cm) 1.7 09/27/22 0800   Depth (cm) 0.1 09/27/22 0800   Wound (cm^2) 5.95 cm^2 09/27/22 0800   Wound Volume (cm^3) 0.6 cm^3 09/27/22 0800   Drainage Characteristics/Odor serosanguineous 09/27/22 0800   Drainage Amount moderate 09/27/22 0800   Care, Wound non-select wound debridement performed 09/27/22 0800              Further instructions from your care team       Josiah Crump      1965    A DME order for supplies has been placed to [facility]. If there are any issues with your order including not receiving the order please call [facility] at [phone] option 3. They can also provide a tracking number for you if you had supplies shipped to you.    Wound Dressing Change: Left posterior thigh  Cleanse with mild unscented soap and water  Apply 1 4x4 mepilex border to wound  Change three times a week    A referral was placed to Dr. Zhao at plastic surgery. Please call [phone] to schedule.     Molly Sung PA-C September 27, 2022          Durable Medical Equipment Wound Care Orders     Wound Care Order for DME - ONLY FOR DME   As directed      DME Provider: [facility] Comment - [phone]    Wound Supply Order Options: Complex Wound    Optional: .dmewound can be used to pull in order specific information into documentation    Wound Number: Wound 1    Wound 1 Location: Left posterior thigh    Wound 1 Dressing Change Frequency: Other (Comments) Comment - Three times a week    Wound 1 Length of Need: 30 days    Wound 1 - Dressing Supplies: Primary    Wound 1 - Primary Dressing Dispensing Instructions: Ok to Substitute    Wound 1 - Primary Dressing Types: Foam Dressing w/ Border    Wound 1 - Foam with Border Types: Mepilex Border []    Wound 1 - Mepilex Border Size: 4 x 4    Wound 1 - Mepilex Border Quantity: 12    Skin ulcer of left thigh with fat layer exposed (H)        Electronically signed by Molly Sung PA-C on September 27, 2022  Molly Sung PA-C   NO CHARGE VISIT

## 2022-09-27 NOTE — DISCHARGE INSTRUCTIONS
Josiah Crump      1965    A DME order for supplies has been placed to [facility]. If there are any issues with your order including not receiving the order please call [facility] at [phone] option 3. They can also provide a tracking number for you if you had supplies shipped to you.    Wound Dressing Change: Left posterior thigh  Cleanse with mild unscented soap and water  Apply 1 4x4 mepilex border to wound  Change three times a week    A referral was placed to Dr. Zhao at plastic surgery. Please call [phone] to schedule.     Molly Sung PA-C September 27, 2022    Call us at [phone] if you have any questions about your wounds, have redness or swelling around your wound, have a fever of 101 or greater or if you have any other problems or concerns. We answer the phone Monday through Friday 8 am to 4 pm, please leave a message as we check the voicemail frequently throughout the day.     If you had a positive experience please indicate that on your patient satisfaction survey form that [facility] will be sending you.    It was a pleasure meeting with you today.  Thank you for allowing me and my team the privilege of caring for you today.  YOU are the reason we are here, and I truly hope we provided you with the excellent service you deserve.  Please let us know if there is anything else we can do for you so that we can be sure you are leaving completely satisfied with your care experience.      If you have any billing related questions please call the [facility] Business office at [phone]. The clinic staff does not handle billing related matters.    If you are scheduled to have a follow up appointment, you will receive a reminder call the day before your visit. On the appointment day please arrive 15 minutes prior to your appointment time. If you are unable to keep that appointment, please call the clinic to cancel or reschedule. If you are more than  10 minutes late or greater for your appointment, the clinic policy is that you may be asked to reschedule.

## 2022-09-27 NOTE — TELEPHONE ENCOUNTER
Diagnosis, Referred by & from: Dx: Rectal Pain and Bleeding- Referred by Emeli Burrows PA-C at [facility]     Appt date: 10/07/22   NOTES STATUS DETAILS   OFFICE NOTE from referring provider Internal 05/05/2022 from ED Admission     OFFICE NOTE from other specialist N/A       DISCHARGE SUMMARY from hospital Internal [facility]:  3/10/22 - Admission with Dr. Kimble     DISCHARGE REPORT from the ER Internal [facility]:  2/3/22 - ED OV with Dr. Griffith  1/3/22 - ED OV with Dr. Hampton  7/4/21 - ED OV with Dr. Garcia     [facility]:  1/10/22 - ED OV with Dr. Barajas   OPERATIVE REPORT Internal/Care Everywhere [facility]:  6/30/21 - OP Note for FLEXIBLE CYSTOSCOPY, REMOVAL AND PLACEMENT OF SANTIAGO CATHETER with Dr. Byers     [facility]:  8/20/21 - OP Note for CYSTOSCOPY, WITH SUPRAPUBIC CATHETER INSERTION with Dr. Lira  2/22/21 - OP Note for FLEXIBLE CYSTOSCOPY, SANTIAGO CATHETER PLACEMENT with Dr. Byers   MEDICATION LIST Internal    LABS     ANAL PAP N/A    BIOPSIES/PATHOLOGY RELATED TO DIAGNOSIS Internal 3/11/22 - Colon Biopsy     DIAGNOSTIC PROCEDURES     PFC TESTING (from the Pelvic floor center includes Manometry, PDNL, EMG, etc.) N/A    COLONOSCOPY Internal 3/11/22 - Colonoscopy  2/28/22 - Colonoscopy (Cancelled)     UPPER ENDOSCOPY (EGD) N/A      FLEX SIGMOIDOSCOPY N/A    ERCP N/A    IMAGING (DISC & REPORT)      CT Internal 08/22/2022- CT Abd/Pelvis   1/10/22 - CT Abd/Pelvis  1/4/22 - CT Pelvis  1/3/22 - CTA Abd/Pelvis  10/6/21 - CT Abd/Pelvis  4/18/21 - CT Abd/Pelvis  6/13/19 - CT Abd/Pelvis  3/6/18- CTA Abdomen  3/6/18 - CT Pelvis   MRI N/A    XRAY Internal 10/31/17 - XR Pelvis   ULTRASOUND  (ENDOANAL/ENDORECTAL) Internal 12/7/20 - US Renal

## 2022-10-04 ENCOUNTER — PATIENT OUTREACH (OUTPATIENT)
Dept: NURSING | Facility: CLINIC | Age: 57
End: 2022-10-04

## 2022-10-04 ENCOUNTER — MEDICAL CORRESPONDENCE (OUTPATIENT)
Dept: HEALTH INFORMATION MANAGEMENT | Facility: CLINIC | Age: 57
End: 2022-10-04

## 2022-10-04 NOTE — PROGRESS NOTES
Clinic Care Coordination Contact  [facility]/Voicemail       Clinical Data: Care Coordinator Outreach  Outreach attempted x 1.  Left message on patient's voicemail with call back information and requested return call.  Plan:  Care Coordinator will try to reach patient again in 10 business days.

## 2022-10-13 ENCOUNTER — PATIENT OUTREACH (OUTPATIENT)
Dept: NURSING | Facility: CLINIC | Age: 57
End: 2022-10-13
Payer: MEDICARE

## 2022-10-13 ENCOUNTER — MEDICAL CORRESPONDENCE (OUTPATIENT)
Dept: HEALTH INFORMATION MANAGEMENT | Facility: CLINIC | Age: 57
End: 2022-10-13

## 2022-10-13 NOTE — LETTER
[facility] CARE COORDINATION  [facility] [address] [place] [address]    October 13, 2022    Josiah Crump  [address]   [place] [address]      Dear Josiah,        I am a clinic care coordinator who works with Bala Benjamin MD with the [facility]. I recently tried to call and was unable to reach you.   Please feel free to contact me with any questions or concerns regarding care coordination and what we can offer.      We are focused on providing you with the highest-quality healthcare experience possible.    Sincerely,     Patty Dowd RN      Enclosed: I have enclosed a copy of the Patient Centered Plan of Care. This has helpful information and goals that we have talked about. Please keep this in an easy to access place to use as needed.

## 2022-10-13 NOTE — LETTER
[facility]  Patient Centered Plan of Care  About Me:        Patient Name:  Josiah Crump    YOB: 1965  Age:         56 year old   [place]oco MRN:    [id number] Telephone Information:  Home Phone [phone]   Mobile [phone]       Address:  [address] [place] [address] Email address:  [email or web address]      Emergency Contact(s)    Name Relationship Lgl Grd Work Phone Home Phone Mobile Phone   1. JILLIAN BOSWELL* Other    [phone]   2. NIMOC, MARLITA Significant ot*    [phone]           Primary language:  English     needed? No   [employer]:  [phone] op. 1  Other communication barriers:No data recorded  Preferred Method of Communication:  Mail  Current living arrangement: I live alone    Mobility Status/ Medical Equipment: Independent w/Device        Health Maintenance  Health Maintenance Reviewed: Up to date      My Access Plan  Medical Emergency 911   Primary Clinic Line   -     24 Hour Appointment Line [phone] or  [phone]) (toll-free)   24 Hour Nurse Line [phone] (toll-free)   Preferred Urgent Care [facility], [phone]     [facility]  [phone]     Preferred Pharmacy [id number] - [place], [place] - [address]     Behavioral Health Crisis Line The National Suicide Prevention Lifeline at 1-170.155.4347 or Text/Call 788             My Care Team Members  Patient Care Team       Relationship Specialty Notifications Start End    Bala Benjamin MD PCP - General Family Medicine  9/6/22     Phone: [phone] Fax: [phone]         [address] [place] [address]    Richard Byers MD MD Urology  2/6/20     Phone: [phone] Fax: [phone]         [facility] UROLOGY [address] [facility] [address] [facility] [address]    Osmani Goncalves MD MD Urology   2/6/20     Phone: [phone] Fax: [phone]         [address] [place] [address] [address] [facility] [address]    Marvel Petit MD Referring Physician Family Practice  2/10/20     Phone: [phone] Fax: [phone]         [address] [place] [address]    Haley Martinez MD MD INTERNAL MEDICINE - ENDOCRINOLOGY, DIABETES & METABOLISM  5/18/20     Phone: [phone] Fax: [phone]         [address] [place] [address]    Haley Martinez MD Assigned Endocrinology Provider   10/23/20     Phone: [phone] Fax: [phone]         [address] [place] [address]    Sonya Mccann, RN Specialty Care Coordinator Urology  7/28/21     Charles Lira MD MD Urology  7/28/21     Referred to Colorectal     Phone: [phone] Fax: [phone]         [address] [place] [address]    Felicita Vega PA-C Physician Assistant Endocrinology, Diabetes, and Metabolism  10/12/21     Phone: [phone] Fax: [phone]         [address] [place] [address] [address] [facility] [address]    Charles Lira MD Assigned Surgical Provider   10/31/21     Phone: [phone] Fax: [phone]         [address] [place] [address]    Solange Ross APRN CNP Nurse Practitioner Colon & Rectal  2/16/22     Phone: [phone] Fax: [phone]         [address] [place] [address] [address] [facility] [address]    Patty Dowd RN Lead Care Coordinator Primary Care - CC Admissions 5/9/22     Bala Benjamin MD Assigned PCP   7/2/22     Phone: [phone] Fax: [phone]         [address] [place] [address]    SHANIKA Zhao MD MD Plastic Surgery  9/29/22     Phone: [phone] Fax: [phone]         [address] [place] [address]    Molly Sung PA-C Physician Assistant Wound Care  9/29/22     Referred to Plastic Surg.    Phone: [phone] Fax: [phone]         [facility] [place] [address] [address]            My Care Plans  Self Management and  Treatment Plan  Care Plan  Care Plan: General     Problem: HP GENERAL PROBLEM     Goal: I would like to get medication therapy for pain     Start Date: 8/1/2022    Note:           Barriers: access  Strengths: engagement     Patient expressed understanding of goal: yes  Action steps to achieve this goal:  1. I will work with PCP to determine plan and dosing schedule  2. I will follow up as needed   3. I will update Care coordination team during next outreach              Goal: I will have a better understanding and plan of care for BHP within 3-4 months     Note:          Barriers: language, knowledge deficit   Strengths: family support  Date to Achieve By:  Patient expressed understanding of goal: yes  Action steps to achieve this goal:     2. I will update CCC team    08/01/22  RN CC shared scheduling phone number to reschedule missed visit             Goal: I would like to get an updated order for Wheelchair repair and consideration for power wheelchair with cadi waiver within 2-3 months     Start Date: 5/11/2022    Note:           Strengths: pt self advocate  Date to Achieve By: July  Patient expressed understanding of goal: yes  Action steps to achieve this goal:  1. I will meet with PCP to consider and review DME order  2. I will update Care coordination team during next outreach    08/01/22  Has visit for fitting in September               Goal: I would like to get manual wheelchair seat repair     Note:     Barriers: access, transportation  Strengths: engagement  Patient expressed understanding of goal: yes  Action steps to achieve this goal:  1. I will follow up with [facility] provider at [facility] to determine next steps   2. I will update Care coordination team during next outreach                              Action Plans on File:                       Advance Care Plans/Directives Type:   No data recorded    My Medical and Care Information  Problem List   Patient Active Problem List   Diagnosis      Benign essential hypertension     Chronic pain     General symptom     Gynecomastia     Hx of BKA, right (H)     Hydrocele     Infection associated with implanted penile prosthesis, initial encounter (H)     Insomnia     Lateral epicondylitis     Neurogenic bladder     Other tenosynovitis of hand and wrist     Pain in limb     Paraplegia (H)     Right shoulder strain     Vitamin D deficiency     Status post hip surgery     Heterotopic ossification of bone     Physical deconditioning     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, initial encounter (H)     Spasticity     History of disarticulation of right hip     Injury of thoracic spinal cord, sequela (H)     Urinary incontinence     Breakdown (mechanical) of implanted penile prosthesis, initial encounter (H)     Gluteal cleft wound, right, sequela     Complication of implanted penile prosthesis, initial encounter (H)     Anticoagulated     Pelvic abscess in male (H)     Infected decubitus ulcer, unspecified ulcer stage     Traumatic injury of rectum     Rectal bleeding     Pressure injury of skin of buttock, unspecified injury stage, unspecified laterality     Postoperative pain     Abdominal pain, generalized     Hyponatremia     Acute UTI     Porcelain gallbladder     Impotence     History of CVA (cerebrovascular accident)     Morbid obesity (H)     Paresthesia     Hyposmolality syndrome     Gastrointestinal hemorrhage, unspecified gastrointestinal hemorrhage type     Encounter for screening laboratory testing for severe acute respiratory syndrome coronavirus 2 (SARS-CoV-2)     Encounter for social work intervention     Skin ulcer of left thigh with fat layer exposed (H)      Current Medications and Allergies:  See printed Medication Report.    Care Coordination Start Date: 5/11/2022   Frequency of Care Coordination: monthly     Form Last Updated: 10/13/2022

## 2022-10-13 NOTE — PROGRESS NOTES
Clinic Care Coordination Contact  [facility]/Voicemail       Clinical Data: Care Coordinator Outreach  Outreach attempted x 1.  Left message on patient's voicemail with call back information and requested return call.  RN CC sent update care plan and [facility] note via [employer]  Plan: Pt to attend schedule visit   RN CC will reach out in one month

## 2022-10-14 ENCOUNTER — TELEPHONE (OUTPATIENT)
Dept: PLASTIC SURGERY | Facility: CLINIC | Age: 57
End: 2022-10-14

## 2022-10-14 NOTE — TELEPHONE ENCOUNTER
[facility] Call Center    Phone Message    May a detailed message be left on voicemail: yes     Reason for Call: Other: patent nees new and sooner appt date december is to far out - Panniculectomy     Action Taken: Message routed to:  [facility] plastic    Travel Screening: Not Applicable

## 2022-10-17 ENCOUNTER — MYC MEDICAL ADVICE (OUTPATIENT)
Dept: PLASTIC SURGERY | Facility: CLINIC | Age: 57
End: 2022-10-17

## 2022-10-17 ENCOUNTER — TRANSFERRED RECORDS (OUTPATIENT)
Dept: HEALTH INFORMATION MANAGEMENT | Facility: CLINIC | Age: 57
End: 2022-10-17

## 2022-10-17 NOTE — TELEPHONE ENCOUNTER
Attempted to call patient, does not go through, no voicemail. [employer] message sent with contact information to discuss scheduling.

## 2022-10-18 DIAGNOSIS — I10 BENIGN ESSENTIAL HYPERTENSION: ICD-10-CM

## 2022-10-18 DIAGNOSIS — Z76.0 ENCOUNTER FOR MEDICATION REFILL: ICD-10-CM

## 2022-10-19 RX ORDER — AMLODIPINE BESYLATE 5 MG/1
TABLET ORAL
Qty: 90 TABLET | Refills: 3 | Status: SHIPPED | OUTPATIENT
Start: 2022-10-19 | End: 2023-10-18

## 2022-10-19 NOTE — TELEPHONE ENCOUNTER
"Last Written Prescription Date:  7/25/22  Last Fill Quantity: 90,  # refills: 0   Last office visit provider:  7/25/22     Requested Prescriptions   Pending Prescriptions Disp Refills     amLODIPine (NORVASC) 5 MG tablet [Pharmacy Med Name: AMLODIPINE BESYLATE 5MG TABLETS] 90 tablet 0     Sig: TAKE 1 TABLET(5 MG) BY MOUTH DAILY       Calcium Channel Blockers Protocol  Passed - 10/19/2022  8:39 AM        Passed - Blood pressure under 140/90 in past 12 months     BP Readings from Last 3 Encounters:   09/27/22 135/66   08/22/22 (!) 145/84   07/25/22 (!) 166/82                 Passed - Recent (12 mo) or future (30 days) visit within the authorizing provider's specialty     Patient has had an office visit with the authorizing provider or a provider within the authorizing providers department within the previous 12 mos or has a future within next 30 days. See \"Patient Info\" tab in inbasket, or \"Choose Columns\" in Meds & Orders section of the refill encounter.              Passed - Medication is active on med list        Passed - Patient is age 18 or older        Passed - Normal serum creatinine on file in past 12 months     Recent Labs   Lab Test 08/22/22  0648   CR 1.03       Ok to refill medication if creatinine is low               Nain Nunez RN 10/19/22 8:39 AM  "

## 2022-10-22 ENCOUNTER — HEALTH MAINTENANCE LETTER (OUTPATIENT)
Age: 57
End: 2022-10-22

## 2022-10-26 NOTE — TELEPHONE ENCOUNTER
Spoke with pt, he states he has had surgery x2 with Dr Blackmon for his stump and for his buttocks. States he now wants to address his upper abdomen, has excess skin, pain and skin problems, not amenable with weight loss and working out. States it is a functional issue as well as cosmetic. Discussed with Dr Clancy's nurse, appt booked in [place] for 11/29, pt is able to transfer self from wheelchair if needed.

## 2022-10-28 ENCOUNTER — TELEPHONE (OUTPATIENT)
Dept: UROLOGY | Facility: CLINIC | Age: 57
End: 2022-10-28

## 2022-10-28 NOTE — TELEPHONE ENCOUNTER
[facility] Call Center    Phone Message    May a detailed message be left on voicemail: yes     Reason for Call: Pt is calling stating he was supposed to meet w/ Pankaj for an appt to discuss fixing his medication on 10/12 but was unable to get to the appt due to transportation issues considered he is in a wheel chair. Pt said he has his transportation figured out and needs to see Pariser ASAP. Writer found soonest availability to be 1/6/22. Please reach out to pt ASAP. Thanks     Action Taken: Message routed to:  [facility] Uro    Travel Screening: Not Applicable

## 2022-10-31 ENCOUNTER — OFFICE VISIT (OUTPATIENT)
Dept: UROLOGY | Facility: CLINIC | Age: 57
End: 2022-10-31
Payer: MEDICARE

## 2022-10-31 DIAGNOSIS — N31.9 NEUROGENIC BLADDER: Primary | ICD-10-CM

## 2022-10-31 PROCEDURE — 51705 CHANGE OF BLADDER TUBE: CPT

## 2022-10-31 RX ORDER — CIPROFLOXACIN 500 MG/1
500 TABLET, FILM COATED ORAL ONCE
Status: DISCONTINUED | OUTPATIENT
Start: 2022-10-31 | End: 2023-04-17

## 2022-10-31 NOTE — PROGRESS NOTES
Chief Complaint   Patient presents with     Allied Health Visit     Catheter change       Patient Active Problem List   Diagnosis     Benign essential hypertension     Chronic pain     General symptom     Gynecomastia     Hx of BKA, right (H)     Hydrocele     Infection associated with implanted penile prosthesis, initial encounter (H)     Insomnia     Lateral epicondylitis     Neurogenic bladder     Other tenosynovitis of hand and wrist     Pain in limb     Paraplegia (H)     Right shoulder strain     Vitamin D deficiency     Status post hip surgery     Heterotopic ossification of bone     Physical deconditioning     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, initial encounter (H)     Spasticity     History of disarticulation of right hip     Injury of thoracic spinal cord, sequela (H)     Urinary incontinence     Breakdown (mechanical) of implanted penile prosthesis, initial encounter (H)     Gluteal cleft wound, right, sequela     Complication of implanted penile prosthesis, initial encounter (H)     Anticoagulated     Pelvic abscess in male (H)     Infected decubitus ulcer, unspecified ulcer stage     Traumatic injury of rectum     Rectal bleeding     Pressure injury of skin of buttock, unspecified injury stage, unspecified laterality     Postoperative pain     Abdominal pain, generalized     Hyponatremia     Acute UTI     Porcelain gallbladder     Impotence     History of CVA (cerebrovascular accident)     Morbid obesity (H)     Paresthesia     Hyposmolality syndrome     Gastrointestinal hemorrhage, unspecified gastrointestinal hemorrhage type     Encounter for screening laboratory testing for severe acute respiratory syndrome coronavirus 2 (SARS-CoV-2)     Encounter for social work intervention     Skin ulcer of left thigh with fat layer exposed (H)       No Known Allergies    Current Outpatient Medications   Medication Sig Dispense Refill     acetaminophen (TYLENOL) 325 MG tablet Take 2  tablets (650 mg) by mouth every 6 hours as needed for pain (Patient not taking: Reported on 7/25/2022) 100 tablet 11     amLODIPine (NORVASC) 5 MG tablet TAKE 1 TABLET(5 MG) BY MOUTH DAILY 90 tablet 3     bacitracin 500 UNIT/GM OINT bacitracin 500 unit/gram topical ointment       baclofen (LIORESAL) 20 MG tablet 1 PO TID PRN SPACTICITY 90 tablet 5     Laquita Protect (EUCERIN) external cream Apply topically 2 times daily Cleanse the posterior scrotum region with this, twice daily gently with a soft cloth in addition to other wound care measures (Patient not taking: Reported on 7/25/2022) 57 g 0     bisacodyl (DULCOLAX) 5 MG EC tablet Take 2 tablets by mouth at 10am the day before procedure. (Patient not taking: Reported on 7/25/2022) 2 tablet 0     clopidogrel (PLAVIX) 75 MG tablet TAKE 1 TABLET(75 MG) BY MOUTH DAILY 90 tablet 1     FEROSUL 325 (65 Fe) MG tablet Take 325 mg by mouth daily (Patient not taking: Reported on 7/25/2022)       Fesoterodine Fumarate (TOVIAZ) 8 MG TB24 Take 8 mg by mouth daily  (Patient not taking: Reported on 7/25/2022)       gabapentin (NEURONTIN) 100 MG capsule Take 1 capsule (100 mg) by mouth 2 times daily (Patient not taking: Reported on 7/25/2022) 6 capsule 0     gabapentin (NEURONTIN) 100 MG capsule 2 capsules p.o. twice daily (Patient not taking: Reported on 7/25/2022) 120 capsule 5     meloxicam (MOBIC) 15 MG tablet Take 15 mg by mouth daily (Patient not taking: Reported on 7/25/2022)       menthol, Topical Analgesic, 2.5% (BENGAY VANISHING SCENT) 2.5 % GEL topical gel Apply topically every 6 hours as needed (pain, muscle aches) (Patient not taking: Reported on 7/25/2022) 57 g 0     Miconazole Nitrate 2 % ointment Apply topically 2 times daily To perineal area with dressing change (Patient not taking: Reported on 7/25/2022) 28.7 g 0     naloxone (NARCAN) 4 MG/0.1ML nasal spray Spray 1 spray (4 mg) into one nostril alternating nostrils once as needed for opioid reversal every 2-3  minutes until assistance arrives 0.2 mL 0     NEW MED TB syringe with needle attached for administration of .5ml 20 min prior to sexual activity.   May be used 3 times per week. 2.5 mL 11     oxyCODONE (ROXICODONE) 5 MG tablet Take 1 tablet (5 mg) by mouth every 12 hours as needed for pain 30 tablet 0     oxyCODONE (ROXICODONE) 5 MG tablet Take 1 tablet (5 mg) by mouth every 6 hours as needed for severe pain or pain (Patient not taking: Reported on 7/25/2022) 12 tablet 0     pantoprazole (PROTONIX) 40 MG EC tablet Take 1 tablet (40 mg) by mouth every morning (before breakfast) (Patient not taking: Reported on 7/25/2022) 14 tablet 0     psyllium (METAMUCIL/KONSYL) capsule Take 1 capsule by mouth 2 times daily (Patient not taking: Reported on 7/25/2022) 60 capsule 0     simethicone (MYLICON) 80 MG chewable tablet Take 1 tablet (80 mg) by mouth 4 times daily (Patient not taking: Reported on 7/25/2022) 30 tablet 0     simvastatin (ZOCOR) 20 MG tablet TAKE 1 TABLET(20 MG) BY MOUTH DAILY 90 tablet 3     Skin Oils (BABY OIL) OIL Apply small amount as needed to perineal area to help remove critic aid paste as necessary if paste becomes soiled (Patient not taking: Reported on 7/25/2022) 591 mL 0     testosterone cypionate (DEPOTESTOSTERONE) 200 MG/ML injection Inject 0.5 mLs (100 mg) into the muscle every 14 days 3 mL 5       Social History     Tobacco Use     Smoking status: Never     Smokeless tobacco: Never   Vaping Use     Vaping Use: Never used   Substance Use Topics     Alcohol use: No     Drug use: No       Josiah G Theron comes into clinic today at the request of Dr. Lira for SP catheter change.    Patient diagnosis: Neurogenic bladder    This service provided today was under the direct supervision of Dr. Edwards, who was available if needed.    Josiah G Theron presents to clinic for scheduled [Yes] catheter exchange.  Order has been verified: Yes.    Removal:  16 Fr straight tipped latex muhammad catheter  removed from suprapubic meatus without difficulty.    Insertion:  16 Fr straight tipped latex muhammad catheter inserted into suprapubic meatus in the usual sterile fashion without difficulty.  Balloon filled with 10 mL sterile H2O.  Received 10 ml clear urine output.   Catheter secured in place with leg strap: No.     One Cipro 500 mg given per protocol: Yes.   The following medication was given:     MEDICATION:  Ciprofloxacin  ROUTE: PO  SITE: Medication was given orally   DOSE: 500 mg  LOT #: [id number]  : [employer]  EXPIRATION DATE: 10/23  NDC#: 1026-0154-99   Was there drug waste? No    Prior to medication administration, verified patient identity using patient's name and date of birth.  Due to medication administration, patient instructed to remain in clinic for 15 minutes  afterwards, and to report any adverse reaction to me immediately.    Drug Amount Wasted:  None.  Single dose tablet    Patient did tolerate procedure well.    Patient instructed as to where to call or go for pain, fever, leakage, or decreased urine flow.       Nate West EMT  10/31/2022  10:18 AM

## 2022-11-01 NOTE — TELEPHONE ENCOUNTER
VM is not set up. I set a [employer] message that patient is set up on 11/16 10am. Ask for patient to call the clinic to confirm or reply to my [employer] message

## 2022-11-08 ENCOUNTER — HOSPITAL ENCOUNTER (EMERGENCY)
Facility: CLINIC | Age: 57
Discharge: HOME | End: 2022-11-08
Attending: EMERGENCY MEDICINE | Admitting: EMERGENCY MEDICINE
Payer: MEDICARE

## 2022-11-08 ENCOUNTER — APPOINTMENT (OUTPATIENT)
Dept: RADIOLOGY | Facility: CLINIC | Age: 57
End: 2022-11-08
Attending: EMERGENCY MEDICINE
Payer: MEDICARE

## 2022-11-08 ENCOUNTER — APPOINTMENT (OUTPATIENT)
Dept: ULTRASOUND IMAGING | Facility: CLINIC | Age: 57
End: 2022-11-08
Attending: EMERGENCY MEDICINE
Payer: MEDICARE

## 2022-11-08 ENCOUNTER — HOSPITAL ENCOUNTER (OUTPATIENT)
Dept: OCCUPATIONAL THERAPY | Facility: CLINIC | Age: 57
Setting detail: THERAPIES SERIES
Discharge: HOME | End: 2022-11-08
Attending: FAMILY MEDICINE
Payer: MEDICARE

## 2022-11-08 VITALS
SYSTOLIC BLOOD PRESSURE: 151 MMHG | DIASTOLIC BLOOD PRESSURE: 86 MMHG | BODY MASS INDEX: 26.1 KG/M2 | HEART RATE: 76 BPM | RESPIRATION RATE: 24 BRPM | OXYGEN SATURATION: 95 % | TEMPERATURE: 98.9 F | WEIGHT: 215 LBS

## 2022-11-08 DIAGNOSIS — R60.0 PERIPHERAL EDEMA: ICD-10-CM

## 2022-11-08 DIAGNOSIS — R06.00 DYSPNEA, UNSPECIFIED TYPE: ICD-10-CM

## 2022-11-08 LAB
ANION GAP SERPL CALCULATED.3IONS-SCNC: 15 MMOL/L (ref 5–18)
ATRIAL RATE - MUSE: 74 BPM
BASOPHILS # BLD AUTO: 0.1 10E3/UL (ref 0–0.2)
BASOPHILS NFR BLD AUTO: 1 %
BNP SERPL-MCNC: <10 PG/ML (ref 0–46)
BUN SERPL-MCNC: 8 MG/DL (ref 8–22)
C REACTIVE PROTEIN LHE: 6.3 MG/DL (ref 0–?)
CALCIUM SERPL-MCNC: 9.3 MG/DL (ref 8.5–10.5)
CHLORIDE BLD-SCNC: 105 MMOL/L (ref 98–107)
CO2 SERPL-SCNC: 19 MMOL/L (ref 22–31)
CREAT SERPL-MCNC: 1.2 MG/DL (ref 0.7–1.3)
DIASTOLIC BLOOD PRESSURE - MUSE: NORMAL MMHG
EOSINOPHIL # BLD AUTO: 0.2 10E3/UL (ref 0–0.7)
EOSINOPHIL NFR BLD AUTO: 2 %
ERYTHROCYTE [DISTWIDTH] IN BLOOD BY AUTOMATED COUNT: 18.1 % (ref 10–15)
GFR SERPL CREATININE-BSD FRML MDRD: 71 ML/MIN/1.73M2
GLUCOSE BLD-MCNC: 96 MG/DL (ref 70–125)
HCT VFR BLD AUTO: 43.3 % (ref 40–53)
HGB BLD-MCNC: 13.2 G/DL (ref 13.3–17.7)
IMM GRANULOCYTES # BLD: 0 10E3/UL
IMM GRANULOCYTES NFR BLD: 0 %
INTERPRETATION ECG - MUSE: NORMAL
LYMPHOCYTES # BLD AUTO: 1.8 10E3/UL (ref 0.8–5.3)
LYMPHOCYTES NFR BLD AUTO: 17 %
MCH RBC QN AUTO: 22.2 PG (ref 26.5–33)
MCHC RBC AUTO-ENTMCNC: 30.5 G/DL (ref 31.5–36.5)
MCV RBC AUTO: 73 FL (ref 78–100)
MONOCYTES # BLD AUTO: 0.8 10E3/UL (ref 0–1.3)
MONOCYTES NFR BLD AUTO: 7 %
NEUTROPHILS # BLD AUTO: 7.5 10E3/UL (ref 1.6–8.3)
NEUTROPHILS NFR BLD AUTO: 73 %
NRBC # BLD AUTO: 0 10E3/UL
NRBC BLD AUTO-RTO: 0 /100
P AXIS - MUSE: 47 DEGREES
PLATELET # BLD AUTO: 324 10E3/UL (ref 150–450)
POTASSIUM BLD-SCNC: 3.6 MMOL/L (ref 3.5–5)
PR INTERVAL - MUSE: 164 MS
QRS DURATION - MUSE: 86 MS
QT - MUSE: 374 MS
QTC - MUSE: 415 MS
R AXIS - MUSE: 28 DEGREES
RBC # BLD AUTO: 5.95 10E6/UL (ref 4.4–5.9)
SODIUM SERPL-SCNC: 139 MMOL/L (ref 136–145)
SYSTOLIC BLOOD PRESSURE - MUSE: NORMAL MMHG
T AXIS - MUSE: 45 DEGREES
VENTRICULAR RATE- MUSE: 74 BPM
WBC # BLD AUTO: 10.4 10E3/UL (ref 4–11)

## 2022-11-08 PROCEDURE — 99285 EMERGENCY DEPT VISIT HI MDM: CPT | Mod: 25

## 2022-11-08 PROCEDURE — 250N000011 HC RX IP 250 OP 636: Performed by: EMERGENCY MEDICINE

## 2022-11-08 PROCEDURE — 96365 THER/PROPH/DIAG IV INF INIT: CPT

## 2022-11-08 PROCEDURE — 93005 ELECTROCARDIOGRAM TRACING: CPT | Performed by: EMERGENCY MEDICINE

## 2022-11-08 PROCEDURE — 250N000013 HC RX MED GY IP 250 OP 250 PS 637: Performed by: EMERGENCY MEDICINE

## 2022-11-08 PROCEDURE — 71046 X-RAY EXAM CHEST 2 VIEWS: CPT

## 2022-11-08 PROCEDURE — 36415 COLL VENOUS BLD VENIPUNCTURE: CPT | Performed by: EMERGENCY MEDICINE

## 2022-11-08 PROCEDURE — 93971 EXTREMITY STUDY: CPT | Mod: LT

## 2022-11-08 PROCEDURE — 85025 COMPLETE CBC W/AUTO DIFF WBC: CPT | Performed by: EMERGENCY MEDICINE

## 2022-11-08 PROCEDURE — 86140 C-REACTIVE PROTEIN: CPT | Performed by: EMERGENCY MEDICINE

## 2022-11-08 PROCEDURE — 80048 BASIC METABOLIC PNL TOTAL CA: CPT | Performed by: EMERGENCY MEDICINE

## 2022-11-08 PROCEDURE — 97542 WHEELCHAIR MNGMENT TRAINING: CPT | Mod: GO | Performed by: OCCUPATIONAL THERAPIST

## 2022-11-08 PROCEDURE — 83880 ASSAY OF NATRIURETIC PEPTIDE: CPT | Performed by: EMERGENCY MEDICINE

## 2022-11-08 RX ORDER — OXYCODONE HYDROCHLORIDE 5 MG/1
5 TABLET ORAL EVERY 6 HOURS PRN
Qty: 4 TABLET | Refills: 0 | Status: SHIPPED | OUTPATIENT
Start: 2022-11-08 | End: 2022-11-11

## 2022-11-08 RX ORDER — OXYCODONE HYDROCHLORIDE 5 MG/1
5 TABLET ORAL ONCE
Status: COMPLETED | OUTPATIENT
Start: 2022-11-08 | End: 2022-11-08

## 2022-11-08 RX ORDER — DOXYCYCLINE 100 MG/1
100 CAPSULE ORAL EVERY 12 HOURS SCHEDULED
Status: DISCONTINUED | OUTPATIENT
Start: 2022-11-08 | End: 2022-11-08 | Stop reason: HOSPADM

## 2022-11-08 RX ORDER — CEFDINIR 300 MG/1
600 CAPSULE ORAL DAILY
Qty: 14 CAPSULE | Refills: 0 | Status: SHIPPED | OUTPATIENT
Start: 2022-11-08 | End: 2022-11-15

## 2022-11-08 RX ORDER — DOXYCYCLINE 100 MG/1
100 CAPSULE ORAL 2 TIMES DAILY
Qty: 14 CAPSULE | Refills: 0 | Status: SHIPPED | OUTPATIENT
Start: 2022-11-08 | End: 2022-11-15

## 2022-11-08 RX ORDER — CEFTRIAXONE 2 G/1
2 INJECTION, POWDER, FOR SOLUTION INTRAMUSCULAR; INTRAVENOUS ONCE
Status: COMPLETED | OUTPATIENT
Start: 2022-11-08 | End: 2022-11-08

## 2022-11-08 RX ADMIN — CEFTRIAXONE SODIUM 2 G: 2 INJECTION, POWDER, FOR SOLUTION INTRAMUSCULAR; INTRAVENOUS at 04:38

## 2022-11-08 RX ADMIN — OXYCODONE HYDROCHLORIDE 5 MG: 5 TABLET ORAL at 03:35

## 2022-11-08 RX ADMIN — DOXYCYCLINE 100 MG: 100 CAPSULE ORAL at 04:38

## 2022-11-08 ASSESSMENT — ACTIVITIES OF DAILY LIVING (ADL)
ADLS_ACUITY_SCORE: 35
ADLS_ACUITY_SCORE: 35

## 2022-11-08 ASSESSMENT — ENCOUNTER SYMPTOMS
SHORTNESS OF BREATH: 1
ABDOMINAL PAIN: 0
COUGH: 0
FEVER: 0

## 2022-11-08 NOTE — PROGRESS NOTES
"   SEATING AND WHEELED MOBILITY ASSESSMENT  11/08/22 1400   Quick Adds   Quick Adds Certification;Current Manual Wheelchair   General Information    Rehab Discipline OT   Funding Medicare/Ma/CADI   Service Outpatient;Occupational Therapy;Seating/Wheeled Mobility Evaluation   Height 6'3\"   Weight 250   Start Of Care Date 11/08/22   Referring Physician ERUM Benjamin   Orders Evaluate And Treat As Indicated;Per Therapist Evaluation   Orders Date 07/27/22   Patient/Caregiver Goals Power mobility   Rehabilitation Technology Supplier Jayleen MAYS from [employer]   Current Community Support Personal Care Attendant;Transportation Service  (10 hours pca/day)   Patient role/Employment history Disabled   Fall Risk Screen   Fall screen completed by OT   Have you fallen 2 or more times in the past year? No   Have you fallen and had an injury in the past year? No   Is patient a fall risk? Yes   Medical History   Onset Of Illness/injury Or Date Of Surgery 7/27/22  (order)   Medical Diagnosis Chronic pain  Pain in limb  Paraplegia (H)  Injury of thoracic spinal cord, sequela (H)  Postoperative pain  Abdominal pain, generalized  Paresthesia     Digestive  Vitamin D deficiency  Traumatic injury of rectum  Rectal bleeding  Porcelain gallbladder  Morbid obesity (H)  Gastrointestinal hemorrhage, unspecified gastrointestinal hemorrhage type     Endocrine  Hyponatremia  Hyposmolality syndrome     Circulatory  Benign essential hypertension     Musculoskeletal and Integumentary  Lateral epicondylitis  Other tenosynovitis of hand and wrist  Right shoulder strain  Spasticity  Gluteal cleft wound, right, sequela  Infected decubitus ulcer, unspecified ulcer stage  Pressure injury of skin of buttock, unspecified injury stage, unspecified laterality  Skin ulcer of left thigh with fat layer exposed (H)     Urinary  Hydrocele  Infection associated with implanted penile prosthesis, initial encounter (H)  Neurogenic bladder  Erosion of urethra due to " catheterization of urinary tract, initial encounter (H)  Urinary incontinence  Complication of implanted penile prosthesis, initial encounter (H)  Acute UTI     Other  General symptom  Gynecomastia  Hx of BKA, right (H)  Insomnia  Status post hip surgery  Heterotopic ossification of bone  Physical deconditioning  History of disarticulation of right hip  Breakdown (mechanical) of implanted penile prosthesis, initial encounter (H)  Anticoagulated  Pelvic abscess in male (H)  Impotence  History of CVA (cerebrovascular accident)   Medical History Paraplegia, R AKA, CVA   Recent or Planned Surgeries injections in B shoulders and numbness in hands due to propelling for 36 years.   Current Manual Wheelchair   Age 6    Ti lite   Cushion Gel   Wheelchair Back   (custom)   Footrest Style vfront   Settings Used Home;Outdoor Community Mobility;Primary Means of Transportation   Condition Fair   Current Equipment Requires Replacement   Rationale for Equipment Changes Patient having challenging with shoulder and hand overuse injuries due to 30+ year manual w/c use   Home Accessibility   Living Environment Apartment/condo   Primary Entrance Level;Elevator   All Rooms Wheelchair Accessible Yes   Community ADL   Transportation Transportation Services   Cognitive/Visual/Hearing Status   Observations No Problems Observed During Evaluation   Vision Intact   Hearing Intact   ADL Status   Feeding Independent   Grooming/Hygiene Independent   Dressing Independent   Toileting Requires Assist;Uses Equipment;Catheter  (shower chair, supbrapubic, bowel program)   Bathing Requires Assist;Uses Equipment  (standard shower chair elizabeth)   Meal Preparation Requires Assist   Home Management Requires Assist   ADL Comments Needs shower chair   Fatigue   Fatigue Measure Score with w/c propulsion, some days are hard to get around at all   Balance   Unsupported Sitting Balance Uses Upper Extremities for Balance   Sitting Balance in Chair  Uses Upper Extremities for Balance   Standing Balance Unable   Ambulation   Ambulation Non Ambulatory   Transfers   Transfer Assist Independent   Transfer Method Squat Pivot/Scoot Boost   Sleep/Rest   Sleep Surface/Equipment standard bed   Wheelchair Ability   Wheelchair Ability Quick Adds Manual Chair;Wheelchair Use   Manual Wheelchair Propulsion   Manual Wheelchair Propulsion Independent   Wheelchair Use   Ability to Perform Weight Shifts Independent   Bed Confined Without Wheelchair? Yes   Hours in Wheelchair Daily 5   Neuromuscular   History of Pressure Sores Yes   Current Pressure Sores No   Pressure Sore location R thigh area- lead to ampuatation   Pain Yes   Pain Location shoulders with propulsion   Pain Scale  6   Coordination UE Intact;LE Impaired   Tone Spasticity  (L LE baclofen)   Sensory Deficits Reported knee down absent   Head and Neck   Head and Neck Position Functional   Head Control Good   Upper Extremities   UE ROM WFL   UE Strength L shoulder flex/ab 3- otherwise 5r/5   Pelvis   Anterior/Posterior Pelvis Position Posterior Tilt   Pelvic Obliquity Left Elevation   Trunk   Anterior/Posterior Trunk Position Increased Thoracic Kyphosis   Left-Right Trunk Position S Curve   Lower Extremities   LE ROM L Full passive no acitve   LE Strength 0/5   Foot Positioning Plantar flexed   LE Comments Swelling in LE and severe spasms   Patient Measurements   Other 18x18   Education Assessment   Barriers to Learning No Barriers   Preferred Learning Style Listening;Demonstration   Assessment/Plan   Criteria for Skilled Interventions Met Yes, Treatment Indicated   Treatment Diagnosis impaired participation in MRADLS   Therapy Frequency once   Planned Therapy Interventions Wheelchair Management/Propulsion Training   Planned Therapy Interventions Comments Determined need for power moblity due to limited functional mobility and pain in shoulders with manual chair secondary to 36 years of propelling.  Patient would  benifit from FWD and all seat functions for pressure mangement and functions.   Risks and benefits of treatment have been explained Yes   Patient/family & other staff in agreement with plan of care Yes   Session Time   OT Wheelchair Management Minutes (29382) 55   Certification   Certification date from 11/08/22   Certification date to 11/08/22   Adult OT Eval Goals   OT Eval Goals (Adult) 1    OT Goal 1   Goal Identifier power chair and shower chair   Goal Description Patient to demonstrate a successful home trial with the recommended equipment;Patient to demonstrate a successful home trial with the recommended equipment;   Goal Progress to be done with vendor   Target Date 11/08/22   Date Met 11/08/22   Electronically signed by:  Jeri HERNÁNDEZ/L, ATP      Occupational Therapist, Assistive   [phone]      fax: [phone]      [email or web address]  [facility], [address]  [address]  [place]   [address]
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
## 2022-11-08 NOTE — ED TRIAGE NOTES
"Left leg swelling x 3 days. Abdominal pain for a \"few days\" and shortness for breath for a \"few days.\"  HX of a stroke and blood clots. Right leg amputee      Triage Assessment     Row Name 11/08/22 0109       Triage Assessment (Adult)    Airway WDL WDL       Respiratory WDL    Respiratory WDL --  shortness of breath       Skin Circulation/Temperature WDL    Skin Circulation/Temperature WDL WDL       Cardiac WDL    Cardiac WDL WDL       Peripheral/Neurovascular WDL    Peripheral Neurovascular WDL WDL       Cognitive/Neuro/Behavioral WDL    Cognitive/Neuro/Behavioral WDL WDL              "

## 2022-11-17 ENCOUNTER — TRANSFERRED RECORDS (OUTPATIENT)
Dept: HEALTH INFORMATION MANAGEMENT | Facility: CLINIC | Age: 57
End: 2022-11-17

## 2022-11-29 ENCOUNTER — PATIENT OUTREACH (OUTPATIENT)
Dept: CARE COORDINATION | Facility: CLINIC | Age: 57
End: 2022-11-29

## 2022-11-29 NOTE — PROGRESS NOTES
Clinic Care Coordination Contact  [facility]/Voicemail       Clinical Data: Care Coordinator Outreach  Outreach attempted x 1.  Unable to leavw  A message  Plan: Care Coordinator will close - available in future if needed

## 2022-12-03 DIAGNOSIS — D50.0 IRON DEFICIENCY ANEMIA DUE TO CHRONIC BLOOD LOSS: Primary | ICD-10-CM

## 2022-12-05 RX ORDER — PNV NO.95/FERROUS FUM/FOLIC AC 28MG-0.8MG
TABLET ORAL
Qty: 30 TABLET | Refills: 0 | Status: SHIPPED | OUTPATIENT
Start: 2022-12-05 | End: 2023-04-17

## 2022-12-05 NOTE — TELEPHONE ENCOUNTER
"-Per medication management tab, it states that the patient reported that they are not taking med - this was reported on 07/25/2022    Last Written Prescription Date:  01/07/2022  Last Fill Quantity: X,  # refills: X   Last office visit provider:  07/25/2022     Requested Prescriptions   Pending Prescriptions Disp Refills     Ferrous Sulfate (IRON) 325 (65 Fe) MG tablet [Pharmacy Med Name: IRON 325MG HIGH POTENCY TABLETS] 30 tablet      Sig: TAKE 1 TABLET(325 MG) BY MOUTH DAILY WITH BREAKFAST       Iron Supplements Passed - 12/3/2022 10:53 AM        Passed - Patient is 12 years of age or older        Passed - Recent (12 mo) or future (30 days) visit within the authorizing provider's specialty     Patient has had an office visit with the authorizing provider or a provider within the authorizing providers department within the previous 12 mos or has a future within next 30 days. See \"Patient Info\" tab in inbasket, or \"Choose Columns\" in Meds & Orders section of the refill encounter.              Passed - Hgb OR Hct on record within the past 12 mos.     Patient need only have had a HGB or HCT on file in the past 12 mos. That result does not need to be normal.    Recent Labs   Lab Test 11/08/22  0309 08/22/22  0648 05/07/22  0712   HGB 13.2* 11.9* 12.0*     Recent Labs   Lab Test 11/08/22  0309 08/22/22  0648 05/07/22  0712   HCT 43.3 41.4 41.8       Please verify a HGB or HCT has been checked SINCE THE LAST DOSE CHANGE.            Passed - Medication is active on med list             Aurelia Whatley RN 12/05/22 2:22 PM  "

## 2022-12-10 ENCOUNTER — HEALTH MAINTENANCE LETTER (OUTPATIENT)
Age: 57
End: 2022-12-10

## 2022-12-14 NOTE — PROGRESS NOTES
REQUISITION AND JUSTIFICATION FOR DURABLE MEDICAL EQUIPMENT    Patient Name:  Josiah Crump  MR #:  [id number]  :  1965  Age/Gender:  57 year old male  Visit Date:  Josiah Crump seen for seating and wheeled mobility evaluation by Jeri HERNÁNDEZ/L,ATP on 22.    CLINICAL CRITERIA FOR MOBILITY ASSISTIVE EQUIPMENT  Coverage Criteria Per Local Coverage Determination  A) Josiah has mobility limitations due to paraplegia, R AKA and CVA that significantly impairs his ability to participate in all of his mobility-related activities of daily living (MRADL). Specifically affected are toileting (being able to get there in time to prevent accidents), dressing, and bathing (getting into the bathroom of designated place). Current equipment used is 5 year out K5 manual wheelchair that he no longer can independently propell due to severe shoulder degeneration from 36 years in a manual w/c. This patient needs the new equipment requested to be able to increase/allow for safe and independent participation in MRADLS and IADLS. Please see additional documentation in the seating and wheeled mobility report for details.   Josiah had a successful clinical trial here, and also a successful trial at home with the recommended equipment. Josiah is very willing and physically / cognitively able to use the recommended equipment to assist his with mobility-related activities of daily living and general mobility. A group 3 power wheelchair is being requested because it has better suspension for a smooth ride and has the capabilities of expandable electronics to operate the  power seat functions Josiah needs for independence with his activities of daily living. A Group 2 power wheelchair does not have sufficient electronics to support this patient's progressive neurological deficits due to paraplegia and CVA.  B) Josiah's mobility limitation cannot be sufficiently and safely resolved by the use of an appropriately  fitted cane or walker because he is non ambulatory. Strength of legs is 0/5 for one maximal repetition. Fatigue also impacts this patient's ability to ambulate, regardless of the gait aid.    C) Josiah does not have sufficient upper extremity function to self-propel an optimally-configured manual wheelchair in his home to perform MRADLs during a typical day due to limitations in strength, endurance, range of motion, and coordination. Distance and time to propel a light weight manual wheelchair not appropraite due to severe shoulder and arm degeneration from lifelong propulsion.  Strength of arms is L shoulder flex/ab 3-/5 otherwise 5/5.  D)  Josiah is not able to use a POV/scooter because it will not fit in his home environment. Josiah is unable to safely transfer to and from a POV, unable to operate the tiller steering system, and unable to maintain postural stability and position while operating the POV. Josiah needs more appropriate seating and positioning than any scooter seat provides.  E) The need for this equipment is LIFETIME.   RECOMMENDATIONS FOR MOBILITY BASE, SEATING SYSTEM AND COMPONENTS  [place] [address] [place]- this front wheel drive power wheelchair is needed for this patient to continue to have independent mobility and to be able to allow him to complete or assist in all of his mobility related activities of daily living (MRADLs). This wheelchair will also have the seating and positioning system and seat function he needs to be able to use and tolerate the wheelchair full time, and have functional and comfortable positioning for a full day's activities. Josiah has Paraplegia, R AKA and CVA which impairs his ability to move in his home without the use of the requested wheelchair. He lives in an accessible home with level access and are looking into adapted van options.    Power elevating seat - Vertical movement is necessary to allow Josiah to function and participate in a three-dimensional  world. This seat feature will increase his ability to reach by bringing him closer for better access with weak arms while reaching into cupboards or closets.  During the home trial he was able to use this feature to increase ease of lateral transfers and countertop activities due to shoulder limitations and pain.  Josiah is transferring  bed<=> wheelchair with scoot boost lateral trasnfer. This requested seat lift feature promotes safety with and improved independence with lateral transfers by allowing a level transfer or transfer from a higher to lower surface, which is gravity-assisted.  It also facilitates forward transfer by allowing legs, hips to be more extended, thereby lessening the strength required for the user to perform a stand-pivot transfer.  Power seat elevation also allows the user to have eye contact with others and reduces cervical strain and pain (including headaches from poor positioning). Vertical rise also provides psycho-social benefits of being on peer level and speaking eye-to-eye. Additionally, seat elevation allows certain medications to be kept out of reach of children but remain accessible to the user.    2 Batteries and  - gel sealed, and two are necessary to power the wheelchair. They are maintenance free and are safe for travel on the road or in the air. They are necessary to provide reliable use of the power wheelchair on a single charge.    LED light kit for F3/F5-The light package includes headlights, tail lights, and turn signals to make the wheelchair safe to use in low light conditions. It provides safety when used outdoors especially in poorly lit areas. It also provides safety for wheelchair users on the street, in parking lots, while crossing an intersection, or while using public transportation. The cover (shroud) is specially molded to fit and protect the lights.  Josiah needs this for safety when traveling in low lit situations like on/off ramps or in [place]  outside.     Expandable controller -  Needed for operation of the joystick for mobility and seat functions    Harness for expandable controller - needs to be inline for communication between the controller and the joystick    Slimline retractable swing away joystick mount - needed to be able to move the joystick out of the way during transfers, or when at the desk using the computer, or at the table eating, so as not to inadvertently hit the joystick, thus moving the wheelchair or turning the power on or off without his knowledge.    Adjustable height panel bracket- placed joystick at appropriate height for safe driving    Corpus Power Tilt and Recline  - This seating function combination is needed for this patient to be able to perform independent weight shifts and also to be able to change him seated position without the request of a care giver. Josiah requires a powered seating system with both tilt and recline to optimize pressure distribution and postural repositioning.   The power tilt seat allows him to change his position by rotating rearward without changing the angle of his hips or knees. Due to atrophy of his buttocks he is at high risk of developing pressure ulcers if not able to change his position. Due to compromised ability to exert himself for weight shifting, the power tilt seat allows him to do this by operating it through the joystick. He can also use a slight degree of tilt for assisting in his seating and positioning for normal functions during the day a to assist keeping him in a midline, erect and upright position by using the effect of gravity.   The power reclining back feature also reduces pressures by allowing him to change the angle of his hips and knees into a more open and supine / recumbent position. This increases his tolerance and be able to remain in the requested wheelchair for a complete day and not be dependent on care givers to change him position or transfer him to another  surface for comfort or resting. The recline feature can be used to access the perineal area necessary for toileting assistance. The power tilt seat and power recline back are necessary features that allow tasks to be done by the care giver without the need for transferring back to bed for these activities.  The medical justification for these seat functions is consistent with the RESNA Position Papers regarding these seat function interventions (Kamran et al., 2009). These seat functions will also reduce upper extremity strain per the Paralyzed 's of [place] Guidelines for upper limb preservation (Mindy, et al., 2005).    Power Positioning electronics to be operated through the Rnet controller - this is needed to allow Josiah to use the joystick of the wheelchair for control of mobility and operation of the power seat function. This is important for this patient with limited strength and coordination to increase ease of for operation of the seat functions.    Corpus Ergo Solid curved and padded back support - firm and contoured back support is needed to support Josiah's thoracolumbar area in an upright and midline position, with appropriate support pads as needed. This will provide support whether he is in the upright or tilted position. This back support is essential to provide sufficient lateral contour to maximize his postural alignment and minimize his tendency to develop scoliosis and other secondary complications.    Solomon fusion seat cushion - this pressure distribution and positioning seat cushion will optimally  distribute seating pressures to prevent pressure ulcers, but also provide a stable base of support for him to use during MRADLs.    Corpus power elevating foot legrest- Allows for elevation and extension of lower extremities while elevating. This can improve circulation and prevent/reduce edema (with recline/tilt combination).  The lower legs of this wheelchair user act as a reservoir for  "fluid accumulation due to lack of movement. Elevation of this patient's legs above the level of the heart (left atrium) is recommended as part of the management of edema in conjunction with other measures such as support garments  This patient has lower extremity dependent edema while sitting in his wheelchair, which resolves with elevation of his legs. This feature, when used with the power recline back or tilt seat, can increase Josiah's sitting tolerance while positioning him in a more natural position. This can also be helpful if he fatigues and requires rests throughout the day, without transferring him back to bed.  Due to inability to perform a functional weight shifting, he is at risk for developing pressure ulcers. With the use of this feature it will allow for optimal weight shifting in conjunction with tilt and recline.  Per RESNA white paper on elevating legrests, using elevating legrests and tilting more than 30 degrees in combination with full recline significantly improves lower leg hemodynamics status as measured by near-infrared spectroscopy (Devyn et al., 2010)  Additionally, these legrests can improve ground clearance to navigate thresholds and slopes and still allowing the legs to achieve a tight 90 degree position for typical driving conditions. This position shortens the overall functional wheelbase for improved maneuverability    Permobil Plus 10\" headrest - needed to keep Josiah's head in an erect, midline and upright position whether he is in the upright, tilted or reclined position. His head and neck need to be supported as well as the rest of his body.    Adj/removable mounting hardware - needed for mounting the requested headrest in the most appropriate position on the back of the wheelchair for optimal head and neck support and to be able to be removed for transfers.     Extra link for head rest- needed to fit to this patient    Bodilink LPTS lateral pads with adj/removable hardware- " needed to provide support for weak trunk muscles in order to provide upright posture for optimal environmental engagement. Hardware needed to be able to remove for safety during transfers.      PENNY phone ca- allows a place for his phone to safely be positioned on chair with use, preventing it from falling on the ground and allows for safe communication    USB - Josiah must be able to access his phone or computer for: Increased safety, in order to call or text for help in an emergency and to communicate with caregivers regarding their daily care needs via phone or text.  The USB  allows for his wheelchair to charge his phone or other devices so it is readily available when needed.  This also allows for the device to be close to him on his chair vs. Having to reach to attach/detach it from a wall .    Medical Necessities bag hooks- This is used to secure a medical necessity bag to carry essential items such as emergency medications, catheterization/toileting supplies, etc. The clips lock to provide a safe and secure means to transport medical items.    This equipment is reasonable and necessary with reference to accepted standards of medical practice and treatment of this patient's condition and is not being recommended as a convenience item. Without this recommended equipment, he is highly likely to sustain injuries from falls, develop pressure sores or postural compensation, and/or be bed confined, which those costs far exceed the cost of the requested equipment.    Electronically signed by:  Jeri PERALTA, ATP      Occupational Therapist, Assistive   [phone]      fax: [phone]      [email or web address]  [facility]- [facility], [address]  [address]  [place] [address]    December 15, 2022    I have read and concur with the above recommendations.    Physician Printed Name  __________________________________________    Physician SIgnature  _____________________________________________    Date of SIgnature ______________________________    Physician Phone  ______________________________

## 2022-12-15 ENCOUNTER — TELEPHONE (OUTPATIENT)
Dept: FAMILY MEDICINE | Facility: CLINIC | Age: 57
End: 2022-12-15

## 2022-12-15 NOTE — TELEPHONE ENCOUNTER
General Call      Reason for Call:  Wheelchair order    What are your questions or concerns:  Pt called and asked if we received wheelchair order from [facility]. I don't see any new order sent to us. Does provider by any chance have these orders? I show that an order was signed off for an electric wheelchair back on 7/26/22, but not sure if this is that order that pt is inquiring about as he did states that order was just sent over to us bout 1-2 weeks ago. I called and lvm to Felicita at [facility] to get more info and awaiting call back. Please look into this and follow back up with pt to confirmed if wheelchair order was received or not from [facility]. Thank you.       Could we send this information to you in [employer] or would you prefer to receive a phone call?:   Patient would prefer a phone call   Okay to leave a detailed message?: Yes at Home number on file [phone] (home)

## 2022-12-15 NOTE — TELEPHONE ENCOUNTER
Update from my previous note below. [facility] stated that they are still putting packet together and have not sent order over to us yet. They'll be faxing it over soon, please looking out for orders via [employer]. I called and relay info to pt and pt verbalize understanding. Thank you.

## 2022-12-16 ENCOUNTER — MEDICAL CORRESPONDENCE (OUTPATIENT)
Dept: HEALTH INFORMATION MANAGEMENT | Facility: CLINIC | Age: 57
End: 2022-12-16

## 2022-12-19 ENCOUNTER — MEDICAL CORRESPONDENCE (OUTPATIENT)
Dept: HEALTH INFORMATION MANAGEMENT | Facility: CLINIC | Age: 57
End: 2022-12-19

## 2022-12-30 ENCOUNTER — OFFICE VISIT (OUTPATIENT)
Dept: UROLOGY | Facility: CLINIC | Age: 57
End: 2022-12-30
Payer: MEDICARE

## 2022-12-30 DIAGNOSIS — N31.9 NEUROGENIC BLADDER: Primary | ICD-10-CM

## 2022-12-30 PROCEDURE — 51705 CHANGE OF BLADDER TUBE: CPT

## 2022-12-30 RX ORDER — CIPROFLOXACIN 500 MG/1
500 TABLET, FILM COATED ORAL ONCE
Status: COMPLETED | OUTPATIENT
Start: 2022-12-30 | End: 2022-12-30

## 2022-12-30 RX ADMIN — CIPROFLOXACIN 500 MG: 500 TABLET, FILM COATED ORAL at 10:10

## 2022-12-30 NOTE — PROGRESS NOTES
Josiah Crump comes into clinic today at the request of Dr. Lira with the diagnosis of NGB for a catheter change.    Order has been verified: Yes.    The following medication was given:     MEDICATION:  Ciprofloxacin  ROUTE: PO  SITE: Medication was given orally   DOSE: 500 mg  LOT #: [id number]  : [employer]  EXPIRATION DATE: 10/2023  NDC#: 7770-3918-36   Was there drug waste? No    Prior to administration, verified patient identity using patient's name and date of birth.    Drug Amount Wasted:  None.  Vial/Syringe: Single dose      No Known Allergies    Removal:  16 Fr straight tipped latex muhammad catheter removed from suprapubic meatus without difficulty after removing 7.5 mL of fluid from the balloon.    Insertion:  16 Fr straight tipped latex muhammad catheter inserted into suprapubic meatus in the usual sterile fashion without difficulty.  Received > 50 ml yellow positive urine return.   Balloon filled with 8 mL sterile H2O after positive urine return.  Catheter secured in place with leg strap: No.     Patient did tolerate procedure well.     Patient instructed as to where to call or go for pain, fever, leakage, or decreased urine flow.     This service provided today was under the direct supervision of Dr. Guillaume, who was available if needed.    Slime Garza   12/30/2022  9:26 AM

## 2023-01-09 ENCOUNTER — MEDICAL CORRESPONDENCE (OUTPATIENT)
Dept: HEALTH INFORMATION MANAGEMENT | Facility: CLINIC | Age: 58
End: 2023-01-09

## 2023-01-11 ENCOUNTER — MEDICAL CORRESPONDENCE (OUTPATIENT)
Dept: HEALTH INFORMATION MANAGEMENT | Facility: CLINIC | Age: 58
End: 2023-01-11

## 2023-01-16 DIAGNOSIS — G82.20 PARAPLEGIA (H): ICD-10-CM

## 2023-01-17 ENCOUNTER — MEDICAL CORRESPONDENCE (OUTPATIENT)
Dept: HEALTH INFORMATION MANAGEMENT | Facility: CLINIC | Age: 58
End: 2023-01-17

## 2023-01-17 NOTE — TELEPHONE ENCOUNTER
Routing refill request to provider for review/approval because:  Drug not on the [facility] refill protocol     Last Written Prescription Date:  7/25/22  Last Fill Quantity: 90,  # refills: 5   Last office visit provider:  7/25/22     Requested Prescriptions   Pending Prescriptions Disp Refills     baclofen (LIORESAL) 20 MG tablet [Pharmacy Med Name: BACLOFEN 20MG TABLETS] 90 tablet 5     Sig: TAKE ONE TABLET BY MOUTH THREE TIMES DAILY AS NEEDED FOR SPACTICITY       There is no refill protocol information for this order          Nain Nunez RN 01/17/23 11:48 AM

## 2023-01-18 RX ORDER — BACLOFEN 20 MG/1
TABLET ORAL
Qty: 90 TABLET | Refills: 5 | Status: SHIPPED | OUTPATIENT
Start: 2023-01-18 | End: 2023-07-17

## 2023-01-25 ENCOUNTER — OFFICE VISIT (OUTPATIENT)
Dept: UROLOGY | Facility: CLINIC | Age: 58
End: 2023-01-25
Payer: COMMERCIAL

## 2023-01-25 VITALS
HEART RATE: 73 BPM | SYSTOLIC BLOOD PRESSURE: 188 MMHG | BODY MASS INDEX: 26.18 KG/M2 | WEIGHT: 215 LBS | DIASTOLIC BLOOD PRESSURE: 82 MMHG | HEIGHT: 76 IN

## 2023-01-25 DIAGNOSIS — N31.9 NEUROGENIC BLADDER: Primary | ICD-10-CM

## 2023-01-25 PROCEDURE — 99213 OFFICE O/P EST LOW 20 MIN: CPT | Mod: 25 | Performed by: UROLOGY

## 2023-01-25 PROCEDURE — 51705 CHANGE OF BLADDER TUBE: CPT | Performed by: UROLOGY

## 2023-01-25 RX ORDER — CIPROFLOXACIN 500 MG/1
500 TABLET, FILM COATED ORAL ONCE
Status: COMPLETED | OUTPATIENT
Start: 2023-01-25 | End: 2023-01-25

## 2023-01-25 RX ADMIN — CIPROFLOXACIN 500 MG: 500 TABLET, FILM COATED ORAL at 11:32

## 2023-01-25 NOTE — PATIENT INSTRUCTIONS
Please schedule a 1 month nurse visit for Catheter change and injection teaching with Stacey.    It was a pleasure meeting with you today.  Thank you for allowing me and my team the privilege of caring for you today.  YOU are the reason we are here, and I truly hope we provided you with the excellent service you deserve.  Please let us know if there is anything else we can do for you so that we can be sure you are leaving completely satisfied with your care experience.

## 2023-01-25 NOTE — NURSING NOTE
Removal:  16 Fr straight tipped latex muhammad catheter removed from suprapubic meatus without difficulty.    Insertion:  16 Fr straight tipped latex muhammad catheter inserted into suprapubic meatus in the usual sterile fashion without difficulty.  Balloon filled with 10 mL sterile H2O.  Received 30 ml yellow urine output.   Catheter secured in place with leg strap: Yes.     One Cipro 500 mg given per protocol: Yes.   The following medication was given:     MEDICATION:  Ciprofloxacin  ROUTE: PO  SITE: Medication was given orally   DOSE: 500 mg  LOT #: [id number]  : [employer]  EXPIRATION DATE: 10/23  NDC#: 1782-0724-70   Was there drug waste? No    Prior to medication administration, verified patient identity using patient's name and date of birth.  Due to medication administration, patient instructed to remain in clinic for 15 minutes  afterwards, and to report any adverse reaction to me immediately.    Drug Amount Wasted:  None.  Single dose tablet    Patient did tolerate procedure well.    Patient instructed as to where to call or go for pain, fever, leakage, or decreased urine flow.       Nate West EMT  1/25/2023  11:30 AM

## 2023-01-25 NOTE — NURSING NOTE
"Chief Complaint   Patient presents with     Follow Up     Discuss Trimix       Blood pressure (!) 188/82, pulse 73, height 1.93 m (6' 4\"), weight 97.5 kg (215 lb). Body mass index is 26.17 kg/m .    Patient Active Problem List   Diagnosis     Benign essential hypertension     Chronic pain     General symptom     Gynecomastia     Hx of BKA, right (H)     Hydrocele     Infection associated with implanted penile prosthesis, initial encounter (H)     Insomnia     Lateral epicondylitis     Neurogenic bladder     Other tenosynovitis of hand and wrist     Pain in limb     Paraplegia (H)     Right shoulder strain     Vitamin D deficiency     Status post hip surgery     Heterotopic ossification of bone     Physical deconditioning     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, initial encounter (H)     Spasticity     History of disarticulation of right hip     Injury of thoracic spinal cord, sequela (H)     Urinary incontinence     Breakdown (mechanical) of implanted penile prosthesis, initial encounter (H)     Gluteal cleft wound, right, sequela     Complication of implanted penile prosthesis, initial encounter (H)     Anticoagulated     Pelvic abscess in male (H)     Infected decubitus ulcer, unspecified ulcer stage     Traumatic injury of rectum     Rectal bleeding     Pressure injury of skin of buttock, unspecified injury stage, unspecified laterality     Postoperative pain     Abdominal pain, generalized     Hyponatremia     Acute UTI     Porcelain gallbladder     Impotence     History of CVA (cerebrovascular accident)     Morbid obesity (H)     Paresthesia     Hyposmolality syndrome     Gastrointestinal hemorrhage, unspecified gastrointestinal hemorrhage type     Encounter for screening laboratory testing for severe acute respiratory syndrome coronavirus 2 (SARS-CoV-2)     Encounter for social work intervention     Skin ulcer of left thigh with fat layer exposed (H)       No Known Allergies    Current " Outpatient Medications   Medication Sig Dispense Refill     acetaminophen (TYLENOL) 325 MG tablet Take 2 tablets (650 mg) by mouth every 6 hours as needed for pain (Patient not taking: Reported on 7/25/2022) 100 tablet 11     amLODIPine (NORVASC) 5 MG tablet TAKE 1 TABLET(5 MG) BY MOUTH DAILY 90 tablet 3     bacitracin 500 UNIT/GM OINT bacitracin 500 unit/gram topical ointment       baclofen (LIORESAL) 20 MG tablet TAKE ONE TABLET BY MOUTH THREE TIMES DAILY AS NEEDED FOR SPACTICITY 90 tablet 5     Laquita Protect (EUCERIN) external cream Apply topically 2 times daily Cleanse the posterior scrotum region with this, twice daily gently with a soft cloth in addition to other wound care measures (Patient not taking: Reported on 7/25/2022) 57 g 0     bisacodyl (DULCOLAX) 5 MG EC tablet Take 2 tablets by mouth at 10am the day before procedure. (Patient not taking: Reported on 7/25/2022) 2 tablet 0     clopidogrel (PLAVIX) 75 MG tablet TAKE 1 TABLET(75 MG) BY MOUTH DAILY 90 tablet 1     Ferrous Sulfate (IRON) 325 (65 Fe) MG tablet TAKE 1 TABLET(325 MG) BY MOUTH DAILY WITH BREAKFAST 30 tablet 0     Fesoterodine Fumarate (TOVIAZ) 8 MG TB24 Take 8 mg by mouth daily  (Patient not taking: Reported on 7/25/2022)       gabapentin (NEURONTIN) 100 MG capsule Take 1 capsule (100 mg) by mouth 2 times daily (Patient not taking: Reported on 7/25/2022) 6 capsule 0     gabapentin (NEURONTIN) 100 MG capsule 2 capsules p.o. twice daily (Patient not taking: Reported on 7/25/2022) 120 capsule 5     meloxicam (MOBIC) 15 MG tablet Take 15 mg by mouth daily (Patient not taking: Reported on 7/25/2022)       menthol, Topical Analgesic, 2.5% (BENGAY VANISHING SCENT) 2.5 % GEL topical gel Apply topically every 6 hours as needed (pain, muscle aches) (Patient not taking: Reported on 7/25/2022) 57 g 0     Miconazole Nitrate 2 % ointment Apply topically 2 times daily To perineal area with dressing change (Patient not taking: Reported on 7/25/2022) 28.7 g 0      naloxone (NARCAN) 4 MG/0.1ML nasal spray Spray 1 spray (4 mg) into one nostril alternating nostrils once as needed for opioid reversal every 2-3 minutes until assistance arrives 0.2 mL 0     NEW MED TB syringe with needle attached for administration of .5ml 20 min prior to sexual activity.   May be used 3 times per week. 2.5 mL 11     pantoprazole (PROTONIX) 40 MG EC tablet Take 1 tablet (40 mg) by mouth every morning (before breakfast) (Patient not taking: Reported on 7/25/2022) 14 tablet 0     psyllium (METAMUCIL/KONSYL) capsule Take 1 capsule by mouth 2 times daily (Patient not taking: Reported on 7/25/2022) 60 capsule 0     simethicone (MYLICON) 80 MG chewable tablet Take 1 tablet (80 mg) by mouth 4 times daily (Patient not taking: Reported on 7/25/2022) 30 tablet 0     simvastatin (ZOCOR) 20 MG tablet TAKE 1 TABLET(20 MG) BY MOUTH DAILY 90 tablet 3     Skin Oils (BABY OIL) OIL Apply small amount as needed to perineal area to help remove critic aid paste as necessary if paste becomes soiled (Patient not taking: Reported on 7/25/2022) 591 mL 0     testosterone cypionate (DEPOTESTOSTERONE) 200 MG/ML injection Inject 0.5 mLs (100 mg) into the muscle every 14 days 3 mL 5       Social History     Tobacco Use     Smoking status: Never     Smokeless tobacco: Never   Vaping Use     Vaping Use: Never used   Substance Use Topics     Alcohol use: No     Drug use: No       Nate West  1/25/2023  11:13 AM

## 2023-01-25 NOTE — LETTER
1/25/2023       RE: Josiah Crump  [address]  [place] [address]     Dear Colleague,    Thank you for referring your patient, Josiah Crump, to the [facility] [place] at [facility]. Please see a copy of my visit note below.    Josiah Crump is a 57 year old male with neurogenic bladder, amputation, chronic wounds.  Manages bladder with SPT  Also had multiple attempts at IPP insertion. Most recently, it got infected and needed to be removed.  He is not a great candidate for repeat IPP insertion.  He wants to try injection therapy for erections.    Vital signs reviewed    Exam:  Abdomen soft  No tenderness or evidence of cellulitis  No hematoma  SPT draining clear  Paraplegia    A/P   Neurogenic bladder  Chronic pelvic pain  Erectile dysfunction with recent infected IPP    SPT was exchanged today. Old 16 Fr removed and new 16 Fr catheter inserted with 10cc in balloon    We will set up a nursing visit for trial of injection therapy and SPT change. (he wants to try this before spending $80+ in case it is futile.    Charles Lira MD

## 2023-01-29 NOTE — PROGRESS NOTES
Josiah Crump is a 57 year old male with neurogenic bladder, amputation, chronic wounds.  Manages bladder with SPT  Also had multiple attempts at IPP insertion. Most recently, it got infected and needed to be removed.  He is not a great candidate for repeat IPP insertion.  He wants to try injection therapy for erections.    Vital signs reviewed    Exam:  Abdomen soft  No tenderness or evidence of cellulitis  No hematoma  SPT draining clear  Paraplegia    A/P   Neurogenic bladder  Chronic pelvic pain  Erectile dysfunction with recent infected IPP    SPT was exchanged today. Old 16 Fr removed and new 16 Fr catheter inserted with 10cc in balloon    We will set up a nursing visit for trial of injection therapy and SPT change. (he wants to try this before spending $80+ in case it is futile.    Charles Lira MD

## 2023-02-01 DIAGNOSIS — R69 DIAGNOSIS UNKNOWN: Primary | ICD-10-CM

## 2023-02-06 ENCOUNTER — TELEPHONE (OUTPATIENT)
Dept: UROLOGY | Facility: CLINIC | Age: 58
End: 2023-02-06
Payer: COMMERCIAL

## 2023-02-06 NOTE — TELEPHONE ENCOUNTER
Tried calling to set up an appointment for Wednesday 2/8/23 for injection teaching. Patient does not have voicemail setup on his phone, so I sent him a [employer] message     Stacey Tilley RN, BSN  Care Coordinator Urology

## 2023-02-06 NOTE — TELEPHONE ENCOUNTER
----- Message from Nate West sent at 1/25/2023 11:33 AM CST -----  Regarding: Trimix teaching  Dr. Pankaj Hercules wanted this patient to come back in about a month for injection teaching. Please help get this gentleman scheduled when you know your availability.    ThanksTremaine

## 2023-02-26 DIAGNOSIS — Z76.0 ENCOUNTER FOR MEDICATION REFILL: ICD-10-CM

## 2023-02-27 RX ORDER — CLOPIDOGREL BISULFATE 75 MG/1
TABLET ORAL
Qty: 90 TABLET | Refills: 1 | Status: SHIPPED | OUTPATIENT
Start: 2023-02-27 | End: 2023-09-25

## 2023-02-27 NOTE — TELEPHONE ENCOUNTER
"Routing refill request to provider for review/approval because:  Labs out of range:  hgb    Last Written Prescription Date:  8/18/22  Last Fill Quantity: 90,  # refills: 1   Last office visit provider:  7/25/22     Requested Prescriptions   Pending Prescriptions Disp Refills     clopidogrel (PLAVIX) 75 MG tablet [Pharmacy Med Name: CLOPIDOGREL 75MG TABLETS] 90 tablet 1     Sig: TAKE 1 TABLET(75 MG) BY MOUTH DAILY       Plavix Failed - 2/26/2023  3:19 PM        Failed - Normal HGB on file in past 12 months     Recent Labs   Lab Test 11/08/22  0309   HGB 13.2*               Passed - No active PPI on record unless is Protonix        Passed - Normal Platelets on file in past 12 months     Recent Labs   Lab Test 11/08/22  0309                  Passed - Recent (12 mo) or future (30 days) visit within the authorizing provider's specialty     Patient has had an office visit with the authorizing provider or a provider within the authorizing providers department within the previous 12 mos or has a future within next 30 days. See \"Patient Info\" tab in inbasket, or \"Choose Columns\" in Meds & Orders section of the refill encounter.              Passed - Medication is active on med list        Passed - Patient is age 18 or older             Felicita Ferreira RN 02/27/23 12:23 PM  "

## 2023-02-28 ENCOUNTER — TELEPHONE (OUTPATIENT)
Dept: ENDOCRINOLOGY | Facility: CLINIC | Age: 58
End: 2023-02-28

## 2023-02-28 DIAGNOSIS — E29.1 HYPOGONADISM MALE: ICD-10-CM

## 2023-02-28 NOTE — TELEPHONE ENCOUNTER
TESTOSTERONE CYP 200MG/ML SDV 1ML   Last Written Prescription Date:  6/13/22  Last Fill Quantity: 3,   # refills: 5  Last Office Visit : 4/12/21  Future Office visit:  none    Routing refill request to provider for review/approval because:  Drug not on the [facility], [facility] or [facility] refill protocol or controlled substance  Overdue for follow up appointment

## 2023-02-28 NOTE — TELEPHONE ENCOUNTER
Attempted to call - pt's VM is not set up - sent MyChart for pt to c/back to schedule a f/up with Dr. Martinez.     Chandni TEREZA Alfaro on 2/28/2023 at 11:54 AM      Mer Cisneros RN Araki, Takako, MD; [facility] 1 hour ago (10:05 AM)     MJ  TESTOSTERONE CYP 200MG/ML SDV 1ML   Pend #3ml, blank refills      Mer Cisneros RN 1 hour ago (10:05 AM)     MJ  TESTOSTERONE CYP 200MG/ML SDV 1ML   Last Written Prescription Date:  6/13/22  Last Fill Quantity: 3,   # refills: 5  Last Office Visit : 4/12/21  Future Office visit:  none     Routing refill request to provider for review/approval because:  Drug not on the [facility], [facility] or [facility] refill protocol or controlled substance  Overdue for follow up appointment                  Note      Interface, E[employer] routed conversation to p Med Refills Team 3 hours ago (8:08 AM)

## 2023-03-03 RX ORDER — TESTOSTERONE CYPIONATE 200 MG/ML
100 INJECTION, SOLUTION INTRAMUSCULAR
Qty: 3 ML | Refills: 5 | Status: SHIPPED | OUTPATIENT
Start: 2023-03-03 | End: 2024-01-07

## 2023-03-13 ENCOUNTER — OFFICE VISIT (OUTPATIENT)
Dept: UROLOGY | Facility: CLINIC | Age: 58
End: 2023-03-13
Payer: COMMERCIAL

## 2023-03-13 DIAGNOSIS — N31.9 NEUROGENIC BLADDER: Primary | ICD-10-CM

## 2023-03-13 PROCEDURE — 51705 CHANGE OF BLADDER TUBE: CPT

## 2023-03-13 NOTE — PROGRESS NOTES
Josiah Crump comes into clinic today at the request of Dr. Ibarra with the diagnosis of neurogenic bladder for a catheter exchange.    Order has been verified: Yes.    The following medication was given:     MEDICATION:  cipro  ROUTE: PO  SITE: Medication was given orally   DOSE: 500 mg  LOT #: [id number]  : [employer]  EXPIRATION DATE: 12/23  NDC#:0904   Was there drug waste? No    Prior to administration, verified patient identity using patient's name and date of birth.    Drug Amount Wasted:  None.  Vial/Syringe: Single dose      No Known Allergies    Removal:  16 Fr straight tipped silicone muhammad catheter removed from suprapubic meatus without difficulty after removing 8 mL of fluid from the balloon.    Insertion:  16 Fr straight tipped silicone muhammad catheter inserted into suprapubic meatus in the usual sterile fashion without difficulty.  Received > 50 ml yellow positive urine return.   Balloon filled with 10 mL sterile H2O after positive urine return.  Catheter secured in place with leg strap: Yes.     Patient did tolerate procedure well.     Patient instructed as to where to call or go for pain, fever, leakage, or decreased urine flow.     This service provided today was under the direct supervision of Dr. Savage, who was available if needed.    Nain Mendez RN   3/13/2023  2:24 PM      No chief complaint on file.      Patient Active Problem List   Diagnosis     Benign essential hypertension     Chronic pain     General symptom     Gynecomastia     Hx of BKA, right (H)     Hydrocele     Infection associated with implanted penile prosthesis, initial encounter (H)     Insomnia     Lateral epicondylitis     Neurogenic bladder     Other tenosynovitis of hand and wrist     Pain in limb     Paraplegia (H)     Right shoulder strain     Vitamin D deficiency     Status post hip surgery     Heterotopic ossification of bone     Physical deconditioning     Erosion of urethra due to  catheterization of urinary tract, initial encounter (H)     Spasticity     History of disarticulation of right hip     Injury of thoracic spinal cord, sequela (H)     Urinary incontinence     Breakdown (mechanical) of implanted penile prosthesis, initial encounter (H)     Gluteal cleft wound, right, sequela     Complication of implanted penile prosthesis, initial encounter (H)     Anticoagulated     Pelvic abscess in male (H)     Infected decubitus ulcer, unspecified ulcer stage     Traumatic injury of rectum     Rectal bleeding     Pressure injury of skin of buttock, unspecified injury stage, unspecified laterality     Postoperative pain     Abdominal pain, generalized     Hyponatremia     Acute UTI     Porcelain gallbladder     Impotence     History of CVA (cerebrovascular accident)     Morbid obesity (H)     Paresthesia     Hyposmolality syndrome     Gastrointestinal hemorrhage, unspecified gastrointestinal hemorrhage type     Encounter for screening laboratory testing for severe acute respiratory syndrome coronavirus 2 (SARS-CoV-2)     Encounter for social work intervention     Skin ulcer of left thigh with fat layer exposed (H)       No Known Allergies      Current Outpatient Medications:      acetaminophen (TYLENOL) 325 MG tablet, Take 2 tablets (650 mg) by mouth every 6 hours as needed for pain (Patient not taking: Reported on 7/25/2022), Disp: 100 tablet, Rfl: 11     amLODIPine (NORVASC) 5 MG tablet, TAKE 1 TABLET(5 MG) BY MOUTH DAILY, Disp: 90 tablet, Rfl: 3     bacitracin 500 UNIT/GM OINT, bacitracin 500 unit/gram topical ointment, Disp: , Rfl:      baclofen (LIORESAL) 20 MG tablet, TAKE ONE TABLET BY MOUTH THREE TIMES DAILY AS NEEDED FOR SPACTICITY, Disp: 90 tablet, Rfl: 5     Laquita Protect (EUCERIN) external cream, Apply topically 2 times daily Cleanse the posterior scrotum region with this, twice daily gently with a soft cloth in addition to other wound care measures (Patient not taking: Reported on  7/25/2022), Disp: 57 g, Rfl: 0     bisacodyl (DULCOLAX) 5 MG EC tablet, Take 2 tablets by mouth at 10am the day before procedure. (Patient not taking: Reported on 7/25/2022), Disp: 2 tablet, Rfl: 0     clopidogrel (PLAVIX) 75 MG tablet, TAKE 1 TABLET(75 MG) BY MOUTH DAILY, Disp: 90 tablet, Rfl: 1     Ferrous Sulfate (IRON) 325 (65 Fe) MG tablet, TAKE 1 TABLET(325 MG) BY MOUTH DAILY WITH BREAKFAST, Disp: 30 tablet, Rfl: 0     Fesoterodine Fumarate (TOVIAZ) 8 MG TB24, Take 8 mg by mouth daily  (Patient not taking: Reported on 7/25/2022), Disp: , Rfl:      gabapentin (NEURONTIN) 100 MG capsule, Take 1 capsule (100 mg) by mouth 2 times daily (Patient not taking: Reported on 7/25/2022), Disp: 6 capsule, Rfl: 0     gabapentin (NEURONTIN) 100 MG capsule, 2 capsules p.o. twice daily (Patient not taking: Reported on 7/25/2022), Disp: 120 capsule, Rfl: 5     meloxicam (MOBIC) 15 MG tablet, Take 15 mg by mouth daily (Patient not taking: Reported on 7/25/2022), Disp: , Rfl:      menthol, Topical Analgesic, 2.5% (BENGAY VANISHING SCENT) 2.5 % GEL topical gel, Apply topically every 6 hours as needed (pain, muscle aches) (Patient not taking: Reported on 7/25/2022), Disp: 57 g, Rfl: 0     Miconazole Nitrate 2 % ointment, Apply topically 2 times daily To perineal area with dressing change (Patient not taking: Reported on 7/25/2022), Disp: 28.7 g, Rfl: 0     naloxone (NARCAN) 4 MG/0.1ML nasal spray, Spray 1 spray (4 mg) into one nostril alternating nostrils once as needed for opioid reversal every 2-3 minutes until assistance arrives, Disp: 0.2 mL, Rfl: 0     NEW MED, TB syringe with needle attached for administration of .5ml 20 min prior to sexual activity.  May be used 3 times per week., Disp: 2.5 mL, Rfl: 11     pantoprazole (PROTONIX) 40 MG EC tablet, Take 1 tablet (40 mg) by mouth every morning (before breakfast) (Patient not taking: Reported on 7/25/2022), Disp: 14 tablet, Rfl: 0     psyllium (METAMUCIL/KONSYL) capsule, Take 1  capsule by mouth 2 times daily (Patient not taking: Reported on 7/25/2022), Disp: 60 capsule, Rfl: 0     simethicone (MYLICON) 80 MG chewable tablet, Take 1 tablet (80 mg) by mouth 4 times daily (Patient not taking: Reported on 7/25/2022), Disp: 30 tablet, Rfl: 0     simvastatin (ZOCOR) 20 MG tablet, TAKE 1 TABLET(20 MG) BY MOUTH DAILY, Disp: 90 tablet, Rfl: 3     Skin Oils (BABY OIL) OIL, Apply small amount as needed to perineal area to help remove critic aid paste as necessary if paste becomes soiled (Patient not taking: Reported on 7/25/2022), Disp: 591 mL, Rfl: 0     testosterone cypionate (DEPOTESTOSTERONE) 200 MG/ML injection, Inject 0.5 mLs (100 mg) into the muscle every 14 days, Disp: 3 mL, Rfl: 5    Current Facility-Administered Medications:      ciprofloxacin (CIPRO) tablet 500 mg, 500 mg, Oral, Once, Charles Lira MD    Social History     Tobacco Use     Smoking status: Never     Smokeless tobacco: Never   Vaping Use     Vaping Use: Never used   Substance Use Topics     Alcohol use: No     Drug use: No       Patient comes into clinic today at the request of Dr. Lira for intracavernosal injection teaching. I did the injection teaching.      This service provided today was under the direct supervision of Dr. Savage, who is   available if needed.     Buck presents to clinic for Edex injection teaching.  Edex informational patient packet was read and reviewed in clinic by patient.  Reviewed usage and possible side effects.  Questions answered appropriately. Discussed there needs to be some element of foreplay after injecting Edex.   This reaches it's full effectiveness in 20 minutes.   Patient injected 10 mcg of Edex into the cavernosa without this writer's assistance.     After 15 minutes, patient reassessed.  Patient rates his erection a ??/10.   Patient educated on alternating sites for injection, left and right side of shaft. Also discussed not to use more than 3 times per week, at least 24  hours between each injection.   Also discussed prolonged erections and need to go to ER if that happens. He would need to go into the ER in 4 hours if his erection is prolonged.   Patient verbalized understanding.   Patient has no erection at this time before.   No further questions.  Patient to call if he has any questions or concerns.  I am asking patient call me after the first injection to discuss the response.  Prescription for Edex 10 mcg sent in to patient's pharmacy.    Prior to injection, verified patient identity using patient's name and date of birth.  Due to injection Edex administration, patient instructed to remain in clinic for 15 minutes  afterwards, and to report any adverse reaction to me immediately.     The following medication was given:      MEDICATION: Edex  ROUTE: Intracavitary  SITE: penis  DOSE: 10 mcg  LOT #:   :  [employer]  EXPIRATION DATE:  12/23  NDC#: [id number]            The following medication was given:     Was there drug waste? No  Multi-dose vial: No    This author allowed patient to inject after training.  This author remained in the room during injection.  This author instructed patient to wait for 20 minutes and then have an evaluation of the efficacy of the medication,  After 20 minutes, this author returned to room to find that patient had left the room, leaving his check-in badge and information packet.  This author called patient and received no answer, and was unable to leave voicemail.    Nain Mendez RN  March 13, 2023

## 2023-03-14 NOTE — TELEPHONE ENCOUNTER
Spoke to pt and attempted to schedule a f/up to see Dr. Martinez. Pt did not want to wait until 2/2024. Routing message to provider.     Chandni Alfaro on 3/14/2023 at 12:51 PM

## 2023-04-11 ENCOUNTER — TELEPHONE (OUTPATIENT)
Dept: ENDOCRINOLOGY | Facility: CLINIC | Age: 58
End: 2023-04-11
Payer: COMMERCIAL

## 2023-04-11 ENCOUNTER — TELEPHONE (OUTPATIENT)
Dept: FAMILY MEDICINE | Facility: CLINIC | Age: 58
End: 2023-04-11

## 2023-04-11 NOTE — TELEPHONE ENCOUNTER
Patient call:     Appointment type: return   Provider: Michelle  Return date: first available   Speciality phone number: [phone]  Additional appointment(s) needed:   Additional notes: Ok to overbook but non urgent (in 2 months) : message from 4/3   Will need to schedule manually in [facility] or at end of clinic   sent SALEEM Orantes not set up 4/11 [facility]

## 2023-04-11 NOTE — TELEPHONE ENCOUNTER
General Call    Contacts       Type Contact Phone/Fax    04/11/2023 02:17 PM CDT Phone (Incoming) Sonja Whitfield (Care Coordinator) [phone]        Reason for Call:  Concerns of Care    What are your questions or concerns:  Sonja,  from [facility] is calling with some concerns. PCA reports that patient isn't properly taking care of colostomy bag. They are also having a hard time getting a hold of the patient today. PCA informed the patient that he should go to the emergency room on 4/10 but according to Sonja, patient was turned away. Sonja would just like to speak to provider about patient's care.    Date of last appointment with provider: na    Could we send this information to you in [facility] or would you prefer to receive a phone call?:   Patient would prefer a phone call   Okay to leave a detailed message?: Yes at Sonja [phone]

## 2023-04-12 NOTE — TELEPHONE ENCOUNTER
Called Sonja and left a message to help schedule appointment to follow-up with us, can also be followed by colorectal surgery ostomy nurse if not already done.

## 2023-04-17 ENCOUNTER — HOSPITAL ENCOUNTER (INPATIENT)
Facility: CLINIC | Age: 58
LOS: 2 days | Discharge: HOME | DRG: 872 | End: 2023-04-19
Attending: EMERGENCY MEDICINE | Admitting: INTERNAL MEDICINE
Payer: COMMERCIAL

## 2023-04-17 ENCOUNTER — APPOINTMENT (OUTPATIENT)
Dept: RADIOLOGY | Facility: CLINIC | Age: 58
DRG: 872 | End: 2023-04-17
Payer: COMMERCIAL

## 2023-04-17 ENCOUNTER — APPOINTMENT (OUTPATIENT)
Dept: CT IMAGING | Facility: CLINIC | Age: 58
DRG: 872 | End: 2023-04-17
Payer: COMMERCIAL

## 2023-04-17 DIAGNOSIS — N12 PYELONEPHRITIS: ICD-10-CM

## 2023-04-17 DIAGNOSIS — N17.9 AKI (ACUTE KIDNEY INJURY) (H): ICD-10-CM

## 2023-04-17 DIAGNOSIS — N39.0 ACUTE UTI: ICD-10-CM

## 2023-04-17 DIAGNOSIS — M79.605 PAIN IN BOTH LOWER EXTREMITIES: Primary | ICD-10-CM

## 2023-04-17 DIAGNOSIS — M79.604 PAIN IN BOTH LOWER EXTREMITIES: Primary | ICD-10-CM

## 2023-04-17 DIAGNOSIS — Z93.3 COLOSTOMY PRESENT (H): ICD-10-CM

## 2023-04-17 DIAGNOSIS — N39.0 URINARY TRACT INFECTION: ICD-10-CM

## 2023-04-17 DIAGNOSIS — N39.0 COMPLICATED URINARY TRACT INFECTION: ICD-10-CM

## 2023-04-17 LAB
ABO/RH(D): NORMAL
ALBUMIN MFR UR ELPH: 166.7 MG/DL (ref 1–14)
ALBUMIN SERPL-MCNC: 2.3 G/DL (ref 3.5–5)
ALBUMIN UR-MCNC: 100 MG/DL
ALP SERPL-CCNC: 119 U/L (ref 45–120)
ALT SERPL W P-5'-P-CCNC: 10 U/L (ref 0–45)
ANION GAP SERPL CALCULATED.3IONS-SCNC: 13 MMOL/L (ref 5–18)
ANTIBODY SCREEN: NEGATIVE
APPEARANCE UR: ABNORMAL
APTT PPP: 32 SECONDS (ref 22–38)
AST SERPL W P-5'-P-CCNC: 13 U/L (ref 0–40)
BACTERIA #/AREA URNS HPF: ABNORMAL /HPF
BASOPHILS # BLD AUTO: 0.1 10E3/UL (ref 0–0.2)
BASOPHILS NFR BLD AUTO: 0 %
BILIRUB DIRECT SERPL-MCNC: <0.1 MG/DL
BILIRUB SERPL-MCNC: 0.3 MG/DL (ref 0–1)
BILIRUB UR QL STRIP: NEGATIVE
BUN SERPL-MCNC: 22 MG/DL (ref 8–22)
CALCIUM SERPL-MCNC: 9 MG/DL (ref 8.5–10.5)
CHLORIDE BLD-SCNC: 107 MMOL/L (ref 98–107)
CO2 SERPL-SCNC: 19 MMOL/L (ref 22–31)
COLOR UR AUTO: ABNORMAL
CREAT SERPL-MCNC: 1.98 MG/DL (ref 0.7–1.3)
CREAT UR-MCNC: 27 MG/DL
EOSINOPHIL # BLD AUTO: 0.1 10E3/UL (ref 0–0.7)
EOSINOPHIL NFR BLD AUTO: 0 %
ERYTHROCYTE [DISTWIDTH] IN BLOOD BY AUTOMATED COUNT: 19.5 % (ref 10–15)
FERRITIN SERPL-MCNC: 213 NG/ML (ref 31–409)
GFR SERPL CREATININE-BSD FRML MDRD: 39 ML/MIN/1.73M2
GLUCOSE BLD-MCNC: 74 MG/DL (ref 70–125)
GLUCOSE UR STRIP-MCNC: NEGATIVE MG/DL
HCT VFR BLD AUTO: 28.6 % (ref 40–53)
HGB BLD-MCNC: 7.8 G/DL (ref 13.3–17.7)
HGB BLD-MCNC: 8.5 G/DL (ref 13.3–17.7)
HGB UR QL STRIP: ABNORMAL
HOLD SPECIMEN: NORMAL
IMM GRANULOCYTES # BLD: 0.3 10E3/UL
IMM GRANULOCYTES NFR BLD: 1 %
INR PPP: 1.47 (ref 0.85–1.15)
KETONES UR STRIP-MCNC: NEGATIVE MG/DL
LACTATE SERPL-SCNC: 0.7 MMOL/L (ref 0.7–2)
LACTATE SERPL-SCNC: 0.9 MMOL/L (ref 0.7–2)
LEUKOCYTE ESTERASE UR QL STRIP: ABNORMAL
LYMPHOCYTES # BLD AUTO: 0.8 10E3/UL (ref 0.8–5.3)
LYMPHOCYTES NFR BLD AUTO: 4 %
MAGNESIUM SERPL-MCNC: 1.8 MG/DL (ref 1.8–2.6)
MCH RBC QN AUTO: 21.7 PG (ref 26.5–33)
MCHC RBC AUTO-ENTMCNC: 29.7 G/DL (ref 31.5–36.5)
MCV RBC AUTO: 73 FL (ref 78–100)
MONOCYTES # BLD AUTO: 1 10E3/UL (ref 0–1.3)
MONOCYTES NFR BLD AUTO: 5 %
MUCOUS THREADS #/AREA URNS LPF: PRESENT /LPF
NEUTROPHILS # BLD AUTO: 19.1 10E3/UL (ref 1.6–8.3)
NEUTROPHILS NFR BLD AUTO: 90 %
NITRATE UR QL: POSITIVE
NRBC # BLD AUTO: 0 10E3/UL
NRBC BLD AUTO-RTO: 0 /100
PH UR STRIP: 6 [PH] (ref 5–7)
PLATELET # BLD AUTO: 388 10E3/UL (ref 150–450)
POTASSIUM BLD-SCNC: 4.3 MMOL/L (ref 3.5–5)
PROT SERPL-MCNC: 8.9 G/DL (ref 6–8)
PROT/CREAT 24H UR: 6.17 MG/MG CR
RBC # BLD AUTO: 3.92 10E6/UL (ref 4.4–5.9)
RBC URINE: 2 /HPF
RETICS # AUTO: 0.04 10E6/UL (ref 0.01–0.11)
RETICS/RBC NFR AUTO: 1 % (ref 0.8–2.7)
SODIUM SERPL-SCNC: 139 MMOL/L (ref 136–145)
SP GR UR STRIP: 1.01 (ref 1–1.03)
SPECIMEN EXPIRATION DATE: NORMAL
SQUAMOUS EPITHELIAL: <1 /HPF
TRANSFERRIN SERPL-MCNC: 127 MG/DL (ref 200–360)
UROBILINOGEN UR STRIP-MCNC: <2 MG/DL
VIT B12 SERPL-MCNC: 1185 PG/ML (ref 232–1245)
WBC # BLD AUTO: 21.3 10E3/UL (ref 4–11)
WBC CLUMPS #/AREA URNS HPF: PRESENT /HPF
WBC URINE: 167 /HPF

## 2023-04-17 PROCEDURE — 96361 HYDRATE IV INFUSION ADD-ON: CPT

## 2023-04-17 PROCEDURE — 86850 RBC ANTIBODY SCREEN: CPT | Performed by: INTERNAL MEDICINE

## 2023-04-17 PROCEDURE — 250N000011 HC RX IP 250 OP 636: Performed by: PHYSICIAN ASSISTANT

## 2023-04-17 PROCEDURE — 83605 ASSAY OF LACTIC ACID: CPT | Performed by: INTERNAL MEDICINE

## 2023-04-17 PROCEDURE — 84466 ASSAY OF TRANSFERRIN: CPT | Performed by: INTERNAL MEDICINE

## 2023-04-17 PROCEDURE — 83605 ASSAY OF LACTIC ACID: CPT | Performed by: PHYSICIAN ASSISTANT

## 2023-04-17 PROCEDURE — 85018 HEMOGLOBIN: CPT | Performed by: INTERNAL MEDICINE

## 2023-04-17 PROCEDURE — 85610 PROTHROMBIN TIME: CPT | Performed by: PHYSICIAN ASSISTANT

## 2023-04-17 PROCEDURE — 85730 THROMBOPLASTIN TIME PARTIAL: CPT | Performed by: PHYSICIAN ASSISTANT

## 2023-04-17 PROCEDURE — 99223 1ST HOSP IP/OBS HIGH 75: CPT | Performed by: INTERNAL MEDICINE

## 2023-04-17 PROCEDURE — 36415 COLL VENOUS BLD VENIPUNCTURE: CPT | Performed by: PHYSICIAN ASSISTANT

## 2023-04-17 PROCEDURE — 250N000013 HC RX MED GY IP 250 OP 250 PS 637: Performed by: INTERNAL MEDICINE

## 2023-04-17 PROCEDURE — 51705 CHANGE OF BLADDER TUBE: CPT

## 2023-04-17 PROCEDURE — 85025 COMPLETE CBC W/AUTO DIFF WBC: CPT | Performed by: PHYSICIAN ASSISTANT

## 2023-04-17 PROCEDURE — 82607 VITAMIN B-12: CPT | Performed by: INTERNAL MEDICINE

## 2023-04-17 PROCEDURE — 74176 CT ABD & PELVIS W/O CONTRAST: CPT

## 2023-04-17 PROCEDURE — 87040 BLOOD CULTURE FOR BACTERIA: CPT | Performed by: PHYSICIAN ASSISTANT

## 2023-04-17 PROCEDURE — 87086 URINE CULTURE/COLONY COUNT: CPT | Performed by: PHYSICIAN ASSISTANT

## 2023-04-17 PROCEDURE — 96375 TX/PRO/DX INJ NEW DRUG ADDON: CPT

## 2023-04-17 PROCEDURE — 80053 COMPREHEN METABOLIC PANEL: CPT | Performed by: PHYSICIAN ASSISTANT

## 2023-04-17 PROCEDURE — 250N000011 HC RX IP 250 OP 636: Performed by: HOSPITALIST

## 2023-04-17 PROCEDURE — 82248 BILIRUBIN DIRECT: CPT | Performed by: PHYSICIAN ASSISTANT

## 2023-04-17 PROCEDURE — 258N000003 HC RX IP 258 OP 636: Performed by: PHYSICIAN ASSISTANT

## 2023-04-17 PROCEDURE — 83735 ASSAY OF MAGNESIUM: CPT | Performed by: PHYSICIAN ASSISTANT

## 2023-04-17 PROCEDURE — 96365 THER/PROPH/DIAG IV INF INIT: CPT | Mod: 59

## 2023-04-17 PROCEDURE — 85045 AUTOMATED RETICULOCYTE COUNT: CPT | Performed by: INTERNAL MEDICINE

## 2023-04-17 PROCEDURE — 71046 X-RAY EXAM CHEST 2 VIEWS: CPT

## 2023-04-17 PROCEDURE — 84156 ASSAY OF PROTEIN URINE: CPT | Performed by: INTERNAL MEDICINE

## 2023-04-17 PROCEDURE — 36415 COLL VENOUS BLD VENIPUNCTURE: CPT | Performed by: INTERNAL MEDICINE

## 2023-04-17 PROCEDURE — 99285 EMERGENCY DEPT VISIT HI MDM: CPT | Mod: 25

## 2023-04-17 PROCEDURE — 83550 IRON BINDING TEST: CPT | Performed by: INTERNAL MEDICINE

## 2023-04-17 PROCEDURE — 120N000001 HC R&B MED SURG/OB

## 2023-04-17 PROCEDURE — 81001 URINALYSIS AUTO W/SCOPE: CPT | Performed by: PHYSICIAN ASSISTANT

## 2023-04-17 PROCEDURE — 258N000003 HC RX IP 258 OP 636: Performed by: INTERNAL MEDICINE

## 2023-04-17 PROCEDURE — 82728 ASSAY OF FERRITIN: CPT | Performed by: INTERNAL MEDICINE

## 2023-04-17 RX ORDER — POLYETHYLENE GLYCOL 3350 17 G/17G
17 POWDER, FOR SOLUTION ORAL DAILY PRN
Status: DISCONTINUED | OUTPATIENT
Start: 2023-04-17 | End: 2023-04-19 | Stop reason: HOSPADM

## 2023-04-17 RX ORDER — CEFTRIAXONE 1 G/1
1 INJECTION, POWDER, FOR SOLUTION INTRAMUSCULAR; INTRAVENOUS ONCE
Status: COMPLETED | OUTPATIENT
Start: 2023-04-17 | End: 2023-04-17

## 2023-04-17 RX ORDER — CEFTRIAXONE 1 G/1
1 INJECTION, POWDER, FOR SOLUTION INTRAMUSCULAR; INTRAVENOUS EVERY 24 HOURS
Status: DISCONTINUED | OUTPATIENT
Start: 2023-04-18 | End: 2023-04-19 | Stop reason: HOSPADM

## 2023-04-17 RX ORDER — DIAZEPAM 5 MG
5 TABLET ORAL EVERY 6 HOURS PRN
Status: ON HOLD | COMMUNITY
Start: 2023-03-22 | End: 2023-04-19

## 2023-04-17 RX ORDER — PROCHLORPERAZINE MALEATE 10 MG
10 TABLET ORAL EVERY 6 HOURS PRN
Status: DISCONTINUED | OUTPATIENT
Start: 2023-04-17 | End: 2023-04-19 | Stop reason: HOSPADM

## 2023-04-17 RX ORDER — NALOXONE HYDROCHLORIDE 0.4 MG/ML
0.2 INJECTION, SOLUTION INTRAMUSCULAR; INTRAVENOUS; SUBCUTANEOUS
Status: DISCONTINUED | OUTPATIENT
Start: 2023-04-17 | End: 2023-04-19 | Stop reason: HOSPADM

## 2023-04-17 RX ORDER — OXYCODONE HYDROCHLORIDE 10 MG/1
5-10 TABLET ORAL EVERY 6 HOURS PRN
Status: ON HOLD | COMMUNITY
Start: 2023-03-22 | End: 2023-04-19

## 2023-04-17 RX ORDER — OXYCODONE HYDROCHLORIDE 5 MG/1
5-10 TABLET ORAL EVERY 6 HOURS PRN
Status: DISCONTINUED | OUTPATIENT
Start: 2023-04-17 | End: 2023-04-19 | Stop reason: HOSPADM

## 2023-04-17 RX ORDER — AMOXICILLIN 250 MG
2 CAPSULE ORAL 2 TIMES DAILY PRN
Status: DISCONTINUED | OUTPATIENT
Start: 2023-04-17 | End: 2023-04-19 | Stop reason: HOSPADM

## 2023-04-17 RX ORDER — BISACODYL 10 MG
10 SUPPOSITORY, RECTAL RECTAL DAILY PRN
Status: DISCONTINUED | OUTPATIENT
Start: 2023-04-17 | End: 2023-04-19 | Stop reason: HOSPADM

## 2023-04-17 RX ORDER — NALOXONE HYDROCHLORIDE 0.4 MG/ML
0.4 INJECTION, SOLUTION INTRAMUSCULAR; INTRAVENOUS; SUBCUTANEOUS
Status: DISCONTINUED | OUTPATIENT
Start: 2023-04-17 | End: 2023-04-19 | Stop reason: HOSPADM

## 2023-04-17 RX ORDER — PROCHLORPERAZINE 25 MG
25 SUPPOSITORY, RECTAL RECTAL EVERY 12 HOURS PRN
Status: DISCONTINUED | OUTPATIENT
Start: 2023-04-17 | End: 2023-04-19 | Stop reason: HOSPADM

## 2023-04-17 RX ORDER — ONDANSETRON 2 MG/ML
4 INJECTION INTRAMUSCULAR; INTRAVENOUS EVERY 6 HOURS PRN
Status: DISCONTINUED | OUTPATIENT
Start: 2023-04-17 | End: 2023-04-19 | Stop reason: HOSPADM

## 2023-04-17 RX ORDER — HYDROMORPHONE HYDROCHLORIDE 1 MG/ML
0.5 INJECTION, SOLUTION INTRAMUSCULAR; INTRAVENOUS; SUBCUTANEOUS EVERY 6 HOURS PRN
Status: DISCONTINUED | OUTPATIENT
Start: 2023-04-17 | End: 2023-04-19 | Stop reason: HOSPADM

## 2023-04-17 RX ORDER — ONDANSETRON 4 MG/1
4 TABLET, ORALLY DISINTEGRATING ORAL EVERY 6 HOURS PRN
Status: DISCONTINUED | OUTPATIENT
Start: 2023-04-17 | End: 2023-04-19 | Stop reason: HOSPADM

## 2023-04-17 RX ORDER — BACLOFEN 10 MG/1
20 TABLET ORAL 3 TIMES DAILY PRN
Status: DISCONTINUED | OUTPATIENT
Start: 2023-04-17 | End: 2023-04-19 | Stop reason: HOSPADM

## 2023-04-17 RX ORDER — AMLODIPINE BESYLATE 5 MG/1
5 TABLET ORAL DAILY
Status: DISCONTINUED | OUTPATIENT
Start: 2023-04-18 | End: 2023-04-19 | Stop reason: HOSPADM

## 2023-04-17 RX ORDER — ACETAMINOPHEN 325 MG/1
650 TABLET ORAL EVERY 6 HOURS PRN
Status: DISCONTINUED | OUTPATIENT
Start: 2023-04-17 | End: 2023-04-19 | Stop reason: HOSPADM

## 2023-04-17 RX ORDER — SIMVASTATIN 20 MG
20 TABLET ORAL AT BEDTIME
Status: DISCONTINUED | OUTPATIENT
Start: 2023-04-17 | End: 2023-04-19 | Stop reason: HOSPADM

## 2023-04-17 RX ORDER — AMOXICILLIN 250 MG
1 CAPSULE ORAL 2 TIMES DAILY PRN
Status: DISCONTINUED | OUTPATIENT
Start: 2023-04-17 | End: 2023-04-19 | Stop reason: HOSPADM

## 2023-04-17 RX ORDER — LIDOCAINE 40 MG/G
CREAM TOPICAL
Status: DISCONTINUED | OUTPATIENT
Start: 2023-04-17 | End: 2023-04-19 | Stop reason: HOSPADM

## 2023-04-17 RX ORDER — DIAZEPAM 5 MG
5 TABLET ORAL EVERY 6 HOURS PRN
Status: DISCONTINUED | OUTPATIENT
Start: 2023-04-17 | End: 2023-04-19 | Stop reason: HOSPADM

## 2023-04-17 RX ORDER — SODIUM CHLORIDE 9 MG/ML
INJECTION, SOLUTION INTRAVENOUS CONTINUOUS
Status: DISCONTINUED | OUTPATIENT
Start: 2023-04-17 | End: 2023-04-18

## 2023-04-17 RX ORDER — ACETAMINOPHEN 650 MG/1
650 SUPPOSITORY RECTAL EVERY 6 HOURS PRN
Status: DISCONTINUED | OUTPATIENT
Start: 2023-04-17 | End: 2023-04-19 | Stop reason: HOSPADM

## 2023-04-17 RX ORDER — CLOPIDOGREL BISULFATE 75 MG/1
75 TABLET ORAL DAILY
Status: DISCONTINUED | OUTPATIENT
Start: 2023-04-17 | End: 2023-04-19 | Stop reason: HOSPADM

## 2023-04-17 RX ORDER — HYDROMORPHONE HYDROCHLORIDE 1 MG/ML
0.5 INJECTION, SOLUTION INTRAMUSCULAR; INTRAVENOUS; SUBCUTANEOUS ONCE
Status: COMPLETED | OUTPATIENT
Start: 2023-04-17 | End: 2023-04-17

## 2023-04-17 RX ADMIN — HYDROMORPHONE HYDROCHLORIDE 0.5 MG: 1 INJECTION, SOLUTION INTRAMUSCULAR; INTRAVENOUS; SUBCUTANEOUS at 22:28

## 2023-04-17 RX ADMIN — CLOPIDOGREL BISULFATE 75 MG: 75 TABLET ORAL at 19:37

## 2023-04-17 RX ADMIN — DIAZEPAM 5 MG: 5 TABLET ORAL at 19:38

## 2023-04-17 RX ADMIN — CEFTRIAXONE 1 G: 1 INJECTION, POWDER, FOR SOLUTION INTRAMUSCULAR; INTRAVENOUS at 16:45

## 2023-04-17 RX ADMIN — VANCOMYCIN HYDROCHLORIDE 1750 MG: 5 INJECTION, POWDER, LYOPHILIZED, FOR SOLUTION INTRAVENOUS at 17:59

## 2023-04-17 RX ADMIN — SODIUM CHLORIDE 1000 ML: 9 INJECTION, SOLUTION INTRAVENOUS at 14:57

## 2023-04-17 RX ADMIN — OXYCODONE HYDROCHLORIDE 10 MG: 5 TABLET ORAL at 19:38

## 2023-04-17 RX ADMIN — SIMVASTATIN 20 MG: 20 TABLET, FILM COATED ORAL at 19:37

## 2023-04-17 RX ADMIN — HYDROMORPHONE HYDROCHLORIDE 0.5 MG: 1 INJECTION, SOLUTION INTRAMUSCULAR; INTRAVENOUS; SUBCUTANEOUS at 17:39

## 2023-04-17 RX ADMIN — SODIUM CHLORIDE: 9 INJECTION, SOLUTION INTRAVENOUS at 20:21

## 2023-04-17 ASSESSMENT — ACTIVITIES OF DAILY LIVING (ADL)
FALL_HISTORY_WITHIN_LAST_SIX_MONTHS: NO
CHANGE_IN_FUNCTIONAL_STATUS_SINCE_ONSET_OF_CURRENT_ILLNESS/INJURY: NO
ADLS_ACUITY_SCORE: 35
WALKING_OR_CLIMBING_STAIRS: STAIR CLIMBING DIFFICULTY, REQUIRES EQUIPMENT
HEARING_DIFFICULTY_OR_DEAF: NO
TRANSFERRING: 0-->ASSISTANCE NEEDED (DEVELOPMENTALLY APPROPRIATE)
ADLS_ACUITY_SCORE: 35
DIFFICULTY_COMMUNICATING: NO
TOILETING_ASSISTANCE: TOILETING DIFFICULTY, REQUIRES EQUIPMENT
CONCENTRATING,_REMEMBERING_OR_MAKING_DECISIONS_DIFFICULTY: NO
EQUIPMENT_CURRENTLY_USED_AT_HOME: COLOSTOMY/OSTOMY SUPPLIES;WHEELCHAIR, MANUAL
DOING_ERRANDS_INDEPENDENTLY_DIFFICULTY: NO
DEPENDENT_IADLS:: CLEANING;COOKING;LAUNDRY;SHOPPING;MEAL PREPARATION;MEDICATION MANAGEMENT;MONEY MANAGEMENT;INCONTINENCE
TOILETING: 1-->ASSISTANCE (EQUIPMENT/PERSON) NEEDED (NOT DEVELOPMENTALLY APPROPRIATE)
VISION_MANAGEMENT: READING GLASSES
ADLS_ACUITY_SCORE: 28
ADLS_ACUITY_SCORE: 35
TOILETING_ISSUES: YES
WALKING_OR_CLIMBING_STAIRS_DIFFICULTY: YES
TOILETING: 1-->ASSISTANCE (EQUIPMENT/PERSON) NEEDED
DRESSING/BATHING_DIFFICULTY: NO
WEAR_GLASSES_OR_BLIND: YES
TRANSFERRING: 1-->ASSISTANCE (EQUIPMENT/PERSON) NEEDED
ADLS_ACUITY_SCORE: 28
ADLS_ACUITY_SCORE: 33
DIFFICULTY_EATING/SWALLOWING: NO
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
## 2023-04-17 NOTE — PHARMACY-ADMISSION MEDICATION HISTORY
Pharmacist Admission Medication History    Admission medication history is complete. The information provided in this note is only as accurate as the sources available at the time of the update.    Medication reconciliation/reorder completed by provider prior to medication history? No    Information Source(s): Patient and CareEverywhere/SureScripts via in-person    Pertinent Information: none    Changes made to PTA medication list:    Added: oxycodone 10 mg, diazepam 5 mg    Deleted: acetaminophen, Laquita protect cream, bacitracin ointment, bisacodyl tab, ciprofloxacin ONCE, ferrous sulfate, Toviaz, gabapentin x 2, meloxicam, Bengay topical gel, pantoprazole, famotidine, psyllium, simethicone, baby oil    Changed: None    Allergies reviewed with patient and updates made in EHR: yes    Medication History Completed By: Stephany Molina [facility] 4/17/2023 4:49 PM    Prior to Admission medications    Medication Sig Last Dose Taking? Auth Provider Long Term End Date   amLODIPine (NORVASC) 5 MG tablet TAKE 1 TABLET(5 MG) BY MOUTH DAILY 4/16/2023 Yes Bala Benjamin MD Yes    baclofen (LIORESAL) 20 MG tablet TAKE ONE TABLET BY MOUTH THREE TIMES DAILY AS NEEDED FOR SPACTICITY 4/17/2023 at am Yes Bala Benjamin MD     clopidogrel (PLAVIX) 75 MG tablet TAKE 1 TABLET(75 MG) BY MOUTH DAILY 4/16/2023 Yes Bala Benjamin MD Yes    diazepam (VALIUM) 5 MG tablet Take 5 mg by mouth every 6 hours as needed for muscle spasms Past Week Yes Unknown, Entered By History     naloxone (NARCAN) 4 MG/0.1ML nasal spray Spray 1 spray (4 mg) into one nostril alternating nostrils once as needed for opioid reversal every 2-3 minutes until assistance arrives Unknown Yes Bala Benjamin MD Yes    NEW MED TB syringe with needle attached for administration of .5ml 20 min prior to sexual activity.   May be used 3 times per week. Unknown Yes Charles Lira MD     oxyCODONE IR (ROXICODONE) 10 MG tablet Take 5-10 mg by mouth every 6 hours as needed for pain  Past Week Yes Unknown, Entered By History     simvastatin (ZOCOR) 20 MG tablet TAKE 1 TABLET(20 MG) BY MOUTH DAILY 4/16/2023 Yes Bala Benjamin MD Yes    testosterone cypionate (DEPOTESTOSTERONE) 200 MG/ML injection Inject 0.5 mLs (100 mg) into the muscle every 14 days Past Month Yes Haley Martinez MD Yes

## 2023-04-17 NOTE — PHARMACY-VANCOMYCIN DOSING SERVICE
Pharmacy Vancomycin Initial Note  Date of Service 2023  Patient's  1965  57 year old, male    Indication: Urinary Tract Infection    Current estimated CrCl = Estimated Creatinine Clearance: 57.4 mL/min (A) (based on SCr of 1.98 mg/dL (H)).    Creatinine for last 3 days  2023:  1:19 PM Creatinine 1.98 mg/dL    Recent Vancomycin Level(s) for last 3 days  No results found for requested labs within last 3 days.      Vancomycin IV Administrations (past 72 hours)      No vancomycin orders with administrations in past 72 hours.                Nephrotoxins and other renal medications (From now, onward)    Start     Dose/Rate Route Frequency Ordered Stop    23 1630  vancomycin (VANCOCIN) 1,750 mg in 0.9% NaCl 500 mL intermittent infusion         1,750 mg  over 2 Hours Intravenous ONCE 23 1629            Contrast Orders - past 72 hours (72h ago, onward)    None              Plan:  1. Start vancomycin  1750 mg IV x 1 (17.5 mg/kg) initial load. History of MRSA.   2. Creatinine elevated on admission, current Insight model suggesting q24 hour frequency, with improvement may fit q12 hour frequency.  3. Place pharmacy consult to dose for further dosing if indicated.    Stephany Molina [facility]

## 2023-04-18 LAB
ANION GAP SERPL CALCULATED.3IONS-SCNC: 7 MMOL/L (ref 5–18)
BUN SERPL-MCNC: 19 MG/DL (ref 8–22)
CALCIUM SERPL-MCNC: 8.2 MG/DL (ref 8.5–10.5)
CHLORIDE BLD-SCNC: 111 MMOL/L (ref 98–107)
CO2 SERPL-SCNC: 19 MMOL/L (ref 22–31)
CREAT SERPL-MCNC: 1.95 MG/DL (ref 0.7–1.3)
ERYTHROCYTE [DISTWIDTH] IN BLOOD BY AUTOMATED COUNT: 19.5 % (ref 10–15)
GFR SERPL CREATININE-BSD FRML MDRD: 40 ML/MIN/1.73M2
GLUCOSE BLD-MCNC: 112 MG/DL (ref 70–125)
HCT VFR BLD AUTO: 25.7 % (ref 40–53)
HGB BLD-MCNC: 7.3 G/DL (ref 13.3–17.7)
HGB BLD-MCNC: 7.3 G/DL (ref 13.3–17.7)
HGB BLD-MCNC: 7.9 G/DL (ref 13.3–17.7)
IRON BINDING CAPACITY (ROCHE): 103 UG/DL (ref 240–430)
IRON SATN MFR SERPL: 7 % (ref 15–46)
IRON SERPL-MCNC: 7 UG/DL (ref 61–157)
LACTATE SERPL-SCNC: 0.6 MMOL/L (ref 0.7–2)
MAGNESIUM SERPL-MCNC: 1.8 MG/DL (ref 1.8–2.6)
MCH RBC QN AUTO: 21.3 PG (ref 26.5–33)
MCHC RBC AUTO-ENTMCNC: 28.4 G/DL (ref 31.5–36.5)
MCV RBC AUTO: 75 FL (ref 78–100)
PLATELET # BLD AUTO: 290 10E3/UL (ref 150–450)
POTASSIUM BLD-SCNC: 4.3 MMOL/L (ref 3.5–5)
RBC # BLD AUTO: 3.43 10E6/UL (ref 4.4–5.9)
SODIUM SERPL-SCNC: 137 MMOL/L (ref 136–145)
WBC # BLD AUTO: 13.7 10E3/UL (ref 4–11)

## 2023-04-18 PROCEDURE — 99222 1ST HOSP IP/OBS MODERATE 55: CPT | Performed by: PHYSICIAN ASSISTANT

## 2023-04-18 PROCEDURE — 999N000197 HC STATISTIC WOC PT EDUCATION, 0-15 MIN

## 2023-04-18 PROCEDURE — 83735 ASSAY OF MAGNESIUM: CPT | Performed by: INTERNAL MEDICINE

## 2023-04-18 PROCEDURE — 250N000011 HC RX IP 250 OP 636: Performed by: INTERNAL MEDICINE

## 2023-04-18 PROCEDURE — 250N000011 HC RX IP 250 OP 636: Performed by: HOSPITALIST

## 2023-04-18 PROCEDURE — 85014 HEMATOCRIT: CPT | Performed by: INTERNAL MEDICINE

## 2023-04-18 PROCEDURE — 120N000001 HC R&B MED SURG/OB

## 2023-04-18 PROCEDURE — 36415 COLL VENOUS BLD VENIPUNCTURE: CPT | Performed by: INTERNAL MEDICINE

## 2023-04-18 PROCEDURE — 250N000013 HC RX MED GY IP 250 OP 250 PS 637: Performed by: INTERNAL MEDICINE

## 2023-04-18 PROCEDURE — 85660 RBC SICKLE CELL TEST: CPT | Performed by: INTERNAL MEDICINE

## 2023-04-18 PROCEDURE — 258N000003 HC RX IP 258 OP 636: Performed by: INTERNAL MEDICINE

## 2023-04-18 PROCEDURE — 85018 HEMOGLOBIN: CPT | Performed by: INTERNAL MEDICINE

## 2023-04-18 PROCEDURE — 83605 ASSAY OF LACTIC ACID: CPT | Performed by: INTERNAL MEDICINE

## 2023-04-18 PROCEDURE — 99232 SBSQ HOSP IP/OBS MODERATE 35: CPT | Performed by: INTERNAL MEDICINE

## 2023-04-18 PROCEDURE — 250N000013 HC RX MED GY IP 250 OP 250 PS 637: Performed by: HOSPITALIST

## 2023-04-18 PROCEDURE — 80048 BASIC METABOLIC PNL TOTAL CA: CPT | Performed by: INTERNAL MEDICINE

## 2023-04-18 RX ORDER — GUAIFENESIN 600 MG/1
600 TABLET, EXTENDED RELEASE ORAL 2 TIMES DAILY
Status: DISCONTINUED | OUTPATIENT
Start: 2023-04-18 | End: 2023-04-19 | Stop reason: HOSPADM

## 2023-04-18 RX ADMIN — SIMVASTATIN 20 MG: 20 TABLET, FILM COATED ORAL at 20:32

## 2023-04-18 RX ADMIN — VANCOMYCIN HYDROCHLORIDE 1250 MG: 5 INJECTION, POWDER, LYOPHILIZED, FOR SOLUTION INTRAVENOUS at 18:10

## 2023-04-18 RX ADMIN — HYDROMORPHONE HYDROCHLORIDE 0.5 MG: 1 INJECTION, SOLUTION INTRAMUSCULAR; INTRAVENOUS; SUBCUTANEOUS at 23:50

## 2023-04-18 RX ADMIN — BACLOFEN 20 MG: 10 TABLET ORAL at 16:18

## 2023-04-18 RX ADMIN — OXYCODONE HYDROCHLORIDE 10 MG: 5 TABLET ORAL at 19:51

## 2023-04-18 RX ADMIN — DIAZEPAM 5 MG: 5 TABLET ORAL at 01:39

## 2023-04-18 RX ADMIN — CEFTRIAXONE 1 G: 1 INJECTION, POWDER, FOR SOLUTION INTRAMUSCULAR; INTRAVENOUS at 17:00

## 2023-04-18 RX ADMIN — IRON SUCROSE 200 MG: 20 INJECTION, SOLUTION INTRAVENOUS at 11:55

## 2023-04-18 RX ADMIN — OXYCODONE HYDROCHLORIDE 10 MG: 5 TABLET ORAL at 01:39

## 2023-04-18 RX ADMIN — CLOPIDOGREL BISULFATE 75 MG: 75 TABLET ORAL at 08:44

## 2023-04-18 RX ADMIN — AMLODIPINE BESYLATE 5 MG: 5 TABLET ORAL at 08:43

## 2023-04-18 RX ADMIN — GUAIFENESIN 600 MG: 600 TABLET ORAL at 20:32

## 2023-04-18 RX ADMIN — BACLOFEN 20 MG: 10 TABLET ORAL at 23:50

## 2023-04-18 RX ADMIN — OXYCODONE HYDROCHLORIDE 10 MG: 5 TABLET ORAL at 08:43

## 2023-04-18 RX ADMIN — HYDROMORPHONE HYDROCHLORIDE 0.5 MG: 1 INJECTION, SOLUTION INTRAMUSCULAR; INTRAVENOUS; SUBCUTANEOUS at 16:18

## 2023-04-18 RX ADMIN — DIAZEPAM 5 MG: 5 TABLET ORAL at 19:51

## 2023-04-18 RX ADMIN — HYDROMORPHONE HYDROCHLORIDE 0.5 MG: 1 INJECTION, SOLUTION INTRAMUSCULAR; INTRAVENOUS; SUBCUTANEOUS at 05:50

## 2023-04-18 RX ADMIN — DIAZEPAM 5 MG: 5 TABLET ORAL at 08:44

## 2023-04-18 RX ADMIN — BACLOFEN 20 MG: 10 TABLET ORAL at 00:00

## 2023-04-18 RX ADMIN — SODIUM CHLORIDE: 9 INJECTION, SOLUTION INTRAVENOUS at 05:55

## 2023-04-18 ASSESSMENT — ACTIVITIES OF DAILY LIVING (ADL)
ADLS_ACUITY_SCORE: 28

## 2023-04-18 NOTE — PLAN OF CARE
Patient A &Ox4, makes needs known. Scoots self well in bed, transfers self into wheelchair by himself.  Arrived to the unit around 1900. C/O pain at 8/10, gave Oxy 10 with Valium to minimal effect. MD merritt and IV Dilaudid order received.     IV Vanco given and NS 100mL/hr running into PIV on right arm.    Suprapubic cath dressing CDI, 1600mL output clear, pale yellow urine. Colostomy bag intact.     VSS but tachy at baseline. Mepilex added to left posterior thigh wound.
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
## 2023-04-18 NOTE — CONSULTS
[facility] RN Consult Note    Today's Visit: [facility] Nurse consulted for colostomy.  Patient has colostomy created in 2/2022. He states he is independent with cares.  He did not bring supplies with him, [facility] nurse will order supplies from stores, however patient uses [employer] and we have [employer] in the hospital, patient agrees.  Per patient, he does not need any further assistance from [facility] nurse.  [facility] RN will sign off, wound/ostomy treatment plan in place. Please re-consult with any new concerns.   EVA Byrd, RN, PHN, HNB-BC, CWOCN  Pager no longer is use, please contact through [employer] group: [facility] [employer]

## 2023-04-18 NOTE — UTILIZATION REVIEW
Admission Status; Secondary Review Determination   Under the authority of the Utilization Management Committee, the utilization review process indicated a secondary review on Josiah Crump. The review outcome is based on review of the medical records, discussions with staff, and applying clinical experience noted on the date of the review.   (x) Inpatient Status Appropriate - This patient's medical care is consistent with medical management for inpatient care and reasonable inpatient medical practice.     RATIONALE FOR DETERMINATION   57 year old male with past medical history of CVA, T8 paraplegia, hypertension, neurogenic bladder with chronic indwelling suprapubic catheter, bowel perforation requiring exploratory laparotomy, sigmoid colectomy and end colostomy on 2/2/2023, recently admitted to [facility] for evaluation for hematochezia and passage of blood clots from 4/7 through 4/9 admitted to [facility] on 4/17/2023 for acute bilateral pyelonephritis and acute kidney injury. nephrology consult , on 2 IV antibiotics and IV fluid , Hb is dropping (7.3) , started on IV iron and will need to monitor Hb        At the time of admission with the information available to the attending physician more than 2 nights Hospital complex care was anticipated, based on patient risk of adverse outcome if treated as outpatient and complex care required. Inpatient admission is appropriate based on the Medicare guidelines.   The information on this document is developed by the utilization review team in order for the business office to ensure compliance. This only denotes the appropriateness of proper admission status and does not reflect the quality of care rendered.   The definitions of Inpatient Status and Observation Status used in making the determination above are those provided in the CMS Coverage Manual, Chapter 1 and Chapter 6, section 70.4.   Sincerely,   Juaquin Lucio MD  Utilization Review  Physician  Advisor  [facility]

## 2023-04-18 NOTE — PLAN OF CARE
Problem: Plan of Care - These are the overarching goals to be used throughout the patient stay.    Goal: Plan of Care Review  Description: The Plan of Care Review/Shift note should be completed every shift.  The Outcome Evaluation is a brief statement about your assessment that the patient is improving, declining, or no change.  This information will be displayed automatically on your shift note.  Outcome: Progressing     Problem: UTI (Urinary Tract Infection)  Goal: Improved Infection Symptoms  Outcome: Progressing     Problem: Pain Acute  Goal: Optimal Pain Control and Function  Outcome: Progressing   Goal Outcome Evaluation:             Pt A&O x 4. Pt calls appropriately and can make needs known. Call light within reach. Order received for venofer and administered. K and Mg protocol. K 4.3 and Mg 1.8 both recheck in the morning. Hgb 7.3 provider aware. Supra pubic cath patent and draining clear yellow urine. Colostomy intact. Mepilex on open wound on L upper thigh and top of L foot. Pt stated that metal piece in his shower rubs on it. Pt edematous in L lower extremity, ankle and foot. Foot elevated. WOC consult. PRN oral oxy and valium given at 0900. Anticipated discharge 4/19 home with PCA

## 2023-04-18 NOTE — PLAN OF CARE
Problem: Plan of Care - These are the overarching goals to be used throughout the patient stay.    Goal: Plan of Care Review  Description: The Plan of Care Review/Shift note should be completed every shift.  The Outcome Evaluation is a brief statement about your assessment that the patient is improving, declining, or no change.  This information will be displayed automatically on your shift note.  Outcome: Progressing     Problem: Pain Acute  Goal: Optimal Pain Control and Function    Patient is alert and orientated times four. Pain managed with prn oxycodone, baclofen and valium. Supra-pubic catheter and colostomy in place and both are patent. IV fluids infusing at 100ml/hr. Continues on the K+ and mag protocols with rechecks scheduled for this AM. Bed alarm in place for patient's safety.
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
## 2023-04-18 NOTE — PHARMACY-VANCOMYCIN DOSING SERVICE
Pharmacy Vancomycin Initial Note  Date of Service 2023  Patient's  1965  57 year old, male    Indication: Urinary Tract Infection    Current estimated CrCl = Estimated Creatinine Clearance: 57.4 mL/min (A) (based on SCr of 1.98 mg/dL (H)).    Creatinine for last 3 days  2023:  1:19 PM Creatinine 1.98 mg/dL    Recent Vancomycin Level(s) for last 3 days  No results found for requested labs within last 3 days.      Vancomycin IV Administrations (past 72 hours)                   vancomycin (VANCOCIN) 1,750 mg in 0.9% NaCl 500 mL intermittent infusion (mg) 1,750 mg Given 23 1759                Nephrotoxins and other renal medications (From now, onward)    Start     Dose/Rate Route Frequency Ordered Stop    23 1800  vancomycin (VANCOCIN) 1,250 mg in 0.9% NaCl 250 mL intermittent infusion         1,250 mg  over 90 Minutes Intravenous EVERY 24 HOURS 23 1935      23 1700  vancomycin (VANCOCIN) 1,750 mg in 0.9% NaCl 500 mL intermittent infusion         1,750 mg  over 2 Hours Intravenous ONCE 23 1629            Contrast Orders - past 72 hours (72h ago, onward)    None          [employer] Prediction of Planned Initial Vancomycin Regimen  oading dose: 1750mg  Regimen: 1250 mg IV every 24 hours.  Start time: 18:00 on 2023  Exposure target: AUC24 (range)400-600 mg/L.hr   AUC24,ss: 488 mg/L.hr  Probability of AUC24 > 400: 72 %  Ctrough,ss: 15.4 mg/L  Probability of Ctrough,ss > 20: 26 %  Probability of nephrotoxicity (Lodise SERVANDO ): 11 %        Plan:  1. Start vancomycin  1250mg IV q24h.   2. Vancomycin monitoring method: AUC  3. Vancomycin therapeutic monitoring goal: 400-600 mg*h/L  4. Pharmacy will check vancomycin levels as appropriate in 1-3 Days.    5. Serum creatinine levels will be ordered daily for the first week of therapy and at least twice weekly for subsequent weeks.      Star Seth [facility]

## 2023-04-18 NOTE — CONSULTS
Care Management Initial Consult    General Information  Assessment completed with: Patient,    Type of CM/SW Visit: Initial Assessment    Primary Care Provider verified and updated as needed:  yes   Readmission within the last 30 days: unable to assess (4/7-4/9 at [facility])         Advance Care Planning: Advance Care Planning Reviewed:  (does not have HCD and declines offer for blank HCD)        Communication Assessment  Patient's communication style: spoken language (English or Bilingual)    Hearing Difficulty or Deaf: no   Wear Glasses or Blind: yes    Cognitive  Cognitive/Neuro/Behavioral: WDL                      Living Environment:   People in home: alone     Current living Arrangements: apartment      Able to return to prior arrangements: yes     Family/Social Support:  Care provided by: self, friend, other (see comments) (PCA daily for 8-9 hours)  Provides care for: no one  Marital Status:   Children (3 children, PCA daily)          Description of Support System: Supportive, Involved       Current Resources:   Patient receiving home care services: No  Community Resources: County Worker, PCA ([facility])  Equipment currently used at home: colostomy/ostomy supplies (SP catheter, WC)  Supplies currently used at home: Incontinence Supplies (SP catheter, colostomy)    Employment/Financial:  Employment Status: disabled     Employment/ Comments: no  or [employer]  Financial Concerns: No concerns identified   Referral to Financial Worker: No     Lifestyle & Psychosocial Needs:  Social Determinants of Health     Tobacco Use: Low Risk  (1/25/2023)    Patient History      Smoking Tobacco Use: Never      Smokeless Tobacco Use: Never      Passive Exposure: Not on file   Alcohol Use: Not At Risk (4/2/2019)    AUDIT-C      Frequency of Alcohol Consumption: Never      Average Number of Drinks: Not on file      Frequency of Binge Drinking: Not on file   Financial Resource Strain: Not on file   Food  "Insecurity: Not on file   Transportation Needs: Not on file   Physical Activity: Not on file   Stress: Not on file   Social Connections: Not on file   Intimate Partner Violence: Not on file   Depression: Not at risk (2/23/2022)    PHQ-2      PHQ-2 Score: 0   Housing Stability: Not on file     Functional Status:  Prior to admission patient needed assistance:   Dependent ADLs:: Bathing, Dressing, Wheelchair-independent, Grooming, Incontinence  Dependent IADLs:: Cleaning, Cooking, Laundry, Shopping, Meal Preparation, Medication Management, Money Management, Incontinence     Mental Health Status:  Mental Health Status:  (denies)       Chemical Dependency Status:  Chemical Dependency Status:  (denies)           Values/Beliefs:  Spiritual, Cultural Beliefs, Druze Practices, Values that affect care: no               Additional Information:  Chart reviewed. Was recently hospitalized at [facility] (4/7-4/9). WOC and GI consults pending. IV antibiotics. Labs.     CM met with patient; assessment completed to best of CM ability as patient \"not interested in speaking to SW\" but did end up answering most questions.     Lives alone. Has PCA daily \"8-9 hours\" that \"help with everything\". He states he does still drive and his car is in in ER parking lot and he plans to drive himself home. Denies having any skilled HC services.     Primarily WC bound. Has suprapubic catheter and colostomy which he self manages.     He states plan to return home and resume PCA services; he denies needs from CM. Plans to drive self home.     Surekha Nicholson RN        "

## 2023-04-18 NOTE — PROGRESS NOTES
[facility]    Medicine Progress Note - Hospitalist Service    Date of Admission:  4/17/2023    Assessment & Plan   Josiah Crump is a 57 year old male with history of CVA, T8 paraplegia, hypertension, neurogenic bladder with chronic indwelling suprapubic catheter, bowel perforation requiring exploratory laparotomy, sigmoid colectomy and end colostomy on 2/2/2023, recently admitted to [facility] for evaluation for hematochezia and passage of blood clots from 4/7 through 4/9 admitted to [facility] on 4/17/2023 for acute bilateral pyelonephritis and acute kidney injury.      Acute bilateral pyelonephritis  History of MRSA infection  Lactic acid level normal.  ceftriaxone 1 g IV every 24 hours  vancomycin pharmacy to dose  Blood cultures on 4/17/2023 in process  Urine culture on 4/17/2023 in process     Acute kidney injury  Hypercalcemia when corrected for albumin  Creatinine 1.98->1.95 baseline 1.0 to 1.2 mg/dL  Urinalysis consistent with acute UTI.  Repeat base metabolic profile in a.m.  urine protein creatinine ratio when infection resolved.     History of bowel perforation, sigmoid colectomy, estevez pouch and colostomy.   Hematochezia  Extremity wounds  Wound and ostomy cares consult.   Hemoglobin stable at 7.3.   Order type and screen.   Hold plavix.   Appreciate GI recommendations.     Microcytic anemia  Last hemoglobin on 4/9/2023 was 7.7 MCV 72.  Hemoglobin 7.3 g/dL.  Iron sat 7, ferritin 213, reticulocyte count 0.04, vitamin B12 level normal.  Repeat CBC in AM.  Order hemoglobin electrophoresis  Order ferrous sulfate 200 mg IV daily.   Order ferrous sulfate oral supplement at discharge.      Neurogenic bladder requiring chronic suprapubic urinary catheter.   Continue suprapubic catheter cares.     Benign essential hypertension  PTA medication: Amlodipine 5 mg daily with hold parameters.      Paraplegia lower extremity  Secondary to T8 injury.  Muscle spasticity  PTA  medication: Baclofen 20 mg 3 times daily as needed for muscle spasm  Diazepam 5 mg every 6 hours as needed for muscle spasms     History of right thalamic CVA 4/2020.  PTA medication: hold clopidogrel 75 mg daily      Diet: Combination Diet Regular Diet Adult    DVT Prophylaxis: Pneumatic Compression Devices  Shah Catheter: Not present  Lines: None     Cardiac Monitoring: None  Code Status: Full Code      Clinically Significant Risk Factors Present on Admission           # Hypercalcemia: corrected calcium is >10.1, will monitor as appropriate    # Hypoalbuminemia: Lowest albumin = 2.3 g/dL at 4/17/2023  1:19 PM, will monitor as appropriate  # Coagulation Defect: INR = 1.47 (Ref range: 0.85 - 1.15) and/or PTT = 32 Seconds (Ref range: 22 - 38 Seconds), will monitor for bleeding  # Drug Induced Platelet Defect: home medication list includes an antiplatelet medication                Disposition Plan      Expected Discharge Date: 04/20/2023      Destination: home;home with help/services            Charles Sharp DO  Hospitalist Service  [facility]  Securely message with [employer] (more info)  Text page via [employer] Paging/Directory   ______________________________________________________________________    Interval History   No significant events overnight.   Reports feeling much better today.   Anxious to discharge on 4/19/23.     Physical Exam   Vital Signs: Temp: 98.6  F (37  C) Temp src: Oral BP: 111/60 Pulse: 95   Resp: 14 SpO2: 94 % O2 Device: None (Room air)    Weight: 215 lbs 0 oz  General Appearance:  No apparent distress, normocephalic atraumatic.  Well-nourished.  Respiratory: Clear to auscultation bilaterally no wheezes rales or rhonchi  Cardiovascular: Tachycardia, regular, normal S1-S2, no murmur bruit rub or gallop.  GI: Abdomen is nondistended.  Right-sided ostomy.  no blood or clots.  Nontender to palpation.  No guarding or rebound tenderness.  Skin: No visualized wound,  abrasions, petechia or ecchymoses on exposed skin.  Other:  Right above-knee amputation.  Awake alert oriented x3 general sensation intact over left lower extremity.    Medical Decision Making   40 MINUTES SPENT BY ME on the date of service doing chart review, history, exam, documentation & further activities per the note.      Data     I have personally reviewed the following data over the past 24 hrs:    13.7 (H)  \   7.3 (L); 7.3 (L)   / 290     137 111 (H) 19 /  112   4.3 19 (L) 1.95 (H) \       ALT: 10 AST: 13 AP: 119 TBILI: 0.3   ALB: 2.3 (L) TOT PROTEIN: 8.9 (H) LIPASE: N/A       Procal: N/A CRP: N/A Lactic Acid: 0.6 (L)       INR:  1.47 (H) PTT:  32   D-dimer:  N/A Fibrinogen:  N/A       Ferritin:  213 % Retic:  1.0 LDH:  N/A       Imaging results reviewed over the past 24 hrs:   Recent Results (from the past 24 hour(s))   Chest XR,  PA & LAT    Narrative    EXAM: XR CHEST 2 VIEWS  LOCATION: [facility]  DATE/TIME: 4/17/2023 4:13 PM CDT    INDICATION: weakness, fatigue  COMPARISON: 11/08/2022       Impression    IMPRESSION:  Left Bochdalek hernia. No pleural fluid or pneumothorax. No airspace disease. There is mild vascular engorgement. The cardiomediastinal silhouette is normal in size. Metallic suture wires are again seen.    CT Abdomen Pelvis w/o Contrast    Narrative    EXAM: CT ABDOMEN PELVIS W/O CONTRAST  LOCATION: [facility]  DATE/TIME: 4/17/2023 4:20 PM CDT    INDICATION: Abdominal pain, h o colostomy and suprapubic catheter, concern for UTI sepsis  COMPARISON: CT 04/07/2023  TECHNIQUE: CT scan of the abdomen and pelvis was performed without IV contrast. Multiplanar reformats were obtained. Dose reduction techniques were used.  CONTRAST: None.    FINDINGS:   LOWER CHEST: Moderate sized fat-containing Bochdalek hernia left posterior hemidiaphragm.    HEPATOBILIARY: Nondistended gallbladder with extensive gallbladder wall calcifications. No bile duct  dilatation. Negative liver.    PANCREAS: Normal.    SPLEEN: Stable borderline splenomegaly measuring 13 cm.    ADRENAL GLANDS: Normal.    KIDNEYS/BLADDER: Mild bilateral hydronephrosis with urothelial thickening and perinephric stranding/edema suggests bilateral pyelonephritis. Suprapubic Shah catheter is present within decompressed bladder. Bladder wall thickening with perivesicular   edema suggests cystitis.    BOWEL: Right lower quadrant diverting colostomy with Cramer's pouch. No obstruction or inflammatory change.    LYMPH NODES: No lymphadenopathy.    VASCULATURE: No aortoiliac aneurysm.    PELVIC ORGANS: Negative.    MUSCULOSKELETAL: Right leg amputation with hip disarticulation. And chronic left hip dislocation redemonstrated. Scrotal wall thickening and skin/soft tissue thickening right perineum overall similar with no focal fluid collection. Thickening      Impression    IMPRESSION:   1.  Suprapubic Shah catheter in place. Decompressed bladder with generalized wall thickening, and bilateral upper urinary tract urothelial thickening with perinephric/periureteral edema suggests bilateral pyelonephritis.  2.  Stable intra-abdominal postop changes with diverting right lower quadrant colostomy and Cramer's pouch.  3.  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

## 2023-04-19 VITALS
BODY MASS INDEX: 27.1 KG/M2 | TEMPERATURE: 97.3 F | HEART RATE: 94 BPM | WEIGHT: 222.66 LBS | DIASTOLIC BLOOD PRESSURE: 60 MMHG | SYSTOLIC BLOOD PRESSURE: 115 MMHG | OXYGEN SATURATION: 97 % | RESPIRATION RATE: 18 BRPM

## 2023-04-19 LAB
ANION GAP SERPL CALCULATED.3IONS-SCNC: 7 MMOL/L (ref 5–18)
BACTERIA UR CULT: NORMAL
BUN SERPL-MCNC: 19 MG/DL (ref 8–22)
CALCIUM SERPL-MCNC: 8.7 MG/DL (ref 8.5–10.5)
CHLORIDE BLD-SCNC: 114 MMOL/L (ref 98–107)
CO2 SERPL-SCNC: 20 MMOL/L (ref 22–31)
CREAT SERPL-MCNC: 1.98 MG/DL (ref 0.7–1.3)
ERYTHROCYTE [DISTWIDTH] IN BLOOD BY AUTOMATED COUNT: 19.7 % (ref 10–15)
GFR SERPL CREATININE-BSD FRML MDRD: 39 ML/MIN/1.73M2
GLUCOSE BLD-MCNC: 103 MG/DL (ref 70–125)
HCT VFR BLD AUTO: 27.2 % (ref 40–53)
HGB A1 MFR BLD: 96.7 %
HGB A2 MFR BLD: 3 %
HGB BLD-MCNC: 7.7 G/DL (ref 13.3–17.7)
HGB C MFR BLD: 0 %
HGB E MFR BLD: 0 %
HGB F MFR BLD: 0.3 %
HGB FRACT BLD ELPH-IMP: NORMAL
HGB OTHER MFR BLD: 0 %
HGB S BLD QL SOLY: NORMAL
HGB S MFR BLD: 0 %
MAGNESIUM SERPL-MCNC: 1.9 MG/DL (ref 1.8–2.6)
MCH RBC QN AUTO: 21.3 PG (ref 26.5–33)
MCHC RBC AUTO-ENTMCNC: 28.3 G/DL (ref 31.5–36.5)
MCV RBC AUTO: 75 FL (ref 78–100)
PATH INTERP BLD-IMP: NORMAL
PLATELET # BLD AUTO: 307 10E3/UL (ref 150–450)
POTASSIUM BLD-SCNC: 4.7 MMOL/L (ref 3.5–5)
RBC # BLD AUTO: 3.61 10E6/UL (ref 4.4–5.9)
SODIUM SERPL-SCNC: 141 MMOL/L (ref 136–145)
WBC # BLD AUTO: 10.4 10E3/UL (ref 4–11)

## 2023-04-19 PROCEDURE — 250N000013 HC RX MED GY IP 250 OP 250 PS 637: Performed by: INTERNAL MEDICINE

## 2023-04-19 PROCEDURE — 83735 ASSAY OF MAGNESIUM: CPT | Performed by: INTERNAL MEDICINE

## 2023-04-19 PROCEDURE — 250N000011 HC RX IP 250 OP 636: Performed by: HOSPITALIST

## 2023-04-19 PROCEDURE — 80048 BASIC METABOLIC PNL TOTAL CA: CPT | Performed by: INTERNAL MEDICINE

## 2023-04-19 PROCEDURE — 36415 COLL VENOUS BLD VENIPUNCTURE: CPT | Performed by: INTERNAL MEDICINE

## 2023-04-19 PROCEDURE — 250N000013 HC RX MED GY IP 250 OP 250 PS 637: Performed by: HOSPITALIST

## 2023-04-19 PROCEDURE — 99233 SBSQ HOSP IP/OBS HIGH 50: CPT | Performed by: PHYSICIAN ASSISTANT

## 2023-04-19 PROCEDURE — 99239 HOSP IP/OBS DSCHRG MGMT >30: CPT | Performed by: INTERNAL MEDICINE

## 2023-04-19 PROCEDURE — 85027 COMPLETE CBC AUTOMATED: CPT | Performed by: INTERNAL MEDICINE

## 2023-04-19 RX ORDER — DOXYCYCLINE 100 MG/1
100 CAPSULE ORAL 2 TIMES DAILY
Qty: 24 CAPSULE | Refills: 0 | Status: SHIPPED | OUTPATIENT
Start: 2023-04-19 | End: 2023-05-01

## 2023-04-19 RX ORDER — OXYCODONE HYDROCHLORIDE 10 MG/1
5-10 TABLET ORAL EVERY 6 HOURS PRN
Qty: 18 TABLET | Refills: 0 | Status: SHIPPED | OUTPATIENT
Start: 2023-04-19 | End: 2024-02-28

## 2023-04-19 RX ORDER — CEFDINIR 300 MG/1
300 CAPSULE ORAL 2 TIMES DAILY
Qty: 24 CAPSULE | Refills: 0 | Status: SHIPPED | OUTPATIENT
Start: 2023-04-19 | End: 2023-05-01

## 2023-04-19 RX ORDER — DIAZEPAM 5 MG
5 TABLET ORAL EVERY 6 HOURS PRN
Qty: 12 TABLET | Refills: 0 | Status: SHIPPED | OUTPATIENT
Start: 2023-04-19

## 2023-04-19 RX ORDER — FERROUS SULFATE 325(65) MG
325 TABLET ORAL EVERY OTHER DAY
Qty: 15 TABLET | Refills: 1 | Status: SHIPPED | OUTPATIENT
Start: 2023-04-19

## 2023-04-19 RX ADMIN — CLOPIDOGREL BISULFATE 75 MG: 75 TABLET ORAL at 08:16

## 2023-04-19 RX ADMIN — AMLODIPINE BESYLATE 5 MG: 5 TABLET ORAL at 08:16

## 2023-04-19 RX ADMIN — OXYCODONE HYDROCHLORIDE 10 MG: 5 TABLET ORAL at 02:03

## 2023-04-19 RX ADMIN — GUAIFENESIN 600 MG: 600 TABLET ORAL at 08:16

## 2023-04-19 RX ADMIN — DIAZEPAM 5 MG: 5 TABLET ORAL at 02:03

## 2023-04-19 RX ADMIN — HYDROMORPHONE HYDROCHLORIDE 0.5 MG: 1 INJECTION, SOLUTION INTRAMUSCULAR; INTRAVENOUS; SUBCUTANEOUS at 05:31

## 2023-04-19 ASSESSMENT — ACTIVITIES OF DAILY LIVING (ADL)
ADLS_ACUITY_SCORE: 28

## 2023-04-19 NOTE — PROGRESS NOTES
ADDENDUM:  Reviewed patient's labs. Kidney function is stable, not yet improved. If okay with primary, would not object to discharge from renal perspective. I can see patient in clinic next week for follow up appointment and our nursing staff could give a dose of Venofer    RENAL PROGRESS NOTE    ASSESSMENT & PLAN:   Acute kidney injury, ? Underlying chronic kidney disease - With baseline creatinine of 0.8-1.2 and presented with creatinine 1.98 in the setting of bilateral pyelonephritis. Receiving antibiotics, IVF and creatinine stable yesterday with good urine output. No labs drawn yet today. Would recheck pro/cr ratio when urinary infection fully treated/resolved. If renal function improved today could discharge. Would likely send out off ARB and resume as outpatient     Acute bilateral pyelonephritis - With history of MRSA infection; receiving ceftriaxone 1g daily, vancomycin dosed by pharmacy. Urine culture grew mixed urogenital ave. Blood cultures negative thus far     Hypercalcemia - Initially with hypercalcemia when correcting for hypoalbuminemia, improved on follow up lab     H/o bowel perforation, sigmoid colectomy, estevez pouch and colostomy - GI consulted     Anemia - Hemoglobin has been running upper 7-mid 8 range. Evaluation including iron studies, reticulocyte count and B12 ordered. Iron studies showed severe iron deficiency, IV iron ordered per hospitalist.      Neurogenic bladder - requiring chronic suprapubic urinary catheter.      Hypertension - On amlodipine as outpatient; resuming with hold parameters. As above, likely discharge off ARB and resume as outpatient     Lower extremity paraplegia - Secondary to T8 injury. On baclofen for spasticity, diazepam     H/o right thalamic CVA 4/2020        SUBJECTIVE:  Hoping to discharge today. Feeling quite good. Eating well, no nausea or vomiting. No fever, chills. He denies shortness of breath. No bleeding from colostomy.     OBJECTIVE:  Physical  Exam   Temp: 97.3  F (36.3  C) Temp src: Oral BP: 115/60 Pulse: 94   Resp: 18 SpO2: 97 % O2 Device: None (Room air)    Vitals:    04/17/23 1216 04/19/23 0600   Weight: 97.5 kg (215 lb) 101 kg (222 lb 10.6 oz)     Vital Signs with Ranges  Temp:  [97.3  F (36.3  C)-98.6  F (37  C)] 97.3  F (36.3  C)  Pulse:  [83-94] 94  Resp:  [16-18] 18  BP: (108-130)/(60-68) 115/60  SpO2:  [96 %-97 %] 97 %  I/O last 3 completed shifts:  In: 1440 [P.O.:1080; I.V.:360]  Out: 7100 [Urine:6950; Stool:150]        Patient Vitals for the past 72 hrs:   Weight   04/19/23 0600 101 kg (222 lb 10.6 oz)   04/17/23 1216 97.5 kg (215 lb)       Intake/Output Summary (Last 24 hours) at 4/19/2023 0943  Last data filed at 4/19/2023 0859  Gross per 24 hour   Intake 1440 ml   Output 7500 ml   Net -6060 ml       PHYSICAL EXAM:  General - Alert and oriented x3, no apparent distress  HEENT - Normocephalic, atraumatic  Neck - Supple. No JVD  Respiratory - Clear to auscultation bilaterally, no crackles or wheezes  Cardiovascular - Normal S1S2, no rub  Abdomen - Soft, bowel sounds present. Right sided ostomy   - Shah draining clear light yellow urine  Extremities - Right above knee amputation  Integumentary - Warm, dry, no rash  Neurologic - Grossly intact; no focal deficits noted      LABORATORY STUDIES:     Recent Labs   Lab 04/18/23  1812 04/18/23  1015 04/17/23  2340 04/17/23  1319   WBC  --  13.7*  --  21.3*   RBC  --  3.43*  --  3.92*   HGB 7.9* 7.3*  7.3* 7.8* 8.5*   HCT  --  25.7*  --  28.6*   PLT  --  290  --  388       Basic Metabolic Panel:  Recent Labs   Lab 04/18/23  1015 04/17/23  1319    139   POTASSIUM 4.3 4.3   CHLORIDE 111* 107   CO2 19* 19*   BUN 19 22   CR 1.95* 1.98*    74   PAT 8.2* 9.0       Recent Labs   Lab Test 04/18/23  1812 04/18/23  1015 04/17/23  2340 04/17/23  1349 04/17/23  1319 05/06/22  0653 05/05/22  2326   INR  --   --   --  1.47*  --   --  1.08   WBC  --  13.7*  --   --  21.3*   < > 8.8   HGB 7.9* 7.3*   7.3*   < >  --  8.5*   < > 12.3*   PLT  --  290  --   --  388   < > 297    < > = values in this interval not displayed.         Personally reviewed current labs      Darya Noble PA-C  [facility]  [phone]

## 2023-04-19 NOTE — PROGRESS NOTES
Pt is alert and oriented x4. C/o pain to abdomen tht radiates to his back. Pain meds administered with minimal relief. Colostomy bag changed d/t leaking. But bag leaked again at the bottom. Pt refused to have a new bag changed and instead requested to apply a tape on the bottom edge of the bag. Stoma appeared healthy and patent. Bowels were formed to soft then loose. Suprapubic catheter in place, draining well. Noted abrasion/excoriation to buttocks and scrotum. Pt changes position independently. AM staff informed re excoriated buttocks and scrotum. Dressings to left foot and thigh were clean, dry and intact. VSS.

## 2023-04-19 NOTE — PLAN OF CARE
Patient A & Ox 4, makes needs known. C/O 8/10 pain in abdomen, right flank. Gave PRN Dilaudid, Oxy, Valium and baclofen to good effect. Received IV Rocephin and Vanco, fluids stopped. Pale yellow urine output, some white sediment. Independently emptied colostomy, medium BM. Anticipating going home tomorrow.

## 2023-04-19 NOTE — DISCHARGE SUMMARY
[facility]  Hospitalist Discharge Summary      Date of Admission:  4/17/2023  Date of Discharge:  4/19/2023  Discharging Provider: Charles Sharp DO  Discharge Service: Hospitalist Service    Discharge Diagnoses   Sepsis, POA, r/t Acute Bilateral Pyelonephritis and UTI  Acute kidney injury  Hypercalcemia  Microcytic anemia  Neurogenic bladder  Essential hypertension  Lower extremity paraplegia  History of right thalamic CVA    Follow-ups Needed After Discharge   Follow-up Appointments     Follow-up and recommended labs and tests       Follow up with primary care provider, Bala Benjamin, within 7 days for   hospital follow- up for complicated urinary tract infection, microcytic   anemia and acute kidney injury.  The following labs/tests are recommended:   BMP, CBC, .    Recommend referral for screening colonoscopy.  Recommend referral to general surgery for evaluation of porcelain   gallbladder.       Unresulted Labs Ordered in the Past 30 Days of this Admission     Date and Time Order Name Status Description    4/18/2023 12:08 PM HGB Eval Reflex to ELP or RBC Solubility In process     4/17/2023  2:07 PM Blood Culture Peripheral Blood Preliminary     4/17/2023  2:07 PM Blood Culture Peripheral Blood Preliminary       These results will be followed up by PCP and hospitalist service.     Discharge Disposition   Discharged to home  Condition at discharge: Stable    Hospital Course   57 year old male with history of CVA, T8 paraplegia, hypertension, neurogenic bladder with chronic indwelling suprapubic catheter, bowel perforation requiring exploratory laparotomy, sigmoid colectomy and end colostomy on 2/2/2023, recently admitted to [facility] for evaluation for hematochezia and passage of blood clots from 4/7 through 4/9 admitted to [facility] on 4/17/2023 for acute bilateral pyelonephritis and acute kidney injury. Catheter was exchanged on 4/17/23.  Patient was initiated on  vancomycin and ceftriaxone and urine culture later grew multiple urogenital organisms.  Patient was transitioned to cefdinir and doxycycline to complete 14 day course.     Acute kidney injury with creatinine 1.98.  Patient's urine output was normal.  Nephrology was consulted and recommended outpatient follow up after treatment of pyelonephritis.  Would recommend repeat renal function profile and urine and protein creatinine ratio.  If continues to be abnormal will need outpatient nephrology consultation.     Patient had small amount of blood in ostomy bag on presentation.  Plavix was held.  Iron studies performed and consistent with iron deficiency and acute inflammatory anemia.  Gastroenterology recommended screening colonoscopy.  Patient again recommended general surgery follow up for porcelain gallbladder management.     Consultations This Hospital Stay   PHARMACY TO DOSE VANCO  GASTROENTEROLOGY IP CONSULT  WOUND OSTOMY CONTINENCE NURSE  IP CONSULT  PHARMACY TO DOSE VANCO  CARE MANAGEMENT / SOCIAL WORK IP CONSULT  NEPHROLOGY IP CONSULT    Code Status   Full Code    Time Spent on this Encounter   ICharles DO, personally saw the patient today and spent greater than 30 minutes discharging this patient.       Charles Sharp DO  [address] [address]  Phone: [phone]  Fax: [phone]  ______________________________________________________________________    Physical Exam   Vital Signs: Temp: 97.3  F (36.3  C) Temp src: Oral BP: 115/60 Pulse: 94   Resp: 18 SpO2: 97 % O2 Device: None (Room air)    Weight: 222 lbs 10.63 oz  General Appearance:  No apparent distress, normocephalic atraumatic.  Well-nourished.  Respiratory: Clear to auscultation bilaterally no wheezes rales or rhonchi  Cardiovascular: Tachycardia, regular, normal S1-S2, no murmur bruit rub or gallop.  GI: Abdomen is nondistended.  Right-sided ostomy.  no blood  or clots.  Nontender to palpation.  No guarding or rebound tenderness.  Skin: No visualized wound, abrasions, petechia or ecchymoses on exposed skin.  Other:  Right above-knee amputation.  Awake alert oriented x3 general sensation intact over left lower extremity.          Primary Care Physician   Bala Benjamin    Discharge Orders      Reason for your hospital stay    Complicated urinarty tract infection, suprapubic catheter, acute kidney injury.     Follow-up and recommended labs and tests     Follow up with primary care provider, Bala Benjamin, within 7 days for hospital follow- up for complicated urinary tract infection, microcytic anemia and acute kidney injury.  The following labs/tests are recommended: BMP, CBC.    Recommend referral for screening colonoscopy.  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  Nephrology to follow in 1 week, will repeat BMP and evaluate for treatment of anemia.      Activity    Your activity upon discharge: activity as tolerated     When to contact your care team    Call your primary doctor if you have any of the following: temperature greater than 100.4 degrees Fahrenheit.     Resume Home Care Services    PCA for urinary catheter cares.     Diet    Follow this diet upon discharge: Orders Placed This Encounter      Combination Diet Regular Diet Adult       Significant Results and Procedures   Most Recent 3 CBC's:Recent Labs   Lab Test 04/19/23  1037 04/18/23  1812 04/18/23  1015 04/17/23  2340 04/17/23  1319   WBC 10.4  --  13.7*  --  21.3*   HGB 7.7* 7.9* 7.3*  7.3*   < > 8.5*   MCV 75*  --  75*  --  73*     --  290  --  388    < > = values in this interval not displayed.     Most Recent 3 BMP's:Recent Labs   Lab Test 04/19/23  1037 04/18/23  1015 04/17/23  1319    137 139   POTASSIUM 4.7 4.3 4.3   CHLORIDE 114* 111* 107   CO2 20* 19* 19*   BUN 19 19 22   CR 1.98* 1.95* 1.98*   ANIONGAP 7 7 13   PAT 8.7 8.2* 9.0    112 74      Most Recent 2 LFT's:Recent Labs   Lab Test 04/17/23  1319 08/22/22  0648   AST 13 24   ALT 10 15   ALKPHOS 119 91   BILITOTAL 0.3 0.2     7-Day Micro Results     Collected Updated Procedure Result Status      04/17/2023 1459 04/19/2023 0917 Urine Culture [[id number]]    Urine, Shah Catheter    Final result Component Value   Culture >100,000 CFU/mL Mixture of urogenital aev               04/17/2023 1456 04/18/2023 1901 Blood Culture Peripheral Blood [[id number]]   Peripheral Blood    Preliminary result Component Value   Culture No growth after 1 day  [P]                04/17/2023 1435 04/18/2023 1731 Blood Culture Peripheral Blood [[id number]]   Peripheral Blood    Preliminary result Component Value   Culture No growth after 1 day  [P]                    Most Recent Urinalysis:Recent Labs   Lab Test 04/17/23  1459 10/06/21  0920 07/04/21  0943   COLOR Light Yellow   < > Light Yellow   APPEARANCE Turbid*   < > Clear   URINEGLC Negative   < > Negative   URINEBILI Negative   < > Negative   URINEKETONE Negative   < > Negative   SG 1.006   < > 1.011   UBLD 0.2 mg/dL*   < > 0.1 mg/dL*   URINEPH 6.0   < > 6.0   PROTEIN 100*   < > 200 mg/dL*   UROBILINOGEN  --   --  <2.0 mg/dL   NITRITE Positive*   < > Negative   LEUKEST 500 Francesca/uL*   < > 500 Francesca/uL*   RBCU 2   < > 26*   WBCU 167*   < > 40*    < > = values in this interval not displayed.     Most Recent Anemia Panel:Recent Labs   Lab Test 04/19/23  1037 04/18/23  1015 04/17/23  2340 04/17/23  1319   WBC 10.4   < >  --  21.3*   HGB 7.7*   < > 7.8* 8.5*   HCT 27.2*   < >  --  28.6*   MCV 75*   < >  --  73*      < >  --  388   IRON  --   --  7*  --    IRONSAT  --   --  7*  --    RETICABSCT  --   --   --  0.040   RETP  --   --   --  1.0   FEB  --   --  103*  --    ANNIE  --   --   --  213   B12  --   --   --  1,185    < > = values in this interval not displayed.   ,   Results for orders placed or performed during the hospital encounter of 04/17/23   Chest XR,   PA & LAT    Narrative    EXAM: XR CHEST 2 VIEWS  LOCATION: [facility]  DATE/TIME: 4/17/2023 4:13 PM CDT    INDICATION: weakness, fatigue  COMPARISON: 11/08/2022       Impression    IMPRESSION:  Left Bochdalek hernia. No pleural fluid or pneumothorax. No airspace disease. There is mild vascular engorgement. The cardiomediastinal silhouette is normal in size. Metallic suture wires are again seen.    CT Abdomen Pelvis w/o Contrast    Narrative    EXAM: CT ABDOMEN PELVIS W/O CONTRAST  LOCATION: [facility]  DATE/TIME: 4/17/2023 4:20 PM CDT    INDICATION: Abdominal pain, h o colostomy and suprapubic catheter, concern for UTI sepsis  COMPARISON: CT 04/07/2023  TECHNIQUE: CT scan of the abdomen and pelvis was performed without IV contrast. Multiplanar reformats were obtained. Dose reduction techniques were used.  CONTRAST: None.    FINDINGS:   LOWER CHEST: Moderate sized fat-containing Bochdalek hernia left posterior hemidiaphragm.    HEPATOBILIARY: Nondistended gallbladder with extensive gallbladder wall calcifications. No bile duct dilatation. Negative liver.    PANCREAS: Normal.    SPLEEN: Stable borderline splenomegaly measuring 13 cm.    ADRENAL GLANDS: Normal.    KIDNEYS/BLADDER: Mild bilateral hydronephrosis with urothelial thickening and perinephric stranding/edema suggests bilateral pyelonephritis. Suprapubic Shah catheter is present within decompressed bladder. Bladder wall thickening with perivesicular   edema suggests cystitis.    BOWEL: Right lower quadrant diverting colostomy with Cramer's pouch. No obstruction or inflammatory change.    LYMPH NODES: No lymphadenopathy.    VASCULATURE: No aortoiliac aneurysm.    PELVIC ORGANS: Negative.    MUSCULOSKELETAL: Right leg amputation with hip disarticulation. And chronic left hip dislocation redemonstrated. Scrotal wall thickening and skin/soft tissue thickening right perineum overall similar with no focal fluid  collection. Thickening      Impression    IMPRESSION:   1.  Suprapubic Shah catheter in place. Decompressed bladder with generalized wall thickening, and bilateral upper urinary tract urothelial thickening with perinephric/periureteral edema suggests bilateral pyelonephritis.  2.  Stable intra-abdominal postop changes with diverting right lower quadrant colostomy and Cramer's pouch.  3.  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           Discharge Medications   Current Discharge Medication List      START taking these medications    Details   cefdinir (OMNICEF) 300 MG capsule Take 1 capsule (300 mg) by mouth 2 times daily for 12 days  Qty: 24 capsule, Refills: 0    Associated Diagnoses: Complicated urinary tract infection      doxycycline hyclate (VIBRAMYCIN) 100 MG capsule Take 1 capsule (100 mg) by mouth 2 times daily for 12 days  Qty: 24 capsule, Refills: 0    Associated Diagnoses: Complicated urinary tract infection      ferrous sulfate (FEROSUL) 325 (65 Fe) MG tablet Take 1 tablet (325 mg) by mouth every other day  Qty: 15 tablet, Refills: 1    Associated Diagnoses: Complicated urinary tract infection         CONTINUE these medications which have CHANGED    Details   diazepam (VALIUM) 5 MG tablet Take 1 tablet (5 mg) by mouth every 6 hours as needed for muscle spasms  Qty: 12 tablet, Refills: 0    Associated Diagnoses: Pain in both lower extremities      oxyCODONE IR (ROXICODONE) 10 MG tablet Take 0.5-1 tablets (5-10 mg) by mouth every 6 hours as needed for moderate to severe pain  Qty: 18 tablet, Refills: 0    Associated Diagnoses: Pain in both lower extremities         CONTINUE these medications which have NOT CHANGED    Details   amLODIPine (NORVASC) 5 MG tablet TAKE 1 TABLET(5 MG) BY MOUTH DAILY  Qty: 90 tablet, Refills: 3    Associated Diagnoses: Benign essential hypertension; Encounter for medication refill      baclofen (LIORESAL) 20 MG tablet TAKE ONE  TABLET BY MOUTH THREE TIMES DAILY AS NEEDED FOR SPACTICITY  Qty: 90 tablet, Refills: 5    Associated Diagnoses: Paraplegia (H)      clopidogrel (PLAVIX) 75 MG tablet TAKE 1 TABLET(75 MG) BY MOUTH DAILY  Qty: 90 tablet, Refills: 1    Associated Diagnoses: Encounter for medication refill      naloxone (NARCAN) 4 MG/0.1ML nasal spray Spray 1 spray (4 mg) into one nostril alternating nostrils once as needed for opioid reversal every 2-3 minutes until assistance arrives  Qty: 0.2 mL, Refills: 0    Associated Diagnoses: Chronic pain syndrome      NEW MED TB syringe with needle attached for administration of .5ml 20 min prior to sexual activity.   May be used 3 times per week.  Qty: 2.5 mL, Refills: 11    Comments: [facility] - Tri mix #3   Papaverine 24.6 mg/ml  Phentolamine 600 mcg  Alprostadil 45 mcg/ml  Associated Diagnoses: Male erectile disorder; Erectile dysfunction due to diseases classified elsewhere      simvastatin (ZOCOR) 20 MG tablet TAKE 1 TABLET(20 MG) BY MOUTH DAILY  Qty: 90 tablet, Refills: 3    Associated Diagnoses: History of CVA (cerebrovascular accident)      testosterone cypionate (DEPOTESTOSTERONE) 200 MG/ML injection Inject 0.5 mLs (100 mg) into the muscle every 14 days  Qty: 3 mL, Refills: 5    Associated Diagnoses: Hypogonadism male           Allergies   No Known Allergies

## 2023-04-22 LAB
BACTERIA BLD CULT: NO GROWTH
BACTERIA BLD CULT: NO GROWTH

## 2023-04-24 ENCOUNTER — MEDICAL CORRESPONDENCE (OUTPATIENT)
Dept: HEALTH INFORMATION MANAGEMENT | Facility: CLINIC | Age: 58
End: 2023-04-24
Payer: COMMERCIAL

## 2023-04-25 NOTE — TELEPHONE ENCOUNTER
Patient call:     Appointment type: return diabetes   Provider: Michelle   Return date: around May  Speciality phone number: [phone]  Additional appointment(s) needed:   Additional notes: VM not set up, sent MyC   Overbook ok at the end of clinic - non urgent, will need to schedule manually Miquel Light on 4/25/2023 at 10:59 AM     yes

## 2023-05-04 ENCOUNTER — TELEPHONE (OUTPATIENT)
Dept: FAMILY MEDICINE | Facility: CLINIC | Age: 58
End: 2023-05-04

## 2023-05-04 PROBLEM — Z93.3 COLOSTOMY STATUS (H): Status: ACTIVE | Noted: 2023-05-04

## 2023-05-04 PROBLEM — D64.9 ANEMIA: Status: ACTIVE | Noted: 2023-04-07

## 2023-05-04 PROBLEM — T83.511A URINARY TRACT INFECTION ASSOCIATED WITH CATHETERIZATION OF URINARY TRACT (H): Status: ACTIVE | Noted: 2023-04-29

## 2023-05-04 PROBLEM — Z89.511 HX OF BKA, RIGHT (H): Status: RESOLVED | Noted: 2018-04-09 | Resolved: 2023-05-04

## 2023-05-04 PROBLEM — L89.90 INFECTED DECUBITUS ULCER, UNSPECIFIED ULCER STAGE: Status: RESOLVED | Noted: 2022-01-07 | Resolved: 2023-05-04

## 2023-05-04 PROBLEM — Z90.49 H/O COLECTOMY: Status: ACTIVE | Noted: 2023-05-04

## 2023-05-04 PROBLEM — N12 PYELONEPHRITIS: Status: RESOLVED | Noted: 2023-04-17 | Resolved: 2023-05-04

## 2023-05-04 PROBLEM — Z79.01 ON CONTINUOUS ORAL ANTICOAGULATION: Status: ACTIVE | Noted: 2023-05-04

## 2023-05-04 PROBLEM — E87.6 HYPOKALEMIA: Status: RESOLVED | Noted: 2023-04-07 | Resolved: 2023-05-04

## 2023-05-04 PROBLEM — K63.1 BOWEL PERFORATION (H): Status: RESOLVED | Noted: 2023-02-02 | Resolved: 2023-05-04

## 2023-05-04 PROBLEM — T83.410A: Status: ACTIVE | Noted: 2021-07-28

## 2023-05-04 PROBLEM — D50.0 IRON DEFICIENCY ANEMIA DUE TO CHRONIC BLOOD LOSS: Status: ACTIVE | Noted: 2023-04-07

## 2023-05-04 PROBLEM — K66.8 PNEUMOPERITONEUM: Status: ACTIVE | Noted: 2023-05-04

## 2023-05-04 PROBLEM — T83.410A: Status: RESOLVED | Noted: 2021-07-28 | Resolved: 2023-05-04

## 2023-05-04 PROBLEM — K65.1 PELVIC ABSCESS IN MALE (H): Status: RESOLVED | Noted: 2022-01-07 | Resolved: 2023-05-04

## 2023-05-04 PROBLEM — K63.1 BOWEL PERFORATION (H): Status: ACTIVE | Noted: 2023-02-02

## 2023-05-04 PROBLEM — M77.10 LATERAL EPICONDYLITIS: Status: RESOLVED | Noted: 2019-02-15 | Resolved: 2023-05-04

## 2023-05-04 PROBLEM — L97.122 SKIN ULCER OF LEFT THIGH WITH FAT LAYER EXPOSED (H): Status: RESOLVED | Noted: 2022-09-27 | Resolved: 2023-05-04

## 2023-05-04 PROBLEM — N39.0 URINARY TRACT INFECTION ASSOCIATED WITH CATHETERIZATION OF URINARY TRACT (H): Status: ACTIVE | Noted: 2023-04-29

## 2023-05-04 PROBLEM — L89.309 PRESSURE INJURY OF SKIN OF BUTTOCK, UNSPECIFIED INJURY STAGE, UNSPECIFIED LATERALITY: Status: RESOLVED | Noted: 2022-01-10 | Resolved: 2023-05-04

## 2023-05-04 PROBLEM — Z93.59 SUPRAPUBIC CATHETER (H): Status: ACTIVE | Noted: 2023-05-04

## 2023-05-04 PROBLEM — S31.819S: Status: RESOLVED | Noted: 2021-08-17 | Resolved: 2023-05-04

## 2023-05-04 PROBLEM — S36.60XA: Status: RESOLVED | Noted: 2022-01-07 | Resolved: 2023-05-04

## 2023-05-04 PROBLEM — K55.9 ISCHEMIC COLITIS (H): Status: ACTIVE | Noted: 2023-05-04

## 2023-05-04 PROBLEM — S24.109S INJURY OF THORACIC SPINAL CORD, SEQUELA (H): Status: RESOLVED | Noted: 2020-02-27 | Resolved: 2023-05-04

## 2023-05-04 PROBLEM — M89.8X9 HETEROTOPIC OSSIFICATION OF BONE: Status: ACTIVE | Noted: 2019-05-24

## 2023-05-04 PROBLEM — E87.1 HYPONATREMIA: Status: RESOLVED | Noted: 2022-03-10 | Resolved: 2023-05-04

## 2023-05-04 PROBLEM — K92.1 HEMATOCHEZIA: Status: ACTIVE | Noted: 2023-04-07

## 2023-05-04 PROBLEM — R53.81 PHYSICAL DECONDITIONING: Status: ACTIVE | Noted: 2019-05-28

## 2023-05-04 PROBLEM — E87.6 HYPOKALEMIA: Status: ACTIVE | Noted: 2023-04-07

## 2023-05-04 PROBLEM — E87.1 HYPOSMOLALITY SYNDROME: Status: RESOLVED | Noted: 2022-03-11 | Resolved: 2023-05-04

## 2023-05-04 PROBLEM — L08.9 INFECTED DECUBITUS ULCER, UNSPECIFIED ULCER STAGE: Status: RESOLVED | Noted: 2022-01-07 | Resolved: 2023-05-04

## 2023-05-04 NOTE — TELEPHONE ENCOUNTER
Attempted to call patient and lvm for him to return call to clinic. Please see message below from Dr. Dolan.     Of note, patient does have AWV with PCP Cassidy scheduled in 6 days.     ----- Message from Mary Dolan MD sent at 5/4/2023 10:53 AM CDT -----  Regarding: Hospital follow up  No-showed for hospital follow up today. Multiple things need follow up.  Please schedule with PCP for hospital follow up.

## 2023-05-05 NOTE — TELEPHONE ENCOUNTER
Contacted patient and appointment already scheduled on 5/10/23 with PCP, Dr Benjamin.  No further action needed.    Mame Bishop RN  [facility]

## 2023-05-10 ENCOUNTER — OFFICE VISIT (OUTPATIENT)
Dept: FAMILY MEDICINE | Facility: CLINIC | Age: 58
End: 2023-05-10
Payer: COMMERCIAL

## 2023-05-10 VITALS
DIASTOLIC BLOOD PRESSURE: 76 MMHG | TEMPERATURE: 98.3 F | HEART RATE: 93 BPM | SYSTOLIC BLOOD PRESSURE: 132 MMHG | OXYGEN SATURATION: 96 %

## 2023-05-10 DIAGNOSIS — Z93.3 COLOSTOMY STATUS (H): ICD-10-CM

## 2023-05-10 DIAGNOSIS — D50.0 IRON DEFICIENCY ANEMIA DUE TO CHRONIC BLOOD LOSS: ICD-10-CM

## 2023-05-10 DIAGNOSIS — K55.9 ISCHEMIC COLITIS (H): ICD-10-CM

## 2023-05-10 DIAGNOSIS — E66.01 MORBID OBESITY (H): ICD-10-CM

## 2023-05-10 DIAGNOSIS — G82.20 PARAPLEGIA (H): ICD-10-CM

## 2023-05-10 DIAGNOSIS — Z09 HOSPITAL DISCHARGE FOLLOW-UP: Primary | ICD-10-CM

## 2023-05-10 LAB
ALBUMIN SERPL BCG-MCNC: 3.8 G/DL (ref 3.5–5.2)
ALP SERPL-CCNC: 118 U/L (ref 40–129)
ALT SERPL W P-5'-P-CCNC: 8 U/L (ref 10–50)
ANION GAP SERPL CALCULATED.3IONS-SCNC: 13 MMOL/L (ref 7–15)
AST SERPL W P-5'-P-CCNC: 23 U/L (ref 10–50)
BILIRUB SERPL-MCNC: 0.4 MG/DL
BUN SERPL-MCNC: 19.1 MG/DL (ref 6–20)
CALCIUM SERPL-MCNC: 9.4 MG/DL (ref 8.6–10)
CHLORIDE SERPL-SCNC: 104 MMOL/L (ref 98–107)
CREAT SERPL-MCNC: 1.63 MG/DL (ref 0.67–1.17)
DEPRECATED HCO3 PLAS-SCNC: 20 MMOL/L (ref 22–29)
ERYTHROCYTE [DISTWIDTH] IN BLOOD BY AUTOMATED COUNT: 18.4 % (ref 10–15)
GFR SERPL CREATININE-BSD FRML MDRD: 49 ML/MIN/1.73M2
GLUCOSE SERPL-MCNC: 89 MG/DL (ref 70–99)
HCT VFR BLD AUTO: 32.5 % (ref 40–53)
HGB BLD-MCNC: 9.6 G/DL (ref 13.3–17.7)
MCH RBC QN AUTO: 22.9 PG (ref 26.5–33)
MCHC RBC AUTO-ENTMCNC: 29.5 G/DL (ref 31.5–36.5)
MCV RBC AUTO: 77 FL (ref 78–100)
PLATELET # BLD AUTO: 309 10E3/UL (ref 150–450)
POTASSIUM SERPL-SCNC: 4.5 MMOL/L (ref 3.4–5.3)
PROT SERPL-MCNC: 9.1 G/DL (ref 6.4–8.3)
RBC # BLD AUTO: 4.2 10E6/UL (ref 4.4–5.9)
SODIUM SERPL-SCNC: 137 MMOL/L (ref 136–145)
WBC # BLD AUTO: 8.2 10E3/UL (ref 4–11)

## 2023-05-10 PROCEDURE — 99215 OFFICE O/P EST HI 40 MIN: CPT | Performed by: FAMILY MEDICINE

## 2023-05-10 PROCEDURE — 80053 COMPREHEN METABOLIC PANEL: CPT | Performed by: FAMILY MEDICINE

## 2023-05-10 PROCEDURE — 85027 COMPLETE CBC AUTOMATED: CPT | Performed by: FAMILY MEDICINE

## 2023-05-10 PROCEDURE — 36415 COLL VENOUS BLD VENIPUNCTURE: CPT | Performed by: FAMILY MEDICINE

## 2023-05-10 RX ORDER — DIAPER,BRIEF,ADULT, DISPOSABLE
EACH MISCELLANEOUS
Qty: 30 EACH | Refills: 11 | Status: SHIPPED | OUTPATIENT
Start: 2023-05-10

## 2023-05-10 NOTE — PROGRESS NOTES
"  Assessment & Plan     Hospital discharge follow-up    - CBC with platelets; Future  - Comprehensive metabolic panel (BMP + Alb, Alk Phos, ALT, AST, Total. Bili, TP); Future  - CBC with platelets  - Comprehensive metabolic panel (BMP + Alb, Alk Phos, ALT, AST, Total. Bili, TP)    Colostomy status (H)    - Ostomy Care Referral; Future  - Wound care and ostomy supplies  - Incontinence Supplies Order for DME - ONLY FOR DME  - Incontinence Supply Disposable (UNDERGARMENT) MISC; Use 1 day    Paraplegia (H)      Ischemic colitis (H)      Morbid obesity (H)      Iron deficiency anemia due to chronic blood loss    - CBC with platelets; Future  - CBC with platelets    Multiple hospital admission recently included GI bleed with colectomy, iron deficiency anemia ,improved  with blood transfusion in the hospital, acute kidney injury, overall feeling better.  Having hard time taking care of his right ostomy bag, referral will be placed to the ostomy nurse.  Supply order was signed and faxed to his DME company.  Recheck kidney function today.    Review of external notes as documented elsewhere in note  40 minutes spent by me on the date of the encounter doing chart review, review of outside records, review of test results, interpretation of tests, patient visit, documentation, discussion with other provider(s) and discussion with family      MED REC REQUIRED  Post Medication Reconciliation Status: discharge medications reconciled, continue medications without change  BMI:   Estimated body mass index is 27.1 kg/m  as calculated from the following:    Height as of 1/25/23: 1.93 m (6' 4\").    Weight as of 4/19/23: 101 kg (222 lb 10.6 oz).   Weight management plan: Discussed healthy diet and exercise guidelines        Bala Benjamin MD  [facility]   Josiah is a 57 year old, presenting for the following health issues:  Hospital F/U and Ostomy pouch change        5/10/2023     1:11 PM "   Additional Questions   Roomed by Sukhwinder VOSS   Accompanied by self     HPI       Hospital Follow-up Visit:    Hospital/Nursing Home/IP Rehab Facility: [facility]  Date of Admission: 4/17/2023  Date of Discharge: 4/19/2023  Reason(s) for Admission: GI Bleeding ,ALISE (acute kidney injury) (H)    Was your hospitalization related to COVID-19? No   Problems taking medications regularly:  None  Medication changes since discharge: None  Problems adhering to non-medication therapy:  None    Summary of hospitalization:  CareEverywhere information obtained and reviewed  Diagnostic Tests/Treatments reviewed.  Follow up needed: none  Other Healthcare Providers Involved in Patient s Care:         None  Update since discharge: improved.         Plan of care communicated with patient           He had several hospitalization within the last month, initially admitted at [facility] with GI bleed and bowel perforation, had a right colectomy, subsequently developed acute renal failure, anemia requiring transfusion.  He is here for follow-up, still having difficulty taking care of his colostomy bag, he would like a request of supply to be sent to his DME company.  He continue to follow-up with the urology.    Review of Systems   Constitutional, HEENT, cardiovascular, pulmonary, gi and gu systems are negative, except as otherwise noted.      Objective    /76 (BP Location: Left arm, Patient Position: Sitting, Cuff Size: Adult Large)   Pulse 93   Temp 98.3  F (36.8  C) (Temporal)   SpO2 96%   There is no height or weight on file to calculate BMI.  Physical Exam   GENERAL: healthy, alert and no distress  NECK: no adenopathy, no asymmetry, masses, or scars and thyroid normal to palpation  RESP: lungs clear to auscultation - no rales, rhonchi or wheezes  CV: regular rate and rhythm, normal S1 S2, no S3 or S4, no murmur, click or rub, no peripheral edema and peripheral pulses strong  ABDOMEN: soft, nontender, no  hepatosplenomegaly, no masses and bowel sounds normal  MS: Absent right lower extremities, no edema at the left lower extremity.    Results for orders placed or performed in visit on 05/10/23   CBC with platelets     Status: Abnormal   Result Value Ref Range    WBC Count 8.2 4.0 - 11.0 10e3/uL    RBC Count 4.20 (L) 4.40 - 5.90 10e6/uL    Hemoglobin 9.6 (L) 13.3 - 17.7 g/dL    Hematocrit 32.5 (L) 40.0 - 53.0 %    MCV 77 (L) 78 - 100 fL    MCH 22.9 (L) 26.5 - 33.0 pg    MCHC 29.5 (L) 31.5 - 36.5 g/dL    RDW 18.4 (H) 10.0 - 15.0 %    Platelet Count 309 150 - 450 10e3/uL       This note was dictated using a voice recognition software.  Any grammatical or context distortion are unintentional and inherent to the software.

## 2023-05-10 NOTE — NURSING NOTE
S-(situation):   Patient having trouble keeping colostomy bag on especially at bedtime.    B-(background):    Patient at HD appointment today with Dr Benjamin    A-(assessment):   Patient cleaned area and applied skin prep.  Normal reddish / pink stoma and intake skin surrounding stoma  Helped patient apply adhesive and attached colostomy bag.    Mame Bishop RN  [facility]

## 2023-05-12 DIAGNOSIS — N52.1 ERECTILE DYSFUNCTION DUE TO DISEASES CLASSIFIED ELSEWHERE: ICD-10-CM

## 2023-05-12 DIAGNOSIS — N52.9 MALE ERECTILE DISORDER: Primary | ICD-10-CM

## 2023-05-12 NOTE — TELEPHONE ENCOUNTER
NEW MED      Papaverine 24.6 mg/ml   Phentolamine 600 mcg   Alprostadil 45 mcg/ml  Last Written Prescription Date:  2/23/22  Last Fill Quantity: 2.5ml,   # refills: 11  Last Office Visit : 1/25/23  Future Office visit:  0    Routing refill request to provider for review/approval because:  Drug not on the [facility], P or [facility] refill protocol

## 2023-05-15 ENCOUNTER — OFFICE VISIT (OUTPATIENT)
Dept: UROLOGY | Facility: CLINIC | Age: 58
End: 2023-05-15
Payer: COMMERCIAL

## 2023-05-15 ENCOUNTER — PRE VISIT (OUTPATIENT)
Dept: UROLOGY | Facility: CLINIC | Age: 58
End: 2023-05-15

## 2023-05-15 ENCOUNTER — OFFICE VISIT (OUTPATIENT)
Dept: WOUND CARE | Facility: CLINIC | Age: 58
End: 2023-05-15
Payer: COMMERCIAL

## 2023-05-15 DIAGNOSIS — Z93.3 COLOSTOMY STATUS (H): Primary | ICD-10-CM

## 2023-05-15 DIAGNOSIS — N31.9 NEUROGENIC BLADDER: Primary | ICD-10-CM

## 2023-05-15 PROCEDURE — 51705 CHANGE OF BLADDER TUBE: CPT

## 2023-05-15 PROCEDURE — 99211 OFF/OP EST MAY X REQ PHY/QHP: CPT

## 2023-05-15 RX ORDER — CIPROFLOXACIN 500 MG/1
500 TABLET, FILM COATED ORAL ONCE
Status: COMPLETED | OUTPATIENT
Start: 2023-05-15 | End: 2023-05-15

## 2023-05-15 RX ADMIN — CIPROFLOXACIN 500 MG: 500 TABLET, FILM COATED ORAL at 08:58

## 2023-05-15 NOTE — TELEPHONE ENCOUNTER
Pt was seen as a nurse visit on 3/13/23 for SPT change and edex training.  A Rx was sent in at that time therefore Rx refill request is too soon.  Writer tried contacting pt to discuss if Rx is needed or an error.  No answer and VM box was full.  Writer will try pt and a later time.

## 2023-05-15 NOTE — PROGRESS NOTES
Josiah Crump comes into clinic today at the request of Dr. Lira with the diagnosis of NGB for a catheter change.    Order has been verified: Yes.    The following medication was given:     MEDICATION:  Ciprofloxacin  ROUTE: PO  SITE: Medication was given orally   DOSE: 500 mg  LOT #: [id number]  : [employer]  EXPIRATION DATE: 11/2024  NDC#: 2128-1610-07   Was there drug waste? No    Prior to administration, verified patient identity using patient's name and date of birth.    Drug Amount Wasted:  None.  Vial/Syringe: Single dose      No Known Allergies    Removal:  16 Fr straight tipped latex muhammad catheter removed from suprapubic meatus without difficulty after removing 7 mL of fluid from the balloon.    Insertion:  16 Fr straight tipped latex muhammad catheter inserted into suprapubic meatus in the usual sterile fashion without difficulty.  Received > 0 ml positive urine return. I then irrigated the pt's bladder with 20 mL or sterile water to ensure proper catheter placement.  Balloon filled with 10 mL sterile H2O after positive urine return.  Catheter secured in place with leg strap: No.     Patient did tolerate procedure well.     Pt did arrive to his appt with an open colostomy and stated that he has reached out to colorectal about the opening and planned to have them order supplies for the pt and assist with colostomy bag fitting.    Patient instructed as to where to call or go for pain, fever, leakage, or decreased urine flow.     This service provided today was under the direct supervision of Dr. Edwards, who was available if needed.    Slime Garza   5/15/2023  8:35 AM

## 2023-05-16 ENCOUNTER — MEDICAL CORRESPONDENCE (OUTPATIENT)
Dept: HEALTH INFORMATION MANAGEMENT | Facility: CLINIC | Age: 58
End: 2023-05-16
Payer: COMMERCIAL

## 2023-05-18 ENCOUNTER — MEDICAL CORRESPONDENCE (OUTPATIENT)
Dept: HEALTH INFORMATION MANAGEMENT | Facility: CLINIC | Age: 58
End: 2023-05-18
Payer: COMMERCIAL

## 2023-05-19 NOTE — TELEPHONE ENCOUNTER
Writer reached out to pt again today.  No answer and VM box full.  Writer did contact pharmacy and they stated a refill is needed on Rx. Rx submitted for patient.

## 2023-05-25 ENCOUNTER — NURSE TRIAGE (OUTPATIENT)
Dept: FAMILY MEDICINE | Facility: CLINIC | Age: 58
End: 2023-05-25
Payer: COMMERCIAL

## 2023-05-25 DIAGNOSIS — N39.0 RECURRENT UTI (URINARY TRACT INFECTION): Primary | ICD-10-CM

## 2023-05-25 RX ORDER — CEFDINIR 300 MG/1
300 CAPSULE ORAL 2 TIMES DAILY
Qty: 14 CAPSULE | Refills: 0 | Status: SHIPPED | OUTPATIENT
Start: 2023-05-25 | End: 2023-06-22

## 2023-05-25 NOTE — TELEPHONE ENCOUNTER
Called patient and relayed message from Dr. Benjamin. Patient verbalized understanding, will call back if symptoms persist after course of abx.

## 2023-05-25 NOTE — TELEPHONE ENCOUNTER
"Nurse Triage SBAR    Is this a 2nd Level Triage? NO    Situation: suspected UTI     Background: patient is a wheelchair bound paraplegic with chronic indwelling muhammad    Assessment: patient reports cloudy foul-smelling urine in his muhammad bag since Monday 5/22. Denies fever, hematuria, or flank pain. Gets his muhammad changed once per month, last changed 2 weeks ago. Has increased his oral fluid intake without resolution of symptoms. Patient states he has \"been in this chair for 36 years and knows what this is.\"    Protocol Recommended Disposition:   See PCP Within 24 Hours    Recommendation: declining coming to clinic to leave UA, states he just saw Dr. Benjamin recently and does not wish to come to clinic again so soon as it is difficult for him to transport himself due to being wheelchair bound. Requesting cipro antibiotic as this has been effective at clearing his urinary symptoms in the past.     Routed to provider    Does the patient meet one of the following criteria for ADS visit consideration? 16+ years old, with an [facility] PCP     TIP  Providers, please consider if this condition is appropriate for management at one of our Acute and Diagnostic Services sites.     If patient is a good candidate, please use dotphrase <dot>triageresponse and select Refer to ADS to document.    Reason for Disposition    [1] Cloudy urine lasts > 24 hours AND [2] not cleared by increasing fluid intake    Additional Information    Negative: SEVERE abdominal pain    Negative: [1] No new urine in bag > 4 hours AND [2] leaking urine around catheter AND [3] catheter not kinked    Negative: [1] Not able to insert \"in and out\" catheter > 4 hours AND [2] abdomen swelling    Negative: [1] Catheter was pulled-out accidentally AND [2] bright red continuous bleeding    Negative: Fever > 100.4 F (38.0 C)    Negative: [1] Drinking very little AND [2] dehydration suspected (e.g., no urine > 12 hours, very dry mouth, very lightheaded)    Negative: " "Patient sounds very sick or weak to the triager    Negative: Catheter was accidentally pulled-out    Negative: [1] No new urine in bag > 4 hours AND [2] catheter not kinked    Negative: Not able to insert \"in and out\" catheter > 8 hours    Negative: [1] Catheter is broken AND [2] is not usable    Negative: Bleeding around catheter (e.g., from penis or female urethra)    Negative: Lower abdominal pain or swelling    Negative: [1] Bloody or red-colored urine  AND [2] no recent prostate or bladder surgery  (Exception: brief episode and urine now clear)    Negative: [1] Bloody or red-colored urine  AND [2] prostate or bladder surgery > 3 days (72 hours) ago    Negative: [1] Tea-colored or pink urine lasts > 24 hours AND [2] not cleared by increasing fluid intake    AND [3] no recent prostate or bladder surgery    Protocols used: SPINAL CORD INJURY - URINARY CATHETER SYMPTOMS AND [id number]    Liliana Juarez RN BSN  [facility]  "

## 2023-05-25 NOTE — TELEPHONE ENCOUNTER
New Medication Request    Contacts       Type Contact Phone/Fax    05/25/2023 01:40 PM CDT Phone (Incoming) Ankur Crumpestela ADAME (Self) [phone] (M)          What medication are you requesting?: Antibiotic     Reason for medication request: pt stated he needs an antibiotic prescribed for a UTI     Have you taken this medication before?: No    Controlled Substance Agreement on file:   CSA -- Patient Level:     [Media Unavailable] Controlled Substance Agreement - Opioid - Scan on 8/1/2017   [Media Unavailable] Controlled Substance Agreement - Opioid - Scan on 6/6/2016         Patient offered an appointment? No    Preferred Pharmacy:    [id number] [address] & [address] [address]  Phone: [phone] Fax: [phone]    Could we send this information to you in [facility] or would you prefer to receive a phone call?:   Patient would prefer a phone call   Okay to leave a detailed message?: Yes at Home number on file [phone] (home)

## 2023-05-31 NOTE — PLAN OF CARE
"Met with patient at start of shift. Pt had briefly mentioned that her  had been drinking last night and that he had to get \"kicked out\". She was frustrated that he had visited her in the hospital when he had been drinking. Offered support, listened to patient and her concerns. SW following.   " May 31, 2023     Patient: Aliyah St   YOB: 2010   Date of Visit: 5/31/2023       To Whom it May Concern:    Aliyah St was seen in my clinic on 5/31/2023 at 2:05 pm.     Please excuse Aliyah for her absence from school on the date listed above to be able to make her appointment.    Sincerely,         Gisselle Rojo MD    Medical information is confidential and cannot be disclosed without the written consent of the patient or her representative.

## 2023-06-06 ENCOUNTER — TELEPHONE (OUTPATIENT)
Dept: FAMILY MEDICINE | Facility: CLINIC | Age: 58
End: 2023-06-06
Payer: COMMERCIAL

## 2023-06-06 NOTE — TELEPHONE ENCOUNTER
Pt calling checking the status on an order that was sent to  to sign for a wheelchair repair. Please look into this request and advise.  Thanks!

## 2023-06-07 NOTE — TELEPHONE ENCOUNTER
Called patient--he will have repair form sent to us again.    Bala Benjamin MD [place] [facility] Team Pool 45 minutes ago (12:38 PM)     GM  Not sure, please have them fax again to be on the safe side.

## 2023-06-20 ENCOUNTER — TELEPHONE (OUTPATIENT)
Dept: WOUND CARE | Facility: CLINIC | Age: 58
End: 2023-06-20
Payer: COMMERCIAL

## 2023-06-20 NOTE — TELEPHONE ENCOUNTER
LM with pt asking which curent products he is using so I can look up the   ----- Message from Neeta Molina RN sent at 6/20/2023  3:02 PM CDT -----  Pt is requestin opaque pouches 2 piece  Melber orders sensura sonal 2 piece with drain bag

## 2023-06-22 ENCOUNTER — OFFICE VISIT (OUTPATIENT)
Dept: FAMILY MEDICINE | Facility: CLINIC | Age: 58
End: 2023-06-22
Payer: COMMERCIAL

## 2023-06-22 VITALS
SYSTOLIC BLOOD PRESSURE: 153 MMHG | TEMPERATURE: 97.1 F | HEART RATE: 67 BPM | RESPIRATION RATE: 24 BRPM | OXYGEN SATURATION: 99 % | DIASTOLIC BLOOD PRESSURE: 83 MMHG

## 2023-06-22 DIAGNOSIS — T83.511S URINARY TRACT INFECTION ASSOCIATED WITH INDWELLING URETHRAL CATHETER, SEQUELA: Primary | ICD-10-CM

## 2023-06-22 DIAGNOSIS — G56.03 BILATERAL CARPAL TUNNEL SYNDROME: ICD-10-CM

## 2023-06-22 DIAGNOSIS — I10 HYPERTENSION GOAL BP (BLOOD PRESSURE) < 130/80: ICD-10-CM

## 2023-06-22 DIAGNOSIS — D50.0 IRON DEFICIENCY ANEMIA DUE TO CHRONIC BLOOD LOSS: ICD-10-CM

## 2023-06-22 DIAGNOSIS — N39.0 URINARY TRACT INFECTION ASSOCIATED WITH INDWELLING URETHRAL CATHETER, SEQUELA: Primary | ICD-10-CM

## 2023-06-22 DIAGNOSIS — E66.01 MORBID OBESITY (H): ICD-10-CM

## 2023-06-22 PROCEDURE — 99215 OFFICE O/P EST HI 40 MIN: CPT | Performed by: FAMILY MEDICINE

## 2023-06-22 PROCEDURE — 87086 URINE CULTURE/COLONY COUNT: CPT | Performed by: FAMILY MEDICINE

## 2023-06-22 RX ORDER — CEFDINIR 300 MG/1
300 CAPSULE ORAL 2 TIMES DAILY
Qty: 14 CAPSULE | Refills: 0 | Status: SHIPPED | OUTPATIENT
Start: 2023-06-22 | End: 2023-06-29

## 2023-06-22 ASSESSMENT — PAIN SCALES - GENERAL: PAINLEVEL: NO PAIN (0)

## 2023-06-22 NOTE — PROGRESS NOTES
Assessment & Plan     Urinary tract infection associated with indwelling urethral catheter, sequela  We will send urine for culture, start Omnicef while awaiting culture, adjust antibiotic based on culture and sensitivities.  - Urine Culture Aerobic Bacterial - lab collect; Future  - cefdinir (OMNICEF) 300 MG capsule; Take 1 capsule (300 mg) by mouth 2 times daily for 7 days  - Urine Culture Aerobic Bacterial - lab collect    Bilateral carpal tunnel syndrome  Has seen orthopedist, declined wrist braces prescription, is planning to follow-up with them    Morbid obesity (H)  Discussed regular physical activities weight loss program.  Hypertension goal BP (blood pressure) < 130/80  Mildly elevated BP today, patient stating he did not take his amlodipine, advised him to take it as soon as he get back home monitor his blood pressure at home and follow-up in about 2 to 3 weeks for recheck.    Iron deficiency anemia due to chronic blood loss  Recheck CBC, consider oral iron therapy.  - CBC with platelets; Future    Review of external notes as documented elsewhere in note  40 minutes spent by me on the date of the encounter doing chart review, review of outside records, review of test results, interpretation of tests, patient visit and documentation        Work on weight loss    Bala Benjamin MD  [facility]    Rola Rahman is a 57 year old, presenting for the following health issues:  Recheck Medication and Urinary Problem        6/22/2023     1:31 PM   Additional Questions   Roomed by martine TYLER     He does have suprapubic catheter, is here today complaining of about a week of cloudy and smelly urine, mild pressure in the suprapubic area.  According to patient its usually sign of an infection, has had multiple in the past.  Has been treated with Cipro, Omnicef in the past.  Acute colitis with perforation few months ago, had a colectomy, , he is able to manage his colostomy bag.   Numbness tingling to both upper extremities, symptoms started with the previous manual wheelchair, has seen Ortho was told that he has carpal tunnel syndrome.  He is planning to follow-up with them .  Mild iron deficiency few weeks ago likely related to his surgery, denies any excessive fatigue, will recheck, consider oral iron as tolerated.    Review of Systems   Constitutional, HEENT, cardiovascular, pulmonary, gi and gu systems are negative, except as otherwise noted.      Objective    BP (!) 162/97 (BP Location: Left arm, Patient Position: Sitting, Cuff Size: Adult Large)   Pulse 67   Temp 97.1  F (36.2  C) (Temporal)   Resp 24   SpO2 99%   There is no height or weight on file to calculate BMI.  Physical Exam   GENERAL: healthy, alert and no distress  NECK: no adenopathy, no asymmetry, masses, or scars and thyroid normal to palpation  RESP: lungs clear to auscultation - no rales, rhonchi or wheezes  CV: regular rate and rhythm, normal S1 S2, no S3 or S4, no murmur, click or rub, no peripheral edema and peripheral pulses strong  ABDOMEN: soft, nontender, no hepatosplenomegaly, no masses and bowel sounds normal  MS: no gross musculoskeletal defects noted, no edema    No results found for any visits on 06/22/23.    This note was completed in part using a voice recognition software, any grammatical or context distortion are unintentional and inherent to the software.        Prior to immunization administration, verified patients identity using patient s name and date of birth. Please see Immunization Activity for additional information.     Screening Questionnaire for Adult Immunization    Are you sick today?   Yes   Do you have allergies to medications, food, a vaccine component or latex?   No   Have you ever had a serious reaction after receiving a vaccination?   No   Do you have a long-term health problem with heart, lung, kidney, or metabolic disease (e.g., diabetes), asthma, a blood disorder, no spleen,  complement component deficiency, a cochlear implant, or a spinal fluid leak?  Are you on long-term aspirin therapy?   No   Do you have cancer, leukemia, HIV/AIDS, or any other immune system problem?   No   Do you have a parent, brother, or sister with an immune system problem?   No   In the past 3 months, have you taken medications that affect  your immune system, such as prednisone, other steroids, or anticancer drugs; drugs for the treatment of rheumatoid arthritis, Crohn s disease, or psoriasis; or have you had radiation treatments?   No   Have you had a seizure, or a brain or other nervous system problem?   No   During the past year, have you received a transfusion of blood or blood    products, or been given immune (gamma) globulin or antiviral drug?   No   For women: Are you pregnant or is there a chance you could become       pregnant during the next month?   No   Have you received any vaccinations in the past 4 weeks?   No     Immunization questionnaire was positive for at least one answer.  Notified .           Screening performed by Jennifer Carter MA on 6/22/2023 at 1:35 PM.

## 2023-06-24 LAB — BACTERIA UR CULT: NORMAL

## 2023-06-26 ENCOUNTER — PATIENT OUTREACH (OUTPATIENT)
Dept: CARE COORDINATION | Facility: CLINIC | Age: 58
End: 2023-06-26
Payer: COMMERCIAL

## 2023-06-27 NOTE — PLAN OF CARE
Pt admitted to room 813 at 1400.  VSS, minimal draining from incision, alert and OX4, c/o moderate pain medicated with IV dilaudid with relief.  Shah patent, no would ulcers noted.  PARESH patent.  Will cont to monitor post op status and medicate prn.    What Type Of Note Output Would You Prefer (Optional)?: Bullet Format How Severe Is Your Acne?: mild Is This A New Presentation, Or A Follow-Up?: Acne

## 2023-07-10 ENCOUNTER — PATIENT OUTREACH (OUTPATIENT)
Dept: CARE COORDINATION | Facility: CLINIC | Age: 58
End: 2023-07-10
Payer: COMMERCIAL

## 2023-07-13 ENCOUNTER — PRE VISIT (OUTPATIENT)
Dept: UROLOGY | Facility: CLINIC | Age: 58
End: 2023-07-13
Payer: COMMERCIAL

## 2023-07-13 DIAGNOSIS — N31.9 NEUROGENIC BLADDER: Primary | ICD-10-CM

## 2023-07-13 RX ORDER — CIPROFLOXACIN 500 MG/1
500 TABLET, FILM COATED ORAL ONCE
Status: DISCONTINUED | OUTPATIENT
Start: 2023-07-14 | End: 2023-07-24

## 2023-07-13 NOTE — CONFIDENTIAL NOTE
Reason for nurse visit: SPT chage    Diagnosis: NGB    Ordering provider: Dr. Lira    Last seen by provider: 1/25/2023     No Known Allergies     Slime Garza

## 2023-07-14 RX ORDER — CIPROFLOXACIN 500 MG/1
500 TABLET, FILM COATED ORAL ONCE
Status: CANCELLED | OUTPATIENT
Start: 2023-07-18

## 2023-07-15 DIAGNOSIS — G82.20 PARAPLEGIA (H): ICD-10-CM

## 2023-07-15 NOTE — TELEPHONE ENCOUNTER
Routing refill request to provider for review/approval because:  Drug not on the [facility] refill protocol     Last Written Prescription Date:  1/18/2023  Last Fill Quantity: 90,  # refills: 5   Last office visit provider:  6/22/2023     Requested Prescriptions   Pending Prescriptions Disp Refills     baclofen (LIORESAL) 20 MG tablet [Pharmacy Med Name: BACLOFEN 20MG TABLETS] 90 tablet 5     Sig: TAKE 1 TABLET BY MOUTH THREE TIMES DAILY AS NEEDED FOR SPACTITY       There is no refill protocol information for this order          Molly Ornelas RN 07/15/23 8:09 AM

## 2023-07-17 RX ORDER — BACLOFEN 20 MG/1
TABLET ORAL
Qty: 90 TABLET | Refills: 5 | Status: SHIPPED | OUTPATIENT
Start: 2023-07-17 | End: 2024-01-12

## 2023-07-24 RX ORDER — CIPROFLOXACIN 500 MG/1
500 TABLET, FILM COATED ORAL ONCE
Status: COMPLETED | OUTPATIENT
Start: 2023-07-25 | End: 2023-07-25

## 2023-07-25 ENCOUNTER — OFFICE VISIT (OUTPATIENT)
Dept: UROLOGY | Facility: CLINIC | Age: 58
End: 2023-07-25
Payer: COMMERCIAL

## 2023-07-25 ENCOUNTER — TELEPHONE (OUTPATIENT)
Dept: WOUND CARE | Facility: CLINIC | Age: 58
End: 2023-07-25

## 2023-07-25 DIAGNOSIS — N31.9 NEUROGENIC BLADDER: Primary | ICD-10-CM

## 2023-07-25 PROCEDURE — 51705 CHANGE OF BLADDER TUBE: CPT

## 2023-07-25 RX ADMIN — CIPROFLOXACIN 500 MG: 500 TABLET, FILM COATED ORAL at 13:09

## 2023-07-25 NOTE — TELEPHONE ENCOUNTER
LM with pt to make appt    ----- Message from Sasha Chambers sent at 7/25/2023  1:28 PM CDT -----  Flash Santacruz,   This pt stopped by me in K pod today and said he was trying to get in touch with you by phone. Not sure if you got his messages but I thought I'd pass along the word.    Thanks,     Sasha

## 2023-07-25 NOTE — PROGRESS NOTES
Josiah Crump comes into clinic today at the request of Dr. Lira with the diagnosis of NGB for a catheter change.    Order has been verified: Yes.    The following medication was given:     MEDICATION:  Ciprofloxacin  ROUTE: PO  SITE: Medication was given orally   DOSE: 500 mg  LOT #: [id number]  : [employer]  EXPIRATION DATE: 11/2024  NDC#: 1533-8488-57   Was there drug waste? No    Prior to administration, verified patient identity using patient's name and date of birth.    Drug Amount Wasted:  None.  Vial/Syringe: Single dose      No Known Allergies    Removal:  16 Fr straight tipped latex muhammad catheter removed from suprapubic meatus without difficulty after removing 7 mL of fluid from the balloon.    Insertion:  16 Fr straight tipped latex muhammad catheter inserted into suprapubic meatus in the usual sterile fashion without difficulty.  Received > 0 ml positive urine return. I flushed pt's catheter with 60 mL of sterile water to ensure proper placement.  Balloon filled with 8 mL sterile H2O after positive urine return.  Catheter secured in place with leg strap: No.     Patient did tolerate procedure well.     Patient instructed as to where to call or go for pain, fever, leakage, or decreased urine flow.     This service provided today was under the direct supervision of Dr. Haley, who was available if needed.    Slime Garza   7/25/2023  12:48 PM

## 2023-08-01 ENCOUNTER — TELEPHONE (OUTPATIENT)
Dept: PLASTIC SURGERY | Facility: CLINIC | Age: 58
End: 2023-08-01

## 2023-08-01 NOTE — TELEPHONE ENCOUNTER
Called and spoke with patient regarding being inappropriately scheduled.    Dr. Blackmon stated that they will not be providing this type of surgery at this [facility] and should be seen with another plastic surgeon.    Also, that the patient should be [place] [facility] [place] than the [facility] due to patient lack of mobility.    Patient was provided with the phone number to reschedule.

## 2023-08-05 NOTE — LETTER
Letter by Marvel Petit MD at      Author: Marvel Petit MD Service: -- Author Type: --    Filed:  Encounter Date: 6/11/2020 Status: (Other)         Thank you for scheduling your visit as a Video Visit! To receive an invitation and connect with your provider, the [facility] video visit technology must be accessible from your smartphone or personal device. Please click the link below for setup instructions:    [email or web address]              To klever Waldron RN  08/05/23 [id number]

## 2023-08-21 NOTE — ED PROVIDER NOTES
Pre-procedure checklist reviewed.    MD aware of maximum contrast dose of 144 mL. eMERGENCY dEPARTMENT Signout NOTE      Patient was signed out to me by Dr. Payan at 6:28 AM.      HPI:  Josiah Crump is a 56 year old male who presents to the ED for evaluation of rectal bleeding. 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 He inserted a finger into his rectum today in an attempt to stool and had immediate onset of pain followed by blood.  He is chronically anticoagulated on Plavix. The bleeding continued into this afternoon. He feels lightheaded and weak.  He has never had this happen before. Currently patient reports abdominal pain and rectal pain and continued bleeding.        Follow up needed: Admission to facility with colorectal surgery.    LABS  Pertinent lab results reviewed in chart.  Results for orders placed or performed during the hospital encounter of 01/03/22   CTA Abdomen Pelvis with Contrast    Impression    IMPRESSION:  1.  New distal colitis. No evidence for active gastrointestinal bleeding.  2.  New presacral and perirectal fluid collection suspicious for abscess. Main pocket of fluid measures approximately 6.6 x 1.8 x 3.0 cm, with short channel-like extensions superiorly and inferiorly along the left pelvis as described.   CT Pelvis Soft Tissue wo Contrast    Impression    IMPRESSION:  1.  Bilateral hydroceles, marked scrotal edema and marked penile edema unchanged.  2.  Inflammatory change from the rectosigmoid junction to the anal canal with posterior abscesses and left sinus tract as detailed above unchanged.  3.  Extensive decubitus ulceration posterior to the right acetabulum and right hemipelvic bones within which is an ill-defined thick-walled fluid collection 6 x 4 x 4 cm that has increased in size since 1/3/2022.     Result Value Ref Range    INR 1.20 (H) 0.85 - 1.15   Basic metabolic panel   Result Value Ref Range    Sodium  136 136 - 145 mmol/L    Potassium 3.8 3.5 - 5.0 mmol/L    Chloride 102 98 - 107 mmol/L    Carbon Dioxide (CO2) 21 (L) 22 - 31 mmol/L    Anion Gap 13 5 - 18 mmol/L    Urea Nitrogen 18 8 - 22 mg/dL    Creatinine 0.81 0.70 - 1.30 mg/dL    Calcium 9.5 8.5 - 10.5 mg/dL    Glucose 81 70 - 125 mg/dL    GFR Estimate >90 >60 mL/min/1.73m2   Result Value Ref Range    Troponin I <0.01 0.00 - 0.29 ng/mL   Result Value Ref Range    Magnesium 2.1 1.8 - 2.6 mg/dL   Asymptomatic COVID-19 Virus (Coronavirus) by PCR Nasopharyngeal    Specimen: Nasopharyngeal; Swab   Result Value Ref Range    SARS CoV2 PCR Negative Negative   CBC with platelets and differential   Result Value Ref Range    WBC Count 10.1 4.0 - 11.0 10e3/uL    RBC Count 5.68 4.40 - 5.90 10e6/uL    Hemoglobin 12.2 (L) 13.3 - 17.7 g/dL    Hematocrit 41.8 40.0 - 53.0 %    MCV 74 (L) 78 - 100 fL    MCH 21.5 (L) 26.5 - 33.0 pg    MCHC 29.2 (L) 31.5 - 36.5 g/dL    RDW 18.9 (H) 10.0 - 15.0 %    Platelet Count 358 150 - 450 10e3/uL    % Neutrophils 65 %    % Lymphocytes 18 %    % Monocytes 8 %    % Eosinophils 8 %    % Basophils 1 %    % Immature Granulocytes 0 %    NRBCs per 100 WBC 0 <1 /100    Absolute Neutrophils 6.7 1.6 - 8.3 10e3/uL    Absolute Lymphocytes 1.8 0.8 - 5.3 10e3/uL    Absolute Monocytes 0.8 0.0 - 1.3 10e3/uL    Absolute Eosinophils 0.8 (H) 0.0 - 0.7 10e3/uL    Absolute Basophils 0.1 0.0 - 0.2 10e3/uL    Absolute Immature Granulocytes 0.0 <=0.4 10e3/uL    Absolute NRBCs 0.0 10e3/uL   Result Value Ref Range    Hemoglobin 11.5 (L) 13.3 - 17.7 g/dL   Lactic acid whole blood   Result Value Ref Range    Lactic Acid 1.7 0.7 - 2.0 mmol/L   CBC (+ platelets, no diff)   Result Value Ref Range    WBC Count 7.5 4.0 - 11.0 10e3/uL    RBC Count 4.81 4.40 - 5.90 10e6/uL    Hemoglobin 10.3 (L) 13.3 - 17.7 g/dL    Hematocrit 35.8 (L) 40.0 - 53.0 %    MCV 74 (L) 78 - 100 fL    MCH 21.4 (L) 26.5 - 33.0 pg    MCHC 28.8 (L) 31.5 - 36.5 g/dL    RDW 18.2 (H) 10.0 - 15.0 %     Platelet Count 300 150 - 450 10e3/uL   Result Value Ref Range    Hemoglobin 10.1 (L) 13.3 - 17.7 g/dL   Result Value Ref Range    Hemoglobin 10.5 (L) 13.3 - 17.7 g/dL   Adult Type and Screen   Result Value Ref Range    ABO/RH(D) O POS     Antibody Screen Negative Negative    SPECIMEN EXPIRATION DATE [id number]        RADIOLOGY  CT Pelvis Soft Tissue wo Contrast   Final Result   IMPRESSION:   1.  Bilateral hydroceles, marked scrotal edema and marked penile edema unchanged.   2.  Inflammatory change from the rectosigmoid junction to the anal canal with posterior abscesses and left sinus tract as detailed above unchanged.   3.  Extensive decubitus ulceration posterior to the right acetabulum and right hemipelvic bones within which is an ill-defined thick-walled fluid collection 6 x 4 x 4 cm that has increased in size since 1/3/2022.         CTA Abdomen Pelvis with Contrast   Final Result   IMPRESSION:   1.  New distal colitis. No evidence for active gastrointestinal bleeding.   2.  New presacral and perirectal fluid collection suspicious for abscess. Main pocket of fluid measures approximately 6.6 x 1.8 x 3.0 cm, with short channel-like extensions superiorly and inferiorly along the left pelvis as described.          ED COURSE & MEDICAL DECISION MAKING    Pertinent Labs and Imaging reviewed (see chart for details)      ED Course as of 01/04/22 1414   Mon Jan 03, 2022   2153 Sodium: 136   Tue Jan 04, 2022   0627 Has ? Rectal abscess per radiology. Has 2 gram drop in 14 hours. General surgery refused. Needs colorectal surgery.      9:06 AM I discussed patient's case with Dr. Spencer, colorectal surgery at the [facility]. No beds are available at the , will attempt to admit here at [facility].   9:30 AM I discussed the case with hospitalist, Dr. Anand. He does not believe colorectal covers [facility].  9:34 AM I spoke with colorectal associates, call center recetpion would not let me speak to an  oncall surgeon.    9:41 AM I evaluated and updated patient.  9:47 AM I discussed patient's case with [facility] . Declined ED to ED transfer.  11:15 AM Patient care signed out to Dr. Fields.        At the conclusion of the encounter I discussed  the results of all of the tests and the disposition. The questions were answered. The patient or family acknowledged understanding and was agreeable with the care plan.       FINAL IMPRESSION          Diagnoses       Codes Comments    Rectal bleeding     K62.5     Traumatic injury of rectum     S36.60XA     Anticoagulated     Z79.01           Tutu Chu MD  1/4/2022  6:33 AM           Tutu Chu MD  01/04/22 1414

## 2023-08-27 ENCOUNTER — HEALTH MAINTENANCE LETTER (OUTPATIENT)
Age: 58
End: 2023-08-27

## 2023-08-31 NOTE — LETTER
Letter by Bala Benjamin MD at      Author: Bala Benjamin MD Service: -- Author Type: --    Filed:  Encounter Date: 2/12/2021 Status: (Other)         Josiah Crump Sr.  [address]  [place] [address]             February 12, 2021         Dear Mr. Crump,    Below are the results from your recent visit:    Resulted Orders   HM2(CBC w/o Differential)   Result Value Ref Range    WBC 6.4 4.0 - 11.0 thou/uL    RBC 6.44 (H) 4.40 - 6.20 mill/uL    Hemoglobin 13.1 (L) 14.0 - 18.0 g/dL    Hematocrit 45.9 40.0 - 54.0 %    MCV 71 (L) 80 - 100 fL    MCH 20.3 (L) 27.0 - 34.0 pg    MCHC 28.5 (L) 32.0 - 36.0 g/dL    RDW 20.2 (H) 11.0 - 14.5 %    Platelets 270 140 - 440 thou/uL    MPV 9.3 7.0 - 10.0 fL   Comprehensive Metabolic Panel   Result Value Ref Range    Sodium 140 136 - 145 mmol/L    Potassium 4.1 3.5 - 5.0 mmol/L    Chloride 105 98 - 107 mmol/L    CO2 25 22 - 31 mmol/L    Anion Gap, Calculation 10 5 - 18 mmol/L    Glucose 72 70 - 125 mg/dL    BUN 13 8 - 22 mg/dL    Creatinine 0.78 0.70 - 1.30 mg/dL    GFR MDRD Af Amer >60 >60 mL/min/1.73m2    GFR MDRD Non Af Amer >60 >60 mL/min/1.73m2    Bilirubin, Total 0.3 0.0 - 1.0 mg/dL    Calcium 8.9 8.5 - 10.5 mg/dL    Protein, Total 8.3 (H) 6.0 - 8.0 g/dL    Albumin 2.9 (L) 3.5 - 5.0 g/dL    Alkaline Phosphatase 107 45 - 120 U/L    AST 15 0 - 40 U/L    ALT 12 0 - 45 U/L    Narrative    Fasting Glucose reference range is 70-99 mg/dL per  American Diabetes Association (ADA) guidelines.   Hepatitis C Antibody (Anti-HCV)   Result Value Ref Range    Hepatitis C Ab Positive (!) Negative   Glycosylated Hemoglobin A1c   Result Value Ref Range    Hemoglobin A1c 5.1 <=5.6 %   Hepatitis C RNA Quantitation by RT-PCR   Result Value Ref Range    HCV RNA Quant HCV RNA Not Detected HCVND [IU]/mL      Comment:      The STEFAN AmpliPrep/STEFAN TaqMan HCV Test is an FDA-approved in vitro nucleic  acid amplification test for the quantitation of HCV RNA in  human plasma (ETDA  plasma) or serum using the STEFAN AmpliPrep Instrument for automated viral  nucleic acid extraction and the STEFAN TaqMan Analyzer or STEFAN TaqMan for  automated Real Time PCR amplification and detection of the viral nucleic acid  target.  Titer results are reported in International Units/mL (IU/mL) using the 1st WHO  International standard for HCV for Nucleic Acid Amplification based assays.    Log of HCV RNA Qt Not Calculated <1.2 Log IU/mL      Comment:        Performed and/or entered by:  [facility]  [address] [place] [id number]       Hepatitis C antibody were positive, but the PCR was negative, likely from a previous exposure but no actual infection.  Mild anemia, but stable.    Please call with questions or contact us using [employer].    Sincerely,        Electronically signed by Bala Benjamin MD        Mohs Rapid Report Verbiage: The area of clinically evident tumor was marked with skin marking ink and appropriately hatched.  The initial incision was made following the Mohs approach through the skin.  The specimen was taken to the lab, divided into the necessary number of pieces, chromacoded and processed according to the Mohs protocol.  This was repeated in successive stages until a tumor free defect was achieved.

## 2023-09-12 ENCOUNTER — TELEPHONE (OUTPATIENT)
Dept: FAMILY MEDICINE | Facility: CLINIC | Age: 58
End: 2023-09-12
Payer: COMMERCIAL

## 2023-09-12 NOTE — TELEPHONE ENCOUNTER
General Call      Reason for Call:  Prescription     What are your questions or concerns:  Pt called in, he stated that he needed a new prescription to get his wheelchair repaired. Pt wants order to get sent to [facility]. Pt did not have fax number but had phone number to clinic [phone]. Please advise. Call pt back if you have any questions.  Thanks!    Date of last appointment with provider: 6/22/23    Could we send this information to you in [employer] or would you prefer to receive a phone call?:   Patient would prefer a phone call   Okay to leave a detailed message?: Yes at Home number on file [phone] (home)    07-Apr-2020

## 2023-09-12 NOTE — LETTER
September 15, 2023      Josiah G Theron  [address]   [place] [address]        To Whom It May Concern:    Josiah ADAME Theron was seen in our clinic.  Okay to to repair his  damaged wheelchair.  Sincerely,        Bala Benjamin MD

## 2023-09-13 ENCOUNTER — PRE VISIT (OUTPATIENT)
Dept: UROLOGY | Facility: CLINIC | Age: 58
End: 2023-09-13
Payer: COMMERCIAL

## 2023-09-13 DIAGNOSIS — N31.9 NEUROGENIC BLADDER: Primary | ICD-10-CM

## 2023-09-13 RX ORDER — CIPROFLOXACIN 500 MG/1
500 TABLET, FILM COATED ORAL ONCE
Status: DISCONTINUED | OUTPATIENT
Start: 2023-09-19 | End: 2023-09-25

## 2023-09-22 NOTE — TELEPHONE ENCOUNTER
Pt calling back checking the status on this request as he states he hasn't heard back from anyone. Please advise.  Thanks!

## 2023-09-25 DIAGNOSIS — Z76.0 ENCOUNTER FOR MEDICATION REFILL: ICD-10-CM

## 2023-09-25 RX ORDER — CIPROFLOXACIN 500 MG/1
500 TABLET, FILM COATED ORAL ONCE
Status: DISCONTINUED | OUTPATIENT
Start: 2023-09-26 | End: 2023-09-29

## 2023-09-25 RX ORDER — CLOPIDOGREL BISULFATE 75 MG/1
TABLET ORAL
Qty: 90 TABLET | Refills: 1 | Status: SHIPPED | OUTPATIENT
Start: 2023-09-25

## 2023-09-29 RX ORDER — CIPROFLOXACIN 500 MG/1
500 TABLET, FILM COATED ORAL ONCE
Status: COMPLETED | OUTPATIENT
Start: 2023-10-06 | End: 2023-10-06

## 2023-10-06 ENCOUNTER — OFFICE VISIT (OUTPATIENT)
Dept: UROLOGY | Facility: CLINIC | Age: 58
End: 2023-10-06
Payer: COMMERCIAL

## 2023-10-06 DIAGNOSIS — N31.9 NEUROGENIC BLADDER: Primary | ICD-10-CM

## 2023-10-06 PROCEDURE — 51705 CHANGE OF BLADDER TUBE: CPT

## 2023-10-06 RX ADMIN — CIPROFLOXACIN 500 MG: 500 TABLET, FILM COATED ORAL at 15:13

## 2023-10-06 NOTE — PROGRESS NOTES
Josiah Crump comes into clinic today at the request of Dr. Charles Lira with the diagnosis of neurogenic bladder for a SPT catheter change.    Order has been verified: Yes.    The following medication was given:     MEDICATION:  Cipro  ROUTE: PO  SITE: Medication was given orally   DOSE: 500mg  LOT #: [id number]  : [employer]  EXPIRATION DATE: 11/2024  NDC#: 2975-7177-47   Was there drug waste? No    Prior to administration, verified patient identity using patient's name and date of birth.    Drug Amount Wasted:  None.  Vial/Syringe: Single dose      No Known Allergies    Removal:  16 Fr straight tipped latex muhammad catheter removed from suprapubic meatus without difficulty after removing 6 mL of fluid from the balloon.    Insertion:  16 Fr straight tipped latex muhammad catheter inserted into suprapubic meatus in the usual sterile fashion without difficulty.  Received > 25 ml yellow positive urine return.   Balloon filled with 7 mL sterile H2O after positive urine return.  Catheter secured in place with leg strap: No.     Patient did tolerate procedure well.     Patient instructed as to where to call or go for pain, fever, leakage, or decreased urine flow.     This service provided today was under the direct supervision of Darek Frazier Pa-C, who was available if needed.    Daniela Quintero RN   10/6/2023  2:18 PM

## 2023-10-17 DIAGNOSIS — I10 BENIGN ESSENTIAL HYPERTENSION: ICD-10-CM

## 2023-10-17 DIAGNOSIS — Z76.0 ENCOUNTER FOR MEDICATION REFILL: ICD-10-CM

## 2023-10-18 RX ORDER — AMLODIPINE BESYLATE 5 MG/1
TABLET ORAL
Qty: 90 TABLET | Refills: 3 | Status: SHIPPED | OUTPATIENT
Start: 2023-10-18

## 2023-10-26 ENCOUNTER — TELEPHONE (OUTPATIENT)
Dept: FAMILY MEDICINE | Facility: CLINIC | Age: 58
End: 2023-10-26

## 2023-10-26 NOTE — TELEPHONE ENCOUNTER
Left voicemail for patient to return call to clinic. If patient calls back, please get more information about symptoms/triage if appropriate.

## 2023-10-26 NOTE — TELEPHONE ENCOUNTER
General Call      Reason for Call:  RX requesting    What are your questions or concerns:  Pt called and stated that he has an UTI and would like some antibiotic sent to his pharmacy on file if appropriate. Thanks!    Date of last appointment with provider: 6/22/2023    Could we send this information to you in [employer] or would you prefer to receive a phone call?:   Patient would prefer a phone call   Okay to leave a detailed message?: Yes at Home number on file [phone] (home)

## 2023-10-27 NOTE — TELEPHONE ENCOUNTER
Made 2nd attempt to call, left voicemail for both patient and his significant other Rayo. If either call back, patient needs to be triaged for suspected UTI.

## 2023-10-27 NOTE — TELEPHONE ENCOUNTER
S-(situation):   Pt called and stated that he has an UTI and would like some antibiotic sent to his pharmacy on file if appropriate. Thanks!      B-(background):     Patient was seen at [facility] on 10/6/23 for neurogenic bladder and patient was prescribed Cipro.    A-(assessment):   Patient declined to be triaged  Last UA / UC done on 6/22/23 was negative    R-(recommendations):   Offered patient a lab only appointment for UA / UC and patient declined.   PCP, Dr Benjamin out of clinic today  Patient is calling Urology Clinic for UTI treatment.  Message routed to PCP, Dr Benjamin for ISA Bishop RN  [facility]

## 2023-10-28 ENCOUNTER — MEDICAL CORRESPONDENCE (OUTPATIENT)
Dept: HEALTH INFORMATION MANAGEMENT | Facility: CLINIC | Age: 58
End: 2023-10-28
Payer: COMMERCIAL

## 2023-11-01 ENCOUNTER — MEDICAL CORRESPONDENCE (OUTPATIENT)
Dept: HEALTH INFORMATION MANAGEMENT | Facility: CLINIC | Age: 58
End: 2023-11-01
Payer: COMMERCIAL

## 2023-11-06 ENCOUNTER — MEDICAL CORRESPONDENCE (OUTPATIENT)
Dept: HEALTH INFORMATION MANAGEMENT | Facility: CLINIC | Age: 58
End: 2023-11-06
Payer: COMMERCIAL

## 2023-11-09 ENCOUNTER — MEDICAL CORRESPONDENCE (OUTPATIENT)
Dept: HEALTH INFORMATION MANAGEMENT | Facility: CLINIC | Age: 58
End: 2023-11-09
Payer: COMMERCIAL

## 2023-11-20 ENCOUNTER — MEDICAL CORRESPONDENCE (OUTPATIENT)
Dept: HEALTH INFORMATION MANAGEMENT | Facility: CLINIC | Age: 58
End: 2023-11-20
Payer: COMMERCIAL

## 2023-11-23 NOTE — PATIENT INSTRUCTIONS
Please schedule a cystoscopy with Botox in 6 months with Dr. Savage (Dr. Frank will be finished her fellowship).    It was a pleasure meeting with you today.  Thank you for allowing me and my team the privilege of caring for you today.  YOU are the reason we are here, and I truly hope we provided you with the excellent service you deserve.  Please let us know if there is anything else we can do for you so that we can be sure you are leaving completely satisfied with your care experience.        - Lou Mera,   EMT Clinic Support     no

## 2023-12-04 ENCOUNTER — MEDICAL CORRESPONDENCE (OUTPATIENT)
Dept: HEALTH INFORMATION MANAGEMENT | Facility: CLINIC | Age: 58
End: 2023-12-04
Payer: COMMERCIAL

## 2023-12-18 ENCOUNTER — PRE VISIT (OUTPATIENT)
Dept: UROLOGY | Facility: CLINIC | Age: 58
End: 2023-12-18
Payer: COMMERCIAL

## 2023-12-18 DIAGNOSIS — N31.9 NEUROGENIC BLADDER: Primary | ICD-10-CM

## 2023-12-18 RX ORDER — CIPROFLOXACIN 500 MG/1
500 TABLET, FILM COATED ORAL ONCE
Status: COMPLETED | OUTPATIENT
Start: 2023-12-19 | End: 2023-12-19

## 2023-12-19 ENCOUNTER — OFFICE VISIT (OUTPATIENT)
Dept: UROLOGY | Facility: CLINIC | Age: 58
End: 2023-12-19
Payer: COMMERCIAL

## 2023-12-19 DIAGNOSIS — N31.9 NEUROGENIC BLADDER: Primary | ICD-10-CM

## 2023-12-19 PROCEDURE — 51705 CHANGE OF BLADDER TUBE: CPT

## 2023-12-19 RX ADMIN — CIPROFLOXACIN 500 MG: 500 TABLET, FILM COATED ORAL at 08:39

## 2023-12-19 NOTE — PROGRESS NOTES
Josiah Crump comes into clinic today at the request of Dr. Lira with the diagnosis of NGB for a catheter change.    Order has been verified: Yes.    The following medication was given:     MEDICATION: Ciprofloxacin  ROUTE: PO  SITE: Medication was given orally  DOSE: 500mg  LOT #: [id number]  : [employer]  EXPIRATION DATE: 03/2025  NDC#: 7276-7279-30   Was there drug waste? No    Prior to medication administration, verified patient identity using patient's name and date of birth.  Due to medication administration, patient instructed to remain in clinic for 15 minutes  afterwards, and to report any adverse reaction to me immediately.      No Known Allergies    Removal:  16 Fr straight tipped latex muhammad catheter removed from suprapubic meatus without difficulty after removing 5 mL of fluid from the balloon.    Insertion:  16 Fr straight tipped latex muhammad catheter inserted into suprapubic meatus in the usual sterile fashion without difficulty.  Received > 0 ml positive urine return. Irrigated pt's bladder with 60 mL of sterile water to ensure proper placement.  Balloon filled with 8 mL sterile H2O after positive urine return.  Catheter secured in place with leg strap: No.     Patient did tolerate procedure well.     Patient instructed as to where to call or go for pain, fever, leakage, or decreased urine flow.     This service provided today was under the direct supervision of Darek Wolff Pa-C, who was available if needed.    Slime Garza   12/19/2023  8:21 AM

## 2024-01-05 DIAGNOSIS — E29.1 HYPOGONADISM MALE: ICD-10-CM

## 2024-01-06 NOTE — TELEPHONE ENCOUNTER
testosterone cypionate (DEPOTESTOSTERONE) 200 MG/ML injection     3 mL 5 3/3/2023     Routed because: controlled. Can't find visit belen Martinez.

## 2024-01-07 RX ORDER — TESTOSTERONE CYPIONATE 200 MG/ML
INJECTION, SOLUTION INTRAMUSCULAR
Qty: 3 ML | Refills: 5 | Status: SHIPPED | OUTPATIENT
Start: 2024-01-07

## 2024-01-08 ENCOUNTER — MEDICAL CORRESPONDENCE (OUTPATIENT)
Dept: HEALTH INFORMATION MANAGEMENT | Facility: CLINIC | Age: 59
End: 2024-01-08
Payer: COMMERCIAL

## 2024-01-11 DIAGNOSIS — G82.20 PARAPLEGIA (H): ICD-10-CM

## 2024-01-12 RX ORDER — BACLOFEN 20 MG/1
TABLET ORAL
Qty: 90 TABLET | Refills: 5 | Status: SHIPPED | OUTPATIENT
Start: 2024-01-12

## 2024-02-12 ENCOUNTER — MEDICAL CORRESPONDENCE (OUTPATIENT)
Dept: HEALTH INFORMATION MANAGEMENT | Facility: CLINIC | Age: 59
End: 2024-02-12
Payer: COMMERCIAL

## 2024-02-16 ENCOUNTER — APPOINTMENT (OUTPATIENT)
Dept: CT IMAGING | Facility: CLINIC | Age: 59
End: 2024-02-16
Attending: EMERGENCY MEDICINE
Payer: COMMERCIAL

## 2024-02-16 ENCOUNTER — HOSPITAL ENCOUNTER (EMERGENCY)
Facility: CLINIC | Age: 59
Discharge: HOME | End: 2024-02-16
Attending: EMERGENCY MEDICINE | Admitting: EMERGENCY MEDICINE
Payer: COMMERCIAL

## 2024-02-16 VITALS
TEMPERATURE: 99.2 F | DIASTOLIC BLOOD PRESSURE: 78 MMHG | HEART RATE: 82 BPM | OXYGEN SATURATION: 98 % | RESPIRATION RATE: 18 BRPM | SYSTOLIC BLOOD PRESSURE: 166 MMHG

## 2024-02-16 DIAGNOSIS — R10.84 GENERALIZED ABDOMINAL PAIN: ICD-10-CM

## 2024-02-16 LAB
ALBUMIN SERPL BCG-MCNC: 3.6 G/DL (ref 3.5–5.2)
ALBUMIN UR-MCNC: 100 MG/DL
ALP SERPL-CCNC: 114 U/L (ref 40–150)
ALT SERPL W P-5'-P-CCNC: 15 U/L (ref 0–70)
ANION GAP SERPL CALCULATED.3IONS-SCNC: 9 MMOL/L (ref 7–15)
APPEARANCE UR: CLEAR
AST SERPL W P-5'-P-CCNC: 17 U/L (ref 0–45)
BASOPHILS # BLD AUTO: 0.1 10E3/UL (ref 0–0.2)
BASOPHILS NFR BLD AUTO: 1 %
BILIRUB SERPL-MCNC: <0.2 MG/DL
BILIRUB UR QL STRIP: NEGATIVE
BUN SERPL-MCNC: 33.5 MG/DL (ref 6–20)
CALCIUM SERPL-MCNC: 8.7 MG/DL (ref 8.6–10)
CHLORIDE SERPL-SCNC: 109 MMOL/L (ref 98–107)
COLOR UR AUTO: ABNORMAL
CREAT SERPL-MCNC: 1.8 MG/DL (ref 0.67–1.17)
DEPRECATED HCO3 PLAS-SCNC: 22 MMOL/L (ref 22–29)
EGFRCR SERPLBLD CKD-EPI 2021: 43 ML/MIN/1.73M2
EOSINOPHIL # BLD AUTO: 0.4 10E3/UL (ref 0–0.7)
EOSINOPHIL NFR BLD AUTO: 4 %
ERYTHROCYTE [DISTWIDTH] IN BLOOD BY AUTOMATED COUNT: 20.2 % (ref 10–15)
FLUAV RNA SPEC QL NAA+PROBE: NEGATIVE
FLUBV RNA RESP QL NAA+PROBE: NEGATIVE
GLUCOSE SERPL-MCNC: 106 MG/DL (ref 70–99)
GLUCOSE UR STRIP-MCNC: NEGATIVE MG/DL
HCT VFR BLD AUTO: 31.2 % (ref 40–53)
HGB BLD-MCNC: 9 G/DL (ref 13.3–17.7)
HGB UR QL STRIP: ABNORMAL
IMM GRANULOCYTES # BLD: 0 10E3/UL
IMM GRANULOCYTES NFR BLD: 0 %
KETONES UR STRIP-MCNC: NEGATIVE MG/DL
LEUKOCYTE ESTERASE UR QL STRIP: ABNORMAL
LIPASE SERPL-CCNC: 56 U/L (ref 13–60)
LYMPHOCYTES # BLD AUTO: 1.7 10E3/UL (ref 0.8–5.3)
LYMPHOCYTES NFR BLD AUTO: 20 %
MCH RBC QN AUTO: 20.5 PG (ref 26.5–33)
MCHC RBC AUTO-ENTMCNC: 28.8 G/DL (ref 31.5–36.5)
MCV RBC AUTO: 71 FL (ref 78–100)
MONOCYTES # BLD AUTO: 0.8 10E3/UL (ref 0–1.3)
MONOCYTES NFR BLD AUTO: 10 %
NEUTROPHILS # BLD AUTO: 5.4 10E3/UL (ref 1.6–8.3)
NEUTROPHILS NFR BLD AUTO: 65 %
NITRATE UR QL: NEGATIVE
NRBC # BLD AUTO: 0 10E3/UL
NRBC BLD AUTO-RTO: 1 /100
PH UR STRIP: 6.5 [PH] (ref 5–7)
PLATELET # BLD AUTO: 307 10E3/UL (ref 150–450)
POTASSIUM SERPL-SCNC: 4.6 MMOL/L (ref 3.4–5.3)
PROT SERPL-MCNC: 7.5 G/DL (ref 6.4–8.3)
RBC # BLD AUTO: 4.4 10E6/UL (ref 4.4–5.9)
RBC URINE: 30 /HPF
RSV RNA SPEC NAA+PROBE: NEGATIVE
SARS-COV-2 RNA RESP QL NAA+PROBE: NEGATIVE
SODIUM SERPL-SCNC: 140 MMOL/L (ref 135–145)
SP GR UR STRIP: 1.02 (ref 1–1.03)
SQUAMOUS EPITHELIAL: <1 /HPF
UROBILINOGEN UR STRIP-MCNC: NORMAL MG/DL
WBC # BLD AUTO: 8.3 10E3/UL (ref 4–11)
WBC CLUMPS #/AREA URNS HPF: PRESENT /HPF
WBC URINE: 161 /HPF

## 2024-02-16 PROCEDURE — 81001 URINALYSIS AUTO W/SCOPE: CPT | Performed by: EMERGENCY MEDICINE

## 2024-02-16 PROCEDURE — 74177 CT ABD & PELVIS W/CONTRAST: CPT

## 2024-02-16 PROCEDURE — 250N000009 HC RX 250: Performed by: EMERGENCY MEDICINE

## 2024-02-16 PROCEDURE — 96361 HYDRATE IV INFUSION ADD-ON: CPT | Mod: 59

## 2024-02-16 PROCEDURE — 250N000011 HC RX IP 250 OP 636: Performed by: EMERGENCY MEDICINE

## 2024-02-16 PROCEDURE — 51705 CHANGE OF BLADDER TUBE: CPT

## 2024-02-16 PROCEDURE — 80053 COMPREHEN METABOLIC PANEL: CPT | Performed by: EMERGENCY MEDICINE

## 2024-02-16 PROCEDURE — 85048 AUTOMATED LEUKOCYTE COUNT: CPT | Performed by: EMERGENCY MEDICINE

## 2024-02-16 PROCEDURE — 96376 TX/PRO/DX INJ SAME DRUG ADON: CPT | Mod: 59

## 2024-02-16 PROCEDURE — 96374 THER/PROPH/DIAG INJ IV PUSH: CPT | Mod: 59

## 2024-02-16 PROCEDURE — 99285 EMERGENCY DEPT VISIT HI MDM: CPT | Mod: 25

## 2024-02-16 PROCEDURE — 87086 URINE CULTURE/COLONY COUNT: CPT | Performed by: EMERGENCY MEDICINE

## 2024-02-16 PROCEDURE — 87637 SARSCOV2&INF A&B&RSV AMP PRB: CPT | Performed by: EMERGENCY MEDICINE

## 2024-02-16 PROCEDURE — 36415 COLL VENOUS BLD VENIPUNCTURE: CPT | Performed by: EMERGENCY MEDICINE

## 2024-02-16 PROCEDURE — 83690 ASSAY OF LIPASE: CPT | Performed by: EMERGENCY MEDICINE

## 2024-02-16 PROCEDURE — 258N000003 HC RX IP 258 OP 636: Performed by: EMERGENCY MEDICINE

## 2024-02-16 RX ORDER — HYDROMORPHONE HYDROCHLORIDE 1 MG/ML
0.5 INJECTION, SOLUTION INTRAMUSCULAR; INTRAVENOUS; SUBCUTANEOUS ONCE
Status: COMPLETED | OUTPATIENT
Start: 2024-02-16 | End: 2024-02-16

## 2024-02-16 RX ORDER — IOPAMIDOL 755 MG/ML
112 INJECTION, SOLUTION INTRAVASCULAR ONCE
Status: COMPLETED | OUTPATIENT
Start: 2024-02-16 | End: 2024-02-16

## 2024-02-16 RX ORDER — OXYCODONE HYDROCHLORIDE 5 MG/1
5 TABLET ORAL EVERY 6 HOURS PRN
Qty: 12 TABLET | Refills: 0 | Status: SHIPPED | OUTPATIENT
Start: 2024-02-16 | End: 2024-02-19

## 2024-02-16 RX ADMIN — SODIUM CHLORIDE 72 ML: 9 INJECTION, SOLUTION INTRAVENOUS at 10:59

## 2024-02-16 RX ADMIN — SODIUM CHLORIDE 1000 ML: 9 INJECTION, SOLUTION INTRAVENOUS at 09:23

## 2024-02-16 RX ADMIN — HYDROMORPHONE HYDROCHLORIDE 0.5 MG: 1 INJECTION, SOLUTION INTRAMUSCULAR; INTRAVENOUS; SUBCUTANEOUS at 09:18

## 2024-02-16 RX ADMIN — IOPAMIDOL 112 ML: 755 INJECTION, SOLUTION INTRAVENOUS at 10:59

## 2024-02-16 RX ADMIN — HYDROMORPHONE HYDROCHLORIDE 0.5 MG: 1 INJECTION, SOLUTION INTRAMUSCULAR; INTRAVENOUS; SUBCUTANEOUS at 11:26

## 2024-02-16 ASSESSMENT — ACTIVITIES OF DAILY LIVING (ADL)
ADLS_ACUITY_SCORE: 38

## 2024-02-16 NOTE — ED TRIAGE NOTES
Pt c/o of chest pain that hurts when he takes a deep breath, lower abdominal pain that radiates to his lower back pain . Pt wants to have CT scan to rule out infection.
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
## 2024-02-17 LAB — BACTERIA UR CULT: NORMAL

## 2024-02-21 ENCOUNTER — TELEPHONE (OUTPATIENT)
Dept: FAMILY MEDICINE | Facility: CLINIC | Age: 59
End: 2024-02-21
Payer: COMMERCIAL

## 2024-02-21 NOTE — TELEPHONE ENCOUNTER
Future Appointments 2/21/2024 - 8/19/2024        Date Visit Type Length Department Provider     2/28/2024 11:00 AM ED/HOSP FOLLOW UP 40 min [facility] Bala Benjamin MD    Location Instructions:     [facility] is located at [address] [place] [address] in [place], at the intersection of [facility]. This is one block south of the [facility]. Free parking is available in the lot directly north of the clinic across [facility]. The clinic is near stops along bus [address] and [address].

## 2024-02-21 NOTE — TELEPHONE ENCOUNTER
Left voicemail requesting return call to clinic. If patient calls back, ED discharge recommendation to follow-up with PCP for reassessment. Patient had 2/19/24 appt with Dr. Benjamin that was cancelled. Please help schedule hospital follow-up appt for potential referral.

## 2024-02-21 NOTE — TELEPHONE ENCOUNTER
General Call      Reason for Call:  Surgery     What are your questions or concerns:  Pt called states that he was just seen in the ER last Friday 2/16/24 and states that he has an hernia. Pt is requesting a call back from  to discuss as he would like a referral for a surgeon to perform surgery, please give pt a call back at your earliest convenient.  Thanks!      Date of last appointment with provider: 6/22/23    Could we send this information to you in [employer] or would you prefer to receive a phone call?:   Patient would prefer a phone call   Okay to leave a detailed message?: Yes at Home number on file [phone] (home)

## 2024-02-27 ENCOUNTER — MEDICAL CORRESPONDENCE (OUTPATIENT)
Dept: HEALTH INFORMATION MANAGEMENT | Facility: CLINIC | Age: 59
End: 2024-02-27
Payer: COMMERCIAL

## 2024-02-28 ENCOUNTER — OFFICE VISIT (OUTPATIENT)
Dept: FAMILY MEDICINE | Facility: CLINIC | Age: 59
End: 2024-02-28
Payer: COMMERCIAL

## 2024-02-28 ENCOUNTER — TELEPHONE (OUTPATIENT)
Dept: FAMILY MEDICINE | Facility: CLINIC | Age: 59
End: 2024-02-28

## 2024-02-28 ENCOUNTER — MEDICAL CORRESPONDENCE (OUTPATIENT)
Dept: HEALTH INFORMATION MANAGEMENT | Facility: CLINIC | Age: 59
End: 2024-02-28

## 2024-02-28 VITALS
DIASTOLIC BLOOD PRESSURE: 78 MMHG | RESPIRATION RATE: 16 BRPM | OXYGEN SATURATION: 98 % | SYSTOLIC BLOOD PRESSURE: 127 MMHG | TEMPERATURE: 97.2 F | HEART RATE: 91 BPM

## 2024-02-28 DIAGNOSIS — K55.9 ISCHEMIC COLITIS (H): ICD-10-CM

## 2024-02-28 DIAGNOSIS — S73.005S CLOSED DISLOCATION OF LEFT HIP, SEQUELA: ICD-10-CM

## 2024-02-28 DIAGNOSIS — Z13.220 LIPID SCREENING: ICD-10-CM

## 2024-02-28 DIAGNOSIS — Z93.3 COLOSTOMY STATUS (H): ICD-10-CM

## 2024-02-28 DIAGNOSIS — N18.32 CHRONIC KIDNEY DISEASE, STAGE 3B (H): ICD-10-CM

## 2024-02-28 DIAGNOSIS — R10.31 RLQ ABDOMINAL PAIN: ICD-10-CM

## 2024-02-28 DIAGNOSIS — K92.9 DISORDER OF STOMA: ICD-10-CM

## 2024-02-28 DIAGNOSIS — M43.28 ANKYLOSIS OF SACROILIAC JOINT: ICD-10-CM

## 2024-02-28 DIAGNOSIS — R16.1 SPLENOMEGALY: ICD-10-CM

## 2024-02-28 DIAGNOSIS — M79.605 PAIN IN BOTH LOWER EXTREMITIES: ICD-10-CM

## 2024-02-28 DIAGNOSIS — F11.20 UNCOMPLICATED OPIOID DEPENDENCE (H): ICD-10-CM

## 2024-02-28 DIAGNOSIS — G82.20 PARAPLEGIA (H): ICD-10-CM

## 2024-02-28 DIAGNOSIS — Z09 HOSPITAL DISCHARGE FOLLOW-UP: Primary | ICD-10-CM

## 2024-02-28 DIAGNOSIS — E66.01 MORBID OBESITY (H): ICD-10-CM

## 2024-02-28 DIAGNOSIS — M79.604 PAIN IN BOTH LOWER EXTREMITIES: ICD-10-CM

## 2024-02-28 DIAGNOSIS — N39.0 RECURRENT UTI: ICD-10-CM

## 2024-02-28 PROCEDURE — 99215 OFFICE O/P EST HI 40 MIN: CPT | Performed by: FAMILY MEDICINE

## 2024-02-28 RX ORDER — OXYCODONE HYDROCHLORIDE 10 MG/1
5-10 TABLET ORAL EVERY 8 HOURS PRN
Qty: 12 TABLET | Refills: 0 | Status: SHIPPED | OUTPATIENT
Start: 2024-02-28 | End: 2024-08-04

## 2024-02-28 RX ORDER — CIPROFLOXACIN 250 MG/1
250 TABLET, FILM COATED ORAL 2 TIMES DAILY
Qty: 10 TABLET | Refills: 0 | Status: SHIPPED | OUTPATIENT
Start: 2024-02-28 | End: 2024-03-04

## 2024-02-28 ASSESSMENT — PATIENT HEALTH QUESTIONNAIRE - PHQ9
SUM OF ALL RESPONSES TO PHQ QUESTIONS 1-9: 1
10. IF YOU CHECKED OFF ANY PROBLEMS, HOW DIFFICULT HAVE THESE PROBLEMS MADE IT FOR YOU TO DO YOUR WORK, TAKE CARE OF THINGS AT HOME, OR GET ALONG WITH OTHER PEOPLE: NOT DIFFICULT AT ALL

## 2024-02-28 NOTE — PROGRESS NOTES
Assessment & Plan     Hospital discharge follow-up      Uncomplicated opioid dependence (H)      Paraplegia (H)      Colostomy status (H)  - Adult GI  Referral - Consult Only; Future    Ischemic colitis (H24)  Status post colectomy, stoma bag with good output, referred to GI for parastomal hernia, chronic abdominal pain management.    Morbid obesity (H)      Chronic kidney disease, stage 3b (H)  Discussed avoiding NSAIDs and nephrotoxic substances.    Pain in both lower extremities  Short supply given to help with pain, possible side effect discussed included respiratory depression and even death discussed follow-up with pain clinic for long-term management, ideally with no narcotics.  - oxyCODONE IR (ROXICODONE) 10 MG tablet; Take 0.5-1 tablets (5-10 mg) by mouth every 8 hours as needed for moderate to severe pain    Recurrent UTI    - ciprofloxacin (CIPRO) 250 MG tablet; Take 1 tablet (250 mg) by mouth 2 times daily for 5 days    Lipid screening    - Lipid panel reflex to direct LDL Non-fasting; Future    RLQ abdominal pain    - Adult GI  Referral - Consult Only; Future    Disorder of stoma    - Adult GI  Referral - Consult Only; Future    Ankylosis of sacroiliac joint  Closed dislocation of left hip, sequela  Findings were noted on previous CT scan, no significant complaints besides intermittent pain.    Splenomegaly  Noted on CT scan, denies any left upper quadrant pain.    Review of external notes as documented elsewhere in note  40 minutes spent by me on the date of the encounter doing chart review, review of outside records, review of test results, interpretation of tests, patient visit, and documentation     MED REC REQUIRED  Post Medication Reconciliation Status: discharge medications reconciled and changed, per note/orders    Regular exercise    Rola Rahman is a 58 year old, presenting for the following health issues:  Hospital F/U        2/28/2024    10:50 AM    Additional Questions   Roomed by hser   Accompanied by self     HPI     Patient was seen in the ER on February 16 for abdominal pain, status post bowel perforation, he has a suprapubic catheter, no acute finding was found on the abdominal CT scan, CT scan did show postsurgical changes colostomy and Cramer pouch, parastomal hernia.  He is complaining of urinary symptoms with bladder pain, his urine analysis has always came back positive.  He has a suprapubic catheter.  Denies any fever or chills, stoma is producing a good amount of stool.Was discharged with few  oxycodone pill, is almost out of the medication and asking for refill.CT scan also showed incidental degenerative disease involving his pelvis or lower back area.    Hospital Follow-up Visit:    Hospital/Nursing Home/IP Rehab Facility:  [facility] emergency dept   Date of Admission: 02/16/2024  Date of Discharge: 02/16/2024  Reason(s) for Admission: abd pain     Was your hospitalization related to COVID-19? No   Problems taking medications regularly:  None  Medication changes since discharge: None  Problems adhering to non-medication therapy:  None    Summary of hospitalization:  [facility] hospital discharge summary reviewed  Diagnostic Tests/Treatments reviewed.  Follow up needed: none  Other Healthcare Providers Involved in Patient s Care:         None  Update since discharge: improved.         Plan of care communicated with patient           MUSCULOSKELETAL: Severe degenerative and/or posttraumatic changes pelvis and bilateral hips with bony destruction atrophic changes, and chronic dislocation. Coccyx absent.     Review of Systems  Constitutional, HEENT, cardiovascular, pulmonary, gi and gu systems are negative, except as otherwise noted.      Objective    /78 (BP Location: Left arm, Patient Position: Sitting, Cuff Size: Adult Large)   Pulse 91   Temp 97.2  F (36.2  C) (Temporal)   Resp 16   SpO2 98%   There is no  height or weight on file to calculate BMI.  Physical Exam   GENERAL: alert and no distress  NECK: no adenopathy, no asymmetry, masses, or scars  RESP: lungs clear to auscultation - no rales, rhonchi or wheezes  CV: regular rate and rhythm, normal S1 S2, no S3 or S4, no murmur, click or rub, no peripheral edema  ABDOMEN: soft, nontender, bowel sounds normal, and Splenomegaly, stoma right lower abdomen.  MS: no gross musculoskeletal defects noted, no edema    No results found for any visits on 02/28/24.        Signed Electronically by: Bala Benjamin MD      Prior to immunization administration, verified patients identity using patient s name and date of birth. Please see Immunization Activity for additional information.     Screening Questionnaire for Adult Immunization    Are you sick today?   No   Do you have allergies to medications, food, a vaccine component or latex?   No   Have you ever had a serious reaction after receiving a vaccination?   No   Do you have a long-term health problem with heart, lung, kidney, or metabolic disease (e.g., diabetes), asthma, a blood disorder, no spleen, complement component deficiency, a cochlear implant, or a spinal fluid leak?  Are you on long-term aspirin therapy?   No   Do you have cancer, leukemia, HIV/AIDS, or any other immune system problem?   No   Do you have a parent, brother, or sister with an immune system problem?   No   In the past 3 months, have you taken medications that affect  your immune system, such as prednisone, other steroids, or anticancer drugs; drugs for the treatment of rheumatoid arthritis, Crohn s disease, or psoriasis; or have you had radiation treatments?   No   Have you had a seizure, or a brain or other nervous system problem?   No   During the past year, have you received a transfusion of blood or blood    products, or been given immune (gamma) globulin or antiviral drug?   No   For women: Are you pregnant or is there a chance you could become        pregnant during the next month?   No   Have you received any vaccinations in the past 4 weeks?   No     Immunization questionnaire answers were all negative.      Patient instructed to remain in clinic for 15 minutes afterwards, and to report any adverse reactions.     Screening performed by Destiny Gross MA on 2/28/2024 at 10:56 AM.       Answers submitted by the patient for this visit:  Patient Health Questionnaire (Submitted on 2/28/2024)  If you checked off any problems, how difficult have these problems made it for you to do your work, take care of things at home, or get along with other people?: Not difficult at all  PHQ9 TOTAL SCORE: 1

## 2024-02-28 NOTE — CONFIDENTIAL NOTE
Forms/Letter Request    Type of form/letter: Nursing Home/Assisted Living Orders      Do we have the form/letter: Yes: Placed in your in-basket    Who is the form from? [facility] (if other please explain)    Where did/will the form come from? form was faxed in    When is form/letter needed by: ASAP    How would you like the form/letter returned: Fax : [phone]    Patient Notified form requests are processed in 5-7 business days:No

## 2024-02-29 ENCOUNTER — TELEPHONE (OUTPATIENT)
Dept: FAMILY MEDICINE | Facility: CLINIC | Age: 59
End: 2024-02-29
Payer: COMMERCIAL

## 2024-02-29 PROBLEM — R16.1 SPLENOMEGALY: Status: ACTIVE | Noted: 2024-02-29

## 2024-02-29 PROBLEM — M43.28 ANKYLOSIS OF SACROILIAC JOINT: Status: ACTIVE | Noted: 2024-02-29

## 2024-02-29 PROBLEM — S73.005S: Status: ACTIVE | Noted: 2024-02-29

## 2024-02-29 PROBLEM — N17.9 AKI (ACUTE KIDNEY INJURY) (H): Status: RESOLVED | Noted: 2023-04-17 | Resolved: 2024-02-29

## 2024-02-29 NOTE — TELEPHONE ENCOUNTER
Reason for Call:  Other referral    Detailed comments: patient called and needs a 2nd referral for [facility] please.    Order is .    Please contact patient.  Thank you.    Phone Number Patient can be reached at: Cell number on file:    Telephone Information:   Mobile [phone]       Best Time: any    Can we leave a detailed message on this number? YES    Call taken on 2024 at 10:07 AM by Danica Silva

## 2024-03-07 DIAGNOSIS — M79.605 PAIN IN BOTH LOWER EXTREMITIES: ICD-10-CM

## 2024-03-07 DIAGNOSIS — M79.604 PAIN IN BOTH LOWER EXTREMITIES: ICD-10-CM

## 2024-03-07 RX ORDER — OXYCODONE HYDROCHLORIDE 10 MG/1
5-10 TABLET ORAL EVERY 8 HOURS PRN
Qty: 12 TABLET | Refills: 0 | OUTPATIENT
Start: 2024-03-07

## 2024-03-07 NOTE — TELEPHONE ENCOUNTER
Medication Question or Refill        What medication are you calling about (include dose and sig)?: oxyCODONE IR (ROXICODONE) 10 MG tablet     Preferred Pharmacy:    [id number] [address] & [address] [address]  Phone: [phone] Fax: [phone]          Controlled Substance Agreement on file:   CSA -- Patient Level:     [Media Unavailable] Controlled Substance Agreement - Opioid - Scan on 8/1/2017   [Media Unavailable] Controlled Substance Agreement - Opioid - Scan on 6/6/2016       Who prescribed the medication?: PCP    Do you need a refill? Yes, pt called stated that he has been in a lot of pain and has a hernia. Pt is requesting a refill on medication and also a referral to a pain clinic. Please look into this request and advise asap and call pt back if you have any questions.  Thanks!    When did you use the medication last? N/A    Patient offered an appointment? No    Do you have any questions or concerns?  Yes pt is requesting a referral to a pain clinic as he has been in a lot pain due to a hernia      Could we send this information to you in [facility] or would you prefer to receive a phone call?:   Patient would prefer a phone call   Okay to leave a detailed message?: Yes at Home number on file [phone] (home)

## 2024-03-13 ENCOUNTER — MEDICAL CORRESPONDENCE (OUTPATIENT)
Dept: HEALTH INFORMATION MANAGEMENT | Facility: CLINIC | Age: 59
End: 2024-03-13
Payer: COMMERCIAL

## 2024-03-19 ENCOUNTER — APPOINTMENT (OUTPATIENT)
Dept: CT IMAGING | Facility: CLINIC | Age: 59
End: 2024-03-19
Attending: EMERGENCY MEDICINE
Payer: COMMERCIAL

## 2024-03-19 ENCOUNTER — HOSPITAL ENCOUNTER (EMERGENCY)
Facility: CLINIC | Age: 59
Discharge: HOME | End: 2024-03-19
Attending: EMERGENCY MEDICINE | Admitting: EMERGENCY MEDICINE
Payer: COMMERCIAL

## 2024-03-19 ENCOUNTER — TELEPHONE (OUTPATIENT)
Dept: UROLOGY | Facility: CLINIC | Age: 59
End: 2024-03-19

## 2024-03-19 VITALS
HEIGHT: 72 IN | OXYGEN SATURATION: 100 % | TEMPERATURE: 97.3 F | HEART RATE: 75 BPM | SYSTOLIC BLOOD PRESSURE: 164 MMHG | RESPIRATION RATE: 20 BRPM | DIASTOLIC BLOOD PRESSURE: 88 MMHG | WEIGHT: 230 LBS | BODY MASS INDEX: 31.15 KG/M2

## 2024-03-19 DIAGNOSIS — R10.9 RIGHT SIDED ABDOMINAL PAIN: ICD-10-CM

## 2024-03-19 DIAGNOSIS — N32.89 BLADDER WALL THICKENING: ICD-10-CM

## 2024-03-19 DIAGNOSIS — K43.5 PARASTOMAL HERNIA WITHOUT OBSTRUCTION OR GANGRENE: ICD-10-CM

## 2024-03-19 LAB
ALBUMIN SERPL BCG-MCNC: 3.9 G/DL (ref 3.5–5.2)
ALBUMIN UR-MCNC: 200 MG/DL
ALP SERPL-CCNC: 127 U/L (ref 40–150)
ALT SERPL W P-5'-P-CCNC: 23 U/L (ref 0–70)
ANION GAP SERPL CALCULATED.3IONS-SCNC: 13 MMOL/L (ref 7–15)
APPEARANCE UR: ABNORMAL
AST SERPL W P-5'-P-CCNC: 14 U/L (ref 0–45)
BACTERIA #/AREA URNS HPF: ABNORMAL /HPF
BASOPHILS # BLD AUTO: 0.1 10E3/UL (ref 0–0.2)
BASOPHILS NFR BLD AUTO: 1 %
BILIRUB SERPL-MCNC: 0.2 MG/DL
BILIRUB UR QL STRIP: NEGATIVE
BUN SERPL-MCNC: 32.8 MG/DL (ref 6–20)
CALCIUM SERPL-MCNC: 8.9 MG/DL (ref 8.6–10)
CHLORIDE SERPL-SCNC: 108 MMOL/L (ref 98–107)
COLOR UR AUTO: ABNORMAL
CREAT SERPL-MCNC: 1.89 MG/DL (ref 0.67–1.17)
DEPRECATED HCO3 PLAS-SCNC: 20 MMOL/L (ref 22–29)
EGFRCR SERPLBLD CKD-EPI 2021: 41 ML/MIN/1.73M2
EOSINOPHIL # BLD AUTO: 0.4 10E3/UL (ref 0–0.7)
EOSINOPHIL NFR BLD AUTO: 5 %
ERYTHROCYTE [DISTWIDTH] IN BLOOD BY AUTOMATED COUNT: 19.1 % (ref 10–15)
GLUCOSE SERPL-MCNC: 105 MG/DL (ref 70–99)
GLUCOSE UR STRIP-MCNC: NEGATIVE MG/DL
HCT VFR BLD AUTO: 34.8 % (ref 40–53)
HGB BLD-MCNC: 9.8 G/DL (ref 13.3–17.7)
HGB UR QL STRIP: ABNORMAL
IMM GRANULOCYTES # BLD: 0 10E3/UL
IMM GRANULOCYTES NFR BLD: 0 %
KETONES UR STRIP-MCNC: NEGATIVE MG/DL
LACTATE SERPL-SCNC: 1.1 MMOL/L (ref 0.7–2)
LEUKOCYTE ESTERASE UR QL STRIP: ABNORMAL
LYMPHOCYTES # BLD AUTO: 1.5 10E3/UL (ref 0.8–5.3)
LYMPHOCYTES NFR BLD AUTO: 21 %
MCH RBC QN AUTO: 20.3 PG (ref 26.5–33)
MCHC RBC AUTO-ENTMCNC: 28.2 G/DL (ref 31.5–36.5)
MCV RBC AUTO: 72 FL (ref 78–100)
MONOCYTES # BLD AUTO: 0.6 10E3/UL (ref 0–1.3)
MONOCYTES NFR BLD AUTO: 8 %
NEUTROPHILS # BLD AUTO: 4.8 10E3/UL (ref 1.6–8.3)
NEUTROPHILS NFR BLD AUTO: 65 %
NITRATE UR QL: NEGATIVE
NRBC # BLD AUTO: 0 10E3/UL
NRBC BLD AUTO-RTO: 0 /100
PH UR STRIP: 6 [PH] (ref 5–7)
PLATELET # BLD AUTO: 296 10E3/UL (ref 150–450)
POTASSIUM SERPL-SCNC: 4.1 MMOL/L (ref 3.4–5.3)
PROT SERPL-MCNC: 8.3 G/DL (ref 6.4–8.3)
RBC # BLD AUTO: 4.82 10E6/UL (ref 4.4–5.9)
RBC URINE: >182 /HPF
SODIUM SERPL-SCNC: 141 MMOL/L (ref 135–145)
SP GR UR STRIP: 1.02 (ref 1–1.03)
UROBILINOGEN UR STRIP-MCNC: NORMAL MG/DL
WBC # BLD AUTO: 7.4 10E3/UL (ref 4–11)
WBC CLUMPS #/AREA URNS HPF: PRESENT /HPF
WBC URINE: 125 /HPF

## 2024-03-19 PROCEDURE — 250N000011 HC RX IP 250 OP 636: Performed by: EMERGENCY MEDICINE

## 2024-03-19 PROCEDURE — 250N000009 HC RX 250: Performed by: EMERGENCY MEDICINE

## 2024-03-19 PROCEDURE — 51705 CHANGE OF BLADDER TUBE: CPT

## 2024-03-19 PROCEDURE — 96375 TX/PRO/DX INJ NEW DRUG ADDON: CPT | Mod: 59

## 2024-03-19 PROCEDURE — 80053 COMPREHEN METABOLIC PANEL: CPT | Performed by: EMERGENCY MEDICINE

## 2024-03-19 PROCEDURE — 87086 URINE CULTURE/COLONY COUNT: CPT | Performed by: EMERGENCY MEDICINE

## 2024-03-19 PROCEDURE — 96365 THER/PROPH/DIAG IV INF INIT: CPT | Mod: 59

## 2024-03-19 PROCEDURE — 36415 COLL VENOUS BLD VENIPUNCTURE: CPT | Performed by: EMERGENCY MEDICINE

## 2024-03-19 PROCEDURE — 96376 TX/PRO/DX INJ SAME DRUG ADON: CPT | Mod: 59

## 2024-03-19 PROCEDURE — 87186 SC STD MICRODIL/AGAR DIL: CPT | Mod: XU | Performed by: EMERGENCY MEDICINE

## 2024-03-19 PROCEDURE — 83605 ASSAY OF LACTIC ACID: CPT | Performed by: EMERGENCY MEDICINE

## 2024-03-19 PROCEDURE — 81003 URINALYSIS AUTO W/O SCOPE: CPT | Performed by: EMERGENCY MEDICINE

## 2024-03-19 PROCEDURE — 99285 EMERGENCY DEPT VISIT HI MDM: CPT | Mod: 25

## 2024-03-19 PROCEDURE — 99214 OFFICE O/P EST MOD 30 MIN: CPT | Performed by: PHYSICIAN ASSISTANT

## 2024-03-19 PROCEDURE — 85041 AUTOMATED RBC COUNT: CPT | Performed by: EMERGENCY MEDICINE

## 2024-03-19 PROCEDURE — 74177 CT ABD & PELVIS W/CONTRAST: CPT

## 2024-03-19 RX ORDER — CEPHALEXIN 500 MG/1
500 CAPSULE ORAL 2 TIMES DAILY
Qty: 20 CAPSULE | Refills: 0 | Status: SHIPPED | OUTPATIENT
Start: 2024-03-19 | End: 2024-03-29

## 2024-03-19 RX ORDER — OXYCODONE HYDROCHLORIDE 5 MG/1
5 TABLET ORAL EVERY 6 HOURS PRN
Qty: 6 TABLET | Refills: 0 | Status: SHIPPED | OUTPATIENT
Start: 2024-03-19 | End: 2024-03-22

## 2024-03-19 RX ORDER — IOPAMIDOL 755 MG/ML
115 INJECTION, SOLUTION INTRAVASCULAR ONCE
Status: COMPLETED | OUTPATIENT
Start: 2024-03-19 | End: 2024-03-19

## 2024-03-19 RX ORDER — CEFTRIAXONE 1 G/1
1 INJECTION, POWDER, FOR SOLUTION INTRAMUSCULAR; INTRAVENOUS ONCE
Status: COMPLETED | OUTPATIENT
Start: 2024-03-19 | End: 2024-03-19

## 2024-03-19 RX ORDER — MORPHINE SULFATE 4 MG/ML
4 INJECTION, SOLUTION INTRAMUSCULAR; INTRAVENOUS
Status: DISCONTINUED | OUTPATIENT
Start: 2024-03-19 | End: 2024-03-19 | Stop reason: HOSPADM

## 2024-03-19 RX ADMIN — IOPAMIDOL 115 ML: 755 INJECTION, SOLUTION INTRAVENOUS at 10:01

## 2024-03-19 RX ADMIN — CEFTRIAXONE SODIUM 1 G: 1 INJECTION, POWDER, FOR SOLUTION INTRAMUSCULAR; INTRAVENOUS at 13:28

## 2024-03-19 RX ADMIN — SODIUM CHLORIDE 73 ML: 9 INJECTION, SOLUTION INTRAVENOUS at 10:01

## 2024-03-19 RX ADMIN — MORPHINE SULFATE 4 MG: 4 INJECTION, SOLUTION INTRAMUSCULAR; INTRAVENOUS at 11:16

## 2024-03-19 RX ADMIN — MORPHINE SULFATE 4 MG: 4 INJECTION, SOLUTION INTRAMUSCULAR; INTRAVENOUS at 09:10

## 2024-03-19 ASSESSMENT — ACTIVITIES OF DAILY LIVING (ADL)
ADLS_ACUITY_SCORE: 38

## 2024-03-19 NOTE — ED TRIAGE NOTES
Presents for abd pain and back pain. Pt reports being seen for the same thing about a month ago.
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
## 2024-03-19 NOTE — DISCHARGE INSTRUCTIONS
*You may resume diet and activities as tolerated.  *Take medications as prescribed.  Cephalexin as directed. Tylenol and/or ibuprofen as directed as needed for pain. Oxycodone for severe pain not relieved by ibuprofen or tylenol.   *Followup with general surgery for further evaluation of your abdominal pain and hernia.  *Return if you develop **, faint or feel like you will faint or become worse in any way.    Discharge Instructions  Urinary Tract Infection  You or your child have been diagnosed with a urinary tract infection, or UTI. The urinary tract includes the kidneys (which make urine/pee), ureters (the tubes that carry urine/pee from the kidneys to the bladder), the bladder (which stores urine/pee), and urethra (the tube that carries urine/pee out of the bladder). Urinary tract infections occur when bacteria travel up the urethra into the bladder (bladder infection) and, in some cases, from there into the kidneys (kidney infection).  Generally, every Emergency Department visit should have a follow-up clinic visit with either a primary or a specialty clinic/provider. Please follow-up as instructed by your emergency provider today.  Return to the Emergency Department if:  You or your child have severe back pain.  You or your child are vomiting (throwing up) so that you cannot take your medicine.  You or your child have a new fever (had not previously had a fever) over 101 F.  You or your child have confusion or are very weak, or feel very ill.  Your child seems much more ill, will not wake up, will not respond right, or is crying for a long time and will not calm down.  You or your child are showing signs of dehydration. These signs may include decreased urination (pee), dry mouth/gums/tongue, or decreased activity.    Follow-up with your provider:   Children under 24 months need to be seen by their regular provider within one week after a diagnosis of a UTI. It may be necessary to do some more tests to look at  the child s kidney or bladder.  You should begin to feel better within 24 - 48 hours of starting your antibiotic; follow-up with your regular clinic/doctor/provider if this is not the case.    Treatment:   You will be treated with an antibiotic to kill the bacteria. We have to make an educated guess, based on what we know about common bacteria and antibiotics, as to which antibiotic will work for your infection. We will be correct most times but there will be some cases where the antibiotic chosen is not correct (see urine cultures below).  Take a pain medication such as acetaminophen (Tylenol ) or ibuprofen (Advil , Motrin , Nuprin ).  Phenazopyridine (Pyridium , Uristat ) is a prescription medication that numbs the bladder to reduce the burning pain of some UTIs.  The same medication is available in a non-prescription version (Azo-Standard , Urodol ). This medication will change the color of the urine and tears (usually blue or orange). If you wear contacts, do not wear them while taking this medication as they may be stained by the medication.    Urine Cultures:  If indicated, a urine culture may have been performed today. This test generally takes 24-48 hours to complete so the results are not known at this time. The results can confirm that an infection is present but also determine which antibiotic is effective for the specific bacteria that is causing the infection. If your urine culture shows that the antibiotic you were given today will not work to treat your infection, we will attempt to contact you to make arrangements to change the antibiotic. If the culture confirms that the antibiotic is effective for your infection, you will not be contacted. We often recommend follow-up with your regular physician/provider on the culture results regardless of this process.    Antibiotic Warning:   If you have been placed on antibiotics - watch for signs of allergic reaction.  These include rash, lip swelling,  "difficulty breathing, wheezing, and dizziness.  If you develop any of these symptoms, stop the antibiotic immediately and go to an emergency room or urgent care for evaluation.    Probiotics: If you have been given an antibiotic, you may want to also take a probiotic pill or eat yogurt with live cultures. Probiotics have \"good bacteria\" to help your intestines stay healthy. Studies have shown that probiotics help prevent diarrhea and other intestine problems (including C. diff infection) when you take antibiotics. You can buy these without a prescription in the pharmacy section of the store.   If you were given a prescription for medicine here today, be sure to read all of the information (including the package insert) that comes with your prescription.  This will include important information about the medicine, its side effects, and any warnings that you need to know about.  The pharmacist who fills the prescription can provide more information and answer questions you may have about the medicine.  If you have questions or concerns that the pharmacist cannot address, please call or return to the Emergency Department.   Remember that you can always come back to the Emergency Department if you are not able to see your regular provider in the amount of time listed above, if you get any new symptoms, or if there is anything that worries you.    Discharge Instructions  Abdominal Pain    Abdominal pain (belly pain) can be caused by many things. Your evaluation today does not show the exact cause for your pain. Your provider today has decided that it is unlikely your pain is due to a life threatening problem, or a problem requiring surgery or hospital admission. Sometimes those problems cannot be found right away, so it is very important that you follow up as directed.  Sometimes only the changes which occur over time allow the cause of your pain to be found.    Generally, every Emergency Department visit should have a " follow-up clinic visit with either a primary or a specialty clinic/provider. Please follow-up as instructed by your emergency provider today. With abdominal pain, we often recommend very close follow-up, such as the following day.    ADULTS:  Return to the Emergency Department right away if:    You get an oral temperature above 102oF or as directed by your provider.  You have blood in your stools. This may be bright red or appear as black, tarry stools.    You keep vomiting (throwing up) or cannot drink liquids.  You see blood when you vomit.   You cannot have a bowel movement or you cannot pass gas.  Your stomach gets bloated or bigger.  Your skin or the whites of your eyes look yellow.  You faint.  You have bloody, frequent or painful urination (peeing).  You have new symptoms or anything that worries you.    CHILDREN:  Return to the Emergency Department right away if your child has any of the above-listed symptoms or the following:    Pushes your hand away or screams/cries when his/her belly is touched.  You notice your child is very fussy or weak.  Your child is very tired and is too tired to eat or drink.  Your child is dehydrated.  Signs of dehydration can be:  Significant change in the amount of wet diapers/urine.  Your infant or child starts to have dry mouth and lips, or no saliva (spit) or tears.    PREGNANT WOMEN:  Return to the Emergency Department right away if you have any of the above-listed symptoms or the following:    You have bleeding, leaking fluid or passing tissue from the vagina.  You have worse pain or cramping, or pain in your shoulder or back.  You have vomiting that will not stop.  You have a temperature of 100oF or more.  Your baby is not moving as much as usual.  You faint.  You get a bad headache with or without eye problems and abdominal pain.  You have a seizure.  You have unusual discharge from your vagina and abdominal pain.    Abdominal pain is pretty common during pregnancy.  Your  "pain may or may not be related to your pregnancy. You should follow-up closely with your OB provider so they can evaluate you and your baby.  Until you follow-up with your regular provider, do the following:     Avoid sex and do not put anything in your vagina.  Drink clear fluids.  Only take medications approved by your provider.    MORE INFORMATION:    Appendicitis:  A possible cause of abdominal pain in any person who still has their appendix is acute appendicitis. Appendicitis is often hard to diagnose.  Testing does not always rule out early appendicitis or other causes of abdominal pain. Close follow-up with your provider and re-evaluations may be needed to figure out the reason for your abdominal pain.    Follow-up:  It is very important that you make an appointment with your clinic and go to the appointment.  If you do not follow-up with your primary provider, it may result in missing an important development which could result in permanent injury or disability and/or lasting pain.  If there is any problem keeping your appointment, call your provider or return to the Emergency Department.    Medications:  Take your medications as directed by your provider today.  Before using over-the-counter medications, ask your provider and make sure to take the medications as directed.  If you have any questions about medications, ask your provider.    Diet:  Resume your normal diet as much as possible, but do not eat fried, fatty or spicy foods while you have pain.  Do not drink alcohol or have caffeine.  Do not smoke tobacco.    Probiotics: If you have been given an antibiotic, you may want to also take a probiotic pill or eat yogurt with live cultures. Probiotics have \"good bacteria\" to help your intestines stay healthy. Studies have shown that probiotics help prevent diarrhea (loose stools) and other intestine problems (including C. diff infection) when you take antibiotics. You can buy these without a prescription " in the pharmacy section of the store.     If you were given a prescription for medicine here today, be sure to read all of the information (including the package insert) that comes with your prescription.  This will include important information about the medicine, its side effects, and any warnings that you need to know about.  The pharmacist who fills the prescription can provide more information and answer questions you may have about the medicine.  If you have questions or concerns that the pharmacist cannot address, please call or return to the Emergency Department.       Remember that you can always come back to the Emergency Department if you are not able to see your regular provider in the amount of time listed above, if you get any new symptoms, or if there is anything that worries you.
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
## 2024-03-19 NOTE — PROGRESS NOTES
Urology Procedure Note    Please see note by Solange Hagen PA-C on 3/19 for formal consultation note.    Patient prepped and drapped in standard sterile fashion. There was some resistance with attempted insertion of 16 fr coude muhammad, with potentially false passage just within the start of the tract close to skin. With some gentle rotation and maneuvering, able to place with lots of lubrication. 12 ml instilled in balloon. Copious clear yellow urine return.    Geovanny Sawyer MD  Urology Resident

## 2024-03-21 ENCOUNTER — TELEPHONE (OUTPATIENT)
Dept: UROLOGY | Facility: CLINIC | Age: 59
End: 2024-03-21
Payer: COMMERCIAL

## 2024-03-21 LAB
BACTERIA UR CULT: ABNORMAL

## 2024-03-21 NOTE — TELEPHONE ENCOUNTER
Left Voicemail (2nd Attempt) for the patient to call back and schedule the following:    Appointment type: Return  Provider: Darek Wolff or Dr. Lira   Return date: Next available   Specialty phone number: [phone]  Additional appointment(s) needed: N/A  Additonal Notes: SPT/rUTIs and hosp follow-up

## 2024-03-21 NOTE — TELEPHONE ENCOUNTER
Health Call Center    Phone Message    May a detailed message be left on voicemail: yes     Reason for Call: Other: Patient was seen in the ER on 3/19/24 and called to make follow up appointment. Scheduled soonest available with  per patient request and from 3/19 and 3/21 notes. Soonest available is 4/17/24 scheduled and added to wait list. Please reach out to patient if needs to be seen sooner.       Action Taken: Other: Urology    Travel Screening: Not Applicable

## 2024-03-21 NOTE — TELEPHONE ENCOUNTER
Spoke with pt to reschedule to a sooner appointment with Mame Amos, and offered an appointment tomorrow 3/22. Patient declined, and requested to see Dr. Lira. Advised appointment has been added to the wait list if there is a cancellation and he agreed to keep current appointment unless he can see Dr. Lira sooner. Has callback if he would prefer to see Mame sooner as well

## 2024-03-22 ENCOUNTER — TELEPHONE (OUTPATIENT)
Dept: EMERGENCY MEDICINE | Facility: CLINIC | Age: 59
End: 2024-03-22
Payer: COMMERCIAL

## 2024-03-22 NOTE — TELEPHONE ENCOUNTER
[facility]    Reason for call: Lab Result Notification     Lab Result (including Rx patient on, if applicable).  If culture, copy of lab report at bottom.  Lab Result: Final Urine Culture Report on 3/21/24  [facility] Emergency Dept discharge antibiotic prescribed: Cephalexin (Keflex) 500 mg capsule, 1 capsule (500 mg) by mouth 2 times daily for 10 days.   Date of Rx (if applicable):  3/19/24     #1. Bacteria, 50,000 - 100,000 CFU/ML Klebsiella pneumoniae ,  is SUSCEPTIBLE to antibiotic.     #2. Bacteria, 10,000 - 50,000 CFU/mL Methicillin resistant Staphylococcus aureus (MRSA)  ,  is NOT TESTED to antibiotic.     #3. Bacteria, 10,000 - 50,000 CFU/mL Streptococcus agalactiae (Group B Streptococcus) ,  is SUSCEPTIBLE to antibiotic.     Recommendations in treatment per [facility] ED lab result Urine Culture protocol.    Creatinine Level (mg/dl)   Creatinine   Date Value Ref Range Status   03/19/2024 1.89 (H) 0.67 - 1.17 mg/dL Final   05/20/2020 0.75 0.66 - 1.25 mg/dL Final    Creatinine clearance (ml/min), if applicable    Serum creatinine: 1.89 mg/dL (H) 03/19/24 0911  Estimated creatinine clearance: 53.9 mL/min (A)     Patient's current Symptoms:   Left voicemail message requesting a call back to [facility] ED Lab Result RN at [phone]. RN is available every day between 9 a.m. and 5:30 p.m.     Cliff López RN

## 2024-03-22 NOTE — LETTER
March 22, 2024        Josiah Crump  [address]   [place] [address]          Dear Josiah Crump:    You were seen in the [facility] Emergency Department at [facility] EMERGENCY DEPT on 3/19/2024.  We are unable to reach you by phone, so we are sending you this letter.     It is important that you call [facility] Emergency Department lab result nurse at [phone], as we have information to relay to you AND/OR we MAY have to make some changes in your treatment.    Best time to call back is between 9AM and 5:30PM, 7 days a week.      Sincerely,     [facility] Emergency Department Lab Result RN  [phone]

## 2024-04-02 ENCOUNTER — PRE VISIT (OUTPATIENT)
Dept: UROLOGY | Facility: CLINIC | Age: 59
End: 2024-04-02
Payer: COMMERCIAL

## 2024-04-02 NOTE — TELEPHONE ENCOUNTER
Reason for visit: follow up      *called pt 4/2/24--pt repeatedly said he would like to see dr castano*     Records/imaging/labs/orders: in epic     At Rooming: standard

## 2024-04-04 ENCOUNTER — OFFICE VISIT (OUTPATIENT)
Dept: SURGERY | Facility: CLINIC | Age: 59
End: 2024-04-04
Payer: COMMERCIAL

## 2024-04-04 VITALS
HEIGHT: 72 IN | HEART RATE: 78 BPM | OXYGEN SATURATION: 98 % | WEIGHT: 210 LBS | SYSTOLIC BLOOD PRESSURE: 138 MMHG | BODY MASS INDEX: 28.44 KG/M2 | RESPIRATION RATE: 16 BRPM | DIASTOLIC BLOOD PRESSURE: 82 MMHG

## 2024-04-04 DIAGNOSIS — K43.5 PARASTOMAL HERNIA WITHOUT OBSTRUCTION OR GANGRENE: Primary | ICD-10-CM

## 2024-04-04 PROCEDURE — 99203 OFFICE O/P NEW LOW 30 MIN: CPT | Performed by: SURGERY

## 2024-04-04 NOTE — PATIENT INSTRUCTIONS
Your appointment with Dr Blackburn is scheduled for 5/22/24 2:00pm at the [facility] office in [place]
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
## 2024-04-17 ENCOUNTER — MYC MEDICAL ADVICE (OUTPATIENT)
Dept: UROLOGY | Facility: CLINIC | Age: 59
End: 2024-04-17

## 2024-04-17 ENCOUNTER — TELEPHONE (OUTPATIENT)
Dept: UROLOGY | Facility: CLINIC | Age: 59
End: 2024-04-17

## 2024-04-17 NOTE — TELEPHONE ENCOUNTER
You have an appointment currently scheduled with Dr Lira on Today.  Due to changes issues with the flat tire you have been rescheduled to Wed 4/24 at 10:00.    Please use your MyChart or call [phone] to confirm the appointment.    We look forward to seeing you at your upcoming appointment and thank you for choosing [facility].    Thank you for trusting us with your care.    Sincerely, [facility]
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
## 2024-04-18 NOTE — TELEPHONE ENCOUNTER
Patient confirmed scheduled appointment:  Date: 5/1  Time: 9:15  Visit type: RETURN  Provider: Pankaj  Location: [facility]  Testing/imaging: NA  Additional notes: Patient is out of town on 4/24

## 2024-05-13 ENCOUNTER — ALLIED HEALTH/NURSE VISIT (OUTPATIENT)
Dept: UROLOGY | Facility: CLINIC | Age: 59
End: 2024-05-13
Payer: COMMERCIAL

## 2024-05-13 DIAGNOSIS — N31.9 NEUROGENIC BLADDER: Primary | ICD-10-CM

## 2024-05-13 PROCEDURE — 51705 CHANGE OF BLADDER TUBE: CPT

## 2024-05-13 RX ORDER — CIPROFLOXACIN 500 MG/1
500 TABLET, FILM COATED ORAL ONCE
Status: COMPLETED | OUTPATIENT
Start: 2024-05-13 | End: 2024-05-13

## 2024-05-13 RX ADMIN — CIPROFLOXACIN 500 MG: 500 TABLET, FILM COATED ORAL at 11:22

## 2024-05-13 NOTE — PROGRESS NOTES
Josiah Crump comes into clinic today at the request of Dr. Charles Lira with the diagnosis of urinary retention for a catheter exchange.    Order has been verified: Yes.    No Known Allergies    The following medication was given:     MEDICATION: Ciprofloxacin  ROUTE: PO  SITE: Medication was given orally  DOSE: 500mg  LOT #: [id number]  : [employer]  EXPIRATION DATE: 11/2024  NDC#: 6292-5143-21   Was there drug waste? No    Prior to medication administration, verified patient identity using patient's name and date of birth.  Due to medication administration, patient instructed to remain in clinic for 15 minutes  afterwards, and to report any adverse reaction to me immediately.    Removal:  16 Fr Coude tipped latex muhammad catheter removed from suprapubic meatus without difficulty after removing 10 mL of fluid from the balloon.    Insertion:  16 Fr straight tipped latex muhammad catheter inserted into suprapubic meatus in the usual sterile fashion without difficulty.  Received > 10  ml yellow positive urine return.   Balloon filled with 10 mL sterile H2O after positive urine return.  Catheter secured in place with leg strap: N/A.     Patient did tolerate procedure well.     Patient instructed as to where to call or go for pain, fever, leakage, or decreased urine flow.     This service provided today was under the direct supervision of Dr. Osmani Goncalves, who was available if needed.    Arabella Kelley CMA   5/13/2024  11:16 AM

## 2024-05-14 ENCOUNTER — MEDICAL CORRESPONDENCE (OUTPATIENT)
Dept: HEALTH INFORMATION MANAGEMENT | Facility: CLINIC | Age: 59
End: 2024-05-14
Payer: COMMERCIAL

## 2024-05-14 ENCOUNTER — TRANSFERRED RECORDS (OUTPATIENT)
Dept: HEALTH INFORMATION MANAGEMENT | Facility: CLINIC | Age: 59
End: 2024-05-14
Payer: COMMERCIAL

## 2024-05-14 ENCOUNTER — TELEPHONE (OUTPATIENT)
Dept: FAMILY MEDICINE | Facility: CLINIC | Age: 59
End: 2024-05-14
Payer: COMMERCIAL

## 2024-05-14 NOTE — TELEPHONE ENCOUNTER
Forms/Letter Request    Type of form/letter: OTHER: order       Do we have the form/letter: Yes: placed in PCP inbox    Who is the form from? [facility]  (if other please explain)    Where did/will the form come from? form was faxed in    When is form/letter needed by: Asap    How would you like the form/letter returned: Fax : [phone]

## 2024-06-08 ENCOUNTER — APPOINTMENT (OUTPATIENT)
Dept: CT IMAGING | Facility: CLINIC | Age: 59
End: 2024-06-08
Attending: EMERGENCY MEDICINE
Payer: COMMERCIAL

## 2024-06-08 ENCOUNTER — HOSPITAL ENCOUNTER (EMERGENCY)
Facility: CLINIC | Age: 59
Discharge: HOME | End: 2024-06-08
Attending: EMERGENCY MEDICINE | Admitting: EMERGENCY MEDICINE
Payer: COMMERCIAL

## 2024-06-08 VITALS
BODY MASS INDEX: 25.67 KG/M2 | DIASTOLIC BLOOD PRESSURE: 80 MMHG | SYSTOLIC BLOOD PRESSURE: 115 MMHG | OXYGEN SATURATION: 96 % | WEIGHT: 200 LBS | HEART RATE: 75 BPM | RESPIRATION RATE: 22 BRPM | TEMPERATURE: 97.3 F | HEIGHT: 74 IN

## 2024-06-08 DIAGNOSIS — R07.9 CHEST PAIN, UNSPECIFIED TYPE: ICD-10-CM

## 2024-06-08 DIAGNOSIS — N18.9 CHRONIC RENAL FAILURE, UNSPECIFIED CKD STAGE: ICD-10-CM

## 2024-06-08 DIAGNOSIS — T83.511A URINARY TRACT INFECTION ASSOCIATED WITH INDWELLING URETHRAL CATHETER, INITIAL ENCOUNTER (H): ICD-10-CM

## 2024-06-08 DIAGNOSIS — N39.0 URINARY TRACT INFECTION ASSOCIATED WITH INDWELLING URETHRAL CATHETER, INITIAL ENCOUNTER (H): ICD-10-CM

## 2024-06-08 LAB
ALBUMIN SERPL BCG-MCNC: 3.4 G/DL (ref 3.5–5.2)
ALBUMIN UR-MCNC: 100 MG/DL
ALP SERPL-CCNC: 106 U/L (ref 40–150)
ALT SERPL W P-5'-P-CCNC: 18 U/L (ref 0–70)
AMORPH CRY #/AREA URNS HPF: ABNORMAL /HPF
ANION GAP SERPL CALCULATED.3IONS-SCNC: 13 MMOL/L (ref 7–15)
APPEARANCE UR: ABNORMAL
AST SERPL W P-5'-P-CCNC: 19 U/L (ref 0–45)
ATRIAL RATE - MUSE: 76 BPM
BACTERIA #/AREA URNS HPF: ABNORMAL /HPF
BASOPHILS # BLD AUTO: 0.1 10E3/UL (ref 0–0.2)
BASOPHILS NFR BLD AUTO: 1 %
BILIRUB SERPL-MCNC: <0.2 MG/DL
BILIRUB UR QL STRIP: NEGATIVE
BUN SERPL-MCNC: 29.9 MG/DL (ref 6–20)
CALCIUM SERPL-MCNC: 8.9 MG/DL (ref 8.6–10)
CHLORIDE SERPL-SCNC: 104 MMOL/L (ref 98–107)
COLOR UR AUTO: ABNORMAL
CREAT SERPL-MCNC: 1.96 MG/DL (ref 0.67–1.17)
DEPRECATED HCO3 PLAS-SCNC: 19 MMOL/L (ref 22–29)
DIASTOLIC BLOOD PRESSURE - MUSE: NORMAL MMHG
EGFRCR SERPLBLD CKD-EPI 2021: 39 ML/MIN/1.73M2
EOSINOPHIL # BLD AUTO: 0.3 10E3/UL (ref 0–0.7)
EOSINOPHIL NFR BLD AUTO: 3 %
ERYTHROCYTE [DISTWIDTH] IN BLOOD BY AUTOMATED COUNT: 22.5 % (ref 10–15)
GLUCOSE SERPL-MCNC: 90 MG/DL (ref 70–99)
GLUCOSE UR STRIP-MCNC: NEGATIVE MG/DL
HCT VFR BLD AUTO: 30.5 % (ref 40–53)
HGB BLD-MCNC: 8.8 G/DL (ref 13.3–17.7)
HGB UR QL STRIP: ABNORMAL
HOLD SPECIMEN: 0
HOLD SPECIMEN: 0
HOLD SPECIMEN: NORMAL
HOLD SPECIMEN: NORMAL
IMM GRANULOCYTES # BLD: 0 10E3/UL
IMM GRANULOCYTES NFR BLD: 0 %
INTERPRETATION ECG - MUSE: NORMAL
KETONES UR STRIP-MCNC: NEGATIVE MG/DL
LEUKOCYTE ESTERASE UR QL STRIP: ABNORMAL
LYMPHOCYTES # BLD AUTO: 1.5 10E3/UL (ref 0.8–5.3)
LYMPHOCYTES NFR BLD AUTO: 15 %
MCH RBC QN AUTO: 20.6 PG (ref 26.5–33)
MCHC RBC AUTO-ENTMCNC: 28.9 G/DL (ref 31.5–36.5)
MCV RBC AUTO: 71 FL (ref 78–100)
MONOCYTES # BLD AUTO: 0.8 10E3/UL (ref 0–1.3)
MONOCYTES NFR BLD AUTO: 8 %
MUCOUS THREADS #/AREA URNS LPF: PRESENT /LPF
NEUTROPHILS # BLD AUTO: 7.6 10E3/UL (ref 1.6–8.3)
NEUTROPHILS NFR BLD AUTO: 73 %
NITRATE UR QL: POSITIVE
NRBC # BLD AUTO: 0 10E3/UL
NRBC BLD AUTO-RTO: 0 /100
P AXIS - MUSE: 49 DEGREES
PH UR STRIP: 5.5 [PH] (ref 5–7)
PLATELET # BLD AUTO: 275 10E3/UL (ref 150–450)
POTASSIUM SERPL-SCNC: 3.7 MMOL/L (ref 3.4–5.3)
PR INTERVAL - MUSE: 158 MS
PROT SERPL-MCNC: 7.2 G/DL (ref 6.4–8.3)
QRS DURATION - MUSE: 90 MS
QT - MUSE: 392 MS
QTC - MUSE: 441 MS
R AXIS - MUSE: 60 DEGREES
RBC # BLD AUTO: 4.28 10E6/UL (ref 4.4–5.9)
RBC URINE: 1 /HPF
SODIUM SERPL-SCNC: 136 MMOL/L (ref 135–145)
SP GR UR STRIP: 1.01 (ref 1–1.03)
SYSTOLIC BLOOD PRESSURE - MUSE: NORMAL MMHG
T AXIS - MUSE: 67 DEGREES
TROPONIN T SERPL HS-MCNC: 17 NG/L
TROPONIN T SERPL HS-MCNC: 19 NG/L
UROBILINOGEN UR STRIP-MCNC: NORMAL MG/DL
VENTRICULAR RATE- MUSE: 76 BPM
WBC # BLD AUTO: 10.3 10E3/UL (ref 4–11)
WBC CLUMPS #/AREA URNS HPF: PRESENT /HPF
WBC URINE: 135 /HPF

## 2024-06-08 PROCEDURE — 84484 ASSAY OF TROPONIN QUANT: CPT | Mod: 91 | Performed by: EMERGENCY MEDICINE

## 2024-06-08 PROCEDURE — 80053 COMPREHEN METABOLIC PANEL: CPT | Performed by: EMERGENCY MEDICINE

## 2024-06-08 PROCEDURE — 250N000013 HC RX MED GY IP 250 OP 250 PS 637: Performed by: EMERGENCY MEDICINE

## 2024-06-08 PROCEDURE — 74176 CT ABD & PELVIS W/O CONTRAST: CPT

## 2024-06-08 PROCEDURE — 85025 COMPLETE CBC W/AUTO DIFF WBC: CPT | Performed by: EMERGENCY MEDICINE

## 2024-06-08 PROCEDURE — 81001 URINALYSIS AUTO W/SCOPE: CPT | Performed by: EMERGENCY MEDICINE

## 2024-06-08 PROCEDURE — 36415 COLL VENOUS BLD VENIPUNCTURE: CPT | Performed by: EMERGENCY MEDICINE

## 2024-06-08 PROCEDURE — 87086 URINE CULTURE/COLONY COUNT: CPT | Performed by: EMERGENCY MEDICINE

## 2024-06-08 PROCEDURE — 93005 ELECTROCARDIOGRAM TRACING: CPT

## 2024-06-08 PROCEDURE — 96365 THER/PROPH/DIAG IV INF INIT: CPT

## 2024-06-08 PROCEDURE — 250N000011 HC RX IP 250 OP 636: Mod: JZ | Performed by: EMERGENCY MEDICINE

## 2024-06-08 PROCEDURE — 99285 EMERGENCY DEPT VISIT HI MDM: CPT | Mod: 25

## 2024-06-08 RX ORDER — HYDROCODONE BITARTRATE AND ACETAMINOPHEN 5; 325 MG/1; MG/1
2 TABLET ORAL ONCE
Status: COMPLETED | OUTPATIENT
Start: 2024-06-08 | End: 2024-06-08

## 2024-06-08 RX ORDER — CEPHALEXIN 500 MG/1
500 CAPSULE ORAL 3 TIMES DAILY
Qty: 21 CAPSULE | Refills: 0 | Status: SHIPPED | OUTPATIENT
Start: 2024-06-08 | End: 2024-06-08

## 2024-06-08 RX ORDER — OXYCODONE HYDROCHLORIDE 5 MG/1
10 TABLET ORAL ONCE
Status: COMPLETED | OUTPATIENT
Start: 2024-06-08 | End: 2024-06-08

## 2024-06-08 RX ORDER — CEPHALEXIN 500 MG/1
500 CAPSULE ORAL 3 TIMES DAILY
Qty: 21 CAPSULE | Refills: 0 | Status: SHIPPED | OUTPATIENT
Start: 2024-06-08

## 2024-06-08 RX ORDER — CEFTRIAXONE 2 G/1
2 INJECTION, POWDER, FOR SOLUTION INTRAMUSCULAR; INTRAVENOUS ONCE
Status: COMPLETED | OUTPATIENT
Start: 2024-06-08 | End: 2024-06-08

## 2024-06-08 RX ADMIN — CEFTRIAXONE SODIUM 2 G: 2 INJECTION, POWDER, FOR SOLUTION INTRAMUSCULAR; INTRAVENOUS at 12:14

## 2024-06-08 RX ADMIN — HYDROCODONE BITARTRATE AND ACETAMINOPHEN 2 TABLET: 5; 325 TABLET ORAL at 10:36

## 2024-06-08 ASSESSMENT — ACTIVITIES OF DAILY LIVING (ADL)
ADLS_ACUITY_SCORE: 36
ADLS_ACUITY_SCORE: 38
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
## 2024-06-09 LAB
BACTERIA UR CULT: ABNORMAL

## 2024-06-12 ENCOUNTER — TELEPHONE (OUTPATIENT)
Dept: FAMILY MEDICINE | Facility: CLINIC | Age: 59
End: 2024-06-12
Payer: COMMERCIAL

## 2024-06-12 NOTE — TELEPHONE ENCOUNTER
Forms/Letter Request     Type of form/letter: OTHER: order     Do we have the form/letter: Yes: placed in PCP inbox     Who is the form from? [facility]  (if other please explain)     Where did/will the form come from? form was faxed in     When is form/letter needed by: Asap     How would you like the form/letter returned: Fax : [phone]

## 2024-06-25 ENCOUNTER — MEDICAL CORRESPONDENCE (OUTPATIENT)
Dept: HEALTH INFORMATION MANAGEMENT | Facility: CLINIC | Age: 59
End: 2024-06-25
Payer: COMMERCIAL

## 2024-07-08 ENCOUNTER — TELEPHONE (OUTPATIENT)
Dept: UROLOGY | Facility: CLINIC | Age: 59
End: 2024-07-08
Payer: COMMERCIAL

## 2024-07-08 NOTE — TELEPHONE ENCOUNTER
Call placed to patient and scheduled him for a cath exchange 7/9/24 at 1100.    Thank you,  Jacquelin Shetty RN, BSN Urology Triage

## 2024-07-08 NOTE — TELEPHONE ENCOUNTER
[facility] Call Center    Phone Message    May a detailed message be left on voicemail: yes     Reason for Call: Other: Pt called to schedule for Cath Change but writer couldn't find any availabilities.  Please call pt back . Thanks.    Action Taken: Message routed to:  [facility] Urology    Travel Screening: Not Applicable     Date of Service:

## 2024-07-09 ENCOUNTER — ALLIED HEALTH/NURSE VISIT (OUTPATIENT)
Dept: UROLOGY | Facility: CLINIC | Age: 59
End: 2024-07-09
Payer: COMMERCIAL

## 2024-07-09 DIAGNOSIS — N31.9 NEUROGENIC BLADDER: Primary | ICD-10-CM

## 2024-07-09 PROCEDURE — 51705 CHANGE OF BLADDER TUBE: CPT

## 2024-07-09 RX ORDER — SULFAMETHOXAZOLE/TRIMETHOPRIM 800-160 MG
1 TABLET ORAL ONCE
Status: COMPLETED | OUTPATIENT
Start: 2024-07-09 | End: 2024-07-09

## 2024-07-09 RX ADMIN — SULFAMETHOXAZOLE AND TRIMETHOPRIM 1 TABLET: 800; 160 TABLET ORAL at 11:35

## 2024-07-09 NOTE — PROGRESS NOTES
Josiah G Theron comes into clinic today at the request of Dr Lira with the diagnosis of Urine retention for a catheter exchange..    Order has been verified: Yes.    No Known Allergies    The following medication was given:   See separate note      Removal:  16 Fr straight tipped latex muhammad catheter removed from suprapubic meatus without difficulty after removing 8 mL of fluid from the balloon.    Insertion:  16 Fr straight tipped latex muhammad catheter inserted into suprapubic meatus in the usual sterile fashion without difficulty.  Received > 25 ml yellow positive urine return.   Balloon filled with 10 mL sterile H2O after positive urine return.  Catheter secured in place with leg strap: No.     Patient did tolerate procedure well.     Patient instructed as to where to call or go for pain, fever, leakage, or decreased urine flow.     This service provided today was under the direct supervision of Darek Wolff PA-C, who was available if needed.    Jacquelin Shetty RN   7/9/2024  11:45 AM

## 2024-07-09 NOTE — PROGRESS NOTES
Josiah Crump comes into clinic today at the request of Dr. Charles Lira with the diagnosis of urinary retention for a catheter exchange.    Order has been verified: Yes.    The following medication was given:     MEDICATION: Bactrim (Trimethoprim / Sulfamethoxazole)  ROUTE: PO  SITE: Medication was given orally  DOSE: 800mg/160mg  LOT #: [id number]  : [employer]  EXPIRATION DATE: 01/2026  NDC#: 8025-4240-01   Was there drug waste? No    Prior to medication administration, verified patient identity using patient's name and date of birth.  Due to medication administration, patient instructed to remain in clinic for 15 minutes  afterwards, and to report any adverse reaction to me immediately.    No Known Allergies    While the writer of this note was preparing to exchange his catheter he demanded that a nurse do the exchange. The writer informed him that he was capable of exchanging his catheter however Josiah still prefered a RN to complete the exchange. Jacquelin Shetty, RN completed the exchange instead of the writer.    Lorin Mezgeminh   7/9/2024  11:20 AM

## 2024-07-13 DIAGNOSIS — I10 BENIGN ESSENTIAL HYPERTENSION: ICD-10-CM

## 2024-07-13 DIAGNOSIS — Z76.0 ENCOUNTER FOR MEDICATION REFILL: ICD-10-CM

## 2024-07-13 RX ORDER — AMLODIPINE BESYLATE 5 MG/1
TABLET ORAL
Qty: 90 TABLET | Refills: 3 | OUTPATIENT
Start: 2024-07-13

## 2024-08-04 ENCOUNTER — HOSPITAL ENCOUNTER (EMERGENCY)
Facility: CLINIC | Age: 59
Discharge: HOME | End: 2024-08-04
Attending: EMERGENCY MEDICINE | Admitting: EMERGENCY MEDICINE
Payer: COMMERCIAL

## 2024-08-04 ENCOUNTER — APPOINTMENT (OUTPATIENT)
Dept: MRI IMAGING | Facility: CLINIC | Age: 59
End: 2024-08-04
Attending: EMERGENCY MEDICINE
Payer: COMMERCIAL

## 2024-08-04 VITALS
TEMPERATURE: 97.1 F | OXYGEN SATURATION: 95 % | WEIGHT: 200 LBS | HEART RATE: 65 BPM | RESPIRATION RATE: 22 BRPM | HEIGHT: 76 IN | SYSTOLIC BLOOD PRESSURE: 148 MMHG | BODY MASS INDEX: 24.36 KG/M2 | DIASTOLIC BLOOD PRESSURE: 89 MMHG

## 2024-08-04 DIAGNOSIS — R42 DIZZINESS: ICD-10-CM

## 2024-08-04 LAB
ATRIAL RATE - MUSE: 73 BPM
DIASTOLIC BLOOD PRESSURE - MUSE: NORMAL MMHG
GLUCOSE BLDC GLUCOMTR-MCNC: 99 MG/DL (ref 70–99)
INTERPRETATION ECG - MUSE: NORMAL
P AXIS - MUSE: 42 DEGREES
PR INTERVAL - MUSE: 166 MS
QRS DURATION - MUSE: 76 MS
QT - MUSE: 420 MS
QTC - MUSE: 462 MS
R AXIS - MUSE: 17 DEGREES
SYSTOLIC BLOOD PRESSURE - MUSE: NORMAL MMHG
T AXIS - MUSE: 58 DEGREES
VENTRICULAR RATE- MUSE: 73 BPM

## 2024-08-04 PROCEDURE — 250N000013 HC RX MED GY IP 250 OP 250 PS 637: Performed by: EMERGENCY MEDICINE

## 2024-08-04 PROCEDURE — 82962 GLUCOSE BLOOD TEST: CPT

## 2024-08-04 PROCEDURE — 99285 EMERGENCY DEPT VISIT HI MDM: CPT | Mod: 25

## 2024-08-04 PROCEDURE — 93005 ELECTROCARDIOGRAM TRACING: CPT

## 2024-08-04 PROCEDURE — 70551 MRI BRAIN STEM W/O DYE: CPT

## 2024-08-04 RX ORDER — MECLIZINE HYDROCHLORIDE 25 MG/1
25 TABLET ORAL ONCE
Status: COMPLETED | OUTPATIENT
Start: 2024-08-04 | End: 2024-08-04

## 2024-08-04 RX ORDER — OXYCODONE HYDROCHLORIDE 5 MG/1
5 TABLET ORAL EVERY 6 HOURS PRN
Qty: 6 TABLET | Refills: 0 | Status: SHIPPED | OUTPATIENT
Start: 2024-08-04 | End: 2024-08-07

## 2024-08-04 RX ORDER — OXYCODONE HYDROCHLORIDE 5 MG/1
10 TABLET ORAL ONCE
Status: COMPLETED | OUTPATIENT
Start: 2024-08-04 | End: 2024-08-04

## 2024-08-04 RX ORDER — MECLIZINE HYDROCHLORIDE 25 MG/1
25 TABLET ORAL 3 TIMES DAILY PRN
Qty: 10 TABLET | Refills: 0 | Status: SHIPPED | OUTPATIENT
Start: 2024-08-04

## 2024-08-04 RX ORDER — ONDANSETRON 4 MG/1
4 TABLET, ORALLY DISINTEGRATING ORAL EVERY 6 HOURS PRN
Qty: 10 TABLET | Refills: 0 | Status: SHIPPED | OUTPATIENT
Start: 2024-08-04 | End: 2024-08-07

## 2024-08-04 RX ADMIN — MECLIZINE HYDROCHLORIDE 25 MG: 25 TABLET ORAL at 12:27

## 2024-08-04 RX ADMIN — OXYCODONE HYDROCHLORIDE 10 MG: 5 TABLET ORAL at 12:27

## 2024-08-04 ASSESSMENT — COLUMBIA-SUICIDE SEVERITY RATING SCALE - C-SSRS
2. HAVE YOU ACTUALLY HAD ANY THOUGHTS OF KILLING YOURSELF IN THE PAST MONTH?: NO
6. HAVE YOU EVER DONE ANYTHING, STARTED TO DO ANYTHING, OR PREPARED TO DO ANYTHING TO END YOUR LIFE?: NO
1. IN THE PAST MONTH, HAVE YOU WISHED YOU WERE DEAD OR WISHED YOU COULD GO TO SLEEP AND NOT WAKE UP?: NO

## 2024-08-04 ASSESSMENT — ACTIVITIES OF DAILY LIVING (ADL)
ADLS_ACUITY_SCORE: 38

## 2024-08-04 NOTE — DISCHARGE INSTRUCTIONS
You should continue to take your medications as prescribed, you can try the meclizine to see if this helps with your dizziness.  You should make an appointment to follow-up with your primary doctor for recheck.  Certainly return the emergency room if you develop worsening symptoms especially weakness or numbness on one side, persistent vision changes, severe headache

## 2024-08-04 NOTE — ED PROVIDER NOTES
"  Emergency Department Note      History of Present Illness     Chief Complaint   Dizziness and Generalized Weakness    HPI   Josiah Crump is a 58 year old male with a history as noted below who presents to the ED for dizziness. This morning, the patient woke up feeling dizzy like he was going to tip over. He also noted some trouble with his vision at that time. He denies any weakness or numbness. Patient endorses chronic chest pain.    Independent Historian   None    Review of External Notes   [facility]One    Past Medical History   Medical History and Problem List   Hyperkalemia  Hyperphosphatemia  Opioid dependence  Chronic anemia  Porcelain gallbladder  Acute pyelonephritis  Neurogenic bladder  Bowel perforation  Acute kidney injury  Cerebrovascular accident  Hypertension  Paraplegia  Spinal cord injury  Pneumoperitoneum  Colostomy  Chronic muhammad catheter  Hyposmolarity syndrome  Insomnia  Epicondylitis  Tenosynovitis  Vitamin D deficiency    Medications   Simvastatin  Clopidogrel  Amlodipine  Baclofen  Testosterone cypionate  Diazepam  Oxycodone    Surgical History   Right leg amputation  Colonoscopy  Cystoscopy  Disarticulate hip  Hydrocelectomy  Penis prothesis implantation  Joint aspiration  Orthopedic surgery  Urethroplasty  Vascular surgery    Physical Exam   Patient Vitals for the past 24 hrs:   BP Temp Temp src Pulse Resp SpO2 Height Weight   08/04/24 1210 (!) 148/89 -- -- 65 22 95 % -- --   08/04/24 1206 -- 97.1  F (36.2  C) Temporal -- -- -- 1.93 m (6' 4\") 90.7 kg (200 lb)     Physical Exam  Constitutional: Alert, attentive, GCS 15   Eyes: EOM are normal, anicteric, conjugate gaze  CV: distal extremities warm, well perfused  Chest: Non-labored breathing on RA  GI: Markedly protruding right lower quadrant stoma without surrounding erythema, no overt abdominal tenderness, with a suprapubic catheter of sorts  MSK: Right leg amputation   Neurological: Alert, attentive, moving all extremities equally.   Skin: " Skin is warm and dry.      Diagnostics   Lab Results   Labs Ordered and Resulted from Time of ED Arrival to Time of ED Departure   GLUCOSE BY METER - Normal       Result Value    GLUCOSE BY METER POCT 99     BASIC METABOLIC PANEL     Imaging   MR Brain w/o Contrast   Final Result   IMPRESSION:   1.  Unremarkable appearance to the brain with no acute intracranial abnormality.      2.  Trace fluid in the left mastoid air cells.        EKG   ECG taken at 1215, ECG read at 1230  Normal sinus rhythm with sinus arrhythmia  Cannot rule out anterior infarct, age undetermined   Abnormal ECG  No significant changes as compared to prior, dated 6/8/24.  Rate 73 bpm. MT interval 166 ms. QRS duration 76 ms. QT/QTc 420/462 ms. P-R-T axes 42 17 58.    Independent Interpretation   None    ED Course    Medications Administered   Medications   oxyCODONE (ROXICODONE) tablet 10 mg (10 mg Oral $Given 8/4/24 1227)   meclizine (ANTIVERT) tablet 25 mg (25 mg Oral $Given 8/4/24 1227)       Procedures   Procedures     Discussion of Management   None    ED Course   ED Course as of 08/04/24 1549   Sun Aug 04, 2024   1218 I obtained history and examined the patient as noted above.       Additional Documentation  None    Medical Decision Making / Diagnosis     EVERETT   Josiah Crump is a 58 year old male with extensive chronic medical problems including prior stroke, right leg amputation, suprapubic catheter, secondary to neurogenic bladder and spinal cord injury presenting for dizziness consistent with vertigo.  We were able to get a Noncon MRI which shows no evidence of stroke, EKG showed no ischemic changes with normal rhythm.  We poked the patient several times to get blood work however were unable to do so and he declined further pokes and preferred to go home.  I have low suspicion for hyponatremia, hypoglycemia or severe anemia.  He denies any lateralizing weakness or numbness, no vision changes, low suspicion for critical vessel  stenosis.  He was given a short course of pain medication, Zofran and meclizine and recommended PCP follow-up.    Disposition   The patient was discharged.     Diagnosis     ICD-10-CM    1. Dizziness  R42            Discharge Medications   New Prescriptions    MECLIZINE (ANTIVERT) 25 MG TABLET    Take 1 tablet (25 mg) by mouth 3 times daily as needed for dizziness    ONDANSETRON (ZOFRAN ODT) 4 MG ODT TAB    Take 1 tablet (4 mg) by mouth every 6 hours as needed    OXYCODONE (ROXICODONE) 5 MG TABLET    Take 1 tablet (5 mg) by mouth every 6 hours as needed for moderate pain     Lb Russ MD  [employer]  3:49 PM 08/04/24       Scribe Disclosure:  I, Obi Underwood, am serving as a scribe at 12:27 PM on 8/4/2024 to document services personally performed by Lb Russ MD based on my observations and the provider's statements to me.        Lb Russ MD  08/04/24 7969

## 2024-08-04 NOTE — ED NOTES
Bed: ED11  Expected date:   Expected time:   Means of arrival:   Comments:  [facility]: 58 M dizzy weakness non ambulatory paraplegic eta 1150

## 2024-08-04 NOTE — ED NOTES
"Writer attempted to place IV and/or draw blood for ordered labs. Patient adamantly refused writer to attempt, stating he is \"always an ultrasound\" for his IV. Pt continued to refuse writer to try for lab draw or IV.  "

## 2024-08-04 NOTE — ED NOTES
Lab unable to get blood draw for orders labs, provider aware. Pt stated he wanted to leave without labs - provider aware.

## 2024-08-04 NOTE — ED TRIAGE NOTES
Pt BIBA for sudden dizziness and weakness. Denies dizziness upon arrival to ED. Per EMS, no treatments or interventions done PTA, no BG. Pt's baseline is WC, states ability to transfer self independently      Triage Assessment (Adult)       Row Name 08/04/24 1202          Respiratory WDL    Rhythm/Pattern, Respiratory no shortness of breath reported;unlabored;pattern regular        Cardiac WDL    Cardiac WDL --  denies chest pain        Cognitive/Neuro/Behavioral WDL    Level of Consciousness alert     Orientation oriented x 4     Speech logical;clear     Mood/Behavior cooperative;anxious        Armando Coma Scale    Best Eye Response 4-->(E4) spontaneous     Best Motor Response 6-->(M6) obeys commands     Best Verbal Response 5-->(V5) oriented     Armando Coma Scale Score 15

## 2024-08-15 ENCOUNTER — PRE VISIT (OUTPATIENT)
Dept: UROLOGY | Facility: CLINIC | Age: 59
End: 2024-08-15

## 2024-08-15 DIAGNOSIS — N31.9 NEUROGENIC BLADDER: Primary | ICD-10-CM

## 2024-08-15 RX ORDER — SULFAMETHOXAZOLE/TRIMETHOPRIM 800-160 MG
1 TABLET ORAL ONCE
Status: ACTIVE | OUTPATIENT
Start: 2024-08-19

## 2024-08-15 NOTE — TELEPHONE ENCOUNTER
Authorizing provider: Dr. Lira    - SPT cath exchange    - 16 Fr straight tipped latex     - urinary retention     - Bactrim--[facility] on file

## 2024-08-22 ENCOUNTER — MEDICAL CORRESPONDENCE (OUTPATIENT)
Dept: HEALTH INFORMATION MANAGEMENT | Facility: CLINIC | Age: 59
End: 2024-08-22
Payer: COMMERCIAL

## 2024-08-27 ENCOUNTER — TRANSFERRED RECORDS (OUTPATIENT)
Dept: HEALTH INFORMATION MANAGEMENT | Facility: CLINIC | Age: 59
End: 2024-08-27
Payer: COMMERCIAL

## 2024-08-28 ENCOUNTER — TRANSCRIBE ORDERS (OUTPATIENT)
Dept: OTHER | Age: 59
End: 2024-08-28

## 2024-08-28 DIAGNOSIS — K43.5 PARASTOMAL HERNIA: Primary | ICD-10-CM

## 2024-09-23 NOTE — ED NOTES
Left message for Curtis about request for referral. Will send new request.    Small amount of formed stool otherwise small amount of blood noted on pt's incontinence pad.  Perineal care provided.  Per charge RN no update on transfer bed for the [facility].

## 2024-10-20 ENCOUNTER — HEALTH MAINTENANCE LETTER (OUTPATIENT)
Age: 59
End: 2024-10-20

## 2025-04-21 NOTE — TELEPHONE ENCOUNTER
Form faxed successfully to at 1 [phone] done.  
Form is filled out, please fax back  
Form place in your inbox.  
Form was in Media. Writer reprinted form will fill what I can on the form and give it back to Dr. Petit to review.   
I have not see any form from Josiah.   
Name of form/paperwork:   Other:  Incontience Supplies     Date form received by clinic:  04/06/2020    When is the form needed by? ASAP    Patient Notified form requests are processed in 3-5 business days: Yes  (If patient needs for sooner, please note that in this message)    Okay to leave a detailed message: Yes     Form sent back was incomplete needs:  1. Primary diagnosis  2. incontinence diagnosis  3. Question # 5 was missed.   Call need to be initial, dated and fax back    All corrections will need to be initial, dated and fax back.    Fax: 1 [phone]  
[Normal] : soft, non-tender, non-distended, no masses palpated, no HSM and normal bowel sounds

## 2025-07-01 NOTE — TELEPHONE ENCOUNTER
Reason for Call: Request for an order or referral:    Order or referral being requested: incontinent supplys    Date needed: as soon as possible    Has the patient been seen by the PCP for this problem? YES    Additional comments: [facility] medical faxed on 08/18 havent received it back they will refax today to [phone] please complete and return  Phone number Patient can be reached at:  Home number on file [phone] (home)    Best Time:  anytime    Can we leave a detailed message on this number?  YES    Call taken on 9/8/2021 at 10:40 AM by Mary Lindsey   No

## (undated) DEVICE — CONNECTOR WATER VALVE PERFUSION PACK STR 020272801

## (undated) DEVICE — SUCTION TIP YANKAUER W/O VENT K86

## (undated) DEVICE — PREP SKIN SCRUB TRAY 4461A

## (undated) DEVICE — SU MONOCRYL 4-0 PS-2 18" UND Y496G

## (undated) DEVICE — CAST PADDING 4" STERILE 9044S

## (undated) DEVICE — CATH TRAY FOLEY SURESTEP 16FR W/DRAIN BAG LF A300416A

## (undated) DEVICE — SYR 05ML LL W/O NDL

## (undated) DEVICE — DRAPE MAYO STAND 23X54 8337

## (undated) DEVICE — SU VICRYL 2-0 CT-1 27" J339H

## (undated) DEVICE — EYE PREP BETADINE 5% SOLUTION 30ML 0065-0411-30

## (undated) DEVICE — SOL NACL 0.9% IRRIG 1000ML BOTTLE 2F7124

## (undated) DEVICE — PREP POVIDONE IODINE SOLUTION 10% 120ML

## (undated) DEVICE — LINEN TOWEL PACK X5 5464

## (undated) DEVICE — LINEN GOWN XLG 5407

## (undated) DEVICE — SU PDS II 2-0 CT-2 27"  Z333H

## (undated) DEVICE — DRSG PRIMAPORE 03 1/8X6" 66000318

## (undated) DEVICE — WIPES FOLEY CARE SURESTEP PROVON DFC100

## (undated) DEVICE — ESU GROUND PAD ADULT W/CORD E7507

## (undated) DEVICE — DRSG DRAIN 2X2" 7087

## (undated) DEVICE — BONE CLEANING TIP INTERPULSE  0210-010-000

## (undated) DEVICE — PAD CHUX UNDERPAD 30X36" P3036C

## (undated) DEVICE — SOL WATER IRRIG 1000ML BOTTLE 2F7114

## (undated) DEVICE — BLADE SAW SAGITTAL STRK 25X79.5X1.24MM 4/2000 2108-318-000

## (undated) DEVICE — DRSG KERLIX FLUFFS X5

## (undated) DEVICE — DRAIN JACKSON PRATT ROUND SIL 19FR W/TROCAR LF JP-2232

## (undated) DEVICE — SU VICRYL 0 CT-1 36" J346H

## (undated) DEVICE — PREP CHLORAPREP 26ML TINTED HI-LITE ORANGE 930815

## (undated) DEVICE — CANISTER WOUND VAC W/GEL 1000ML M8275093/5

## (undated) DEVICE — SOL NACL 0.9% INJ 1000ML BAG 2B1324X

## (undated) DEVICE — STRAP KNEE/BODY 31143004

## (undated) DEVICE — LINEN ORTHO PACK 5446

## (undated) DEVICE — SYR 10ML FINGER CONTROL W/O NDL 309695

## (undated) DEVICE — DRAPE IOBAN INCISE 13X13" 6640EZ

## (undated) DEVICE — SU MONOCRYL 3-0 PS-2 18" UND Y497G

## (undated) DEVICE — SYR BULB IRRIG 50ML LATEX FREE 0035280

## (undated) DEVICE — ESU LIGASURE IMPACT OPEN SEALER/DVDR CVD LG JAW LF4418

## (undated) DEVICE — SYR 50ML LL W/O NDL 309653

## (undated) DEVICE — PACK CYSTO CUSTOM ASC

## (undated) DEVICE — PACK GOWN 3/PK DISP XL SBA32GPFCB

## (undated) DEVICE — SPECIMEN CONTAINER 5OZ STERILE 2600SA

## (undated) DEVICE — DRSG WOUND VAC GRANUFOAM LG BLACK 26X15CM M8275053/5

## (undated) DEVICE — CLIP HORIZON MED BLUE 002200

## (undated) DEVICE — BLADE KNIFE SURG 15 371115

## (undated) DEVICE — DRSG WOUND VAC GRANUFOAM LG SILVER M8275099/5

## (undated) DEVICE — GLOVE PROTEXIS W/NEU-THERA 7.5  2D73TE75

## (undated) DEVICE — DRSG STERI STRIP 1/2X4" R1547

## (undated) DEVICE — SOL WATER IRRIG 500ML BOTTLE 2F7113

## (undated) DEVICE — SU ETHILON 2-0 PS 18" 585H

## (undated) DEVICE — DRAPE GYN/UROLOGY FLUID POUCH TUR 29455

## (undated) DEVICE — NDL 21GA 1.5"

## (undated) DEVICE — SU ETHILON 3-0 PS-2 18" 1669H

## (undated) DEVICE — SU VICRYL 2-0 CT-1 27" UND J259H

## (undated) DEVICE — PACK TOTAL HIP W/POUCH RIVERSIDE LATEX FREE

## (undated) DEVICE — DRAPE STERI TOWEL LG 1010

## (undated) DEVICE — SYR 10ML SLIP TIP W/O NDL 303134

## (undated) DEVICE — PREP POVIDONE IODINE SOLUTION 10% 4OZ BOTTLE 29906-004

## (undated) DEVICE — JELLY LUBRICATING SURGILUBE 2OZ TUBE

## (undated) DEVICE — SUCTION MANIFOLD NEPTUNE 2 SYS 4 PORT 0702-020-000

## (undated) DEVICE — DRSG GAUZE 3X3"

## (undated) DEVICE — SUCTION IRR SYSTEM W/O TIP INTERPULSE HANDPIECE 0210-100-000

## (undated) DEVICE — SU SILK 2-0 SH CR 5X18" C0125

## (undated) DEVICE — BLADE CLIPPER SGL USE 9680

## (undated) DEVICE — TAPE MEDIPORE 4"X2YD 2864

## (undated) DEVICE — RETR ELASTIC STAYS LONE STAR SHARP 5MM 8/PACK 3311-8G

## (undated) DEVICE — ADH SKIN CLOSURE PREMIERPRO EXOFIN 1.0ML 3470

## (undated) DEVICE — SU MONOCRYL 3-0 SH 27" UND Y416H

## (undated) DEVICE — VESSEL LOOPS YELLOW MAXI 31145694

## (undated) DEVICE — Device

## (undated) DEVICE — BNDG ELASTIC 6"X5YDS STERILE 6611-6S

## (undated) DEVICE — ADHESIVE SWIFTSET 0.8ML OCTYL SS6

## (undated) DEVICE — DRAPE C-ARM W/STRAPS 42X72" 07-CA104

## (undated) DEVICE — RETR RING LONE STAR 28.3X18.3CM W/CATH CLIPSX2 3308G

## (undated) DEVICE — KIT SPY ELITE DISP LC3006

## (undated) DEVICE — DRAIN JACKSON PRATT CHANNEL 10FR RND SIL W/TROCAR JP-2227

## (undated) DEVICE — SU PROLENE 2-0 MHDA 36" 8843H

## (undated) DEVICE — CATH TRAY FOLEY SURESTEP 16FR WDRAIN BAG STLK LATEX A300316A

## (undated) DEVICE — SUTURE BOOTS 051003PBX

## (undated) DEVICE — SU ETHILON 3-0 PS-1 18" 1663H

## (undated) DEVICE — TUBING SUCTION 12"X1/4" N612

## (undated) DEVICE — LINEN TOWEL PACK X6 WHITE 5487

## (undated) DEVICE — STPL SKIN 35W ROTATING HEAD PRW35

## (undated) DEVICE — BLADE KNIFE SURG 10 371110

## (undated) DEVICE — ENDO SEAL BX PORT BPS-A

## (undated) DEVICE — BAG URINARY DRAIN LUBRISIL IC 4000ML LF 253509A

## (undated) DEVICE — CATH FOLYSIL 16FR 15ML AA6116

## (undated) DEVICE — SPONGE BALL KERLIX ROUND XL W/O STRING LATEX 4935

## (undated) DEVICE — SUCTION TIP YANKAUER STR K87

## (undated) DEVICE — ESU PENCIL W/COATED BLADE E2450H

## (undated) DEVICE — SPONGE KITTNER 30-101

## (undated) DEVICE — GLOVE PROTEXIS BLUE W/NEU-THERA 8.0  2D73EB80

## (undated) DEVICE — DRSG WOUND VAC SPONGE MED BLACK M8275052/5

## (undated) DEVICE — TOOTHBRUSH ADULT NON STERILE MDS136850

## (undated) DEVICE — PAD CHUX UNDERPAD 30X30"

## (undated) DEVICE — SU SILK 2-0 TIE 12X30" A305H

## (undated) DEVICE — SPONGE SURGIFOAM 100 1974

## (undated) DEVICE — DRSG DRAIN 4X4" 7086

## (undated) DEVICE — SU SILK 0 TIE 6X30" A306H

## (undated) DEVICE — PREP CHLORAPREP 26ML TINTED ORANGE  260815

## (undated) DEVICE — PAD CHUX UNDERPAD 23X24" 7136

## (undated) DEVICE — BNDG ELASTIC 6"X5YDS UNSTERILE 6611-60

## (undated) DEVICE — BLADE KNIFE SURG 12 371112

## (undated) DEVICE — DRSG ADAPTIC 3X16"  6114

## (undated) DEVICE — GLOVE PROTEXIS W/NEU-THERA 8.0  2D73TE80

## (undated) DEVICE — LINEN TOWEL PACK X30 5481

## (undated) DEVICE — NDL 25GA 1.5" 305127

## (undated) DEVICE — DRAIN JACKSON PRATT RESERVOIR 100ML SU130-1305

## (undated) DEVICE — TUBING SMOKE EVAC ATTACHMENT E3590

## (undated) DEVICE — TUBING IRRIG CYSTO/BLADDER SET 81" LF 2C4040

## (undated) DEVICE — CAST PADDING 4" UNSTERILE 9044

## (undated) DEVICE — ESU GROUND PAD UNIVERSAL W/O CORD

## (undated) DEVICE — DRAPE IOBAN INCISE 23X17" 6650EZ

## (undated) DEVICE — SU MONOCRYL 3-0 PS-2 27" Y427H

## (undated) DEVICE — LINEN GOWN X4 5410

## (undated) DEVICE — TUBING SMOKE EVAC 1CMX3M SEA3710

## (undated) DEVICE — SU MONOCRYL 4-0 PS-2 27" UND Y426H

## (undated) DEVICE — SOL NACL 0.9% IRRIG 3000ML BAG 2B7477

## (undated) DEVICE — SPONGE RAY-TEC 4X8" 7318

## (undated) DEVICE — APPLICATOR COTTON TIP 6"X2 STERILE LF 6012

## (undated) DEVICE — SOL ADH LIQUID BENZOIN SWAB 0.6ML C1544

## (undated) DEVICE — NDL PERC ACCESS 18GX20CM M0067001220

## (undated) DEVICE — DRAPE POUCH INSTRUMENT 1018

## (undated) DEVICE — BONE WAX 2.5GM W31G

## (undated) DEVICE — LIGHT HANDLE X1 31140133

## (undated) DEVICE — NDL SPINAL 22GA 7" QUINCKE 405149

## (undated) DEVICE — DRAPE LAP W/ARMBOARD 29410

## (undated) DEVICE — PACK ENT MINOR CUSTOM ASC

## (undated) DEVICE — DRAPE SLEEVE 599

## (undated) DEVICE — DRSG ABDOMINAL 07 1/2X8" 7197D

## (undated) DEVICE — PREP TECHNI-CARE CHLOROXYLENOL 3% 4OZ BOTTLE C222-4ZWO

## (undated) DEVICE — PREP POVIDONE-IODINE 7.5% SCRUB 4OZ BOTTLE MDS093945

## (undated) DEVICE — SU ETHILON 3-0 FS-1 18" 663G

## (undated) DEVICE — DRAIN JACKSON PRATT 15FR ROUND SU130-1323

## (undated) DEVICE — SU VICRYL 0 CT-1 36" J946H

## (undated) DEVICE — SUPPORTER ATHLETIC LG LATEX 202636

## (undated) DEVICE — SU CHROMIC 4-0 SH 27" G121H

## (undated) DEVICE — COVER ULTRASOUND PROBE W/GEL FLEXI-FEEL 6"X58" LF  25-FF658

## (undated) DEVICE — RETR PENILE WILSON XL 12"  TLC-5042-M

## (undated) DEVICE — SU SILK 2-0 SH 30" K833H

## (undated) DEVICE — CLIP HORIZON SM RED WIDE SLOT 001201

## (undated) DEVICE — SU VICRYL 3-0 SH 27" UND J416H

## (undated) DEVICE — SYR 20ML LL W/O NDL 302830

## (undated) DEVICE — DRAPE LEGGINGS CLEAR 8430

## (undated) DEVICE — SU SILK 3-0 SH 30" K832H

## (undated) DEVICE — DRAPE SHEET HALF 40X60" 9358

## (undated) DEVICE — COVER CAMERA IN-LIGHT DISP LT-C02

## (undated) DEVICE — KIT ENDO FIRST STEP DISINFECTANT 200ML W/POUCH EP-4

## (undated) DEVICE — STRAP STIRRUP W/SLIP 30187-030

## (undated) DEVICE — DRAPE SHEET MED 44X70" 9355

## (undated) DEVICE — GOWN IMPERVIOUS SPECIALTY XLG/XLONG 32474

## (undated) DEVICE — SUCTION MANIFOLD DORNOCH ULTRA CART UL-CL500

## (undated) DEVICE — DRSG PRIMAPORE 02X3" 7133

## (undated) DEVICE — TUBING SUCTION MEDI-VAC 1/4"X20' N620A

## (undated) DEVICE — CATH INTRODUCER SUPRAPUBIC 16FR SF-S16-851

## (undated) DEVICE — DRAPE U SPLIT 74X120" 29440

## (undated) DEVICE — RAD KNIFE HANDLE W/11 BLADE DISPOSABLE 371611

## (undated) DEVICE — GLOVE PROTEXIS MICRO 6.5  2D73PM65

## (undated) DEVICE — DRSG GAUZE 4X4" TRAY 6939

## (undated) DEVICE — SPONGE LAP 18X18" X8435

## (undated) DEVICE — SU SILK 3-0 TIE 12X30" A304H

## (undated) DEVICE — SU VICRYL 4-0 RB-1 27" UND J214H

## (undated) DEVICE — DRSG MEPILEX BORDER SACRUM 6.3X7.9" 282055

## (undated) DEVICE — SU PDS II 5-0 RB-1 DA 30" Z320H

## (undated) DEVICE — SYR 70ML TOOMEY 041170

## (undated) DEVICE — TUBE CULTURE AEROBIC/ANAEROBIC W/O SWABS A.C.T.I.1. 12401

## (undated) DEVICE — SOL NACL 0.9% 10ML VIAL 0409-4888-02

## (undated) DEVICE — SU VICRYL 0 CT-1 27" UND J260H

## (undated) RX ORDER — PHENYLEPHRINE HCL IN 0.9% NACL 1 MG/10 ML
SYRINGE (ML) INTRAVENOUS
Status: DISPENSED
Start: 2019-05-23

## (undated) RX ORDER — CIPROFLOXACIN 500 MG/1
TABLET, FILM COATED ORAL
Status: DISPENSED
Start: 2022-10-31

## (undated) RX ORDER — FENTANYL CITRATE 50 UG/ML
INJECTION, SOLUTION INTRAMUSCULAR; INTRAVENOUS
Status: DISPENSED
Start: 2021-10-06

## (undated) RX ORDER — CALCIUM CHLORIDE 100 MG/ML
INJECTION INTRAVENOUS; INTRAVENTRICULAR
Status: DISPENSED
Start: 2019-05-23

## (undated) RX ORDER — CIPROFLOXACIN 500 MG/1
TABLET, FILM COATED ORAL
Status: DISPENSED
Start: 2022-03-15

## (undated) RX ORDER — CIPROFLOXACIN 500 MG/1
TABLET, FILM COATED ORAL
Status: DISPENSED
Start: 2020-12-07

## (undated) RX ORDER — AMPICILLIN 2 G/1
INJECTION, POWDER, FOR SOLUTION INTRAVENOUS
Status: DISPENSED
Start: 2021-08-20

## (undated) RX ORDER — GABAPENTIN 300 MG/1
CAPSULE ORAL
Status: DISPENSED
Start: 2020-07-03

## (undated) RX ORDER — PROPOFOL 10 MG/ML
INJECTION, EMULSION INTRAVENOUS
Status: DISPENSED
Start: 2020-07-03

## (undated) RX ORDER — BUPIVACAINE HYDROCHLORIDE 2.5 MG/ML
INJECTION, SOLUTION EPIDURAL; INFILTRATION; INTRACAUDAL
Status: DISPENSED
Start: 2019-07-10

## (undated) RX ORDER — FENTANYL CITRATE 50 UG/ML
INJECTION, SOLUTION INTRAMUSCULAR; INTRAVENOUS
Status: DISPENSED
Start: 2022-02-28

## (undated) RX ORDER — OXYCODONE HYDROCHLORIDE 5 MG/1
TABLET ORAL
Status: DISPENSED
Start: 2021-09-20

## (undated) RX ORDER — CIPROFLOXACIN 500 MG/1
TABLET, FILM COATED ORAL
Status: DISPENSED
Start: 2023-01-25

## (undated) RX ORDER — OXYCODONE HYDROCHLORIDE 5 MG/1
TABLET ORAL
Status: DISPENSED
Start: 2021-10-06

## (undated) RX ORDER — CIPROFLOXACIN 500 MG/1
TABLET, FILM COATED ORAL
Status: DISPENSED
Start: 2022-03-23

## (undated) RX ORDER — SULFAMETHOXAZOLE/TRIMETHOPRIM 800-160 MG
TABLET ORAL
Status: DISPENSED
Start: 2024-07-09

## (undated) RX ORDER — CEFAZOLIN SODIUM 1 G/3ML
INJECTION, POWDER, FOR SOLUTION INTRAMUSCULAR; INTRAVENOUS
Status: DISPENSED
Start: 2019-05-23

## (undated) RX ORDER — CIPROFLOXACIN 500 MG/1
TABLET, FILM COATED ORAL
Status: DISPENSED
Start: 2023-10-06

## (undated) RX ORDER — DEXAMETHASONE SODIUM PHOSPHATE 4 MG/ML
INJECTION, SOLUTION INTRA-ARTICULAR; INTRALESIONAL; INTRAMUSCULAR; INTRAVENOUS; SOFT TISSUE
Status: DISPENSED
Start: 2021-09-20

## (undated) RX ORDER — FENTANYL CITRATE 50 UG/ML
INJECTION, SOLUTION INTRAMUSCULAR; INTRAVENOUS
Status: DISPENSED
Start: 2021-08-20

## (undated) RX ORDER — LIDOCAINE HYDROCHLORIDE 20 MG/ML
INJECTION, SOLUTION EPIDURAL; INFILTRATION; INTRACAUDAL; PERINEURAL
Status: DISPENSED
Start: 2021-09-20

## (undated) RX ORDER — HYDROMORPHONE HYDROCHLORIDE 1 MG/ML
INJECTION, SOLUTION INTRAMUSCULAR; INTRAVENOUS; SUBCUTANEOUS
Status: DISPENSED
Start: 2021-09-20

## (undated) RX ORDER — CEFAZOLIN SODIUM 1 G/3ML
INJECTION, POWDER, FOR SOLUTION INTRAMUSCULAR; INTRAVENOUS
Status: DISPENSED
Start: 2020-07-03

## (undated) RX ORDER — BUPIVACAINE HYDROCHLORIDE 2.5 MG/ML
INJECTION, SOLUTION EPIDURAL; INFILTRATION; INTRACAUDAL
Status: DISPENSED
Start: 2019-07-05

## (undated) RX ORDER — EPHEDRINE SULFATE 50 MG/ML
INJECTION, SOLUTION INTRAMUSCULAR; INTRAVENOUS; SUBCUTANEOUS
Status: DISPENSED
Start: 2019-05-23

## (undated) RX ORDER — CIPROFLOXACIN 500 MG/1
TABLET, FILM COATED ORAL
Status: DISPENSED
Start: 2023-07-25

## (undated) RX ORDER — ONDANSETRON 2 MG/ML
INJECTION INTRAMUSCULAR; INTRAVENOUS
Status: DISPENSED
Start: 2020-07-03

## (undated) RX ORDER — FENTANYL CITRATE-0.9 % NACL/PF 10 MCG/ML
PLASTIC BAG, INJECTION (ML) INTRAVENOUS
Status: DISPENSED
Start: 2021-09-20

## (undated) RX ORDER — SODIUM CHLORIDE, SODIUM LACTATE, POTASSIUM CHLORIDE, CALCIUM CHLORIDE 600; 310; 30; 20 MG/100ML; MG/100ML; MG/100ML; MG/100ML
INJECTION, SOLUTION INTRAVENOUS
Status: DISPENSED
Start: 2019-05-23

## (undated) RX ORDER — ONDANSETRON 2 MG/ML
INJECTION INTRAMUSCULAR; INTRAVENOUS
Status: DISPENSED
Start: 2019-05-23

## (undated) RX ORDER — LIDOCAINE HYDROCHLORIDE 10 MG/ML
INJECTION, SOLUTION EPIDURAL; INFILTRATION; INTRACAUDAL; PERINEURAL
Status: DISPENSED
Start: 2022-07-14

## (undated) RX ORDER — PROPOFOL 10 MG/ML
INJECTION, EMULSION INTRAVENOUS
Status: DISPENSED
Start: 2019-05-23

## (undated) RX ORDER — SULFAMETHOXAZOLE/TRIMETHOPRIM 800-160 MG
TABLET ORAL
Status: DISPENSED
Start: 2020-02-25

## (undated) RX ORDER — ACETAMINOPHEN 325 MG/1
TABLET ORAL
Status: DISPENSED
Start: 2020-07-03

## (undated) RX ORDER — CIPROFLOXACIN 500 MG/1
TABLET, FILM COATED ORAL
Status: DISPENSED
Start: 2022-02-15

## (undated) RX ORDER — LIDOCAINE HYDROCHLORIDE 20 MG/ML
INJECTION, SOLUTION EPIDURAL; INFILTRATION; INTRACAUDAL; PERINEURAL
Status: DISPENSED
Start: 2019-05-23

## (undated) RX ORDER — CIPROFLOXACIN 500 MG/1
TABLET, FILM COATED ORAL
Status: DISPENSED
Start: 2023-05-15

## (undated) RX ORDER — CIPROFLOXACIN 500 MG/1
TABLET, FILM COATED ORAL
Status: DISPENSED
Start: 2022-06-14

## (undated) RX ORDER — FENTANYL CITRATE 50 UG/ML
INJECTION, SOLUTION INTRAMUSCULAR; INTRAVENOUS
Status: DISPENSED
Start: 2021-09-20

## (undated) RX ORDER — CIPROFLOXACIN 500 MG/1
TABLET, FILM COATED ORAL
Status: DISPENSED
Start: 2020-09-28

## (undated) RX ORDER — FENTANYL CITRATE 50 UG/ML
INJECTION, SOLUTION INTRAMUSCULAR; INTRAVENOUS
Status: DISPENSED
Start: 2020-07-03

## (undated) RX ORDER — CIPROFLOXACIN 500 MG/1
TABLET, FILM COATED ORAL
Status: DISPENSED
Start: 2022-12-30

## (undated) RX ORDER — CIPROFLOXACIN 500 MG/1
TABLET, FILM COATED ORAL
Status: DISPENSED
Start: 2022-09-09

## (undated) RX ORDER — LIDOCAINE HYDROCHLORIDE 10 MG/ML
INJECTION, SOLUTION EPIDURAL; INFILTRATION; INTRACAUDAL; PERINEURAL
Status: DISPENSED
Start: 2019-07-05

## (undated) RX ORDER — BUPIVACAINE HYDROCHLORIDE AND EPINEPHRINE 5; 5 MG/ML; UG/ML
INJECTION, SOLUTION PERINEURAL
Status: DISPENSED
Start: 2021-09-20

## (undated) RX ORDER — TRIAMCINOLONE ACETONIDE 40 MG/ML
INJECTION, SUSPENSION INTRA-ARTICULAR; INTRAMUSCULAR
Status: DISPENSED
Start: 2019-07-10

## (undated) RX ORDER — DEXAMETHASONE SODIUM PHOSPHATE 4 MG/ML
INJECTION, SOLUTION INTRA-ARTICULAR; INTRALESIONAL; INTRAMUSCULAR; INTRAVENOUS; SOFT TISSUE
Status: DISPENSED
Start: 2020-07-03

## (undated) RX ORDER — CEFAZOLIN SODIUM 2 G/100ML
INJECTION, SOLUTION INTRAVENOUS
Status: DISPENSED
Start: 2019-05-23

## (undated) RX ORDER — PROPOFOL 10 MG/ML
INJECTION, EMULSION INTRAVENOUS
Status: DISPENSED
Start: 2021-09-20

## (undated) RX ORDER — DIPHENHYDRAMINE HYDROCHLORIDE 50 MG/ML
INJECTION INTRAMUSCULAR; INTRAVENOUS
Status: DISPENSED
Start: 2022-03-11

## (undated) RX ORDER — FENTANYL CITRATE 50 UG/ML
INJECTION, SOLUTION INTRAMUSCULAR; INTRAVENOUS
Status: DISPENSED
Start: 2022-03-11

## (undated) RX ORDER — LIDOCAINE HYDROCHLORIDE 20 MG/ML
JELLY TOPICAL
Status: DISPENSED
Start: 2020-03-02

## (undated) RX ORDER — CIPROFLOXACIN 500 MG/1
TABLET, FILM COATED ORAL
Status: DISPENSED
Start: 2023-12-19

## (undated) RX ORDER — TRIAMCINOLONE ACETONIDE 40 MG/ML
INJECTION, SUSPENSION INTRA-ARTICULAR; INTRAMUSCULAR
Status: DISPENSED
Start: 2019-07-05

## (undated) RX ORDER — GENTAMICIN 40 MG/ML
INJECTION, SOLUTION INTRAMUSCULAR; INTRAVENOUS
Status: DISPENSED
Start: 2021-08-20

## (undated) RX ORDER — LIDOCAINE HYDROCHLORIDE 10 MG/ML
INJECTION, SOLUTION EPIDURAL; INFILTRATION; INTRACAUDAL; PERINEURAL
Status: DISPENSED
Start: 2019-07-10

## (undated) RX ORDER — ONDANSETRON 2 MG/ML
INJECTION INTRAMUSCULAR; INTRAVENOUS
Status: DISPENSED
Start: 2021-09-20

## (undated) RX ORDER — OXYCODONE HYDROCHLORIDE 5 MG/1
TABLET ORAL
Status: DISPENSED
Start: 2021-08-20

## (undated) RX ORDER — ALBUMIN, HUMAN INJ 5% 5 %
SOLUTION INTRAVENOUS
Status: DISPENSED
Start: 2019-05-23

## (undated) RX ORDER — CIPROFLOXACIN 500 MG/1
TABLET, FILM COATED ORAL
Status: DISPENSED
Start: 2024-05-13

## (undated) RX ORDER — OXYCODONE HYDROCHLORIDE 5 MG/1
TABLET ORAL
Status: DISPENSED
Start: 2020-07-03

## (undated) RX ORDER — LIDOCAINE HYDROCHLORIDE 20 MG/ML
INJECTION, SOLUTION EPIDURAL; INFILTRATION; INTRACAUDAL; PERINEURAL
Status: DISPENSED
Start: 2020-07-03

## (undated) RX ORDER — FENTANYL CITRATE 50 UG/ML
INJECTION, SOLUTION INTRAMUSCULAR; INTRAVENOUS
Status: DISPENSED
Start: 2019-05-23

## (undated) RX ORDER — CIPROFLOXACIN 500 MG/1
TABLET, FILM COATED ORAL
Status: DISPENSED
Start: 2022-05-09

## (undated) RX ORDER — PHENYLEPHRINE HCL IN 0.9% NACL 1 MG/10 ML
SYRINGE (ML) INTRAVENOUS
Status: DISPENSED
Start: 2020-07-03